# Patient Record
Sex: FEMALE | Race: WHITE
[De-identification: names, ages, dates, MRNs, and addresses within clinical notes are randomized per-mention and may not be internally consistent; named-entity substitution may affect disease eponyms.]

---

## 2025-02-10 ENCOUNTER — HOSPITAL ENCOUNTER (EMERGENCY)
Age: 55
Discharge: HOME | End: 2025-02-10
Payer: COMMERCIAL

## 2025-02-10 VITALS
SYSTOLIC BLOOD PRESSURE: 115 MMHG | TEMPERATURE: 98.06 F | HEART RATE: 86 BPM | OXYGEN SATURATION: 98 % | DIASTOLIC BLOOD PRESSURE: 78 MMHG

## 2025-02-10 VITALS — SYSTOLIC BLOOD PRESSURE: 104 MMHG | OXYGEN SATURATION: 95 % | DIASTOLIC BLOOD PRESSURE: 64 MMHG | HEART RATE: 84 BPM

## 2025-02-10 VITALS — SYSTOLIC BLOOD PRESSURE: 98 MMHG | DIASTOLIC BLOOD PRESSURE: 48 MMHG | OXYGEN SATURATION: 97 % | HEART RATE: 83 BPM

## 2025-02-10 VITALS — HEART RATE: 86 BPM | SYSTOLIC BLOOD PRESSURE: 104 MMHG | DIASTOLIC BLOOD PRESSURE: 64 MMHG

## 2025-02-10 VITALS — DIASTOLIC BLOOD PRESSURE: 76 MMHG | HEART RATE: 83 BPM | SYSTOLIC BLOOD PRESSURE: 135 MMHG

## 2025-02-10 VITALS — HEART RATE: 85 BPM | OXYGEN SATURATION: 93 %

## 2025-02-10 VITALS — OXYGEN SATURATION: 95 % | HEART RATE: 84 BPM | DIASTOLIC BLOOD PRESSURE: 54 MMHG | SYSTOLIC BLOOD PRESSURE: 109 MMHG

## 2025-02-10 VITALS — HEART RATE: 86 BPM | OXYGEN SATURATION: 94 %

## 2025-02-10 VITALS — HEART RATE: 84 BPM

## 2025-02-10 VITALS — OXYGEN SATURATION: 93 % | DIASTOLIC BLOOD PRESSURE: 64 MMHG | SYSTOLIC BLOOD PRESSURE: 113 MMHG | HEART RATE: 84 BPM

## 2025-02-10 VITALS — HEART RATE: 91 BPM

## 2025-02-10 VITALS — TEMPERATURE: 98.24 F | HEART RATE: 83 BPM | OXYGEN SATURATION: 97 %

## 2025-02-10 VITALS — HEART RATE: 86 BPM | TEMPERATURE: 98.2 F | SYSTOLIC BLOOD PRESSURE: 97 MMHG | DIASTOLIC BLOOD PRESSURE: 73 MMHG

## 2025-02-10 VITALS
DIASTOLIC BLOOD PRESSURE: 65 MMHG | TEMPERATURE: 98.4 F | OXYGEN SATURATION: 78 % | SYSTOLIC BLOOD PRESSURE: 112 MMHG | HEART RATE: 95 BPM

## 2025-02-10 VITALS — HEART RATE: 83 BPM | SYSTOLIC BLOOD PRESSURE: 109 MMHG | DIASTOLIC BLOOD PRESSURE: 56 MMHG | OXYGEN SATURATION: 87 %

## 2025-02-10 VITALS — HEART RATE: 88 BPM | SYSTOLIC BLOOD PRESSURE: 109 MMHG | OXYGEN SATURATION: 93 % | DIASTOLIC BLOOD PRESSURE: 57 MMHG

## 2025-02-10 VITALS — HEART RATE: 87 BPM

## 2025-02-10 VITALS — OXYGEN SATURATION: 78 %

## 2025-02-10 VITALS — HEART RATE: 85 BPM | OXYGEN SATURATION: 94 %

## 2025-02-10 VITALS — HEART RATE: 81 BPM | SYSTOLIC BLOOD PRESSURE: 116 MMHG | DIASTOLIC BLOOD PRESSURE: 65 MMHG

## 2025-02-10 VITALS — DIASTOLIC BLOOD PRESSURE: 67 MMHG | SYSTOLIC BLOOD PRESSURE: 111 MMHG | HEART RATE: 84 BPM | OXYGEN SATURATION: 94 %

## 2025-02-10 VITALS — SYSTOLIC BLOOD PRESSURE: 98 MMHG | OXYGEN SATURATION: 97 % | DIASTOLIC BLOOD PRESSURE: 48 MMHG | HEART RATE: 85 BPM

## 2025-02-10 VITALS — HEART RATE: 82 BPM

## 2025-02-10 VITALS — HEART RATE: 82 BPM | OXYGEN SATURATION: 97 %

## 2025-02-10 VITALS — OXYGEN SATURATION: 93 % | SYSTOLIC BLOOD PRESSURE: 97 MMHG | DIASTOLIC BLOOD PRESSURE: 73 MMHG | HEART RATE: 89 BPM

## 2025-02-10 VITALS — SYSTOLIC BLOOD PRESSURE: 113 MMHG | DIASTOLIC BLOOD PRESSURE: 65 MMHG | HEART RATE: 85 BPM

## 2025-02-10 VITALS — HEART RATE: 88 BPM | OXYGEN SATURATION: 94 %

## 2025-02-10 VITALS — HEART RATE: 81 BPM

## 2025-02-10 VITALS — HEART RATE: 89 BPM

## 2025-02-10 VITALS — HEART RATE: 84 BPM | DIASTOLIC BLOOD PRESSURE: 46 MMHG | SYSTOLIC BLOOD PRESSURE: 107 MMHG

## 2025-02-10 VITALS — OXYGEN SATURATION: 96 % | HEART RATE: 83 BPM

## 2025-02-10 VITALS — HEART RATE: 83 BPM | OXYGEN SATURATION: 94 %

## 2025-02-10 VITALS — OXYGEN SATURATION: 91 % | HEART RATE: 84 BPM

## 2025-02-10 VITALS — HEART RATE: 83 BPM

## 2025-02-10 VITALS — OXYGEN SATURATION: 95 % | HEART RATE: 84 BPM

## 2025-02-10 VITALS — HEART RATE: 86 BPM

## 2025-02-10 VITALS — TEMPERATURE: 98.24 F

## 2025-02-10 VITALS — OXYGEN SATURATION: 94 %

## 2025-02-10 VITALS — BODY MASS INDEX: 42.7 KG/M2

## 2025-02-10 VITALS — HEART RATE: 85 BPM

## 2025-02-10 VITALS — HEART RATE: 114 BPM

## 2025-02-10 DIAGNOSIS — R79.89: ICD-10-CM

## 2025-02-10 DIAGNOSIS — D64.9: Primary | ICD-10-CM

## 2025-02-10 DIAGNOSIS — N18.9: ICD-10-CM

## 2025-02-10 DIAGNOSIS — R44.1: ICD-10-CM

## 2025-02-10 LAB
ADD MANUAL DIFF: NO
ALANINE AMINOTRANSFERASE: 12 U/L (ref 14–59)
ALBUMIN GLOBULIN RATIO: 0.5
ALBUMIN LEVEL: 2.2 G/DL (ref 3.4–5)
ALKALINE PHOSPHATASE: 51 U/L (ref 46–116)
ANION GAP: 10
ASPARTATE AMINO TRANSFERASE: 18 U/L (ref 15–37)
BLOOD UREA NITROGEN: 37 MG/DL (ref 7–18)
CALCIUM: 9.3 MG/DL (ref 8.5–10.1)
CARBON DIOXIDE: 35.4 MMOL/L (ref 21–32)
CHLORIDE: 99 MMOL/L (ref 98–107)
CO2 BLD-SCNC: 35.4 MMOL/L (ref 21–32)
ESTIMATED GFR (AFRICAN AMERICA: 16
ESTIMATED GFR (NON-AFRICAN AME: 13
GLOBULIN: 4.8 G/DL
GLUCOSE BLD-MCNC: 121 MG/DL (ref 74–106)
HCT VFR BLD CALC: 22.2 % (ref 36–48)
HEMATOCRIT: 22.2 % (ref 36–48)
HEMOGLOBIN: 6.6 G/DL (ref 12–16)
IMMATURE GRANULOCYTES ABS AUTO: 0.18 10^3/UL (ref 0–0.03)
IMMATURE GRANULOCYTES PCT AUTO: 1.6 % (ref 0–0.5)
LYMPHOCYTES  ABSOLUTE AUTO: 2.3 10^3/UL (ref 1.2–3.8)
MCV RBC: 90.6 FL (ref 81–99)
MEAN CORPUSCULAR HEMOGLOBIN: 26.9 PG (ref 26.7–34)
MEAN CORPUSCULAR HGB CONC: 29.7 G/DL (ref 29.9–35.2)
MEAN CORPUSCULAR VOLUME: 90.6 FL (ref 81–99)
PLATELET # BLD: 367 10^3/UL (ref 150–450)
PLATELET COUNT: 367 10^3/UL (ref 150–450)
POTASSIUM SERPLBLD-SCNC: 4.4 MMOL/L (ref 3.5–5.1)
POTASSIUM: 4.4 MMOL/L (ref 3.5–5.1)
RED BLOOD COUNT: 2.45 10^6/UL (ref 4.2–5.4)
SODIUM BLD-SCNC: 140 MMOL/L (ref 136–145)
SODIUM: 140 MMOL/L (ref 136–145)
TOTAL PROTEIN: 7 G/DL (ref 6.4–8.2)
WBC # BLD: 11.5 10^3/UL (ref 4–11)
WHITE BLOOD COUNT: 11.5 10^3/UL (ref 4–11)

## 2025-02-10 PROCEDURE — 93005 ELECTROCARDIOGRAM TRACING: CPT

## 2025-02-10 PROCEDURE — 84484 ASSAY OF TROPONIN QUANT: CPT

## 2025-02-10 PROCEDURE — 99284 EMERGENCY DEPT VISIT MOD MDM: CPT

## 2025-02-10 PROCEDURE — 36592 COLLECT BLOOD FROM PICC: CPT

## 2025-02-10 PROCEDURE — 86923 COMPATIBILITY TEST ELECTRIC: CPT

## 2025-02-10 PROCEDURE — 80053 COMPREHEN METABOLIC PANEL: CPT

## 2025-02-10 PROCEDURE — 86900 BLOOD TYPING SEROLOGIC ABO: CPT

## 2025-02-10 PROCEDURE — 36430 TRANSFUSION BLD/BLD COMPNT: CPT

## 2025-02-10 PROCEDURE — 86850 RBC ANTIBODY SCREEN: CPT

## 2025-02-10 PROCEDURE — 86901 BLOOD TYPING SEROLOGIC RH(D): CPT

## 2025-02-10 PROCEDURE — 36415 COLL VENOUS BLD VENIPUNCTURE: CPT

## 2025-02-10 PROCEDURE — 70450 CT HEAD/BRAIN W/O DYE: CPT

## 2025-02-10 PROCEDURE — 85025 COMPLETE CBC W/AUTO DIFF WBC: CPT

## 2025-02-10 PROCEDURE — P9016 RBC LEUKOCYTES REDUCED: HCPCS

## 2025-02-10 NOTE — ED_ITS
HPI - Altered Mental Status    
General    
Chief Complaint: Altered Mental Status    
Stated Complaint: WEAKNESS    
Time Seen by Provider: 02/10/25 07:11    
Source: patient    
Mode of arrival: ambulance    
Limitations: no limitations    
History of Present Illness    
HPI narrative:     
The patient is a 54-year-old female who is coming to us from a skilled nursing   
facility for concern of altered mental status, the patient herself denies any   
concerns she mentioned that she did admit that she think that she saw someone in  
her room today and she trying getting out of the bed when the nurse came and   
that may be what was considered that she is hallucinating    
    
    
The patient admitted that this happened only this time and apparently she thinks  
that maybe the antibiotic that she was provided with before because her this    
    
At this moment the patient denies any concerns or any pain    
Related Data    
                                Home Medications    
    
    
    
?Medication ?Instructions ?Recorded ?Confirmed    
     
acetaminophen 325 mg tablet 650 mg PO Q4H PRN fever 02/10/25 02/10/25    
     
albuterol sulfate 2.5 mg/3 mL mg 02/10/25     
    
(0.083 %) solution for nebulization       
     
amiodarone 200 mg tablet 400 mg PO Q24H 02/10/25 02/10/25    
     
amitriptyline 50 mg tablet 50 mg PO .every night 02/10/25 02/10/25    
     
apixaban 5 mg tablet (Eliquis) 5 mg PO BID 02/10/25 02/10/25    
     
duloxetine 60 mg capsule,delayed 60 mg PO DAILY 02/10/25 02/10/25    
    
release       
     
losartan 25 mg tablet 25 mg PO DAILY 02/10/25 02/10/25    
     
omeprazole 20 mg capsule,delayed 20 mg PO DAILY 02/10/25 02/10/25    
    
release       
     
potassium chloride 10 mEq 10 meq PO DAILY 02/10/25 02/10/25    
    
tablet,extended release       
     
pravastatin 40 mg tablet 40 mg PO DAILY 02/10/25 02/10/25    
    
    
    
                                    Allergies    
    
    
    
Allergy/AdvReac Type Severity Reaction Status Date / Time    
     
amoxicillin (From Augmentin) AdvReac Intermediate Unknown Verified 02/10/25   
07:09    
     
aspirin AdvReac Intermediate Unknown Verified 02/10/25 07:09    
     
clavulanic acid (From AdvReac Intermediate Unknown Verified 02/10/25 07:09    
    
Augmentin)         
     
diphenhydramine (From AdvReac Intermediate Unknown Verified 02/10/25 07:09    
    
Benadryl)         
     
naproxen AdvReac Intermediate Unknown Verified 02/10/25 07:09    
     
NSAIDS (Non-Steroidal AdvReac Intermediate Unknown Verified 02/10/25 07:09    
    
Anti-Inflamma         
    
    
    
    
Review of Systems    
    
    
ROS      
    
 Status of ROS                          10 or more systems reviewed and unremark    
able except as noted in     
history and below       
    
    
PFSH    
PFSH    
Social History    
Little interest or pleasure in doing things:  not at all     
Feeling down, depressed, or hopeless:  not at all     
    
    
    
Exam    
Narrative    
Exam Narrative:     
Nurses notes and vital signs reviewed and patient is not hypoxic.    
    
General:  Well-appearing and in no apparent distress.      
Skin:  Warm, pale    
No rash.    
Head:  Normocephalic, atraumatic.    
Neck:  Supple, non-tender.    
Eye:  Pupils are equal, round and EOMI. No scleral icterus.    
Ears, Nose, Mouth, and Throat:  TM are clear, no nasal mucosal hypertrophy.    
Oral mucosa is moist, no posterior oropharynx erythema, uvula is mid-line    
Cardiovascular:  Regular Rate and Rhythm without murmur, gallop or rub.    
Respiratory:  No accessory muscle use or respiratory distress.    
Lungs are clear to auscultation, no wheezing, rales or rhonchi    
Chest Wall:  no tenderness    
Back: No midline thoracic or lumbar vertebral tenderness. No CVA tenderness    
Musculoskeletal:  normal ROM, no calf or popliteal tenderness, no lower   
extremity edema/swelling    
GI:  Abdomen is soft, non-distended.  Normal bowel sounds.    
No masses appreciated.    
No tenderness to palpation. No rebound, guarding, or rigidity noted.    
Constitutional    
Vital Signs, click to edit/add:     
    
                                Last Vital Signs    
    
    
    
Temp  98.2 F   02/10/25 11:18    
     
Pulse  82   02/10/25 11:20    
     
Resp  11 L  02/10/25 11:20    
     
BP  109/54   02/10/25 11:17    
     
Pulse Ox  97   02/10/25 11:20    
     
O2 Del Method  Room Air  02/10/25 07:24    
     
O2 Flow Rate  3   02/10/25 07:09    
    
    
    
    
    
Course    
Vital Signs    
Vital signs:     
    
                                   Vital Signs    
    
    
    
Temperature  98.4 F   02/10/25 07:09    
     
Pulse Rate  95 H  02/10/25 07:09    
     
Respiratory Rate  18   02/10/25 07:09    
     
Blood Pressure  112/65   02/10/25 07:09    
     
Pulse Oximetry  78 L  02/10/25 07:09    
     
Oxygen Delivery Method  Room Air  02/10/25 07:09    
     
Oxygen Delivery Flow Rate  3   02/10/25 07:09    
    
    
                                            
    
    
    
Temperature  98.2 F   02/10/25 11:18    
     
Pulse Rate  82   02/10/25 11:20    
     
Respiratory Rate  11 L  02/10/25 11:20    
     
Blood Pressure  109/54   02/10/25 11:17    
     
Pulse Oximetry  97   02/10/25 11:20    
     
Oxygen Delivery Method  Room Air  02/10/25 07:24    
     
Oxygen Delivery Flow Rate  3   02/10/25 07:09    
    
    
    
    
    
MDM - Altered Mental Status    
MDM Narrative    
Medical decision making narrative:     
The patient EKG in the ER showing sinus rhythm with a heart rate of 84 no ST   
elevation or depression the patient does not have any chest pain    
    
The patient apparently was admitted almost within the last month to [facility] where she was diagnosed with chronic kidney disease in addition to   
apparently UTI, the patient was discharged to the skilled nursing facility   
because of muscular deconditioning    
    
The patient did admit that she was hallucinating about someone being with her in  
the room earlier this morning and that made them sent her over here for   
evaluation    
    
But the patient at the moment have no complaint and her blood workup did show   
that her hemoglobin 6.6 she admits that sometimes she is tired and weak most of   
the time she is denying any chest pain or difficulty breathing    
    
The patient consented to getting 1 unit of blood transfusion    
    
There is no active bleeding at the moment and her kidney function shows GFR of   
13 which can explain her having anemia of chronic disease    
    
The patient right now was transfused with 1 unit of packed RBCs after which she   
was monitored and she had no acute complaints    
    
The patient then will be sent back to the skilled nursing facility    
    
It was noted that the patient troponin is elevated and this is mostly elevated   
secondary to the chronic kidney disease and I did repeat the test and there was   
no trending up that could be significant for any acute coronary syndrome    
Lab Data    
Labs:     
    
                                   Lab Results    
    
    
    
  02/10/25 02/10/25 02/10/25 Range/Units    
    
  07:15 08:06 08:51     
     
WBC  11.5 H    (4.0-11.0)  10^3/uL    
     
RBC  2.45 L    (4.20-5.40)  10^6/uL    
     
Hgb  6.6 L*    (12.0-16.0)  g/dL    
     
Hct  22.2 L*    (36.0-48.0)  %    
     
MCV  90.6    (81.0-99.0)  fL    
     
MCH  26.9    (26.7-34.0)  pg    
     
MCHC  29.7 L    (29.9-35.2)  g/dL    
     
RDW  16.5 H    (11.0-15.0)  %    
     
Plt Count  367    (150-450)  10^3/uL    
     
MPV  9.8    (9.5-13.5)  fL    
     
Neut % (Auto)  68.8    (43.0-75.0)  %    
     
Lymph % (Auto)  19.7 L    (20.5-60.0)  %    
     
Mono % (Auto)  7.8    (1.7-12.0)  %    
     
Eos % (Auto)  1.8    (0.9-7.0)  %    
     
Baso % (Auto)  0.3    (0.2-2.0)  %    
     
Neut # (Auto)  7.9 H    (1.4-6.5)  10^3/uL    
     
Lymph # (Auto)  2.3    (1.2-3.8)  10^3/uL    
     
Mono # (Auto)  0.9 H    (0.3-0.8)  10^3/uL    
     
Eos # (Auto)  0.2    (0.0-0.7)  10^3/uL    
     
Baso # (Auto)  0.0    (0.0-0.1)  10^3/uL    
     
Abs Immat Gran (auto)  0.18 H    (0.00-0.03)  10^3/uL    
     
Imm/Tot Granulo (auto)  1.6 H    (0.0-0.5)  %    
     
Sodium  140    (136-145)  mmol/L    
     
Potassium  4.4    (3.5-5.1)  mmol/L    
     
Chloride  99    ()  mmol/L    
     
Carbon Dioxide  35.4 H    (21.0-32.0)  mmol/L    
     
Anion Gap  10.0        
     
BUN  37.0 H    (7.0-18.0)  mg/dL    
     
Creatinine  3.62 H    (0.55-1.02)  mg/dL    
     
Est GFR ( Amer)  16 L    (>=60 mL/min/1.73m^2)      
     
Est GFR (Non-Af Amer)  13 L    (>=60 mL/min/1.73m^2)      
     
BUN/Creatinine Ratio  10.2        
     
Glucose  121 H    ()  mg/dL    
     
Calcium  9.3    (8.5-10.1)  mg/dL    
     
Total Bilirubin  0.3    (0.2-1.0)  mg/dL    
     
AST  18    (15-37)  U/L    
     
ALT  12 L    (14-59)  U/L    
     
Alkaline Phosphatase  51    ()  U/L    
     
Troponin I High Sens    111.0 H*  (4.0-51.3)  pg/mL    
     
Total Protein  7.0    (6.4-8.2)  g/dL    
     
Albumin  2.2 L    (3.4-5.0)  g/dL    
     
Globulin  4.8    g/dL    
     
Albumin/Globulin Ratio  0.5        
     
Blood Type   A Negative       
     
Antibody Screen   Negative       
     
Crossmatch   See Detail       
    
    
    
    
  02/10/25 Range/Units    
    
  10:28     
     
WBC   (4.0-11.0)  10^3/uL    
     
RBC   (4.20-5.40)  10^6/uL    
     
Hgb   (12.0-16.0)  g/dL    
     
Hct   (36.0-48.0)  %    
     
MCV   (81.0-99.0)  fL    
     
MCH   (26.7-34.0)  pg    
     
MCHC   (29.9-35.2)  g/dL    
     
RDW   (11.0-15.0)  %    
     
Plt Count   (150-450)  10^3/uL    
     
MPV   (9.5-13.5)  fL    
     
Neut % (Auto)   (43.0-75.0)  %    
     
Lymph % (Auto)   (20.5-60.0)  %    
     
Mono % (Auto)   (1.7-12.0)  %    
     
Eos % (Auto)   (0.9-7.0)  %    
     
Baso % (Auto)   (0.2-2.0)  %    
     
Neut # (Auto)   (1.4-6.5)  10^3/uL    
     
Lymph # (Auto)   (1.2-3.8)  10^3/uL    
     
Mono # (Auto)   (0.3-0.8)  10^3/uL    
     
Eos # (Auto)   (0.0-0.7)  10^3/uL    
     
Baso # (Auto)   (0.0-0.1)  10^3/uL    
     
Abs Immat Gran (auto)   (0.00-0.03)  10^3/uL    
     
Imm/Tot Granulo (auto)   (0.0-0.5)  %    
     
Sodium   (136-145)  mmol/L    
     
Potassium   (3.5-5.1)  mmol/L    
     
Chloride   ()  mmol/L    
     
Carbon Dioxide   (21.0-32.0)  mmol/L    
     
Anion Gap       
     
BUN   (7.0-18.0)  mg/dL    
     
Creatinine   (0.55-1.02)  mg/dL    
     
Est GFR (African Amer)   (>=60 mL/min/1.73m^2)      
     
Est GFR (Non-Af Amer)   (>=60 mL/min/1.73m^2)      
     
BUN/Creatinine Ratio       
     
Glucose   ()  mg/dL    
     
Calcium   (8.5-10.1)  mg/dL    
     
Total Bilirubin   (0.2-1.0)  mg/dL    
     
AST   (15-37)  U/L    
     
ALT   (14-59)  U/L    
     
Alkaline Phosphatase   ()  U/L    
     
Troponin I High Sens  116.8 H*  (4.0-51.3)  pg/mL    
     
Total Protein   (6.4-8.2)  g/dL    
     
Albumin   (3.4-5.0)  g/dL    
     
Globulin   g/dL    
     
Albumin/Globulin Ratio       
     
Blood Type       
     
Antibody Screen       
     
Crossmatch       
    
    
    
    
    
Discharge Plan    
Discharge    
Chief Complaint: Altered Mental Status    
    
Clinical Impression:    
 Anemia, Transfusion of blood during current hospitalisation, Chronic kidney   
disease    
    
    
Patient Disposition: Home, Self-Care    
    
Time of Disposition Decision: 11:22    
    
Condition: Good    
    
Prescriptions / Home Meds:    
No Action    
  acetaminophen 325 mg tablet     
   650 mg PO Q4H PRN (Reason: fever)     
  albuterol sulfate 2.5 mg /3 mL (0.083 %) solution for nebulization     
           
  amiodarone 200 mg tablet     
   400 mg PO Q24H     
  amitriptyline 50 mg tablet     
   50 mg PO .every night     
  duloxetine 60 mg capsule,delayed release(DR/EC)     
   60 mg PO DAILY     
  Eliquis 5 mg tablet     
   5 mg PO BID     
  losartan 25 mg tablet     
   25 mg PO DAILY     
  omeprazole 20 mg capsule,delayed release(DR/EC)     
   20 mg PO DAILY     
  potassium chloride 10 mEq tablet extended release     
   10 meq PO DAILY     
  pravastatin 40 mg tablet     
   40 mg PO DAILY     
    
Print Language: English    
    
Instructions:  Chronic Kidney Disease Diet (DC), Anemia (ED)    
    
Referrals:    
MONY THAYER [Primary Care Provider] - 1 week

## 2025-02-10 NOTE — ECG_ITS
[facility] 
                                        
                                       Test Date:    2025-02-10 
Pat Name:     STEPHANIE HEARN            Department:    
Patient ID:   [id number]               Room:         - 
Gender:       Female                   Technician:    
:          [id number]               Requested By: MONY THAYER 
Order Number: [id number]              Reading MD:   DEVON GILLIAM 
                                 Measurements 
Intervals                              Axis           
Rate:         84                       P:            13 
ID:           [id number]                      QRS:          44 
QRSD:         100                      T:            91 
QT:           382                                     
QTc:          423                                     
                           Interpretive Statements 
1100 Sinus rhythm 
4011 Minimal ST depression 
9130 **  borderline ECG  ** 
No previous ECG available for comparison 
Electronically Signed On 2- 20:21:30 EST by DEVON GILLIAM

## 2025-02-10 NOTE — CT_ITS
The [address] [address] 
     [phone] 
  
  
Patient Name: 
STEPHANIE HEARN 
  
MRN: [id number]    YOB: 1970    Sex: F 
Assigned Patient Location: ED.MAIN 
Current Patient Location:  
Accession/Order Number: [id number] 
Exam Date: 2/10/2025  07:50    Report Date: 2/10/2025  08:07 
  
At the request of: 
BRADLEY ERWIN   
  
Procedure:  CT head/brain wo con 
  
CT head/brain wo con, 2/10/2025 7:50 AM EST 
  
INDICATION: hallucination on anticoagulant  
  
COMPARISON: There is no appropriate prior study for comparison.  
  
TECHNIQUE: Axial CT images of the brain from skull base to vertex, including  
portions of the face and sinuses, were obtained without contrast . Multiplanar  
  
reformatted images were generated and reviewed as needed. 
  
Dose reduction techniques were achieved by using automated exposure control  
and/or adjustment of mA and/or kV according to patient size and/or use of  
iterative reconstruction technique. 
  
FINDINGS: 
  
The cerebral sulci as well as ventricular system are appropriate for age. 
  
There is no intracranial mass, mass effect, midline shift, intra or  
extra-axial  
fluid collection or hemorrhage. Periventricular and centrum semiovale  
hypodensities are most likely consistent with microvascular ischemic changes. 
  
The visualized portions of orbits, mastoid air cells as well as paranasal  
sinuses are unremarkable.  
  
There is no suspicious osteolytic or osteoblastic lesion.  
  
ORDER #: [id number] CT/CT head/brain wo con  
IMPRESSION:  
   
No acute intracranial process is noted.  
   
   
Electronically authenticated by: ANI GRANGER   Date: 2/10/2025  08:07

## 2025-02-10 NOTE — ED.AMS1
HPI - Altered Mental Status
General
Chief Complaint: Altered Mental Status
Stated Complaint: WEAKNESS
Time Seen by Provider: 02/10/25 07:11
Source: patient
Mode of arrival: ambulance
Limitations: no limitations
History of Present Illness
HPI narrative: 
The patient is a 54-year-old female who is coming to us from a skilled nursing facility for concern of altered mental status, the patient herself denies any concerns she mentioned that she did admit that she think that she saw someone in her room 
today and she trying getting out of the bed when the nurse came and that may be what was considered that she is hallucinating


The patient admitted that this happened only this time and apparently she thinks that maybe the antibiotic that she was provided with before because her this

At this moment the patient denies any concerns or any pain
Related Data
Home Medications

?Medication ?Instructions ?Recorded ?Confirmed
acetaminophen 325 mg tablet 650 mg PO Q4H PRN fever 02/10/25 02/10/25
albuterol sulfate 2.5 mg/3 mL mg 02/10/25 
(0.083 %) solution for nebulization   
amiodarone 200 mg tablet 400 mg PO Q24H 02/10/25 02/10/25
amitriptyline 50 mg tablet 50 mg PO .every night 02/10/25 02/10/25
apixaban 5 mg tablet (Eliquis) 5 mg PO BID 02/10/25 02/10/25
duloxetine 60 mg capsule,delayed 60 mg PO DAILY 02/10/25 02/10/25
release   
losartan 25 mg tablet 25 mg PO DAILY 02/10/25 02/10/25
omeprazole 20 mg capsule,delayed 20 mg PO DAILY 02/10/25 02/10/25
release   
potassium chloride 10 mEq 10 meq PO DAILY 02/10/25 02/10/25
tablet,extended release   
pravastatin 40 mg tablet 40 mg PO DAILY 02/10/25 02/10/25


Allergies

Allergy/AdvReac Type Severity Reaction Status Date / Time
amoxicillin (From Augmentin) AdvReac Intermediate Unknown Verified 02/10/25 07:09
aspirin AdvReac Intermediate Unknown Verified 02/10/25 07:09
clavulanic acid (From AdvReac Intermediate Unknown Verified 02/10/25 07:09
Augmentin)     
diphenhydramine (From AdvReac Intermediate Unknown Verified 02/10/25 07:09
Benadryl)     
naproxen AdvReac Intermediate Unknown Verified 02/10/25 07:09
NSAIDS (Non-Steroidal AdvReac Intermediate Unknown Verified 02/10/25 07:09
Anti-Inflamma     



Review of Systems
ROS  
 Status of ROS 10 or more systems reviewed and unremarkable except as noted in history and below   

PFSH
PFSH
Social History
Little interest or pleasure in doing things:  not at all 
Feeling down, depressed, or hopeless:  not at all 



Exam
Narrative
Exam Narrative: 
Nurses notes and vital signs reviewed and patient is not hypoxic.

General:  Well-appearing and in no apparent distress.  
Skin:  Warm, pale
No rash.
Head:  Normocephalic, atraumatic.
Neck:  Supple, non-tender.
Eye:  Pupils are equal, round and EOMI. No scleral icterus.
Ears, Nose, Mouth, and Throat:  TM are clear, no nasal mucosal hypertrophy.  Oral mucosa is moist, no posterior oropharynx erythema, uvula is mid-line
Cardiovascular:  Regular Rate and Rhythm without murmur, gallop or rub.
Respiratory:  No accessory muscle use or respiratory distress.
Lungs are clear to auscultation, no wheezing, rales or rhonchi
Chest Wall:  no tenderness
Back: No midline thoracic or lumbar vertebral tenderness. No CVA tenderness
Musculoskeletal:  normal ROM, no calf or popliteal tenderness, no lower extremity edema/swelling
GI:  Abdomen is soft, non-distended.  Normal bowel sounds.
No masses appreciated.
No tenderness to palpation. No rebound, guarding, or rigidity noted.
Constitutional
Vital Signs, click to edit/add: 

Last Vital Signs

Temp  98.2 F   02/10/25 11:18
Pulse  82   02/10/25 11:20
Resp  11 L  02/10/25 11:20
BP  109/54   02/10/25 11:17
Pulse Ox  97   02/10/25 11:20
O2 Del Method  Room Air  02/10/25 07:24
O2 Flow Rate  3   02/10/25 07:09




Course
Vital Signs
Vital signs: 

Vital Signs

Temperature  98.4 F   02/10/25 07:09
Pulse Rate  95 H  02/10/25 07:09
Respiratory Rate  18   02/10/25 07:09
Blood Pressure  112/65   02/10/25 07:09
Pulse Oximetry  78 L  02/10/25 07:09
Oxygen Delivery Method  Room Air  02/10/25 07:09
Oxygen Delivery Flow Rate  3   02/10/25 07:09



Temperature  98.2 F   02/10/25 11:18
Pulse Rate  82   02/10/25 11:20
Respiratory Rate  11 L  02/10/25 11:20
Blood Pressure  109/54   02/10/25 11:17
Pulse Oximetry  97   02/10/25 11:20
Oxygen Delivery Method  Room Air  02/10/25 07:24
Oxygen Delivery Flow Rate  3   02/10/25 07:09




MDM - Altered Mental Status
MDM Narrative
Medical decision making narrative: 
The patient EKG in the ER showing sinus rhythm with a heart rate of 84 no ST elevation or depression the patient does not have any chest pain

The patient apparently was admitted almost within the last month to [facility] where she was diagnosed with chronic kidney disease in addition to apparently UTI, the patient was discharged to the skilled nursing facility because of muscular 
deconditioning

The patient did admit that she was hallucinating about someone being with her in the room earlier this morning and that made them sent her over here for evaluation

But the patient at the moment have no complaint and her blood workup did show that her hemoglobin 6.6 she admits that sometimes she is tired and weak most of the time she is denying any chest pain or difficulty breathing

The patient consented to getting 1 unit of blood transfusion

There is no active bleeding at the moment and her kidney function shows GFR of 13 which can explain her having anemia of chronic disease

The patient right now was transfused with 1 unit of packed RBCs after which she was monitored and she had no acute complaints

The patient then will be sent back to the skilled nursing facility

It was noted that the patient troponin is elevated and this is mostly elevated secondary to the chronic kidney disease and I did repeat the test and there was no trending up that could be significant for any acute coronary syndrome
Lab Data
Labs: 

Lab Results

  02/10/25 02/10/25 02/10/25 Range/Units
  07:15 08:06 08:51 
WBC  11.5 H    (4.0-11.0)  10^3/uL
RBC  2.45 L    (4.20-5.40)  10^6/uL
Hgb  6.6 L*    (12.0-16.0)  g/dL
Hct  22.2 L*    (36.0-48.0)  %
MCV  90.6    (81.0-99.0)  fL
MCH  26.9    (26.7-34.0)  pg
MCHC  29.7 L    (29.9-35.2)  g/dL
RDW  16.5 H    (11.0-15.0)  %
Plt Count  367    (150-450)  10^3/uL
MPV  9.8    (9.5-13.5)  fL
Neut % (Auto)  68.8    (43.0-75.0)  %
Lymph % (Auto)  19.7 L    (20.5-60.0)  %
Mono % (Auto)  7.8    (1.7-12.0)  %
Eos % (Auto)  1.8    (0.9-7.0)  %
Baso % (Auto)  0.3    (0.2-2.0)  %
Neut # (Auto)  7.9 H    (1.4-6.5)  10^3/uL
Lymph # (Auto)  2.3    (1.2-3.8)  10^3/uL
Mono # (Auto)  0.9 H    (0.3-0.8)  10^3/uL
Eos # (Auto)  0.2    (0.0-0.7)  10^3/uL
Baso # (Auto)  0.0    (0.0-0.1)  10^3/uL
Abs Immat Gran (auto)  0.18 H    (0.00-0.03)  10^3/uL
Imm/Tot Granulo (auto)  1.6 H    (0.0-0.5)  %
Sodium  140    (136-145)  mmol/L
Potassium  4.4    (3.5-5.1)  mmol/L
Chloride  99    ()  mmol/L
Carbon Dioxide  35.4 H    (21.0-32.0)  mmol/L
Anion Gap  10.0    
BUN  37.0 H    (7.0-18.0)  mg/dL
Creatinine  3.62 H    (0.55-1.02)  mg/dL
Est GFR ( Amer)  16 L    (>=60 mL/min/1.73m^2)  
Est GFR (Non-Af Amer)  13 L    (>=60 mL/min/1.73m^2)  
BUN/Creatinine Ratio  10.2    
Glucose  121 H    ()  mg/dL
Calcium  9.3    (8.5-10.1)  mg/dL
Total Bilirubin  0.3    (0.2-1.0)  mg/dL
AST  18    (15-37)  U/L
ALT  12 L    (14-59)  U/L
Alkaline Phosphatase  51    ()  U/L
Troponin I High Sens    111.0 H*  (4.0-51.3)  pg/mL
Total Protein  7.0    (6.4-8.2)  g/dL
Albumin  2.2 L    (3.4-5.0)  g/dL
Globulin  4.8    g/dL
Albumin/Globulin Ratio  0.5    
Blood Type   A Negative   
Antibody Screen   Negative   
Crossmatch   See Detail   

  02/10/25 Range/Units
  10:28 
WBC   (4.0-11.0)  10^3/uL
RBC   (4.20-5.40)  10^6/uL
Hgb   (12.0-16.0)  g/dL
Hct   (36.0-48.0)  %
MCV   (81.0-99.0)  fL
MCH   (26.7-34.0)  pg
MCHC   (29.9-35.2)  g/dL
RDW   (11.0-15.0)  %
Plt Count   (150-450)  10^3/uL
MPV   (9.5-13.5)  fL
Neut % (Auto)   (43.0-75.0)  %
Lymph % (Auto)   (20.5-60.0)  %
Mono % (Auto)   (1.7-12.0)  %
Eos % (Auto)   (0.9-7.0)  %
Baso % (Auto)   (0.2-2.0)  %
Neut # (Auto)   (1.4-6.5)  10^3/uL
Lymph # (Auto)   (1.2-3.8)  10^3/uL
Mono # (Auto)   (0.3-0.8)  10^3/uL
Eos # (Auto)   (0.0-0.7)  10^3/uL
Baso # (Auto)   (0.0-0.1)  10^3/uL
Abs Immat Gran (auto)   (0.00-0.03)  10^3/uL
Imm/Tot Granulo (auto)   (0.0-0.5)  %
Sodium   (136-145)  mmol/L
Potassium   (3.5-5.1)  mmol/L
Chloride   ()  mmol/L
Carbon Dioxide   (21.0-32.0)  mmol/L
Anion Gap   
BUN   (7.0-18.0)  mg/dL
Creatinine   (0.55-1.02)  mg/dL
Est GFR (African Amer)   (>=60 mL/min/1.73m^2)  
Est GFR (Non-Af Amer)   (>=60 mL/min/1.73m^2)  
BUN/Creatinine Ratio   
Glucose   ()  mg/dL
Calcium   (8.5-10.1)  mg/dL
Total Bilirubin   (0.2-1.0)  mg/dL
AST   (15-37)  U/L
ALT   (14-59)  U/L
Alkaline Phosphatase   ()  U/L
Troponin I High Sens  116.8 H*  (4.0-51.3)  pg/mL
Total Protein   (6.4-8.2)  g/dL
Albumin   (3.4-5.0)  g/dL
Globulin   g/dL
Albumin/Globulin Ratio   
Blood Type   
Antibody Screen   
Crossmatch   




Discharge Plan
Discharge
Chief Complaint: Altered Mental Status

Clinical Impression:
 Anemia, Transfusion of blood during current hospitalisation, Chronic kidney disease


Patient Disposition: Home, Self-Care

Time of Disposition Decision: 11:22

Condition: Good

Prescriptions / Home Meds:
No Action
  acetaminophen 325 mg tablet 
   650 mg PO Q4H PRN (Reason: fever) 
  albuterol sulfate 2.5 mg /3 mL (0.083 %) solution for nebulization 
       
  amiodarone 200 mg tablet 
   400 mg PO Q24H 
  amitriptyline 50 mg tablet 
   50 mg PO .every night 
  duloxetine 60 mg capsule,delayed release(DR/EC) 
   60 mg PO DAILY 
  Eliquis 5 mg tablet 
   5 mg PO BID 
  losartan 25 mg tablet 
   25 mg PO DAILY 
  omeprazole 20 mg capsule,delayed release(DR/EC) 
   20 mg PO DAILY 
  potassium chloride 10 mEq tablet extended release 
   10 meq PO DAILY 
  pravastatin 40 mg tablet 
   40 mg PO DAILY 

Print Language: English

Instructions:  Chronic Kidney Disease Diet (DC), Anemia (ED)

Referrals:
MONY THAYER [Primary Care Provider] - 1 week

## 2025-02-10 NOTE — SWNOTE1
SURAJ received email from Valeria at [facility] and she was letting SURAJ know that pt is from there facility. SURAJ confirmed with Valeria that pt is here in our ED at the present time.

## 2025-02-11 ENCOUNTER — HOSPITAL ENCOUNTER (OUTPATIENT)
Dept: HOSPITAL 101 - LAB | Age: 55
Discharge: HOME | End: 2025-02-11
Payer: COMMERCIAL

## 2025-02-11 DIAGNOSIS — R41.82: Primary | ICD-10-CM

## 2025-02-11 LAB
ALANINE AMINOTRANSFERASE: 16 U/L (ref 14–59)
ALBUMIN GLOBULIN RATIO: 0.5
ALBUMIN LEVEL: 2.3 G/DL (ref 3.4–5)
ALKALINE PHOSPHATASE: 62 U/L (ref 46–116)
ANION GAP: 10.8
ASPARTATE AMINO TRANSFERASE: 21 U/L (ref 15–37)
BLOOD UREA NITROGEN: 42 MG/DL (ref 7–18)
CALCIUM: 8.8 MG/DL (ref 8.5–10.1)
CARBON DIOXIDE: 32.8 MMOL/L (ref 21–32)
CHLORIDE: 100 MMOL/L (ref 98–107)
CO2 BLD-SCNC: 32.8 MMOL/L (ref 21–32)
ESTIMATED GFR (AFRICAN AMERICA: 14
ESTIMATED GFR (NON-AFRICAN AME: 12
GLOBULIN: 4.7 G/DL
GLUCOSE BLD-MCNC: 215 MG/DL (ref 74–106)
GLUCOSE URINE UA: 100 MG/DL
HCT VFR BLD CALC: 26.3 % (ref 36–48)
HEMATOCRIT: 26.3 % (ref 36–48)
HEMOGLOBIN: 7.7 G/DL (ref 12–16)
MAGNESIUM: 1.8 MG/DL (ref 1.8–2.4)
MCV RBC: 92.9 FL (ref 81–99)
MEAN CORPUSCULAR HEMOGLOBIN: 27.2 PG (ref 26.7–34)
MEAN CORPUSCULAR HGB CONC: 29.3 G/DL (ref 29.9–35.2)
MEAN CORPUSCULAR VOLUME: 92.9 FL (ref 81–99)
PLATELET # BLD: 366 10^3/UL (ref 150–450)
PLATELET COUNT: 366 10^3/UL (ref 150–450)
POTASSIUM SERPLBLD-SCNC: 4.6 MMOL/L (ref 3.5–5.1)
POTASSIUM: 4.6 MMOL/L (ref 3.5–5.1)
RED BLOOD COUNT: 2.83 10^6/UL (ref 4.2–5.4)
SODIUM BLD-SCNC: 139 MMOL/L (ref 136–145)
SODIUM: 139 MMOL/L (ref 136–145)
TOTAL PROTEIN: 7 G/DL (ref 6.4–8.2)
WBC # BLD: 14.8 10^3/UL (ref 4–11)
WHITE BLOOD COUNT: 14.8 10^3/UL (ref 4–11)

## 2025-02-11 PROCEDURE — 85027 COMPLETE CBC AUTOMATED: CPT

## 2025-02-11 PROCEDURE — 80053 COMPREHEN METABOLIC PANEL: CPT

## 2025-02-11 PROCEDURE — 36415 COLL VENOUS BLD VENIPUNCTURE: CPT

## 2025-02-11 PROCEDURE — 81003 URINALYSIS AUTO W/O SCOPE: CPT

## 2025-02-11 PROCEDURE — 83735 ASSAY OF MAGNESIUM: CPT

## 2025-02-11 NOTE — XMS_ITS
Comprehensive CCD (C-CDA v2.1)  
  
                          Created on: 2025  
  
  
CRIS HEARN  
External Reference #: CDR,PersonID:[id number]  
: 1970  
Sex: Female  
  
Demographics  
  
  
                                        Address             [address], [place]  [id number]  
   
                                        Mobile Phone        [phone]  
   
                                        Preferred Language  en  
   
                                        Marital Status      Single  
   
                                        Buddhism Affiliation Unknown  
   
                                        Race                White  
   
                                        Ethnic Group        Not  or Lati  
no  
  
  
Author  
  
  
                                        Organization        [facility]  
  
  
Care Team Providers  
  
  
                                Care Team Member Name Role            Phone  
   
                                RUPERTO FELIX W Unavailable     Unavailable  
   
                                ISIDRO, RUPERTO W Unavailable     Unavailable  
   
                                LORENE LOPES   Unavailable     Unavailable  
   
                                ISIDRO, RUPERTO W Unavailable     Unavailable  
   
                                ISIDRO, RUPERTO W Unavailable     Unavailable  
   
                                ISIDRO, RUPERTO W Unavailable     Unavailable  
   
                                ISIDRO, RUPERTO W Unavailable     Unavailable  
   
                                ISIDRO, RUPERTO W Unavailable     Unavailable  
   
                                ISIDRO, RUPERTO W Unavailable     Unavailable  
   
                                ISIDRO, RUPERTO W Unavailable     Unavailable  
   
                                ISIDRO, RUPERTO W Unavailable     Unavailable  
   
                                MD Jill Rodriguez Attending Provider [phone]  
  
   
                                Yann Sparks Unavailable     [phone]  
   
                                Ruddy Elder DO Primary Care Provider [phone]   
   
                                DO Apollo Lopez Attending Provider [phone]  
[phone]  
   
                                DO Apollo Lopez Primary Care Provider [phone]  
[phone]  
   
                                MD Jordan De Leon Jr Emergency Provider [phone]  
[phone]  
   
                                DO Sulaiman Shine Admit Provider  [phone]  
   
                                MD Brisa Acevedo Attending Provider [phone]  
[phone]  
   
                                MARYCRUZ Iniguez MD Unavailable     [phone]  
   
                                SEBASTIEN Henriquez Emergency Provider [phone]  
   
                                DO Alexei Wise Emergency Provider MD Amanda Melchor Admit Provider  [phone]  
   
                                MD Amanda Murillo Attending Provider [phone]   
   
                                MD Azael Aj Other Provider  [phone]  
   
                                [place] Primary Care Provider [phone]  
[phone]  
   
                                MD Roselia Chowdhury Other Provider  [phone]  
   
                                DO Apollo Lopez Primary Care Provider [phone]  
[phone]  
   
                                SEBASTIEN Henriquez Emergency Provider [phone]  
   
                                DO Alexei Wise Emergency Provider MD Amanda Melchor Admit Provider  [phone]  
   
                                MD Amanda Murillo Attending Provider [phone]   
   
                                MD Azael Aj Other Provider  [phone]  
   
                                [facility] Primary Care Provider [phone]  
[phone]  
   
                                MD Roselia Chowdhury Other Provider  [phone]  
   
                                MD Azael Aj Attending Provider [phone]  
   
                                [facility] Primary Care Provider [phone]  
[phone]  
   
                                [facility] Primary Care Provider [phone]  
[phone]  
   
                                [facility] [phone]  
   
                                MARYCRUZ Iniguez MD Unavailable     [phone]  
   
                                Unavailable     Primary Care Provider Unavailabl  
e  
   
                                Kenji Christiansen MD Unavailable     [phone]  
   
                                Unavailable     Primary Care Provider Unavailabl  
e  
   
                                [facility] Primary Care Provider [phone]  
[phone]  
   
                                Miquel Asencio DO Emergency Provider [phone]  
   
                                Renny Rowley MD Admit Provider  [phone]  
   
                                Renny Rowley MD Attending Provider [phone]  
   
                                Unavailable     Primary Care Provider Unavailabl  
e  
   
                                Nithya Jaimes DO Emergency Provider [phone]  
   
                                Shyam Guadarrama MD Admit Provider  [phone]  
   
                                Shyam Guadarrama MD Attending Provider [phone]  
[phone]  
   
                                Yamilet Terry MD Attending Provider [phone]  
   
                                Azael Aj MD Other Provider  [phone]  
   
                                Lisa Camejo MD Other Provider  [phone]  
   
                                Charlie Ortega     Attending       Unavailable  
   
                                Santos Gandara Attending       Unavailable  
   
                                Santos Gandara Admitting       Unavailable  
   
                                [place]      Unavailable  
   
                                [place] [employer] [facility]      Unavailable  
   
                                [place] [employer]      Unavailable  
   
                                [facility] [place] [employer]      Unavailable  
   
                                [place] [employer]      Unavailable  
   
                                [place] [employer]      Unavailable  
   
                                [facility] [place] [employer]      Unavailable  
   
                                [place] [employer]      Unavailable  
   
                                [place] [employer]      Unavailable  
   
                                MD Nahum P[facility]rez [employer]      Unavailable  
   
                                RUDDY ELDER Primary Care    Unavailable  
   
                                PROVIDER, UNKNOWN Admitting       Unavailable  
   
                                ARELI QUIÑONES Attending       Unavailable  
   
                                PROVIDER, UNKNOWN Admitting       Unavailable  
   
                                RUDDY ELDER Primary Care    Unavailable  
   
                                PROVIDER, UNKNOWN Attending       Unavailable  
   
                                MARYCRUZ INIGUEZ Attending       Unavailable  
   
                                PROVIDER, UNKNOWN Admitting       Unavailable  
   
                                RUDDY ELDER Primary [facility]    Unavailable  
   
                                PROVIDER, UNKNOWN Admitting       Unavailable  
   
                                RUDDY ELDER Primary Care    Unavailable  
   
                                PROVIDER, UNKNOWN Attending       Unavailable  
   
                                RUDDY ELDER Primary Care    Unavailable  
   
                                KOUROUNI, ISMINI Admitting       Unavailable  
   
                                PROVIDER, UNKNOWN Attending       Unavailable  
   
                                JOSEPHINESKIENE, YAMILET Referring       Unavailable  
   
                                [facility]UDDY ELDER Primary Care    Unavailable  
   
                                KOUROUNI, ISMINI Admitting       Unavailable  
   
                                PROVIDER, UNKNOWN Attending       Unavailable  
   
                                SEMASKIENE, YAMILET Referring       Unavailable  
   
                                RUD[facility] ELDER Primary [facility]    Unavailable  
   
                                KOUROUNI, ISMINI Admitting       Unavailable  
   
                                PROVIDER, UNKNOWN Attending       Unavailable  
   
                                HSANDAE, YAMILET Referring       Unavailable  
   
                                PROVIDER, UNKNOWN Admitting       Unavailable  
   
                                [facility] ELD[facility] Primary [facility]    Unavailable  
   
                                PROVIDER, UNKNOWN Attending       Unavailable  
   
                                MARYCRUZ INIGUEZ Admitting       Unavailable  
   
                                RUDDY ELDER Primary Care    Unavailable  
   
                                PROVIDER, UNKNOWN Attending       Unavailable  
   
                                MARYCRUZ INIGUEZ Admitting       Unavailable  
   
                                RUDDY ELDER Primary Care    Unavailable  
   
                                PROVIDER, UNKNOWN Attending       Unavailable  
   
                                MARYCRUZ INIGUEZ Referring       Unavailable  
   
                                PROVIDER, UNKNOWN Attending       Unavailable  
   
                                PROVIDER, UNKNOWN Admitting       Unavailable  
   
                                [facility]UD[facility] ELDER Primary [facility]    Unavailable  
   
                                MARYCRUZ INIGUEZ Admitting       Unavailable  
   
                                MARYCRUZ INIGUEZ Attending       Unavailable  
   
                                MARYCRUZ INIGUEZ Referring       Unavailable  
   
                                [facility]    Unavailable  
   
                                [facility]LD[facility]    Unavailable  
   
                                CHRIS MENDIOLA Attending       Unavailable  
   
                                KOUROUNI, ISMINI Admitting       Unavailable  
   
                                REQUEST, IP PHYSICAL THERAPY SERVICE Consulting   
     Unavailable  
   
                                SEMASKIENE, YAMILET Referring       Unavailable  
   
                                REQUEST, IP OCCUPATIONAL THERAPY SERVICE Consult  
ing      Unavailable  
   
                                CONSULT, IP INFECTIOUS DISEASE Consulting      U  
navailable  
   
                                CONSULT, IP SURGERY UROLOGY Consulting      Unav  
ailable  
   
                                SENAKILAH TRIMBLE Admitting       Unavailabl  
e  
   
                                MIQUEL ASENCIO    Referring       Unavailable  
   
                                TANISHA STAHL Attending       Unavailable  
   
                                [facility] ELD[facility] Primary [facility]    Unavailable  
   
                                CONSULT, IP SURGERY UROLOGY Consulting      Unav  
ailable  
   
                                REQUEST, IP SLP SERVICE Consulting      Unavaila  
ble  
   
                                REQUEST, IP PHYSICAL THERAPY SERVICE Consulting   
     Unavailable  
   
                                REQUEST, IP OCCUPATIONAL THERAPY SERVICE Consult  
ing      Unavailable  
   
                                CONSULT, IP INFECTIOUS DISEASE Consulting      U  
navailable  
   
                                CONSULT, IP CARDIOLOGY Consulting      Unavailab  
le  
   
                                CONSULT, IP CARDIOLOGY ELECTROPHYSIOLOGY (EP) Co  
nsulting      Unavailable  
   
                                ALESIA BARRETT Consulting      Unavailable  
   
                                MARYCRUZ INIGUEZ Attending       Unavailable  
   
                                MARYCRUZ INIGUEZ Admitting       Unavailable  
   
                                [facility] ELD[facility] Primary [facility]    Unavailable  
   
                                PROVIDER, UNKNOWN Admitting       Unavailable  
   
                                RUDDY ELDER Primary Care    Unavailable  
   
                                PROVIDER, UNKNOWN Attending       Unavailable  
   
                                PROVIDER, UNKNOWN Admitting       Unavailable  
   
                                KENJI CHRISTIANSEN Attending       Unavailable  
   
                                RUD[facility] ELDER Primary [facility]    Unavailable  
   
                                PROVIDER, UNKNOWN Admitting       Unavailable  
   
                                AMANDA RICHARD Attending       Unavailable  
   
                                RUDDY ELDER Primary Care    Unavailable  
   
                                [facility]UD[facility] ELDER Primary [facility]    Unavailable  
   
                                SENEVIRATNE, CHANDULA Admitting       Unavailabl  
e  
   
                                LUIS M, MIQUEL    Referring       Unavailable  
   
                                PROVIDER, UNKNOWN Attending       Unavailable  
   
                                [facility] ELD[facility]    Unavailable  
   
                                SENEVIRATNE, CHANDULA Admitting       Unavailabl  
e  
   
                                LUIS M, MIQUEL    Referring       Unavailable  
   
                                PROVIDER, UNKNOWN Attending       Unavailable  
   
                                SENEVIRATNE, CHANDULA Admitting       Unavailabl  
e  
   
                                LUIS M, MIQUEL    Referring       Unavailable  
   
                                [facility] ELD[facility]    Unavailable  
   
                                PROVIDER, UNKNOWN Attending       Unavailable  
   
                                LORENE BARRERA   Primary Care Physician [phone]  
   
                                MD [facility]um P[facility]      Unavailable  
   
                                Santos Gandara Attending       Unavailable  
   
                                Santos Gandara Admitting       Unavailable  
   
                                [place] Primary [facility]    Unavaila  
ble  
   
                                Apollo Lopez  Consulting      Unavailable  
   
                                [facility], Sharif Attending       Unavailable  
   
                                [facility], Sharif Admitting       Unavailable  
   
                                [place]    Unavaila  
ble  
   
                                Renny Rowley   Attending       Unavailable  
   
                                Renny Rowley   Admitting       Unavailable  
   
                                Azael Aj  Consulting      Unavailable  
   
                                [place]    Unavaila  
Yamilet Betancourt Attending       Unavailable  
   
                                Shyam Guadarrama    Admitting       Unavailable  
   
                                Lisa Camejo   Consulting      Unavailable  
  
  
  
Allergies  
  
  
                                                    Allergy   
Classification                          Reported   
Allergen(s)               Allergy Type              Date of   
Onset                     Reaction(s)               Facility  
   
                                                    Aluminum aspirin  
(2 sources)         Aluminum aspirin    Drug Allergy        20                                                  [facility]  
   
                                                    diphenhydrAMINE  
(2 sources)         diphenhydrAMINE     Drug Allergy        20                                                  [facility]  
   
                                                    NSAIDs  
(4 sources)         Ibuprofen           Drug Allergy        20                                                  [facility]  
Work Phone:   
[phone]  
   
                                                      
(20 sources)                            Aspirin;   
Translations:   
[aspirin]                 Drug Allergy              20                        hives                     [facility]  
   
                                        Comment on above:   This patient uses Al  
buterol at home.   
   
                                                      
(14 sources)                            Azithromycin;   
Translations:   
[azithromycin]            Drug Allergy              20                        [place]                  [facility]  
   
                                                      
(20 sources)                            diphenhydrAMINE;   
Translations:   
[DIPHENHYDRAMINE]         Drug Allergy              20                                      Swelling,   
Unknown   
(qualifier   
value)                                  [facility]  
   
                                                      
(20 sources)                            Ibuprofen;   
Translations:   
[ibuprofen]               Drug Allergy              20                        hives                     [facility]  
   
                                                      
(20 sources)                            Naproxen;   
Translations:   
[naproxen]                Drug Allergy              20                                      hives,   
Urticaria   
(disorder)                              [facility]  
   
                                                      
(14 sources)                            Amoxicillin-Pot   
Clavulanate;   
Translations:   
[AMOXICILLIN-POT   
CLAVULANATE]                            Propensity to   
adverse   
reactions to   
drug                                    20                                                  [facility]  
   
                                                      
(7 sources)                             Amoxicillin;   
Translations:   
[amoxicillin]             Drug Allergy              20                        [facility]                    [facility]  
   
                                                      
(10 sources)                            Amoxicillin /   
Clavulanate;   
Translations:   
[Augmentin]     Drug Allergy                    [facility]   
Repository  
   
                                                      
(9 sources)                             diphenhydrAMINE;   
Translations:   
[Benadryl]      Drug Allergy                                    [facility]   
Repository  
   
                                                      
(10 sources)                            NSAIDs;   
Translations:   
[NSAIDs]                                Propensity to   
adverse   
reactions   
(disorder)                              [facility]   
Repository  
  
  
  
Medications  
Current Medications  
  
  
  
                      Medication Drug Class(es) Dates      Sig (Normalized) Sig   
(Original)  
   
                                                    acetaminophen 500 mg   
oral tablet  
(20 sources)                                        Start:   
2025                              take 1 tablet by   
mouth every four   
hours as needed for   
pain                                    acetaminophen 500   
mg Tab 500 mg = 1   
tab(s), Oral, q4hr,   
PRN for pain, # 60   
tab(s), Refills(s)   
0 Start Date:   
2/3/25 Status:   
Ordered  
  
  
  
                                                    Start: 2024  
End: 2024                         take 2 tablets by mouth every   
six hours as needed for pain            acetaminophen (Tylenol) 325 mg   
tablet Take 2 Tablets by mouth every   
6 hours as needed for Pain or Fever   
for up to 5 days. 20 Tablet 0   
2024 Active  
   
                                                    Start: 2024  
End: 2024                         take 2 tablets by mouth every   
six hours as needed for pain            acetaminophen (Tylenol) 325 mg   
tablet Take 2 Tablets by mouth every   
6 hours as needed for Pain or Fever   
for up to 5 days. 20 Tablet 0   
2024 Active  
   
                                                    Start: 2024  
End: 2024                                     acetaminophen (TYLENOL) tabl  
et  
   
                                                    Start: 10-  
End: 2022                         take 1 tablet by mouth every   
four hours as needed for pain           Acetaminophen 500 mg Tablet   
Discontinued 500 MG PO Q4H as needed   
for Pain 100 2021   
11:00pm 2022 10:07pm  
   
                                                    Start: 2021  
End: 10-                         take 2 tablets by mouth every   
six hours as needed for pain            Acetaminophen 325 mg Tablet   
Discontinued 650 MG PO Q6H as needed   
for fever or pain 0 2021 11:00pm 2021   
10:36am  
   
                                                    Start: 2021  
End: 10-                         take 650 mg by mouth every six   
hours                                   Acetaminophen Discontinued 650 MG PO   
Q6H 0 2021 12:00am   
2021 11:36am  
  
  
  
                                                    acetaminophen 325 mg   
/ oxyCODONE   
hydrochloride 5 mg   
oral tablet  
(20 sources)    Opioid Agonist  Start: 2024                 oxyCODONE-acet  
aminophen   
(PERCOCET) 5-325 mg per tablet  
  
  
  
                                                    Start: 2022  
End: 2022                         take 1 tablet by mouth   
every six hours as needed   
for pain                                Oxycodone-Acetaminophen (Percocet) 5-325  
   
mg tablet Discontinued 1 TAB PO Q6H as   
needed for pain 12 3 May 4th, 2022 May   
6th, 2022 2:03pm This is a formal   
prescription to complete the process for   
the emergency telephone order previously   
given from Dr. Castelan.  
   
                                                    Start: 2019  
End: 2019                         take 1 tablet by mouth   
every eight hours as   
needed for pain                         Oxycodone-Acetaminophen 5-325 mg Tablet   
Discontinued 1 TAB PO Q8H as needed for   
Pain Scale 8 - 10 15 5 2019   
12:00am 2019 2:52pm  
  
  
  
                                                    [id number] 200   
actuat   
albuterol 0.09   
mg/actuat   
metered dose   
inhaler  
(20 sources)                            beta2-Adrenergic   
Agonist                                 Start:   
2025                              take 2 puff(s)   
by inhalation   
every four hours   
for wheezing                            Proventil HFA 90   
mcg/inh Aerosol   
2 puff(s),   
Inhalation, q4hr   
for wheezing,   
6.7 gram,   
Refill(s) 0   
Start Date:   
2/3/25 Status:   
Ordered  
  
  
  
                                        Start: 2024   take 2.5 mg by inhal  
ation every   
four hours as needed                    Albuterol Sulfate 2.5 mg /3 mL   
(0.083 %) solution for   
nebulization Active 2.5 MG   
INHALATION Every 4 hours as needed   
for Shortness Of Breath 270 30 May   
16th, 2024 10:35am  
   
                                        Start: 2023   take 2 puff(s) by mo  
uth every   
four hours as needed for wheezing       albuterol (PROVENTIL HFA)   
INHALATION HFA inhaler   
(VENTOLIN,PROAIR,PROVENTIL) 90mcg   
Inhale 2 Puffs by mouth every 4   
hours as needed for Shortness of   
Breath or Wheezing. 8.5 g   
2023 Active  
   
                                Start: 2023                 albuterol (PRO  
VENTIL HFA) 108 (90   
Base) MCG/ACT HFA inhaler  
   
                                                    Start: 06-  
End: 2025                         take 1 puff(s) by inhalation   
every six hours as needed               Albuterol Sulfate (Ventolin Hfa)   
90 mcg/actuation Hfa Aerosol   
Inhaler Discontinued 2 PUFF   
INHALATION Q6H as needed for   
Shortness Of Breath  11:00pm 2025   
12:16am  
   
                                                    Start: 2019  
End: 2024                         take 2.5 mg by inhalation every   
three hours as needed                   Albuterol Sulfate 2.5 mg /3 mL   
(0.083 %) Solution For   
Nebulization Discontinued 2.5 MG   
INHALATION Q3H as needed for   
Shortness Of Breath 15 October   
6th, 2021 11:00pm May 16th, 2024   
10:37am  
   
                                                    Start: 2019  
End: 2020                         take 2.5 mg by inhalation four   
times daily                             Albuterol Sulfate 2.5 mg /3 mL   
(0.083 %) Solution For   
Nebulization Discontinued 2.5 MG   
INHALATION Four times daily -   
Respiratory 0 2019   
11:00pm 2020   
11:02pm  
   
                                                    Start: 2017  
End: 2019                         take 2.5 mg by inhalation every   
four hours as needed for wheezing       Albuterol Sulfate 1.25 mg/3 mL   
Solution For Nebulization   
Discontinued 2.5 MG INHALATION Q4H   
as needed for Shortness Of Breath   
Or Wheezing 100 2017 6:09pm 2019 4:43pm  
   
                                                    Start: 2017  
End: 2017                         take 1.25 mg by inhalation every   
four hours as needed for wheezing       Albuterol Sulfate 1.25 mg/3 mL   
Solution For Nebulization   
Discontinued 1.25 MG INHALATION   
Q4H as needed for Shortness Of   
Breath Or Wheezing 2017 12:00am 2017   
6:09pm  
   
                                                            take 1 puff(s) by in  
halation   
every four hours as needed              Albuterol Sulfate  (90   
Base) MCG/ACT 1 puff as needed   
Inhalation every 4 hrs Active  
   
                                                            take 1 puff(s) by in  
halation   
every four hours as needed              Albuterol Sulfate  (90   
Base) MCG/ACT 1 puff as needed   
Inhalation every 4 hrs Active  
  
  
  
                                                    albuterol 0.833   
mg/ml /   
ipratropium   
bromide 0.167   
mg/ml   
inhalation   
solution  
(20 sources)                            Anticholinergic,   
beta2-Adrenergic   
Agonist                                 Start:   
2025                              take 3 mL by   
inhalation   
every six   
hours                                   albuterol-ipratropium   
Inh Sol 3 mL UD 3 mL,   
NEB, q6hr, Refill(s) 0   
Start Date: 2/3/25   
Status: Ordered  
  
  
  
                                                    Start: 2024  
End: 2024                         take 1 mL by inhalation   
every six hours                         Ipratropium-Albuterol 0.5 mg-3 mg(2.5   
mg base)/3 mL solution for nebulization   
Discontinued 3 ML INHALATION Every 6   
hours 2024 11:00pm May   
16th, 2024 10:18am  
   
                                                    Start: 2021  
End: 2023                         take 1 mL by inhalation four   
times daily                             Ipratropium-Albuterol 0.5 mg-3 mg(2.5   
mg base)/3 mL Solution For Nebulization   
Discontinued 3 ML INHALATION Four times   
daily - Respiratory 15 2021 11:00pm 2023 7:21pm  
   
                                                            take 3 mL by inhalat  
ion   
every six hours as needed               Ipratropium-Albuterol 0.5-2.5 (3)   
MG/3ML 3 mL as needed Inhalation every   
6 hrs Active  
  
  
  
                                                    amiodarone   
hydrochloride 200   
mg oral tablet  
(1 source)                Antiarrhythmic            Start:   
2025                              take 2   
tablets by   
mouth once   
daily                                   amiodarone 200 mg   
Tab 400 mg = 2   
tab(s), Oral,   
Daily, Refills(s)   
0 Start Date:   
2/3/25 Status:   
Ordered  
   
                                                    amitriptyline   
hydrochloride 50 mg   
oral tablet  
(20 sources)                            Tricyclic   
Antidepressant                          Start:   
2025                              take 1   
tablet by   
mouth once   
daily at   
bedtime                                 amitriptyline 50   
mg Tab 50 mg = 1   
tab(s), Oral, Once   
a day (at   
bedtime), # 30   
tab(s), Refills(s)   
0 Start Date:   
2/3/25 Status:   
Ordered  
  
  
  
                                                    Start: 2017  
End: 2024                         take 1 tablet by mouth once   
daily in the evening                    Amitriptyline 50 mg Tablet   
Discontinued 50 MG PO Every evening   
0 2021 1:17pm   
2021 10:36am  
  
  
  
                                                    ammonium lactate   
Top 12% Lotion  
(1 source)                      Start: 2025                 ammonium lacta  
te   
Top 12% Lotion 1   
govind, Topical,   
BID, 120 mL,   
Refill(s) 0   
Start Date:   
2/3/25 Status:   
Ordered  
   
                                                    amoxicillin 875 mg   
/ clavulanate 125   
mg oral tablet  
(20 sources)                            Penicillin-class   
Antibacterial                           Start: 2024  
End: 2024                         take 1 tablet   
by mouth   
twice daily                             amoxicillin-clav  
ulanate   
(AUGMENTIN)   
875-125 MG per   
tablet Take 1   
Tablet by mouth   
2 times daily   
for 14 days. 28   
Tablet 0   
2024   
Active  
  
  
  
                                                    Start: 2024  
End: 2024                         take 1 tablet by mouth   
twice daily                             amoxicillin-clavulanate (AUGMENTIN)   
875-125 MG per tablet Take 1 Tablet by   
mouth 2 times daily for 10 days. 20 Tablet   
0 2024 Active  
   
                                                    Start: 2020  
End: 06-                         take 1 tablet by mouth   
every eight hours                       Amoxicillin-Pot Clavulanate (Augmentin)   
500-125 mg tablet Discontinued 1 TAB PO   
Q8H 30 10 December 16th, 2020 12:00am 2021 2:35am  
  
  
  
                                                    apixaban 5 mg   
oral tablet  
(20 sources)                            Factor Xa   
Inhibitor                 Start: 2025         take 1 tablet   
by mouth twice   
daily                                   Eliquis 5 mg oral   
tablet 5 mg = 1   
tab(s), Oral, BID,   
# 60 tab(s),   
Refills(s) 0 Start   
Date: 2/3/25   
Status: Ordered  
  
  
  
                                                    Start: 2019  
End: 2023                         take 1 tablet by mouth twice   
daily                                   Apixaban (Eliquis) 5 mg Tablet   
Discontinued 5 MG PO Twice daily  11:00pm 2023 4:10pm On Hold:   
resumption determined by [facility]   
hospital  
   
                                                            take 2 tablets by mo  
uth twice   
daily                                   Apixaban (ELIQUIS) 2.5 MG tablet   
Take 5 mg by mouth 2 times daily.   
Active  
  
  
  
                                                    calcium chloride 0.0014 meq/  
ml /   
potassium chloride 0.004 meq/ml /   
sodium chloride 0.103 meq/ml /   
sodium lactate 0.028 meq/ml   
injectable solution  
(3 sources)                     Start: 2024                 lactated ringe  
rs iv infusion  
  
  
  
                                Start: 2024                 lactated ringe  
rs iv infusion  
   
                                Start: 2023                 lactated ringe  
rs iv infusion  
  
  
  
                                                    cefepime 1000 mg   
injection  
(1 source)                              Cephalosporin   
Antibacterial       Start: 2023                       cefepime   
(MAXIPIME) iv   
piggyback 1000 mg  
   
                                                    Cpap (Continuous   
Positive Airway   
Pressure)  
(1 source)                      Start: 2024                 Cpap (Continuo  
us   
Positive Airway   
Pressure) Active   
0 .ROUTE May   
16th, 2024   
12:00am DME   
[facility]  
   
                                                    Cpap (Continuous   
Positive Airway   
Pressure) unit  
(5 sources)                     Start: 2024                 Cpap (Continuo  
us   
Positive Airway   
Pressure) unit   
Active 0 .ROUTE   
May 15th, 2024   
11:00pm DME   
[facility]  
   
                                                    Cymbalta 60 mg   
Cap-DR  
(1 source)                              Start: 2025   take 1   
capsule by   
mouth once   
daily                                   Cymbalta 60 mg   
Cap-DR = 1   
cap(s), Oral,   
Daily, (do not   
crush or chew), #   
30 cap(s),   
Refills(s) 0   
Start Date:   
2/3/25 Status:   
Ordered  
   
                                                    dextrose 10 % iv   
infusion  
(1 source)                      Start: 2023                 dextrose 10 %   
iv   
infusion  
   
                                                    0.5 ml dulaglutide   
1.5 mg/ml   
auto-injector  
(20 sources)                            GLP-1 Receptor   
Agonist             Start: 2023                       Dulaglutide   
(Trulicity) 0.75   
mg/0.5 mL pen   
injector Active   
0.7 MG SUBCUT   
every week 2023   
11:00pm On Hold:   
Hold ON thursday   
PER PT  
  
  
  
                                                            inject 0.75 mg by banks  
bcutaneous injection   
every week                              dulaglutide (Trulicity) 0.75 MG/0.5ML   
SOPN injection pen Inject 0.75 mg under   
the skin once weekly.  Active  
  
  
  
                                                    esomeprazole 40 mg   
delayed release   
oral capsule  
(1 source)                              Proton Pump   
Inhibitor                               Start:   
2023                                          esomeprazole (NEXIUM)   
capsule  
   
                                                    fenofibrate 145 mg   
oral tablet  
(20 sources)                            Peroxisome   
Proliferator   
Receptor alpha   
Agonist                                 Start:   
2024                              take 1   
tablet by   
mouth once   
daily                                   Fenofibrate   
Nanocrystallized 145   
mg tablet Active 145   
MG PO Daily May 7th,   
2024 11:00pm  
  
  
  
                                                    Start: 2017  
End: 2019                                     Fenofibrate 120 mg Tablet   
Discontinued 134 MG PO Daily   
2017 12:00am 2019 4:43pm  
   
                                                    Start: 2017  
End: 2019           take 134 mg by mouth once daily Fenofibrate Discontinu  
ed 134 MG   
PO   
Daily 2017 1:00am   
2019 5:43pm  
   
                                                            take 1 tablet by laith  
th every   
twenty-four hours                       Fenofibrate 145 MG 1 tablet with   
food Orally Once a day Active  
  
  
  
                                                    1 ml fentaNYL 0.05 mg/ml   
injection  
(4 sources)               Opioid Agonist            Start: 2024  
End: 2024                                     fentaNYL (SUBLIMAZE) 50   
MCG/ML injection  
  
  
  
                                                    Start: 2024  
End: 2024                                     fentaNYL (SUBLIMAZE) 100 MCG  
/2ML injection  
   
                                                    Start: 2023  
End: 2023                                     fentaNYL (SUBLIMAZE) 100 MCG  
/2ML injection  
  
  
  
                                                    fluticasone   
propionate 0.05   
mg/actuat metered   
dose nasal spray  
(20 sources)        Corticosteroid      Start: 2025   take 1 spray(s)   
nasal route   
twice daily                             fluticasone Nasal   
0.05 mg/inh Spry 1   
spray(s), Nasal,   
BID, 16 gram,   
Refill(s) 0, each   
nostril Start   
Date: 2/3/25   
Status: Ordered  
  
  
  
                                                    Start: 2023  
End: 2025                                     Fluticasone Propionate 50 mc  
g/actuation spray,suspension   
Discontinued 2 SPRAY INTRANASAL Daily as needed for Allergy   
Symptoms 2023 11:00pm 2025 11:14pm  
   
                                                                Fluticasone Furo  
ate 50 MCG/ACT AEPB Inhale by mouth. Active  
  
  
  
                                                    120 actuat   
fluticasone   
propionate 0.23   
mg/actuat /   
salmeterol 0.021   
mg/actuat metered   
dose inhaler  
(20 sources)                            Corticosteroid,   
beta2-Adrenergic   
Agonist                                 Start:   
2023                              take 2   
puff(s) by   
mouth twice   
daily                                   fluticasone   
-salmeterol   
(Advair HFA)   
230-21 MCG/ACT   
inhaler Inhale 2   
Puffs by mouth 2   
times daily. 12   
Each 2023   
Active  
  
  
  
                                                    Start: 10-  
End: 2022                                     Fluticasone Propion-Salmeter  
ol (Advair Diskus) 100-50 mcg/dose   
blister with device Discontinued 1 INH INHALATION Twice daily 60   
2021 11:00pm 2022 4:14am  
   
                                                    Start: 10-  
End: 2022                                     Fluticasone Propion-Salmeter  
ol (Advair Diskus) 100-50 mcg/dose   
blister with device Discontinued 1 INH INHALATION Twice daily 60   
2021 12:00am 2022 5:14am  
   
                                                    Start: 2017  
End: 2018                                     Fluticasone Propion-Salmeter  
ol (Advair Diskus) 250-50 mcg/dose   
blister with device Discontinued 1 INH INHALATION Twice daily  12:00am 2018 11:00pm 2018 11:04pm  
   
                                                    Start: 2017  
End: 2018                                     Fluticasone Propion-Salmeter  
ol (Advair Diskus) 250-50 mcg/dose   
blister with device Discontinued 1 INH INHALATION Twice daily  12:00am 2018 11:04pm  
   
                                                    Start: 2017  
End: 2018                                     Fluticasone Propion-Salmeter  
ol (Advair Diskus) 250-50 mcg/dose   
blister with device Discontinued 1 INH INHALATION Twice daily  1:00am 2018 12:04am  
  
  
  
                                                    furosemide 80 mg   
oral tablet  
(20 sources)        Loop Diuretic       Start: 2024   take 1 tablet   
by mouth once   
daily                                   Furosemide 80 mg   
tablet Active 80 MG   
PO Daily May 7th,   
2024 11:00pm On   
Hold: Hold  
  
  
  
                                                    Start: 2023  
End: 2024                         take 1 tablet by mouth once   
daily                                   Furosemide 20 mg Tablet Discontinued   
20 MG PO Daily 2023   
11:00pm 2024 11:56pm  
   
                                                    Start: 2021  
End: 10-                         take 2 tablets by mouth once   
daily in the morning                    Furosemide (Lasix) 40 mg tablet   
Discontinued 80 MG PO Every morning   
2021 4:01pm 2021 10:36am  
   
                                                    Start: 2021  
End: 2021                         take 1 tablet by mouth once   
daily                                   Furosemide (Lasix) 40 mg tablet   
Discontinued 40 MG PO Daily  11:00pm 2021 11:56am  
   
                                                    Start: 06-  
End: 06-                         take 1 tablet by mouth once   
daily                                   Furosemide 80 mg tablet Discontinued   
80 MG PO Daily 2021   
11:00pm 2021 3:37am  
   
                                                    Start: 2019  
End: 2020                         take 1 tablet by mouth once   
daily                                   Furosemide 40 mg tablet Discontinued   
40 MG PO Daily    
4:34pm 2020 12:03pm  
   
                                                    Start: 2019  
End: 2019                         take 2 tablets by mouth once   
daily                                   Furosemide 40 mg tablet Discontinued   
80 MG PO Daily 2019 2:53pm   
2019 4:34pm  
   
                                                    Start: 2019  
End: 2019           take 80 mg by mouth once daily Furosemide Discontinued  
 80 MG PO   
Daily 2019 3:53pm 2019 5:34pm  
   
                                                    Start: 2019  
End: 2019                         take 1 tablet by mouth once   
daily                                   Furosemide 40 mg Tablet Discontinued   
40 MG PO Daily 60 2019   
12:03pm 2019 2:53pm  
   
                                                    Start: 2017  
End: 2019                         take 1 tablet by mouth twice   
daily                                   Furosemide 40 mg Tablet Discontinued   
40 MG PO BID@0800,1600 60 2017 12:00am 2019   
12:03pm  
   
                                                    Start: 2017  
End: 2017                         take 1 tablet by mouth once   
daily                                   Furosemide 80 mg Tablet Discontinued   
80 MG PO Daily 2017   
12:00am 2017 6:01pm  
  
  
  
                                                    1 ml HYDROmorphone   
hydrochloride 0.2 mg/ml   
prefilled syringe  
(14 sources)              Opioid Agonist            Start: 2024  
End: 05-                                     HYDROmorphone (DILAUDID)   
1 mg/mL injection  
  
  
  
                                                    Start: 2024  
End: 2024                                     HYDROmorphone (DILAUDID) 0.2  
   
MG/ML injection 0.2 mg  
   
                                                    Start: 2023  
End: 2023                         take 0.5 mg intravenously every   
four hours as needed                    Hydromorphone (Dilaudid)   
Discontinued 0.5 mg IV-PUSH Q4H   
as needed 0 2023   
11:00pm 2023   
4:10pm  
   
                                                    Start: 2023  
End: 2023                         take 0.5 mg intravenously every   
four hours                              Hydromorphone (Dilaudid)   
Discontinued 0.5 mg IV-PUSH Q4H 0   
2023 11:00pm 2023 4:10pm  
   
                                                    Start: 2023  
End: 2023                         take 0.5 mg intravenously every   
four hours                              Hydromorphone (Dilaudid)   
Discontinued 0.5 mg IV-PUSH Q4H 0   
2023 12:00am   
2023 5:10pm  
   
                                        Start: 2023   take 0.5 mg intraven  
ously every   
four hours                              Hydromorphone (Dilaudid) Active   
0.5 mg IV-PUSH Q4H 0 2023 12:00am  
  
  
  
                                                    insulin lispro 100   
unt/ml injectable   
solution  
(1 source)      Insulin Analog  Start: 2023                 insulin lispro  
   
(HumaLOG) 100   
UNIT/ML   
injection  
   
                                                    Lidoderm  
(1 source)                              Antiarrhythmic, Amide   
Local Anesthetic    Start: 2025                       Lidoderm 1   
patch(es),   
Topical, Daily,   
Refill(s) 0   
Start Date:   
2/3/25 Status:   
Ordered  
   
                                                    linezolid 600 mg   
oral tablet  
(20 sources)                            Oxazolidinone   
Antibacterial             Start: 2024         take 1 tablet   
by mouth   
twice daily                             Linezolid 600 mg   
tablet Active   
600 MG PO Twice   
daily May 7th,   
2024 11:00pm  
  
  
  
                                                    Start: 2024  
End: 2024                         take 1 tablet by mouth twice   
daily                                   linezolid (ZYVOX) 600 MG tablet Take   
1 Tablet by mouth 2 times daily. 6   
Tablet 0 2024   
Discontinued  
  
  
  
                                                    losartan potassium   
25 mg oral tablet  
(1 source)                              Angiotensin 2   
Receptor Blocker          Start: 2025         take 1 tablet   
by mouth once   
daily                                   losartan 25 mg Tab   
25 mg = 1 tab(s),   
Oral, Daily, # 30   
tab(s), Refills(s)   
0 Start Date:   
2/3/25 Status:   
Ordered  
   
                                                    Multi For Her -  
(3 sources)                                                     Multi For Her -   
Orally Active  
   
                                                    Multivitamin   
preparation  
(15 sources)                            Start: 2023   take 1 tablet   
by mouth once   
daily                                   Multivitamin   
Active 1 TAB PO   
Daily 2023 11:00pm  
  
  
  
                                        Start: 2023   take 1 tablet by laith  
th once   
daily                                   Multivitamin Active 1 TAB PO Daily   
2023 12:00am  
   
                                                    Start: 2017  
End: 10-                         take 1 tablet by mouth once   
daily in the morning                    Multivitamin Discontinued 1 TAB PO   
Every morning 2017   
12:00am 2021 10:36am  
   
                                                    Start: 2017  
End: 10-                         take 1 tablet by mouth once   
daily in the morning                    Multivitamin Discontinued 1 TAB PO   
Every morning 2017   
1:00am 2021 11:36am  
  
  
  
                                                    Multivitamin tablet  
(5 sources)                             Start: 2023   take 1 tablet   
by mouth once   
daily                                   Multivitamin   
tablet Active 1   
TAB PO Daily 2023 11:00pm  
   
                                                    1 ml naloxone   
hydrochloride 0.4   
mg/ml injection  
(2 sources)     Opioid Antagonist Start: 2024                 naloxone (NA  
RCAN)   
0.4 MG/ML   
injection  
  
  
  
                                Start: 2024                 naloxone (NARC  
AN) 0.4 MG/ML injection  
  
  
  
                                                    nystatin 100 unt/mg   
topical powder  
(20 sources)    Polyene Antifungal Start: 2023                 nystatin (M  
YCOSTATIN)   
100,000 unit/g powder  
  
  
  
                                                    Start: 2022  
End: 2023                                     Nystatin 100,000 unit/gram p  
owder Discontinued TOPICAL May 2nd,  
   
2022 11:00pm 2023 7:21pm  
   
                                                    Start: 2021  
End: 10-                                     Nystatin (Nystop) 100,000 un  
it/gram Powder Discontinued 1   
APPLIC   
TOPICAL Twice daily 0 2021 11:00pm 2021 10:36am  
   
                                                    Start: 2019  
End: 06-                                     Nystatin (Nystop) 100,000 un  
it/gram Powder Discontinued 1   
APPLIC   
TOPICAL Three times daily 60  11:00pm 2021 2:35am  
  
  
  
                                                    omeprazole 20 mg   
delayed release   
oral capsule  
(20 sources)                            Proton Pump   
Inhibitor                 Start: 2024         take 1 capsule   
by mouth twice   
daily                                   Omeprazole 20 mg   
capsule,delayed   
release(DR/EC) Active   
20 MG PO Twice daily   
May 7th, 2024 11:00pm  
  
  
  
                                                    Start: 10-  
End: 2024                         take 1 capsule by mouth twice   
daily                                   Omeprazole 20 mg Capsule,Delayed   
Release(Dr/Ec) Discontinued 20 MG PO   
Twice daily 60 2021   
11:00pm 2024 11:56pm  
   
                                                    Start: 2019  
End: 2020                         take 1 capsule by mouth once   
daily                                   Omeprazole 20 mg Capsule,Delayed   
Release(Dr/Ec) Discontinued 20 MG PO   
Daily 30  11:00pm   
2020 11:05pm  
  
  
  
                                                    oxyCODONE hydrochloride 5   
mg oral tablet  
(20 sources)    Opioid Agonist  Start: 2023                 oxyCODONE imme  
diate   
release tablet  
  
  
  
                                                    Start: 2021  
End: 2022                         take 1 tablet by mouth every   
six hours as needed for pain            Oxycodone 5 mg Tablet Discontinued 5   
MG PO Q6H as needed for Pain    
10:07pm  
  
  
  
                                                    Oxygen  
(2 sources)                     Start: 2024                 Oxygen Active   
0 .Route May 16th, 2024 11:17am   
DME   
[facility]  
  
  
  
                                                    Start: 2024  
End: 2024                                     Oxygen Discontinued 0 .Route  
 May 8th, 2024 12:00am May 16th,   
2024   
11:19am As directed  
  
  
  
                                                    Oxygen 3 liters  
(3 sources)                                                     Oxygen 3 liters   
continuous Active  
   
                                                    Oxygen unit  
(10 sources)                    Start: 2024                 Oxygen unit Ac  
tive 0 .Route May 16th, 2024   
10:17am DME [facility]  
  
  
  
                                                    Start: 2024  
End: 2024                                     Oxygen unit Discontinued 0 .  
Route May 7th, 2024 11:00pm May   
16th,   
2024 10:19am As directed  
  
  
  
                                                    pantoprazole 40 mg   
delayed release oral   
tablet  
(20 sources)                            Proton Pump   
Inhibitor                 Start: 2025         take 1 tablet   
by mouth once   
daily                                   Pantoprazole 40 mg   
DR Tab 40 mg = 1   
tab(s), Oral,   
Daily, Refills(s) 0   
Start Date: 2/3/25   
Status: Ordered  
  
  
  
                                        Start: 2023   take 1 tablet by laith  
th once   
daily                                   pantoprazole (PROTONIX) 40 MG tablet   
Take 1 Tablet by mouth daily. 30   
Tablet 3 2023 Active  
  
  
  
                                                    phenazopyridine   
hydrochloride 200 mg   
delayed release oral   
tablet  
(3 sources)                                         Start: 2024  
End: 2024                         take 1 tablet by   
mouth three   
times daily as   
needed                                  phenazopyridine   
(Pyridium) 200 MG tablet   
Take 1 Tablet by mouth 3   
times daily as needed   
for Other (bladder   
irritation) for up to 3   
days. 9 Tablet 0   
2024   
Active  
  
  
  
                                                    Start: 2024  
End: 2024                         take 1 tablet by mouth three   
times daily as needed                   phenazopyridine (Pyridium) 200 MG   
tablet Take 1 Tablet by mouth 3 times   
daily as needed for Other (bladder   
irritation) for up to 3 days. 9 Tablet   
0 2024 Active  
  
  
  
                                                    polyethylene glycol 3350   
07332 mg powder for oral   
solution  
(12 sources)              Osmotic Laxative          Start: 2024  
End: 2024                                     polyethylene glycol   
(MiraLax) packet   
Dissolve 1 Packet (17 g   
total) in 8 ounces of   
liquid and drink daily.   
14 Packet 2024 Active  
  
  
  
                                                    Start: 2024  
End: 2024                                     polyethylene glycol (MiraLax  
) packet Dissolve 1 Packet (17 g   
total) in 8 ounces of liquid and drink daily. 14 Packet 0   
2024 Active  
  
  
  
                                                    pravastatin sodium   
40 mg oral tablet  
(20 sources)                            HMG-CoA Reductase   
Inhibitor                 Start: 2025         take 1 tablet   
by mouth once   
daily at   
bedtime                                 pravastatin 40 mg   
Tab 40 mg = 1   
tab(s), Oral, Once   
a day (at   
bedtime), # 30   
tab(s), Refills(s)   
0 Start Date:   
2/3/25 Status:   
Ordered  
  
  
  
                                                    Start: 06-  
End: 10-                         take 1 tablet by mouth once   
daily in the evening                    Pravastatin 40 mg tablet   
Discontinued 40 MG PO Every evening   
0 2021 1:17pm   
2021 10:36am  
   
                                                    Start: 2017  
End: 2020                         take 1 tablet by mouth at   
bedtime                                 Pravastatin 40 mg Tablet   
Discontinued 40 MG PO Bedtime 0 2019 11:00pm 2020 1:50pm  
  
  
  
                                                    sennosides, usp 8.6 mg   
oral tablet  
(1 source)                              Start: 2025   take 1 tablet by laith  
th   
once daily                              senna 8.6 mg Tab 8.6   
mg = 1 tab(s), Oral,   
Daily, Refills(s) 0   
Start Date: 2/3/25   
Status: Ordered  
   
                                                    Sodium Chloride  
(20 sources)                            Start: 2025   take 0.09 g   
intravenously at   
bedtime                                 Sodium Chloride 0.9%   
IV Sol 250 mL 0.09   
gm, 10 mL, IV Push,   
Bedtime Start Date:   
2/3/25 Status:   
Ordered  
  
  
  
                                                    Start: 2025  
End: 2025                         take 0.09 g intravenously at   
bedtime                                 Sodium Chloride 0.9% IV Sol 250   
mL 0.09 gm, 10 mL, IV Push,   
Bedtime Start Date: 2/3/25 Stop   
Date: 25 Status: Ordered  
   
                                Start: 2024                 sodium chlorid  
e 0.9 % 0.9 %   
injection  
   
                                Start: 2024                 sodium chlorid  
e 0.9 % 0.9 %   
injection  
   
                                Start: 2023                 Sodium Chlorid  
e Flush (sodium   
chloride 0.9%) 0.9 % SOLN flush   
syringe Inject 10 mL   
intravenously every 12 hours. 10   
mL 2023 Active  
   
                                                    Start: 2023  
End: 2023                                     sodium chloride 0.9 % iv carlos  
  
  
  
  
                                                    sulfamethoxazole 800   
mg / trimethoprim   
160 mg oral tablet  
(1 source)                              Dihydrofolate   
Reductase   
Inhibitor   
Antibacterial,   
Sulfonamide   
Antimicrobial                                               Sulfamethoxazole-Tri  
methoprim   
800-160 MG TAKE 1 TABLET BY   
MOUTH TWICE A DAY FOR 5 DAYS   
Oral for 5 Days Active  
   
                                                    tamsulosin   
hydrochloride 0.4 mg   
oral capsule  
(20 sources)                            alpha-Adrenergic   
Blocker                                 Star  
t:   
                                      take 1   
capsule by   
mouth once   
daily in   
the evening   
for pain                                tamsulosin (FLOMAX) 0.4 MG   
capsule Take 1 Capsule by mouth   
daily. For stent related pain 30   
Capsule 2024 1:24 PM EDT   
2024 Active  
   
                                                    traMADol   
hydrochloride 50 mg   
oral tablet  
(6 sources)               Opioid Agonist            Star  
t:   
  
End:   
                                                  traMADOL 50 mg Tab 25 mg = 0  
.5   
tab(s), Oral, q12hr, PRN Pain, #   
10 tab(s), Refills(s) 0 Start   
Date: 25 Stop Date: 25   
Status: Ordered  
  
  
  
                                        Start: 2025   take 1 tablet by laith  
th every   
twelve hours                            traMADol 25 mg oral tablet 25 mg =   
1 tab(s), Oral, q12hr, Refills(s) 0   
Start Date: 2/3/25 Status: Ordered  
   
                                                    Start: 2024  
End: 2024                                     tramadol (ULTRAM) 50 MG tabl  
et   
Indications: Acute post-operative   
pain Take 1 Tablet by mouth every 8   
hours as needed for Pain for up to   
5 doses. 5 Tablet 0 2024 Active  
   
                                                    Start: 2024  
End: 2024                                     tramadol (ULTRAM) 50 MG tabl  
et Take   
1 Tablet by mouth every 6 hours as   
needed (severe pain not controlled   
by tylenol) for up to 6 doses. 6   
Tablet 0 2024   
Active  
  
  
  
                                                    vitamin b12 1 mg   
oral tablet  
(20 sources)        Vitamin B12         Start: 10-   take 1 tablet by   
mouth once daily   
in the morning                          Cyanocobalamin   
(Vitamin B-12) 1,000   
mcg Tablet Active 1000   
MCG PO Every morning   
   
11:00pm  
  
  
  
                                                    Start: 10-  
End: 2022                         inject 1000 ug by intramuscular   
injection every 30 days                 Cyanocobalamin (Vitamin B-12)   
1,000 mcg/mL Solution Discontinued   
1000 MCG IM Q30D@0900 0 2021 11:00pm 2022   
10:07pm  
   
                                                    Start: 10-  
End: 2022                         inject 1000 ug by intramuscular   
injection every 30 days                 Cyanocobalamin (Vitamin B-12)   
Discontinued 1000 MCG IM Q30D@0900   
0 2021 12:00am 2022 11:07pm  
  
  
  
Completed/Discontinued Medications  
  
  
  
                      Medication Drug Class(es) Dates      Sig (Normalized) Sig   
(Original)  
   
                                                    acetaminophen 325 mg   
/ HYDROcodone   
bitartrate 5 mg oral   
tablet  
(13 sources)              Opioid Agonist            Start:   
2022  
End:   
2023                              take 1 tablet by   
mouth every six   
hours as needed for   
pain                                    Hydrocodone-Acetami  
nophen 5-325 mg   
tablet Discontinued   
1 TAB PO Q6H as   
needed for pain 20   
5 May 6th, 2022   
July 31st, 2023   
7:21pm  
   
                                                    2 ml amisulpride 2.5   
mg/ml injection  
(1 source)                                          Start:   
2024  
End:   
2024                                          amisulpride   
(BARHEMSYS)   
injection 10 mg  
  
  
  
                                                    Start: 2024  
End: 2024                                     amisulpride (BARHEMSYS) inje  
ction 10 mg  
  
  
  
                                                    atorvastatin 40 mg   
oral tablet  
(13 sources)                            HMG-CoA   
Reductase   
Inhibitor                               Start: 2020  
End: 06-                         take 1 tablet   
by mouth once   
daily in the   
evening                                 Atorvastatin 40 mg   
Tablet Discontinued   
40 MG PO Every   
evening    
12:00am 2021 2:35am  
   
                                                    120 actuat   
budesonide 0.16   
mg/actuat /   
formoterol fumarate   
0.0045 mg/actuat   
metered dose   
inhaler  
(20 sources)                            Corticosteroid,   
beta2-Adrenergic   
Agonist                                 Start: 2024  
End: 2025                                     Budesonide-Formoter  
ol (Symbicort)   
160-4.5   
mcg/actuation HFA   
aerosol inhaler   
Discontinued 2 INH   
INHALATION Twice   
daily May 7th, 2024   
11:00pm 2025 12:16am  
  
  
  
                                Start: 2023                 budesonide-for  
moterol (SYMBICORT)   
80-4.5 MCG/ACT inhaler  
   
                                        Start: 01-   take 2 puff(s) by in  
halation   
twice daily                             Symbicort 160-4.5 MCG/ACT 2 puffs   
Inhalation Twice a day for 30 days 10   
[place], 2023 Active  
   
                                                    Start: 2022  
End: 2024                         take 1 puff(s) by inhalation   
twice daily                             Budesonide-Formoterol (Symbicort)   
160-4.5 mcg/actuation HFA aerosol   
inhaler Discontinued 2 PUFF INHALATION   
Twice daily 2022 11:00pm   
2024 11:55pm  
   
                                                            take 2 puff(s) by mo  
uth   
twice daily                             budesonide-formoterol (Symbicort)   
160-4.5 MCG/ACT inhaler Inhale 2 Puffs   
by mouth 2 times daily. Active  
  
  
  
                                                    canagliflozin 100   
mg oral tablet  
(20 sources)                            Sodium-Glucose   
Cotransporter 2   
Inhibitor                               Start:   
2021  
End: 10-                         take 1   
tablet by   
mouth once   
daily in the   
morning                                 Canagliflozin   
(Invokana) 100 mg   
tablet   
Discontinued 100   
MG PO Every   
morning 2021 4:01pm   
2021   
10:36am  
  
  
  
                                                    Start: 2021  
End: 2021                         take 1 tablet by mouth once   
daily                                   Canagliflozin 100 mg tablet   
Discontinued 100 MG PO Daily  11:00pm 2021 6:32pm  
  
  
  
                                                    cefdinir 300 mg   
oral capsule  
(20 sources)                            Cephalosporin   
Antibacterial                           Start: 2019  
End: 2019                         take 1 capsule   
by mouth twice   
daily                                   Cefdinir 300 mg   
capsule   
Discontinued 300 MG   
PO Twice daily    
12:00am 2019 2:50pm  
  
  
  
                                                    Start: 2017  
End: 2017                         take 1 capsule by mouth twice   
daily                                   Cefdinir 300 mg capsule Discontinued   
300 MG PO Twice daily 6 3 November   
20th, 2017 12:00am 2017 12:00am 2017   
12:01am  
  
  
  
                                                    cefTRIAXone 1000 mg   
injection  
(13 sources)                            Cephalosporin   
Antibacterial                           Start: 2024  
End: 2024                                     cefTRIAXone   
(ROCEPHIN) 1,000 mg   
in dextrose 50 mL   
ivpb  
  
  
  
                                                    Start: 2023  
End: 2023                         take 1 g intravenously every   
twenty-four hours                       Ceftriaxone 1 gram Recon Soln   
Discontinued 1 GM IV Q24H 0 2023 11:00pm 2023 4:53pm  
  
  
  
                                                    cephalexin 500 mg   
oral capsule  
(20 sources)                            Cephalosporin   
Antibacterial                           Start: 2023  
End: 10-                         take 1   
capsule by   
mouth every   
eight hours                             Cephalexin 500 mg   
capsule   
Discontinued 500   
MG PO Q8H 30 10   
September 21st,   
2023 11:00pm   
2023   
3:04am  
  
  
  
                                                    Start: 2021  
End: 10-                         take 1 capsule by mouth four   
times daily                             Cephalexin 500 mg capsule   
Discontinued 500 MG PO Four times   
daily    
11:00pm 2021 10:36am  
  
  
  
                                                    citalopram 40 mg   
oral tablet  
(13 sources)                            Serotonin   
Reuptake   
Inhibitor                               Start: 2017  
End: 2019                         take 1 tablet   
by mouth once   
daily                                   Citalopram 40   
tablet   
Discontinued 40 MG   
PO Daily 2017 12:00am   
2019   
2:53pm  
   
                                                    cloNIDine   
hydrochloride 0.1 mg   
oral tablet  
(13 sources)                            Central alpha-2   
Adrenergic   
Agonist                                 Start: 2017  
End: 2019                         take 1 tablet   
by mouth   
twice daily                             Clonidine Hcl 0.1   
mg Tablet   
Discontinued 0.1   
MG PO Twice daily   
2017 12:00am 2019 4:43pm  
   
                                                    24 hr dilTIAZem   
hydrochloride 180 mg   
extended release   
oral capsule  
(13 sources)                            Calcium Channel   
Blocker                                 Start: 2017  
End: 2019                         take 1   
capsule by   
mouth once   
daily                                   Diltiazem Hcl 180   
mg Capsule,Ext.Rel   
24h Degradable   
Discontinued 180   
MG PO Daily   
2017 12:00am 2019 4:43pm  
   
                                                    docusate sodium 100   
mg oral capsule  
(20 sources)                                        Start: 10-  
End: 2022                         take 1   
capsule by   
mouth three   
times daily                             Docusate Sodium   
100 mg Capsule   
Discontinued 100   
MG PO Three times   
daily  11:00pm   
2022   
10:07pm  
  
  
  
                                                    Start: 06-  
End: 06-                         take 1 capsule by mouth once   
daily                                   Docusate Sodium 100 mg capsule   
Discontinued 100 MG PO Daily 2021 11:00pm 2021   
4:35am  
  
  
  
                                                    doxycycline   
hyclate 100 mg   
oral tablet  
(20 sources)              Tetracycline-class Drug   Start:   
2023  
End: 10-                         take 1   
tablet by   
mouth once   
daily                                   Doxycycline   
Hyclate 100 mg   
Tablet   
Discontinued 100   
MG PO Daily   
2023 11:00pm   
2023 3:04am  
  
  
  
                                                    Start: 10-  
End: 2022                         take 1 tablet by mouth twice   
daily                                   Doxycycline Hyclate 100 mg tablet   
Discontinued 100 MG PO Twice daily   
10 5 October 28th, 2021 11:00pm   
2022 10:07pm  
  
  
  
                                                    DULoxetine 60 mg   
delayed release oral   
capsule  
(20 sources)                            Serotonin and   
Norepinephrine Reuptake   
Inhibitor                               Start: 10-  
End: 2022                                     Duloxetine   
Discontinued MG PO   
2021   
12:00am 2022 11:05pm  
  
  
  
                                                    Start: 2017  
End: 2022                                     Duloxetine 60 mg capsule,del  
ayed release(DR/EC) Discontinued MG  
   
PO 2021 11:00pm 2022 10:05pm  
  
  
  
                                                    ergocalciferol 1.25 mg   
oral capsule  
(20 sources)                            Provitamin D2   
Compound                                Start: 2023  
End: 2025                                     Ergocalciferol (Vitamin   
D2) (Vitamin D2) 1,250   
mcg (50,000 unit)   
capsule Discontinued   
37986 UNIT PO every   
week 2023   
11:00pm 2025 12:17am thursday  
  
  
  
                                                    Start: 10-  
End: 2022                         take 1 capsule by mouth every   
week                                    Ergocalciferol (Vitamin D2) 1,250 mcg   
(50,000 unit) Capsule Discontinued   
1250 MCG PO Q7D@0900 4 2021 11:00pm 2022 10:08pm  
   
                                                                vitamin D2 (ERGO  
CALCIFEROL) 1.25 MG   
(50358 UT) capsule Take 50,000 Units   
by mouth. Active  
  
  
  
                                                    ertapenem 1000   
mg injection  
(9 sources)               Penem Antibacterial       Start:   
10-  
End: 2024                         take 1 g   
intravenously   
every twenty-four   
hours                                   Ertapenem 1 gram   
Recon Soln   
Discontinued 1 GM   
IV Q24H 14 14   
2023 11:00pm   
2024 11:57pm  
   
                                                    gabapentin 100   
mg oral capsule  
(20 sources)                            Anti-epileptic   
Agent                                   Start:   
2019  
End: 2020                                     Gabapentin 100 mg   
Capsule   
Discontinued 100   
MG PO Twice daily   
15 10 Leeanna 17th,   
2019 11:00pm   
2020 11:03pm   
Really dispense   
only 15 tablets:   
100 mg po BID for   
5 days, then 100   
mg po daily for 5   
days, then stop.  
  
  
  
                                                    Start: 2017  
End: 2019                         take 1 capsule by mouth three   
times daily                             Gabapentin 300 mg Capsule   
Discontinued 300 MG PO Three times   
daily 2017 12:00am   
2019 4:43pm  
  
  
  
                                                    Insulin Aspart U-100 (Novolo  
g   
Flexpen U-100 Insulin) 100   
unit/mL (3 mL) Insulin Pen  
(12 sources)                                        Start: 2023  
End: 2023                                     Insulin Aspart U-100 (Novolo  
g   
Flexpen U-100 Insulin) 100   
unit/mL (3 mL) Insulin Pen   
Discontinued 0 UNITS SUBCUT Every   
6 hours 0 2023 11:00pm   
2023 4:10pm  
  
  
  
                                                    Start: 2023  
End: 2023                                     Insulin Aspart U-100 (Novolo  
g Flexpen U-100 Insulin) 100   
unit/mL   
(3 mL) Insulin Pen Discontinued 0 UNITS SUBCUT Every 6 hours 0   
2023 12:00am 2023 5:10pm  
   
                                Start: 2023                 Insulin Aspart  
 U-100 (Novolog Flexpen U-100 Insulin) 100   
unit/mL   
(3 mL) Insulin Pen Active 0 UNITS SUBCUT Every 6 hours 0 2023 12:00am  
  
  
  
                                                    3 ml insulin aspart,   
human 100 unt/ml pen   
injector  
(20 sources)              Insulin Analog            Start: 2019  
End: 2020                                     Insulin Aspart U-100   
(Novolog Flexpen U-100   
Insulin) 100 unit/mL (3 mL)   
Insulin Pen Discontinued 0   
UNITS SUBCUT Before meals   
and at bedtime 0 2019 11:00pm 2020 11:03pm Please contact   
the information source for   
Protocol details.  
  
  
  
                                                    Start: 2017  
End: 2019                         inject 10 [IU] by subcutaneous   
injection at mealtime, then inject   
5 [IU] by subcutaneous injection   
at mealtime                             Insulin Aspart U-100 (Novolog   
U-100 Insulin Aspart) 100 unit/mL   
Solution Discontinued 20 UNITS   
SUBCUT before meals 2017 12:00am 2019 4:43pm 10 UNITS BEFORE A   
LARGE MEAL, 5 UNITS BEFORE A   
SMALL MEAL.  
  
  
  
                                                    3 ml insulin detemir   
100 unt/ml pen injector  
(20 sources)              Insulin Analog            Start: 2019  
End: 2020                                     Insulin Detemir U-100   
(Levemir Flextouch U-100   
Insuln) 100 unit/mL (3 mL)   
Insulin Pen Discontinued 60   
UNITS SUBCUT Q24H 0 2019 11:00pm 2020 11:03pm  
  
  
  
                                                    Start: 2017  
End: 2019                         inject 60 [IU] by subcutaneous   
injection at bedtime                    Insulin Detemir U-100 (Levemir   
U-100 Insulin) 100 unit/mL   
Solution Discontinued 60 UNIT   
SUBCUT Bedtime 2017 12:00am 2019   
4:43pm  
  
  
  
                                                    iohexol (OMNIPAQUE) 350 MG/M  
L   
injection  
(2 sources)                                         Start: 2024  
End: 2024                                     iohexol (OMNIPAQUE) 350 MG/M  
L   
injection  
  
  
  
                                                    Start: 2023  
End: 2023                                     iohexol (OMNIPAQUE) 350 MG/M  
L injection  
  
  
  
                                                    ipratropium   
bromide 0.2 mg/ml   
inhalation   
solution  
(20 sources)              Anticholinergic           Start: 2019  
End: 2020                         take 0.5 mg by   
inhalation four   
times daily                             Ipratropium   
Bromide 0.02 %   
Solution   
Discontinued 0.5   
MG INHALATION Four   
times daily -   
Respiratory 0 2019 11:00pm   
2020 11:04pm  
  
  
  
                                                    Start: 2019  
End: 2020                         take 0.5 mg by inhalation four   
times daily                             Ipratropium Bromide Discontinued   
0.5 MG INHALATION Four times daily   
- Respiratory 0 2019   
12:00am 2020   
12:04am  
   
                                                                ipratropium (ATR  
OVENT) 0.02 %   
nebulizer solution Use 2.5 mL via   
nebulizer 4 times daily. Active  
  
  
  
                                                    irbesartan 150 mg   
oral tablet  
(13 sources)                            Angiotensin 2   
Receptor Blocker                        Start: 2021  
End: 10-                         take 1 tablet   
by mouth once   
daily                                   Irbesartan 150 mg   
Tablet Discontinued   
75 MG PO Daily 15   
30 Leeanna 17th, 2021   
11:00pm 2021 10:36am   
On Hold: Resume on   
10/01/21.  
  
  
  
                                                    Start: 2021  
End: 10-           take 75 mg by mouth once daily Irbesartan Discontinued  
 75 MG PO   
Daily 15 30 Leeanna 18th, 2021 12:00am   
2021 11:36am  
  
  
  
                                                    ammonium lactate 120 mg/ml   
topical lotion  
(20 sources)                                        Start: 10-  
End: 2024                                     Ammonium Lactate 12 % lotion  
   
Discontinued 1 APPLIC TOPICAL   
Daily as needed for Dry Skin   
2023 11:00pm   
2024 11:54pm  
  
  
  
                                                    Start: 2023  
End: 2023                                     Ammonium Lactate 12 % lotion  
 Discontinued 1 APPLIC TOPICAL   
Daily   
2023 11:00pm 2023 4:53pm apply to feet  
   
                                                                ammonium lactate  
 (LAC-HYDRIN) 12 % lotion Apply 12 g topically 2   
times daily. Apply thin layer to affected area. Active  
  
  
  
                                                    linagliptin 5 mg   
oral tablet  
(13 sources)                            Dipeptidyl   
Peptidase 4   
Inhibitor                               Start: 2020  
End: 06-                         take 1   
tablet by   
mouth once   
daily                                   Linagliptin 5 mg   
tablet   
Discontinued 5 MG   
PO Daily  12:00am 2021 3:37am  
   
                                                    lisinopril 10 mg   
oral tablet  
(13 sources)                            Angiotensin   
Converting Enzyme   
Inhibitor                               Start: 2019  
End: 2020                         take 1   
tablet by   
mouth once   
daily                                   Lisinopril 10 mg   
Tablet   
Discontinued 10 MG   
PO Daily 2019 12:00am   
2020 12:03pm  
   
                                                    100 ml magnesium   
sulfate 40 mg/ml   
injection  
(1 source)                                          Start: 2023  
End: 2023                                     magnesium sulfate   
4 GM/100ML in 100   
mL ivpb  
   
                                                    meperidine   
hydrochloride 50   
mg/ml injectable   
solution  
(1 source)                Opioid Agonist            Start: 2023  
End: 2023                                     meperidine   
(Demerol) 50 MG/ML   
injection   
Indications:   
Rigors Inject 1 mL   
into the muscle   
once for 1 dose. 1   
mL 0 2023   
   
                                                    24 hr metFORMIN   
hydrochloride 500 mg   
extended release   
oral tablet  
(20 sources)              Biguanide                 Start: 2022  
End: 2025                         take 1   
tablet by   
mouth once   
daily                                   Metformin 500 mg   
tablet extended   
release 24 hr   
Discontinued 500   
MG PO Daily 2022 11:00pm   
2025   
12:18am  
  
  
  
                                                    Start: 2021  
End: 10-                         take 1 tablet by mouth once   
daily in the evening                    Metformin 500 mg tablet   
Discontinued 500 MG PO Every   
evening  11:00pm   
2021 10:36am On Hold:   
Resume on 10/01/21.  
   
                                                    Start: 2019  
End: 2020                         take 1 tablet by mouth twice   
daily                                   Metformin 1,000 mg Tablet   
Discontinued 1000 MG PO Twice daily   
2019 11:00pm 2020 12:03pm  
   
                                                    Start: 2017  
End: 2019                         take 2 tablets by mouth twice   
daily                                   Metformin 500 mg Tablet   
Discontinued 1000 MG PO Twice daily   
2017 12:00am 2019 2:52pm  
   
                                                    Start: 2017  
End: 2019                         take 1000 mg by mouth twice   
daily                                   Metformin Discontinued 1000 MG PO   
Twice daily 2017   
1:00am 2019 3:52pm  
   
                                                            take 1 tablet by laith  
th twice   
daily at mealtime                       metformin (GLUCOPHAGE) 500 MG   
tablet Take 500 mg by mouth 2 times   
daily (with meals). Active  
  
  
  
                                                    methylPREDNISolone 4 mg   
oral tablet  
(13 sources)              Corticosteroid            Start:   
10-  
End: 2022                         take 1   
tablet by   
mouth once                              Methylprednisolone   
(Medrol (Yves)) 4 mg   
tablets,dose pack   
Discontinued 0 PO   
.COMPLEX  11:00pm   
2022   
10:07pm orally per   
package directions  
   
                                                    midazolam 1 mg/ml   
injectable solution  
(2 sources)               Benzodiazepine            Start:   
2024  
End: 2024                                     midazolam (VERSED) 2   
MG/2ML injection  
  
  
  
                                                    Start: 2023  
End: 2023                                     midazolam (VERSED) 2 MG/2ML   
injection  
  
  
  
                                                    Multivitamin Tablet  
(5 sources)                                         Start: 2017  
End: 10-                         take 1 tablet by   
mouth once daily   
in the morning                          Multivitamin Tablet   
Discontinued 1 TAB PO   
Every morning 2017 12:00am   
2021   
10:36am  
   
                                                    Multivitamin With Folic   
Acid (Thera) 400 mcg   
Tablet  
(13 sources)                                        Start: 10-  
End: 2023                         take 1 tablet by   
mouth once daily   
in the morning                          Multivitamin With Folic   
Acid (Thera) 400 mcg   
Tablet Discontinued 1   
TAB PO Every morning    
11:00pm 2023   
7:21pm  
  
  
  
                                                    Start: 10-  
End: 2023                         take 1 tablet by mouth once   
daily in the morning                    Multivitamin With Folic Acid (Thera)   
400 mcg Tablet Discontinued 1 TAB PO   
Every morning    
12:00am 2023 8:21pm  
   
                                        Start: 10-   take 1 tablet by laith  
th once   
daily in the morning                    Multivitamin With Folic Acid (Thera)   
400 mcg Tablet Active 1 TAB PO Every   
morning  12:00am  
  
  
  
                                                    nitrofurantoin,   
macrocrystals 25 mg   
/ nitrofurantoin,   
monohydrate 75 mg   
oral capsule  
(9 sources)                             Nitrofuran   
Antibacterial                           Start:   
2024  
End: 2025                         take 1   
capsule by   
mouth twice   
daily at   
mealtime                                Nitrofurantoin   
Monohyd/M-Cryst   
(Macrobid) 100 mg   
capsule   
Discontinued 100 MG   
PO Twice daily    
12:00am 2025 12:18am   
must administer   
with a meal/food  
   
                                                    2 ml ondansetron 2   
mg/ml injection  
(1 source)                              Serotonin-3   
Receptor   
Antagonist                              Start:   
2024  
End: 2024                                     ondansetron   
(ZOFRAN) 4 MG/2ML   
injection  
   
                                                    Potassium Chloride  
(20 sources)                                        Start:   
2025                              take 1 tablet   
by mouth   
twice daily   
at mealtime                             Potassium Chloride   
(Eqv-Klor-Con 10)   
10 mEq oral tablet,   
extended release 10   
mEq = 1 tab(s),   
Oral, Daily, TAKE 1   
TABLET BY MOUTH   
TWICE DAILY WITH   
FOOD Start Date:   
2/3/25 Status:   
Ordered  
  
  
  
                                        Start: 2022   take 1 capsule by mo  
uth once   
daily                                   Potassium Chloride 10 mEq capsule,   
extended release Active 10 MEQ PO   
Daily 30 May 5th, 2022 11:00pm On   
Hold: Hold  
   
                                                    Start: 2019  
End: 06-                         take 1 capsule by mouth once   
daily                                   Potassium Chloride 10 mEq capsule,   
extended release Discontinued 10 MEQ   
PO Daily  11:00pm   
2021 3:37am  
   
                                                    Start: 2017  
End: 2019                         take 1 tablet by mouth once   
daily                                   Potassium Chloride 20 mEq Tablet   
Extended Release Discontinued 20 MEQ   
PO Daily 2017 12:00am   
2019 4:43pm  
   
                                                            take 1 tablet by laith  
th once   
daily                                   potassium chloride SA (K-DUR) 10 MEQ   
controlled release tablet Take 10   
mEq by mouth daily. Active  
  
  
  
                                                    predniSONE 10 mg oral tablet  
(20 sources)                                        Start: 2019  
End: 2020                                     Prednisone 10 mg tablet   
Discontinued 0 .ROUTE .COMPLEX  11:00pm 2020 11:06pm Disp 10 mg. QS   
for taper course. 40 mg for 3   
days, then 20 mg for 3 days, then   
10 mg for 7 days. In AM with food.  
  
  
  
                                                    Start: 2019  
End: 2020                                     Prednisone Discontinued 0 .R  
OUTE   
.COMPLEX  12:00am   
2020 12:06am Disp 10   
mg. QS for taper course. 40 mg for 3   
days, then 20 mg for 3 days, then 10   
mg for 7 days. In AM with food.  
   
                                                    Start: 2017  
End: 2017                                     Prednisone 20 mg Tablet Disc  
ontinued   
20 MG PO As Directed 13  12:00am 2017   
12:00am 2017 12:02am 40   
mg daily x 4 days, 30 mg daily x 4   
days, 20 mg daily x 3 days, then 10   
mg daily x 2 days, then stop Please   
contact the information source for   
Taper Schedule details.  
   
                                                    Start: 2017  
End: 2017                         take 40 mg by mouth once   
daily, then take 30 mg by   
mouth once daily, then take 20   
mg by mouth once daily                  Prednisone Discontinued 20 MG PO As   
Directed 13    
1:00am 2017 1:02am 40   
mg daily x 4 days, 30 mg daily x 4   
days, 20 mg daily x 3 days, then 10   
mg daily x 2 days, then stop  
  
  
  
                                                    prochlorperazine 5   
mg/ml injectable   
solution  
(12 sources)              Phenothiazine             Start:   
2023  
End:   
2023                              take 10 mg   
intravenously   
every four hours   
as needed for   
nausea and   
vomiting                                Prochlorperazine   
Edisylate 10 mg/2 mL   
(5 mg/mL) Solution   
Discontinued 10 MG   
IV-PUSH Q4H as   
needed for Nausea   
And Vomiting 0 2023 11:00pm   
2023   
4:10pm  
   
                                                    Sennosides (Senna   
Lax) 8.6 mg Tablet  
(20 sources)                                        Start:   
10-  
End:   
2022                              take 1 tablet by   
mouth twice daily   
as needed for   
constipation                            Sennosides (Senna   
Lax) 8.6 mg Tablet   
Discontinued 1 TAB   
PO Twice daily as   
needed for   
Constipation 60   
2021   
11:00pm 2022 10:06pm  
  
  
  
                                                    Start: 10-  
End: 2022                         take 1 tablet by mouth twice   
daily                                   Sennosides (Senna Lax) 8.6 mg Tablet   
Discontinued 1 TAB PO Twice daily 60   
2021 11:00pm 2022 10:06pm  
   
                                                    Start: 10-  
End: 2022                         take 2 tablets by mouth at   
bedtime                                 Sennosides (Senna Lax) 8.6 mg Tablet   
Discontinued 2 TAB PO Bedtime 60   
2021 11:00pm 2022 10:06pm  
   
                                                    Start: 10-  
End: 2022                         take 1 tablet by mouth twice   
daily                                   Sennosides (Senna Lax) 8.6 mg Tablet   
Discontinued 1 TAB PO Twice daily 60   
2021 12:00am 2022 11:06pm  
   
                                                    Start: 10-  
End: 2022                         take 2 tablets by mouth at   
bedtime                                 Sennosides (Senna Lax) 8.6 mg Tablet   
Discontinued 2 TAB PO Bedtime 60   
2021 12:00am 2022 11:06pm  
  
  
  
                                                    sotalol hydrochloride   
80 mg oral tablet  
(20 sources)              Antiarrhythmic            Start: 10-  
End: 2022                         take 1 tablet   
by mouth   
twice daily                             Sotalol 80 mg   
tablet Discontinued   
80 MG PO Twice   
daily 2021 11:00pm 2022 10:06pm  
  
  
  
                                                    Start: 2021  
End: 10-                         take 1 tablet by mouth once   
daily in the morning                    Sotalol (Sotalol Af) 120 mg tablet   
Discontinued 120 MG PO Every morning   
2021 4:01pm 2021 10:36am  
   
                                                    Start: 2019  
End: 2021                         take 1 tablet by mouth twice   
daily                                   Sotalol 80 mg Tablet Discontinued 80   
MG PO Twice daily 60 30 2019 11:00pm 2021 2:03pm  
   
                                                                Sotalol HCl AF 1  
20 MG TABS Take by   
mouth daily. Active  
  
  
  
                                                    60 actuat   
tiotropium 0.0025   
mg/actuat   
inhalation spray  
(20 sources)              Anticholinergic           Start: 2024  
End: 2025                         take 2.5 ug by   
inhalation once   
daily                                   Tiotropium Bromide   
(Spiriva Respimat)   
2.5 mcg/actuation   
mist Discontinued 2   
INH INHALATION   
Daily May 7th, 2024   
11:00pm 2025 12:19am  
  
  
  
                                                    Start: 2023  
End: 2024                         take 1 puff(s) by inhalation   
once daily                              Tiotropium Bromide (Spiriva   
Respimat) 2.5 mcg/actuation mist   
Discontinued 2 PUFF INHALATION   
Daily 2023 11:00pm   
2024 11:56pm  
   
                                        Start: 01-   take 2 puff(s) by in  
halation   
once daily                              Spiriva Respimat 2.5 MCG/ACT 2   
puffs Inhalation Once a day for 30   
days 10 [place], 2023 Active  
  
  
  
                                                    vancomycin 1500   
mg injection  
(20 sources)                            Glycopeptide   
Antibacterial                           Start:   
10-  
End: 2024                         take 1.5 g   
intravenously   
every twenty-four   
hours                                   Vancomycin 1.5   
gram recon soln   
Discontinued 1.5   
GM IV Q24H 14  11:00pm   
2024 11:57pm  
  
  
  
                                                    Start: 2023  
End: 2023                                     vancomycin (VANCOCIN) 500 mg  
 in D5W   
100 mL (COMMERCIAL PREMIX)  
   
                                                    Start: 2023  
End: 2023                                     vancomycin 1500 mg in D5W 25  
0 mL   
ivpb  
   
                                                    Start: 2023  
End: 2023                                     vancomycin 1500 mg in D5W 25  
0 mL   
ivpb  
   
                                                    Start: 2022  
End: 2023                         take 1 capsule by mouth four   
times daily                             Vancomycin 125 mg Capsule   
Discontinued 125 MG PO Four times   
daily 40 10 May 18th, 2022 11:00pm   
2023 7:21pm  
  
  
  
                                                    vancomycin (VANCOCIN) 500 MG  
 500   
mg in dextrose 5 % 100 mL   
OFFSITE ADS  
(1 source)                                          Start: 2023  
End: 2023                                     vancomycin (VANCOCIN) 500 MG  
   
500 mg in dextrose 5 % 100 mL   
OFFSITE ADS  
   
                                                    vancomycin lab draw  
(3 sources)                                         Start: 2023  
End: 2023                                     vancomycin lab draw  
  
  
  
                                                    Start: 2023  
End: 2023                                     vancomycin lab draw  
   
                                                    Start: 2023  
End: 2023                                     vancomycin lab draw  
  
  
  
                                                    Vitamin D 50,000   
intl units (1.25   
mg) oral capsule  
(1 source)                              Start: 2025   take 1 capsule by   
mouth every week                        Vitamin D 50,000 intl units   
(1.25 mg) oral capsule 50,000   
International_Unit = 1   
cap(s), Oral, qWeek, # 4   
cap(s), Refills(s) 0 Start   
Date: 2/3/25 Status: Ordered  
  
  
  
Problems  
Active Problems  
  
  
                      Problem Classification Problem    Date       Documented Da  
te Episodic/Chronic  
   
                                                    Abdominal pain  
(10 sources)                            Flank pain;   
Translations:   
[Unspecified abdominal   
pain]                                   2025          Episodic  
   
                                                    Acute and unspecified   
renal failure  
(20 sources)                            Injury of kidney;   
Translations: [Acute   
kidney failure,   
unspecified]                            Onset:   
2019                Episodic  
   
                                                    Administrative/social   
admission  
(13 sources)                            Other reduced   
mobility;   
Translations:   
[Impaired mobility and   
activities of daily   
living]                                 2021          Episodic  
   
                                                    Bacterial infection;   
unspecified site  
(11 sources)                            Bacteremia;   
Translations:   
[Bacteremia]                            10-          Episodic  
   
                                                    Cardiac dysrhythmias  
(20 sources)                            Atrial fibrillation;   
Translations:   
[Unspecified atrial   
fibrillation]                           2021          Chronic  
   
                                                    Chronic kidney disease  
(20 sources)                            Chronic kidney disease   
stage 3; Translations:   
[Stage 3 chronic   
kidney disease]                         Onset:   
  
3                         2022                Chronic  
   
                                                    Chronic obstructive   
pulmonary disease and   
bronchiectasis  
(20 sources)                            Acute exacerbation of   
chronic obstructive   
airways disease;   
Translations: [Chronic   
obstructive lung   
disease]                                Onset:   
  
3                         2019                Chronic  
   
                                        Comment on above:   3L NC CONTINUOUS   
   
                                                    Conduction disorders  
(2 sources)                             First degree   
atrioventricular   
block; Translations:   
[Atrioventricular   
block, first degree]                     2023          Chronic  
   
                                                    Congestive heart   
failure;   
nonhypertensive  
(20 sources)                            Heart failure with   
normal ejection   
fraction;   
Translations:   
[Unspecified diastolic   
(congestive) heart   
failure]                                2021          Chronic  
   
                                                    Crushing injury or   
internal injury  
(11 sources)                            Perinephric hematoma;   
Translations: [Minor   
contusion of   
unspecified kidney,   
initial encounter]                      Onset:   
  
5                         2025                Episodic  
   
                                                    Deficiency and other   
anemia  
(13 sources)                            Chronic anemia;   
Translations: [Anemia,   
unspecified]                            2022          Episodic  
   
                                                    Deficiency and other   
anemia  
(11 sources)                            Anemia; Translations:   
[Anemia, unspecified]                     2023          Episodic  
   
                                                    Deficiency and other   
anemia  
(2 sources)                             Anemia, unspecified;   
Translations: [Anemia,   
unspecified]                            10-          Episodic  
   
                                                    Diabetes mellitus with   
complications  
(20 sources)                            Disorder due to type 2   
diabetes mellitus;   
Translations: [Type 2   
diabetes mellitus with   
unspecified   
complications]                          Onset:   
  
3                         2023                Chronic  
   
                                                    Diabetes mellitus   
without complication  
(20 sources)                            Type 2 diabetes   
mellitus without   
complications;   
Translations:   
[Diabetes mellitus]                     Onset:   
  
7                         06-                Chronic  
   
                                                    Disorders of lipid   
metabolism  
(20 sources)                            Dyslipidemia;   
Translations:   
[Hyperlipidemia,   
unspecified]                            Onset:   
  
3                         2019                Chronic  
   
                                                    E Codes: Fall  
(20 sources)                            Fall on same level;   
Translations: [Fall on   
same level,   
unspecified, initial   
encounter]                              2022          Episodic  
   
                                                    Esophageal disorders  
(18 sources)                            Diffuse spasm of   
esophagus;   
Translations:   
[Dyskinesia of   
esophagus]                              2023          Chronic  
   
                                                    Essential hypertension  
(20 sources)                            Essential   
hypertension;   
Translations:   
[Essential (primary)   
hypertension]                           Onset:   
  
3                         2019                Chronic  
   
                                                    Fluid and electrolyte   
disorders  
(20 sources)                            Acute hypokalemia;   
Translations:   
[Hypokalemia]                           2020          Episodic  
   
                                                    Fracture of lower limb  
(20 sources)                            Metatarsal bone   
fracture;   
Translations:   
[Fracture of   
unspecified metatarsal   
bone(s), right foot,   
initial encounter for   
closed fracture]                        2021          Episodic  
   
                                                    Fracture of lower limb  
(13 sources)                            Metatarsal bone   
fracture;   
Translations:   
[Fracture of   
unspecified metatarsal   
bone(s), left foot,   
initial encounter for   
closed fracture]                        2021          Episodic  
   
                                                    Intestinal infection  
(13 sources)                            Clostridium difficile   
diarrhea;   
Translations:   
[Enterocolitis due to   
Clostridium difficile,   
not specified as   
recurrent]                              2022          Episodic  
   
                                                    Malaise and fatigue  
(20 sources)                            Asthenia;   
Translations:   
[Weakness]                              Onset:   
  
5                         2021                Episodic  
   
                                                    Mood disorders  
(20 sources)                            Depressive disorder;   
Translations:   
[Depression]                            Onset:   
  
3                         2021                Chronic  
   
                                                    Multiple sclerosis  
(20 sources)                            Multiple sclerosis;   
Translations:   
[Multiple sclerosis]                    Onset:   
  
3                         2023                Chronic  
   
                                                    Nutritional   
deficiencies  
(12 sources)                            Vitamin D deficiency;   
Translations: [Vitamin   
D deficiency,   
unspecified]                            2023          Chronic  
   
                                                    Open wounds of head;   
neck; and trunk  
(16 sources)                            Disorder of breast;   
Translations:   
[Unspecified open   
wound of left breast,   
initial encounter]                      2023          Episodic  
   
                                                    Other aftercare  
(13 sources)                            Long-term current use   
of anticoagulant;   
Translations: [Long   
term (current) use of   
anticoagulants]                         2020          Episodic  
   
                                                    Other aftercare  
(1 source)                              Long term (current)   
use of insulin;   
Translations: [Long   
term (current) use of   
insulin]                                Onset:   
  
5                                                   Episodic  
   
                                                    Other and ill-defined   
heart disease  
(13 sources)                            Left ventricular   
systolic dysfunction;   
Translations: [Other   
ill-defined heart   
diseases]                               2021          Chronic  
   
                                                    Other circulatory   
disease  
(1 source)                              History of transient   
ischemic attack;   
Translations:   
[Personal history of   
transient ischemic   
attack (TIA), and   
cerebral infarction   
without residual   
deficits]                               Onset:   
  
5                                                   Episodic  
   
                                                    Other connective   
tissue disease  
(20 sources)                            Foot pain;   
Translations: [Pain in   
right foot]                             2021          Episodic  
   
                                                    Other diseases of   
kidney and ureters  
(12 sources)                            Bilateral   
hydronephrosis ;   
Translations:   
[Unspecified   
hydronephrosis]                         2023          Episodic  
   
                                                    Other diseases of   
kidney and ureters  
(4 sources)                             Unspecified   
hydronephrosis;   
Translations:   
[Hydronephrosis]                        2023          Episodic  
   
                                                    Other gastrointestinal   
disorders  
(15 sources)                            Dysphagia;   
Translations:   
[Dysphagia,   
unspecified]                            2021          Episodic  
   
                                                    Other gastrointestinal   
disorders  
(20 sources)                            Diarrhea;   
Translations:   
[Diarrhea,   
unspecified]                            2019          Episodic  
   
                                                    Other gastrointestinal   
disorders  
(13 sources)                            Esophageal dysphagia;   
Translations: [Other   
dysphagia]                              2021          Episodic  
   
                                                    Other gastrointestinal   
disorders  
(13 sources)                            Constipation;   
Translations:   
[Constipation,   
unspecified]                            2021          Episodic  
   
                                                    Other gastrointestinal   
disorders  
(4 sources)                             Diarrhea, unspecified;   
Translations:   
[Diarrhea]                              2023          Episodic  
   
                                                    Other hematologic   
conditions  
(13 sources)                            Raised cardiac enzyme   
or marker;   
Translations: [Other   
specified   
abnormalities of   
plasma proteins]                        2022          Episodic  
   
                                                    Other injuries and   
conditions due to   
external causes  
(13 sources)                            History of fall;   
Translations: [History   
of falling]                             2021          Episodic  
   
                                                    Other lower   
respiratory disease  
(13 sources)                            Hypoxemia;   
Translations:   
[Hypoxemia]                             2022          Episodic  
   
                                                    Other lower   
respiratory disease  
(13 sources)                            Hypoxia; Translations:   
[Hypoxemia]                             2021          Episodic  
   
                                                    Other nervous system   
disorders  
(1 source)                              Polyneuropathy,   
unspecified;   
Translations:   
[POLYNEUROPATHY   
UNSPECIFIED]                            Onset:   
  
7                                                   Chronic  
   
                                                    Other nervous system   
disorders  
(13 sources)                            Disorder of brain;   
Translations:   
[Encephalopathy,   
unspecified]                            2022          Chronic  
   
                                                    Other nervous system   
disorders  
(6 sources)                             Encephalopathy,   
unspecified;   
Translations:   
[Encephalopathy,   
unspecified]                            Onset:   
  
5                         2025                Chronic  
   
                                                    Other nervous system   
disorders  
(1 source)                              Metabolic   
encephalopathy;   
Translations:   
[Metabolic   
encephalopathy]                         Onset:   
  
5                                                   Chronic  
   
                                                    Other nervous system   
disorders  
(1 source)                              Acute postoperative   
pain; Translations:   
[Other acute   
postprocedural pain]                     2024          Episodic  
   
                                                    Other nutritional;   
endocrine; and   
metabolic disorders  
(20 sources)                            Morbid obesity;   
Translations: [Morbid   
(severe) obesity due   
to excess calories]                     Onset:   
  
3                         2021                Chronic  
   
                                                    Other nutritional;   
endocrine; and   
metabolic disorders  
(13 sources)                            Metabolic syndrome X;   
Translations:   
[Metabolic syndrome]                     2021          Chronic  
   
                                                    Other nutritional;   
endocrine; and   
metabolic disorders  
(13 sources)                            Body mass index 40+ -   
severely obese;   
Translations: [Morbid   
(severe) obesity due   
to excess calories]                     2021          Chronic  
   
                                                    Other nutritional;   
endocrine; and   
metabolic disorders  
(13 sources)                            Obesity; Translations:   
[Obesity, unspecified]                     06-          Chronic  
   
                                                    Other nutritional;   
endocrine; and   
metabolic disorders  
(13 sources)                            Alveolar   
hypoventilation;   
Translations: [Morbid   
(severe) obesity with   
alveolar   
hypoventilation]                        2022          Chronic  
   
                                                    Other nutritional;   
endocrine; and   
metabolic disorders  
(16 sources)                            Hypomagnesemia;   
Translations:   
[Hypomagnesemia]                        2020          Chronic  
   
                                                    Other nutritional;   
endocrine; and   
metabolic disorders  
(3 sources)                             Extreme obesity with   
alveolar   
hypoventilation;   
Translations: [Morbid   
(severe) obesity with   
alveolar   
hypoventilation]                                            Chronic  
   
                                                    Other nutritional;   
endocrine; and   
metabolic disorders  
(4 sources)                             Morbid (severe)   
obesity with alveolar   
hypoventilation;   
Translations: [Obesity   
hypoventilation   
syndrome]                                                   Chronic  
   
                                                    Other nutritional;   
endocrine; and   
metabolic disorders  
(13 sources)              Morbid obesity            Onset:   
  
3                         2023                Chronic  
   
                                                    Other nutritional;   
endocrine; and   
metabolic disorders  
(4 sources)                             Hypomagnesemia;   
Translations:   
[Disorders of   
magnesium metabolism]                   Onset:   
  
5                         2025                Chronic  
   
                                                    Other screening for   
suspected conditions   
(not mental disorders   
or infectious disease)  
(2 sources)                             Electrocardiogram   
abnormal;   
Translations:   
[Abnormal   
electrocardiogram   
[ECG] [EKG]]                            2023          Episodic  
   
                                                    Other skin disorders  
(13 sources)                            Eruption;   
Translations: [Rash   
and other nonspecific   
skin eruption]                          2021          Episodic  
   
                                                    Pneumonia (except that   
caused by tuberculosis   
or sexually   
transmitted disease)  
(13 sources)                            Community acquired   
pneumonia;   
Translations:   
[Pneumonia,   
unspecified organism]                     2019          Episodic  
   
                                                    Residual codes;   
unclassified  
(20 sources)                            Obstructive sleep   
apnea syndrome;   
Translations:   
[Obstructive sleep   
apnea (adult)   
(pediatric)]                            Onset:   
  
3                         2021                Chronic  
   
                                                    Residual codes;   
unclassified  
(2 sources)                             Sleep apnea;   
Translations: [Sleep   
apnea, unspecified]                                         Chronic  
   
                                                    Residual codes;   
unclassified  
(13 sources)                            Obstructive sleep   
apnea   
(adult)(pediatric)                      Onset:   
  
3                         2023                Chronic  
   
                                                    Residual codes;   
unclassified  
(13 sources)                            Bilateral lower limb   
edema; Translations:   
[Localized edema]                       2021          Episodic  
   
                                                    Residual codes;   
unclassified  
(20 sources)                            Tobacco user;   
Translations: [Tobacco   
use]                                    2019          Episodic  
   
                                                    Residual codes;   
unclassified  
(13 sources)                            Patient encounter   
status; Translations:   
[Encounter for   
prophylactic measures,   
unspecified]                            2021          Episodic  
   
                                                    Residual codes;   
unclassified  
(3 sources)                             Tobacco dependence   
syndrome;   
Translations: [Tobacco   
use]                                                        Episodic  
   
                                                    Residual codes;   
unclassified  
(1 source)                              Rigor; Translations:   
[Other general   
symptoms and signs]                     2023          Episodic  
   
                                                    Respiratory failure;   
insufficiency; arrest   
(adult)  
(20 sources)                            Chronic hypoxemic   
respiratory failure;   
Translations: [Chronic   
respiratory failure   
with hypoxia]                           Onset:   
  
5                         2021                Chronic  
   
                                                    Respiratory failure;   
insufficiency; arrest   
(adult)  
(20 sources)                            Acute hypercapnic   
respiratory failure;   
Translations: [Acute   
respiratory failure   
with hypercapnia]                       10-          Episodic  
   
                                                    Septicemia (except in   
labor)  
(20 sources)                            Sepsis; Translations:   
[Sepsis, unspecified   
organism]                               Onset:   
  
5                         2020                Episodic  
   
                                                    Shock  
(1 source)                              Severe sepsis with   
septic shock;   
Translations: [Severe   
sepsis with septic   
shock]                                  Onset:   
  
5                                                   Episodic  
   
                                                    Skin and subcutaneous   
tissue infections  
(20 sources)                            Abscess of abdominal   
wall; Translations:   
[Cutaneous abscess of   
abdominal wall]                         2020          Episodic  
   
                                                    Spondylosis;   
intervertebral disc   
disorders; other back   
problems  
(20 sources)                            Backache;   
Translations:   
[Dorsalgia,   
unspecified]                            2020          Episodic  
   
                                                    Sprains and strains  
(13 sources)                            Sprain of medial   
collateral ligament of   
left knee, initial   
encounter;   
Translations: [Sprain   
of medial collateral   
ligament of left knee]                     2022          Episodic  
   
                                                    Substance-related   
disorders  
(14 sources)                            Smoker; Translations:   
[Nicotine dependence,   
unspecified,   
uncomplicated]                          2017          Chronic  
   
                                        Comment on above:   Added secondary to d  
ocumentation in Social History.   
   
                                                    Superficial injury;   
contusion  
(13 sources)                            Hematoma of right   
foot; Translations:   
[Contusion of right   
foot, initial   
encounter]                              2022          Episodic  
   
                                                    Systemic lupus   
erythematosus and   
connective tissue   
disorders  
(13 sources)                            Overlap syndrome;   
Translations: [Other   
overlap syndromes]                      2019          Chronic  
   
                                                    Unclassified  
(1 source)                              Body mass index (BMI)   
50-59.9 , adult;   
Translations: [BODY   
MASS INDEX BMI 50-59.9   
ADULT]                                  Onset:   
                                                   Chronic  
   
                                                    Urinary tract   
infections  
(20 sources)                            Bacterial urinary   
infection;   
Translations: [Urinary   
tract infection, site   
not specified]                          Onset:   
2021                Episodic  
   
                                                    Viral infection  
(13 sources)                            Disease caused by   
2019-nCoV;   
Translations:   
[COVID-19]                              2022          Episodic  
  
  
Past or Other Problems  
  
  
                      Problem Classification Problem    Date       Documented Da  
te Episodic/Chronic  
   
                                                    Acquired foot   
deformities  
(1 source)                              Foot drop, left   
foot; Translations:   
[FOOT DROP LEFT   
FOOT]                                   Onset:   
2017                                          Episodic  
   
                                                    Calculus of urinary   
tract  
(20 sources)                            Staghorn calculus;   
Translations:   
[Calculus of   
kidney]                                 Onset:   
2023                Episodic  
   
                                                    Deficiency and other   
anemia  
(20 sources)                            Iron deficiency   
anemia;   
Translations: [Iron   
deficiency anemia,   
unspecified]                            Onset:   
2023                Episodic  
   
                                                    Deficiency and other   
anemia  
(13 sources)                            Iron deficiency   
anemia, unspecified                     Onset:   
2023                Episodic  
   
                                                    Diabetes mellitus   
without complication  
(1 source)                              Other abnormal   
glucose;   
Translations:   
[OTHER ABNORMAL   
GLUCOSE]                                Onset:   
2017                                          Episodic  
   
                                                    Other circulatory   
disease  
(1 source)                              Hemorrhage,   
unspecified                             2024          Episodic  
   
                                                    Other circulatory   
disease  
(1 source)                              Hemorrhage, not   
elsewhere   
classified;   
Translations:   
[Hemorrhage, not   
elsewhere   
classified]                             Onset:   
2024                                          Episodic  
   
                                                    Other diseases of   
kidney and ureters  
(20 sources)                            Obstructive   
nephropathy;   
Translations:   
[Other obstructive   
and reflux   
uropathy]                               Onset:   
2023                Episodic  
   
                                                    Other diseases of   
kidney and ureters  
(13 sources)                            Other specified   
disorders of kidney   
and ureter                              Onset:   
2023                Episodic  
   
                                                    Other gastrointestinal   
disorders  
(1 source)                              Dysphagia,   
unspecified;   
Translations:   
[DYSPHAGIA   
UNSPECIFIED]                            Onset:   
2017                                          Episodic  
   
                                                    Other nervous system   
disorders  
(1 source)                              Other acute   
postoperative pain                      2024          Episodic  
   
                                                    Other skin disorders  
(20 sources)                            Hidradenitis   
suppurativa;   
Translations:   
[Hidradenitis   
suppurativa]                            Onset:   
2023                Episodic  
   
                                                    Other skin disorders  
(13 sources)              Hidradenitis              Onset:   
2023                Episodic  
  
  
  
Results  
  
  
                      Test Name  Value      Interpretation Reference Range Juan Choi 2025   
   
                      Anion gap [Moles/Vol] 7 mmol/L   Normal     6-16       [facility]  
   
                                        Comment on above:   Performed By: #### 2  
[id number] ####  
[facility]  
[address]  
[place] [address]   
   
                      Calcium [Mass/Vol] 8.8 mg/dL  Low        8.9-11.1   [facility]  
   
                                        Comment on above:   Performed By: #### 2  
[id number] ####  
[facility]  
[address]  
[place] [address]   
   
                      Chloride [Moles/Vol] 97 mmol/L  Low        101-111    [facility]  
   
                                        Comment on above:   Performed By: #### 2  
[id number] ####  
[facility]  
[address] [place] [place] [address]   
   
                      CO2 [Moles/Vol] 37 mmol/L  High       21-31      [facility]  
   
                                        Comment on above:   Performed By: #### 2  
[id number] ####  
[facility]  
[address]  
[place] [address]   
   
                      Creatinine [Mass/Vol] 3.3 mg/dL  High       0.5-1.3    [facility]  
   
                                        Comment on above:   Performed By: #### 2  
[id number] ####  
[facility]  
[address] [place] [address]   
   
                      Glucose [Mass/Vol] 103 mg/dL  Normal          [facility]  
   
                                        Comment on above:   Performed By: #### 2  
[id number] ####  
[facility]  
[address]  
[place] [address]   
   
                      Potassium [Moles/Vol] 4.0 mmol/L Normal     3.5-5.3    [facility]  
   
                                        Comment on above:   Performed By: #### 2  
[id number] ####  
[facility]  
[address]  
[place] [address]   
   
                      Sodium [Moles/Vol] 137 mmol/L Normal     135-145    [facility]  
   
                                        Comment on above:   Performed By: #### 2  
[id number] ####  
[facility]  
[address] [place] [address]   
   
                                                    Urea nitrogen   
[Mass/Vol]      32 mg/dL        High            5-21            [facility]  
   
                                        Comment on above:   Performed By: #### 2  
[id number] ####  
[facility]  
[address] [place] [address]   
   
                                                    Urea   
nitrogen/Creatinine   
[Mass ratio]    10 No Units     Normal          10-20           [facility]  
   
                                        Comment on above:   Performed By: #### 2  
[id number] ####  
[facility]  
[address] [place] [address]   
   
                                                    CHEMISTRYOrdered By: Lab ROP  
User on 2025   
   
                      Glucose [Mass/Vol] 142 mg/dL  High       55 - 99 mg/dL FT  
C POC   
Subsection  
   
                                        Comment on above:   Result Comment: Rayray GAGE   
   
                                POC Username    MAYURI TAPIA Invalid   
Interpretation   
Code                                                FT POC   
Subsection  
   
                                Sodium [Moles/Vol] 734562654962 mmol/L Invalid   
Interpretation   
Code                                                [facility] POC   
Subsection  
   
                                Sodium [Moles/Vol] [id number] mmol/L Invalid   
Interpretation   
Code                                                FT POC   
Subsection  
   
                      Glucose [Mass/Vol] 137 mg/dL  High       55 - 99 mg/dL FTM  
C POC   
Subsection  
   
                                        Comment on above:   Result Comment: Rayray GAGE   
   
                                POC Username    EVELYN GARRETT     Invalid   
Interpretation   
Code                                                FT POC   
Subsection  
   
                                Sodium [Moles/Vol] [id number] mmol/L Invalid   
Interpretation   
Code                                                [facility] POC   
Subsection  
   
                                Sodium [Moles/Vol] 855807744 mmol/L Invalid   
Interpretation   
Code                                                [facility] POC   
Subsection  
   
                      Glucose [Mass/Vol] 96 mg/dL   Normal     55 - 99 mg/dL FTM  
C POC   
Subsection  
   
                                        Comment on above:   Result Comment: Rayray GAGE   
   
                                POC Username    EVELYN GARRETT     Invalid   
Interpretation   
Code                                                FT POC   
Subsection  
   
                                Sodium [Moles/Vol] [id number] mmol/L Invalid   
Interpretation   
Code                                                [facility] POC   
Subsection  
   
                                Sodium [Moles/Vol] 084208821 mmol/L Invalid   
Interpretation   
Code                                                FT POC   
Subsection  
   
                                                    CHEMISTRYOrdered By: SYSTEM   
SYSTEM on 2025   
   
                      Anion gap [Moles/Vol] 7 mmol/L   Normal     6 - 16 mEq/L R  
emisol Chem  
   
                          Calcium [Mass/Vol] 8.8 mg/dL    Low          8.9 - 11.  
1   
mg/dL                                   Remisol Chem  
   
                          Chloride [Moles/Vol] 97 mmol/L    Low          101 - 1  
11   
mmol/L                                  Remisol Chem  
   
                      CO2 [Moles/Vol] 37 mmol/L  High       21 - 31 mmol/L Remis  
ol Chem  
   
                      Creatinine [Mass/Vol] 3.3 mg/dL  High       0.5 - 1.3 mg/d  
L Remisol Chem  
   
                          eGFR         16 mL/min/1.73 m2 Low          >=59mL/min  
/1.73   
m2                                      Remisol Chem  
   
                      Glucose [Mass/Vol] 103 mg/dL  Normal     55 - 199 mg/dL Re  
misol Chem  
   
                          Potassium [Moles/Vol] 4.0 mmol/L   Normal       3.5 -   
5.3   
mmol/L                                  Remisol Chem  
   
                          Sodium [Moles/Vol] 137 mmol/L   Normal       135 - 145  
   
mmol/L                                  Remisol Chem  
   
                                                    Urea nitrogen   
[Mass/Vol]      32 mg/dL        High            5 - 21 mg/dL    Remisol Chem  
   
                                                    Urea   
nitrogen/Creatinine   
[Mass ratio]    10 mg/mg        Normal          10 - 20         Remisol Chem  
   
                                                    Capillary Glucose POCon    
   
                      Glucose [Mass/Vol] 142 mg/dL  High       55-99      [facility]  
   
                                        Comment on above:   Result Comment: Rayray GAGE   
   
                                                            Performed By: #### 2  
[id number] ####  
[facility]  
[address] [place] [address]   
   
                      Glucose [Mass/Vol] 137 mg/dL  High       55-99      [facility]  
   
                                        Comment on above:   Result Comment: Rayray GAGE   
   
                                                            Performed By: #### 2  
[id number] ####  
[facility]  
[address] [place] [address]   
   
                      Glucose [Mass/Vol] 96 mg/dL   Normal     55-99      [facility]  
   
                                        Comment on above:   Result Comment: Rayray GAGE   
   
                                                            Performed By: #### 2  
[id number] ####  
[facility]  
[address] [place] [address]   
   
                                                    Discharge Note-Nursingon    
   
                                                    Discharge   
Note-Nursing                            Discharge Note-Nursing  
[Image Removed]  
CRIS HEARN  
:1970  
MRN:[id number]  
Visit Date:2025  
Inpatient Discharge   
Instructions  
Your Care Team  
Admitting Physician -  
Santos Gandara DO  
Consulting Physician -  
Nahum Pérez MD  
Reason for Your Visit  
renal insufficiency  
Your Diagnosis  
Metabolic   
encephalopathy  
History of stroke  
Acute kidney injury   
superimposed on   
chronic kidney disease  
UTI (urinary tract   
infection)  
Chronic kidney   
disease, unspecified  
Potential stroke  
Weakness or fatigue  
Tests Performed  
BMP -- Results Pending   
--  
CT Head or Brain w/o   
Contrast  
MRI Brain w/o Contrast  
XR Chest Single View  
Please visit your   
patient portal for   
your results or   
contact your primary   
care physician.  
This Is Your   
Medications List  
Sodium Chloride 0.9%   
intravenous solution   
(Sodium Chloride 0.9%   
IV Sol 250 mL)  
Sodium Chloride 0.9%   
intravenous solution   
(Sodium Chloride 0.9%   
IV Sol 250 mL)  
acetaminophen   
(acetaminophen 500 mg   
Tab)  
albuterol (Proventil   
HFA 90 mcg/inh   
Aerosol)  
albuterol-ipratropium   
(albuterol-ipratropium   
Inh Sol 3 mL UD)  
amiodarone (amiodarone   
200 mg Tab)  
amitriptyline   
(amitriptyline 50 mg   
Tab)  
ammonium lactate   
topical (ammonium   
lactate Top 12%   
Lotion)  
apixaban (Eliquis 5 mg   
oral tablet)  
duloxetine (Cymbalta   
60 mg Cap-DR)  
ergocalciferol   
(Vitamin D 50,000 intl   
units (1.25 mg) oral   
capsule)  
fluticasone nasal   
(fluticasone Nasal   
0.05 mg/inh Spry)  
lidocaine topical   
(Lidoderm)  
losartan (losartan 25   
mg Tab)  
pantoprazole   
(Pantoprazole 40 mg DR   
Tab)  
potassium chloride   
(Potassium Chloride   
(Eqv-Klor-Con 10) 10   
mEq oral tablet,   
extended release)  
pravastatin   
(pravastatin 40 mg   
Tab)  
senna (senna 8.6 mg   
Tab)  
tramadol (traMADOL 50   
mg Tab)  
tramadol (traMADol 25   
mg oral tablet)  
[Image Removed:   
STOP]Stop taking these   
medications  
ertapenem  
heparin flush (Heparin   
Lock Flush)  
Discharge Vitals  
Temperature (Axillary)  
36.6 ???C  
Heart Rate (Monitored)  
77  
Respiratory Rate  
18  
Blood Pressure  
127/78  
Weight  
122.7 kg  
What to do next  
Instructions From Your   
Doctor  
Event Name  
Event Result  
Discharge Activity  
Ambulate as tolerated  
Discharge Restrictions  
No restrictions  
Discharge Diet(s)  
Fat Modified- Low   
cholesterol  
Discharge Instructions  
Will need follow-up   
with PCP, urology to   
have placed her   
urostomy tube on the   
left at [facility] and   
recommend nephrology   
consult in the   
outpatient setting  
  
**New Follow Up   
Appointments after   
Discharge**  
Follow Up with   
Recommend referral to   
nephrology in the   
outpatient setting for   
your acute kidney   
injury When:  
Follow Up with   
Follow-up with [facility]   
urology who placed   
your nephrostomy tube   
When:  
Follow Up with LORENE BARRERA When:  
Comments:  
Call for followup   
appointment  
Where:  
[facility] [place] [address]-  
[phone]   
Business (1)  
Follow Up with   
Beto MATTHEWS, HUMAIRA Sidhu When: Within 2 to   
4 weeks  
Where:  
[facility] [address]   
[place] [address]-  
[phone]  
Medications  
What How Much When   
Instructions Next Dose  
Changed tramadol   
(traMADOL 50 mg Tab)   
0.5 Tablets By Mouth   
Every 12 hours as   
needed for Pain   
Printed Prescription   
As needed  
Unchanged   
acetaminophen   
(acetaminophen 500 mg   
Tab) 1 Tablets By   
Mouth Every 4 hours as   
needed for for pain As   
needed  
Unchanged albuterol   
(Proventil HFA 90 mcg/   
inh Aerosol) 2 Puffs   
Inhalation Every 4   
hours as needed for   
for wheezing As needed  
Unchanged   
albuterol-ipratropium   
(albuterol-ipratropium   
Inh Sol 3 mL UD) 3   
Milliliter Nebulized   
inhalation (aerosol)   
Every 6 hours Resume   
tonight at 8:00 PM  
Unchanged amiodarone   
(amiodarone 200 mg   
Tab) 2 Tablets By   
Mouth Every day   
2025  
Unchanged   
amitriptyline   
(amitriptyline 50 mg   
Tab) 1 Tablets By   
Mouth Once a day (at   
bedtime) Tonight at   
bedtime  
Unchanged ammonium   
lactate topical   
(ammonium lactate Top   
12% Lotion) 1   
Application Topical 2   
times a day Resume   
tonight at 9:00 PM  
Unchanged apixaban   
(Eliquis 5 mg oral   
tablet) 1 Tablets By   
Mouth 2 times a day   
Tonight at 9:00 PM  
Unchanged duloxetine   
(Cymbalta 60 mg   
Cap-DR) 1 Capsules By   
Mouth Every day (do   
not crush or chew)   
2025  
Unchanged   
ergocalciferol   
(Vitamin D 50,000 intl   
units (1.25 mg) oral   
capsule) 1 Capsules By   
Mouth Every week   
Resume weekly  
Unchanged fluticasone   
nasal (fluticasone   
Nasal 0.05 mg/ inh   
Spry) 1 Sprays Nasal   
Inhalation 2 times a   
day each nostril   
Resume tonight at 9:00   
PM  
Unchanged lidocaine   
topical (Lidoderm) 1   
Patches Topical Every   
day Resume 2025  
Unchanged losartan   
(losartan 25 mg Tab) 1   
Tablets By Mouth Every   
day Resume 2025  
Unchanged pantoprazole   
(Pantoprazole 40 mg DR   
Tab) 1 Tablets By   
Mouth Every day Resume   
2025  
Unchanged potassium   
chloride (Potassium   
Chloride (Eqv-Klor-Con   
10) 10 mEq oral   
tablet, extended   
release) 1 Tablets By   
Mouth Every day TAKE 1   
TABLET BY MOUTH TWICE   
DAILY WITH FOOD Resume   
2025  
Unchanged pravastatin   
(pravastatin 40 mg   
Tab) 1 Tablets By   
Mouth Once a day (at   
bedtime) Tonight at   
bedtime  
Unchanged senna (   
(more content not   
included)...        Normal                                  [facility]  
   
                                                    Inpatient Clinical Summaryon  
 2025   
   
                                                    Inpatient Clinical   
Summary                                 Inpatient Clinical   
Summary  
(Inserted Image.   
Unable to display)   
[address] [address] [phone]  
Clinical Summary  
  
Person Information:  
Name: CRIS HEARN   
Age: 54 Years :   
1970  
Sex: Female PCP: LORENE BARRERA DO  
Marital Status:  
Single Phone:  
Race:  
White Ethnicity:  
Non- or    
Language:  
English  
MRN: [id number] Visit   
Id: [id number]  
Visit Reason:  
Potential stroke;   
Weakness or fatigue;   
stroke Speciality:   
Acuity:  
Enc Type: Inpatient   
Med Service: Medical  
Arrival:  
2025 15:48:48   
Discharge: Dispo Type:   
Admitted as IP to this   
Hosp  
  
Address:  
[address] [place] [id number]  
  
Provider Notes:  
  
  
Diagnosis:  
1:Metabolic   
encephalopathy;   
2:History of stroke;   
3:Acute kidney injury   
superimposed on   
chronic kidney   
disease; 4:UTI   
(urinary tract   
infection); Chronic   
kidney disease,   
unspecified  
  
Problems  
Active  
Smoker  
  
Smoking Status:  
Current Every Day   
Smoker  
  
Functional Status:  
Sensory Deficits:  
History of Falls:  
Mobility Assistance   
Prior to Admission:   
Partial assistance  
ADLs: Complete assist  
Current Level of   
Assistance for   
Self-Care/Mobility:  
  
Cognitive Status:  
Oriented x 3  
  
Allergies  
aspirin (Hives)  
NSAIDs (Hives)  
naproxen (Hives)  
Augmentin (Thrush)  
Benadryl (Unknown)  
  
Measurements:  
Height: 167.64 cm  
Weight: 122.7 kg  
Blood Pressure: 127   
mmHg / 78 mmHg  
BMI: 43.8 kg/m2  
  
  
Procedures  
No Procedures   
Documented  
  
Immunizations  
No Immunizations   
Documented This Visit  
  
Final Med List:  
acetaminophen   
(acetaminophen 500 mg   
Tab) 1 Tablets By   
Mouth every 4 hours as   
needed for pain.  
albuterol (Proventil   
HFA 90 mcg/inh   
Aerosol) 2 Puffs   
Inhalation every 4   
hours as needed for   
wheezing.  
albuterol-ipratropium   
(albuterol-ipratropium   
Inh Sol 3 mL UD) 3   
Milliliter Nebulized   
inhalation (aerosol)   
every 6 hours.  
amiodarone (amiodarone   
200 mg Tab) 2 Tablets   
By Mouth every day.  
amitriptyline   
(amitriptyline 50 mg   
Tab) 1 Tablets By   
Mouth once a day (at   
bedtime).  
ammonium lactate   
topical (ammonium   
lactate Top 12%   
Lotion) 1 Application   
Topical 2 times a day.  
apixaban (Eliquis 5 mg   
oral tablet) 1 Tablets   
By Mouth 2 times a   
day.  
duloxetine (Cymbalta   
60 mg Cap-DR) 1   
Capsules By Mouth   
every day. (do not   
crush or chew).  
ergocalciferol   
(Vitamin D 50,000 intl   
units (1.25 mg) oral   
capsule) 1 Capsules By   
Mouth every week.  
fluticasone nasal   
(fluticasone Nasal   
0.05 mg/inh Spry) 1   
Sprays Nasal   
Inhalation 2 times a   
day. each nostril.  
lidocaine topical   
(Lidoderm) 1 Patches   
Topical every day.  
losartan (losartan 25   
mg Tab) 1 Tablets By   
Mouth every day.  
pantoprazole   
(Pantoprazole 40 mg DR   
Tab) 1 Tablets By   
Mouth every day.  
potassium chloride   
(Potassium Chloride   
(Eqv-Klor-Con 10) 10   
mEq oral tablet,   
extended release) 1   
Tablets By Mouth every   
day. TAKE 1 TABLET BY   
MOUTH TWICE DAILY WITH   
FOOD.  
pravastatin   
(pravastatin 40 mg   
Tab) 1 Tablets By   
Mouth once a day (at   
bedtime).  
senna (senna 8.6 mg   
Tab) 1 Tablets By   
Mouth every day.  
Sodium Chloride 0.9%   
intravenous solution   
(Sodium Chloride 0.9%   
IV Sol 250 mL) 10   
Milliliter IV Push at   
bedtime.  
Sodium Chloride 0.9%   
intravenous solution   
(Sodium Chloride 0.9%   
IV Sol 250 mL) 10   
Milliliter IV Push at   
bedtime for 24 Hour.  
tramadol (traMADol 25   
mg oral tablet) 1   
Tablets By Mouth every   
12 hours.  
tramadol (traMADOL 50   
mg Tab) 0.5 Tablets By   
Mouth every 12 hours   
as needed Pain.   
Refills: 0.  
  
Care Team Members:  
Attending Physician:   
Santos Gandara DO  
Consulting Physician:   
Nahum Pérez MD  
Referring Physician:  
  
  
Follow up:  
With: Address: When:  
Recommend referral to   
nephrology in the   
outpatient setting for   
your acute kidney   
injury  
With: Address: When:  
Follow-up with [facility]   
urology who placed   
your nephrostomy tube  
With: Address: When:  
[place]   
[address] [phone]   
Business (1)  
Comments:  
Call for followup   
appointment  
With: Address: When:  
Nahum Pérez MD   
[facility] [address]   
[place] [address] [phone] Within   
2 to 4 weeks  
  
Patient Education   
Information:  
  
Core Measures: Stroke   
(Cerebrovascular   
Accident) [facility] [place]   
[place]            [facility]  
   
                                                    Inpatient Patient Summaryon   
2025   
   
                                                    Inpatient Patient   
Summary                                 Inpatient Patient   
Summary  
(Inserted Image.   
Unable to display)   
[address] [address] [phone]  
  
Patient Discharge   
Instructions  
  
PERSON INFORMATION  
Name: CRIS HEARN   
YOB: 1970  
Current Date: 2025   
16:25:37  
  
PHYSICIANS  
Admitting Physician:   
Santos Gandara DO  
Primary Care   
Physician: LORENE BARRERA DO  
PCP Phone Number:   
[phone]  
Comment:  
  
Discharge Diagnosis:   
1:Metabolic   
encephalopathy;   
2:History of stroke;   
3:Acute kidney injury   
superimposed on   
chronic kidney   
disease; 4:UTI   
(urinary tract   
infection); Chronic   
kidney disease,   
unspecified  
Condition at   
Discharge: Stable  
  
CRIS HEARN has   
been given the   
following list of   
follow-up   
instructions,   
prescriptions, and   
patient education   
materials:  
  
PATIENT FOLLOW-UP   
INFORMATION  
Diet: Fat Modified-   
Low cholesterol  
Discharge Activity:   
Ambulate as tolerated  
Discharge   
Restrictions: No   
restrictions  
Wound Care   
Instructions:  
Remove Your Dressing   
In Days  
Call Your Doctor For:  
  
IF UNABLE TO CONTACT   
YOUR PHYSICIAN AND YOU   
FEEL IT IS AN   
EMERGENCY, GO TO THE   
NEAREST EMERGENCY ROOM   
OR CALL 911  
  
Home Treatment:  
Devices/Equipment:   
None  
Special Services:  
Additional   
Instructions: Will   
need follow-up with   
PCP, urology to have   
placed her urostomy   
tube on the left at   
[facility] and recommend   
nephrology consult in   
the outpatient setting  
Primary Care Physician   
to provide the   
following pending test   
results:  
Follow up:  
With: Address: When:  
Recommend referral to   
nephrology in the   
outpatient setting for   
your acute kidney   
injury  
With: Address: When:  
Follow-up with [facility]   
urology who placed   
your nephrostomy tube  
With: Address: When:  
[place]   
[address] [phone]   
Business (1)  
Comments:  
Call for followup   
appointment  
With: Address: When:  
Beto MATTHEWS, [address] [address] [address] [place] [address] [phone] Within   
2 to 4 weeks  
In the event that this   
physician does not   
participate in your   
insurance network,   
please consult with   
your insurance company   
to find a nearby   
participating   
provider.  
  
Comment:  
SHRUTHI MEDINA RENEE E,   
have received the   
attached patient   
education   
materials/instructions   
and have verbalized   
understanding:  
Patient   
Signature_____________  
______________________  
__________   
Date___________  
Clinican/Nurse   
Signature_____________  
______________________  
___ Date___________  
HERE ARE THE   
MEDICATION CHANGES   
THAT OCCURRED DURING   
YOUR HOSPITAL STAY  
  
Medications to   
Continue Taking That   
Have Changed  
Printed Prescriptions  
START: tramadol   
(traMADOL 50 mg Tab)   
0.5 Tablets By Mouth   
every 12 hours as   
needed Pain. Refills:   
0.  
Last   
Dose:_________________  
___Next   
Dose:_________________  
___  
Other Medications  
START: tramadol   
(traMADol 25 mg oral   
tablet) 1 Tablets By   
Mouth every 12 hours.  
Last   
Dose:_________________  
___Next   
Dose:_________________  
___  
Medications to   
Continue with No   
Changes  
Other Medications  
acetaminophen   
(acetaminophen 500 mg   
Tab) 1 Tablets By   
Mouth every 4 hours as   
needed for pain.  
Last   
Dose:_________________  
___Next   
Dose:_________________  
___  
albuterol (Proventil   
HFA 90 mcg/inh   
Aerosol) 2 Puffs   
Inhalation every 4   
hours as needed for   
wheezing.  
Last   
Dose:_________________  
___Next   
Dose:_________________  
___  
albuterol-ipratropium   
(albuterol-ipratropium   
Inh Sol 3 mL UD) 3   
Milliliter Nebulized   
inhalation (aerosol)   
every 6 hours.  
Last   
Dose:_________________  
___Next   
Dose:_________________  
___  
amiodarone (amiodarone   
200 mg Tab) 2 Tablets   
By Mouth every day.  
Last   
Dose:_________________  
___Next   
Dose:_________________  
___  
amitriptyline   
(amitriptyline 50 mg   
Tab) 1 Tablets By   
Mouth once a day (at   
bedtime).  
Last   
Dose:_________________  
___Next   
Dose:_________________  
___  
ammonium lactate   
topical (ammonium   
lactate Top 12%   
Lotion) 1 Application   
Topical 2 times a day.  
Last   
Dose:_________________  
___Next   
Dose:_________________  
___  
apixaban (Eliquis 5 mg   
oral tablet) 1 Tablets   
By Mouth 2 times a   
day.  
Last   
Dose:_________________  
___Next   
Dose:_________________  
___  
duloxetine (Cymbalta   
60 mg Cap-DR) 1   
Capsules By Mouth   
every day. (do not   
crush or chew).  
Last   
Dose:_________________  
___Next   
Dose:_________________  
___  
ergocalciferol   
(Vitamin D 50,000 intl   
units (1.25 mg) oral   
capsule) 1 Capsules By   
Mouth every week.  
Last   
Dose:_________________  
___Next   
Dose:_________________  
___  
fluticasone nasal   
(fluticasone Nasal   
0.05 mg/inh Spry) 1   
Sprays Nasal   
Inhalation 2 times a   
day. each nostril.  
Last   
Dose:_________________  
___Next   
Dose:_________________  
___  
lidocaine topical   
(Lidoderm) 1 Patches   
Topical every day.  
Last   
Dose:_________________  
___Next   
Dose:_________________  
___  
losartan (losartan 25   
mg Tab) 1 Tablets By   
Mouth every day.  
Last   
Dose:_________________  
___Next   
Dose:_________________  
___  
pantoprazole   
(Pantoprazole 40 mg DR   
Tab) 1 Tablets By   
Mouth every day.  
Last   
Dose:_________________  
___Next   
Dose:_________________  
___  
potassium chloride   
(Potassium Chlori   
(more content not   
included)...        Normal                                  [facility]  
   
                                                    Interdisciplinary Note - Luis  
e Manageron 2025   
   
                                                    Interdisciplinary   
Note -                      Interdisciplinary Note   
-   
CRM to room 317  
Patient is awake,   
alert and oriented.   
Patient is from [facility]   
SNF. She does not want   
to return and wants   
new SNF. Patient PCP   
listed is not correct,   
she verified DME and   
insurance. Patient   
came in with stroke   
like symptoms. She was   
found to have   
encephalopathy, CULLEN   
and UTi. Patient is   
assigned to Dr Gandara,   
see notes. Patient has   
consults for neuro,   
diet, SS for ETOH and   
advance directives.   
Patient declines ETOH   
use and does not want   
to do advance   
directives. Patient   
was accepted to [facility]   
and they started   
precert on . CRM   
provided contact info,   
white board updated.   
CRM following  
DC once precert   
obtained            Normal                                  [facility] [facility]  
   
                                        Comment on above:   Result Comment: Elec  
tronically Signed By: Rebecca Ortiz\.br\Date and Time Signed: 25 11:52 EST   
   
                                                    eGFRon 2025   
   
                      eGFR       16 mL/min/1.73 m2 Low        >=59       [facility]  
   
                                        Comment on above:   Performed By: #### 1  
[id number] ####  
[facility]  
[address]  
[place] [address]   
   
                                                    BMPon 2025   
   
                      Anion gap [Moles/Vol] 10 mmol/L  Normal     6-16       [facility]  
   
                                        Comment on above:   Performed By: #### 2  
[id number] ####  
[facility]  
[address] [place] [address]   
   
                      Calcium [Mass/Vol] 8.9 mg/dL  Normal     8.9-11.1   [facility]  
   
                                        Comment on above:   Performed By: #### 2  
[id number] ####  
[facility]  
[address]  
[place] [address]   
   
                      Chloride [Moles/Vol] 98 mmol/L  Low        101-111    [facility]  
   
                                        Comment on above:   Performed By: #### 2  
[id number] ####  
[facility]  
[address] [place] [place] [address]   
   
                      CO2 [Moles/Vol] 35 mmol/L  High       21-31      [facility]  
   
                                        Comment on above:   Performed By: #### 2  
[id number] ####  
[facility]  
[address]  
[place] [address]   
   
                      Creatinine [Mass/Vol] 3.3 mg/dL  High       0.5-1.3    [facility]  
   
                                        Comment on above:   Performed By: #### 2  
[id number] ####  
[facility]  
[address]  
[place] [address]   
   
                      Glucose [Mass/Vol] 128 mg/dL  Normal          [facility]  
   
                                        Comment on above:   Performed By: #### 2  
[id number] ####  
[facility]  
[address] [place] [address]   
   
                      Potassium [Moles/Vol] 4.1 mmol/L Normal     3.5-5.3    [facility]  
   
                                        Comment on above:   Performed By: #### 2  
[id number] ####  
[facility]  
[address] [place] [address]   
   
                      Sodium [Moles/Vol] 139 mmol/L Normal     135-145    [facility]  
   
                                        Comment on above:   Performed By: #### 2  
[id number] ####  
[facility]  
[address] [place] [address]   
   
                                                    Urea nitrogen   
[Mass/Vol]      34 mg/dL        High            5-21            [facility]  
   
                                        Comment on above:   Performed By: #### 2  
[id number] ####  
[facility]  
[address] [place] [address]   
   
                                                    Urea   
nitrogen/Creatinine   
[Mass ratio]    10 No Units     Normal          10-20           [facility]  
   
                                        Comment on above:   Performed By: #### 2  
[id number] ####  
[facility]  
[address] [place] [address]   
   
                                                    CHEMISTRYOrdered By: SYSTEM   
SYSTEM on 2025   
   
                      Anion gap [Moles/Vol] 10 mmol/L  Normal     6 - 16 mEq/L R  
emisol Chem  
   
                          Calcium [Mass/Vol] 8.9 mg/dL    Normal       8.9 - 11.  
1   
mg/dL                                   Remisol [facility]  
   
                          Chloride [Moles/Vol] 98 mmol/L    Low          101 - 1  
11   
mmol/L                                  Remisol [facility]  
   
                      CO2 [Moles/Vol] 35 mmol/L  High       21 - 31 mmol/L Remis  
ol Chem  
   
                      Creatinine [Mass/Vol] 3.3 mg/dL  High       0.5 - 1.3 mg/d  
L Remisol Chem  
   
                          eGFR         16 mL/min/1.73 m2 Low          >=59mL/min  
/1.73   
m2                                      [facility]  
   
                      Glucose [Mass/Vol] 128 mg/dL  Normal     55 - 199 mg/dL Re  
misol Chem  
   
                          Potassium [Moles/Vol] 4.1 mmol/L   Normal       3.5 -   
5.3   
mmol/L                                  Remisol [facility]  
   
                          Sodium [Moles/Vol] 139 mmol/L   Normal       135 - 145  
   
mmol/L                                  Remisol [facility]  
   
                                                    Urea nitrogen   
[Mass/Vol]      34 mg/dL        High            5 - 21 mg/dL    [facility]  
   
                                                    Urea   
nitrogen/Creatinine   
[Mass ratio]    10 mg/mg        Normal          10 - 20         [facility]  
   
                                                    Capillary Glucose POCon 0  
   
   
                      Glucose [Mass/Vol] 207 mg/dL  High       55-99      [facility]  
   
                                        Comment on above:   Result Comment: Rayray GAGE   
   
                                                            Performed By: #### 2  
[id number] ####  
[facility]  
[address] [place] [address]   
   
                      Glucose [Mass/Vol] 121 mg/dL  High       55-99      [facility]  
   
                                        Comment on above:   Result Comment: Rayray GAGE   
   
                                                            Performed By: #### 2  
[id number] ####  
[facility]  
[address] [place] [address]   
   
                      Glucose [Mass/Vol] 149 mg/dL  High       55-99      [facility]  
   
                                        Comment on above:   Result Comment: No C  
overage Given   
   
                                                            Performed By: #### 2  
[id number] ####  
[facility]  
[address] [place] [address]   
   
                      Glucose [Mass/Vol] 105 mg/dL  High       55-99      [facility]  
   
                                        Comment on above:   Result Comment: Rayray GAGE   
   
                                                            Performed By: #### 2  
[id number] ####  
[facility]  
[address] [place] [address]   
   
                                                    Interdisciplinary Note - Luis  
e Manageron 2025   
   
                                                    Interdisciplinary   
Note -                      Interdisciplinary Note   
-   
CRM to room 317  
Patient is awake,   
alert and oriented.   
Patient is from [facility]   
SNF. She does not want   
to return and wants HH   
and DME needs now.   
Patient PCP listed is   
not correct, she   
verified DME and   
insurance. Patient   
came in with stroke   
like symptoms. She was   
found to have   
encephalopathy, CULLEN   
and UTi. Patient is   
assigned to Dr Gandara,   
see notes. Patient has   
consults for neuro,   
diet, SS for ETOH and   
advance directives.   
Patient declines ETOH   
use and does not want   
to do advance   
directives. . CRM   
provided contact info,   
white board updated.   
CRM following  
CRM went back to   
patient room. She   
wants a SNF stay just   
not back to [facility]  
Referrals sent to 1.   
[facility] and 2 [facility]  
[facility] accepted and they   
are starting precert Normal                                  [facility]  
   
                                        Comment on above:   Result Comment: Elec  
tronically Signed By: Rebecca Ortiz\.br\Date and Time Signed: 25 14:25 EST   
   
                                                    Interdisciplinary   
Note -                      Interdisciplinary Note   
-   
CRM to room 317  
Patient is awake,   
alert and oriented.   
Patient is from [facility]   
SNF. She does not want   
to return and wants HH   
and DME needs now.   
Patient PCP listed is   
not correct, she   
verified DME and   
insurance. Patient   
came in with stroke   
like symptoms. She was   
found to have   
encephalopathy, CULLEN   
and UTi. Patient is   
assigned to Dr Gandara,   
see notes. Patient has   
consults for neuro,   
diet, SS for ETOH and   
advance directives.   
Patient declines ETOH   
use and does not want   
to do advance   
directives. . CRM   
provided contact info,   
white board updated.   
CRM following  
CRM went back to   
patient room. She   
wants a SNF stay just   
not back to [facility]  
Referrals sent to 1.   
[facility] and 2 [facility]  
   
                                        Comment on above:   Result Comment: Elec  
tronically Signed By: Rebecca Ortiz\.br\Date and Time Signed: 25 11:10 EST   
   
                                                    Interdisciplinary   
Note -                      Interdisciplinary Note   
-   
CRM to room 317  
Patient is awake,   
alert and oriented.   
Patient is from [facility].   
Patient PCP listed is   
not correct, she   
verified DME and   
insurance. Patient   
came in with stroke   
like symptoms. Patient   
is assigned to Dr Gandara, see notes.   
Patient has consults   
for neuro, diet, SS   
for ETOH and advance   
directives. Patient   
declines ETOH use and   
does not want to do   
advance directives.   
Patient DC plan is   
transfer to [facility]. CRM   
provided contact info,   
white board updated.   
CRM following  
CRM back to room  
Patient wants SNF stay   
just not return to   
[facility]  
Next choices are [facility]   
and [facility]  
   
                                        Comment on above:   Result Comment: Elec  
tronically Signed By: Rebecca Ortiz\.br\Date and Time Signed: 25 11:02 EST   
   
                                                    eGFRon 2025   
   
                      eGFR       16 mL/min/1.73 m2 Low        >=59       [facility]  
   
                                        Comment on above:   Performed By: #### 1  
[id number] ####  
[facility]  
[address]  
[place] [address]   
   
                                                    BMPon 2025   
   
                      Anion gap [Moles/Vol] 8 mmol/L   Normal     6-16       [facility]  
   
                                        Comment on above:   Performed By: #### 2  
[id number] ####  
[facility]  
[address] [place] [address]   
   
                      Calcium [Mass/Vol] 9.0 mg/dL  Normal     8.9-11.1   [facility]  
   
                                        Comment on above:   Performed By: #### 2  
[id number] ####  
[facility]  
[address] [place] [address]   
   
                      Chloride [Moles/Vol] 99 mmol/L  Low        101-111    [facility]  
   
                                        Comment on above:   Performed By: #### 2  
[id number] ####  
[facility]  
[address] [place] [address]   
   
                      CO2 [Moles/Vol] 35 mmol/L  High       21-31      [facility]  
   
                                        Comment on above:   Performed By: #### 2  
[id number] ####  
[facility]  
[address] [place] [address]   
   
                      Creatinine [Mass/Vol] 3.4 mg/dL  High       0.5-1.3    [facility]  
   
                                        Comment on above:   Performed By: #### 2  
[id number] ####  
[facility]  
[address] [place] [address]   
   
                      Glucose [Mass/Vol] 93 mg/dL   Normal          [facility]  
   
                                        Comment on above:   Performed By: #### 2  
[id number] ####  
[facility]  
[address] [place] [address]   
   
                      Potassium [Moles/Vol] 4.2 mmol/L Normal     3.5-5.3    [facility]  
   
                                        Comment on above:   Performed By: #### 2  
[id number] ####  
[facility]  
[address] [place] [address]   
   
                      Sodium [Moles/Vol] 138 mmol/L Normal     135-145    [facility]  
   
                                        Comment on above:   Performed By: #### 2  
[id number] ####  
[facility]  
[address] [place] [address]   
   
                                                    Urea nitrogen   
[Mass/Vol]      29 mg/dL        High            5-21            [facility]  
   
                                        Comment on above:   Performed By: #### 2  
[id number] ####  
[facility]  
[address] [place] [address]   
   
                                                    Urea   
nitrogen/Creatinine   
[Mass ratio]    8 No Units      Low             10-20           [facility]  
   
                                        Comment on above:   Performed By: #### 2  
[id number] ####  
[facility]  
[address] [place] [address]   
   
                                                    CBC w/ Auto Diffon   
5   
   
                                                    Basophils/100 WBC   
(Bld)           0.4 %           Normal          0.0-2.0         [facility]  
   
                                        Comment on above:   Performed By: #### 2  
[id number] ####  
[facility]  
[address] [place] [address]   
   
                                                    Basophils/Leukocytes   
Auto (Bld) [Pure #   
fraction]       0.0 E9/L        Normal          0.0-0.2         [facility]  
   
                                        Comment on above:   Performed By: #### 2  
[id number] ####  
[facility]  
[address] [place] [address]   
   
                                                    Eosinophils (Bld)   
[#/Vol]         0.5 E9/L        Normal          0.0-0.5         [facility]  
   
                                        Comment on above:   Performed By: #### 2  
[id number] ####  
[facility]  
[address] [place] [address]   
   
                                                    Eosinophils/100 WBC   
(Bld)           5.6 %           Normal          0.0-8.0         [facility]  
   
                                        Comment on above:   Performed By: #### 2  
[id number] ####  
[facility]  
[address] [place] [address]   
   
                                                    Erythrocyte   
distribution width   
(RBC) [Ratio]   17.2 %          High            10.9-14.2       [facility]  
   
                                        Comment on above:   Performed By: #### 2  
[id number] ####  
[facility]  
[address] [place] [address]   
   
                                                    Hematocrit (Bld)   
[Volume fraction] 22.8 %          Low             34.0-46.0       [facility]  
   
                                        Comment on above:   Performed By: #### 2  
[id number] ####  
[facility]  
[address] [place] [address]   
   
                                                    Hemoglobin (Bld)   
[Mass/Vol]      7.3 g/dL        Low             12.0-16.0       [facility]  
   
                                        Comment on above:   Performed By: #### 2  
[id number] ####  
[facility]  
[address] [place] [address]   
   
                                                    Lymphocytes (Bld)   
[#/Vol]         1.6 E9/L        Normal          1.0-4.0         [facility]  
   
                                        Comment on above:   Performed By: #### 2  
[id number] ####  
[facility]  
[address] [place] [address]   
   
                                                    Lymphocytes/100 WBC   
(Bld)           18.4 %          Normal          14.0-50.0       [facility]  
   
                                        Comment on above:   Performed By: #### 2  
[id number] ####  
[facility]  
[address] [place] [address]   
   
                                                    MCH (RBC) [Entitic   
mass]           27.4 pg         Normal          27.0-34.0       [facility]  
   
                                        Comment on above:   Performed By: #### 2  
[id number] ####  
[facility]  
[address] [place] [address]   
   
                      MCHC (RBC) [Mass/Vol] 32.3 g/dL  Normal     31.4-36.0  [facility]  
   
                                        Comment on above:   Performed By: #### 2  
[id number] ####  
[facility]  
[address] [place] [address]   
   
                                                    MCV (RBC) [Entitic   
vol]            85.0 fL         Normal          80.0-100.0      [facility]  
   
                                        Comment on above:   Performed By: #### 2  
[id number] ####  
[facility]  
[address] [place] [address]   
   
                                                    Monocytes (Bld)   
[#/Vol]         0.8 E9/L        Normal          0.2-1.0         [facility]  
   
                                        Comment on above:   Performed By: #### 2  
[id number] ####  
[facility]  
[address] [place] [address]   
   
                                                    Neutrophils (Bld)   
[#/Vol]         5.8 E9/L        Normal          2.0-7.5         [facility]  
   
                                        Comment on above:   Performed By: #### 2  
[id number] ####  
[facility]  
[address] [place] [address]   
   
                                                    Neutrophils/100 WBC   
(Bld)           66.4 %          Normal          36.0-75.0       [facility]  
   
                                        Comment on above:   Performed By: #### 2  
[id number] ####  
[facility]  
[address] [place] [address]   
   
                                                    Platelet mean volume   
(Bld) [Entitic vol] 7.4 fL          Normal          6.4-10.8        [facility]  
   
                                        Comment on above:   Performed By: #### 2  
[id number] ####  
[facility]  
[address] [place] [address]   
   
                                                    Platelets (Bld)   
[#/Vol]         349.0 E9/L      Normal          150.0-500.0     [facility]  
   
                                        Comment on above:   Performed By: #### 2  
[id number] ####  
[facility]  
[address] [place] [address]   
   
                      RBC (Bld) [#/Vol] 2.7 E12/L  Low        4.3-5.9    [facility]  
   
                                        Comment on above:   Performed By: #### 2  
[id number] ####  
[facility]  
[address] [place] [address]   
   
                                                    WBC corrected for   
nucl RBC Auto (Bld)   
[#/Vol]         8.7 E9/L        Normal          4.0-11.0        [facility]  
   
                                        Comment on above:   Performed By: #### 2  
[id number] ####  
[facility]  
[address] [place] [address]   
   
                                                    CHEMISTRYOrdered By: Maribel Wells on 2025   
   
                      Anion gap [Moles/Vol] 8 mmol/L   Normal     6 - 16 mEq/L R  
emisol Chem  
   
                          Calcium [Mass/Vol] 9.0 mg/dL    Normal       8.9 - 11.  
1   
mg/dL                                   [facility]  
   
                          Chloride [Moles/Vol] 99 mmol/L    Low          101 - 1  
11   
mmol/L                                  [facility]  
   
                      CO2 [Moles/Vol] 35 mmol/L  High       21 - 31 mmol/L Remis  
ol Chem  
   
                      Creatinine [Mass/Vol] 3.4 mg/dL  High       0.5 - 1.3 mg/d  
L [facility]  
   
                          eGFR         15 mL/min/1.73 m2 Low          >=59mL/min  
/1.73   
m2                                      [facility]  
   
                      Glucose [Mass/Vol] 93 mg/dL   Normal     55 - 199 mg/dL Re  
misol Chem  
   
                          Potassium [Moles/Vol] 4.2 mmol/L   Normal       3.5 -   
5.3   
mmol/L                                  [facility]  
   
                          Sodium [Moles/Vol] 138 mmol/L   Normal       135 - 145  
   
mmol/L                                  [facility]  
   
                                                    Urea nitrogen   
[Mass/Vol]      29 mg/dL        High            5 - 21 mg/dL    [facility]  
   
                                                    Urea   
nitrogen/Creatinine   
[Mass ratio]    8 mg/mg         Low             10 - 20         [facility]  
   
                                                    Capillary Glucose POCon    
   
                      Glucose [Mass/Vol] 173 mg/dL  High       55-99      [facility]  
   
                                        Comment on above:   Result Comment: Rayray GAGE   
   
                                                            Performed By: #### 2  
[id number] ####  
[facility]  
[address] [place] [address]   
   
                      Glucose [Mass/Vol] 126 mg/dL  High       55-99      [facility]  
   
                                        Comment on above:   Result Comment: Rayray GAGE   
   
                                                            Performed By: #### 2  
[id number] ####  
[facility] Laboratory  
[address] [place] [address]   
   
                      Glucose [Mass/Vol] 139 mg/dL  High       55-99      [facility]  
   
                                        Comment on above:   Result Comment: Rayray GAGE   
   
                                                            Performed By: #### 2  
[id number] ####  
[facility] Laboratory  
[address] [place] [address]   
   
                      Glucose [Mass/Vol] 95 mg/dL   Normal     55-99      [facility]  
   
                                        Comment on above:   Result Comment: Rayray GAGE   
   
                                                            Performed By: #### 2  
[id number] ####  
[facility] Laboratory  
[address] [place] [address]   
   
                                                    HEMATOLOGYOrdered By: SYSTEM  
 SYSTEM on 2025   
   
                                                    Basophils/100 WBC   
(Bld)           0.4 %           Normal          0.0 - 2.0 %     [facility]  
   
                                                    Basophils/Leukocytes   
Auto (Bld) [Pure #   
fraction]       0.0 E9/L        Normal          0.0 - 0.2 E9/L  [facility]  
   
                                                    Eosinophils (Bld)   
[#/Vol]         0.5 E9/L        Normal          0.0 - 0.5 E9/L  [facility]  
   
                                                    Eosinophils/100 WBC   
(Bld)           5.6 %           Normal          0.0 - 8.0 %     [facility]  
   
                                                    Erythrocyte   
distribution width   
(RBC) [Ratio]   17.2 %          High            10.9 - 14.2 %   [facility]  
   
                                                    Hematocrit (Bld)   
[Volume fraction] 22.8 %          Low             34.0 - 46.0 %   [facility]  
   
                                                    Hemoglobin (Bld)   
[Mass/Vol]          7.3 g/dL            Low                 12.0 - 16.0   
gm/dL                                   [facility]  
   
                                                    Lymphocytes (Bld)   
[#/Vol]         1.6 E9/L        Normal          1.0 - 4.0 E9/L  [facility]  
   
                                                    Lymphocytes/100 WBC   
(Bld)           18.4 %          Normal          14.0 - 50.0 %   [facility]  
   
                                                    MCH (RBC) [Entitic   
mass]           27.4 pg         Normal          27.0 - 34.0 pg  [facility]  
   
                          MCHC (RBC) [Mass/Vol] 32.3 g/dL    Normal       31.4 -  
 36.0   
gm/dL                                   [facility]  
   
                                                    MCV (RBC) [Entitic   
vol]            85.0 fL         Normal          80.0 - 100.0 fL [facility]  
   
                                                    Monocytes (Bld)   
[#/Vol]         0.8 E9/L        Normal          0.2 - 1.0 E9/L  [facility]  
   
                                                    Monocytes/100 WBC   
(Bld)           9.2 %           Normal          4.0 - 14.0 %    [facility]  
   
                                                    Neutrophils (Bld)   
[#/Vol]         5.8 E9/L        Normal          2.0 - 7.5 E9/L  [facility]  
   
                                                    Neutrophils/100 WBC   
(Bld)           66.4 %          Normal          36.0 - 75.0 %   [facility]  
   
                                                    Platelet mean volume   
(Bld) [Entitic vol] 7.4 fL          Normal          6.4 - 10.8 fL   [facility]  
   
                                                    Platelets (Bld)   
[#/Vol]             349.0 E9/L          Normal              150.0 - 500.0   
E9/L                                    [facility]  
   
                      RBC (Bld) [#/Vol] 2.7 E12/L  Low        4.3 - 5.9 E12/L Re  
[facility]  
   
                                                    WBC corrected for   
nucl RBC Auto (Bld)   
[#/Vol]         8.7 E9/L        Normal          4.0 - 11.0 E9/L [facility]  
   
                                                    Interdisciplinary Note - Luis  
e Manageron 2025   
   
                                                    Interdisciplinary   
Note -                      Interdisciplinary Note   
-   
CRM to room 317  
Patient is awake,   
alert and oriented.   
Patient is from [facility].   
Patient PCP listed is   
not correct, she   
verified DME and   
insurance. Patient   
came in with stroke   
like symptoms. Patient   
is assigned to Dr Gandara, see notes.   
Patient has consults   
for neuro, diet, SS   
for ETOH and advance   
directives. Patient   
declines ETOH use and   
does not want to do   
advance directives.   
Patient DC plan is   
transfer to [facility]. CRM   
provided contact info,   
white board updated.   
CRM following       Normal                                  [facility]  
   
                                        Comment on above:   Result Comment: Elec  
tronically Signed By: Rebecca Ortiz\.br\Date and Time Signed: 25 11:07 EST   
   
                                                    eGFRon 2025   
   
                      eGFR       15 mL/min/1.73 m2 Low        >=59       [facility]  
   
                                        Comment on above:   Performed By: #### 1  
[id number] ####  
[facility] Laboratory  
[address] [place] [address]   
   
                                                    BMPon 2025   
   
                      Anion gap [Moles/Vol] 8 mmol/L   Normal     6-16       [facility]  
   
                                        Comment on above:   Order Comment: Line   
packet given to CHARLENE [email or web address] 2025   
04:50:10 EST   
   
                                                            Performed By: #### 2  
[id number] ####  
[facility] Laboratory  
[address] [place] [address]   
   
                      Calcium [Mass/Vol] 8.8 mg/dL  Low        8.9-11.1   [facility]  
   
                                        Comment on above:   Order Comment: Line   
packet given to CHARLENE [email or web address] 2025   
04:50:10 EST   
   
                                                            Performed By: #### 2  
[id number] ####  
[facility] Laboratory  
[address] [place] [address]   
   
                      Chloride [Moles/Vol] 101 mmol/L Normal     101-111    [facility]  
   
                                        Comment on above:   Order Comment: Line   
packet given to CHARLENE [email or web address] 2025   
04:50:10 EST   
   
                                                            Performed By: #### 2  
[id number] ####  
[facility] Laboratory  
[address] [place] [address]   
   
                      CO2 [Moles/Vol] 34 mmol/L  High       21-31      [facility]  
   
                                        Comment on above:   Order Comment: Line   
packet given to CHARLENE martinez 2025   
04:50:10 EST   
   
                                                            Performed By: #### 2  
[id number] ####  
[facility] Laboratory  
[address] [place] [address]   
   
                      Creatinine [Mass/Vol] 3.7 mg/dL  High       0.5-1.3    [facility]  
   
                                        Comment on above:   Order Comment: Line   
packet given to CHARLENE martinez 2025   
04:50:10 EST   
   
                                                            Performed By: #### 2  
[id number] ####  
[facility] Laboratory  
[address] [place] [address]   
   
                      Glucose [Mass/Vol] 105 mg/dL  Normal          [facility]  
   
                                        Comment on above:   Order Comment: Line   
packet given to CHARLENE martinez 2025   
04:50:10 EST   
   
                                                            Performed By: #### 2  
[id number] ####  
[facility] Laboratory  
[address] [place] [address]   
   
                      Potassium [Moles/Vol] 3.9 mmol/L Normal     3.5-5.3    [facility]  
   
                                        Comment on above:   Order Comment: Line   
packet given to CHARLENE martinez 2025   
04:50:10 EST   
   
                                                            Performed By: #### 2  
[id number] ####  
[facility] Laboratory  
[address] [place] [address]   
   
                      Sodium [Moles/Vol] 139 mmol/L Normal     135-145    [facility]  
   
                                        Comment on above:   Order Comment: Line   
packet given to CHARLENE Rodriguez [email or web address] 2025   
04:50:10 EST   
   
                                                            Performed By: #### 2  
[id number] ####  
[facility] Laboratory  
[address] [place] [address]   
   
                                                    Urea nitrogen   
[Mass/Vol]      30 mg/dL        High            5-21            [facility]  
   
                                        Comment on above:   Order Comment: Line   
packet given to CHARLENE martinez 2025   
04:50:10 EST   
   
                                                            Performed By: #### 2  
[id number] ####  
[facility] Laboratory  
[address] [place] [address]   
   
                                                    Urea   
nitrogen/Creatinine   
[Mass ratio]    8 No Units      Low             10-20           [facility]  
   
                                        Comment on above:   Order Comment: Line   
packet given to CHARLENE martinez 2025   
04:50:10 EST   
   
                                                            Performed By: #### 2  
[id number] ####  
[facility] Laboratory  
[address] [place] [address]   
   
                                                    CBC w/ Auto Diffon   
5   
   
                                                    Basophils/100 WBC   
(Bld)           0.5 %           Normal          0.0-2.0         [facility]  
   
                                        Comment on above:   Order Comment: Line   
packet given to CHARLENE martinez 2025   
04:50:10 EST   
   
                                                            Performed By: #### 2  
[id number] ####  
[facility] Laboratory  
[address] [place] [address]   
   
                                                    Basophils/Leukocytes   
Auto (Bld) [Pure #   
fraction]       0.0 E9/L        Normal          0.0-0.2         [facility]  
   
                                        Comment on above:   Order Comment: Line   
packet given to CHARLENE martinez 2025   
04:50:10 EST   
   
                                                            Performed By: #### 2  
[id number] ####  
[facility] Laboratory  
[address] [place] [address]   
   
                                                    Eosinophils (Bld)   
[#/Vol]         0.4 E9/L        Normal          0.0-0.5         [facility]  
   
                                        Comment on above:   Order Comment: Line   
packet given to CHARLENE martinez 2025   
04:50:10 EST   
   
                                                            Performed By: #### 2  
[id number] ####  
[facility] Laboratory  
[address] [place] [address]   
   
                                                    Eosinophils/100 WBC   
(Bld)           4.3 %           Normal          0.0-8.0         [facility]  
   
                                        Comment on above:   Order Comment: Line   
packet given to CHARLENE martinez 2025   
04:50:10 EST   
   
                                                            Performed By: #### 2  
[id number] ####  
[facility] Laboratory  
[address] [place] [address]   
   
                                                    Erythrocyte   
distribution width   
(RBC) [Ratio]   17.1 %          High            10.9-14.2       [facility]  
   
                                        Comment on above:   Order Comment: Line   
packet given to CHARLENE martinez 2025   
04:50:10 EST   
   
                                                            Performed By: #### 2  
[id number] ####  
[facility]  
[address] [place] [address]   
   
                                                    Hematocrit (Bld)   
[Volume fraction] 22.3 %          Low             34.0-46.0       [facility]  
   
                                        Comment on above:   Order Comment: Line   
packet given to CHARLENE Rodriguez [facility] 2025   
04:50:10 EST   
   
                                                            Performed By: #### 2  
[id number] ####  
[facility] Laboratory  
[address] [place] [address]   
   
                                                    Hemoglobin (Bld)   
[Mass/Vol]      7.2 g/dL        Low             12.0-16.0       [facility]  
   
                                        Comment on above:   Order Comment: Line   
packet given to CHARLENE Rodriguez [facility] 2025   
04:50:10 EST   
   
                                                            Performed By: #### 2  
[id number] ####  
[facility] Laboratory  
[address] [place] [address]   
   
                                                    Lymphocytes (Bld)   
[#/Vol]         1.8 E9/L        Normal          1.0-4.0         [facility]  
   
                                        Comment on above:   Order Comment: Line   
packet given to CHARLENE Rodriguez [facility] 2025   
04:50:10 EST   
   
                                                            Performed By: #### 2  
[id number] ####  
[facility] Laboratory  
[address] [place] [address]   
   
                                                    Lymphocytes/100 WBC   
(Bld)           22.0 %          Normal          14.0-50.0       [facility]  
   
                                        Comment on above:   Order Comment: Line   
packet given to CHARLENE Rodriguez [facility] 2025   
04:50:10 EST   
   
                                                            Performed By: #### 2  
[id number] ####  
[facility] Laboratory  
[address] [place] [address]   
   
                                                    MCH (RBC) [Entitic   
mass]           27.6 pg         Normal          27.0-34.0       [facility]  
   
                                        Comment on above:   Order Comment: Line   
packet given to CHARLENE Rodriguez [facility] 2025   
04:50:10 EST   
   
                                                            Performed By: #### 2  
[id number] ####  
[facility] Laboratory  
[address] [place] [address]   
   
                      MCHC (RBC) [Mass/Vol] 32.4 g/dL  Normal     31.4-36.0  [facility]  
   
                                        Comment on above:   Order Comment: Line   
packet given to CHARLENE martinez 2025   
04:50:10 EST   
   
                                                            Performed By: #### 2  
[id number] ####  
[facility] Laboratory  
[address] [place] [address]   
   
                                                    MCV (RBC) [Entitic   
vol]            85.1 fL         Normal          80.0-100.0      [facility]  
   
                                        Comment on above:   Order Comment: Line   
packet given to CHARLENE martinez 2025   
04:50:10 EST   
   
                                                            Performed By: #### 2  
[id number] ####  
[facility] Laboratory  
[address] [place] [address]   
   
                                                    Monocytes (Bld)   
[#/Vol]         0.8 E9/L        Normal          0.2-1.0         [facility]  
   
                                        Comment on above:   Order Comment: Line   
packet given to CHARLENE martinez 2025   
04:50:10 EST   
   
                                                            Performed By: #### 2  
[id number] ####  
[facility] Laboratory  
[address] [place] [address]   
   
                                                    Neutrophils (Bld)   
[#/Vol]         5.2 E9/L        Normal          2.0-7.5         [facility]  
   
                                        Comment on above:   Order Comment: Line   
packet given to CHARLENE martinez 2025   
04:50:10 EST   
   
                                                            Performed By: #### 2  
[id number] ####  
[facility] Laboratory  
[address] [place] [address]   
   
                                                    Neutrophils/100 WBC   
(Bld)           63.1 %          Normal          36.0-75.0       [facility]  
   
                                        Comment on above:   Order Comment: Line   
packet given to CHARLENE martinez 2025   
04:50:10 EST   
   
                                                            Performed By: #### 2  
[id number] ####  
[facility] Laboratory  
[address] [place] [address]   
   
                                                    Platelet mean volume   
(Bld) [Entitic vol] 7.8 fL          Normal          6.4-10.8        [facility]  
   
                                        Comment on above:   Order Comment: Line   
packet given to CHARLENE martinez 2025   
04:50:10 EST   
   
                                                            Performed By: #### 2  
[id number] ####  
[facility] Laboratory  
[address] [place] [address]   
   
                                                    Platelets (Bld)   
[#/Vol]         333.0 E9/L      Normal          150.0-500.0     [facility]  
   
                                        Comment on above:   Order Comment: Line   
packet given to CHARLENE [email or web address] 2025   
04:50:10 EST   
   
                                                            Performed By: #### 2  
[id number] ####  
[facility] Laboratory  
[address] [place] [address]   
   
                      RBC (Bld) [#/Vol] 2.6 E12/L  Low        4.3-5.9    [facility]  
   
                                        Comment on above:   Order Comment: Line   
packet given to CHARLENE [email or web address] 2025   
04:50:10 EST   
   
                                                            Performed By: #### 2  
[id number] ####  
[facility]  
[address] [place] [address]   
   
                                                    WBC corrected for   
nucl RBC Auto (Bld)   
[#/Vol]         8.3 E9/L        Normal          4.0-11.0        [facility]  
   
                                        Comment on above:   Order Comment: Line   
packet given to CHARLENE [email or web address] 2025   
04:50:10 EST   
   
                                                            Performed By: #### 2  
[id number] ####  
[facility]  
[address] [place] [address]   
   
                                                    CHEMISTRYOrdered By: Kadie Mckeon on 2025   
   
                                                    Cholesterol   
[Mass/Vol]      100 mg/dL       Low             120 - 200 mg/dL [facility]  
   
                                                    Cholesterol in HDL   
[Mass/Vol]                37 mg/dL                  Invalid   
Interpretation   
Code                                                [facility]  
   
                                        Comment on above:   Result Comment: '>=   
60 LOW RISK'  
'<= 40 HIGH RISK'   
   
                                                    Cholesterol in LDL   
[Mass/Vol]      35 mg/dL        Normal          <=129mg/dL      [facility]  
   
                                                    Cholesterol in VLDL   
[Mass/Vol]      32 mg/dL        Normal          7 - 40 mg/dL    [facility]  
   
                                                    HbA1c (Bld) [Mass   
fraction]       6.4 %           High            <=5.9%          [facility]   
[facility]  
   
                                                    Triglyceride   
[Mass/Vol]      160 mg/dL       High            <=149mg/dL      [facility]  
   
                                                    Capillary Glucose POCon    
   
                      Glucose [Mass/Vol] 155 mg/dL  High       55-99      [facility]  
   
                                        Comment on above:   Performed By: #### 2  
[id number] ####  
[facility] Laboratory  
[address] [place] [address]   
   
                      Glucose [Mass/Vol] 84 mg/dL   Normal     55-99      [facility]  
   
                                        Comment on above:   Result Comment: Ryaray darby RN/MD   
   
                                                            Performed By: #### 2  
[id number] ####  
[facility]  
[address] [place] [address]   
   
                      Glucose [Mass/Vol] 129 mg/dL  High       55-99      [facility]  
   
                                        Comment on above:   Result Comment: Rayray darby RN/MD   
   
                                                            Performed By: #### 2  
[id number] ####  
[facility] Laboratory  
[address] [place] [address]   
   
                      Glucose [Mass/Vol] 111 mg/dL  High       55-99      [facility]  
   
                                        Comment on above:   Result Comment: Rayray darby RN/MD   
   
                                                            Performed By: #### 2  
[id number] ####  
[facility]  
[address] [place] [address]   
   
                                                    HEMATOLOGYOrdered By: SYSTEM  
 SYSTEM on 2025   
   
                                                    Basophils/100 WBC   
(Bld)           0.5 %           Normal          0.0 - 2.0 %     [facility]  
   
                                                    Basophils/Leukocytes   
Auto (Bld) [Pure #   
fraction]       0.0 E9/L        Normal          0.0 - 0.2 E9/L  [facility]  
   
                                                    Eosinophils (Bld)   
[#/Vol]         0.4 E9/L        Normal          0.0 - 0.5 E9/L  [facility]  
   
                                                    Eosinophils/100 WBC   
(Bld)           4.3 %           Normal          0.0 - 8.0 %     [facility]  
   
                                                    Erythrocyte   
distribution width   
(RBC) [Ratio]   17.1 %          High            10.9 - 14.2 %   [facility]  
   
                                                    Hematocrit (Bld)   
[Volume fraction] 22.3 %          Low             34.0 - 46.0 %   [facility]  
   
                                                    Hemoglobin (Bld)   
[Mass/Vol]          7.2 g/dL            Low                 12.0 - 16.0   
gm/dL                                   [facility]  
   
                                                    Lymphocytes (Bld)   
[#/Vol]         1.8 E9/L        Normal          1.0 - 4.0 E9/L  [facility]  
   
                                                    Lymphocytes/100 WBC   
(Bld)           22.0 %          Normal          14.0 - 50.0 %   [facility]  
   
                                                    MCH (RBC) [Entitic   
mass]           27.6 pg         Normal          27.0 - 34.0 pg  [facility]  
   
                          MCHC (RBC) [Mass/Vol] 32.4 g/dL    Normal       31.4 -  
 36.0   
gm/dL                                   [facility]  
   
                                                    MCV (RBC) [Entitic   
vol]            85.1 fL         Normal          80.0 - 100.0 fL [facility]  
   
                                                    Monocytes (Bld)   
[#/Vol]         0.8 E9/L        Normal          0.2 - 1.0 E9/L  [facility]  
   
                                                    Monocytes/100 WBC   
(Bld)           10.1 %          Normal          4.0 - 14.0 %    [facility]  
   
                                                    Neutrophils (Bld)   
[#/Vol]         5.2 E9/L        Normal          2.0 - 7.5 E9/L  [facility]  
   
                                                    Neutrophils/100 WBC   
(Bld)           63.1 %          Normal          36.0 - 75.0 %   [facility]  
   
                                                    Platelet mean volume   
(Bld) [Entitic vol] 7.8 fL          Normal          6.4 - 10.8 fL   [facility]  
   
                                                    Platelets (Bld)   
[#/Vol]             333.0 E9/L          Normal              150.0 - 500.0   
E9/L                                    [facility]  
   
                      RBC (Bld) [#/Vol] 2.6 E12/L  Low        4.3 - 5.9 E12/L Re  
[facility]  
   
                                                    WBC corrected for   
nucl RBC Auto (Bld)   
[#/Vol]         8.3 E9/L        Normal          4.0 - 11.0 E9/L [facility]  
   
                                                    [id number] 2025   
   
                                                    HbA1c (Bld) [Mass   
fraction]       6.4 %           High            <=5.9           [facility]  
   
                                        Comment on above:   Order Comment: Line   
packet given to CHARLENE Rodriguez [id number] 2025   
04:50:10 EST   
   
                                                            Performed By: #### 7  
[id number] ####  
[facility]  
[address] [place] [address]   
   
                                                    Interdisciplinary Note - Luis  
e Manageron 2025   
   
                                                    Interdisciplinary   
Note -                      Interdisciplinary Note   
-   
CRM spoke with patient   
in room. Patient is   
alert and oriented and   
participates in   
discharge planning. No   
family in room.   
Patient white board   
updated, and CRM   
contact information   
provided. Patient   
states DR Gandara saw   
patient earlier today   
and may try to do a   
MRI here while waiting   
a bed at [facility].   
Patient is form [facility]   
and plans to return at   
[facility].   
Patient is on 3L   
oxygen today. Patient   
denies any questions   
at this time. CRM will   
get updates from Dr gandara this am and   
neurology to see as   
well today.         Normal                                  [facility]  
   
                                        Comment on above:   Result Comment: Elec  
tronically Signed By: Randall CHANG,   
Chey\.br\Date and Time Signed: 25 09:16 EST   
   
                                                    Lipid Panelon 2025   
   
                                                    Cholesterol   
[Mass/Vol]      100 mg/dL       Low             120-200         [facility]  
   
                                        Comment on above:   Order Comment: Line   
packet given to CHARLENE Rodriguez [id number] 2025   
04:50:10 EST   
   
                                                            Performed By: #### 2  
[id number] ####  
[facility]  
[address] [place] [address]   
   
                                                    Cholesterol in HDL   
[Mass/Vol]                37 mg/dL                  Invalid   
Interpretation   
Code                                                [facility]  
   
                                        Comment on above:   Order Comment: Line   
packet given to CHARLENE Rodriguez [id number] 2025   
04:50:10 EST   
   
                                                            Result Comment: '>=   
60 LOW RISK'  
'<= 40 HIGH RISK'   
   
                                                            Performed By: #### 2  
[id number] ####  
[facility]  
[address] [place] [address]   
   
                                                    Cholesterol in LDL   
[Mass/Vol]      35 mg/dL        Normal          <=129           [facility]  
   
                                        Comment on above:   Order Comment: Line   
packet given to CHARLENE Rodriguez [id number] 2025   
04:50:10 EST   
   
                                                            Performed By: #### 2  
[id number] ####  
[facility]  
[address] [place] [address]   
   
                                                    Cholesterol in VLDL   
[Mass/Vol]      32 mg/dL        Normal          7-40            [facility]  
   
                                        Comment on above:   Order Comment: Line   
packet given to CHARLENE Rodriguez [id number] 2025   
04:50:10 EST   
   
                                                            Performed By: #### 2  
[id number] ####  
[facility]  
[address] [place] [address]   
   
                                                    Triglyceride   
[Mass/Vol]      160 mg/dL       High            <=149           [facility]  
   
                                        Comment on above:   Order Comment: Line   
packet given to RN Jennifer [id number] 2025   
04:50:10 EST   
   
                                                            Performed By: #### 2  
[id number] ####  
[facility]  
[address] [place]  
[place] [address]   
   
                                                    MRI BRAIN GAUTAM Jiang    
   
                                                              
  
  
  
  
  
  
  
  
  
  
  
  
Exam Date/Time:  
2025 12:34 EST  
  
Reason for Exam:  
Stroke  
  
Report  
IMPRESSION: NO ACUTE   
INFARCT OR   
INTRACRANIAL   
HEMORRHAGE IDENTIFIED.  
  
MILD TO MODERATE   
NONSPECIFIC WHITE   
MATTER CHANGES, AS   
NOTED.  
  
  
EXAM: MRI Brain w/o   
Contrast  
  
  
DATE: 2025 11:42   
AM  
  
CLINICAL HISTORY:   
Stroke.  
  
COMPARISON: Head CT   
2025.  
  
TECHNIQUE: Multiplanar   
MR imaging of the head   
was performed without   
contrast.  
  
FINDINGS: Motion   
artifact mildly limits   
the sequences.  
  
Acute Change: There is   
no evidence of   
restricted diffusion   
to suggest an acute  
infarct.  
  
Hemorrhage: No   
evidence of   
intracranial   
hemorrhage.  
  
Mass Lesion/ Mass   
Effect: No evidence of   
an intracranial mass   
or extra-axial fluid  
collection. No   
significant mass   
effect.  
  
Chronic Change: Mild   
moderate predominantly   
supratentorial white   
matter changes,  
which can be seen in   
chronic small vessel   
ischemic disease,   
chronic sequela of  
migraines, and   
numerous other   
etiologies; including   
vasculitis, and   
demyelination  
such as MS.  
  
Parenchyma: No   
significant volume   
loss for age. The   
brain parenchyma is   
otherwise  
within normal limits   
of signal intensity   
and morphology.  
  
Ventricles: Normal   
caliber and   
morphology.  
  
Skull Base:   
Hypothalamic and   
pituitary region are   
grossly normal.   
Craniocervical  
junction is normal. No   
significant marrow   
replacement process.  
  
Vasculature: Major   
intracranial arterial   
structures, and dural   
venous sinuses show  
typical flow void,   
suggesting patency.  
  
Other: Paranasal   
sinuses and mastoid   
air cells are   
essentially clear. The   
orbits are  
unremarkable. The   
extracranial soft   
tissues are   
unremarkable.  
  
  
  
  
  
  
  
Report  
Ordering Provider:   
Adams Dorman  
***** FINAL REPORT   
*****  
  
Dictated: 2025   
1:14 pm Arie Gardner MD  
  
Signed (Electronic   
Signature): 2025   
1:14 pm  
  
Signed by: Arie Gardner MD  
Transcribed by: KYAW   
Technologist: BOOKER                                           [facility]  
   
                                                            Radiology,   
Radiologist, MD -   
2025 Formatting   
of this note might be   
different from the   
original.  
  
  
  
  
  
  
  
  
  
  
  
  
  
Exam Date/Time:  
2025 12:34 EST  
  
Reason for Exam:  
Stroke  
  
Report  
IMPRESSION: NO ACUTE   
INFARCT OR   
INTRACRANIAL   
HEMORRHAGE IDENTIFIED.  
  
MILD TO MODERATE   
NONSPECIFIC WHITE   
MATTER CHANGES, AS   
NOTED.  
  
  
EXAM: MRI Brain w/o   
Contrast  
  
  
DATE: 2025 11:42   
AM  
  
CLINICAL HISTORY:   
Stroke.  
  
COMPARISON: Head CT   
2025.  
  
TECHNIQUE: Multiplanar   
MR imaging of the head   
was performed without   
contrast.  
  
FINDINGS: Motion   
artifact mildly limits   
the sequences.  
  
Acute Change: There is   
no evidence of   
restricted diffusion   
to suggest an acute  
infarct.  
  
Hemorrhage: No   
evidence of   
intracranial   
hemorrhage.  
  
Mass Lesion/ Mass   
Effect: No evidence of   
an intracranial mass   
or extra-axial fluid  
collection. No   
significant mass   
effect.  
  
Chronic Change: Mild   
moderate predominantly   
supratentorial white   
matter changes,  
which can be seen in   
chronic small vessel   
ischemic disease,   
chronic sequela of  
migraines, and   
numerous other   
etiologies; including   
vasculitis, and   
demyelination  
such as MS.  
  
Parenchyma: No   
significant volume   
loss for age. The   
brain parenchyma is   
otherwise  
within normal limits   
of signal intensity   
and morphology.  
  
Ventricles: Normal   
caliber and   
morphology.  
  
Skull Base:   
Hypothalamic and   
pituitary region are   
grossly normal.   
Craniocervical  
junction is normal. No   
significant marrow   
replacement process.  
  
Vasculature: Major   
intracranial arterial   
structures, and dural   
venous sinuses show  
typical flow void,   
suggesting patency.  
  
Other: Paranasal   
sinuses and mastoid   
air cells are   
essentially clear. The   
orbits are  
unremarkable. The   
extracranial soft   
tissues are   
unremarkable.  
  
  
  
  
  
  
  
Report  
Ordering Provider:   
Adams Dorman  
***** FINAL REPORT   
*****  
  
Dictated: 2025   
1:14 pm Arie Gardner MD  
  
Signed (Electronic   
Signature): 2025   
1:14 pm  
  
Signed by: Arie Gardner MD  
Transcribed by: KYAW   
Technologist: BOOKER  
                                                            [facility]  
   
                                                    Radiology Study   
observation   
(narrative)                                                     [facility]  
   
                                                    MRI BRAIN WO CONOrdered By:   
Radiologist Radiology on 2025   
   
                                                                  [facility]  
Work Phone:   
[phone]  
   
                                                    MRI Brain w/o Contraston    
   
                                                    MRI Brain w/o   
Contrast                                Exam Date/Time:  
2025 12:34 EST  
Reason for Exam:  
Stroke  
Report  
IMPRESSION: NO ACUTE   
INFARCT OR   
INTRACRANIAL   
HEMORRHAGE IDENTIFIED.  
MILD TO MODERATE   
NONSPECIFIC WHITE   
MATTER CHANGES, AS   
NOTED.  
EXAM: MRI Brain w/o   
Contrast  
DATE: 2025 11:42   
AM  
CLINICAL HISTORY:   
Stroke.  
COMPARISON: Head CT   
2025.  
TECHNIQUE: Multiplanar   
MR imaging of the head   
was performed without   
contrast.  
FINDINGS: Motion   
artifact mildly limits   
the sequences.  
Acute Change: There is   
no evidence of   
restricted diffusion   
to suggest an acute  
infarct.  
Hemorrhage: No   
evidence of   
intracranial   
hemorrhage.  
Mass Lesion/ Mass   
Effect: No evidence of   
an intracranial mass   
or extra-axial fluid  
collection. No   
significant mass   
effect.  
Chronic Change: Mild   
moderate predominantly   
supratentorial white   
matter changes,  
which can be seen in   
chronic small vessel   
ischemic disease,   
chronic sequela of  
migraines, and   
numerous other   
etiologies; including   
vasculitis, and   
demyelination  
such as MS.  
Parenchyma: No   
significant volume   
loss for age. The   
brain parenchyma is   
otherwise  
within normal limits   
of signal intensity   
and morphology.  
Ventricles: Normal   
caliber and   
morphology.  
Skull Base:   
Hypothalamic and   
pituitary region are   
grossly normal.   
Craniocervical  
junction is normal. No   
significant marrow   
replacement process.  
Vasculature: Major   
intracranial arterial   
structures, and dural   
venous sinuses show  
typical flow void,   
suggesting patency.  
Other: Paranasal   
sinuses and mastoid   
air cells are   
essentially clear. The   
orbits are  
unremarkable. The   
extracranial soft   
tissues are   
unremarkable.  
  
Report  
Ordering Provider:   
Adams Dorman  
***** FINAL REPORT   
*****  
  
Dictated: 2025   
1:14 pm Arie Gardner MD  
  
Signed (Electronic   
Signature): 2025   
1:14 pm  
Signed by: Arie Gardner MD  
Transcribed by: KYAW   
Technologist: JPD   Normal                                  [facility]  
   
                                                    Telephone Encounteron 2025   
   
                                                    Transcription   
Authentication   
Interface Message   
Text                                    Per staff at [facility], patient has   
been admitted to   
[facility].       Normal                                  [facility]   
[facility]   
[facility]  
   
                                                    eGFRon 2025   
   
                      eGFR       14 mL/min/1.73 m2 Low        >=59       [facility]  
   
                                        Comment on above:   Performed By: #### 1  
[id number] ####  
[facility]  
[address] [place] [address]   
   
                                                    BMPon 2025   
   
                      Anion gap [Moles/Vol] 9 mmol/L   Normal     6-16       [facility]  
   
                                        Comment on above:   Performed By: #### 2  
[id number] ####  
[facility]  
[address] [place] [address]   
   
                      Calcium [Mass/Vol] 9.3 mg/dL  Normal     8.9-11.1   [facility]  
   
                                        Comment on above:   Performed By: #### 2  
[id number] ####  
[facility]  
[address] [place] [address]   
   
                      Chloride [Moles/Vol] 101 mmol/L Normal     101-111    [facility]  
   
                                        Comment on above:   Performed By: #### 2  
[id number] ####  
[facility]  
[address] [place] [address]   
   
                      CO2 [Moles/Vol] 35 mmol/L  High       21-31      [facility]  
   
                                        Comment on above:   Performed By: #### 2  
[id number] ####  
[facility]  
[address] [place] [address]   
   
                      Creatinine [Mass/Vol] 4.2 mg/dL  High       0.5-1.3    [facility]  
   
                                        Comment on above:   Performed By: #### 2  
[id number] ####  
[facility]  
[address] [place] [address]   
   
                      Glucose [Mass/Vol] 154 mg/dL  Normal          [facility]  
   
                                        Comment on above:   Performed By: #### 2  
[id number] ####  
[facility]  
[address] [place] [address]   
   
                      Potassium [Moles/Vol] 4.4 mmol/L Normal     3.5-5.3    [facility]  
   
                                        Comment on above:   Performed By: #### 2  
[id number] ####  
[facility]  
[address] [place] [address]   
   
                      Sodium [Moles/Vol] 141 mmol/L Normal     135-145    [facility]  
   
                                        Comment on above:   Performed By: #### 2  
[id number] ####  
[facility]  
[address] [place] [address]   
   
                                                    Urea nitrogen   
[Mass/Vol]      30 mg/dL        High            5-21            [facility]  
   
                                        Comment on above:   Performed By: #### 2  
[id number] ####  
[facility]  
[address] [place] [address]   
   
                                                    Urea   
nitrogen/Creatinine   
[Mass ratio]    7 No Units      Low             10-20           [facility]  
   
                                        Comment on above:   Performed By: #### 2  
[id number] ####  
[facility]  
[address] [place] [address]   
   
                                                    C Urineon 2025   
   
                                                    Bacteria identified   
Cx Nom (U)                              Microbiology  
PROCEDURE: Urine   
Culture [R1]   
ACCESSION:   
[id number]  
SOURCE: U [employer]   
BODY SITE:  
COLLECTED DATE/TIME:   
2025 17:53 EST   
RECEIVED DATE/TIME:   
2025 21:12 EST  
START DATE/TIME:   
2025 21:12 EST   
FREE TEXT SOURCE:  
Jordan MATTHEWS, Charlie Ortega MD, Charlie  
***FINAL REPORTS***  
Final Report []   
Verified Date/Time:   
2/3/2025 10:30 EST  
1,000 cfu/ml Mixed   
skin contaminants  
Performing Locations  
R1: This test was   
performed at:  
[facility], [address] [place] [address], [address] ,   
, [phone]    Normal                                  [facility]  
   
                                        Comment on above:   Performed By: #### 2  
[id number] ####  
[facility]  
[address] [place] [address]   
   
                                                    CBC w/ Auto Diffon   
5   
   
                                                    Basophils/100 WBC   
(Bld)           0.8 %           Normal          0.0-2.0         [facility]  
   
                                        Comment on above:   Performed By: #### 2  
[id number] ####  
[facility]  
[address] [place] [address]   
   
                                                    Basophils/Leukocytes   
Auto (Bld) [Pure #   
fraction]       0.1 E9/L        Normal          0.0-0.2         [facility]  
   
                                        Comment on above:   Performed By: #### 2  
[id number] ####  
[facility]  
[address] [place] [address]   
   
                                                    Eosinophils (Bld)   
[#/Vol]         0.4 E9/L        Normal          0.0-0.5         [facility]  
   
                                        Comment on above:   Performed By: #### 2  
[id number] ####  
[facility]  
[address] [place] [address]   
   
                                                    Eosinophils/100 WBC   
(Bld)           4.0 %           Normal          0.0-8.0         [facility]  
   
                                        Comment on above:   Performed By: #### 2  
[id number] ####  
[facility]  
[address] [place] [address], [place] [address]   
   
                                                    Erythrocyte   
distribution width   
(RBC) [Ratio]   16.8 %          High            10.9-14.2       [facility]  
   
                                        Comment on above:   Performed By: #### 2  
[id number] ####  
[facility]  
[address] [place] [address]   
   
                                                    Hematocrit (Bld)   
[Volume fraction] 24.8 %          Low             34.0-46.0       [facility]  
   
                                        Comment on above:   Performed By: #### 2  
[id number] ####  
[facility]  
[address] [place] [address]   
   
                                                    Hemoglobin (Bld)   
[Mass/Vol]      8.0 g/dL        Low             12.0-16.0       [facility]  
   
                                        Comment on above:   Performed By: #### 2  
[id number] ####  
[facility]  
[address] [place] [address]   
   
                                                    Lymphocytes (Bld)   
[#/Vol]         1.2 E9/L        Normal          1.0-4.0         [facility]  
   
                                        Comment on above:   Performed By: #### 2  
[id number] ####  
[facility]  
[address] [place] [address]   
   
                                                    Lymphocytes/100 WBC   
(Bld)           13.9 %          Low             14.0-50.0       [facility]  
   
                                        Comment on above:   Performed By: #### 2  
[id number] ####  
[facility] [facility]  
[address] [place] [address]   
   
                                                    MCH (RBC) [Entitic   
mass]           27.6 pg         Normal          27.0-34.0       [facility]  
   
                                        Comment on above:   Performed By: #### 2  
[id number] ####  
[facility]  
[address] [place] [address]   
   
                      MCHC (RBC) [Mass/Vol] 32.5 g/dL  Normal     31.4-36.0  [facility]  
   
                                        Comment on above:   Performed By: #### 2  
[id number] ####  
[facility]  
[address] [place] [address]   
   
                                                    MCV (RBC) [Entitic   
vol]            84.9 fL         Normal          80.0-100.0      [facility]  
   
                                        Comment on above:   Performed By: #### 2  
[id number] ####  
[facility]  
[address] [place] [address]   
   
                                                    Monocytes (Bld)   
[#/Vol]         0.7 E9/L        Normal          0.2-1.0         [facility]  
   
                                        Comment on above:   Performed By: #### 2  
[id number] ####  
[facility]  
[address] [place] [address]   
   
                                                    Neutrophils (Bld)   
[#/Vol]         6.5 E9/L        Normal          2.0-7.5         [facility]  
   
                                        Comment on above:   Performed By: #### 2  
[id number] ####  
[facility]  
[address] [place] [address]   
   
                                                    Neutrophils/100 WBC   
(Bld)           73.3 %          Normal          36.0-75.0       [facility]  
   
                                        Comment on above:   Performed By: #### 2  
[id number] ####  
[facility]  
[address] [place] [address]   
   
                      Platelet   340.0 E9/L Normal     150.0-500.0 [facility]  
   
                                        Comment on above:   Performed By: #### 2  
[id number] ####  
[facility]  
[address] [place] [address]   
   
                                                    Platelet mean volume   
(Bld) [Entitic vol] 7.4 fL          Normal          6.4-10.8        [facility]  
   
                                        Comment on above:   Performed By: #### 2  
[id number] ####  
[facility]  
[address] [place] [address]   
   
                      RBC (Bld) [#/Vol] 2.9 E12/L  Low        4.3-5.9    [facility]  
   
                                        Comment on above:   Performed By: #### 2  
[id number] ####  
[facility]  
[address] [place] [address]   
   
                                                    WBC corrected for   
nucl RBC Auto (Bld)   
[#/Vol]         8.9 E9/L        Normal          4.0-11.0        [facility]  
   
                                        Comment on above:   Performed By: #### 2  
[id number] ####  
[facility]  
[address] [place] [address]   
   
                                                    Capillary Glucose POCon 02-0  
3-2025   
   
                      Glucose [Mass/Vol] 160 mg/dL  High       55-99      [facility]  
   
                                        Comment on above:   Result Comment: Rayray darby RN/MD   
   
                                                            Performed By: #### 2  
[id number] ####  
[facility]  
[address] [place] [address]   
   
                      Glucose [Mass/Vol] 182 mg/dL  High       55-99      [facility]  
   
                                        Comment on above:   Result Comment: Rayray darby RN/MD   
   
                                                            Performed By: #### 2  
[id number] ####  
[facility]  
[address] [place] [address]   
   
                                                    ED Clinical Summaryon 2025   
   
                                        ED Clinical Summary ED Clinical Summary  
(Inserted Image.   
Unable to display)   
[address] [address] [phone]  
ED Clinical Summary  
Person Information  
Name: CRIS HEARN   
[place] Age:   
54 Years :   
1970  
Sex: Female Language:   
English PCP: LORENE BARRERA DO  
Marital Status: Single   
Phone:  
MRN: [id number] Visit   
Id: FIN: [id number]  
Visit Reason:   
Potential stroke;   
Weakness or fatigue;   
stroke Speciality:   
Acuity: 2  
Enc Type: Inpatient   
Med Service: Medical  
Arrival: 2025   
15:48:48 Discharge:   
LOS: 000 44:25  
Checkin: 2025   
15:48:48 Checkout:   
2/3/2025 12:13:09   
Dispo Type: Admitted   
as IP to this [facility]  
  
EVENTS:  
Event Name Event   
Status Request   
Date/Time Start   
Date/Time Complete   
Date/Time  
Arrive Complete   
2025 15:48:48   
Document Home Meds   
Request 2025   
15:48:48  
Triage Complete   
2025 15:48:48   
2025 16:22:46   
2025 16:22:46  
EKG Complete 2025   
15:59:52 2025   
16:24:15  
NPO Request 2025   
15:59:52  
Pending Labs Complete   
2025 15:59:52   
2025 18:18:50  
Blood Collect Request   
2025 15:59:52  
Lab Complete 2025   
15:59:52 2025   
16:29:26  
Patient Care Request   
2025 15:59:52  
CT Complete 2025   
15:59:52 2025   
16:00:38 2025   
16:07:27  
X-Ray Complete   
2025 15:59:52   
2025 16:07:41   
2025 16:12:17  
RT Request 2025   
15:59:52  
Bed Assign Complete   
2025 15:59:56   
Dr Exam Complete   
2025 15:59:56   
2025 16:04:26   
2025 16:04:26  
RN Exam Complete   
2025 15:59:56   
2025 16:28:31   
2025 16:28:31  
RR Stroke Request   
2025 16:00:41  
Registration Complete   
2025 16:04:26   
2025 16:09:38   
2025 16:09:38  
Pending Labs Complete   
2025 16:05:36   
2025 16:05:36   
2025 16:29:26  
Lab Complete 2025   
16:05:36 2025   
16:05:36 2025   
16:29:26  
Pending Labs Complete   
2025 16:06:52   
Reg Complete Request   
2025 16:09:38  
Reg Bed Request   
Complete 2025   
16:09:38 2025   
16:09:38 2025   
16:09:38  
Wet Read Complete   
2025 16:12:17   
2/3/2025 07:16:13   
2/3/2025 07:16:13  
Isolation Screening   
Request 2025   
16:22:47  
Fall Risk Request   
2025 16:28:32  
RR Stroke Request   
2025 16:28:32  
Pending Labs Complete   
2025 16:29:31   
2025 22:38:19  
Pending Labs Complete   
2025 16:51:12   
2025 16:51:12   
2025 16:51:13  
Pending Labs Complete   
2025 17:49:19   
Patient Care Complete   
2025 18:08:25   
2025 21:17:00  
Pending Labs Complete   
2025 18:18:50   
2025 18:18:50   
2/3/2025 10:30:12  
Lab Complete 2025   
18:18:50 2025   
18:18:50 2/3/2025   
10:30:12  
Dr Exam Complete   
2025 19:07:03   
Registration Complete   
2025 19:07:03   
2025 23:14:37   
2025 23:14:37  
Pending Labs Complete   
2025 20:11:41   
2025 20:11:41   
2025 20:19:17  
Meds Admin Cancel   
2025 20:13:08   
2025 20:21:14  
Pending Labs Complete   
2025 20:19:11   
2025 20:19:11   
2025 20:53:28  
Lab Complete 2025   
20:19:11 2025   
20:19:11 2025   
20:53:28  
Meds Admin Complete   
2025 20:21:14   
2025 20:33:42  
Patient Care Request   
2025 20:39:32  
Transfer Request   
2025 20:39:32  
Meds Admin Complete   
2025 20:39:32   
2025 21:58:47  
Meds Admin Complete   
2025 20:39:56   
2025 21:58:47  
Meds Admin Complete   
2025 23:36:00   
2025 23:43:23  
Meds Admin Complete   
2025 00:31:06   
2025 00:43:21  
Pending Labs Complete   
2025 05:21:54   
2025 09:19:02  
Lab Complete 2025   
05:21:54 2025   
09:19:02  
Pending Labs Complete   
2025 08:13:58   
Pending Labs Complete   
2025 08:53:56   
2025 08:53:56   
2025 09:19:02  
Lab Complete 2025   
08:53:56 2025   
08:53:56 2025   
09:19:02  
Dr Exam Complete   
2025 16:28:51   
Registration Complete   
2025 16:28:51   
2025 20:27:42   
2025 20:27:42  
Dr Exam Complete   
2025 19:07:51   
Meds Admin Cancel   
2025 19:48:04   
2025 19:55:04  
Meds Admin Cancel   
2025 19:55:04   
2025 19:56:15  
Meds Admin Cancel   
2025 19:56:15   
2/3/2025 10:49:18  
Dr Exam Complete   
2/3/2025 07:13:15   
Registration Start   
2/3/2025 07:13:15   
2/3/2025 10:30:01  
Consult Request   
2/3/2025 10:22:01  
Hospitalist Consult   
Request 2/3/2025   
10:22:01  
Admit Request 2/3/2025   
10:30:00  
Patient Care Request   
2/3/2025 10:30:02  
Patient Care Request   
2/3/2025 10:30:02  
Patient Care Request   
2/3/2025 10:30:02  
Patient Care Request   
2/3/2025 10:30:03  
Patient Care Request   
2/3/2025 10:30:04  
Patient Care Request   
2/3/2025 10:30:04  
Patient Care Request   
2/3/2025 10:41:11  
Consult Request   
2/3/2025 10:41:11  
Meds Admin Request   
2/3/2025 10:41:11  
Pending Labs Request   
2/3/2025 10:41:11  
Lab Request 2/3/2025   
10:41:11  
Pending Labs Request   
2/3/2025 10:42:09  
Lab Request 2/3/2025   
10:42:09  
Pending Labs (more   
content not   
included)...        Normal                                  [facility]  
   
                                                    ED Note-Physicianon 20   
   
                                        ED Note-Physician   ED Note-Physician  
Basic Information  
Time Seen:  
Charlie Ortega MD   
2025 16:04  
Chief Complaint  
[place] [facility] with   
c/o right sided   
weakness. LKW 1400. hx   
of 2 previous strokes.   
wears 3L O2 24/ for   
hx of COPD.   
nephrostomy in place.   
pt has hx of right   
sided defecits  
History of Present   
Illness  
54-year-old female   
sent from a local   
nursing home because   
of right-sided   
weakness and leaning   
to her right side.   
Patient volunteers   
that she has a history   
of a previous stroke   
that left her with   
some right-sided   
deficit. Patient here   
has no particular   
complaint. She does   
complain of a dry   
mouth. She denies any   
pain. She denies   
shortness of breath   
she denies any   
abdominal pain.   
Patient states that   
she does have a   
drainage tube in her   
left kidney that   
drains to a bag.  
Review of Systems  
A 10 point review of   
systems is negative   
except as noted above.  
Medical and Surgical   
History: Reviewed and   
noted  
Social history: Lives   
at home  
Tobacco: Denies  
Physical Exam  
Vitals & Measurements  
T: 36.8 ???C(Oral) HR:   
86(Monitored) RR: 20   
BP: 125/72 SpO2: 96%  
HT: 172 cm WT: 127 kg   
BMI: 42.93  
Pleasant overweight   
54-year-old female she   
appears to be awake   
and attentive she   
follows my commands   
well. Extraocular   
muscles are conjugate   
and full. There is   
some brief lateral   
gaze nystagmus left   
and right. Visual   
fields are intact.   
There is no detectable   
facial weakness. The   
heart is regular   
distant but without   
murmur gallop or rub.   
Breath sounds are   
equal left and right.   
Patient has an IV   
catheter in the right   
pectoral region. The   
abdomen is obese but   
soft. No redness   
swelling or deformity   
in her extremities.   
Patient is gross motor   
strength in her upper   
extremities is intact   
strong and symmetric.   
Strength testing the   
left leg is somewhat   
problematic because of   
pain with attempted   
movement. When each   
joint is isolated and   
compared her strength   
in terms of flexion   
and extension at the   
knees and the ankles   
appear to be   
symmetric. Simple   
touch is symmetric.   
Heel toe maneuver is   
limited because of   
pain in the left leg.   
Finger-nose maneuver   
does show some   
relative ataxia on the   
right side that is not   
present on the left.   
On the aphasia screen   
the patient did have   
some difficulty   
describing actions   
taken place on the   
action picture. She   
was able to correctly   
identify the pictures   
of the various items.   
Although the speech   
was slurred she was   
able to read each of   
the sentences and   
identify each of the   
words.  
Medical Decision   
Making  
The radiologist from   
stat read states that   
there is no acute   
bleed. More detailed   
reading is difficult   
because of motion   
artifact. When I went   
back into finish our   
NIH scale. The   
patient's family was   
in the room. The   
family member state   
that a nurse at   
[facility]   
states that she was   
leaning to her right   
yesterday and seem to   
have right sided   
weakness yesterday.   
The family states that   
they do believe she   
had an old stroke on   
the right side. The   
major change that the   
family has noticed   
today has been slurred   
speech and increased   
drowsiness. Patient   
was apparently   
hospitalized at   
[facility] twice   
within the past month.   
She was placed on   
antibiotics for   
sepsis. Was discharged   
home but she was so   
weak she could not   
function at home. She   
went back to [place]   
and again transferred   
to [facility].   
She was there for   
several days and then   
discharged to [facility] nursing home.   
She has been at   
[facility] for   
about 5 days. Patient   
is on a blood thinner   
Eliquis. As such she   
will not qualify for   
tPA. We will check her   
kidney function. If   
her kidney function is   
adequate we can   
consider CTA head and   
neck.  
Patient has a   
creatinine above 4. We   
do not have previous   
lab work to compare   
with. We will contact   
[facility] and   
Chris for the most   
recent laboratory   
studies. This would   
likely explain the   
altered mental status.   
We will contact the   
stroke center up in   
[place]. We will   
discuss intervention   
with them.   
Complicating factors   
are the old stroke,   
the remaining ataxia   
on the right side and   
the pain with movement   
of the left leg.  
I discussed the case   
with  from   
[place] neurology   
intervention. She   
believes that we   
should respect the   
family's history of   
previous right-sided   
stroke. She believes   
that this slurred   
speech and the altered   
mental status is   
probably metabolic   
encephalopathy. This   
should be our   
treatment target. She   
agrees that a CTA head   
and neck should not be   
performed because of   
the elevated   
creatinine. And clear   
that the patient is   
not a thrombolytic   
candidate because of   
her Eliquis.  
After contacting the   
[facility]   
transfer line it   
appears that the   
patient's treatment   
occurred at [facility] not at [facility]. We will   
contact [facility].  
I spoke with Dr. Dillon the   
hospitalist who accept   
the patient in   
transfer recommends   
ertapenem for   
treatment of her urine   
as she has   
multidrug-resistant   
urine. Patient is   
given a dose of   
ertapenem.  
Assessment/Plan  
1. Metabolic   
encephalopathy   
(G93.41: Metabolic   
encephalopathy)  
2. Histo (more content   
not included)...    Normal                                  [facility]  
   
                                        Comment on above:   Result Comment: Elec  
tronically Signed By: Chris Bailey DO\.br\Date and Time Signed: 25 10:22   
EST\.br\Electronically Co-Signed By: Charlie Ortega MD   
   
                                                    ED Patient Education Noteon   
2025   
   
                                                    ED Patient Education   
Note                                    ED Patient Education   
Note                Normal                                  [facility]  
   
                                                    ED Patient Summaryon   
025   
   
                                        ED Patient Summary  ED Patient Summary  
(Inserted Image.   
Unable to display)   
[place] [id number] [phone]  
  
Patient Discharge   
Instructions  
  
Person Information  
Name: CRIS HEARN   
Age: 54 Years  
MRN: [id number] FIN:   
[id number]  
Arrival Date: 2025   
15:48:48  
Discharge Diagnosis:   
1:Metabolic   
encephalopathy;   
2:History of stroke;   
3:Acute on chronic   
renal insufficiency;   
Chronic kidney   
disease, unspecified  
Primary Care   
Physician: LORENE BARRERA DO  
  
Provider Information  
Primary Provider:  
Chris Bailey DO  
Advanced Practice   
Clinician:None  
The exam and treatment   
you received in the   
Emergency Department   
were for an urgent   
problem and are not   
intended as complete   
care. It is important   
that you follow up   
with a doctor, nurse   
practitioner, or   
physician???s   
assistant for ongoing   
care. If your symptoms   
become worse or you do   
not improve as   
expected and you are   
unable to reach your   
usual health care   
provider, you should   
return to the   
Emergency Department.   
We are available 24   
hours a day.  
  
CRIS HEARN has   
been given the   
following list of   
patient education   
materials,   
prescriptions and   
follow-up   
instructions:  
  
Follow-up   
Instructions:  
  
In the event that this   
physician does not   
participate in your   
insurance network,   
please consult with   
your insurance company   
to find a nearby   
participating   
provider.  
  
Patient Education   
Materials:  
  
A MESSAGE TO ALL   
PATIENTS REGARDING   
OPIOIDS  
  
PRESCRIPTION OPIOIDS:   
WHAT YOU NEED TO KNOW  
  
Prescription opioids   
can be used to help   
relieve   
moderate-to-severe   
pain and are often   
prescribed following a   
surgery or injury, or   
for certain health   
conditions. These   
medications can be an   
important part of the   
treatment but also   
come with serious   
risks. It is important   
to work with your   
healthcare provider to   
make sure you are   
getting the safest,   
most effective care.  
  
WHAT ARE THE RISKS AND   
SIDE EFFECTS OF OPIOID   
USE?  
Prescription opioids   
carry serious risks of   
addiction and   
overdose, especially   
with prolonged use. An   
opioid overdose, often   
marked by slowed   
breathing, can cause   
sudden death. The use   
of prescription   
opioids can have a   
number of side effects   
as well, even when   
taken as directed:  
???   
Tolerance???meaning   
you might need to take   
more of the medication   
for the same pain   
relief  
??? Physical   
dependence???meaning   
you have symptoms of   
withdrawal when a   
medication is stopped  
??? Increased   
sensitivity to pain  
??? Constipation  
??? Nausea, vomiting,   
and dry mouth  
??? Sleepiness and   
dizziness  
??? Confusion  
??? Depression  
??? Low levels of   
testosterone that can   
result in lower sex   
drive, energy, and   
strength  
??? Itching and   
sweating  
  
RISKS ARE GREATER   
WITH:  
??? History of drug   
misuse, substance use   
disorder, or overdose  
??? Mental health   
conditions (such as   
depression or anxiety)  
??? Sleep apnea  
??? Older age (65   
years and older)  
??? Pregnancy  
  
Avoid alcohol while   
taking prescription   
opioids. Also, unless   
specifically advised   
by your health care   
provider, medications   
to avoid include:  
??? Benzodiazepines   
(such as Xanax or   
Valium)  
??? Muscle relaxants   
(such as Soma or   
Flexeril)  
??? Hypnotics (such as   
Ambien or Lunesta)  
??? Other prescription   
opioids  
  
KNOW YOUR OPTIONS  
Talk to your health   
care provider about   
ways to manage your   
pain that don???t   
involve prescription   
opioids. Some of these   
options may actually   
work better and have   
fewer risks and side   
effects. Options may   
include:  
??? Pain relievers   
such as acetaminophen,   
ibuprofen, and   
naproxen  
??? Some medication   
that are also used for   
depression or seizures  
??? Physical therapy   
and exercise  
??? Cognitive   
behavioral therapy, a   
psychological,   
goal-directed   
approach, in which   
patients learn how to   
modify physical,   
behavioral, and   
emotional triggers of   
pain and stress.  
  
IF YOU ARE PRESCRIBED   
OPIOIDS FOR PAIN:  
??? Never take opioids   
in greater amounts or   
more often than   
prescribed.  
??? Follow up with   
your primary health   
care provider.  
o Work together to   
create a plan on how   
to manage your pain.  
o Talk about ways to   
help manage your pain   
that don???t involve   
prescription opioids.  
o Talk about any and   
all concerns and side   
effects.  
??? Help prevent   
misuse and abuse  
o Never sell or share   
prescription opioids.  
o Never use another   
person???s   
prescription opioids.  
??? Store prescription   
opioids in a secure   
place and out of reach   
of others (this may   
include visitors,   
children, friends, and   
family).  
??? Safely dispose of   
unused prescription   
opioids: Find your   
community drug   
take-back program or   
your pharmacy   
mail-back program, or   
flush them down the   
toilet, following   
guidance from the Food   
and Drug   
Administration   
(www.fda.gov/Drugs/Res  
ourcesForYou).  
??? Visit   
www.cdc.gov/drugoverdo  
se to learn about the   
risks of opioids abuse   
and overdose.  
??? If you believe you   
may be struggling with   
addiction, tell your   
health care   
professional and ask   
for guidance or call   
[facility]???S National   
Helpline at   
3-031-282-BD (more   
content not   
included)...        Normal                                  [facility]  
   
                                                    HEMATOLOGYOrdered By: SYSTEM  
 SYSTEM on 02-   
   
                                                    Basophils/100 WBC   
(Bld)           0.8 %           Normal          0.0 - 2.0 %     [facility]  
   
                                                    Basophils/Leukocytes   
Auto (Bld) [Pure #   
fraction]       0.1 E9/L        Normal          0.0 - 0.2 E9/L  [facility]  
   
                                                    Eosinophils (Bld)   
[#/Vol]         0.4 E9/L        Normal          0.0 - 0.5 E9/L  [facility]  
   
                                                    Eosinophils/100 WBC   
(Bld)           4.0 %           Normal          0.0 - 8.0 %     [facility]  
   
                                                    Erythrocyte   
distribution width   
(RBC) [Ratio]   16.8 %          High            10.9 - 14.2 %   [facility]  
   
                                                    Hematocrit (Bld)   
[Volume fraction] 24.8 %          Low             34.0 - 46.0 %   [facility]  
   
                                                    Hemoglobin (Bld)   
[Mass/Vol]          8.0 g/dL            Low                 12.0 - 16.0   
gm/dL                                   [facility]  
   
                                                    Lymphocytes (Bld)   
[#/Vol]         1.2 E9/L        Normal          1.0 - 4.0 E9/L  [facility]  
   
                                                    Lymphocytes/100 WBC   
(Bld)           13.9 %          Low             14.0 - 50.0 %   [facility]  
   
                                                    MCH (RBC) [Entitic   
mass]           27.6 pg         Normal          27.0 - 34.0 pg  [facility]  
   
                          MCHC (RBC) [Mass/Vol] 32.5 g/dL    Normal       31.4 -  
 36.0   
gm/dL                                   [facility]  
   
                                                    MCV (RBC) [Entitic   
vol]            84.9 fL         Normal          80.0 - 100.0 fL [facility]  
   
                                                    Monocytes (Bld)   
[#/Vol]         0.7 E9/L        Normal          0.2 - 1.0 E9/L  [facility]  
   
                                                    Monocytes/100 WBC   
(Bld)           8.0 %           Normal          4.0 - 14.0 %    [facility]  
   
                                                    Neutrophils (Bld)   
[#/Vol]         6.5 E9/L        Normal          2.0 - 7.5 E9/L  [facility]  
   
                                                    Neutrophils/100 WBC   
(Bld)           73.3 %          Normal          36.0 - 75.0 %   [facility]  
   
                          Platelet     340.0 E9/L   Normal       150.0 - 500.0   
E9/L                                    [facility]  
   
                                                    Platelet mean volume   
(Bld) [Entitic vol] 7.4 fL          Normal          6.4 - 10.8 fL   [facility]  
   
                      RBC (Bld) [#/Vol] 2.9 E12/L  Low        4.3 - 5.9 E12/L Re  
misol Heme  
   
                                                    WBC corrected for   
nucl RBC Auto (Bld)   
[#/Vol]         8.9 E9/L        Normal          4.0 - 11.0 E9/L [facility]  
   
                                                    Interdisciplinary Note - Luis  
e Manageron 2025   
   
                                                    Interdisciplinary   
Note -                      Interdisciplinary Note   
-   
Chart review completed  
Patient just brought   
to 3 [address] today  
Patient is from [facility],   
referral sent to   
verify  
DC plan is transfer   
pending to [facility]  
CRM will see patient   
later this date or /  
CRM to room 317  
Patient is awake,   
alert and oriented.   
Patient is from [facility].   
Patient PCP listed is   
not correct, she   
verified DME and   
insurance. Patient   
came in with stroke   
like symptoms. Patient   
DC plan is transfer to   
[facility]. CRM provided   
contact info, white   
board updated. CRM   
following           Normal                                  [facility]  
   
                                        Comment on above:   Result Comment: Elec  
tronically Signed By: Rebecca Ortiz\.br\Date and Time Signed: 25 14:09 EST   
   
                                                    Interdisciplinary Note - Spe  
ech Languageon 2025   
   
                                                    Interdisciplinary   
Note - Speech   
Language                                Interdisciplinary Note   
- Speech Language  
ST 2/3/25: Per pt, was   
having issues with   
speech and keeping up   
with conversation on   
previous date.   
However, this date   
reports  everyone I   
have talked to today   
agrees I sound like   
myself and I am able   
to keep up with   
conversations.  Pt   
reports no difficulty   
with swallow. Will   
cancel orders at this   
time, please reconsult   
as needed.          Normal                                  [facility]  
   
                                                    eGFRon 2025   
   
                      eGFR       12 mL/min/1.73 m2 Low        >=59       [facility]  
   
                                        Comment on above:   Performed By: #### 1  
[id number] ####  
[facility]  
[address] [place] [address]   
   
                                                    BMPon 2025   
   
                      Anion gap [Moles/Vol] 9 mmol/L   Normal     6-16       [facility]  
   
                                        Comment on above:   Order Comment: line   
draw send packet   
   
                                                            Performed By: #### 2  
[id number] ####  
[facility]  
[address] [place] [address]   
   
                      Calcium [Mass/Vol] 9.0 mg/dL  Normal     8.9-11.1   [facility]  
   
                                        Comment on above:   Order Comment: line   
draw send packet   
   
                                                            Performed By: #### 2  
[id number] ####  
[facility]  
[address] [place] [address]   
   
                      Chloride [Moles/Vol] 102 mmol/L Normal     101-111    [facility]  
   
                                        Comment on above:   Order Comment: line   
draw send packet   
   
                                                            Performed By: #### 2  
[id number] ####  
[facility]  
[address] [place] [address]   
   
                      CO2 [Moles/Vol] 32 mmol/L  High       21-31      [facility]  
   
                                        Comment on above:   Order Comment: line   
draw send packet   
   
                                                            Performed By: #### 2  
[id number] ####  
[facility]  
[address] [place] [address]   
   
                      Creatinine [Mass/Vol] 4.1 mg/dL  High       0.5-1.3    [facility]  
   
                                        Comment on above:   Order Comment: line   
draw send packet   
   
                                                            Performed By: #### 2  
[id number] ####  
[facility]  
[address] [place] [address]   
   
                      Glucose [Mass/Vol] 101 mg/dL  Normal          [facility]  
   
                                        Comment on above:   Order Comment: line   
draw send packet   
   
                                                            Performed By: #### 2  
[id number] ####  
[facility]  
[address] [place] [address]   
   
                      Potassium [Moles/Vol] 4.4 mmol/L Normal     3.5-5.3    [facility]  
   
                                        Comment on above:   Order Comment: line   
draw send packet   
   
                                                            Performed By: #### 2  
[id number] ####  
[facility]  
[address] [place] [address]   
   
                      Sodium [Moles/Vol] 139 mmol/L Normal     135-145    [facility]  
   
                                        Comment on above:   Order Comment: line   
draw send packet   
   
                                                            Performed By: #### 2  
[id number] ####  
[facility]  
[address] [place] [address]   
   
                                                    Urea nitrogen   
[Mass/Vol]      29 mg/dL        High            5-21            [facility]  
   
                                        Comment on above:   Order Comment: line   
draw send packet   
   
                                                            Performed By: #### 2  
[id number] ####  
[facility]  
[address] [place] [address]   
   
                                                    Urea   
nitrogen/Creatinine   
[Mass ratio]    7 No Units      Low             10-20           [facility]  
   
                                        Comment on above:   Order Comment: line   
draw send packet   
   
                                                            Performed By: #### 2  
[id number] ####  
[facility]  
[address] [place] [address]   
   
                                                    CBC w/ Auto Diffon   
5   
   
                                                    Basophils/100 WBC   
(Bld)           0.6 %           Normal          0.0-2.0         [facility]  
   
                                        Comment on above:   Order Comment: line   
draw send packet   
   
                                                            Performed By: #### 2  
[id number] ####  
[facility]  
[address] [place] [address]   
   
                                                    Basophils/Leukocytes   
Auto (Bld) [Pure #   
fraction]       0.0 E9/L        Normal          0.0-0.2         [facility]  
   
                                        Comment on above:   Order Comment: line   
draw send packet   
   
                                                            Performed By: #### 2  
[id number] ####  
[facility]  
[address] [place] [address]   
   
                                                    Eosinophils (Bld)   
[#/Vol]         0.3 E9/L        Normal          0.0-0.5         [facility]  
   
                                        Comment on above:   Order Comment: line   
draw send packet   
   
                                                            Performed By: #### 2  
[id number] ####  
[facility]  
[address] [place] [address]   
   
                                                    Eosinophils/100 WBC   
(Bld)           3.8 %           Normal          0.0-8.0         [facility]  
   
                                        Comment on above:   Order Comment: line   
draw send packet   
   
                                                            Performed By: #### 2  
[id number] ####  
[facility]  
[address] [place] [address]   
   
                                                    Erythrocyte   
distribution width   
(RBC) [Ratio]   16.9 %          High            10.9-14.2       [facility]  
   
                                        Comment on above:   Order Comment: line   
draw send packet   
   
                                                            Performed By: #### 2  
[id number] ####  
[facility]  
[address] [place] [address]   
   
                                                    Hematocrit (Bld)   
[Volume fraction] 22.3 %          Low             34.0-46.0       [facility]  
   
                                        Comment on above:   Order Comment: line   
draw send packet   
   
                                                            Performed By: #### 2  
[id number] ####  
[facility]  
[address] [place] [address]   
   
                                                    Hemoglobin (Bld)   
[Mass/Vol]      7.0 g/dL        Low             12.0-16.0       [facility]  
   
                                        Comment on above:   Order Comment: line   
draw send packet   
   
                                                            Performed By: #### 2  
[id number] ####  
[facility]  
[address] [place] [address]   
   
                                                    Lymphocytes (Bld)   
[#/Vol]         1.3 E9/L        Normal          1.0-4.0         [facility]  
   
                                        Comment on above:   Order Comment: line   
draw send packet   
   
                                                            Performed By: #### 2  
[id number] ####  
[facility]  
[address] [place] [address]   
   
                                                    Lymphocytes/100 WBC   
(Bld)           18.2 %          Normal          14.0-50.0       [facility]  
   
                                        Comment on above:   Order Comment: line   
draw send packet   
   
                                                            Performed By: #### 2  
[id number] ####  
[facility]  
[address] [place] [address]   
   
                                                    MCH (RBC) [Entitic   
mass]           26.9 pg         Low             27.0-34.0       [facility]  
   
                                        Comment on above:   Order Comment: line   
draw send packet   
   
                                                            Performed By: #### 2  
[id number] ####  
[facility]  
[address] [place] [address]   
   
                      MCHC (RBC) [Mass/Vol] 31.5 g/dL  Normal     31.4-36.0  [facility]  
   
                                        Comment on above:   Order Comment: line   
draw send packet   
   
                                                            Performed By: #### 2  
[id number] ####  
[facility]  
[address] [place] [address]   
   
                                                    MCV (RBC) [Entitic   
vol]            85.5 fL         Normal          80.0-100.0      [facility]  
   
                                        Comment on above:   Order Comment: line   
draw send packet   
   
                                                            Performed By: #### 2  
[id number] ####  
[facility]  
[address] [place] [address]   
   
                                                    Monocytes (Bld)   
[#/Vol]         0.6 E9/L        Normal          0.2-1.0         [facility]  
   
                                        Comment on above:   Order Comment: line   
draw send packet   
   
                                                            Performed By: #### 2  
[id number] ####  
[facility]  
[address] [place] [address]   
   
                                                    Neutrophils (Bld)   
[#/Vol]         5.0 E9/L        Normal          2.0-7.5         [facility]  
   
                                        Comment on above:   Order Comment: line   
draw send packet   
   
                                                            Performed By: #### 2  
[id number] ####  
[facility]  
[address] [place] [address]   
   
                                                    Neutrophils/100 WBC   
(Bld)           68.9 %          Normal          36.0-75.0       [facility]  
   
                                        Comment on above:   Order Comment: line   
draw send packet   
   
                                                            Performed By: #### 2  
[id number] ####  
[facility]  
[address] [place] [address]   
   
                      Platelet   304.0 E9/L Normal     150.0-500.0 [facility]  
   
                                        Comment on above:   Order Comment: line   
draw send packet   
   
                                                            Performed By: #### 2  
[id number] ####  
[facility]  
[address] [place] [address]   
   
                                                    Platelet mean volume   
(Bld) [Entitic vol] 7.6 fL          Normal          6.4-10.8        [facility]  
   
                                        Comment on above:   Order Comment: line   
draw send packet   
   
                                                            Performed By: #### 2  
[id number] ####  
[facility]  
[address] [place] [address]   
   
                      RBC (Bld) [#/Vol] 2.6 E12/L  Low        4.3-5.9    [facility]  
   
                                        Comment on above:   Order Comment: line   
draw send packet   
   
                                                            Performed By: #### 2  
[id number] ####  
[facility]  
[address] [place] [address]   
   
                                                    WBC corrected for   
nucl RBC Auto (Bld)   
[#/Vol]         7.3 E9/L        Normal          4.0-11.0        [facility]  
   
                                        Comment on above:   Order Comment: line   
draw send packet   
   
                                                            Performed By: #### 2  
[id number] ####  
[facility]  
[address] [place] [address]   
   
                                                    CT Head or Brain w/o Contras  
ton 2025   
   
                                                    CT Head or Brain w/o   
Contrast                                Exam Date/Time:  
2025 16:07 EST  
Reason for Exam:  
Neuro deficit, acute,   
stroke   
suspected;Stroke  
Report  
IMPRESSION:  
No acute intracranial   
process within limits   
of motion.  
COMMUNICATION:   
Communicated by   
statrad radiologist   
Dr. Stanley with: Dr. Ortega on  
2025 at 1621.  
HISTORY: Rapid   
response stroke.   
Right-sided deficits.   
History of prior   
stroke or  
TIA.  
TECHNIQUE: Serial   
axial images without   
IV contrast were   
obtained from the   
vertex to  
the foramen magnum,   
with sagittal and   
coronal   
reconstructions.  
All CT scans at this   
facility use dose   
modulation, iterative   
reconstruction, and/or  
weight based dosing   
when appropriate to   
reduce radiation dose   
to as low as   
reasonably  
achievable.  
COMPARISON: None   
available.  
RESULT:  
At least moderate   
limitations from   
motion. Within these   
limits:  
Acute change: No   
evidence of an acute   
infarct or other acute   
parenchymal process.  
Hemorrhage: No   
evidence of acute   
intracranial   
hemorrhage.  
Mass Lesion / Mass   
Effect: There is no   
evidence of an   
intracranial mass or  
extraaxial fluid   
collection. No   
significant mass   
effect.  
Chronic change:   
Scattered patchy foci   
of decreased   
attenuation within  
supratentorial white   
matter which is a   
nonspecific finding   
but likely represents   
mild  
microvascular   
ischemia. Vascular   
calcifications.  
Parenchyma: There is   
no significant volume   
loss.  
Ventricles: The   
ventricles are within   
normal limits of size   
and configuration for  
age.  
Paranasal sinuses and   
skull base: The   
visualized paranasal   
sinuses are grossly  
clear. Mastoid air   
cells clear. The skull   
base is unremarkable.   
Soft tissues  
unremarkable.  
  
Report  
Ordering Provider:   
Charlie Ortega  
***** FINAL REPORT   
*****  
  
Dictated: 2025   
9:07 am Apollo Mcclure MD  
  
Signed (Electronic   
Signature): 2025   
9:07 am  
Signed by: Apollo Mcclure MD  
Transcribed by: KYAW   
Technologist: JUANCARLOS   Normal                                  [facility]  
   
                                                    Capillary Glucose POCon    
   
                      Glucose [Mass/Vol] 110 mg/dL  High       55-99      [facility]  
   
                                        Comment on above:   Result Comment: Ivone anderson Meter   
   
                                                            Performed By: #### 2  
[id number] ####  
[facility]  
[address] [place] [address]   
   
                                                    XR Chest Single Viewon    
   
                                        XR Chest Single View Exam Date/Time:  
2025 16:12 EST  
Reason for Exam:  
Chest pain  
Report  
IMPRESSION:  
Possible opacity   
versus atelectasis   
involving the lung   
bases. Possible   
interstitial  
pulmonary edema. No   
priors for comparison.   
Limitations as   
discussed.  
EXAMINATION/TECHNIQUE:   
XR Chest Single View  
HISTORY: Stroke. Chest   
pain.  
COMPARISON: None   
available.  
  
RESULT:  
Limitations from   
portable technique,   
patient body habitus,   
and patient   
positioning.  
Right-sided central   
venous catheter with   
tip near the superior   
cavoatrial junction.  
Possible opacity   
versus atelectasis   
involving the lung   
bases. Diffusely   
coarsened  
interstitial lung   
markings, which could   
be chronic or   
associated with   
interstitial  
pulmonary edema.   
Enlarged   
cardiomediastinal   
silhouette,   
accentuated by   
technique and  
positioning. No   
distinct acute osseous   
findings.  
Ordering Provider:   
Charlie Ortega  
***** FINAL REPORT   
*****  
  
Dictated: 2025   
9:28 am Apollo Mcclure MD  
  
Signed (Electronic   
Signature): 2025   
9:28 am  
Signed by: Apollo Mcclure MD  
Transcribed by: KYAW   
Technologist: CARSON   Normal                                  [facility]  
   
                                                    eGFRon 2025   
   
                      eGFR       12 mL/min/1.73 m2 Low        >=59       [facility]  
   
                                        Comment on above:   Performed By: #### 1  
[id number] ####  
[facility]  
[address] [place] [address]   
   
                                                    BB Draw & Holdon 2025   
   
                                        BB D&H              Sample drawn for Blo  
od   
Ba                  Normal                                  [facility]  
   
                                        Comment on above:   Performed By: #### 1  
[id number] ####  
[facility]  
[address] [place] [address]   
   
                                                    BNPOrdered By: Abdi camargo 2025   
   
                                                    Natriuretic peptide B   
(Bld) [Mass/Vol] 154 pg/mL       High            5 - 80 pg/mL    [facility]   
[facility]  
   
                                        Comment on above:   Performed By: #### 1  
[id number] ####  
[facility]  
[address] [place] [address]   
   
                                                    CBC w/ Auto Diffon   
5   
   
                                                    Basophils/100 WBC   
(Bld)           0.8 %           Normal          0.0-2.0         [facility]  
   
                                        Comment on above:   Performed By: #### 2  
[id number] ####  
[facility]  
[address] [place] [address]   
   
                                                    Basophils/Leukocytes   
Auto (Bld) [Pure #   
fraction]       0.1 E9/L        Normal          0.0-0.2         [facility]  
   
                                        Comment on above:   Performed By: #### 2  
[id number] ####  
[facility]  
[address] [place] [address]   
   
                                                    Eosinophils (Bld)   
[#/Vol]         0.3 E9/L        Normal          0.0-0.5         [facility]  
   
                                        Comment on above:   Performed By: #### 2  
[id number] ####  
[facility]  
[address] [place] [address]   
   
                                                    Eosinophils/100 WBC   
(Bld)           3.2 %           Normal          0.0-8.0         [facility]  
   
                                        Comment on above:   Performed By: #### 2  
[id number] ####  
[facility]  
[address] [place] [address]   
   
                                                    Erythrocyte   
distribution width   
(RBC) [Ratio]   17.1 %          High            10.9-14.2       [facility]  
   
                                        Comment on above:   Performed By: #### 2  
[id number] ####  
[facility]  
[address] [place] [address]   
   
                                                    Hematocrit (Bld)   
[Volume fraction] 22.4 %          Low             34.0-46.0       [facility]  
   
                                        Comment on above:   Performed By: #### 2  
[id number] ####  
[facility]  
[address] [place] [address]   
   
                                                    Hemoglobin (Bld)   
[Mass/Vol]      7.3 g/dL        Low             12.0-16.0       [facility]  
   
                                        Comment on above:   Performed By: #### 2  
[id number] ####  
[facility]  
[address] [place] [address]   
   
                                                    Lymphocytes (Bld)   
[#/Vol]         1.3 E9/L        Normal          1.0-4.0         [facility]  
   
                                        Comment on above:   Performed By: #### 2  
[id number] ####  
[facility]  
[address] [place] [address]   
   
                                                    Lymphocytes/100 WBC   
(Bld)           14.9 %          Normal          14.0-50.0       [facility]  
   
                                        Comment on above:   Performed By: #### 2  
[id number] ####  
[facility]  
[address] [place] [address]   
   
                                                    MCH (RBC) [Entitic   
mass]           27.8 pg         Normal          27.0-34.0       [facility]  
   
                                        Comment on above:   Performed By: #### 2  
[id number] ####  
[facility]  
[address] [place] [address]   
   
                      MCHC (RBC) [Mass/Vol] 32.8 g/dL  Normal     31.4-36.0  [facility]  
   
                                        Comment on above:   Performed By: #### 2  
[id number] ####  
[facility]  
[address] [place] [address]   
   
                                                    MCV (RBC) [Entitic   
vol]            84.8 fL         Normal          80.0-100.0      [facility]  
   
                                        Comment on above:   Performed By: #### 2  
[id number] ####  
[facility]  
[address] [place] [address]   
   
                                                    Monocytes (Bld)   
[#/Vol]         0.7 E9/L        Normal          0.2-1.0         [facility]  
   
                                        Comment on above:   Performed By: #### 2  
[id number] ####  
[facility]  
[address] [place] [address]   
   
                                                    Neutrophils (Bld)   
[#/Vol]         6.5 E9/L        Normal          2.0-7.5         [facility]  
   
                                        Comment on above:   Performed By: #### 2  
[id number] ####  
[facility]  
[address] [place] [address]   
   
                                                    Neutrophils/100 WBC   
(Bld)           73.1 %          Normal          36.0-75.0       [facility]  
   
                                        Comment on above:   Performed By: #### 2  
[id number] ####  
[facility]  
[address] [place] [address]   
   
                      Platelet   307.0 E9/L Normal     150.0-500.0 [facility]  
   
                                        Comment on above:   Performed By: #### 2  
[id number] ####  
[facility]  
[address] [place] [address]   
   
                                                    Platelet mean volume   
(Bld) [Entitic vol] 7.5 fL          Normal          6.4-10.8        [facility]  
   
                                        Comment on above:   Performed By: #### 2  
[id number] ####  
[facility]  
[address] [place] [address]   
   
                      RBC (Bld) [#/Vol] 2.6 E12/L  Low        4.3-5.9    [facility]  
   
                                        Comment on above:   Performed By: #### 2  
[id number] ####  
[facility]  
[address] [place] [address]   
   
                                                    WBC corrected for   
nucl RBC Auto (Bld)   
[#/Vol]         8.9 E9/L        Normal          4.0-11.0        [facility]  
   
                                        Comment on above:   Performed By: #### 2  
[id number] ####  
[facility]  
[address] [place] [address]   
   
                                                    CHEMISTRYOrdered By: SYSTEM   
SYSTEM on 2025   
   
                          Troponin HS  19.80 pg/mL  Normal       10.10 - 27.10   
pg/mL                                   [facility]  
   
                                        Comment on above:   Interpretive Data: T  
he 95% CI (Confidence Interval) PPV   
(Positive Predictive Value) for myocardial infarction in females   
is 38 pg/mL, in males 51 pg/mL. The results should be used in   
conjunction with clinical conditions of myocardial infarction.  
(Access High Sensitivity Troponin I Instructions For Use,   
[employer], 2018)   
   
                          Troponin HS  20.20 pg/mL  Normal       10.10 - 27.10   
pg/mL                                   [facility]  
   
                                        Comment on above:   Interpretive Data: T  
he 95% CI (Confidence Interval) PPV   
(Positive Predictive Value) for myocardial infarction in females   
is 38 pg/mL, in males 51 pg/mL. The results should be used in   
conjunction with clinical conditions of myocardial infarction.  
(Access High Sensitivity Troponin I Instructions For Use,   
[employer], 2018)   
   
                          Troponin HS  19.70 pg/mL  Normal       10.10 - 27.10   
pg/mL                                   [facility]  
   
                                        Comment on above:   Interpretive Data: T  
he 95% CI (Confidence Interval) PPV   
(Positive Predictive Value) for myocardial infarction in females   
is 38 pg/mL, in males 51 pg/mL. The results should be used in   
conjunction with clinical conditions of myocardial infarction.  
(Access High Sensitivity Troponin I Instructions For Use,   
[employer], 2018)   
   
                                                    Albumin/Globulin   
[Mass ratio]    0.8 {ratio}     Low             1.1 - 2.2       [facility]  
   
                                                    ALP [Catalytic   
activity/Vol]       40 [iU]/d           Normal              21 - 98   
Int._Unit/L                             [facility]  
   
                                                    ALT No additional   
P-5'-P [Catalytic   
activity/Vol]       5 [iU]/d            Low                 6 - 46   
Int._Unit/L                             [facility]  
   
                                                    AST [Catalytic   
activity/Vol]       8 [iU]/d            Normal              5 - 43   
Int._Unit/L                             [facility]  
   
                                                    CMPOrdered By: SYSTEM SYSTEM  
 on 2025   
   
                      Albumin [Mass/Vol] 3.0 g/dL   Low        3.3 - 5.0 gm/dL R  
[facility]  
   
                                        Comment on above:   Performed By: #### 2  
[id number] ####  
[facility]  
[address] [place] [address]   
   
                      Bilirubin [Mass/Vol] 0.3 mg/dL  Normal     0.0 - 1.1 mg/dL  
 [facility]  
   
                                        Comment on above:   Performed By: #### 2  
[id number] ####  
[facility]  
[address] [place] [address]   
   
                                                    Globulin (S)   
[Mass/Vol]      3.9 g/dL        Normal          1.4 - 4.0 gm/dL [facility]  
   
                                        Comment on above:   Performed By: #### 2  
[id number] ####  
[facility]  
[address] [place] [address]   
   
                      Protein [Mass/Vol] 6.9 g/dL   Normal     6.0 - 7.8 gm/dL R  
[facility]  
   
                                        Comment on above:   Performed By: #### 2  
[id number] ####  
[facility]  
[address] [place] [address]   
   
                                                    CMPon 2025   
   
                                                    Albumin/Globulin (S)   
[Mass conc ratio] 0.8             Low             1.1-2.2         [facility]  
   
                                        Comment on above:   Performed By: #### 2  
[id number] ####  
[facility]  
[address] [place] [address]   
   
                                                    ALP [Catalytic   
activity/Vol]   40 Int._Unit/L  Normal          21-98           [facility]  
   
                                        Comment on above:   Performed By: #### 2  
[id number] ####  
[facility]  
[address] [place] [address]   
   
                                                    ALT No additional   
P-5'-P [Catalytic   
activity/Vol]   5 Int._Unit/L   Low             6-46            [facility]  
   
                                        Comment on above:   Performed By: #### 2  
[id number] ####  
[facility]  
[address] [place] [address]   
   
                      Anion gap [Moles/Vol] 11 mmol/L  Normal     6-16       [facility]  
   
                                        Comment on above:   Performed By: #### 2  
[id number] ####  
[facility]  
[address] [place] [address]   
   
                                                    AST [Catalytic   
activity/Vol]   8 Int._Unit/L   Normal          5-43            [facility]  
   
                                        Comment on above:   Performed By: #### 2  
[id number] ####  
[facility]  
[address] [place] [address]   
   
                      Calcium [Mass/Vol] 9.0 mg/dL  Normal     8.9-11.1   [facility]  
   
                                        Comment on above:   Performed By: #### 2  
[id number] ####  
[facility]  
[address] [place] [address]   
   
                      Chloride [Moles/Vol] 101 mmol/L Normal     101-111    [facility]  
   
                                        Comment on above:   Performed By: #### 2  
[id number] ####  
[facility]  
[address] [place] [address]   
   
                      CO2 [Moles/Vol] 31 mmol/L  Normal     21-31      [facility]  
   
                                        Comment on above:   Performed By: #### 2  
[id number] ####  
[facility]  
[address] [place] [address]   
   
                      Creatinine [Mass/Vol] 4.2 mg/dL  High       0.5-1.3    [facility]  
   
                                        Comment on above:   Performed By: #### 2  
[id number] ####  
[facility]  
[address] [place] [address]   
   
                      Glucose [Mass/Vol] 111 mg/dL  Normal          [facility]  
   
                                        Comment on above:   Performed By: #### 2  
[id number] ####  
[facility]  
[address] [place] [address]   
   
                      Potassium [Moles/Vol] 4.6 mmol/L Normal     3.5-5.3    [facility]  
   
                                        Comment on above:   Performed By: #### 2  
[id number] ####  
[facility]  
[address] [place] [address]   
   
                      Sodium [Moles/Vol] 138 mmol/L Normal     135-145    [facility]  
   
                                        Comment on above:   Performed By: #### 2  
[id number] ####  
[facility]  
[address] [place] [address]   
   
                                                    Urea nitrogen   
[Mass/Vol]      29 mg/dL        High            5-21            [facility]  
   
                                        Comment on above:   Performed By: #### 2  
[id number] ####  
[facility]  
[address] [place] [address]   
   
                                                    Urea   
nitrogen/Creatinine   
[Mass ratio]    7 No Units      Low             10-20           [facility]  
   
                                        Comment on above:   Performed By: #### 2  
[id number] ####  
[facility]  
[address] [place] [address]   
   
                                                    COAGULATIONOrdered By: Zaid Guzman on 2025   
   
                                                    aPTT Coag (PPP)   
[Time]              39.4 s              High                25.1 - 36.5   
second(s)                               [facility]   
Coag  
   
                                        Comment on above:   Interpretive Data: MARCI fraga  
15 days -  4 weeks  
1 - 5 months  
6 - 11 months  
1 - 5 years  
6 - 10 years  
11 - 17 years  
PTT  
Mean: 35.4 (27.6-45.6)  
Mean: 33.5 (24.8-40.7)  
Mean: 32.4 (25.1-40.7)  
Mean: 31.6 (24.0-39.2)  
Mean: 31.6 (26.9-38.7)  
Mean: 31.0 (24.6-38.4)  
  
Pediatric Reference ranges were obtained from a study by Román Caal et al. prepared from 1437 samples obtained at 7   
different centers using the same coagulation reagent and   
instrumentation as [facility]. Currently there are no coagulation   
studies available worldwide for children birth to 14 days, and   
no normal ranges.  
  
  
Heparin therapeutic range (represented by Anti-Factor Xa   
activity of 0.2 - 0.4  
U/mL) corresponds to PTT of 56.6 - 109.0 sec.   
   
                          PT Coag (PPP) [Time] 17.9 s       High         9.4 - 1  
2.5   
second(s)                               [facility]   
Coag  
   
                                        Comment on above:   Interpretive Data: 1  
5 days - 4 weeks  
1 - 5 months  
6 -11 months  
1 5 years  
6 10 years  
11 -17 years  
Mean: 11.2 (9.5 12.6)  
Mean: 11.0 (9.7 12.8)  
Mean: 11.0 (9.8 13.0)  
Mean: 11.3 (9.9 13.4)  
Mean: 11.7 (10.0 14.6)  
Mean: 11.8 (10.0 - 14.1)  
  
  
  
  
Pediatric Reference ranges were obtained from a study by Román Caal et al. prepared from 1437 samples obtained at 7   
different centers using the same coagulation reagent and   
instrumentation as [facility] Currently there are no coagulation   
studies available worldwide for children birth to 14 days, and   
no normal ranges.   
   
                                                    Capillary Glucose POCon 02-0  
   
   
                      Glucose [Mass/Vol] 115 mg/dL  High       55-99      [facility]  
   
                                        Comment on above:   Result Comment: Ivone  
justin Meter   
   
                                                            Performed By: #### 2  
[id number] ####  
[facility]  
[address] [place] [address]   
   
                                                    ED Note-Physicianon 20   
   
                                        ED Note-Physician   ED Note-Physician  
Basic Information  
Time Seen:  
Charlie Ortega MD   
2025 16:04  
Chief Complaint  
[place] [facility] with   
c/o right sided   
weakness. LKW 1400. hx   
of 2 previous strokes.   
wears 3L O2 24/ for   
hx of COPD.   
nephrostomy in place.   
pt has hx of right   
sided defecits  
History of Present   
Illness  
54-year-old female   
sent from a local   
nursing home because   
of right-sided   
weakness and leaning   
to her right side.   
Patient volunteers   
that she has a history   
of a previous stroke   
that left her with   
some right-sided   
deficit. Patient here   
has no particular   
complaint. She does   
complain of a dry   
mouth. She denies any   
pain. She denies   
shortness of breath   
she denies any   
abdominal pain.   
Patient states that   
she does have a   
drainage tube in her   
left kidney that   
drains to a bag.  
Review of Systems  
A 10 point review of   
systems is negative   
except as noted above.  
Medical and Surgical   
History: Reviewed and   
noted  
Social history: Lives   
at home  
Tobacco: Denies  
Physical Exam  
Vitals & Measurements  
T: 36.8 ???C(Oral) HR:   
86(Monitored) RR: 20   
BP: 125/72 SpO2: 96%  
HT: 172 cm WT: 127 kg   
BMI: 42.93  
Pleasant overweight   
54-year-old female she   
appears to be awake   
and attentive she   
follows my commands   
well. Extraocular   
muscles are conjugate   
and full. There is   
some brief lateral   
gaze nystagmus left   
and right. Visual   
fields are intact.   
There is no detectable   
facial weakness. The   
heart is regular   
distant but without   
murmur gallop or rub.   
Breath sounds are   
equal left and right.   
Patient has an IV   
catheter in the right   
pectoral region. The   
abdomen is obese but   
soft. No redness   
swelling or deformity   
in her extremities.   
Patient is gross motor   
strength in her upper   
extremities is intact   
strong and symmetric.   
Strength testing the   
left leg is somewhat   
problematic because of   
pain with attempted   
movement. When each   
joint is isolated and   
compared her strength   
in terms of flexion   
and extension at the   
knees and the ankles   
appear to be   
symmetric. Simple   
touch is symmetric.   
Heel toe maneuver is   
limited because of   
pain in the left leg.   
Finger-nose maneuver   
does show some   
relative ataxia on the   
right side that is not   
present on the left.   
On the aphasia screen   
the patient did have   
some difficulty   
describing actions   
taken place on the   
action picture. She   
was able to correctly   
identify the pictures   
of the various items.   
Although the speech   
was slurred she was   
able to read each of   
the sentences and   
identify each of the   
words.  
Medical Decision   
Making  
The radiologist from   
stat read states that   
there is no acute   
bleed. More detailed   
reading is difficult   
because of motion   
artifact. When I went   
back into finish our   
NIH scale. The   
patient's family was   
in the room. The   
family member state   
that a nurse at   
[facility]   
states that she was   
leaning to her right   
yesterday and seem to   
have right sided   
weakness yesterday.   
The family states that   
they do believe she   
had an old stroke on   
the right side. The   
major change that the   
family has noticed   
today has been slurred   
speech and increased   
drowsiness. Patient   
was apparently   
hospitalized at   
[facility] twice   
within the past month.   
She was placed on   
antibiotics for   
sepsis. Was discharged   
home but she was so   
weak she could not   
function at home. She   
went back to [place]   
and again transferred   
to [facility].   
She was there for   
several days and then   
discharged to [facility].   
She has been at   
[facility] for   
about 5 days. Patient   
is on a blood thinner   
Eliquis. As such she   
will not qualify for   
tPA. We will check her   
kidney function. If   
her kidney function is   
adequate we can   
consider CTA head and   
neck.  
Patient has a   
creatinine above 4. We   
do not have previous   
lab work to compare   
with. We will contact   
[facility] and   
Chris for the most   
recent laboratory   
studies. This would   
likely explain the   
altered mental status.   
We will contact the   
stroke center up in   
[place]. We will   
discuss intervention   
with them.   
Complicating factors   
are the old stroke,   
the remaining ataxia   
on the right side and   
the pain with movement   
of the left leg.  
I discussed the case   
with  from   
[place] neurology   
intervention. She   
believes that we   
should respect the   
family's history of   
previous right-sided   
stroke. She believes   
that this slurred   
speech and the altered   
mental status is   
probably metabolic   
encephalopathy. This   
should be our   
treatment target. She   
agrees that a CTA head   
and neck should not be   
performed because of   
the elevated   
creatinine. And clear   
that the patient is   
not a thrombolytic   
candidate because of   
her Eliquis.  
After contacting the   
[facility]   
transfer line it   
appears that the   
patient's treatment   
occurred at [facility] not at [facility]. We will   
contact [facility].  
I spoke with Dr. Dillon the   
hospitalist who accept   
the patient in   
transfer recommends   
ertapenem for   
treatment of her urine   
as she has   
multidrug-resistant   
urine. Patient is   
given a dose of   
ertapenem.  
Assessment/Plan  
1. Metabolic   
encephalopathy   
(G93.41: Metabolic   
encephalopathy)  
2. Histo (more content   
not included)...    Normal                                  [facility]  
   
                                        Comment on above:   Result Comment: Elec  
tronically Signed By: Charlie Ortega MD\.br\Electronically Co-Signed By: Maritza Yousif DO\.br\Date and Time Co-Signed: 25 22:27 EST   
   
                                                    Laboratory - Microbiology an  
d Antimicrobial susceptibilityOrdered By: Carol Seip  
 on   
2025   
   
                                                    Bacteria identified   
Cx Nom (U)                              1,000 cfu/ml Mixed   
skin contaminants                                           [facility]  
   
                                                    PT & PTTon 2025   
   
                                                    aPTT Coag (PPP)   
[Time]          39.4 second(s)  High            25.1-36.5       [facility]  
   
                                        Comment on above:   Result Comment: Para  
meter 15 days - 4 weeks 1 - 5 months 6 -   
11   
months 1 - 5 years 6 - 10 years 11 - 17 years  
PTT Mean: 35.4 (27.6-45.6) Mean: 33.5 (24.8-40.7) Mean: 32.4   
(25.1-40.7) Mean: 31.6 (24.0-39.2) Mean: 31.6 (26.9-38.7) Mean:   
31.0 (24.6-38.4)  
  
Pediatric Reference ranges were obtained from a study by wilmer Johnson prepared from 1437 samples obtained at 7   
different centers using the same coagulation reagent and   
instrumentation as [facility]. Currently there are no coagulation   
studies available worldwide for children birth to 14 days, and   
no normal ranges.  
Heparin therapeutic range (represented by Anti-Factor Xa   
activity of 0.2 - 0.4  
U/mL) corresponds to PTT of 56.6 - 109.0 sec.   
   
                                                            Performed By: #### 1  
[id number] ####  
[facility]  
[address] [place] [address]   
   
                      PT Coag (PPP) [Time] 17.9 second(s) High       9.4-12.5     
[facility]  
   
                                        Comment on above:   Result Comment: 15 d  
ays - 4 weeks 1 - 5 months 6 -11 months 1   
??? 5 years 6 ??? 10 years 11 -17 years  
Mean: 11.2 (9.5 ??? 12.6) Mean: 11.0 (9.7 ??? 12.8) Mean: 11.0   
(9.8 ??? 13.0) Mean: 11.3 (9.9 ??? 13.4) Mean: 11.7 (10.0 ???   
14.6) Mean: 11.8 (10.0 - 14.1)  
Pediatric Reference ranges were obtained from a study by wilmer Johnson prepared from 1437 samples obtained at 7   
different centers using the same coagulation reagent and   
instrumentation as [facility] Currently there are no coagulation   
studies available worldwide for children birth to 14 days, and   
no normal ranges.   
   
                                                            Performed By: #### 1  
[id number] ####  
[facility]  
[address] [place] [address]   
   
                                                    PT & PTTOrdered By: Molly Guzman on 2025   
   
                                                    INR Coag (PPP)   
[Relative time]           1.59 {INR}                Invalid   
Interpretation   
Code                                                [facility]   
Coag  
   
                                        Comment on above:   Result Comment: INR   
results are specifically intended to   
assess   
patients stabilized on long-term  
Anticoagulation therapy suggested INR???s  
???Less Intensive Anticoagulation??? 2.0 ??? 3.0  
Conventional Range 3.0 ??? 4.5   
   
                                                            Performed By: #### 1  
[id number] ####  
[facility]  
[address] [place] [address]   
   
                                                            Interpretive Data: I  
NR results are specifically intended to   
assess patients stabilized on long-term  
Anticoagulation therapy suggested INR s  
Less Intensive Anticoagulation 2.0 3.0  
Conventional Range 3.0 4.5   
   
                                                    Telephone Encounteron 2025   
   
                                                    Transcription   
Authentication   
Interface Message   
Text                            Normal                          The   
[facility]   
System  
   
                                                    Troponin 0 Hr.on 2025   
   
                      Troponin HS 21.20 pg/mL Normal     10.10-27.10 [facility]  
   
                                        Comment on above:   Result Comment: The   
95% CI (Confidence Interval) PPV (Positive  
  
Predictive Value) for myocardial infarction in females is 38   
pg/mL, in males 51 pg/mL. The results should be used in   
conjunction with clinical conditions of myocardial infarction.  
(Access High Sensitivity Troponin I Instructions For Use,   
[employer], 2018)   
   
                                                            Performed By: #### 1  
[id number] ####  
[facility]  
[address] [place] [address]   
   
                                                    Troponin 1 Hr.on 2025   
   
                      Troponin HS 19.70 pg/mL Normal     10.10-27.10 [facility]  
   
                                        Comment on above:   Order Comment: 1700  
Pt is a line. CHARLENE Hudson has a line packet, [id number] 2025   
16:53:48 EST   
   
                                                            Result Comment: The   
95% CI (Confidence Interval) PPV (Positive   
Predictive Value) for myocardial infarction in females is 38   
pg/mL, in males 51 pg/mL. The results should be used in   
conjunction with clinical conditions of myocardial infarction.  
(Access High Sensitivity Troponin I Instructions For Use,   
[employer], 2018)   
   
                                                            Performed By: #### 1  
[id number] ####  
[facility]  
[address] [place] [address]   
   
                                                    Troponin 3 Hr.on 2025   
   
                      Troponin HS 20.20 pg/mL Normal     10.10-27.10 [facility]  
   
                                        Comment on above:   Order Comment: line   
draw will drop packet heb 2025   
18:16:36   
EST   
   
                                                            Result Comment: The   
95% CI (Confidence Interval) PPV (Positive   
Predictive Value) for myocardial infarction in females is 38   
pg/mL, in males 51 pg/mL. The results should be used in   
conjunction with clinical conditions of myocardial infarction.  
(Access High Sensitivity Troponin I Instructions For Use,   
[employer], 2018)   
   
                                                            Performed By: #### 1  
[id number] ####  
[facility]  
[address] [place] [address]   
   
                                                    Troponin 6 Hr.on 2025   
   
                      Troponin HS 19.80 pg/mL Normal     10.10-27.10 [facility]  
   
                                        Comment on above:   Result Comment: The   
95% CI (Confidence Interval) PPV (Positive  
  
Predictive Value) for myocardial infarction in females is 38   
pg/mL, in males 51 pg/mL. The results should be used in   
conjunction with clinical conditions of myocardial infarction.  
(Access High Sensitivity Troponin I Instructions For Use,   
[employer], 2018)   
   
                                                            Performed By: #### 1  
[id number] ####  
[facility]  
[address] [place] [address]   
   
                                                    UA with Cult Rflxon 20  
25   
   
                      Bacteria Auto Ql (U) 1+ /HPF    Abnormal   Trace      Fish  
[facility]  
   
                                        Comment on above:   Performed By: #### 4  
[id number] ####  
[facility]  
[address] [place] [address]   
   
                      Bilirubin Ql (U) Negative   Normal     Negative   [facility]  
   
                                        Comment on above:   Performed By: #### 4  
[id number] ####  
[facility]  
[address] [place] [address]   
   
                      Clarity (U) Turbid     Abnormal   Clear      [facility]  
   
                                        Comment on above:   Performed By: #### 4  
[id number] ####  
[facility]  
[address] [place] [address]   
   
                      Color (U)  Brown      Abnormal   Yellow     [facility]  
   
                                        Comment on above:   Result Comment: Micr  
oscopic readings are only performed on   
those   
samples that meet specific criteria set forth by [facility].   
   
                                                            Performed By: #### 4  
[id number] ####  
[facility]  
[address] [place] [address]   
   
                                                    Epithelial   
cells.squamous Auto   
(Urine sed) [#/Area]      0-2                       Invalid   
Interpretation   
Code                                                [facility]  
   
                                        Comment on above:   Performed By: #### 4  
[id number] ####  
[facility]  
[address] [place] [address]   
   
                      Glucose Ql (U) Trace      Abnormal   Negative   [facility]  
   
                                        Comment on above:   Performed By: #### 4  
[id number] ####  
[facility]  
[address] [place] [address]   
   
                                                    Hemoglobin Auto test   
strip (U) [Mass/Vol] 3+ mg/dL        Abnormal        Negative        [facility]  
   
                                        Comment on above:   Performed By: #### 4  
[id number] ####  
[facility]  
[address] [place] [address]   
   
                                                    Ketones Auto test   
strip Ql (U)    Negative        Normal          Negative        [facility]  
   
                                        Comment on above:   Performed By: #### 4  
[id number] ####  
[facility]  
[address] [place] [address]   
   
                                                    Leukocyte esterase   
Auto test strip Ql   
(U)             500 Roxy/uL      Abnormal        Negative        [facility]  
   
                                        Comment on above:   Performed By: #### 4  
[id number] ####  
[facility]  
[address] [place] [address]   
   
                      Mucus Auto Ql (U) Trace      Normal     Negative   [facility]  
   
                                        Comment on above:   Performed By: #### 4  
[id number] ####  
[facility]  
[address] [place] [address]   
   
                                                    Nitrite Auto test   
strip Ql (U)    Negative        Normal          Negative        [facility]  
   
                                        Comment on above:   Performed By: #### 4  
[id number] ####  
[facility]  
[address] [place] [address]   
   
                                pH (U)          7.0 [pH]        Invalid   
Interpretation   
Code                      5.0-9.0                   [facility]  
   
                                        Comment on above:   Performed By: #### 4  
[id number] ####  
[facility]  
[address] [place] [address]   
   
                      Protein Ql (U) 2+ mg/dL   Abnormal   Negative   [facility]  
   
                                        Comment on above:   Performed By: #### 4  
[id number] ####  
[facility]  
[address] [place] [address]   
   
                      RBC Ql (U) >75        Abnormal   0-3        [facility]  
   
                                        Comment on above:   Performed By: #### 4  
[id number] ####  
[facility]  
[address] [place] [address]   
   
                                                    Specific gravity (U)   
[Rel density]             1.013                     Invalid   
Interpretation   
Code                      1.005-1.030               [facility]  
   
                                        Comment on above:   Performed By: #### 4  
[id number] ####  
[facility]  
[address] [place] [address]   
   
                                                    Urobilinogen (U)   
[Mass/Vol]      Negative        Normal          Negative        [facility]  
   
                                        Comment on above:   Performed By: #### 4  
[id number] ####  
[facility]  
[address] [place] [address]   
   
                                                    WBC Auto (Urine sed)   
[#/Area]        31-75           Abnormal        0-5             [facility]  
   
                                        Comment on above:   Performed By: #### 4  
[id number] ####  
[facility]  
[address] [place] [address]   
   
                                                    Type of Urine   
collection method Clean Catch     Normal                          [facility]  
   
                                        Comment on above:   Performed By: #### 4  
[id number] ####  
[facility]  
[address] [place], [place] [address]   
   
                                                    URINALYSISOrdered By: SYSTEM  
 SYSTEM on 2025   
   
                                Bacteria Auto Ql (U) 1+ /HPF         Invalid   
Interpretation   
Code                      Trace/HPF                 [facility] UA Auto   
SS  
   
                      Bilirubin Ql (U) Negative   Normal     Negativemg/dL [facility]   
UA Auto   
SS  
   
                                        Clarity (U)         Turbid  
*ABN*  
(25 5:53 PM)                        Invalid   
Interpretation   
Code                      Clear                     [facility] UA Auto   
SS  
   
                                        Color (U)           Brown 1  
*ABN*  
(25 5:53 PM)                        Invalid   
Interpretation   
Code                      Yellow                    [facility] UA Auto   
SS  
   
                                        Comment on above:   Interpretive Data: M  
icroscopic readings are only performed on   
those samples that meet specific criteria set forth by [facility].   
   
                                                    Epithelial   
cells.squamous Auto   
(Urine sed) [#/Area]      0-2 graded/HPF            Invalid   
Interpretation   
Code                                                [facility] UA Auto   
SS  
   
                                Glucose Ql (U)  Trace mg/dL     Invalid   
Interpretation   
Code                      Negativemg/dL             [facility] UA Auto   
SS  
   
                                                    Hemoglobin Auto test   
strip (U) [Mass/Vol]      3+ mg/dL                  Invalid   
Interpretation   
Code                      Negativemg/dL             [facility] UA Auto   
SS  
   
                                                    Ketones Auto test   
strip Ql (U)    Negative        Normal          Negativemg/dL   [facility] UA Auto   
SS  
   
                                                    Leukocyte esterase   
Auto test strip Ql   
(U)                       500 Roxy/uL Roxy/uL         Invalid   
Interpretation   
Code                      NegativeLeu/uL            [facility] UA Auto   
SS  
   
                          Mucus Auto Ql (U) Trace graded/LPF Normal       Negati  
vegraded/  
LPF                                     [facility] UA Auto   
SS  
   
                                                    Nitrite Auto test   
strip Ql (U)    Negative        Normal          Negativemg/dL   [facility] UA Auto   
SS  
   
                                        pH (U)              7.0  
*NA*  
(25 5:53 PM)                        Invalid   
Interpretation   
Code                      5.0 - 9.0                 [facility] UA Auto   
SS  
   
                                Protein Ql (U)  2+ mg/dL        Invalid   
Interpretation   
Code                      Negativemg/dL             [facility] UA Auto   
SS  
   
                                RBC Ql (U)      >75 graded/HPF  Invalid   
Interpretation   
Code                      0-3graded/HPF             [facility] UA Auto   
SS  
   
                                                    Specific gravity (U)   
[Rel density]                           1.013  
*NA*  
(25 5:53 PM)                        Invalid   
Interpretation   
Code                      1.005 - 1.030             [facility] UA Auto   
SS  
   
                                                    Urobilinogen (U)   
[Mass/Vol]      Negative        Normal          Negativemg/dL   [facility] UA Auto   
SS  
   
                                                    WBC Auto (Urine sed)   
[#/Area]                  31-75 graded/HPF          Invalid   
Interpretation   
Code                      0-5graded/HPF             [facility] UA Auto   
SS  
   
                                                    URINALYSISOrdered By: Taryn Mcfarland on 2025   
   
                                        UA Spec Desc        Clean Catch  
(25 5:53 PM)    Normal                                  [facility]A Auto   
SS  
   
                                                    eGFRon 2025   
   
                      eGFR       12 mL/min/1.73 m2 Low        >=59       [facility]  
   
                                        Comment on above:   Performed By: #### 1  
[id number] ####  
[facility]  
[address] [place] [address]   
   
                                                    Telephone Encounteron 2025   
   
                                                    Transcription   
Authentication   
Interface Message   
Text                                    Copy of order for CT   
faxed to [facility] at   
[phone].  
  
Patient was identified   
by name and date of   
birth. Rupali Rosenberg,   
RN, RN              Normal                                  The   
[facility]   
System  
   
                                                    Transcription   
Authentication   
Interface Message   
Text                            Normal                          The   
[facility]   
System  
   
                                                    ALL MAGNESIUMon 2025   
   
                      Magnesium [Mass/Vol] 2 mg/dL               1.9 - 2.7 mg/dL  
 [facility]  
   
                                                                  CLINISYNC  
   
                                                                  [facility]  
   
                                                    CBC WITH DIFFERENTIALon    
   
                                                    Basophils (Bld)   
[#/Vol]         0.05 10*3/uL    Normal          0.00-0.20       The   
[facility]   
System  
   
                                        Comment on above:   Performed By: #### C  
BCDSAT ####[facility] PATHOLOGY [id number] [place] [address],    
   
                                                    Basophils/100 WBC   
(Bld)           0.4 %           Normal          <=1.9           The   
[facility]   
System  
   
                                        Comment on above:   Performed By: #### C  
BCDSAT ####[facility] PATHOLOGY [id number] [place] [address],    
   
                                                    Eosinophils (Bld)   
[#/Vol]         0.15 10*3/uL    Normal          0.00-0.70       The   
[facility]   
System  
   
                                        Comment on above:   Performed By: #### C  
BCDSAT ####[facility] PATHOLOGY [id number] [place] [address],    
   
                                                    Eosinophils/100 WBC   
(Bld)           1.2 %           Normal          0.1-4.0         The   
[facility]   
System  
   
                                        Comment on above:   Performed By: #### C  
BCDSAT ####[facility] PATHOLOGY [id number]   
[place], [place],    
   
                                                    Erythrocyte   
distribution width   
(RBC) [Ratio]   17.3 %          High            11.5-14.5       The   
[facility]   
System  
   
                                        Comment on above:   Performed By: #### C  
BCDSAT ####[facility] PATHOLOGY [id number]   
[place], [place],    
   
                                                    Hematocrit (Bld)   
[Volume fraction] 23.2 %          Low             36.0-46.0       The   
[facility]   
System  
   
                                        Comment on above:   Performed By: #### C  
BCDSAT ####[facility] PATHOLOGY [id number]   
[place], [place],    
   
                                                    Hemoglobin (Bld)   
[Mass/Vol]      7.1 g/dL        Low             12.0-15.0       The   
[facility]   
System  
   
                                        Comment on above:   Performed By: #### C  
BCDSAT ####[facility] PATHOLOGY [id number]   
[place], [place],    
   
                                                    Lymphocytes (Bld)   
[#/Vol]         1.88 10*3/uL    Normal          1.00-4.80       The   
[facility]   
System  
   
                                        Comment on above:   Performed By: #### C  
BCDSAT ####[facility] PATHOLOGY [id number]   
[place], [place],    
   
                                                    Lymphocytes/100 WBC   
(Bld)           14.8 %          Low             24.0-44.0       The   
[facility]   
System  
   
                                        Comment on above:   Performed By: #### C  
BCDSAT ####[facility] PATHOLOGY [id number]   
[place], [place],    
   
                                                    MCH (RBC) [Entitic   
mass]           26.0 pg         Normal          26.0-34.0       The   
[facility]   
System  
   
                                        Comment on above:   Performed By: #### C  
BCDSAT ####[facility] PATHOLOGY [id number] [place] [address],    
   
                      MCHC (RBC) [Mass/Vol] 30.5 g/dL  Low        32.0-35.9  The  
   
[facility]   
System  
   
                                        Comment on above:   Performed By: #### C  
BCDSAT ####[facility] PATHOLOGY [id number] [place] [address],    
   
                                                    MCV (RBC) [Entitic   
vol]            85 fL           Normal                    The   
[facility]   
System  
   
                                        Comment on above:   Performed By: #### C  
BCDSAT ####[facility] PATHOLOGY [id number]   
[place], [place],    
   
                                                    Monocytes (Bld)   
[#/Vol]         0.99 10*3/uL    Normal          0.20-1.00       The   
[facility]   
System  
   
                                        Comment on above:   Performed By: #### C  
BCDSAT ####[facility] PATHOLOGY [id number]   
[place], [place],    
   
                                                    Monocytes/100 WBC   
(Bld)           7.8 %           Normal          2.0-11.0        The   
[facility]   
System  
   
                                        Comment on above:   Performed By: #### C  
BCDSAT ####[facility] PATHOLOGY [id number]   
[place], [place],    
   
                                                    Neutrophils (Bld)   
[#/Vol]         9.61 10*3/uL    High            1.50-8.00       The   
[facility]   
[facility]  
   
                                        Comment on above:   Performed By: #### RADHA GOODEAT ####S PATHOLOGY [id number]   
[place], [place],    
   
                                                    Neutrophils/100 WBC   
(Bld)           75.8 %          Normal          31.0-76.0       The   
[facility]   
[facility]  
   
                                        Comment on above:   Performed By: #### RADHA GOODEAT ####S PATHOLOGY [id number]   
[place], [place],    
   
                                                    Platelet mean volume   
(Bld) [Entitic vol] 8.2 fL          Normal          7.5-11.2        The   
[facility]   
[facility]  
   
                                        Comment on above:   Performed By: #### RADHA GOODEAT ####S PATHOLOGY [id number]   
[place],    
   
                                                    Platelets (Bld)   
[#/Vol]         303 10*3/uL     Normal          150-400         The   
[facility]   
[facility]  
   
                                        Comment on above:   Performed By: #### RADHA GOODEAT ####S PATHOLOGY [id number]   
[place],    
   
                      RBC (Bld) [#/Vol] 2.72 10*6/uL Low        4.00-5.20  The   
[facility]   
[facility]  
   
                                        Comment on above:   Performed By: #### RADHA GOODEAT ####S PATHOLOGY [id number]   
[place],    
   
                      WBC (Bld) [#/Vol] 12.7 10*3/uL High       4.5-11.5   The   
[facility]   
[facility]  
   
                                        Comment on above:   Performed By: #### RADHA GOODEAT ####S PATHOLOGY [id number]   
[place],    
   
                                                    Discharge Planning Noteon    
   
                                                    Transcription   
Authentication   
Interface Message   
Text                            Normal                          The   
[facility]   
System  
   
                                                    GLUCOSE, FINGERSTICK-IN OFFI  
CEon 2025   
   
                      Glucose [Mass/Vol] 191 mg/dL  High            The   
[facility]   
[facility]  
   
                                        Comment on above:   Performed By: #### 8  
2948 ####NURSING GLUCOSE [id number]   
[place],    
   
                      Glucose [Mass/Vol] 129 mg/dL  High            The   
[facility]   
System  
   
                                        Comment on above:   Performed By: #### 8  
2948 ####NURSING GLUCOSE [id number]   
[place], [address]   
   
                                                    MAGNESIUMon 2025   
   
                      Magnesium [Mass/Vol] 2.0 mg/dL  Normal     1.9-2.7    The   
[facility]   
System  
   
                                        Comment on above:   Performed By: #### M  
G ####[facility] PATHOLOGY [id number]   
[place], [place],    
   
                                                    Progress Noteson 2025   
   
                                                    Transcription   
Authentication   
Interface Message   
Text                                    Patient discharged to   
[facility] via   
[facility]. Two Iv's  
removed. PICC line   
left in place for   
antibiotic use.   
Patient took all   
belongings  
with her.           Normal                                  The   
[facility]   
System  
   
                                                    Transcription   
Authentication   
Interface Message   
Text                                    Pt admitted to   
hospital            Normal                                  The   
[facility]   
System  
   
                                                    Assessment AND Plan Noteon 0  
2025   
   
                                                    Transcription   
Authentication   
Interface Message   
Text                            Normal                          The   
[facility]   
System  
   
                                                    Transcription   
Authentication   
Interface Message   
Text                                    -Home medications   
include duloxetine 60   
mg and amitriptyline   
50 mg at bedtime  
PLAN:  
- Continue home meds Normal                                  The   
[facility]   
System  
   
                                                    Transcription   
Authentication   
Interface Message   
Text                                    Unclear reason for her   
inability to ambulate,   
possibly   
deconditioning vs  
critical   
illness/sepsis   
myopathy vs pain   
limitation over a poor   
baseline  
mobility  
Plan:  
Venous duplex US given   
calf   
tenderness/concern for   
DVT  
Check CK  
PT/OT               Normal                                  The   
[facility]   
System  
   
                                                    Transcription   
Authentication   
Interface Message   
Text                            Normal                          The   
[facility]   
System  
   
                                                    Transcription   
Authentication   
Interface Message   
Text                                    - recently switched   
from sotalol 120 mg   
BID to amiodarone 400   
mg daily per  
cardiology due to   
worsening renal   
function  
PLAN:  
- Amiodarone 4000 mg   
daily  
- Hold home eliquis   
due to possible   
perirenal hematoma  Normal                                  The   
[facility]   
System  
   
                                                    Transcription   
Authentication   
Interface Message   
Text                                    -Home medications   
include canagliflozin   
100 mg daily,   
Trulicity weekly,  
metformin 500 mg BID  
PLAN:  
- Sliding scale   
insulin  
- qAC / at bedtime   
POCT with hypoglycemia   
protocol  
- Discontinue   
canagliflozin due to   
risk UTI with PMH T2DM Normal                                  The   
[facility]   
System  
   
                                                    BASIC METABOLIC PANELon    
   
                      Anion gap [Moles/Vol] 13 mmol/L  Normal     10-20      The  
   
[facility]   
System  
   
                                        Comment on above:   Performed By: #### C  
H8, MG ####[facility] PATHOLOGY [id number]   
[place], [place],    
   
                      Calcium [Mass/Vol] 8.5 mg/dL  Low        8.6-10.3   The   
[facility]   
[facility]  
   
                                        Comment on above:   Performed By: #### C  
H8, MG ####S PATHOLOGY [id number]   
[place],    
   
                      Chloride [Moles/Vol] 106 mmol/L Normal          The   
[facility]   
[facility]  
   
                                        Comment on above:   Performed By: #### C  
H8, MG ####S PATHOLOGY [id number]   
[place],    
   
                      CO2 [Moles/Vol] 26 mmol/L  Normal     21-31      The   
[facility]   
[facility]  
   
                                        Comment on above:   Performed By: #### C  
H8, MG ####S PATHOLOGY [id number]   
[place],    
   
                      Creatinine [Mass/Vol] 3.17 mg/dL High       0.60-1.20  The  
   
[facility]   
[facility]  
   
                                        Comment on above:   Performed By: #### C  
H8, MG ####S PATHOLOGY [id number]   
[place],    
   
                                                    ESTIMATED GFR   
(CKD-EPI)       17 mL/min/1.73sqm Low             >=60            The   
[facility]   
[facility]  
   
                                        Comment on above:   Result Comment:   
 CKD EPI Equation using Creatinine without  
   
RaceComment: Estimated glomerular filtration rate (eGFR) is   
calculated without a race coefficient. Values should be   
interpreted in the context of the patient's full clinical   
presentation.Reference:1. Alfredo C, Delmi M, Valdez FLYNN, et al..   
A Unifying Approach for GFR Estimation: Recommendations of the   
NKF-ASN Task Force on Reassessing the Inclusion of Race in   
Diagnosing Kidney Disease. American Journal of Kidney Diseases   
;79(2):268-88.e1.2. N Engl J Med 1 Vol. 385 Issue 19   
Pages 3254-6357   
   
                                                            Performed By: #### C  
H8, MG ####[facility] PATHOLOGY [id number]   
[place],    
   
                      Glucose [Mass/Vol] 133 mg/dL  High            The   
[facility]   
[facility]  
   
                                        Comment on above:   Performed By: #### C  
H8, MG ####S PATHOLOGY [id number]   
[place],    
   
                      Potassium [Moles/Vol] 4.2 mmol/L Normal     3.5-5.0    The  
   
[facility]   
[facility]  
   
                                        Comment on above:   Performed By: #### RADHA ROBLES8, MG ####[facility] PATHOLOGY [id number] [place] [address],    
   
                      Sodium [Moles/Vol] 141 mmol/L Normal     136-145    The   
[facility]   
[facility]  
   
                                        Comment on above:   Performed By: #### RADHA ROBLES8, MG ####[facility] PATHOLOGY [id number] [place] [address],    
   
                                                    Urea nitrogen   
[Mass/Vol]      28 mg/dL        High            7-25            The   
[facility]  
   
                                        Comment on above:   Performed By: #### RADHA PAGE, MG ####[facility] PATHOLOGY [id number] [place] [address],    
   
                                                    CBC WITH DIFFERENTIALon    
   
                                                    Basophils (Bld)   
[#/Vol]         0.11 10*3/uL    Normal          0.00-0.20       The   
[facility]   
System  
   
                                        Comment on above:   Performed By: #### RADHA  
BCDSAT ####[facility] PATHOLOGY [id number] [place] [address],    
   
                                                    Basophils/100 WBC   
(Bld)           0.8 %           Normal          <=1.9           The   
[facility]   
[facility]  
   
                                        Comment on above:   Performed By: #### RADHA  
BCDSAT ####[facility] PATHOLOGY [id number] [place] [address],    
   
                                                    Eosinophils (Bld)   
[#/Vol]         0.19 10*3/uL    Normal          0.00-0.70       The   
[facility]   
[facility]  
   
                                        Comment on above:   Performed By: #### RADHA  
BCDSAT ####[facility] PATHOLOGY [id number] [place] [address],    
   
                                                    Eosinophils/100 WBC   
(Bld)           1.4 %           Normal          0.1-4.0         The   
[facility]   
[facility]  
   
                                        Comment on above:   Performed By: #### RADHA  
BCDSAT ####[facility] PATHOLOGY [id number] [place] [address],    
   
                                                    Erythrocyte   
distribution width   
(RBC) [Ratio]   17.2 %          High            11.5-14.5       The   
[facility]   
[facility]  
   
                                        Comment on above:   Performed By: #### C  
BCDSAT ####[facility] PATHOLOGY [id number]   
[place],    
   
                                                    Hematocrit (Bld)   
[Volume fraction] 23.2 %          Low             36.0-46.0       The   
[facility]   
System  
   
                                        Comment on above:   Performed By: #### C  
BCDSAT ####[facility] PATHOLOGY [id number]   
[place],    
   
                                                    Hemoglobin (Bld)   
[Mass/Vol]      7.1 g/dL        Low             12.0-15.0       The   
[facility]   
System  
   
                                        Comment on above:   Performed By: #### C  
BCDSAT ####[facility] PATHOLOGY [id number]   
[place],    
   
                                                    Lymphocytes (Bld)   
[#/Vol]         1.49 10*3/uL    Normal          1.00-4.80       The   
[facility]   
System  
   
                                        Comment on above:   Performed By: #### C  
BCDSAT ####[facility] PATHOLOGY [id number] [place] [address],    
   
                                                    Lymphocytes/100 WBC   
(Bld)           11.3 %          Low             24.0-44.0       The   
[facility]   
System  
   
                                        Comment on above:   Performed By: #### C  
BCDSAT ####[facility] PATHOLOGY [id number]   
[place],    
   
                                                    MCH (RBC) [Entitic   
mass]           26.2 pg         Normal          26.0-34.0       The   
[facility]   
System  
   
                                        Comment on above:   Performed By: #### C  
BCDSAT ####[facility] PATHOLOGY [id number]   
[place],    
   
                      MCHC (RBC) [Mass/Vol] 30.6 g/dL  Low        32.0-35.9  The  
   
[facility]   
System  
   
                                        Comment on above:   Performed By: #### C  
BCDSAT ####[facility] PATHOLOGY [id number]   
[place],    
   
                                                    MCV (RBC) [Entitic   
vol]            86 fL           Normal                    The   
[facility]   
System  
   
                                        Comment on above:   Performed By: #### C  
BCDSAT ####[facility] PATHOLOGY [id number] [place] [address],    
   
                                                    Monocytes (Bld)   
[#/Vol]         0.89 10*3/uL    Normal          0.20-1.00       The   
[facility]   
[facility]  
   
                                        Comment on above:   Performed By: #### C  
BCDSAT ####[facility] PATHOLOGY [id number]   
[place],    
   
                                                    Monocytes/100 WBC   
(Bld)           6.7 %           Normal          2.0-11.0        The   
[facility]   
[facility]  
   
                                        Comment on above:   Performed By: #### C  
BCDSAT ####[facility] PATHOLOGY [id number]   
[place],    
   
                                                    Neutrophils (Bld)   
[#/Vol]         10.53 10*3/uL   High            1.50-8.00       The   
[facility]   
[facility]  
   
                                        Comment on above:   Performed By: #### C  
BCDSAT ####[facility] PATHOLOGY [id number] [place] [address],    
   
                                                    Neutrophils/100 WBC   
(Bld)           79.8 %          High            31.0-76.0       The   
[facility]   
[facility]  
   
                                        Comment on above:   Performed By: #### C  
BCDSAT ####[facility] PATHOLOGY [id number] [place] [address],    
   
                                                    Platelet mean volume   
(Bld) [Entitic vol] 7.9 fL          Normal          7.5-11.2        The   
[facility]   
[facility]  
   
                                        Comment on above:   Performed By: #### C  
BCDSAT ####[facility] PATHOLOGY [id number] [place] [address],    
   
                                                    Platelets (Bld)   
[#/Vol]         282 10*3/uL     Normal          150-400         The   
[facility]   
[facility]  
   
                                        Comment on above:   Performed By: #### C  
BCDSAT ####[facility] PATHOLOGY [id number] [place] [address],    
   
                      RBC (Bld) [#/Vol] 2.71 10*6/uL Low        4.00-5.20  The   
[facility]   
[facility]  
   
                                        Comment on above:   Performed By: #### C  
BCDSAT ####[facility] PATHOLOGY [id number] [place] [address],    
   
                      WBC (Bld) [#/Vol] 13.2 10*3/uL High       4.5-11.5   The   
[facility]  
   
                                        Comment on above:   Performed By: #### C  
BCDSAT ####[facility] PATHOLOGY [id number]   
[place],    
   
                                                    GLUCOSE, FINGERSTICK-IN OFFI  
CEon 2025   
   
                      Glucose [Mass/Vol] 148 mg/dL  High            The   
[facility]  
   
                                        Comment on above:   Performed By: #### 8  
2948 ####NURSING GLUCOSE [id number]   
[place], [address]   
   
                      Glucose [Mass/Vol] 122 mg/dL  High            The   
[facility]  
   
                                        Comment on above:   Performed By: #### 8  
2948 ####NURSING GLUCOSE [id number]   
[place], [address]   
   
                      Glucose [Mass/Vol] 147 mg/dL  High            The   
[facility]  
   
                                        Comment on above:   Performed By: #### 8  
2948 ####NURSING GLUCOSE [id number]   
[place], [address]   
   
                      Glucose [Mass/Vol] 163 mg/dL  High            The   
[facility]  
   
                                        Comment on above:   Performed By: #### 8  
2948 ####NURSING GLUCOSE [id number] [place] [address], [address]   
   
                                                    MAGNESIUMon 2025   
   
                      Magnesium [Mass/Vol] 2.1 mg/dL  Normal     1.9-2.7    The   
[facility]  
   
                                        Comment on above:   Performed By: #### C  
H8, MG ####[facility] PATHOLOGY [id number]   
[place], [place],    
   
                                                    METRO CBC WITH DIFFERENTIALo  
n 2025   
   
                                                    Basophils (Bld)   
[#/Vol]             0.11 10*3/uL                            0.00 - 0.20   
K/uL                                    [facility]  
   
                                                    Basophils/100 WBC   
(Bld)           0.8 %                           NINF - 1.9 %    [facility]  
   
                                                    Eosinophils (Bld)   
[#/Vol]             0.19 10*3/uL                            0.00 - 0.70   
K/uL                                    [facility]  
   
                                                    Eosinophils/100 WBC   
(Bld)           1.4 %                           0.1 - 4.0 %     [facility]  
   
                                                    Erythrocyte   
distribution width   
(RBC) [Ratio]   17.2 %          High            11.5 - 14.5 %   [facility]  
   
                                                    Hematocrit (Bld)   
[Volume fraction] 23.2 %          Low             36.0 - 46.0 %   [facility]  
   
                                                    Hemoglobin (Bld)   
[Mass/Vol]          7.1 g/dL            Low                 12.0 - 15.0   
g/dL                                    [facility]  
   
                                                    Interpretation and   
review of laboratory   
results         Abnormal                                        [facility] MEAN PLT VOL 7.9 fL                7.5 - 11.2 fL [facility] PLATELET 282 K/uL              150 - 400 K/uL [facility] RBC COUNT 2.71       Low                   [facility]  
   
                                                    Lymphocytes (Bld)   
[#/Vol]             1.49 10*3/uL                            1.00 - 4.80   
K/uL                                    [facility]  
   
                                                    Lymphocytes/100 WBC   
(Bld)           11.3 %          Low             24.0 - 44.0 %   [facility]  
   
                                                    MCH (RBC) [Entitic   
mass]           26.2 pg                         26.0 - 34.0 pg  [facility]  
   
                          MCHC (RBC) [Mass/Vol] 30.6 g/dL    Low          32.0 -  
 35.9   
g/dL                                    [facility]  
   
                                                    MCV (RBC) [Entitic   
vol]            86 fL                           80 - 100 fL     [facility] TOTAL VOLUME -   
REF                 0.89 K/uL                               0.20 - 1.00   
K/uL                                    [facility]  
   
                                                    Monocytes/100 WBC   
(Bld)           6.7 %                           2.0 - 11.0 %    [facility]  
   
                                                    Neutrophils (Bld)   
[#/Vol]             10.53 10*3/uL       High                1.50 - 8.00   
K/uL                                    [facility]  
   
                                                    Neutrophils/100 WBC   
(Bld)           79.8 %          High            31.0 - 76.0 %   [facility]  
   
                      WBC (Bld) [#/Vol] 13.2 10*3/uL High       4.5 - 11.5 K/uL   
[facility]  
   
                                                                  CLINISYNC  
   
                                                                  [facility]  
   
                                                    Progress Noteson 2025   
   
                                                    Transcription   
Authentication   
Interface Message   
Text                            Normal                          The   
[facility]   
System  
   
                                                    Assessment AND Plan Noteon 0  
2025   
   
                                                    Transcription   
Authentication   
Interface Message   
Text                                    - recently switched   
from sotalol 120 mg   
BID to amiodarone 400   
mg daily per  
cardiology due to   
worsening renal   
function  
PLAN:  
- Amiodarone 4000 mg   
daily  
- Hold home eliquis   
due to possible   
perirenal hematoma  Normal                                  The   
[facility]   
[facility]  
   
                                                    Transcription   
Authentication   
Interface Message   
Text                            Normal                          The   
[facility]   
System  
   
                                                    Transcription   
Authentication   
Interface Message   
Text                                    -Home medications   
include duloxetine 60   
mg and amitriptyline   
50 mg at bedtime  
PLAN:  
- Continue home meds Normal                                  The   
[facility]   
System  
   
                                                    Transcription   
Authentication   
Interface Message   
Text                                    -Home medications   
include canagliflozin   
100 mg daily,   
Trulicity weekly,  
metformin 500 mg BID  
PLAN:  
- Sliding scale   
insulin  
- qAC / at bedtime   
POCT with hypoglycemia   
protocol  
- Discontinue   
canagliflozin due to   
risk UTI with PMH T2DM Normal                                  The   
[facility]   
System  
   
                                                    Transcription   
Authentication   
Interface Message   
Text                                    Unclear reason for her   
inability to ambulate,   
possibly   
deconditioning vs  
critical   
illness/sepsis   
myopathy vs pain   
limitation over a poor   
baseline  
mobility  
Plan:  
Venous duplex US given   
calf   
tenderness/concern for   
DVT  
Check CK  
PT/OT               Normal                                  The   
[facility]   
System  
   
                                                    COMPLETE BLOOD COUNTon    
   
                                                    Erythrocyte   
distribution width   
(RBC) [Ratio]   16.9 %          High            11.5-14.5       The   
[facility]   
[facility]  
   
                                        Comment on above:   Performed By: #### C  
BC ####[facility] PATHOLOGY [id number]   
[place], [place],    
   
                                                    Hematocrit (Bld)   
[Volume fraction] 22.7 %          Low             36.0-46.0       The   
[facility]   
System  
   
                                        Comment on above:   Performed By: #### C  
BC ####[facility] PATHOLOGY [id number] [place] [address],    
   
                                                    Hemoglobin (Bld)   
[Mass/Vol]      7.1 g/dL        Low             12.0-15.0       The   
[facility]   
System  
   
                                        Comment on above:   Performed By: #### C  
BC ####[facility] PATHOLOGY [id number]   
[place], [place],    
   
                                                    MCH (RBC) [Entitic   
mass]           26.8 pg         Normal          26.0-34.0       The   
[facility]   
System  
   
                                        Comment on above:   Performed By: #### C  
BC ####[facility] PATHOLOGY [id number]   
[place], [place],    
   
                      MCHC (RBC) [Mass/Vol] 31.4 g/dL  Low        32.0-35.9  The  
   
[facility]   
System  
   
                                        Comment on above:   Performed By: #### C  
BC ####[facility] PATHOLOGY [id number]   
[place],    
   
                                                    MCV (RBC) [Entitic   
vol]            86 fL           Normal                    The   
[facility]   
System  
   
                                        Comment on above:   Performed By: #### C  
BC ####S PATHOLOGY [id number]   
[place],    
   
                                                    Platelet mean volume   
(Bld) [Entitic vol] 8.3 fL          Normal          7.5-11.2        The   
[facility]   
[facility]  
   
                                        Comment on above:   Performed By: #### C  
BC ####[facility] PATHOLOGY [id number]   
[place],    
   
                                                    Platelets (Bld)   
[#/Vol]         275 10*3/uL     Normal          150-400         The   
[facility]   
System  
   
                                        Comment on above:   Performed By: #### C  
BC ####[facility] PATHOLOGY [id number]   
[place],    
   
                      RBC (Bld) [#/Vol] 2.66 10*6/uL Low        4.00-5.20  The   
[facility]   
[facility]  
   
                                        Comment on above:   Performed By: #### C  
BC ####[facility] PATHOLOGY [id number]   
[place],    
   
                      WBC (Bld) [#/Vol] 13.1 10*3/uL High       4.5-11.5   The   
[facility]   
[facility]  
   
                                        Comment on above:   Performed By: #### C  
BC ####[facility] PATHOLOGY [id number]   
[place],    
   
                                                    CT CHEST W/O CONTRASTon    
   
                      CT CHEST W/O CONTRAST            Normal                The  
   
[facility]   
System  
   
                                                    GLUCOSE, FINGERSTICK-IN OFFI  
CEon 2025   
   
                      Glucose [Mass/Vol] 173 mg/dL  High            The   
[facility]   
[facility]  
   
                                        Comment on above:   Performed By: #### 8  
2948 ####NURSING GLUCOSE [id number]   
[place], [address]   
   
                      Glucose [Mass/Vol] 168 mg/dL  High            The   
[facility]   
[facility]  
   
                                        Comment on above:   Performed By: #### 8  
2948 ####NURSING GLUCOSE [id number]   
[place], [address]   
   
                      Glucose [Mass/Vol] 170 mg/dL  High            The   
[facility]   
System  
   
                                        Comment on above:   Performed By: #### 8  
2948 ####NURSING GLUCOSE [id number]   
[place], [address]   
   
                      Glucose [Mass/Vol] 164 mg/dL  High            The   
[facility]   
[facility]  
   
                                        Comment on above:   Performed By: #### 8  
2948 ####NURSING GLUCOSE [id number]   
[place], [address]   
   
                                                    MAGNESIUMon 2025   
   
                      Magnesium [Mass/Vol] 2.1 mg/dL  Normal     1.9-2.7    The   
[facility]   
System  
   
                                        Comment on above:   Performed By: #### M  
G ####[facility] PATHOLOGY [id number]   
[place], [place],    
   
                                                    Progress Noteson 2025   
   
                                                    Transcription   
Authentication   
Interface Message   
Text                            Normal                          The   
[facility]   
[facility]  
   
                                                    XA TUNL CENTRAL LINE PLMT AD  
ULT (TANJA)on 2025   
   
                                                    XA TUNL CENTRAL LINE   
PLMT ADULT (TANJA)                 Normal                          The   
[facility]   
System  
   
                                                    ALL MAGNESIUMon 2025   
   
                                                    Interpretation and   
review of laboratory   
results         Abnormal                                        [facility]  
   
                      Magnesium [Mass/Vol] 1.6 mg/dL  Low        1.9 - 2.7 mg/dL  
 [facility]  
   
                                                                  CLINISYNC  
   
                                                                  [facility]   
Healthcare  
   
                                                    Assessment AND Plan Noteon 0  
2025   
   
                                                    Transcription   
Authentication   
Interface Message   
Text                                    - recently switched   
from sotalol 120 mg   
BID to amiodarone 400   
mg daily per  
cardiology due to   
worsening renal   
function  
PLAN:  
- Amiodarone 4000 mg   
daily  
- Hold home eliquis   
due to possible   
perirenal hematoma  Normal                                  The   
[facility]   
System  
   
                                                    Transcription   
Authentication   
Interface Message   
Text                            Normal                          The   
[facility]   
System  
   
                                                    Transcription   
Authentication   
Interface Message   
Text                                    -Home medications   
include duloxetine 60   
mg and amitriptyline   
50 mg at bedtime  
PLAN:  
- Continue home meds Normal                                  The   
[facility]   
System  
   
                                                    Transcription   
Authentication   
Interface Message   
Text                                    Unclear reason for her   
inability to ambulate,   
possibly   
deconditioning vs  
critical   
illness/sepsis   
myopathy vs pain   
limitation over a poor   
baseline  
mobility  
Plan:  
Venous duplex US given   
calf   
tenderness/concern for   
DVT  
Check CK  
PT/OT               Normal                                  The   
[facility]   
System  
   
                                                    Transcription   
Authentication   
Interface Message   
Text                                    -Home medications   
include canagliflozin   
100 mg daily,   
Trulicity weekly,  
metformin 500 mg BID  
PLAN:  
- Sliding scale   
insulin  
- qAC / at bedtime   
POCT with hypoglycemia   
protocol  
- Discontinue   
canagliflozin due to   
risk UTI with PMH T2DM Normal                                  The   
[facility]   
System  
   
                                                    CBC WITH DIFFERENTIALon    
   
                                                    Basophils (Bld)   
[#/Vol]         0.10 10*3/uL    Normal          0.00-0.20       The   
[facility]   
System  
   
                                        Comment on above:   Performed By: #### C  
BCDSAT ####[facility] PATHOLOGY [id number]   
[place],    
   
                                                    Basophils/100 WBC   
(Bld)           0.6 %           Normal          <=1.9           The   
[facility]   
[facility]  
   
                                        Comment on above:   Performed By: #### C  
BCDSAT ####[facility] PATHOLOGY [id number]   
[place],    
   
                                                    Eosinophils (Bld)   
[#/Vol]         0.17 10*3/uL    Normal          0.00-0.70       The   
[facility]   
[facility]  
   
                                        Comment on above:   Performed By: #### C  
BCDSAT ####[facility] PATHOLOGY [id number] [place] [address],    
   
                                                    Eosinophils/100 WBC   
(Bld)           1.1 %           Normal          0.1-4.0         The   
[facility]   
[facility]  
   
                                        Comment on above:   Performed By: #### C  
BCDSAT ####[facility] PATHOLOGY [id number] [place] [address],    
   
                                                    Erythrocyte   
distribution width   
(RBC) [Ratio]   16.9 %          High            11.5-14.5       The   
[facility]   
[facility]  
   
                                        Comment on above:   Performed By: #### C  
BCDSAT ####[facility] PATHOLOGY [id number] [place] [address],    
   
                                                    Hematocrit (Bld)   
[Volume fraction] 25.8 %          Low             36.0-46.0       The   
[facility]   
[facility]  
   
                                        Comment on above:   Performed By: #### C  
BCDSAT ####[facility] PATHOLOGY [id number] [place] [address],    
   
                                                    Hemoglobin (Bld)   
[Mass/Vol]      8.0 g/dL        Low             12.0-15.0       The   
[facility]   
[facility]  
   
                                        Comment on above:   Performed By: #### C  
BCDSAT ####[facility] PATHOLOGY [id number] [place] [address],    
   
                                                    Lymphocytes (Bld)   
[#/Vol]         1.62 10*3/uL    Normal          1.00-4.80       The   
[facility]   
[facility]  
   
                                        Comment on above:   Performed By: #### C  
BCDSAT ####[facility] PATHOLOGY [id number] [place] [address],    
   
                                                    Lymphocytes/100 WBC   
(Bld)           10.1 %          Low             24.0-44.0       The   
[facility]   
[facility]  
   
                                        Comment on above:   Performed By: #### C  
BCDSAT ####[facility] PATHOLOGY [id number]   
[place],    
   
                                                    MCH (RBC) [Entitic   
mass]           25.9 pg         Low             26.0-34.0       The   
[facility]  
   
                                        Comment on above:   Performed By: #### C  
BCDSAT ####[facility] PATHOLOGY [id number]   
[place],    
   
                      MCHC (RBC) [Mass/Vol] 31.1 g/dL  Low        32.0-35.9  The  
   
[facility]   
[facility]  
   
                                        Comment on above:   Performed By: #### C  
BCDSAT ####[facility] PATHOLOGY [id number]   
[place],    
   
                                                    MCV (RBC) [Entitic   
vol]            83 fL           Normal                    The   
[facility]   
[facility]  
   
                                        Comment on above:   Performed By: #### C  
BCDSAT ####[facility] PATHOLOGY [id number]   
[place],    
   
                                                    Monocytes (Bld)   
[#/Vol]         1.29 10*3/uL    High            0.20-1.00       The   
[facility]   
[facility]  
   
                                        Comment on above:   Performed By: #### C  
BCDSAT ####[facility] PATHOLOGY [id number]   
[place],    
   
                                                    Monocytes/100 WBC   
(Bld)           8.1 %           Normal          2.0-11.0        The   
[facility]   
[facility]  
   
                                        Comment on above:   Performed By: #### C  
BCDSAT ####[facility] PATHOLOGY [id number]   
[place],    
   
                                                    Neutrophils (Bld)   
[#/Vol]         12.78 10*3/uL   High            1.50-8.00       The   
[facility]   
[facility]  
   
                                        Comment on above:   Performed By: #### C  
BCDSAT ####[facility] PATHOLOGY [id number]   
[place],    
   
                                                    Neutrophils/100 WBC   
(Bld)           80.1 %          High            31.0-76.0       The   
[facility]   
[facility]  
   
                                        Comment on above:   Performed By: #### C  
BCDSAT ####[facility] PATHOLOGY [id number]   
[place],    
   
                                                    Platelet mean volume   
(Bld) [Entitic vol] 7.9 fL          Normal          7.5-11.2        The   
[facility]   
System  
   
                                        Comment on above:   Performed By: #### C  
BCPOLOAT ####S PATHOLOGY [id number]   
[place],    
   
                                                    Platelets (Bld)   
[#/Vol]         285 10*3/uL     Normal          150-400         The   
[facility]   
System  
   
                                        Comment on above:   Performed By: #### C  
RUSSELAT ####S PATHOLOGY [id number]   
[place],    
   
                      RBC (Bld) [#/Vol] 3.09 10*6/uL Low        4.00-5.20  The   
[facility]   
System  
   
                                        Comment on above:   Performed By: #### C  
BCPOLOAT ####S PATHOLOGY [id number]   
[place],    
   
                      WBC (Bld) [#/Vol] 16.0 10*3/uL High       4.5-11.5   The   
[facility]  
   
                                        Comment on above:   Performed By: #### RADHA GOODEAT ####[facility] PATHOLOGY [id number]   
[place],    
   
                                                    Consultson 2025   
   
                                                    Transcription   
Authentication   
Interface Message   
Text                            Normal                          The   
[facility]   
System  
   
                                                    Transcription   
Authentication   
Interface Message   
Text                            Normal                          The   
[facility]   
System  
   
                                                    GLUCOSE, FINGERSTICK-IN OFFI  
CEon 2025   
   
                      Glucose [Mass/Vol] 161 mg/dL  High            The   
[facility]  
   
                                        Comment on above:   Performed By: #### 8  
2948 ####NURSING GLUCOSE [id number]   
[place],    
   
                      Glucose [Mass/Vol] 172 mg/dL  High            The   
[facility]   
System  
   
                                        Comment on above:   Performed By: #### 8  
2948 ####NURSING GLUCOSE [id number]   
[place], [address]   
   
                      Glucose [Mass/Vol] 203 mg/dL  High            The   
[facility]   
System  
   
                                        Comment on above:   Performed By: #### 8  
2948 ####NURSING GLUCOSE [id number]   
[place], [address]   
   
                      Glucose [Mass/Vol] 146 mg/dL  High            The   
[facility]   
System  
   
                                        Comment on above:   Performed By: #### 8  
2948 ####NURSING GLUCOSE [id number]   
[place], [address]   
   
                                                    H AND [employer] 2025   
   
                                                    Transcription   
Authentication   
Interface Message   
Text                            Normal                          The   
[facility]   
[facility]  
   
                                                    ID Antibiotic Orderson    
   
                                                    Transcription   
Authentication   
Interface Message   
Text                            Normal                          The   
[facility]   
System  
   
                                                    MAGNESIUMon 2025   
   
                      Magnesium [Mass/Vol] 1.6 mg/dL  Low        1.9-2.7    The   
[facility]   
System  
   
                                        Comment on above:   Performed By: #### M  
G ####[facility] PATHOLOGY [id number]   
[place],    
   
                                                    Post-Procedure Noteon 2025   
   
                                                    Transcription   
Authentication   
Interface Message   
Text                            Normal                          The   
[facility]   
System  
   
                                                    Progress Noteson 2025   
   
                                                    Transcription   
Authentication   
Interface Message   
Text                            Normal                          The   
[facility]   
System  
   
                                                    Transcription   
Authentication   
Interface Message   
Text                            Normal                          The   
[facility]   
[facility]  
   
                                                    Transcription   
Authentication   
Interface Message   
Text                            Normal                          The   
[facility]   
System  
   
                                                    Assessment AND Plan Noteon 0  
2025   
   
                                                    Transcription   
Authentication   
Interface Message   
Text                                    -Home medications   
include duloxetine 60   
mg and amitriptyline   
50 mg at bedtime  
PLAN:  
- Continue home meds Normal                                  The   
[facility]   
[facility]  
   
                                                    Transcription   
Authentication   
Interface Message   
Text                                    - recently switched   
from sotalol 120 mg   
BID to amiodarone 400   
mg daily per  
cardiology due to   
worsening renal   
function  
PLAN:  
- Amiodarone 4000 mg   
daily  
- Hold home eliquis   
due to possible   
perirenal hematoma  Normal                                  The   
[facility]   
System  
   
                                                    Transcription   
Authentication   
Interface Message   
Text                                    -Home medications   
include canagliflozin   
100 mg daily,   
Trulicity weekly,  
metformin 500 mg BID  
PLAN:  
- Sliding scale   
insulin  
- qAC / at bedtime   
POCT with hypoglycemia   
protocol  
- Discontinue   
canagliflozin due to   
risk UTI with PMH T2DM Normal                                  The   
[facility]   
[facility]  
   
                                                    Transcription   
Authentication   
Interface Message   
Text                            Normal                          The   
[facility]   
System  
   
                                                    Transcription   
Authentication   
Interface Message   
Text                                    Unclear reason for her   
inability to ambulate,   
possibly   
deconditioning vs  
critical   
illness/sepsis   
myopathy vs pain   
limitation over a poor   
baseline  
mobility  
Plan:  
Venous duplex US given   
calf   
tenderness/concern for   
DVT  
Check CK  
PT/OT               Normal                                  The   
[facility]   
System  
   
                                                    BASIC METABOLIC PANELon 01-2  
3-2025   
   
                      Anion gap [Moles/Vol] 11 mmol/L  Normal     10-20      The  
   
[facility]   
System  
   
                                        Comment on above:   Performed By: #### C  
H8, MG ####[facility] [id number]   
[place], [place],    
   
                      Calcium [Mass/Vol] 8.1 mg/dL  Low        8.6-10.3   The   
[facility]   
[facility]  
   
                                        Comment on above:   Performed By: #### C  
H8, MG ####[facility] [id number]   
[place], [place],    
   
                      Chloride [Moles/Vol] 106 mmol/L Normal          The   
[facility]   
[facility]  
   
                                        Comment on above:   Performed By: #### C  
H8, MG ####[facility] [id number]   
[place], [place],    
   
                      CO2 [Moles/Vol] 28 mmol/L  Normal     21-31      The   
[facility]   
System  
   
                                        Comment on above:   Performed By: #### C  
H8, MG ####[facility] [id number]   
[place],    
   
                      Creatinine [Mass/Vol] 2.93 mg/dL High       0.60-1.20  The  
   
[facility]   
System  
   
                                        Comment on above:   Performed By: #### C  
H8, MG ####S PATHOLOGY [id number]   
[place],    
   
                                                    ESTIMATED GFR   
(CKD-EPI)       18 mL/min/1.73sqm Low             >=60            The   
[facility]   
System  
   
                                        Comment on above:   Result Comment:   
 CKD EPI Equation using Creatinine without  
   
RaceComment: Estimated glomerular filtration rate (eGFR) is   
calculated without a race coefficient. Values should be   
interpreted in the context of the patient's full clinical   
presentation.Reference:1. Alfredo C, eDlmi M, Valdez FLYNN, et al..   
A Unifying Approach for GFR Estimation: Recommendations of the   
NKF-ASN Task Force on Reassessing the Inclusion of Race in   
Diagnosing Kidney Disease. American Journal of Kidney Diseases   
;79(2):268-88.e1.2. N Engl J Med  Vol. 385 Issue 19   
Pages 8592-4799   
   
                                                            Performed By: #### C  
H8, MG ####[facility] [id number]   
[place],    
   
                      Glucose [Mass/Vol] 148 mg/dL  High            The   
[facility]   
System  
   
                                        Comment on above:   Performed By: #### C  
H8, MG ####[facility] [id number]   
[place],    
   
                      Potassium [Moles/Vol] 4.2 mmol/L Normal     3.5-5.0    The  
   
[facility]   
[facility]  
   
                                        Comment on above:   Performed By: #### RAHDA ROBLES8, MG ####[facility] PATHOLOGY [id number]   
[place],    
   
                      Sodium [Moles/Vol] 141 mmol/L Normal     136-145    The   
[facility]   
[facility]  
   
                                        Comment on above:   Performed By: #### RADHA ROBLES8, MG ####[facility] PATHOLOGY [id number] [place] [address],    
   
                                                    Urea nitrogen   
[Mass/Vol]      31 mg/dL        High            7-25            The   
[facility]   
[facility]  
   
                                        Comment on above:   Performed By: #### RADHA PAGE, MG ####[facility] PATHOLOGY [id number] [place] [address],    
   
                                                    CBC WITH DIFFERENTIALon 01-2  
3-2025   
   
                                                    Basophils (Bld)   
[#/Vol]         0.07 10*3/uL    Normal          0.00-0.20       The   
[facility]   
System  
   
                                        Comment on above:   Performed By: #### RADHA GOODEAT ####[facility] PATHOLOGY [id number] [place] [address],    
   
                                                    Basophils/100 WBC   
(Bld)           0.5 %           Normal          <=1.9           The   
[facility]   
System  
   
                                        Comment on above:   Performed By: #### RADHA GOODEAT ####[facility] PATHOLOGY [id number]   
[place],    
   
                                                    Eosinophils (Bld)   
[#/Vol]         0.27 10*3/uL    Normal          0.00-0.70       The   
[facility]   
System  
   
                                        Comment on above:   Performed By: #### RADHA GOODEAT ####S PATHOLOGY [id number] [place] [address],    
   
                                                    Eosinophils/100 WBC   
(Bld)           1.7 %           Normal          0.1-4.0         The   
[facility]   
System  
   
                                        Comment on above:   Performed By: #### RADHA GOODEAT ####S PATHOLOGY [id number]   
[place],    
   
                                                    Erythrocyte   
distribution width   
(RBC) [Ratio]   17.2 %          High            11.5-14.5       The   
[facility]   
System  
   
                                        Comment on above:   Performed By: #### RADHA GOODEAT ####[facility] [id number]   
[place],    
   
                                                    Hematocrit (Bld)   
[Volume fraction] 25.3 %          Low             36.0-46.0       The   
[facility]   
[facility]  
   
                                        Comment on above:   Performed By: #### C  
BCDSAT ####[facility] PATHOLOGY [id number]   
[place],    
   
                                                    Hemoglobin (Bld)   
[Mass/Vol]      7.9 g/dL        Low             12.0-15.0       The   
[facility]   
[facility]  
   
                                        Comment on above:   Performed By: #### C  
BCDSAT ####[facility] PATHOLOGY [id number]   
[place],    
   
                                                    Lymphocytes (Bld)   
[#/Vol]         1.86 10*3/uL    Normal          1.00-4.80       The   
[facility]   
[facility]  
   
                                        Comment on above:   Performed By: #### C  
BCDSAT ####[facility] PATHOLOGY [id number]   
[place],    
   
                                                    Lymphocytes/100 WBC   
(Bld)           12.1 %          Low             24.0-44.0       The   
[facility]   
[facility]  
   
                                        Comment on above:   Performed By: #### C  
BCDSAT ####[facility] PATHOLOGY [id number]   
[place],    
   
                                                    MCH (RBC) [Entitic   
mass]           26.3 pg         Normal          26.0-34.0       The   
[facility]   
[facility]  
   
                                        Comment on above:   Performed By: #### C  
BCDSAT ####[facility] PATHOLOGY [id number]   
[place],    
   
                      MCHC (RBC) [Mass/Vol] 31.0 g/dL  Low        32.0-35.9  The  
   
[facility]   
[facility]  
   
                                        Comment on above:   Performed By: #### C  
BCDSAT ####[facility] PATHOLOGY [id number]   
[place],    
   
                                                    MCV (RBC) [Entitic   
vol]            85 fL           Normal                    The   
[facility]   
[facility]  
   
                                        Comment on above:   Performed By: #### C  
BCDSAT ####[facility] PATHOLOGY [id number] [place] [address],    
   
                                                    Monocytes (Bld)   
[#/Vol]         1.52 10*3/uL    High            0.20-1.00       The   
[facility]   
[facility]  
   
                                        Comment on above:   Performed By: #### C  
BCDSAT ####S PATHOLOGY [id number]   
[place],    
   
                                                    Monocytes/100 WBC   
(Bld)           9.9 %           Normal          2.0-11.0        The   
[facility]   
[facility]  
   
                                        Comment on above:   Performed By: #### C  
BCDSAT ####[facility] PATHOLOGY [id number]   
[place],    
   
                                                    Neutrophils (Bld)   
[#/Vol]         11.73 10*3/uL   High            1.50-8.00       The   
[facility]   
[facility]  
   
                                        Comment on above:   Performed By: #### C  
BCDSAT ####[facility] PATHOLOGY [id number]   
[place],    
   
                                                    Neutrophils/100 WBC   
(Bld)           75.9 %          Normal          31.0-76.0       The   
[facility]   
[facility]  
   
                                        Comment on above:   Performed By: #### C  
BCDSAT ####[facility] PATHOLOGY [id number]   
[place],    
   
                                                    Platelet mean volume   
(Bld) [Entitic vol] 8.1 fL          Normal          7.5-11.2        The   
[facility]   
[facility]  
   
                                        Comment on above:   Performed By: #### C  
BCDSAT ####[facility] PATHOLOGY [id number]   
[place],    
   
                                                    Platelets (Bld)   
[#/Vol]         305 10*3/uL     Normal          150-400         The   
[facility]   
[facility]  
   
                                        Comment on above:   Performed By: #### C  
BCDSAT ####S PATHOLOGY [id number]   
[place],    
   
                      RBC (Bld) [#/Vol] 2.99 10*6/uL Low        4.00-5.20  The   
[facility]   
[facility]  
   
                                        Comment on above:   Performed By: #### C  
BCDSAT ####S PATHOLOGY [id number]   
[place],    
   
                      WBC (Bld) [#/Vol] 15.5 10*3/uL High       4.5-11.5   The   
[facility]   
[facility]  
   
                                        Comment on above:   Performed By: #### C  
BCDSAT ####S PATHOLOGY [id number]   
[place],    
   
                                                    Consultson 01-   
   
                                                    Transcription   
Authentication   
Interface Message   
Text                            Normal                          The   
[facility]   
System  
   
                                                    Transcription   
Authentication   
Interface Message   
Text                            Normal                          The   
[facility]   
System  
   
                                                    GLUCOSE, FINGERSTICK-IN OFFI  
CEon 2025   
   
                      Glucose [Mass/Vol] 146 mg/dL  High            The   
[facility]   
System  
   
                                        Comment on above:   Performed By: #### 8  
2948 ####NURSING GLUCOSE [id number]   
[place], [address]   
   
                      Glucose [Mass/Vol] 142 mg/dL  High            The   
[facility]   
System  
   
                                        Comment on above:   Performed By: #### 8  
2948 ####NURSING GLUCOSE [id number]   
[place], [address]   
   
                      Glucose [Mass/Vol] 143 mg/dL  High            The   
[facility]   
System  
   
                                        Comment on above:   Performed By: #### 8  
2948 ####NURSING GLUCOSE [id number]   
[place], [address]   
   
                      Glucose [Mass/Vol] 170 mg/dL  High            The   
[facility]  
   
                                        Comment on above:   Performed By: #### 8  
2948 ####NURSING GLUCOSE [id number]   
[place], [address]   
   
                                                    MAGNESIUMon 2025   
   
                      Magnesium [Mass/Vol] 1.8 mg/dL  Low        1.9-2.7    The   
[facility]   
[facility]  
   
                                        Comment on above:   Performed By: #### C  
H8, MG ####[facility] [id number]   
[place], [place],    
   
                                                    METRO CBC WITH DIFFERENTIALo  
n 2025   
   
                                                    Basophils (Bld)   
[#/Vol]             0.07 10*3/uL                            0.00 - 0.20   
K/uL                                    [facility]   
Healthcare  
   
                                                    Basophils/100 WBC   
(Bld)           0.5 %                           NINF - 1.9 %    [facility]  
   
                                                    Eosinophils (Bld)   
[#/Vol]             0.27 10*3/uL                            0.00 - 0.70   
K/uL                                    [facility]  
   
                                                    Eosinophils/100 WBC   
(Bld)           1.7 %                           0.1 - 4.0 %     [facility]  
   
                                                    Erythrocyte   
distribution width   
(RBC) [Ratio]   17.2 %          High            11.5 - 14.5 %   [facility]  
   
                                                    Hematocrit (Bld)   
[Volume fraction] 25.3 %          Low             36.0 - 46.0 %   [facility]  
   
                                                    Hemoglobin (Bld)   
[Mass/Vol]          7.9 g/dL            Low                 12.0 - 15.0   
g/dL                                    [facility]  
   
                                                    Interpretation and   
review of laboratory   
results         Abnormal                                        [facility] MEAN PLT VOL 8.1 fL                7.5 - 11.2 fL [facility] PLATELET 305 K/uL              150 - 400 K/uL [facility] RBC COUNT 2.99       Low                   [facility]  
   
                                                    Lymphocytes (Bld)   
[#/Vol]             1.86 10*3/uL                            1.00 - 4.80   
K/uL                                    [facility]  
   
                                                    Lymphocytes/100 WBC   
(Bld)           12.1 %          Low             24.0 - 44.0 %   [facility]  
   
                                                    MCH (RBC) [Entitic   
mass]           26.3 pg                         26.0 - 34.0 pg  [facility]  
   
                          MCHC (RBC) [Mass/Vol] 31 g/dL      Low          32.0 -  
 35.9   
g/dL                                    [facility]  
   
                                                    MCV (RBC) [Entitic   
vol]            85 fL                           80 - 100 fL     [facility] TOTAL VOLUME -   
REF                 1.52 K/uL           High                0.20 - 1.00   
K/uL                                    [facility]  
   
                                                    Monocytes/100 WBC   
(Bld)           9.9 %                           2.0 - 11.0 %    [facility]  
   
                                                    Neutrophils (Bld)   
[#/Vol]             11.73 10*3/uL       High                1.50 - 8.00   
K/uL                                    [facility]  
   
                                                    Neutrophils/100 WBC   
(Bld)           75.9 %                          31.0 - 76.0 %   [facility]  
   
                      WBC (Bld) [#/Vol] 15.5 10*3/uL High       4.5 - 11.5 K/uL   
[facility]  
   
                                                                  CLINISYNC  
   
                                                                  [facility]  
   
                                                    Progress Noteson 2025   
   
                                                    Transcription   
Authentication   
Interface Message   
Text                            Normal                          The   
[facility]   
System  
   
                                                    Transcription   
Authentication   
Interface Message   
Text                            Normal                          The   
[facility]   
System  
   
                                                    Transcription   
Authentication   
Interface Message   
Text                            Normal                          The   
[facility]   
System  
   
                                                    Assessment AND Plan Noteon 0  
2025   
   
                                                    Transcription   
Authentication   
Interface Message   
Text                            Normal                          The   
[facility]   
System  
   
                                                    Transcription   
Authentication   
Interface Message   
Text                                    -Home medications   
include duloxetine 60   
mg and amitriptyline   
50 mg at bedtime  
PLAN:  
- Continue home meds Normal                                  The   
[facility]  
   
                                                    Transcription   
Authentication   
Interface Message   
Text                                    -Home medications   
include canagliflozin   
100 mg daily,   
Trulicity weekly,  
metformin 500 mg BID  
PLAN:  
- Sliding scale   
insulin  
- qAC / at bedtime   
POCT with hypoglycemia   
protocol  
- Discontinue   
canagliflozin due to   
risk UTI with PMH T2DM Normal                                  The   
[facility]   
System  
   
                                                    Transcription   
Authentication   
Interface Message   
Text                                    Unclear reason for her   
inability to ambulate,   
possibly   
deconditioning vs  
critical   
illness/sepsis   
myopathy vs pain   
limitation over a poor   
baseline  
mobility  
Plan:  
Venous duplex US given   
calf   
tenderness/concern for   
DVT  
Check CK  
PT/OT               Normal                                  The   
[facility]   
System  
   
                                                    Transcription   
Authentication   
Interface Message   
Text                                    - recently switched   
from sotalol 120 mg   
BID to amiodarone 400   
mg daily per  
cardiology due to   
worsening renal   
function  
PLAN:  
- Amiodarone 4000 mg   
daily  
- Hold home eliquis   
due to possible   
perirenal hematoma  Normal                                  The   
[facility]   
System  
   
                                                    BASIC METABOLIC PANELon    
   
                      Anion gap [Moles/Vol] 10 mmol/L  Normal     10-20      The  
   
[facility]   
System  
   
                                        Comment on above:   Performed By: #### RADHA  
H8, MG, PROCAL ####[facility]   
[id number] [place],    
   
                      Calcium [Mass/Vol] 8.1 mg/dL  Low        8.6-10.3   The   
[facility]   
System  
   
                                        Comment on above:   Performed By: #### C  
H8, MG, PROCAL ####[facility]   
[id number] [place],    
   
                      Chloride [Moles/Vol] 107 mmol/L Normal          The   
[facility]   
System  
   
                                        Comment on above:   Performed By: #### C  
H8, MG, PROCAL ####[facility]   
[id number] [place],    
   
                      CO2 [Moles/Vol] 28 mmol/L  Normal     21-31      The   
[facility]   
System  
   
                                        Comment on above:   Performed By: #### C  
H8, MG, PROCAL ####[facility]   
[id number] [place],    
   
                      Creatinine [Mass/Vol] 3.12 mg/dL High       0.60-1.20  The  
   
[facility]   
System  
   
                                        Comment on above:   Performed By: #### C  
H8, MG, PROCAL ####[facility]   
[id number] [place],    
   
                                                    ESTIMATED GFR   
(CKD-EPI)       17 mL/min/1.73sqm Low             >=60            The   
[facility]   
System  
   
                                        Comment on above:   Result Comment:   
 CKD EPI Equation using Creatinine without  
   
RaceComment: Estimated glomerular filtration rate (eGFR) is   
calculated without a race coefficient. Values should be   
interpreted in the context of the patient's full clinical   
presentation.Reference:1. Alfredo GARCIA, Delmi M, Valdez FLYNN, et al..   
A Unifying Approach for GFR Estimation: Recommendations of the   
NKF-ASN Task Force on Reassessing the Inclusion of Race in   
Diagnosing Kidney Disease. American Journal of Kidney Diseases   
;79(2):268-88.e1.2. N Engl J Med  Vol. 385 Issue 19   
Pages 6528-5947   
   
                                                            Performed By: #### RADHA PAGE MG, PROCAL ####[facility]   
[id number] [place],    
   
                      Glucose [Mass/Vol] 137 mg/dL  High            The   
[facility]   
[facility]  
   
                                        Comment on above:   Performed By: #### RADHA PAGE MG, PROCAL ####[facility]   
[id number] [place],    
   
                      Potassium [Moles/Vol] 4.1 mmol/L Normal     3.5-5.0    The  
   
[facility]   
System  
   
                                        Comment on above:   Performed By: #### RADHA PAGE MG, PROCAL ####[facility]   
[id number] [place],    
   
                      Sodium [Moles/Vol] 141 mmol/L Normal     136-145    The   
[facility]   
[facility]  
   
                                        Comment on above:   Performed By: #### RADHA PAGE MG, PROCAL ####[facility]   
[id number] [place], [place],    
   
                                                    Urea nitrogen   
[Mass/Vol]      39 mg/dL        High            7-25            The   
[facility]   
System  
   
                                        Comment on above:   Performed By: #### RADHA  
H8 MG, PROCAL ####[facility]   
[id number] [place], [place],    
   
                                                    CBC WITH DIFFERENTIALon    
   
                                                    Basophils (Bld)   
[#/Vol]         0.04 10*3/uL    Normal          0.00-0.20       The   
[facility]   
System  
   
                                        Comment on above:   Performed By: #### RADHA  
BCDSAT ####[facility] [id number]   
[place], [place],    
   
                                                    Basophils/100 WBC   
(Bld)           0.3 %           Normal          <=1.9           The   
[facility]   
[facility]  
   
                                        Comment on above:   Performed By: #### C  
BCDSAT ####[facility] PATHOLOGY [id number]   
[place], [place],    
   
                                                    Eosinophils (Bld)   
[#/Vol]         0.33 10*3/uL    Normal          0.00-0.70       The   
[facility]   
[facility]  
   
                                        Comment on above:   Performed By: #### C  
BCDSAT ####[facility] PATHOLOGY [id number] [place] [address],    
   
                                                    Eosinophils/100 WBC   
(Bld)           2.0 %           Normal          0.1-4.0         The   
[facility]   
[facility]  
   
                                        Comment on above:   Performed By: #### C  
BCDSAT ####[facility] PATHOLOGY [id number] [place] [address],    
   
                                                    Erythrocyte   
distribution width   
(RBC) [Ratio]   16.7 %          High            11.5-14.5       The   
[facility]   
[facility]  
   
                                        Comment on above:   Performed By: #### C  
BCDSAT ####[facility] PATHOLOGY [id number] [place] [address],    
   
                                                    Hematocrit (Bld)   
[Volume fraction] 25.6 %          Low             36.0-46.0       The   
[facility]   
[facility]  
   
                                        Comment on above:   Performed By: #### C  
BCDSAT ####[facility] PATHOLOGY [id number]   
[place],    
   
                                                    Hemoglobin (Bld)   
[Mass/Vol]      8.0 g/dL        Low             12.0-15.0       The   
[facility]   
[facility]  
   
                                        Comment on above:   Performed By: #### C  
BCDSAT ####[facility] PATHOLOGY [id number]   
[place],    
   
                                                    Lymphocytes (Bld)   
[#/Vol]         1.94 10*3/uL    Normal          1.00-4.80       The   
[facility]   
[facility]  
   
                                        Comment on above:   Performed By: #### C  
BCDSAT ####[facility] PATHOLOGY [id number]   
[place],    
   
                                                    Lymphocytes/100 WBC   
(Bld)           11.8 %          Low             24.0-44.0       The   
[facility]   
[facility]  
   
                                        Comment on above:   Performed By: #### C  
BCDSAT ####[facility] PATHOLOGY [id number]   
[place],    
   
                                                    MCH (RBC) [Entitic   
mass]           26.4 pg         Normal          26.0-34.0       The   
[facility]   
[facility]  
   
                                        Comment on above:   Performed By: #### C  
BCDSAT ####[facility] PATHOLOGY [id number]   
[place],    
   
                      MCHC (RBC) [Mass/Vol] 31.3 g/dL  Low        32.0-35.9  The  
   
[facility]   
System  
   
                                        Comment on above:   Performed By: #### C  
BCDSAT ####[facility] PATHOLOGY [id number]   
[place],    
   
                                                    MCV (RBC) [Entitic   
vol]            84 fL           Normal                    The   
[facility]   
System  
   
                                        Comment on above:   Performed By: #### C  
BCDSAT ####[facility] PATHOLOGY [id number]   
[place],    
   
                                                    Monocytes (Bld)   
[#/Vol]         1.56 10*3/uL    High            0.20-1.00       The   
[facility]   
[facility]  
   
                                        Comment on above:   Performed By: #### C  
BCDSAT ####[facility] PATHOLOGY [id number]   
[place],    
   
                                                    Monocytes/100 WBC   
(Bld)           9.4 %           Normal          2.0-11.0        The   
[facility]   
System  
   
                                        Comment on above:   Performed By: #### C  
BCDSAT ####[facility] PATHOLOGY [id number]   
[place],    
   
                                                    Neutrophils (Bld)   
[#/Vol]         12.59 10*3/uL   High            1.50-8.00       The   
[facility]   
System  
   
                                        Comment on above:   Performed By: #### C  
BCDSAT ####[facility] PATHOLOGY [id number]   
[place],    
   
                                                    Neutrophils/100 WBC   
(Bld)           76.5 %          High            31.0-76.0       The   
[facility]   
System  
   
                                        Comment on above:   Performed By: #### C  
BCDSAT ####[facility] PATHOLOGY [id number]   
[place],    
   
                                                    Platelet mean volume   
(Bld) [Entitic vol] 8.6 fL          Normal          7.5-11.2        The   
[facility]   
[facility]  
   
                                        Comment on above:   Performed By: #### C  
BCDSAT ####S PATHOLOGY [id number]   
[place],    
   
                                                    Platelets (Bld)   
[#/Vol]         295 10*3/uL     Normal          150-400         The   
[facility]   
[facility]  
   
                                        Comment on above:   Performed By: #### C  
BCDSAT ####[facility] PATHOLOGY [id number]   
[place],    
   
                      RBC (Bld) [#/Vol] 3.03 10*6/uL Low        4.00-5.20  The   
[facility]   
[facility]  
   
                                        Comment on above:   Performed By: #### C  
BCDSAT ####[facility] PATHOLOGY [id number]   
[place],    
   
                      WBC (Bld) [#/Vol] 16.5 10*3/uL High       4.5-11.5   The   
[facility]   
[facility]  
   
                                        Comment on above:   Performed By: #### RADHA GOODEAT ####[facility] PATHOLOGY [id number]   
[place],    
   
                                                    GLUCOSE, FINGERSTICK-IN OFFI  
CEon 2025   
   
                      Glucose [Mass/Vol] 186 mg/dL  High            The   
[facility]   
[facility]  
   
                                        Comment on above:   Performed By: #### 8  
2948 ####NURSING GLUCOSE [id number]   
[place], [address]   
   
                      Glucose [Mass/Vol] 146 mg/dL  High            The   
[facility]   
[facility]  
   
                                        Comment on above:   Performed By: #### 8  
2948 ####NURSING GLUCOSE [id number]   
[place], [address]   
   
                      Glucose [Mass/Vol] 176 mg/dL  High            The   
[facility]   
System  
   
                                        Comment on above:   Performed By: #### 8  
2948 ####NURSING GLUCOSE [id number]   
[place], [address]   
   
                      Glucose [Mass/Vol] 189 mg/dL  High            The   
[facility]   
System  
   
                                        Comment on above:   Performed By: #### 8  
2948 ####NURSING GLUCOSE [id number]   
[place], [address]   
   
                      Glucose [Mass/Vol] 155 mg/dL  High            The   
[facility]   
System  
   
                                        Comment on above:   Performed By: #### 8  
2948 ####NURSING GLUCOSE [id number]   
[place], [address]   
   
                                                    LEGIONELLA ANTIGEN, URINEon   
2025   
   
                                                    LEGIONELLA ANTIGEN,   
URINE           Negative        Normal          Negative        The   
[facility]   
System  
   
                                        Comment on above:   Order Comment: Infec  
tion due to Legionella cannot be ruled out  
   
since other serogroups and species may cause Legionnaires'   
disease. Antigen may not be present in urine in early infection.   
   
                                                            Performed By: #### U  
R LEG AG ####[facility] [id number]   
[place], [place]   
   
                                                    MAGNESIUMon 2025   
   
                      Magnesium [Mass/Vol] 1.8 mg/dL  Low        1.9-2.7    The   
[facility]   
System  
   
                                        Comment on above:   Performed By: #### C  
H8, MG, PROCAL ####[facility]   
[id number] [place], [place],    
   
                                                    METRO CBC WITH DIFFERENTIALo  
n 2025   
   
                                                    Basophils (Bld)   
[#/Vol]             0.04 10*3/uL                            0.00 - 0.20   
K/uL                                    [facility]  
   
                                                    Basophils/100 WBC   
(Bld)           0.3 %                           NINF - 1.9 %    [facility]  
   
                                                    Eosinophils (Bld)   
[#/Vol]             0.33 10*3/uL                            0.00 - 0.70   
K/uL                                    [facility]  
   
                                                    Eosinophils/100 WBC   
(Bld)           2 %                             0.1 - 4.0 %     [facility]  
   
                                                    Erythrocyte   
distribution width   
(RBC) [Ratio]   16.7 %          High            11.5 - 14.5 %   [facility]  
   
                                                    Hematocrit (Bld)   
[Volume fraction] 25.6 %          Low             36.0 - 46.0 %   [facility]  
   
                                                    Hemoglobin (Bld)   
[Mass/Vol]          8 g/dL              Low                 12.0 - 15.0   
g/dL                                    [facility]  
   
                                                    Interpretation and   
review of laboratory   
results         Abnormal                                        [facility] MEAN PLT VOL 8.6 fL                7.5 - 11.2 fL [facility] PLATELET 295 K/uL              150 - 400 K/uL [facility] RBC COUNT 3.03       Low                   [facility]  
   
                                                    Lymphocytes (Bld)   
[#/Vol]             1.94 10*3/uL                            1.00 - 4.80   
K/uL                                    [facility]  
   
                                                    Lymphocytes/100 WBC   
(Bld)           11.8 %          Low             24.0 - 44.0 %   [facility]  
   
                                                    MCH (RBC) [Entitic   
mass]           26.4 pg                         26.0 - 34.0 pg  [facility]  
   
                          MCHC (RBC) [Mass/Vol] 31.3 g/dL    Low          32.0 -  
 35.9   
g/dL                                    [facility]  
   
                                                    MCV (RBC) [Entitic   
vol]            84 fL                           80 - 100 fL     [facility]   
REF                 1.56 K/uL           High                0.20 - 1.00   
K/uL                                    [facility]  
   
                                                    Monocytes/100 WBC   
(Bld)           9.4 %                           2.0 - 11.0 %    [facility]  
   
                                                    Neutrophils (Bld)   
[#/Vol]             12.59 10*3/uL       High                1.50 - 8.00   
K/uL                                    [facility]  
   
                                                    Neutrophils/100 WBC   
(Bld)           76.5 %          High            31.0 - 76.0 %   [facility]  
   
                      WBC (Bld) [#/Vol] 16.5 10*3/uL High       4.5 - 11.5 K/uL   
[facility]  
   
                                                                  CLINISYNC  
   
                                                                  [facility]  
   
                                                    PROCALCITONINon 2025   
   
                      PROCALCITONIN 0.40 ng/mL Normal     <0.50      The   
[facility]   
System  
   
                                        Comment on above:   Result Comment: Proc  
alcitonin Concentrations < 0.50 ng/mL =   
Low   
Risk of severe sepsis and/or septic shockProcalcitonin   
Concentrations > 2.00 ng/mL = High Risk of severe sepsis and/or   
septic shockConcentrations under 0.50 ng/mL do not exclude local   
infections or systemic infections in their initial stages (e.g.   
under six hours from onset of illness). Procalcitonin   
concentrations between 0.50 and 2.00 ng/mL should be interpreted   
with consideration of the patient's history. In this range, it   
is recommended to retest Procalcitonin within 6 to 24 hours.   
   
                                                            Performed By: #### C  
MG CAMILO, PROCAL ####[facility]   
[id number] [place], [address]   
   
                                                    Progress Noteson 2025   
   
                                                    Transcription   
Authentication   
Interface Message   
Text                            Normal                          The   
[facility]   
System  
   
                                                    Transcription   
Authentication   
Interface Message   
Text                            Normal                          The   
[facility]  
   
                                                    Progress Notes - NoteWritero  
n 2025   
   
                                                    Transcription   
Authentication   
Interface Message   
Text                            Normal                          The   
[facility]   
System  
   
                                                    STREPTOCOCCUS PNEUMONIAE AGo  
n 2025   
   
                                                    STREPTOCOCCUS   
PNEUMONIAE AG   Negative        Normal          Negative        The   
[facility]   
System  
   
                                        Comment on above:   Order Comment: Strep  
 pneumoniae antigen absent in urine,   
suggesting no current or recent pneumococcal pneumonia   
infection.   
   
                                                            Performed By: #### S  
[email or web address] ####[facility] [id number]   
[place], [place]   
   
                                                    XR CHEST PA+LAT 2 VIEWSon    
   
                                                    XR CHEST PA+LAT 2   
VIEWS                           Normal                          The   
[facility]   
System  
   
                                                    ALL MAGNESIUMon 2025   
   
                      Magnesium [Mass/Vol] 2.1 mg/dL             1.9 - 2.7 mg/dL  
 [facility]  
   
                                                    Assessment AND Plan Noteon 0  
2025   
   
                                                    Transcription   
Authentication   
Interface Message   
Text                                    - recently switched   
from sotalol 120 mg   
BID to amiodarone 400   
mg daily per  
cardiology due to   
worsening renal   
function  
PLAN:  
- Amiodarone 4000 mg   
daily  
- Hold home eliquis   
due to possible   
perirenal hematoma  Normal                                  The   
[facility]   
System  
   
                                                    Transcription   
Authentication   
Interface Message   
Text                                    Unclear reason for her   
inability to ambulate,   
possibly   
deconditioning vs  
critical   
illness/sepsis   
myopathy vs pain   
limitation over a poor   
baseline  
mobility  
Plan:  
Venous duplex US given   
calf   
tenderness/concern for   
DVT  
Check CK  
PT/OT               Normal                                  The   
[facility]   
System  
   
                                                    Transcription   
Authentication   
Interface Message   
Text                                    -Home medications   
include canagliflozin   
100 mg daily,   
Trulicity weekly,  
metformin 500 mg BID  
PLAN:  
- Sliding scale   
insulin  
- qAC / at bedtime   
POCT with hypoglycemia   
protocol  
- Discontinue   
canagliflozin due to   
risk UTI with PMH T2DM Normal                                  The   
[facility]   
System  
   
                                                    Transcription   
Authentication   
Interface Message   
Text                            Normal                          The   
[facility]   
System  
   
                                                    Transcription   
Authentication   
Interface Message   
Text                                    -Home medications   
include duloxetine 60   
mg and amitriptyline   
50 mg at bedtime  
PLAN:  
- Continue home meds Normal                                  The   
[facility]   
System  
   
                                                    BASIC METABOLIC PANELon    
   
                      Anion gap [Moles/Vol] 11 mmol/L  Normal     10-20      The  
   
[facility]   
System  
   
                                        Comment on above:   Performed By: #### M  
G, CK, HEPATIC, CH8 ####[facility]   
[id number] [place], [address]   
   
                      Calcium [Mass/Vol] 7.9 mg/dL  Low        8.6-10.3   The   
[facility]   
System  
   
                                        Comment on above:   Performed By: #### M  
G, CK, HEPATIC, CH8 ####[facility]   
[id number] [place],    
   
                      Chloride [Moles/Vol] 106 mmol/L Normal          The   
[facility]   
[facility]  
   
                                        Comment on above:   Performed By: #### M  
G, CK, HEPATIC, CH8 ####[facility]   
[id number] [place],    
   
                      CO2 [Moles/Vol] 29 mmol/L  Normal     21-31      The   
[facility]   
[facility]  
   
                                        Comment on above:   Performed By: #### M  
G, CK, HEPATIC, CH8 ####S PATHOLOGY   
[id number] [place],    
   
                      Creatinine [Mass/Vol] 3.33 mg/dL High       0.60-1.20  The  
   
[facility]   
[facility]  
   
                                        Comment on above:   Performed By: #### M  
G, CK, HEPATIC, CH8 ####S PATHOLOGY   
[id number] [place],    
   
                                                    ESTIMATED GFR   
(CKD-EPI)       16 mL/min/1.73sqm Low             >=60            The   
[facility]   
[facility]  
   
                                        Comment on above:   Result Comment:   
 CKD EPI Equation using Creatinine without  
   
RaceComment: Estimated glomerular filtration rate (eGFR) is   
calculated without a race coefficient. Values should be   
interpreted in the context of the patient's full clinical   
presentation.Reference:1. Alfredo C, Delmi M, Valdez FLYNN, et al..   
A Unifying Approach for GFR Estimation: Recommendations of the   
NKF-ASN Task Force on Reassessing the Inclusion of Race in   
Diagnosing Kidney Disease. American Journal of Kidney Diseases   
;79(2):268-88.e1.2. N Engl J Med  Vol. 385 Issue 19   
Pages 3879-9107   
   
                                                            Performed By: #### M  
G, CK, HEPATIC, CH8 ####S PATHOLOGY   
[id number] [place],    
   
                      Glucose [Mass/Vol] 135 mg/dL  High            The   
[facility]   
System  
   
                                        Comment on above:   Performed By: #### M  
G, CK, HEPATIC, CH8 ####S PATHOLOGY   
[id number] [place],    
   
                      Potassium [Moles/Vol] 4.0 mmol/L Normal     3.5-5.0    The  
   
[facility]   
System  
   
                                        Comment on above:   Performed By: #### M  
G, CK, HEPATIC, CH8 ####[facility] PATHOLOGY   
[id number] [place],    
   
                      Sodium [Moles/Vol] 142 mmol/L Normal     136-145    The   
[facility]   
System  
   
                                        Comment on above:   Performed By: #### M  
G, CK, HEPATIC, CH8 ####[facility] PATHOLOGY   
[id number] [place] [address],    
   
                                                    Urea nitrogen   
[Mass/Vol]      45 mg/dL        High            7-25            The   
[facility]   
System  
   
                                        Comment on above:   Performed By: #### M  
G, CK, HEPATIC, CH8 ####[facility] PATHOLOGY   
[id number] [place] [address],    
   
                                                    CBC WITH DIFFERENTIALon    
   
                                                    Basophils (Bld)   
[#/Vol]         0.06 10*3/uL    Normal          0.00-0.20       The   
[facility]   
System  
   
                                        Comment on above:   Performed By: #### C  
BCDSAT ####[facility] PATHOLOGY [id number] [place] [address],    
   
                                                    Basophils/100 WBC   
(Bld)           0.4 %           Normal          <=1.9           The   
[facility]   
System  
   
                                        Comment on above:   Performed By: #### C  
BCDSAT ####[facility] PATHOLOGY [id number] [place] [address],    
   
                                                    Eosinophils (Bld)   
[#/Vol]         0.35 10*3/uL    Normal          0.00-0.70       The   
[facility]   
System  
   
                                        Comment on above:   Performed By: #### C  
BCDSAT ####[facility] PATHOLOGY [id number] [place] [address],    
   
                                                    Eosinophils/100 WBC   
(Bld)           2.1 %           Normal          0.1-4.0         The   
[facility]   
System  
   
                                        Comment on above:   Performed By: #### C  
BCDSAT ####[facility] PATHOLOGY [id number]   
[place], [place],    
   
                                                    Erythrocyte   
distribution width   
(RBC) [Ratio]   16.9 %          High            11.5-14.5       The   
[facility]   
System  
   
                                        Comment on above:   Performed By: #### C  
BCDSAT ####[facility] PATHOLOGY [id number]   
[place], [place],    
   
                                                    Hematocrit (Bld)   
[Volume fraction] 26.9 %          Low             36.0-46.0       The   
[facility]   
System  
   
                                        Comment on above:   Performed By: #### C  
BCDSAT ####[facility] PATHOLOGY [id number]   
[place],    
   
                                                    Hemoglobin (Bld)   
[Mass/Vol]      8.5 g/dL        Low             12.0-15.0       The   
[facility]   
System  
   
                                        Comment on above:   Performed By: #### C  
BCDSAT ####[facility] PATHOLOGY [id number]   
[place],    
   
                                                    Lymphocytes (Bld)   
[#/Vol]         1.83 10*3/uL    Normal          1.00-4.80       The   
[facility]   
System  
   
                                        Comment on above:   Performed By: #### C  
BCDSAT ####[facility] PATHOLOGY [id number]   
[place],    
   
                                                    Lymphocytes/100 WBC   
(Bld)           11.0 %          Low             24.0-44.0       The   
[facility]   
System  
   
                                        Comment on above:   Performed By: #### C  
BCDSAT ####[facility] PATHOLOGY [id number]   
[place],    
   
                                                    MCH (RBC) [Entitic   
mass]           26.7 pg         Normal          26.0-34.0       The   
[facility]   
System  
   
                                        Comment on above:   Performed By: #### C  
BCDSAT ####[facility] PATHOLOGY [id number]   
[place],    
   
                      MCHC (RBC) [Mass/Vol] 31.6 g/dL  Low        32.0-35.9  The  
   
[facility]   
System  
   
                                        Comment on above:   Performed By: #### C  
BCDSAT ####[facility] PATHOLOGY [id number]   
[place],    
   
                                                    MCV (RBC) [Entitic   
vol]            85 fL           Normal                    The   
[facility]   
System  
   
                                        Comment on above:   Performed By: #### C  
BCDSAT ####[facility] PATHOLOGY [id number]   
[place],    
   
                                                    Monocytes (Bld)   
[#/Vol]         1.31 10*3/uL    High            0.20-1.00       The   
[facility]   
[facility]  
   
                                        Comment on above:   Performed By: #### C  
BCDSAT ####[facility] PATHOLOGY [id number]   
[place],    
   
                                                    Monocytes/100 WBC   
(Bld)           7.9 %           Normal          2.0-11.0        The   
[facility]   
[facility]  
   
                                        Comment on above:   Performed By: #### C  
BCDSAT ####[facility] PATHOLOGY [id number]   
[place],    
   
                                                    Neutrophils (Bld)   
[#/Vol]         13.03 10*3/uL   High            1.50-8.00       The   
[facility]   
[facility]  
   
                                        Comment on above:   Performed By: #### C  
BCDSAT ####[facility] PATHOLOGY [id number]   
[place],    
   
                                                    Neutrophils/100 WBC   
(Bld)           78.6 %          High            31.0-76.0       The   
[facility]   
[facility]  
   
                                        Comment on above:   Performed By: #### C  
BCDSAT ####[facility] PATHOLOGY [id number]   
[place],    
   
                                                    Platelet mean volume   
(Bld) [Entitic vol] 8.9 fL          Normal          7.5-11.2        The   
[facility]   
[facility]  
   
                                        Comment on above:   Performed By: #### C  
BCDSAT ####[facility] PATHOLOGY [id number]   
[place],    
   
                                                    Platelets (Bld)   
[#/Vol]         317 10*3/uL     Normal          150-400         The   
[facility]   
[facility]  
   
                                        Comment on above:   Performed By: #### C  
BCDSAT ####[facility] PATHOLOGY [id number]   
[place],    
   
                      RBC (Bld) [#/Vol] 3.18 10*6/uL Low        4.00-5.20  The   
[facility]   
[facility]  
   
                                        Comment on above:   Performed By: #### C  
BCDSAT ####[facility] PATHOLOGY [id number]   
[place],    
   
                      WBC (Bld) [#/Vol] 16.6 10*3/uL High       4.5-11.5   The   
[facility]   
[facility]  
   
                                        Comment on above:   Performed By: #### C  
BCDSAT ####S PATHOLOGY [id number]   
[place],    
   
                                                    Basophils (Bld)   
[#/Vol]         0.09 10*3/uL    Normal          0.00-0.20       The   
[facility]   
[facility]  
   
                                        Comment on above:   Performed By: #### C  
BCDSAT ####[facility] PATHOLOGY [id number] [place] [address],    
   
                                                    Basophils/100 WBC   
(Bld)           0.6 %           Normal          <=1.9           The   
[facility]   
[facility]  
   
                                        Comment on above:   Performed By: #### C  
BCDSAT ####[facility] PATHOLOGY [id number] [place] [address],    
   
                                                    Eosinophils (Bld)   
[#/Vol]         0.41 10*3/uL    Normal          0.00-0.70       The   
[facility]   
[facility]  
   
                                        Comment on above:   Performed By: #### RADHA GOODEAT ####[facility] PATHOLOGY [id number] [place] [address],    
   
                                                    Eosinophils/100 WBC   
(Bld)           2.5 %           Normal          0.1-4.0         The   
[facility]   
[facility]  
   
                                        Comment on above:   Performed By: #### RADHA GOODEAT ####[facility] PATHOLOGY [id number] [place] [address],    
   
                                                    Erythrocyte   
distribution width   
(RBC) [Ratio]   17.0 %          High            11.5-14.5       The   
[facility]   
[facility]  
   
                                        Comment on above:   Performed By: #### RADHA GOODEAT ####[facility] PATHOLOGY [id number] [place] [address],    
   
                                                    Hematocrit (Bld)   
[Volume fraction] 27.1 %          Low             36.0-46.0       The   
[facility]   
[facility]  
   
                                        Comment on above:   Performed By: #### C  
RUSSELAT ####[facility] PATHOLOGY [id number] [place] [address],    
   
                                                    Hemoglobin (Bld)   
[Mass/Vol]      8.3 g/dL        Low             12.0-15.0       The   
[facility]   
[facility]  
   
                                        Comment on above:   Performed By: #### RADHA  
BCPOLOAT ####[facility] PATHOLOGY [id number]   
[place],    
   
                                                    Lymphocytes (Bld)   
[#/Vol]         1.74 10*3/uL    Normal          1.00-4.80       The   
[facility]   
[facility]  
   
                                        Comment on above:   Performed By: #### C  
BCPOLOAT ####[facility] PATHOLOGY [id number]   
[place],    
   
                                                    Lymphocytes/100 WBC   
(Bld)           10.9 %          Low             24.0-44.0       The   
[facility]   
[facility]  
   
                                        Comment on above:   Performed By: #### C  
BCDSAT ####[facility] PATHOLOGY [id number]   
[place],    
   
                                                    MCH (RBC) [Entitic   
mass]           25.9 pg         Low             26.0-34.0       The   
[facility]   
[facility]  
   
                                        Comment on above:   Performed By: #### C  
BCDSAT ####[facility] PATHOLOGY [id number]   
[place],    
   
                      MCHC (RBC) [Mass/Vol] 30.8 g/dL  Low        32.0-35.9  The  
   
[facility]   
[facility]  
   
                                        Comment on above:   Performed By: #### C  
BCDSAT ####[facility] PATHOLOGY [id number]   
[place],    
   
                                                    MCV (RBC) [Entitic   
vol]            84 fL           Normal                    The   
[facility]   
[facility]  
   
                                        Comment on above:   Performed By: #### C  
BCDSAT ####[facility] PATHOLOGY [id number]   
[place],    
   
                                                    Monocytes (Bld)   
[#/Vol]         1.23 10*3/uL    High            0.20-1.00       The   
[facility]   
[facility]  
   
                                        Comment on above:   Performed By: #### C  
BCDSAT ####[facility] PATHOLOGY [id number]   
[place],    
   
                                                    Monocytes/100 WBC   
(Bld)           7.7 %           Normal          2.0-11.0        The   
[facility]   
[facility]  
   
                                        Comment on above:   Performed By: #### C  
BCDSAT ####[facility] PATHOLOGY [id number]   
[place],    
   
                                                    Neutrophils (Bld)   
[#/Vol]         12.47 10*3/uL   High            1.50-8.00       The   
[facility]   
[facility]  
   
                                        Comment on above:   Performed By: #### C  
BCDSAT ####[facility] PATHOLOGY [id number]   
[place],    
   
                                                    Neutrophils/100 WBC   
(Bld)           78.3 %          High            31.0-76.0       The   
[facility]   
System  
   
                                        Comment on above:   Performed By: #### C  
BCDSAT ####S PATHOLOGY [id number]   
[place],    
   
                                                    Platelet mean volume   
(Bld) [Entitic vol] 8.1 fL          Normal          7.5-11.2        The   
[facility]   
System  
   
                                        Comment on above:   Performed By: #### C  
BCDSAT ####S PATHOLOGY [id number]   
[place],    
   
                                                    Platelets (Bld)   
[#/Vol]         308 10*3/uL     Normal          150-400         The   
[facility]   
System  
   
                                        Comment on above:   Performed By: #### C  
BCDSAT ####S PATHOLOGY [id number]   
[place],    
   
                      RBC (Bld) [#/Vol] 3.21 10*6/uL Low        4.00-5.20  The   
[facility]   
System  
   
                                        Comment on above:   Performed By: #### C  
BCDSAT ####S PATHOLOGY [id number]   
[place],    
   
                      WBC (Bld) [#/Vol] 15.9 10*3/uL High       4.5-11.5   The   
[facility]   
System  
   
                                        Comment on above:   Performed By: #### RADHA  
BCDSAT ####S PATHOLOGY [id number]   
[place],    
   
                                                    CREATINE KINASEon 2025  
   
   
                                                    CK [Catalytic   
activity/Vol]   15 U/L          Low                       The   
[facility]   
System  
   
                                        Comment on above:   Performed By: #### M  
G, CK, HEPATIC, CH8 ####S PATHOLOGY   
[id number] [place],    
   
                                                    Consultson 2025   
   
                                                    Transcription   
Authentication   
Interface Message   
Text                            Normal                          The   
[facility]   
System  
   
                                                    GLUCOSE, FINGERSTICK-IN OFFI  
CEon 2025   
   
                      Glucose [Mass/Vol] 195 mg/dL  High            The   
[facility]   
System  
   
                                        Comment on above:   Result Comment: Rayray SANCHEZ MD   
   
                                                            Performed By: #### 8  
[id number] ####NURSING GLUCOSE [id number]   
[place],    
   
                      Glucose [Mass/Vol] 141 mg/dL  High            The   
[facility]   
System  
   
                                        Comment on above:   Performed By: #### 8  
2948 ####NURSING GLUCOSE [id number]   
[place], [address]   
   
                      Glucose [Mass/Vol] 212 mg/dL  High            The   
[facility]   
System  
   
                                        Comment on above:   Performed By: #### 8  
2948 ####NURSING GLUCOSE [id number]   
[place], [address]   
   
                      Glucose [Mass/Vol] 170 mg/dL  High            The   
[facility]   
System  
   
                                        Comment on above:   Performed By: #### 8  
2948 ####NURSING GLUCOSE [id number]   
[place], [address]   
   
                                                    HEPATIC FUNCTION PANELon    
   
                      Albumin [Mass/Vol] 2.7 g/dL   Low        3.5-5.7    The   
[facility]   
System  
   
                                        Comment on above:   Performed By: #### BELINDA  
G, CK, HEPATIC, CH8 ####[facility]   
[id number] [place],    
   
                      ALK        38 IU/L    Normal          The   
[facility]   
System  
   
                                        Comment on above:   Performed By: #### BELINDA  
G, CK, HEPATIC, CH8 ####[facility] PATHOLOGY   
[id number] [place],    
   
                                                    ALT [Catalytic   
activity/Vol]   5 U/L           Low             7-52            The   
[facility]   
System  
   
                                        Comment on above:   Performed By: #### BELINDA  
G, CK, HEPATIC, CH8 ####[facility] PATHOLOG[facility]   
[id number] [place],    
   
                                                    AST [Catalytic   
activity/Vol]   8 U/L           Low             13-39           The   
[facility]   
System  
   
                                        Comment on above:   Performed By: #### BELINDA  
G, CK, HEPATIC, CH8 ####[facility] PATH[facility]   
[id number] [place],    
   
                      Bilirubin [Mass/Vol] 0.3 mg/dL  Normal     0.3-1.0    The   
[facility]   
System  
   
                                        Comment on above:   Performed By: #### BELINDA  
G, CK, HEPATIC, CH8 ####[facility] PATH[facility]   
[id number] [place],    
   
                      DBIL       < 0.05     Normal     0.03-0.18  The   
[facility]   
System  
   
                                        Comment on above:   Performed By: #### BELINDA  
G, CK, HEPATIC, CH8 ####[facility] PATHOLOGY   
[id number] [place],    
   
                      Protein [Mass/Vol] 5.5 g/dL   Low        6.0-8.3    The   
[facility]   
System  
   
                                        Comment on above:   Performed By: #### M  
G, CK, HEPATIC, CH8 ####[facility] PATHOLOGY   
[id number] [place],    
   
                                                    MAGNESIUMon 2025   
   
                      Magnesium [Mass/Vol] 2.1 mg/dL  Normal     1.9-2.7    The   
[facility]   
System  
   
                                        Comment on above:   Performed By: #### M  
G, CK, HEPATIC, CH8 ####[facility] PATHOLOGY   
[id number] [place],    
   
                                                    [facility] BASIC METABOLIC PANELo  
n 2025   
   
                          Calcium [Mass/Vol] 7.9 mg/dL    Low          8.6 - 10.  
3   
mg/dL                                   [facility]  
   
                      Chloride [Moles/Vol] 106 mmol/L            98 - 107 mmol/L  
 [facility]  
   
                      CO2 [Moles/Vol] 29 mmol/L             21 - 31 mmol/L [facility]  
   
                          Creatinine [Mass/Vol] 3.33 mg/dL   High         0.60 -  
 1.20   
mg/dL                                   [facility]  
   
                      Glucose [Mass/Vol] 135 mg/dL  High       74 - 109 mg/dL [facility]  
   
                                                    Interpretation and   
review of laboratory   
results         Abnormal                                        [facility]  
   
                      LHS ESTIMATED GFR 16         Low        - PINF     [facility]  
   
                                        Comment on above:    CKD EPI Equatio  
n using Creatinine without Race  
Comment: Estimated glomerular filtration rate (eGFR) is   
calculated without a race coefficient. Values should be   
interpreted in the context of the patient's full clinical   
presentation.  
Reference:  
1. Alfredo C, Delmi M, Valdez FLYNN, et al.. A Unifying Approach   
for GFR Estimation: Recommendations of the NKF-ASN Task Force on   
Reassessing the Inclusion of Race in Diagnosing Kidney Disease.   
American Journal of Kidney Diseases 202;79(2):268-88.e1.  
2. N Engl J Med  Vol. 385 Issue 19 Pages 3215-0210  
  
   
   
                      METRO ANION GAP 11                    10 - 20    [facility]  
   
                          Potassium [Moles/Vol] 4 mmol/L                  3.5 -   
5.0   
mmol/L                                  [facility]  
   
                          Sodium [Moles/Vol] 142 mmol/L                136 - 145  
   
mmol/L                                  [facility]  
   
                                                    Urea nitrogen   
[Mass/Vol]      45 mg/dL        High            7 - 25 mg/dL    [facility] CBC WITH DIFFERENTIALo  
n 2025   
   
                                                    Basophils (Bld)   
[#/Vol]             0.09 10*3/uL                            0.00 - 0.20   
K/uL                                    [facility]  
   
                                                    Basophils/100 WBC   
(Bld)           0.6 %                           NINF - 1.9 %    [facility]  
   
                                                    Eosinophils (Bld)   
[#/Vol]             0.41 10*3/uL                            0.00 - 0.70   
K/uL                                    [facility]  
   
                                                    Eosinophils/100 WBC   
(Bld)           2.5 %                           0.1 - 4.0 %     [facility]  
   
                                                    Erythrocyte   
distribution width   
(RBC) [Ratio]   17 %            High            11.5 - 14.5 %   [facility]  
   
                                                    Hematocrit (Bld)   
[Volume fraction] 27.1 %          Low             36.0 - 46.0 %   [facility]  
   
                                                    Hemoglobin (Bld)   
[Mass/Vol]          8.3 g/dL            Low                 12.0 - 15.0   
g/dL                                    [facility]  
   
                                                    Interpretation and   
review of laboratory   
results         Abnormal                                        [facility] MEAN PLT VOL 8.1 fL                7.5 - 11.2 fL [facility] PLATELET 308 K/uL              150 - 400 K/uL [facility] RBC COUNT 3.21       Low                   [facility]  
   
                                                    Lymphocytes (Bld)   
[#/Vol]             1.74 10*3/uL                            1.00 - 4.80   
K/uL                                    [facility]  
   
                                                    Lymphocytes/100 WBC   
(Bld)           10.9 %          Low             24.0 - 44.0 %   [facility]  
   
                                                    MCH (RBC) [Entitic   
mass]           25.9 pg         Low             26.0 - 34.0 pg  [facility]  
   
                          MCHC (RBC) [Mass/Vol] 30.8 g/dL    Low          32.0 -  
 35.9   
g/dL                                    [facility]  
   
                                                    MCV (RBC) [Entitic   
vol]            84 fL                           80 - 100 fL     [facility] TOTAL VOLUME -   
REF                 1.23 K/uL           High                0.20 - 1.00   
K/uL                                    [facility]  
   
                                                    Monocytes/100 WBC   
(Bld)           7.7 %                           2.0 - 11.0 %    [facility]  
   
                                                    Neutrophils (Bld)   
[#/Vol]             12.47 10*3/uL       High                1.50 - 8.00   
K/uL                                    [facility]  
   
                                                    Neutrophils/100 WBC   
(Bld)           78.3 %          High            31.0 - 76.0 %   [facility]  
   
                      WBC (Bld) [#/Vol] 15.9 10*3/uL High       4.5 - 11.5 K/uL   
[facility]  
   
                                                    No Panel Informationon    
   
                                                                  [facility]  
   
                                                    Procedureson 2025   
   
                                                    Transcription   
Authentication   
Interface Message   
Text                                                Invalid   
Interpretation   
Code                                                The   
[facility]   
System  
   
                                                    Progress Noteson 2025   
   
                                                    Transcription   
Authentication   
Interface Message   
Text                            Normal                          The   
[facility]   
System  
   
                                                    Transcription   
Authentication   
Interface Message   
Text                            Normal                          The   
[facility]   
System  
   
                                                    Transcription   
Authentication   
Interface Message   
Text                                    Preliminary Vascular   
Lab Report Duplex   
Bilateral lower   
Extremity Vein Scan  
Negative for bilateral   
lower extremity for   
DVT. Official report   
to follow. CADEN Alcala RVS     Normal                                  The   
[facility]   
System  
   
                                                    Transcription   
Authentication   
Interface Message   
Text                            Normal                          The   
[facility]   
System  
   
                                                    Transcription   
Authentication   
Interface Message   
Text                                    25 0258  
Admit Note (Completed   
by Receiving Unit)  
Arrival Time: 0258  
Arrived to:  8 [address]  
Arrived from:  7   
[place] [facility]  
Mode of Transport Cart  
Transported by:   
Patient Transporter  
Transported with:   
Oxygen  
SBAR Report Received   
From: Yudy         Normal                                  The   
[facility]   
System  
   
                                                    Transcription   
Authentication   
Interface Message   
Text                            Normal                          The   
[facility]   
[facility]  
   
                                                    Progress Notes - NoteWritero  
n 2025   
   
                                                    Transcription   
Authentication   
Interface Message   
Text                            Normal                          The   
[facility]   
System  
   
                                                    VANCOMYCIN RANDOMon 20  
25   
   
                      VANC R     23.6 ug/mL Normal     5.0-40.0   The   
[facility]   
[facility]  
   
                                        Comment on above:   Performed By: #### V  
ANC R ####[facility] [id number]   
[place], [address]   
   
                                                    ALL LACTOSE TOLERANCEon    
   
                          METRO CR LACT 0.8 mmol/L                0.5 - 1.6   
mmol/L                                  [facility]  
   
                                                            This test was   
developed, and its   
performance   
characteristics   
determined by the   
Department of   
Pathology of The   
[facility]. It   
has not been cleared   
or approved by the   
FDA. This test is used   
for clinical purposes   
only.  
                                                            [facility]  
   
                                                    BASIC METABOLIC PANELon    
   
                      Anion gap [Moles/Vol] 9 mmol/L   Low        10-20      The  
   
[facility]   
System  
   
                                        Comment on above:   Performed By: #### M  
G, FETIBC, MANASA, CH8, CRP, VANC R ####[facility]   
PATHOLOGY [id number] [place],   
   
   
                      Calcium [Mass/Vol] 8.1 mg/dL  Low        8.6-10.3   The   
[facility]   
[facility]  
   
                                        Comment on above:   Performed By: #### M  
G, FETIBC, MANASA, CH8, CRP, VANC R ####[facility]   
PATHOLOGY [id number] [place],   
   
   
                      Chloride [Moles/Vol] 105 mmol/L Normal          The   
[facility]   
[facility]  
   
                                        Comment on above:   Performed By: #### M  
G, FETIBC, MANASA, CH8, CRP, VANC R ####[facility]   
PATHOLOGY [id number] [place],   
   
   
                      CO2 [Moles/Vol] 28 mmol/L  Normal     21-31      The   
[facility]   
[facility]  
   
                                        Comment on above:   Performed By: #### M  
G, FETIBC, MANASA, CH8, CRP, VANC R ####[facility]   
PATHOLOGY [id number] [place],   
   
   
                      Creatinine [Mass/Vol] 3.34 mg/dL High       0.60-1.20  The  
   
[facility]   
[facility]  
   
                                        Comment on above:   Performed By: #### M  
G, FETIBC, MANASA, CH8, CRP, VANC R ####[facility]   
PATHOLOGY [id number] [place],   
   
   
                                                    ESTIMATED GFR   
(CKD-EPI)       16 mL/min/1.73sqm Low             >=60            The   
[facility]   
[facility]  
   
                                        Comment on above:   Result Comment:   
 CKD EPI Equation using Creatinine without  
   
RaceComment: Estimated glomerular filtration rate (eGFR) is   
calculated without a race coefficient. Values should be   
interpreted in the context of the patient's full clinical   
presentation.Reference:1. Alfredo C, Delmi M, Valdez DC, et al..   
A Unifying Approach for GFR Estimation: Recommendations of the   
NKF-ASN Task Force on Reassessing the Inclusion of Race in   
Diagnosing Kidney Disease. American Journal of Kidney Diseases   
;79(2):268-88.e1.2. N Engl J Med 1 Vol. 385 Issue 19   
Pages 0763-4367   
   
                                                            Performed By: #### M  
G, FETIBC, MANASA, CH8, CRP, VANC R ####[facility]   
PATHOLOGY [id number] [place],   
   
   
                      Glucose [Mass/Vol] 143 mg/dL  High            The   
[facility]   
System  
   
                                        Comment on above:   Performed By: #### M  
G, FETIBC, MANASA, CH8, CRP, VANC R ####[facility]   
PATHOLOGY [id number] [place],   
   
   
                      Potassium [Moles/Vol] 4.1 mmol/L Normal     3.5-5.0    The  
   
[facility]   
System  
   
                                        Comment on above:   Performed By: #### M  
G, FETIBC, MANASA, CH8, CRP, VANC R ####[facility]   
PATHOLOGY [id number] [place],   
   
   
                      Sodium [Moles/Vol] 138 mmol/L Normal     136-145    The   
[facility]   
System  
   
                                        Comment on above:   Performed By: #### M  
G, FETIBC, MANASA, CH8, CRP, VANC R ####[facility]   
PATHOLOGY [id number] [place] [address],   
   
   
                                                    Urea nitrogen   
[Mass/Vol]      49 mg/dL        High            7-25            The   
[facility]   
System  
   
                                        Comment on above:   Performed By: #### M  
G, FETIBC, MANASA, CH8, CRP, VANC R ####[facility]   
PATHOLOGY [id number] [place] [address],   
   
   
                                                    Blood Attestationon 20  
25   
   
                                                    Transcription   
Authentication   
Interface Message   
Text                            Normal                          The   
[facility]   
System  
   
                                                    C-REACTIVE PROTEINon   
025   
   
                      CRP        9.6 mg/dL  High       <0.5       The   
[facility]   
System  
   
                                        Comment on above:   Performed By: #### M  
G, FETIBC, MANASA, CH8, CRP, VANC R ####[facility]   
PATHOLOGY [id number] [place],   
   
   
                                                    CBC WITH DIFFERENTIALon    
   
                                                    Basophils (Bld)   
[#/Vol]         0.17 10*3/uL    Normal          0.00-0.20       The   
[facility]   
System  
   
                                        Comment on above:   Performed By: #### C  
BCDSAT ####[facility] PATHOLOGY [id number]   
[place],    
   
                                                    Basophils/100 WBC   
(Bld)           1.0 %           Normal          <=1.9           The   
[facility]   
[facility]  
   
                                        Comment on above:   Performed By: #### C  
BCDSAT ####[facility] PATHOLOGY [id number]   
[place],    
   
                                                    Eosinophils (Bld)   
[#/Vol]         0.42 10*3/uL    Normal          0.00-0.70       The   
[facility]   
[facility]  
   
                                        Comment on above:   Performed By: #### C  
BCDSAT ####[facility] PATHOLOGY [id number] [place] [address],    
   
                                                    Eosinophils/100 WBC   
(Bld)           2.5 %           Normal          0.1-4.0         The   
[facility]   
[facility]  
   
                                        Comment on above:   Performed By: #### C  
BCDSAT ####[facility] PATHOLOGY [id number] [place] [address],    
   
                                                    Erythrocyte   
distribution width   
(RBC) [Ratio]   16.7 %          High            11.5-14.5       The   
[facility]   
[facility]  
   
                                        Comment on above:   Performed By: #### C  
BCDSAT ####[facility] PATHOLOGY [id number] [place] [address],    
   
                                                    Hematocrit (Bld)   
[Volume fraction] 26.0 %          Low             36.0-46.0       The   
[facility]   
[facility]  
   
                                        Comment on above:   Performed By: #### C  
BCDSAT ####[facility] PATHOLOGY [id number] [place] [address],    
   
                                                    Hemoglobin (Bld)   
[Mass/Vol]      8.2 g/dL        Low             12.0-15.0       The   
[facility]   
[facility]  
   
                                        Comment on above:   Performed By: #### C  
BCDSAT ####[facility] PATHOLOGY [id number] [place] [address],    
   
                                                    Lymphocytes (Bld)   
[#/Vol]         1.79 10*3/uL    Normal          1.00-4.80       The   
[facility]   
[facility]  
   
                                        Comment on above:   Performed By: #### C  
BCDSAT ####[facility] PATHOLOGY [id number] [place] [address],    
   
                                                    Lymphocytes/100 WBC   
(Bld)           10.4 %          Low             24.0-44.0       The   
[facility]  
   
                                        Comment on above:   Performed By: #### C  
BCDSAT ####[facility] PATHOLOGY [id number]   
[place],    
   
                                                    MCH (RBC) [Entitic   
mass]           26.8 pg         Normal          26.0-34.0       The   
[facility]   
[facility]  
   
                                        Comment on above:   Performed By: #### C  
BCDSAT ####[facility] PATHOLOGY [id number]   
[place],    
   
                      MCHC (RBC) [Mass/Vol] 31.7 g/dL  Low        32.0-35.9  The  
   
[facility]   
[facility]  
   
                                        Comment on above:   Performed By: #### C  
BCDSAT ####[facility] PATHOLOGY [id number]   
[place],    
   
                                                    MCV (RBC) [Entitic   
vol]            85 fL           Normal                    The   
[facility]   
[facility]  
   
                                        Comment on above:   Performed By: #### C  
BCDSAT ####[facility] PATHOLOGY [id number] [place] [address],    
   
                                                    Monocytes (Bld)   
[#/Vol]         1.37 10*3/uL    High            0.20-1.00       The   
[facility]   
[facility]  
   
                                        Comment on above:   Performed By: #### C  
BCDSAT ####[facility] PATHOLOGY [id number]   
[place],    
   
                                                    Monocytes/100 WBC   
(Bld)           7.9 %           Normal          2.0-11.0        The   
[facility]   
[facility]  
   
                                        Comment on above:   Performed By: #### C  
BCDSAT ####[facility] PATHOLOGY [id number]   
[place],    
   
                                                    Neutrophils (Bld)   
[#/Vol]         13.51 10*3/uL   High            1.50-8.00       The   
[facility]   
[facility]  
   
                                        Comment on above:   Performed By: #### C  
BCDSAT ####[facility] PATHOLOGY [id number]   
[place],    
   
                                                    Neutrophils/100 WBC   
(Bld)           78.3 %          High            31.0-76.0       The   
[facility]   
[facility]  
   
                                        Comment on above:   Performed By: #### C  
BCDSAT ####[facility] PATHOLOGY [id number]   
[place],    
   
                                                    Platelet mean volume   
(Bld) [Entitic vol] 8.4 fL          Normal          7.5-11.2        The   
[facility]   
[facility]  
   
                                        Comment on above:   Performed By: #### C  
BCDSAT ####[facility] PATHOLOGY [id number]   
[place],    
   
                                                    Platelets (Bld)   
[#/Vol]         284 10*3/uL     Normal          150-400         The   
[facility]   
[facility]  
   
                                        Comment on above:   Performed By: #### C  
BCDSAT ####[facility] PATHOLOGY [id number]   
[place],    
   
                      RBC (Bld) [#/Vol] 3.07 10*6/uL Low        4.00-5.20  The   
[facility]  
   
                                        Comment on above:   Performed By: #### C  
BCDSAT ####[facility] PATHOLOGY [id number]   
[place],    
   
                      WBC (Bld) [#/Vol] 17.3 10*3/uL High       4.5-11.5   The   
[facility]   
System  
   
                                        Comment on above:   Performed By: #### C  
BCDSAT ####[facility] PATHOLOGY [id number] [place] [address],    
   
                                                    Basophils (Bld)   
[#/Vol]         0.12 10*3/uL    Normal          0.00-0.20       The   
[facility]   
System  
   
                                        Comment on above:   Performed By: #### C  
BCDSAT ####[facility] PATHOLOGY [id number] [place] [address],    
   
                                                    Basophils/100 WBC   
(Bld)           0.8 %           Normal          <=1.9           The   
[facility]   
System  
   
                                        Comment on above:   Performed By: #### C  
BCDSAT ####[facility] PATHOLOGY [id number]   
[place],    
   
                                                    Eosinophils (Bld)   
[#/Vol]         0.42 10*3/uL    Normal          0.00-0.70       The   
[facility]   
System  
   
                                        Comment on above:   Performed By: #### C  
BCDSAT ####[facility] PATHOLOGY [id number] [place] [address],    
   
                                                    Eosinophils/100 WBC   
(Bld)           2.7 %           Normal          0.1-4.0         The   
[facility]   
System  
   
                                        Comment on above:   Performed By: #### C  
BCDSAT ####[facility] PATHOLOGY [id number] [place] [address],    
   
                                                    Erythrocyte   
distribution width   
(RBC) [Ratio]   17.3 %          High            11.5-14.5       The   
[facility]  
   
                                        Comment on above:   Performed By: #### C  
RUSSELAT ####[facility] PATHOLOGY [id number] [place] [address],    
   
                                                    Hematocrit (Bld)   
[Volume fraction] 24.3 %          Low             36.0-46.0       The   
[facility]  
   
                                        Comment on above:   Performed By: #### C  
RUSSELAT ####[facility] PATHOLOGY [id number] [place] [address],    
   
                                                    Hemoglobin (Bld)   
[Mass/Vol]      7.5 g/dL        Low             12.0-15.0       The   
[facility]  
   
                                        Comment on above:   Performed By: #### C  
RUSSELAT ####[facility] PATHOLOGY [id number] [place] [address],    
   
                                                    Lymphocytes (Bld)   
[#/Vol]         1.43 10*3/uL    Normal          1.00-4.80       The   
[facility]   
[facility]  
   
                                        Comment on above:   Performed By: #### C  
RUSSELAT ####[facility] PATHOLOGY [id number] [place] [address],    
   
                                                    Lymphocytes/100 WBC   
(Bld)           9.2 %           Low             24.0-44.0       The   
[facility]   
[facility]  
   
                                        Comment on above:   Performed By: #### C  
RUSSELAT ####[facility] PATHOLOGY [id number] [place] [address],    
   
                                                    MCH (RBC) [Entitic   
mass]           25.9 pg         Low             26.0-34.0       The   
[facility]   
System  
   
                                        Comment on above:   Performed By: #### C  
RUSSELAT ####[facility] PATHOLOGY [id number] [place] [address],    
   
                      MCHC (RBC) [Mass/Vol] 30.8 g/dL  Low        32.0-35.9  The  
   
[facility]   
[facility]  
   
                                        Comment on above:   Performed By: #### C  
RUSSELAT ####S PATHOLOGY [id number]   
[place],    
   
                                                    MCV (RBC) [Entitic   
vol]            84 fL           Normal                    The   
[facility]   
[facility]  
   
                                        Comment on above:   Performed By: #### C  
RUSSELAT ####[facility] PATHOLOGY [id number]   
[place],    
   
                                                    Monocytes (Bld)   
[#/Vol]         1.16 10*3/uL    High            0.20-1.00       The   
[facility]  
   
                                        Comment on above:   Performed By: #### C  
BCDSAT ####[facility] PATHOLOGY [id number]   
[place],    
   
                                                    Monocytes/100 WBC   
(Bld)           7.5 %           Normal          2.0-11.0        The   
[facility]   
[facility]  
   
                                        Comment on above:   Performed By: #### C  
BCDSAT ####[facility] PATHOLOGY [id number]   
[place],    
   
                                                    Neutrophils (Bld)   
[#/Vol]         12.39 10*3/uL   High            1.50-8.00       The   
[facility]   
[facility]  
   
                                        Comment on above:   Performed By: #### C  
BCDSAT ####[facility] PATHOLOGY [id number]   
[place],    
   
                                                    Neutrophils/100 WBC   
(Bld)           79.9 %          High            31.0-76.0       The   
[facility]   
[facility]  
   
                                        Comment on above:   Performed By: #### C  
BCDSAT ####[facility] PATHOLOGY [id number]   
[place],    
   
                                                    Platelet mean volume   
(Bld) [Entitic vol] 8.3 fL          Normal          7.5-11.2        The   
[facility]   
[facility]  
   
                                        Comment on above:   Performed By: #### C  
BCDSAT ####[facility] PATHOLOGY [id number]   
[place], [place],    
   
                                                    Platelets (Bld)   
[#/Vol]         322 10*3/uL     Normal          150-400         The   
[facility]   
[facility]  
   
                                        Comment on above:   Performed By: #### C  
BCDSAT ####[facility] PATHOLOGY [id number]   
[place], [place],    
   
                      RBC (Bld) [#/Vol] 2.89 10*6/uL Low        4.00-5.20  The   
[facility]   
[facility]  
   
                                        Comment on above:   Performed By: #### C  
BCDSAT ####[facility] PATHOLOGY [id number]   
[place], [place],    
   
                      WBC (Bld) [#/Vol] 15.5 10*3/uL High       4.5-11.5   The   
[facility]   
System  
   
                                        Comment on above:   Performed By: #### C  
BCDSAT ####[facility] PATHOLOGY [id number] [place] [address],    
   
                                                    Basophils (Bld)   
[#/Vol]         0.06 10*3/uL    Normal          0.00-0.20       The   
[facility]   
System  
   
                                        Comment on above:   Performed By: #### C  
BCDSAT ####[facility] PATHOLOGY [id number] [place] [address], #### HB A1C ####[facility] PATHOLOGY LABORATORY [address] [place], [address]   
   
                                                    Basophils/100 WBC   
(Bld)           0.4 %           Normal          <=1.9           The   
[facility]   
System  
   
                                        Comment on above:   Performed By: #### C  
BCDSAT ####[facility] PATHOLOGY [id number] [place] [address], #### HB A1C ####[facility] PATHOLOGY LABORATORY [address] [place], [address]   
   
                                                    Eosinophils (Bld)   
[#/Vol]         0.40 10*3/uL    Normal          0.00-0.70       The   
[facility]   
System  
   
                                        Comment on above:   Performed By: #### C  
BCDSAT ####[facility] PATHOLOGY [id number] [place] [address], #### HB A1C ####[facility] PATHOLOGY LABORATORY [address] [place], [address]   
   
                                                    Eosinophils/100 WBC   
(Bld)           2.6 %           Normal          0.1-4.0         The   
[facility]  
   
                                        Comment on above:   Performed By: #### C  
BCDSAT ####[facility] PATHOLOGY [id number] [place] [address], #### HB A1C ####[facility] PATHOLOGY LABORATORY [address] [place], [address]   
   
                                                    Erythrocyte   
distribution width   
(RBC) [Ratio]   17.0 %          High            11.5-14.5       The   
[facility]  
   
                                        Comment on above:   Performed By: #### C  
BCDSAT ####[facility] PATHOLOGY [id number] [place] [address], #### HB A1C ####[facility] PATHOLOGY LABORATORY [address] [place],    
   
                                                    Hematocrit (Bld)   
[Volume fraction] 24.7 %          Low             36.0-46.0       The   
[facility]   
System  
   
                                        Comment on above:   Performed By: #### C  
BCDSAT ####[facility] PATHOLOGY [id number] [place] [address], #### HB A1C ####[facility] PATHOLOGY LABORATORY [address] [place],    
   
                                                    Hemoglobin (Bld)   
[Mass/Vol]      7.4 g/dL        Low             12.0-15.0       The   
[facility]   
System  
   
                                        Comment on above:   Performed By: #### C  
BCDSAT ####[facility] PATHOLOGY [id number] [place] [address], #### HB A1C ####[facility] PATHOLOGY LABORATORY [address] [place],    
   
                                                    Lymphocytes (Bld)   
[#/Vol]         1.91 10*3/uL    Normal          1.00-4.80       The   
[facility]   
System  
   
                                        Comment on above:   Performed By: #### C  
BCDSAT ####[facility] PATHOLOGY [id number] [place] [address], #### HB A1C ####[facility] PATHOLOGY LABORATORY [address] [place],    
   
                                                    Lymphocytes/100 WBC   
(Bld)           12.6 %          Low             24.0-44.0       The   
[facility]   
System  
   
                                        Comment on above:   Performed By: #### C  
BCDSAT ####[facility] PATHOLOGY [id number] [place] [address], #### HB A1C ####[facility] PATHOLOGY LABORATORY [address] [place],    
   
                                                    MCH (RBC) [Entitic   
mass]           25.3 pg         Low             26.0-34.0       The   
[facility]   
System  
   
                                        Comment on above:   Performed By: #### C  
BCDSAT ####[facility] PATHOLOGY [id number] [place] [address], #### HB A1C ####[facility] PATHOLOGY LABORATORY [address] [place],    
   
                      MCHC (RBC) [Mass/Vol] 30.2 g/dL  Low        32.0-35.9  The  
   
[facility]   
[facility]  
   
                                        Comment on above:   Performed By: #### C  
BCDSAT ####[facility] PATHOLOGY [id number] [place] [address], #### HB A1C ####[facility] PATHOLOGY LABORATORY [address] [place], [address]   
   
                                                    MCV (RBC) [Entitic   
vol]            84 fL           Normal                    The   
[facility]   
System  
   
                                        Comment on above:   Performed By: #### C  
BCDSAT ####[facility] PATHOLOGY [id number] [place] [address], #### HB A1C ####[facility] PATHOLOGY LABORATORY [address] [place], [address]   
   
                                                    Monocytes (Bld)   
[#/Vol]         1.07 10*3/uL    High            0.20-1.00       The   
[facility]   
System  
   
                                        Comment on above:   Performed By: #### C  
BCDSAT ####[facility] PATHOLOGY [id number] [place] [address], #### HB A1C ####[facility] PATHOLOGY LABORATORY [address] [place], [address]   
   
                                                    Monocytes/100 WBC   
(Bld)           7.1 %           Normal          2.0-11.0        The   
[facility]   
System  
   
                                        Comment on above:   Performed By: #### C  
BCDSAT ####[facility] PATHOLOGY [id number] [place] [address], #### HB A1C ####[facility] PATHOLOGY LABORATORY [address] [place], [address]   
   
                                                    Neutrophils (Bld)   
[#/Vol]         11.77 10*3/uL   High            1.50-8.00       The   
[facility]   
System  
   
                                        Comment on above:   Performed By: #### C  
BCDSAT ####[facility] PATHOLOGY [id number] [place] [address], #### HB A1C ####[facility] PATHOLOGY LABORATORY [address] [place], [address]   
   
                                                    Neutrophils/100 WBC   
(Bld)           77.4 %          High            31.0-76.0       The   
[facility]   
System  
   
                                        Comment on above:   Performed By: #### C  
BCDSAT ####[facility] PATHOLOGY [id number] [place] [address], #### HB A1C ####[facility] PATHOLOGY LABORATORY [address] [place], [address]   
   
                                                    Platelet mean volume   
(Bld) [Entitic vol] 7.8 fL          Normal          7.5-11.2        The   
[facility]   
System  
   
                                        Comment on above:   Performed By: #### C  
BCDSAT ####[facility] PATHOLOGY [id number] [place] [address], #### HB A1C ####[facility] PATHOLOGY LABORATORY [address] [place],    
   
                                                    Platelets (Bld)   
[#/Vol]         312 10*3/uL     Normal          150-400         The   
[facility]   
System  
   
                                        Comment on above:   Performed By: #### C  
BCDSAT ####[facility] PATHOLOGY [id number] [place] [address], #### HB A1C ####[facility] PATHOLOGY LABORATORY [address] [place],    
   
                      RBC (Bld) [#/Vol] 2.94 10*6/uL Low        4.00-5.20  The   
[facility]   
System  
   
                                        Comment on above:   Performed By: #### C  
BCDSAT ####[facility] PATHOLOGY [id number] [place] [address], #### HB A1C ####[facility] PATHOLOGY LABORATORY [address] [place],    
   
                      WBC (Bld) [#/Vol] 15.2 10*3/uL High       4.5-11.5   The   
[facility]   
System  
   
                                        Comment on above:   Performed By: #### C  
BCDSAT ####[facility] PATHOLOGY [id number] [place] [address], #### HB A1C ####[facility] PATHOLOGY LABORATORY [address] [place], [address]   
   
                                                    Basophils (Bld)   
[#/Vol]         0.06 10*3/uL    Normal          0.00-0.20       The   
[facility]   
System  
   
                                        Comment on above:   Performed By: #### C  
BCDSAT ####[facility] PATHOLOGY [id number] [place] [address],    
   
                                                    Basophils/100 WBC   
(Bld)           0.4 %           Normal          <=1.9           The   
[facility]   
System  
   
                                        Comment on above:   Performed By: #### C  
BCDSAT ####[facility] PATHOLOGY [id number]   
[place],    
   
                                                    Eosinophils (Bld)   
[#/Vol]         0.37 10*3/uL    Normal          0.00-0.70       The   
[facility]   
[facility]  
   
                                        Comment on above:   Performed By: #### C  
BCDSAT ####[facility] PATHOLOGY [id number]   
[place],    
   
                                                    Eosinophils/100 WBC   
(Bld)           2.2 %           Normal          0.1-4.0         The   
[facility]   
[facility]  
   
                                        Comment on above:   Performed By: #### C  
BCDSAT ####[facility] PATHOLOGY [id number]   
[place],    
   
                                                    Erythrocyte   
distribution width   
(RBC) [Ratio]   17.3 %          High            11.5-14.5       The   
[facility]   
[facility]  
   
                                        Comment on above:   Performed By: #### C  
BCDSAT ####[facility] PATHOLOGY [id number]   
[place],    
   
                                                    Hematocrit (Bld)   
[Volume fraction] 22.9 %          Low             36.0-46.0       The   
[facility]   
[facility]  
   
                                        Comment on above:   Performed By: #### C  
BCDSAT ####[facility] PATHOLOGY [id number]   
[place],    
   
                                                    Hemoglobin (Bld)   
[Mass/Vol]      6.8 g/dL        Critically low  12.0-15.0       The   
[facility]   
[facility]  
   
                                        Comment on above:   Performed By: #### C  
BCDSAT ####[facility] PATHOLOGY [id number]   
[place],    
   
                                                    Lymphocytes (Bld)   
[#/Vol]         1.79 10*3/uL    Normal          1.00-4.80       The   
[facility]   
[facility]  
   
                                        Comment on above:   Performed By: #### C  
BCDSAT ####[facility] PATHOLOGY [id number]   
[place],    
   
                                                    Lymphocytes/100 WBC   
(Bld)           11.0 %          Low             24.0-44.0       The   
[facility]   
[facility]  
   
                                        Comment on above:   Performed By: #### C  
BCDSAT ####[facility] PATHOLOGY [id number]   
[place],    
   
                                                    MCH (RBC) [Entitic   
mass]           25.0 pg         Low             26.0-34.0       The   
[facility]   
[facility]  
   
                                        Comment on above:   Performed By: #### C  
BCDSAT ####[facility] PATHOLOGY [id number]   
[place],    
   
                      MCHC (RBC) [Mass/Vol] 29.7 g/dL  Low        32.0-35.9  The  
   
[facility]   
[facility]  
   
                                        Comment on above:   Performed By: #### C  
BCDSAT ####[facility] PATHOLOGY [id number]   
[place],    
   
                                                    MCV (RBC) [Entitic   
vol]            84 fL           Normal                    The   
[facility]   
[facility]  
   
                                        Comment on above:   Performed By: #### C  
BCDSAT ####[facility] PATHOLOGY [id number]   
[place],    
   
                                                    Monocytes (Bld)   
[#/Vol]         1.11 10*3/uL    High            0.20-1.00       The   
[facility]   
[facility]  
   
                                        Comment on above:   Performed By: #### C  
BCDSAT ####[facility] PATHOLOGY [id number]   
[place],    
   
                                                    Monocytes/100 WBC   
(Bld)           6.8 %           Normal          2.0-11.0        The   
[facility]   
[facility]  
   
                                        Comment on above:   Performed By: #### C  
BCDSAT ####[facility] PATHOLOGY [id number]   
[place],    
   
                                                    Neutrophils (Bld)   
[#/Vol]         12.99 10*3/uL   High            1.50-8.00       The   
[facility]   
[facility]  
   
                                        Comment on above:   Performed By: #### C  
BCDSAT ####[facility] PATHOLOGY [id number]   
[place],    
   
                                                    Neutrophils/100 WBC   
(Bld)           79.6 %          High            31.0-76.0       The   
[facility]   
[facility]  
   
                                        Comment on above:   Performed By: #### C  
BCDSAT ####[facility] PATHOLOGY [id number]   
[place],    
   
                                                    Platelet mean volume   
(Bld) [Entitic vol] 7.8 fL          Normal          7.5-11.2        The   
[facility]   
[facility]  
   
                                        Comment on above:   Performed By: #### C  
BCDSAT ####S PATHOLOGY [id number]   
[place],    
   
                                                    Platelets (Bld)   
[#/Vol]         329 10*3/uL     Normal          150-400         The   
[facility]   
[facility]  
   
                                        Comment on above:   Performed By: #### C  
BCDSAT ####[facility] PATHOLOGY [id number]   
[place],    
   
                      RBC (Bld) [#/Vol] 2.72 10*6/uL Low        4.00-5.20  The   
[facility]   
[facility]  
   
                                        Comment on above:   Performed By: #### C  
BCDSAT ####[facility] PATHOLOGY [id number]   
[place],    
   
                      WBC (Bld) [#/Vol] 16.3 10*3/uL High       4.5-11.5   The   
[facility]   
[facility]  
   
                                        Comment on above:   Performed By: #### RADHA  
BCDSAT ####[facility] PATHOLOGY [id number]   
[place],    
   
                                                    Consultson 2025   
   
                                                    Transcription   
Authentication   
Interface Message   
Text                            Normal                          The   
[facility]   
System  
   
                                                    Transcription   
Authentication   
Interface Message   
Text                            Normal                          The   
[facility]   
System  
   
                                                    Transcription   
Authentication   
Interface Message   
Text                            Normal                          The   
[facility]   
[facility]  
   
                                                    Transcription   
Authentication   
Interface Message   
Text                            Normal                          The   
[facility]   
[facility]  
   
                                                    FERRITINon 2025   
   
                      MANASA        127.3 ng/mL Normal     11.0-306.8 The   
[facility]   
[facility]  
   
                                        Comment on above:   Performed By: #### M  
G, FETIBC, MANASA, CH8, CRP, VANC R ####[facility]   
PATHOLOGY [id number] [place],   
   
   
                                                    GLUCOSE, FINGERSTICK-IN OFFI  
CEon 2025   
   
                      Glucose [Mass/Vol] 178 mg/dL  High            The   
[facility]   
[facility]  
   
                                        Comment on above:   Performed By: #### 8  
[id number] ####NURSING GLUCOSE [id number]   
[place],    
   
                      Glucose [Mass/Vol] 197 mg/dL  High            The   
[facility]   
[facility]  
   
                                        Comment on above:   Result Comment: Rayray darby RN APN MDFolladenike Protocol   
   
                                                            Performed By: #### 8  
[id number] ####NURSING GLUCOSE [id number]   
[place], [address]   
   
                      Glucose [Mass/Vol] 152 mg/dL  High            The   
[facility]   
[facility]  
   
                                        Comment on above:   Result Comment: Rayray Fenton Protocol   
   
                                                            Performed By: #### 8  
[id number] ####NURSING GLUCOSE [id number]   
[place], [address]   
   
                      Glucose [Mass/Vol] 157 mg/dL  High            The   
[facility]   
[facility]  
   
                                        Comment on above:   Performed By: #### 8  
[id number] ####NURSING GLUCOSE [id number]   
[place], [address]   
   
                                                    H AND Patricio 2025   
   
                                                    Transcription   
Authentication   
Interface Message   
Text                            Normal                          The   
[facility]   
[facility]  
   
                                                    Transcription   
Authentication   
Interface Message   
Text                            Normal                          The   
[facility]   
[facility]  
   
                                                    HEMOGLOBIN A1Con 2025   
   
                      Glucose [Mass/Vol] 134 mg/dL  Normal                The   
[facility]   
[facility]  
   
                                        Comment on above:   Performed By: #### C  
BCDSAT ####[facility] PATHOLOGY [id number] [place] [address], #### HB A1C ####[facility] PATHOLOGY LABORATORY [address] [place], [address]   
   
                                                    HbA1c (Bld) [Mass   
fraction]       6.3 %           High            4.0-5.6         The   
[facility]   
System  
   
                                        Comment on above:   Performed By: #### C  
BCDSAT ####[facility] PATHOLOGY [id number] [place] [address], #### HB A1C ####[facility] PATHOLOGY LABORATORY [address] [place], [address]   
   
                                                    IRON AND TIBCon 2025   
   
                      % SAT CORRECT PRD 11 %       Low        20-55      The   
[facility]   
System  
   
                                        Comment on above:   Performed By: #### M  
G, FETIBC, MANASA, CH8, CRP, VANC R ####[facility]   
PATHOLOGY [id number] [place] [address],   
   
   
                      FE CORRECT PRD 30 ug/dL   Low             The   
[facility]   
System  
   
                                        Comment on above:   Performed By: #### M  
G, FETIBC, MANASA, CH8, CRP, VANC R ####[facility]   
PATHOLOGY [id number] [place] [address],   
   
   
                      TIBC CORRECT  ug/mL  Normal     250-410    The   
[facility]   
System  
   
                                        Comment on above:   Performed By: #### M  
G, FETIBC, MANASA, CH8, CRP, VANC R ####[facility]   
PATHOLOGY [id number] [place], [place],   
   
   
                      TRANSFER CORRECT  mg/dL  Normal     [id number]    The   
[facility]   
System  
   
                                        Comment on above:   Performed By: #### M  
G, FETIBC, MANASA, CH8, CRP, VANC R ####[facility]   
PATHOLOGY [id number] [place], [place],   
   
   
                                                    LACTIC ACIDon 2025   
   
                      CR LACT    0.8 mmol/L Normal     0.5-1.6    [facility]   
[facility]   
[facility]  
   
                                        Comment on above:   Order Comment: This   
test was developed, and its performance   
characteristics determined by the Department of Pathology of The   
[facility]. It has not been cleared or approved by the   
FDA. This test is used for clinical purposes only.   
   
                                                            Performed By: #### L  
ACT ####[facility] PATHOLOGY [id number]   
[place], [place],    
   
                                                    MAGNESIUMon 2025   
   
                      Magnesium [Mass/Vol] 1.8 mg/dL  Low        1.9-2.7    [facility]   
[facility]   
[facility]  
   
                                        Comment on above:   Performed By: #### M  
G, FETIBC, MANASA, CH8, CRP, VANC R ####[facility]   
PATHOLOGY [id number] [place], [place],   
   
   
                                                    METRO CBC WITH DIFFERENTIALo  
n 2025   
   
                                                    Basophils (Bld)   
[#/Vol]             0.06 10*3/uL                            0.00 - 0.20   
K/uL                                    [facility]   
Healthcare  
   
                                                    Basophils/100 WBC   
(Bld)           0.4 %                           NINF - 1.9 %    [facility]  
   
                                                    Eosinophils (Bld)   
[#/Vol]             0.37 10*3/uL                            0.00 - 0.70   
K/uL                                    [facility]  
   
                                                    Eosinophils/100 WBC   
(Bld)           2.2 %                           0.1 - 4.0 %     [facility]  
   
                                                    Erythrocyte   
distribution width   
(RBC) [Ratio]   17.3 %          High            11.5 - 14.5 %   [facility]  
   
                                                    Hematocrit (Bld)   
[Volume fraction] 22.9 %          Low             36.0 - 46.0 %   [facility]  
   
                                                    Hemoglobin (Bld)   
[Mass/Vol]          6.8 g/dL            Critically low      12.0 - 15.0   
g/dL                                    [facility]  
   
                                                    Interpretation and   
review of laboratory   
results         Abnormal                                        [facility] MEAN PLT VOL 7.8 fL                7.5 - 11.2 fL [facility] PLATELET 329 K/uL              150 - 400 K/uL [facility] RBC COUNT 2.72       Low                   [facility]  
   
                                                    Lymphocytes (Bld)   
[#/Vol]             1.79 10*3/uL                            1.00 - 4.80   
K/uL                                    [facility]  
   
                                                    Lymphocytes/100 WBC   
(Bld)           11 %            Low             24.0 - 44.0 %   [facility]  
   
                                                    MCH (RBC) [Entitic   
mass]           25 pg           Low             26.0 - 34.0 pg  [facility]  
   
                          MCHC (RBC) [Mass/Vol] 29.7 g/dL    Low          32.0 -  
 35.9   
g/dL                                    [facility]  
   
                                                    MCV (RBC) [Entitic   
vol]            84 fL                           80 - 100 fL     [facility] TOTAL VOLUME -   
REF                 1.11 K/uL           High                0.20 - 1.00   
K/uL                                    [facility]  
   
                                                    Monocytes/100 WBC   
(Bld)           6.8 %                           2.0 - 11.0 %    [facility]  
   
                                                    Neutrophils (Bld)   
[#/Vol]             12.99 10*3/uL       High                1.50 - 8.00   
K/uL                                    [facility]  
   
                                                    Neutrophils/100 WBC   
(Bld)           79.6 %          High            31.0 - 76.0 %   [facility]  
   
                      WBC (Bld) [#/Vol] 16.3 10*3/uL High       4.5 - 11.5 K/uL   
[facility]  
   
                                                                  CLINISYNC  
   
                                                                  [facility]  
   
                                                    MRSA SCREENon 2025   
   
                                                    MRSA DNA MADIE+probe Ql   
(Unsp spec)                             CMR:  
  
No methicillin   
resistant   
Staphylococcus aureus   
isolated.                 Normal                    No methicillin   
resistant   
Staphylococcus   
aureus   
isolated.                               The   
[facility]   
System  
   
                                        Comment on above:   Performed By: #### C  
MR ####[facility] [id number]   
[place], [place]   
   
                                                    PARTIAL THROMBOPLASTIN TIMEo  
n 2025   
   
                                                    aPTT Coag (Bld)   
[Time]          34 s            Normal          25-37           The   
[facility]   
System  
   
                                        Comment on above:   Performed By: #### A  
PTT, PT ####[facility] PATHOLOGY [id number]   
[place], [place],    
   
                                                    PROTHROMBIN TIME AND INRon 0  
2025   
   
                                                    INR Coag (PPP)   
[Relative time] 1.21 {INR}      High            0.90-1.10       [facility]   
[facility]   
System  
   
                                        Comment on above:   Performed By: #### A  
PTT, PT ####[facility] PATHOLOGY [id number]   
[place], [place],    
   
                      PT Coag (PPP) [Time] 13.6 s     High       9.7-12.9   The   
[facility]   
System  
   
                                        Comment on above:   Performed By: #### A  
PTT, PT ####[facility] PATHOLOGY [id number] [place] [address],    
   
                                                    Progress Noteson 2025   
   
                                                    Transcription   
Authentication   
Interface Message   
Text                            Normal                          [facility]   
[facility]   
System  
   
                                                    Transcription   
Authentication   
Interface Message   
Text                                    Notified Dr. Coreas of   
RN inability to obtain   
peripheral blood   
cultures x2.  
Charge RN also   
attempted x2 with no   
success. No new orders   
at this time.       Normal                                  [facility]   
[facility]   
[facility]  
   
                                                    Transcription   
Authentication   
Interface Message   
Text                            Normal                          [facility]   
[facility]   
[facility]  
   
                                                    Transcription   
Authentication   
Interface Message   
Text                            Normal                          [facility]   
[facility]   
[facility]  
   
                                                    Transcription   
Authentication   
Interface Message   
Text                            Normal                          The   
[facility]   
System  
   
                                                    RED BLOOD CELL COMPONENTon 0  
2025   
   
                                        BB ORDER ITEM       Product status info   
to   
follow              Normal                                  [facility]   
[facility]   
[facility]  
   
                                        Comment on above:   Performed By: #### R  
HARRIS ####[facility] PATHOLOGY [id number] [place] [address],    
   
                                        BB ORDER ITEM       Product status info   
to   
follow              Normal                                  The   
[facility]   
System  
   
                                        Comment on above:   Performed By: #### R  
HARRIS ####S PATHOLOGY [id number] [place] [address],    
   
                                                    RED BLOOD CELL UNIT STATUSon  
 2025   
   
                      BLOOD PRODUCT CODE [id number]   Normal                The   
[facility]   
System  
   
                                        Comment on above:   Performed By: #### R  
BU ####S PATHOLOGY [id number] [place] [address],    
   
                                                    BLOOD PRODUCT   
DESCRIPTION     Red Blood Cells Normal                          The   
[facility]   
System  
   
                                        Comment on above:   Performed By: #### R  
BU ####S PATHOLOGY [id number] [place] [address],    
   
                      BLOOD PRODUCT STATUS Transfused Normal                The   
[facility]   
System  
   
                                        Comment on above:   Performed By: #### R  
BU ####S PATHOLOGY [id number]   
[place],    
   
                                                    BLOOD PRODUCT UNIT   
INFO            [id number]   Normal                          The   
[facility]   
System  
   
                                        Comment on above:   Performed By: #### R  
BU ####[facility] PATHOLOGY [id number]   
[place],    
   
                                                    BLOOD PRODUCT UNIT   
TYPE            0600            Normal                          The   
[facility]   
System  
   
                                        Comment on above:   Result Comment: A Ne  
g   
   
                                                            Performed By: #### R  
BU ####[facility] PATHOLOGY [id number]   
[place],    
   
                                                    CROSSMATCH   
INTERPRETATION  Compatible (E)  Normal                          The   
[facility]   
System  
   
                                        Comment on above:   Performed By: #### R  
BU ####S PATHOLOGY [id number]   
[place],    
   
                      BLOOD PRODUCT CODE [id number]   Normal                The   
[facility]   
System  
   
                                        Comment on above:   Performed By: #### R  
BU ####S PATHOLOGY [id number]   
[place],    
   
                                                    BLOOD PRODUCT   
DESCRIPTION     Red Blood Cells Normal                          The   
[facility]   
System  
   
                                        Comment on above:   Performed By: #### R  
BU ####S PATHOLOGY [id number]   
[place],    
   
                      BLOOD PRODUCT STATUS Transfused Normal                The   
[facility]   
System  
   
                                        Comment on above:   Performed By: #### R  
BU ####[facility] PATHOLOGY [id number]   
[place],    
   
                                                    BLOOD PRODUCT UNIT   
INFO            [id number]   Normal                          The   
[facility]   
System  
   
                                        Comment on above:   Performed By: #### R  
BU ####S PATHOLOGY [id number]   
[place],    
   
                                                    BLOOD PRODUCT UNIT   
TYPE            9500            Normal                          The   
[facility]   
System  
   
                                        Comment on above:   Result Comment: O Ne  
g   
   
                                                            Performed By: #### R  
BU ####[facility] PATH[facility]Y [id number]   
[place],    
   
                                                    CROSSMATCH   
INTERPRETATION  Compatible (E)  Normal                          The   
[facility]   
System  
   
                                        Comment on above:   Performed By: #### R  
BU ####[facility] PATHOLOGY [id number]   
[place],    
   
                                                    Student Noteon 2025   
   
                                                    Transcription   
Authentication   
Interface Message   
Text                            Normal                          The   
[facility]   
System  
   
                                                    TYPE AND SCREENon 2025  
   
   
                                                    ABO and Rh group Nom   
(Bld)                                   Blood group A Rh(D)   
negative            Normal                                  The   
[facility]   
System  
   
                                        Comment on above:   Performed By: #### T  
S ####S PATHOLOGY [id number]   
[place],    
   
                      ABSC INT   Negative   Normal                The   
[facility]   
System  
   
                                        Comment on above:   Performed By: #### T  
S ####S PATHOLOGY [id number] [place] [address],    
   
                                                    Transfer Noteon 2025   
   
                                                    Transcription   
Authentication   
Interface Message   
Text                            Normal                          The   
[facility]   
System  
   
                                                    URINALYSIS WITH REFLEX CULTU  
RE PERFORMABLEon 2025   
   
                      Glucose Ql (U) 200 mg/dL  Abnormal   Negative   The   
[facility]   
System  
   
                                        Comment on above:   Order Comment: A neg  
ative leukocyte esterase AND negative   
nitrite test or absence of pyuria (urine WBC count <= 5-10) make   
a UTI (urinary tract infection) very unlikely in a   
non-neutropenic adult (<=5% likelihood in many studies). A   
positive leukocyte esterase, nitrite and/or pyuria is a   
nonspecific result. This can be seen in conditions other than a   
UTI e.g. asymptomatic bacteriuria, gynecologic infections,   
sexually transmitted infections, and noninfectious conditions   
(positive predictive value for UTI around 50%)   
   
                                                            Performed By: #### u  
rinalysiswcul ####[facility] PATHOLOGY   
[id number] [place] [address], #### C   
URINE ####[facility] [address] [place], [place] [address]   
   
                                                    Protein (U)   
[Mass/Vol]      200 mg/dL       Abnormal        Negative        The   
[facility]   
System  
   
                                        Comment on above:   Order Comment: A neg  
ative leukocyte esterase AND negative   
nitrite test or absence of pyuria (urine WBC count <= 5-10) make   
a UTI (urinary tract infection) very unlikely in a   
non-neutropenic adult (<=5% likelihood in many studies). A   
positive leukocyte esterase, nitrite and/or pyuria is a   
nonspecific result. This can be seen in conditions other than a   
UTI e.g. asymptomatic bacteriuria, gynecologic infections,   
sexually transmitted infections, and noninfectious conditions   
(positive predictive value for UTI around 50%)   
   
                                                            Performed By: #### u  
rinalysiswcul ####S PATHOLOGY   
[id number] [place], [place], #### C   
URINE ####[facility] [id number] [place], [place]   
   
                      SQUAMOUS EPITHELIAL 0-2        Normal     0-10       The   
[facility]   
System  
   
                                        Comment on above:   Order Comment: A neg  
ative leukocyte esterase AND negative   
nitrite test or absence of pyuria (urine WBC count <= 5-10) make   
a UTI (urinary tract infection) very unlikely in a   
non-neutropenic adult (<=5% likelihood in many studies). A   
positive leukocyte esterase, nitrite and/or pyuria is a   
nonspecific result. This can be seen in conditions other than a   
UTI e.g. asymptomatic bacteriuria, gynecologic infections,   
sexually transmitted infections, and noninfectious conditions   
(positive predictive value for UTI around 50%)   
   
                                                            Performed By: #### u  
rinalysiswcul ####[facility] PATHOLOGY   
[id number] [place], [place], #### C   
URINE ####[facility] Mxdpjcluy2544 [place], [place]   
   
                                                    TRIPLE PHOSPHATE   
CRYSTALS        Few             Normal                          The   
[facility]   
System  
   
                                        Comment on above:   Order Comment: A neg  
ative leukocyte esterase AND negative   
nitrite test or absence of pyuria (urine WBC count <= 5-10) make   
a UTI (urinary tract infection) very unlikely in a   
non-neutropenic adult (<=5% likelihood in many studies). A   
positive leukocyte esterase, nitrite and/or pyuria is a   
nonspecific result. This can be seen in conditions other than a   
UTI e.g. asymptomatic bacteriuria, gynecologic infections,   
sexually transmitted infections, and noninfectious conditions   
(positive predictive value for UTI around 50%)   
   
                                                            Performed By: #### u  
rinalysiswcul ####[facility] PATHOLOGY   
[id number] [place], #### C   
URINE ####[facility] [id number] [place], [place]   
   
                      U APPEAR   Turbid     Normal     Clear      The   
[facility]   
System  
   
                                        Comment on above:   Order Comment: A neg  
ative leukocyte esterase AND negative   
nitrite test or absence of pyuria (urine WBC count <= 5-10) make   
a UTI (urinary tract infection) very unlikely in a   
non-neutropenic adult (<=5% likelihood in many studies). A   
positive leukocyte esterase, nitrite and/or pyuria is a   
nonspecific result. This can be seen in conditions other than a   
UTI e.g. asymptomatic bacteriuria, gynecologic infections,   
sexually transmitted infections, and noninfectious conditions   
(positive predictive value for UTI around 50%)   
   
                                                            Performed By: #### u  
rinalysiswcul ####[facility] PATHOLOGY   
[id number] [place], [place], #### C   
URINE ####[facility] [id number] [place], [place]   
   
                      U BILI     Negative   Normal     Negative   The   
[facility]   
System  
   
                                        Comment on above:   Order Comment: A neg  
ative leukocyte esterase AND negative   
nitrite test or absence of pyuria (urine WBC count <= 5-10) make   
a UTI (urinary tract infection) very unlikely in a   
non-neutropenic adult (<=5% likelihood in many studies). A   
positive leukocyte esterase, nitrite and/or pyuria is a   
nonspecific result. This can be seen in conditions other than a   
UTI e.g. asymptomatic bacteriuria, gynecologic infections,   
sexually transmitted infections, and noninfectious conditions   
(positive predictive value for UTI around 50%)   
   
                                                            Performed By: #### u  
rinalysiswcul ####[facility] PATHOLOGY   
[id number] [place], [place], #### C   
URINE ####[facility] Czdqcgsia8925 [place], [place]   
   
                      U BLOOD    Large      Abnormal   Negative   The   
[facility]   
System  
   
                                        Comment on above:   Order Comment: A neg  
ative leukocyte esterase AND negative   
nitrite test or absence of pyuria (urine WBC count <= 5-10) make   
a UTI (urinary tract infection) very unlikely in a   
non-neutropenic adult (<=5% likelihood in many studies). A   
positive leukocyte esterase, nitrite and/or pyuria is a   
nonspecific result. This can be seen in conditions other than a   
UTI e.g. asymptomatic bacteriuria, gynecologic infections,   
sexually transmitted infections, and noninfectious conditions   
(positive predictive value for UTI around 50%)   
   
                                                            Performed By: #### u  
rinalysiswcul ####[facility] PATHOLOGY   
[id number] [place], #### C   
URINE ####[facility] Etashfhza4761 [place], [place]   
   
                      U COLOR    Light Orange Normal     Colorless  The   
[facility]   
System  
   
                                        Comment on above:   Order Comment: A neg  
ative leukocyte esterase AND negative   
nitrite test or absence of pyuria (urine WBC count <= 5-10) make   
a UTI (urinary tract infection) very unlikely in a   
non-neutropenic adult (<=5% likelihood in many studies). A   
positive leukocyte esterase, nitrite and/or pyuria is a   
nonspecific result. This can be seen in conditions other than a   
UTI e.g. asymptomatic bacteriuria, gynecologic infections,   
sexually transmitted infections, and noninfectious conditions   
(positive predictive value for UTI around 50%)   
   
                                                            Performed By: #### u  
rinalysiswcul ####[facility] PATHOLOGY   
[id number] [place], [place], #### C   
URINE ####[facility] [id number] [place], [place] [address]   
   
                      U HY CAST  0-2        Normal                The   
[facility]  
   
                                        Comment on above:   Order Comment: A neg  
ative leukocyte esterase AND negative   
nitrite test or absence of pyuria (urine WBC count <= 5-10) make   
a UTI (urinary tract infection) very unlikely in a   
non-neutropenic adult (<=5% likelihood in many studies). A   
positive leukocyte esterase, nitrite and/or pyuria is a   
nonspecific result. This can be seen in conditions other than a   
UTI e.g. asymptomatic bacteriuria, gynecologic infections,   
sexually transmitted infections, and noninfectious conditions   
(positive predictive value for UTI around 50%)   
   
                                                            Performed By: #### u  
rinalysiswcul ####[facility] PATHOLOGY   
[id number] [place], [place], #### C   
URINE ####[facility] [address] [place], [place] [address]   
   
                      U KETONE   Negative   Normal     Negative   [facility]   
[facility]   
[facility]  
   
                                        Comment on above:   Order Comment: A neg  
ative leukocyte esterase AND negative   
nitrite test or absence of pyuria (urine WBC count <= 5-10) make   
a UTI (urinary tract infection) very unlikely in a   
non-neutropenic adult (<=5% likelihood in many studies). A   
positive leukocyte esterase, nitrite and/or pyuria is a   
nonspecific result. This can be seen in conditions other than a   
UTI e.g. asymptomatic bacteriuria, gynecologic infections,   
sexually transmitted infections, and noninfectious conditions   
(positive predictive value for UTI around 50%)   
   
                                                            Performed By: #### u  
rinalysiswcul ####[facility] PATHOLOGY   
[id number] [place], [place], #### C   
URINE ####[facility] [address] [place], [place]   
   
                      U LEUK     Positive   Abnormal   Negative   The   
[facility]   
System  
   
                                        Comment on above:   Order Comment: A neg  
ative leukocyte esterase AND negative   
nitrite test or absence of pyuria (urine WBC count <= 5-10) make   
a UTI (urinary tract infection) very unlikely in a   
non-neutropenic adult (<=5% likelihood in many studies). A   
positive leukocyte esterase, nitrite and/or pyuria is a   
nonspecific result. This can be seen in conditions other than a   
UTI e.g. asymptomatic bacteriuria, gynecologic infections,   
sexually transmitted infections, and noninfectious conditions   
(positive predictive value for UTI around 50%)   
   
                                                            Result Comment: Norm  
al urine specimens will not produce a   
positive reaction. Small amounts of leukocyte esterase, causing   
a positive reaction should be repeated, using a fresh urine   
specimen, from the same patient. Positive results require   
further testing for pyuria.   
   
                                                            Performed By: #### u  
rinalysiswcul ####[facility] PATHOLOGY   
[id number] [place], [place], #### C   
URINE ####[facility] [address]so[address]xam[address] [place], [place]   
   
                      U MUCOUS   Present    Normal                The   
[facility]   
System  
   
                                        Comment on above:   Order Comment: A neg  
ative leukocyte esterase AND negative   
nitrite test or absence of pyuria (urine WBC count <= 5-10) make   
a UTI (urinary tract infection) very unlikely in a   
non-neutropenic adult (<=5% likelihood in many studies). A   
positive leukocyte esterase, nitrite and/or pyuria is a   
nonspecific result. This can be seen in conditions other than a   
UTI e.g. asymptomatic bacteriuria, gynecologic infections,   
sexually transmitted infections, and noninfectious conditions   
(positive predictive value for UTI around 50%)   
   
                                                            Performed By: #### u  
rinalysiswcul ####[facility] PATHOLOGY   
[id number] [place], [place], #### C   
URINE ####[facility] [address] [place] [address]   
   
                      U NITRITE  Negative   Normal     Negative   The   
[facility]   
[facility]  
   
                                        Comment on above:   Order Comment: A neg  
ative leukocyte esterase AND negative   
nitrite test or absence of pyuria (urine WBC count <= 5-10) make   
a UTI (urinary tract infection) very unlikely in a   
non-neutropenic adult (<=5% likelihood in many studies). A   
positive leukocyte esterase, nitrite and/or pyuria is a   
nonspecific result. This can be seen in conditions other than a   
UTI e.g. asymptomatic bacteriuria, gynecologic infections,   
sexually transmitted infections, and noninfectious conditions   
(positive predictive value for UTI around 50%)   
   
                                                            Performed By: #### u  
rinalysiswcul ####[facility] PATHOLOGY   
[id number] [place], [place], #### C   
URINE ####[facility] [address] [place], [place] [address]   
   
                      U PH       8.0        Normal     5.0-8.0    The   
[facility]   
[facility]  
   
                                        Comment on above:   Order Comment: A neg  
ative leukocyte esterase AND negative   
nitrite test or absence of pyuria (urine WBC count <= 5-10) make   
a UTI (urinary tract infection) very unlikely in a   
non-neutropenic adult (<=5% likelihood in many studies). A   
positive leukocyte esterase, nitrite and/or pyuria is a   
nonspecific result. This can be seen in conditions other than a   
UTI e.g. asymptomatic bacteriuria, gynecologic infections,   
sexually transmitted infections, and noninfectious conditions   
(positive predictive value for UTI around 50%)   
   
                                                            Performed By: #### u  
rinalysiswcul ####[facility] PATHOLOGY   
[id number] [place], [place], #### C   
URINE ####[facility] [address] [place], [place]   
   
                      U RBC      >100       Abnormal   0-2        The   
[facility]   
[facility]  
   
                                        Comment on above:   Order Comment: A neg  
ative leukocyte esterase AND negative   
nitrite test or absence of pyuria (urine WBC count <= 5-10) make   
a UTI (urinary tract infection) very unlikely in a   
non-neutropenic adult (<=5% likelihood in many studies). A   
positive leukocyte esterase, nitrite and/or pyuria is a   
nonspecific result. This can be seen in conditions other than a   
UTI e.g. asymptomatic bacteriuria, gynecologic infections,   
sexually transmitted infections, and noninfectious conditions   
(positive predictive value for UTI around 50%)   
   
                                                            Performed By: #### u  
rinalysiswcul ####[facility] PATHOLOGY   
[id number] [place], [place], #### C   
URINE ####[facility] [address]auctx7[address] [place], [place]   
   
                      U SG       1.015      Normal     <=1.030    The   
[facility]   
System  
   
                                        Comment on above:   Order Comment: A neg  
ative leukocyte esterase AND negative   
nitrite test or absence of pyuria (urine WBC count <= 5-10) make   
a UTI (urinary tract infection) very unlikely in a   
non-neutropenic adult (<=5% likelihood in many studies). A   
positive leukocyte esterase, nitrite and/or pyuria is a   
nonspecific result. This can be seen in conditions other than a   
UTI e.g. asymptomatic bacteriuria, gynecologic infections,   
sexually transmitted infections, and noninfectious conditions   
(positive predictive value for UTI around 50%)   
   
                                                            Performed By: #### u  
rinalysiswcul ####[facility] PATHOLOGY   
[id number] [place], [place], #### C   
URINE ####[facility] [address]reo7305 [place], [place]   
   
                      U UROBILI  3.0 mg/dL  Abnormal   Negative   The   
[facility]   
[facility]  
   
                                        Comment on above:   Order Comment: A neg  
ative leukocyte esterase AND negative   
nitrite test or absence of pyuria (urine WBC count <= 5-10) make   
a UTI (urinary tract infection) very unlikely in a   
non-neutropenic adult (<=5% likelihood in many studies). A   
positive leukocyte esterase, nitrite and/or pyuria is a   
nonspecific result. This can be seen in conditions other than a   
UTI e.g. asymptomatic bacteriuria, gynecologic infections,   
sexually transmitted infections, and noninfectious conditions   
(positive predictive value for UTI around 50%)   
   
                                                            Performed By: #### u  
rinalysiswcul ####[facility] PATHOLOGY   
[id number] [place], [place], #### C   
URINE ####[facility] [address]mw29[address] [place], [place]   
   
                      U WBC           Abnormal   0-2        [facility]   
[facility]   
[facility]  
   
                                        Comment on above:   Order Comment: A neg  
ative leukocyte esterase AND negative   
nitrite test or absence of pyuria (urine WBC count <= 5-10) make   
a UTI (urinary tract infection) very unlikely in a   
non-neutropenic adult (<=5% likelihood in many studies). A   
positive leukocyte esterase, nitrite and/or pyuria is a   
nonspecific result. This can be seen in conditions other than a   
UTI e.g. asymptomatic bacteriuria, gynecologic infections,   
sexually transmitted infections, and noninfectious conditions   
(positive predictive value for UTI around 50%)   
   
                                                            Performed By: #### u  
rinalysiswcul ####[facility] PATHOLOGY   
[id number] [place] [address], #### C   
URINE ####[facility] [address] [place], [place] [address]   
   
                                                    URINE CULTUREon 2025   
   
                                                    Bacteria identified   
Cx Nom (U)                              C URINE:  
  
No growth of greater   
than 100 CFU/ml     Normal                                  The   
[facility]   
System  
   
                                        Comment on above:   Performed By: #### u  
rinalysiswcul ####[facility] PATHOLOGY   
[id number] [place] [address], #### C   
URINE ####[facility] [address] [place], [place] [address]   
   
                                                    VANCOMYCIN RANDOMon 20  
25   
   
                      VANC R     16.1 ug/mL Normal     5.0-40.0   The   
[facility]   
System  
   
                                        Comment on above:   Performed By: #### V  
ANC R ####[facility] PATHOLOGY [id number] [place] [address],    
   
                      VANC R     20.4 ug/mL Normal     5.0-40.0   The   
[facility]   
System  
   
                                        Comment on above:   Performed By: #### M  
G, FETIBC, MANASA, CH8, CRP, VANC R ####[facility]   
PATHOLOGY [id number] [place] [address],   
   
   
                                                    Alanine aminotransferase [En  
zymatic activity/volume] in Serum or PlasmaOrdered   
By:   
Shyam Guadarrama on 2025   
   
                                                    ALT [Catalytic   
activity/Vol]                           Alanine   
aminotransferase   
[Enzymatic   
activity/volume] in   
Serum or Plasma     Low                 7-52                [facility]  
   
                                                    Albumin [Mass/volume] in Ser  
um or Plasma by Bromocresol green (BCG) dye binding   
methoOrdered By: Shyam Guadarrama on 2025   
   
                                                    Albumin BCG dye   
[Mass/Vol]                              Albumin [Mass/volume]   
in Serum or Plasma by   
Bromocresol green   
(BCG) dye binding   
metho               Low                 3.5-5.7             [facility]  
   
                                                    Alkaline phosphatase [Enzyma  
tic activity/volume] in Serum or PlasmaOrdered By:   
Shyam Guadarrama on 2025   
   
                                                    ALP [Catalytic   
activity/Vol]                           Alkaline phosphatase   
[Enzymatic   
activity/volume] in   
Serum or Plasma                                       [facility]  
   
                                                    Appearance of UrineOrdered B  
y: Evan Coffey on 2025   
   
                      Appearance (U) Urine appearance Abnormal   Clear      [facility]  
   
                                                    Aspartate aminotransferase [  
Enzymatic activity/volume] in Serum or PlasmaOrdered  
By:   
Shyam Guadarrama on 2025   
   
                                                    AST [Catalytic   
activity/Vol]                           Aspartate   
aminotransferase   
[Enzymatic   
activity/volume] in   
Serum or Plasma     Low                 13-39               [facility]  
   
                                                    Bacteria [Presence] in Urine  
 by AutomatedOrdered By: Evan Coffey on 2025   
   
                                        Bacteria Auto Ql (U) Bacteria [Presence]  
 in   
Urine by Automated                      None Seen           [facility]  
   
                                                    Bilirubin Test strip Ql (U)O  
rdered By: Evan Erlinda on 2025   
   
                                        Bilirubin Ql (U)    Bilirubin.total   
[Presence] in Urine by   
Test strip                              Negative            [facility]  
   
                                                    Bilirubin.total [Mass/volume  
] in Serum or PlasmaOrdered By: Shyam Guadarrama on   
2025   
   
                                        Bilirubin [Mass/Vol] Bilirubin.total   
[Mass/volume] in Serum   
or Plasma                               0.3-1.0             [facility]  
   
                                                    Blood Cultureon 2025   
   
                                                    Bacteria identified   
Cx Nom (Bld)                            NO GROWTH 5 DAYS  
PERFORMED BY:  
[place] [address]  
[phone]  
PATHOLOGIST MEDICAL   
DIRECTOR  
CODEY SÁNCHEZ M.D.                Normal                                  The   
[facility]   
[facility]   
[facility]  
   
                                        Comment on above:   Performed By: #### C  
MP, LIPID ####  
[address]   
   
                                                    CT abdomen pelvis wo conon 0  
2025   
   
                                                    CT abdomen pelvis wo   
con                                     [facility]  
[address] [place] [address]  
  
CT Scan Report  
Signed  
  
Patient: Cris Hearn MR#: [id number]  
971  
: 1970   
Acct:[id number]  
  
Age/Sex: 54 / F ADM   
Date: 25  
  
Loc:  Room: 9V5353-2   
Type: ADM IN  
Attending Dr: Yamilet Terry MD  
Copies to: Yamilet Terry MD  
  
  
  
Ordering Provider:   
Yamilet Terry MD  
Date of Service:   
25  
Accession #:   
([id number]) CT/CT   
abdomen pelvis wo con:   
Nephrostomy,   
leukocytosis  
  
  
  
  
CT ABDOMEN AND PELVIS   
WITHOUT INTRAVENOUS   
CONTRAST:  
  
CLINICAL HISTORY:   
Nephrostomy,   
leukocytosis,   
weakness, C. difficile  
  
COMPARISON: 2025  
  
TECHNIQUE: Spiral   
images were obtained   
through the abdomen   
and pelvis without   
intravenous contrast.  
This CT exam was   
performed using one or   
more following dose   
reduction techniques:   
Automated  
exposure control,   
adjustment of the mA   
and/or kV according to   
patient size, or use   
of iterative  
reconstruction   
technique.  
  
FINDINGS:  
  
Lung Bases:   
[Consolidation both   
lower lobes likely   
areas of atelectasis.   
Left-sided effusion.  
Small right-sided   
effusion.]  
  
Organs:Gallbladder is   
absent. Otherwise the   
liver, left adrenal   
gland and pancreas are  
unremarkable.   
Bilateral   
nephrolithiasis.   
Left-sided nephrostomy   
tube. Residual   
calculus left aarti  
l collecting system 9   
mm in size. The left   
renal collecting   
system appears   
satisfactory  
decompressed when   
compared to the   
previous examination.   
There is however   
progression of the   
left-  
sided   
perinephric/retroperit  
quinones hematoma   
measuring 14.5 x 7.4   
cm.[Splenic cyst   
unchanged.. Right  
adrenal gland adenoma.   
Thickened left adrenal   
gland.  
  
GI: Moderate retained   
stool within the   
colon. No bowel   
obstruction. Scattered   
colonic  
diverticulosis.  
  
Pelvis:[Mild bladder   
distention. Uterus   
unremarkable as   
visualized. No   
definite adnexal mass.  
Mild bladder   
distention]  
  
Peritoneum/Retroperito  
neum:Progression of   
the retroperitoneal   
hematoma[/perinephric   
hematoma.  
Aortic and iliac   
vascular disease.   
Stable retroperitoneal   
lymph nodes.  
  
Abd   
wall/Bones:Degenerativ  
e changes of the lower   
lumbar spine.   
Fat-containing right   
lower quadrant  
hernia identified   
containing omentum.[  
  
  
  
ORDER #: [id number]   
CT/CT abdomen pelvis   
wo con  
IMPRESSION:  
  
Progression of the   
loculated   
retroperitoneal/perine  
phric collection   
suggestive of a   
combination of  
urinoma and   
retroperitoneal   
hematoma this measures   
up to 15 cm in   
greatest dimension.  
  
  
Bilateral   
nephrolithiasis with   
left-sided nephrostomy  
  
Impression dictated   
by: Taye López M.D.2025 3:04 PM  
  
  
Dictation Location:   
[place] [address]  
  
  
  
Transcribed By: [facility]   
25 1504  
Dictated By: Taey López MD 25 1449  
  
Signed By:  
25 1504       Normal                                  The   
[facility]   
[facility]   
[facility]  
   
                                                    Calcium [Mass/volume] in Ser  
um or PlasmaOrdered By: Shyam Guadarrama on 2025   
   
                                        Calcium [Mass/Vol]  Calcium [Mass/volume  
]   
in Serum or Plasma  Low                 8.6-10.3            [facility]  
   
                                                    Carbon dioxide, total [Moles  
/volume] in Serum or PlasmaOrdered By: Shyam Guadarrama   
on   
2025   
   
                                        CO2 [Moles/Vol]     Carbon dioxide, tota  
l   
[Moles/volume] in   
Serum or Plasma                         21.0-31.0           [facility]  
   
                                                    Chloride [Moles/volume] in S  
chandler or PlasmaOrdered By: Shyam Guadarrama on 2025   
   
                                        Chloride [Moles/Vol] Chloride   
[Moles/volume] in   
Serum or Plasma                                       [facility]  
   
                                                    Color Auto (U)Ordered By: Javier Coffey on 2025   
   
                      Color (U)  Color of Urine by Auto            Yellow     Fi  
[facility]  
   
                                                    Comprehensive Metabolic Pane  
betty 2025   
   
                      Albumin [Mass/Vol] 2.9 g/dL   Low        3.5-5.7    The   
[facility]   
Physician   
Group  
   
                                        Comment on above:   Performed By: #### C  
MP, LIPID ####  
[facility]  
[address]   
   
                                                    Albumin/Globulin   
[Mass ratio]    0.9 {ratio}     Normal                          The   
[facility]   
Physician   
Group  
   
                                        Comment on above:   Performed By: #### C  
MP, LIPID ####  
[facility]  
[address] [place] [id number] [place]   
   
                                                    ALP [Catalytic   
activity/Vol]   37 U/L          Normal                    The   
[facility]   
Physician   
Group  
   
                                        Comment on above:   Performed By: #### C  
MP, LIPID ####  
[facility]  
[address] [place] [id number] [place]   
   
                                                    ALT [Catalytic   
activity/Vol]   3 U/L           Low             7-52            The   
[facility]   
Physician   
Group  
   
                                        Comment on above:   Performed By: #### C  
MP, LIPID ####  
[facility]  
[address]   
   
                      Anion gap [Moles/Vol] 13.7 mmol/L Normal     6.0-15.0   Th  
e   
[facility]   
Physician   
Group  
   
                                        Comment on above:   Performed By: #### C  
MP, LIPID ####  
[facility]  
[address] [place] [address]   
   
                                                    AST [Catalytic   
activity/Vol]   10 U/L          Low             13-39           The   
[facility]   
Physician   
Group  
   
                                        Comment on above:   Performed By: #### C  
MP, LIPID ####  
[facility]  
[address] [place] [address]   
   
                      Bilirubin [Mass/Vol] 0.3 mg/dL  Normal     0.3-1.0    The   
[facility]   
Physician   
Group  
   
                                        Comment on above:   Performed By: #### C  
MP, LIPID ####  
[address]   
   
                      Calcium [Mass/Vol] 8.3 mg/dL  Low        8.6-10.3   The   
[facility]   
Physician   
Group  
   
                                        Comment on above:   Performed By: #### C  
MP, LIPID ####  
[place] [address] [place]   
   
                      Chloride [Moles/Vol] 104 mmol/L Normal          The   
[facility]   
Physician   
Group  
   
                                        Comment on above:   Performed By: #### C  
MP, LIPID ####  
[address]   
   
                      CO2 [Moles/Vol] 24.3 mmol/L Normal     21.0-31.0  The   
[facility]   
Physician   
Group  
   
                                        Comment on above:   Performed By: #### C  
MP, LIPID ####  
[place] [address] [place]   
   
                      Creatinine [Mass/Vol] 2.89 mg/dL High       0.60-1.20  The  
   
[facility]   
Phys[facility]   
[facility]  
   
                                        Comment on above:   Performed By: #### C  
MP, LIPID ####  
[address]   
   
                                                    Creatinine Clr Calc   
Pharmacy        30.20           Normal                          The   
[facility]   
Physician   
Group  
   
                                        Comment on above:   Performed By: #### C  
MP, LIPID ####  
[place] [id number] [place]   
   
                      Estimated GFR 18.619 mL/Min Normal                The   
[facility]   
[facility]   
[facility]  
   
                                        Comment on above:   Performed By: #### C  
MP, LIPID ####  
[address]   
   
                                                    Globulin (S)   
[Mass/Vol]      3.2 g/dL        Normal                          The   
[facility]   
[facility]   
[facility]  
   
                                        Comment on above:   Performed By: #### C  
MP, LIPID ####  
[address]   
   
                      Glucose [Mass/Vol] 139 mg/dL  High            The   
[facility]   
[facility]   
[facility]  
   
                                        Comment on above:   Result Comment: Rand  
om Glucose Reference Range is dependent on  
   
time and  
content of last meal. Glucose of more than 200 mg/dL in a  
nonstressed, ambulatory subject supports the diagnosis of  
Diabetes Mellitus.  
ADA recommended reference range   
   
                                                            Performed By: #### C  
MP, LIPID ####  
[address]   
   
                      Potassium [Moles/Vol] 4.0 mmol/L Normal     3.5-5.1    The  
   
[facility]   
[facility]   
[facility]  
   
                                        Comment on above:   Performed By: #### C  
MP, LIPID ####  
[address]   
   
                      Protein [Mass/Vol] 6.1 g/dL   Low        6.4-8.9    The   
[facility]   
[facility]   
Group  
   
                                        Comment on above:   Performed By: #### C  
MP, LIPID ####  
[address]   
   
                      Sodium [Moles/Vol] 138 mmol/L Normal     136-145    The   
[facility]   
[facility]   
[facility]  
   
                                        Comment on above:   Performed By: #### C  
MP, LIPID ####  
[place] [address] [place]   
   
                                                    Urea nitrogen   
[Mass/Vol]      50 mg/dL        High            7-25            The   
[facility]   
[facility]   
[facility]  
   
                                        Comment on above:   Performed By: #### C  
MP, LIPID ####  
[place] [address] [place]   
   
                                                    Creatinine [Mass/volume] in   
Serum or PlasmaOrdered By: Shyam Guadarrama on 2025  
   
   
                                        Creatinine [Mass/Vol] Creatinine   
[Mass/volume] in Serum   
or Plasma           High                0.60-1.20           [facility]  
   
                                                    Dipstick and Microscopicon 0  
2025   
   
                                Appearance (U)  Cloudy          Critically   
abnormal                  Clear                     The   
[facility]   
[facility]   
Group  
   
                                        Comment on above:   Order Comment: Reaso  
n for Exam Type 2 diabetes mellitus with   
complication,  
without long-ter   
   
                                                            Performed By: #### C  
MP, LIPID ####  
[facility]  
[address] [place] [address] [place]   
   
                      Bacteria,Urine Rare       Normal     None Seen  The   
[facility]   
[facility]   
Group  
   
                                        Comment on above:   Order Comment: Reaso  
n for Exam Type 2 diabetes mellitus with   
complication,  
without long-ter   
   
                                                            Performed By: #### C  
MP, LIPID ####  
[facility]  
[address] [place] [address] [place]   
   
                      Bilirubin,Urine Negative   Normal     Negative   The   
[facility]   
[facility]   
Group  
   
                                        Comment on above:   Order Comment: Reaso  
n for Exam Type 2 diabetes mellitus with   
complication,  
without long-ter   
   
                                                            Performed By: #### C  
MP, LIPID ####  
[place] [address] [place]   
   
                      Budding Yeast,Urine 2+         High       None Seen  The   
[facility]   
[facility]   
Group  
   
                                        Comment on above:   Order Comment: Reaso  
n for Exam Type 2 diabetes mellitus with   
complication,  
without long-ter   
   
                                                            Result Comment: PERF  
ORMED BY:  
[place] [address]  
[phone]  
PATHOLOGIST MEDICAL DIRECTOR  
CODEY SÁNCHEZ M.D.   
   
                                                            Performed By: #### C  
MP, LIPID ####  
[facility]  
[address] [place] [address]   
   
                      Color (U)  Light-Yellow Normal     Yellow     The   
[facility]   
Phys[facility]   
Group  
   
                                        Comment on above:   Order Comment: Reaso  
n for Exam Type 2 diabetes mellitus with   
complication,  
without long-ter   
   
                                                            Performed By: #### C  
MP, LIPID ####  
[facility]  
[address] [place] [address] [place]   
   
                      Glucose Ql (U) 50 mg/dL   High       Normal     The   
[facility]   
Phys[facility]   
Group  
   
                                        Comment on above:   Order Comment: Reaso  
n for Exam Type 2 diabetes mellitus with   
complication,  
without long-ter   
   
                                                            Performed By: #### C  
MP, LIPID ####  
[facility]  
[address] [place] [address] [place]   
   
                      Hyaline Casts,Urine None       Normal     0-8        The   
[facility]   
Phys[facility]   
Group  
   
                                        Comment on above:   Order Comment: Reaso  
n for Exam Type 2 diabetes mellitus with   
complication,  
without long-ter   
   
                                                            Performed By: #### C  
MP, LIPID ####  
[address]   
   
                      Ketones Ql (U) Trace      High       Negative   The   
[facility]   
Phys[facility]   
Group  
   
                                        Comment on above:   Order Comment: Reaso  
n for Exam Type 2 diabetes mellitus with   
complication,  
without long-ter   
   
                                                            Performed By: #### C  
MP, LIPID ####  
[address]   
   
                                                    Leukocyte esterase   
Test strip Ql (U) 4+              High            Negative        The   
[facility]   
Physician   
Group  
   
                                        Comment on above:   Order Comment: Reaso  
n for Exam Type 2 diabetes mellitus with   
complication,  
without long-ter   
   
                                                            Performed By: #### C  
MP, LIPID ####  
[address]   
   
                      Mucus,Urine Rare       Normal                The   
[facility]   
[facility]   
Group  
   
                                        Comment on above:   Order Comment: Reaso  
n for Exam Type 2 diabetes mellitus with   
complication,  
without long-ter   
   
                                                            Performed By: #### C  
MP, LIPID ####  
[address]   
   
                      Nitrite,Urine Negative   Normal     Negative   The   
[facility]   
Phys[facility]   
Group  
   
                                        Comment on above:   Order Comment: Reaso  
n for Exam Type 2 diabetes mellitus with   
complication,  
without long-ter   
   
                                                            Performed By: #### C  
MP, LIPID ####  
[address]   
   
                      Occult Blood,Urine 2+         High       Negative   The   
[facility]   
Phys[facility]   
Group  
   
                                        Comment on above:   Order Comment: Reaso  
n for Exam Type 2 diabetes mellitus with   
complication,  
without long-ter   
   
                                                            Result Comment: PERF  
ORMED BY:  
[place] [address]  
[phone]  
PATHOLOGIST MEDICAL DIRECTOR  
CODEY SÁNCHEZ M.D.   
   
                                                            Performed By: #### C  
MP, LIPID ####  
[address]   
   
                      pH (U)     7.5 [pH]   Normal     5.0-9.0    The   
[facility]   
Phys[facility]   
Group  
   
                                        Comment on above:   Order Comment: Reaso  
n for Exam Type 2 diabetes mellitus with   
complication,  
without long-ter   
   
                                                            Performed By: #### C  
MP, LIPID ####  
[address]   
   
                                                    Protein (U)   
[Mass/Vol]      100 mg/dL       High            Negative        The   
[facility]   
Physician   
Group  
   
                                        Comment on above:   Order Comment: Reaso  
n for Exam Type 2 diabetes mellitus with   
complication,  
without long-ter   
   
                                                            Performed By: #### C  
MP, LIPID ####  
[address]   
   
                      RBC,Urine  Innumerable High       0-4        The   
[facility]   
[facility]   
Group  
   
                                        Comment on above:   Order Comment: Reaso  
n for Exam Type 2 diabetes mellitus with   
complication,  
without long-ter   
   
                                                            Performed By: #### C  
MP, LIPID ####  
[address]   
   
                                                    Specificy   
Gravity,Urine   1.014           Normal          1.001-1.030     The   
[facility]   
Phys[facility]   
Group  
   
                                        Comment on above:   Order Comment: Reaso  
n for Exam Type 2 diabetes mellitus with   
complication,  
without long-ter   
   
                                                            Performed By: #### C  
MP, LIPID ####  
[address]   
   
                      Urobilinogen,Urine Normal     Normal     Normal     The   
[facility]   
[facility]   
[facility]  
   
                                        Comment on above:   Order Comment: Reaso  
n for Exam Type 2 diabetes mellitus with   
complication,  
without long-ter   
   
                                                            Performed By: #### C  
MP, LIPID ####  
[address]   
   
                      WBC,Urine  20 [HPF]   High       0-4        The   
[facility]   
Phys[facility]   
Group  
   
                                        Comment on above:   Order Comment: Reaso  
n for Exam Type 2 diabetes mellitus with   
complication,  
without long-ter   
   
                                                            Performed By: #### C  
MP, LIPID ####  
[address]   
   
                                                    ECG 12 lead ECGon 2025  
   
   
                                        ECG 12 lead ECG     [facility]  
[address] [place] [address]  
  
Electrocardiograph   
Report  
Signed  
  
Patient: Cris Hearn MR#: [id number]  
[id number]  
: 1970   
Acct:[id number]  
  
Age/Sex: 54 / F ADM   
Date: 25  
  
Loc:  Room: [address] [place] [address]   
Type: DIS IN  
Attending Dr: Yamilet Terry MD  
  
Ordering Provider:   
Yamilet Terry MD  
Date of Service:   
25  
Accession #:   
([id number]) ECG/ECG   
12 lead ECG: on   
satolol  
  
  
Copies to:  
  
  
Test Reason :  
Blood Pressure : */*   
mmHG  
Vent. Rate : 59 BPM   
Atrial Rate : 59 BPM  
P-R Int : 210 ms QRS   
Dur : 94 ms  
QT Int : 474 ms P-R-T   
Axes : 58 79 47   
degrees  
QTcB Int : 469 ms  
  
Sinus bradycardia with   
1st degree AV block  
Otherwise normal ECG  
No previous ECGs   
available  
Confirmed by JOAQUIN MATTHEWS [facility], TIEN ([id number])   
on 2025 8:40:44   
AM  
  
Referred By:   
Electronically Signed   
By: TIEN NUÑEZ MD   
[facility]  
  
  
  
Transcribed By: MUS  
Signed By Tien Nuñez MD, [facility]  
25 0840       Normal                                  The   
[facility]   
Phys[facility]   
Group  
   
                                                    Epithelial cells.squamous [#  
/area] in Urine sediment by Automated countOrdered   
By:   
Evan Coffey on 2025   
   
                                                    Epithelial   
cells.squamous Auto   
(Urine sed) [#/Area]                    Epithelial   
cells.squamous   
[#/area] in Urine   
sediment by Automated   
count                                                       [facility]  
   
                                                    Erythrocytes [#/area] in Uri  
ne sediment by Automated countOrdered By: Evan Coffey on   
2025   
   
                                                    RBC Auto (Urine sed)   
[#/Area]                                Erythrocytes [#/area]   
in Urine sediment by   
Automated count     High                0-4                 [facility]  
   
                                                    GLUCOSE, FINGERSTICK-IN OFFI  
CEon 2025   
   
                      Glucose [Mass/Vol] 140 mg/dL  High            [facility]   
[facility]   
[facility]  
   
                                        Comment on above:   Performed By: #### 8  
1858 ####[facility] GLUCOSE [id number] [place] [address], [address]   
   
                                                    Globulin Calc (S) [Mass/Vol]  
Ordered By: Shyam Guadarrama on 2025   
   
                                                    Globulin (S)   
[Mass/Vol]                              Serum globulin   
measurement by   
calculation   
(mass/volume)                                               [facility]  
   
                                                    Glucose Glucometer (BldC) [M  
ass/Vol]Ordered By: Yamilet Terry on 2025   
   
                                        Glucose [Mass/Vol]  Capillary blood   
glucose measurement by   
glucometer   
(mass/volume)                                               [facility]  
   
                                        Comment on above:   Random Glucose Refer  
ence Range is dependent on time and   
content   
of last meal. Glucose of more than 200 mg/dL in a nonstressed,   
ambulatory subject supports the diagnosis of Diabetes Mellitus.   
   
                                                    Glucose Poct Glucometerson 0  
2025   
   
                      Glucose [Mass/Vol] 150 mg/dL  Normal                The   
[facility]   
Physician   
Group  
   
                                        Comment on above:   Result Comment: Rand  
om Glucose Reference Range is dependent on  
   
time and  
content of last meal. Glucose of more than 200 mg/dL in a  
nonstressed, ambulatory subject supports the diagnosis of  
Diabetes Mellitus.  
PERFORMED BY:  
[facility]  
[address] [facility] [address] [address]  
[phone]  
PATHOLOGIST MEDICAL DIRECTOR  
CODEY SÁNCHEZ M.D.   
   
                                                            Performed By: #### G  
LULS ####  
Point of Care testing  
,   
   
                      Glucose [Mass/Vol] 159 mg/dL  Normal                The   
[facility]   
Phys[facility]   
Group  
   
                                        Comment on above:   Result Comment: Rand  
om Glucose Reference Range is dependent on  
   
time and  
content of last meal. Glucose of more than 200 mg/dL in a  
nonstressed, ambulatory subject supports the diagnosis of  
Diabetes Mellitus.  
PERFORMED BY:  
[facility]  
[address] [address]  
[phone]  
PATHOLOGIST MEDICAL DIRECTOR  
CODEY SÁNCHEZ M.D.   
   
                                                            Performed By: #### G  
LULS ####  
Point of Care testing  
,   
   
                      Glucose [Mass/Vol] 151 mg/dL  Normal                The   
[facility]   
Physician   
Group  
   
                                        Comment on above:   Result Comment: Rand  
om Glucose Reference Range is dependent on  
   
time and  
content of last meal. Glucose of more than 200 mg/dL in a  
nonstressed, ambulatory subject supports the diagnosis of  
Diabetes Mellitus.  
PERFORMED BY:  
[facility]  
[address] [address]  
[phone]  
PATHOLOGIST MEDICAL DIRECTOR  
CODEY SÁNCHEZ M.D.   
   
                                                            Performed By: #### G  
LULS ####Point of Care testing,   
   
                                                    Glucose [Mass/volume] in Ser  
um or PlasmaOrdered By: Shyam Guadarrama on 2025   
   
                                        Glucose [Mass/Vol]  Glucose [Mass/volume  
]   
in Serum or Plasma  High                              [facility]  
   
                                        Comment on above:   ADA recommended refe  
rence rangeRandom Glucose Reference Range   
is   
dependent on time and content of last meal. Glucose of more than   
200 mg/dL in a nonstressed, ambulatory subject supports the   
diagnosis of Diabetes Mellitus.   
   
                                                    Glucose [Mass/volume] in Uri  
ne by Test stripOrdered By: Evan Coffey on   
2025   
   
                                                    Glucose Test strip   
(U) [Mass/Vol]                          Glucose [Mass/volume]   
in Urine by Test strip High                Normal              [facility]  
   
                                                    Hematocrit Auto (Bld) [Volum  
e fraction]Ordered By: Yamilet Terry on 2025  
  
   
                                                    Hematocrit (Bld)   
[Volume fraction]                       Hematocrit [Volume   
Fraction] of Blood by   
Automated count     Low                 34.0-46.4           [facility]  
   
                                                    Hemoglobin Test strip Ql (U)  
Ordered By: Evan Coffey on 2025   
   
                                        Hemoglobin Ql (U)   Hemoglobin [Presence  
]   
in Urine by Test strip High                Negative            [facility]  
   
                                                    Hemoglobin [Mass/volume] in   
BloodOrdered By: Yamilet Terry on 2025   
   
                                                    Hemoglobin (Bld)   
[Mass/Vol]                              Hemoglobin   
[Mass/volume] in Blood Low                 11.8-15.4           [facility]  
   
                                                    Hemoglobin and Hematocriton   
2025   
   
                                                    Hematocrit (Bld)   
[Volume fraction] 23.5 %          Low             34.0-46.4       The   
[facility]   
Phys[facility]   
Group  
   
                                        Comment on above:   Result Comment: PERF  
ORMED BY:  
[place] [address]  
[phone]  
PATHOLOGIST MEDICAL DIRECTOR  
CODEY SÁNCHEZ M.D.   
   
                                                            Performed By: #### H  
H ####  
[facility]  
[address] [place] [address]   
   
                                                    Hemoglobin (Bld)   
[Mass/Vol]      7.3 g/dL        Low             11.8-15.4       The   
[facility]   
Physician   
Group  
   
                                        Comment on above:   Performed By: #### H  
H ####  
[facility]  
[address] [place] [address]   
   
                                                    Hematocrit (Bld)   
[Volume fraction] 23.4 %          Low             34.0-46.4       The   
[facility]   
Physician   
Group  
   
                                        Comment on above:   Result Comment: PERF  
ORMED BY:  
[place] [address]  
[phone]  
PATHOLOGIST MEDICAL DIRECTOR  
CODEY SÁNCHEZ M.D.   
   
                                                            Performed By: #### C  
MP, LIPID ####  
[facility]  
[address] [place] [address] [place]   
   
                                                    Hemoglobin (Bld)   
[Mass/Vol]      7.1 g/dL        Low             11.8-15.4       The   
[facility]   
Physician   
Group  
   
                                        Comment on above:   Performed By: #### C  
MP, LIPID ####  
[facility]  
[address]   
   
                                                    Hyaline casts [#/area] in Ur  
ine sediment by Automated countOrdered By: Evan Coffey   
on 2025   
   
                                                    Hyaline casts Auto   
(Urine sed) [#/Area]                    Hyaline casts [#/area]   
in Urine sediment by   
Automated count                         0-8                 [facility]  
   
                                                    Ketones Test strip Ql (U)Ord  
ered By: Evan Coffey on 2025   
   
                                        Ketones Ql (U)      Ketones [Presence] i  
n   
Urine by Test strip High                Negative            [facility]  
   
                                                    Laboratory - Microbiology an  
d Antimicrobial susceptibilityOrdered By: Nithya Jaimes on   
2025   
   
                                                    Bacteria identified   
Cx Nom (Bld)    NO GROWTH 5 DAYS                                 [facility]  
   
                                                    Lactate [Moles/volume] in Se  
rum or PlasmaOrdered By: Nithya Jaimes on 2025   
   
                                        Lactate [Moles/Vol] Lactate [Moles/volum  
e]   
in Serum or Plasma                      0.5-1.9             [facility]  
   
                                        Comment on above:   Lactic Acid referenc  
e range has been updated to 0.5 1.9 mmol/L  
   
and the critical range of 2.0 or greater.   
   
                                                    Lactic Acidon 2025   
   
                      Lactate [Moles/Vol] 0.7 mmol/L Normal     0.5-1.9    The   
[facility]   
Physician   
Group  
   
                                        Comment on above:   Result Comment: Lact  
ic Acid reference range has been updated   
to   
0.5 ? 1.9  
mmol/L and the critical range of 2.0 or greater.  
PERFORMED BY:  
[place] [address]  
[phone]  
PATHOLOGIST MEDICAL DIRECTOR  
CODEY SÁNCHEZ M.D.   
   
                                                            Performed By: #### C  
SEBASTIEN, LIPID ####  
[facility]  
[address] [place] [address]   
   
                                                    LeukoReduced RBCon    
   
                      LeukoReduced RBC READY      Normal                The   
[facility]   
Phys[facility]   
Group  
   
                                                    Leukocyte esterase [Presence  
] in Urine by Test stripOrdered By: Evan Coffey on   
2025   
   
                                                    Leukocyte esterase   
Test strip Ql (U)                       Leukocyte esterase   
[Presence] in Urine by   
Test strip          High                Negative            [facility]  
   
                                                    Leukocytes [#/area] in Urine  
 sediment by Automated countOrdered By: Evan Coffey  
 on   
2025   
   
                                                    WBC Auto (Urine sed)   
[#/Area]                                Leukocytes [#/area] in   
Urine sediment by   
Automated count     High                0-4                 [facility]  
   
                                                    METRO GLUCOSE, FINGERSTICK-I  
N OFFICEon 2025   
   
                      Glucose [Mass/Vol] 140 mg/dL  High       74 - 109 mg/dL NO  
[facility]  
   
                                                    Interpretation and   
review of laboratory   
results         Abnormal                                        [facility]  
   
                                                                  [facility]  
   
                                                                  [facility]  
   
                                                    Magnesiumon 2025   
   
                      Magnesium [Mass/Vol] 2.0 mg/dL  Normal     1.9-2.7    The   
[facility]   
[facility]   
[facility]  
   
                                        Comment on above:   Result Comment: PERF  
ORMED BY:  
[place] [address]  
[phone]  
PATHOLOGIST MEDICAL DIRECTOR  
CODEY SÁNCHEZ M.D.   
   
                                                            Performed By: #### C  
MP, LIPID ####  
[address]   
   
                                                    Magnesium [Mass/volume] in S  
chandler or PlasmaOrdered By: Shyam Guadarrama on 2025   
   
                                        Magnesium [Mass/Vol] Magnesium   
[Mass/volume] in Serum   
or Plasma                               1.9-2.7             [facility]  
   
                                                    Mucus [Presence] in Urine by  
 AutomatedOrdered By: Evan Coffey on 2025   
   
                                        Mucus Auto Ql (U)   Mucus [Presence] in   
Urine by Automated                                          [facility]  
   
                                                    Nitrite Test strip Ql (U)Ord  
ered By: Evan Coffey on 2025   
   
                                        Nitrite Ql (U)      Nitrite [Presence] i  
n   
Urine by Test strip                     Negative            [facility]  
   
                                                    No Panel InformationOrdered   
By: Shyam Guadarrama on 2025   
   
                                                    Estimated GFR   
(CKD-EPI)       18.619 mL/Min                                   [facility]  
   
                                                    Pharmacy Creatinine   
Clearance (Chem 30.20                                           [facility]  
   
                                                    Phosphate [Mass/volume] in S  
chandler or PlasmaOrdered By: Shyam Guadarrama on 2025   
   
                                        Phosphate [Mass/Vol] Phosphate   
[Mass/volume] in Serum   
or Plasma                               2.5-4.5             [facility]  
   
                                                    Phosphoruson 2025   
   
                      Phosphate [Mass/Vol] 3.8 mg/dL  Normal     2.5-4.5    The   
[facility]   
[facility]   
[facility]  
   
                                        Comment on above:   Performed By: #### C  
MP, LIPID ####  
[facility]  
[address]   
   
                                                    Potassium [Moles/volume] in   
Serum or PlasmaOrdered By: Shyam Guadarrama on 2025  
   
   
                                        Potassium [Moles/Vol] Potassium   
[Moles/volume] in   
Serum or Plasma                         3.5-5.1             [facility]  
   
                                                    Protein Test strip (U) [Mass  
/Vol]Ordered By: Evan Coffey on 2025   
   
                                                    Protein (U)   
[Mass/Vol]                              Protein [Mass/volume]   
in Urine by Test strip High                Negative            [facility]  
   
                                                    Protein [Mass/volume] in Ser  
um or PlasmaOrdered By: Shyam Guadarrama on 2025   
   
                                        Protein [Mass/Vol]  Protein [Mass/volume  
]   
in Serum or Plasma  Low                 6.4-8.9             [facility]  
   
                                                    Serum or plasma albumin/glob  
ulin mass ratioOrdered By: Shyam Guadarrama on 2025  
   
   
                                                    Albumin/Globulin   
[Mass ratio]                            Serum or plasma   
albumin/globulin mass   
ratio                                                       [facility]  
   
                                                    Serum or plasma anion gap de  
terminationOrdered By: Shyam Guadarrama on 2025   
   
                                        Anion gap [Moles/Vol] Serum or plasma an  
ion   
gap determination                       6.0-15.0            [facility]  
   
                                                    Sodium [Moles/volume] in Ser  
um or PlasmaOrdered By: Shyam Guadarrama on 2025   
   
                                        Sodium [Moles/Vol]  Sodium [Moles/volume  
]   
in Serum or Plasma                      136-145             [facility]  
   
                                                    Specific gravity Test strip   
(U) [Rel density]Ordered By: Evan Coffey on   
2025   
   
                                                    Specific gravity (U)   
[Rel density]                           Specific gravity of   
Urine by Test strip                     1.001-1.030         [facility]  
   
                                                    Type and Screenon 2025  
   
   
                                                    ABO and Rh group Nom   
(Bld)                                   Blood group A Rh(D)   
negative            Normal                                  The   
[facility]   
[facility]   
[facility]  
   
                                        Comment on above:   Order Comment: Trans  
fuse now? Y Number of units to transfuse   
now? 1   
   
                                                            Result Comment: PERF  
ORMED BY:  
[place] [address]  
[phone]  
PATHOLOGIST MEDICAL DIRECTOR  
CODEY SÁNCHEZ M.D.   
   
                                                    Urea nitrogen [Mass/volume]   
in Serum or PlasmaOrdered By: Shyam Guadarrama on   
2025   
   
                                                    Urea nitrogen   
[Mass/Vol]                              Urea nitrogen   
[Mass/volume] in Serum   
or Plasma           High                7-25                [facility]  
   
                                                    Urine Cultureon 2025   
   
                                                    Bacteria identified   
Cx Nom (U)                              Urine Culture Results  
<10,000 colonies/ml   
Mixed Bacterial Skin   
Contaminants 2 Days  
PERFORMED BY:  
[place] [address]  
[phone]  
PATHOLOGIST MEDICAL   
DIRECTOR  
CODEY SÁNCHEZ M.D.                Normal                                  The   
[facility]   
[facility]   
[facility]  
   
                                        Comment on above:   Performed By: #### C  
MP, LIPID ####  
[address]   
   
                                                    Urine cultureOrdered By: Etienne Coffey on 2025   
   
                                                    Bacteria identified   
Cx Nom (U)      Urine culture                                   [facility]  
   
                                                    Urobilinogen Test strip (U)   
[Mass/Vol]Ordered By: Evan Coffey on 2025   
   
                                                    Urobilinogen (U)   
[Mass/Vol]                              Urobilinogen   
[Mass/volume] in Urine   
by Test strip                           Normal              [facility]  
   
                                                    Yeast.budding [Presence] in   
Urine by Computer assisted methodOrdered By: Evan Coffey   
on 2025   
   
                                                    Yeast.budding   
Computer assisted Ql   
(U)                                     Yeast.budding   
[Presence] in Urine by   
Computer assisted   
method              High                None Seen           [facility]  
   
                                                    pH Test strip (U)Ordered By:  
 Evan Coffey on 2025   
   
                                        pH (U)              pH of Urine by Test   
strip                                   5.0-9.0             [facility]  
   
                                                    Alanine aminotransferase [En  
zymatic activity/volume] in Serum or PlasmaOrdered   
By:   
Evan Coffey on 2025   
   
                                                    ALT [Catalytic   
activity/Vol]                           Alanine   
aminotransferase   
[Enzymatic   
activity/volume] in   
Serum or Plasma     Low                 7-52                [facility]  
   
                                                    Albumin [Mass/volume] in Ser  
um or Plasma by Bromocresol green (BCG) dye binding   
methoOrdered By: Evan Coffey on 2025   
   
                                                    Albumin BCG dye   
[Mass/Vol]                              Albumin [Mass/volume]   
in Serum or Plasma by   
Bromocresol green   
(BCG) dye binding   
metho               Low                 3.5-5.7             [facility]  
   
                                                    Alkaline phosphatase [Enzyma  
tic activity/volume] in Serum or PlasmaOrdered By:   
Evan Coffey on 2025   
   
                                                    ALP [Catalytic   
activity/Vol]                           Alkaline phosphatase   
[Enzymatic   
activity/volume] in   
Serum or Plasma                                       [facility]  
   
                                                    Aspartate aminotransferase [  
Enzymatic activity/volume] in Serum or PlasmaOrdered  
By:   
Evan Coffey on 2025   
   
                                                    AST [Catalytic   
activity/Vol]                           Aspartate   
aminotransferase   
[Enzymatic   
activity/volume] in   
Serum or Plasma     Low                 13-39               [facility]  
   
                                                    BASIC METABOLIC PANELon    
   
                      Anion gap [Moles/Vol] 12 mmol/L  Normal     10-20      The  
   
[facility]   
[facility]  
   
                                        Comment on above:   Performed By: #### C  
H8, MG ####[facility] [id number]   
[place], [place],    
   
                      Calcium [Mass/Vol] 7.7 mg/dL  Low        8.6-10.3   The   
[facility]   
[facility]  
   
                                        Comment on above:   Performed By: #### C  
H8, MG ####[facility] [id number]   
[place],    
   
                      Chloride [Moles/Vol] 105 mmol/L Normal          The   
[facility]   
[facility]  
   
                                        Comment on above:   Performed By: #### C  
H8, MG ####[facility] [id number]   
[place],    
   
                      CO2 [Moles/Vol] 27 mmol/L  Normal     21-31      The   
[facility]   
[facility]  
   
                                        Comment on above:   Performed By: #### C  
H8, MG ####[facility] [id number]   
[place],    
   
                      Creatinine [Mass/Vol] 3.07 mg/dL High       0.60-1.20  The  
   
[facility]   
[facility]  
   
                                        Comment on above:   Performed By: #### C  
H8, MG ####[facility] [id number]   
[place],    
   
                                                    ESTIMATED GFR   
(CKD-EPI)       17 mL/min/1.73sqm Low             >=60            The   
[facility]   
System  
   
                                        Comment on above:   Result Comment:   
 CKD EPI Equation using Creatinine without  
   
RaceComment: Estimated glomerular filtration rate (eGFR) is   
calculated without a race coefficient. Values should be   
interpreted in the context of the patient's full clinical   
presentation.Reference:1. Alfredo GARCIA, Delmi M, Valdez FLYNN, et al..   
A Unifying Approach for GFR Estimation: Recommendations of the   
NKF-ASN Task Force on Reassessing the Inclusion of Race in   
Diagnosing Kidney Disease. American Journal of Kidney Diseases   
;79(2):268-88.e1.2. N Engl J Med 1 Vol. 385 Issue 19   
Pages 3407-4213   
   
                                                            Performed By: #### C  
H8, MG ####[facility] [id number]   
[place],    
   
                      Glucose [Mass/Vol] 168 mg/dL  High            The   
[facility]   
[facility]  
   
                                        Comment on above:   Performed By: #### C  
H8, MG ####[facility] [id number]   
[place],    
   
                      Potassium [Moles/Vol] 4.0 mmol/L Normal     3.5-5.0    The  
   
[facility]   
[facility]  
   
                                        Comment on above:   Performed By: #### C  
H8, MG ####[facility] [id number]   
[place],    
   
                      Sodium [Moles/Vol] 140 mmol/L Normal     136-145    The   
[facility]   
[facility]  
   
                                        Comment on above:   Performed By: #### C  
H8, MG ####[facility] [id number]   
[place],    
   
                                                    Urea nitrogen   
[Mass/Vol]      57 mg/dL        High            7-25            The   
[facility]   
[facility]  
   
                                        Comment on above:   Performed By: #### C  
H8, MG ####[facility] [id number]   
[place],    
   
                                                    Basophils Auto (Bld) [#/Vol]  
Ordered By: Evan Coffey on 2025   
   
                                                    Basophils (Bld)   
[#/Vol]                                 Automated basophil   
count                                   0.0-0.2             [facility]  
   
                                                    Basophils/100 WBC Auto (Bld)  
Ordered By: Evan Coffey on 2025   
   
                                                    Basophils/100 WBC   
(Bld)           Automated basophil %                 .               [facility]  
   
                                                    Bilirubin.total [Mass/volume  
] in Serum or PlasmaOrdered By: Evan Coffey on   
2025   
   
                                        Bilirubin [Mass/Vol] Bilirubin.total   
[Mass/volume] in Serum   
or Plasma                               0.3-1.0             [facility]  
   
                                                    CBC WITH DIFFERENTIALon    
   
                                                    Basophils (Bld)   
[#/Vol]         0.03 10*3/uL    Normal          0.00-0.20       The   
[facility]   
[facility]  
   
                                        Comment on above:   Performed By: #### C  
BCDSAT ####[facility] PATHOLOGY [id number]   
[place],    
   
                                                    Basophils/100 WBC   
(Bld)           0.2 %           Normal          <=1.9           The   
[facility]   
System  
   
                                        Comment on above:   Performed By: #### C  
BCDSAT ####[facility] PATHOLOGY [id number] [place] [address],    
   
                                                    Eosinophils (Bld)   
[#/Vol]         0.21 10*3/uL    Normal          0.00-0.70       The   
[facility]   
[facility]  
   
                                        Comment on above:   Performed By: #### C  
BCDSAT ####[facility] PATHOLOGY [id number]   
[place],    
   
                                                    Eosinophils/100 WBC   
(Bld)           1.1 %           Normal          0.1-4.0         The   
[facility]   
[facility]  
   
                                        Comment on above:   Performed By: #### C  
BCDSAT ####[facility] PATHOLOGY [id number]   
[place],    
   
                                                    Erythrocyte   
distribution width   
(RBC) [Ratio]   16.7 %          High            11.5-14.5       The   
[facility]   
[facility]  
   
                                        Comment on above:   Performed By: #### C  
BCDSAT ####[facility] PATHOLOGY [id number]   
[place],    
   
                                                    Hematocrit (Bld)   
[Volume fraction] 24.3 %          Low             36.0-46.0       The   
[facility]   
[facility]  
   
                                        Comment on above:   Performed By: #### C  
BCDSAT ####[facility] PATHOLOGY [id number]   
[place],    
   
                                                    Hemoglobin (Bld)   
[Mass/Vol]      7.8 g/dL        Low             12.0-15.0       The   
[facility]   
System  
   
                                        Comment on above:   Performed By: #### C  
BCDSAT ####[facility] PATHOLOGY [id number]   
[place],    
   
                                                    Lymphocytes (Bld)   
[#/Vol]         2.04 10*3/uL    Normal          1.00-4.80       The   
[facility]   
System  
   
                                        Comment on above:   Performed By: #### C  
BCDSAT ####[facility] PATHOLOGY [id number]   
[place],    
   
                                                    Lymphocytes/100 WBC   
(Bld)           10.7 %          Low             24.0-44.0       The   
[facility]   
System  
   
                                        Comment on above:   Performed By: #### C  
BCDSAT ####[facility] PATHOLOGY [id number]   
[place],    
   
                                                    MCH (RBC) [Entitic   
mass]           26.4 pg         Normal          26.0-34.0       The   
[facility]   
System  
   
                                        Comment on above:   Performed By: #### C  
BCDSAT ####[facility] PATHOLOGY [id number]   
[place],    
   
                      MCHC (RBC) [Mass/Vol] 32.0 g/dL  Normal     32.0-35.9  The  
   
[facility]   
System  
   
                                        Comment on above:   Performed By: #### C  
BCDSAT ####[facility] PATHOLOGY [id number]   
[place],    
   
                                                    MCV (RBC) [Entitic   
vol]            82 fL           Normal                    The   
[facility]   
System  
   
                                        Comment on above:   Performed By: #### C  
BCDSAT ####[facility] PATHOLOGY [id number]   
[place],    
   
                                                    Monocytes (Bld)   
[#/Vol]         1.01 10*3/uL    High            0.20-1.00       The   
[facility]   
System  
   
                                        Comment on above:   Performed By: #### C  
BCDSAT ####[facility] PATHOLOGY [id number]   
[place],    
   
                                                    Monocytes/100 WBC   
(Bld)           5.3 %           Normal          2.0-11.0        The   
[facility]   
System  
   
                                        Comment on above:   Performed By: #### C  
BCDSAT ####[facility] PATHOLOGY [id number]   
[place],    
   
                                                    Neutrophils (Bld)   
[#/Vol]         15.81 10*3/uL   High            1.50-8.00       The   
[facility]   
[facility]  
   
                                        Comment on above:   Performed By: #### C  
BCDSAT ####[facility] PATHOLOGY [id number]   
[place],    
   
                                                    Neutrophils/100 WBC   
(Bld)           82.8 %          High            31.0-76.0       The   
[facility]   
System  
   
                                        Comment on above:   Performed By: #### C  
BCDSAT ####[facility] PATHOLOGY [id number]   
[place],    
   
                                                    Platelet mean volume   
(Bld) [Entitic vol] 7.6 fL          Normal          7.5-11.2        The   
[facility]   
System  
   
                                        Comment on above:   Performed By: #### C  
BCDSAT ####[facility] PATHOLOGY [id number]   
[place],    
   
                                                    Platelets (Bld)   
[#/Vol]         398 10*3/uL     Normal          150-400         The   
[facility]   
System  
   
                                        Comment on above:   Performed By: #### C  
BCDSAT ####[facility] PATHOLOGY [id number]   
[place],    
   
                      RBC (Bld) [#/Vol] 2.95 10*6/uL Low        4.00-5.20  The   
[facility]   
System  
   
                                        Comment on above:   Performed By: #### C  
BCDSAT ####S PATHOLOGY [id number]   
[place],    
   
                      WBC (Bld) [#/Vol] 19.1 10*3/uL High       4.5-11.5   The   
[facility]   
[facility]  
   
                                        Comment on above:   Performed By: #### RADHA  
BCDSAT ####[facility] PATHOLOGY [id number]   
[place],    
   
                                                    Calcium [Mass/volume] in Ser  
um or PlasmaOrdered By: Evan Coffey on 2025   
   
                                        Calcium [Mass/Vol]  Calcium [Mass/volume  
]   
in Serum or Plasma  Low                 8.6-10.3            [facility]  
   
                                                    Carbon dioxide, total [Moles  
/volume] in Serum or PlasmaOrdered By: Evan Coffey   
on   
2025   
   
                                        CO2 [Moles/Vol]     Carbon dioxide, tota  
l   
[Moles/volume] in   
Serum or Plasma                         21.0-31.0           [facility]  
   
                                                    Chloride [Moles/volume] in S  
chandler or PlasmaOrdered By: Evan Coffey on 2025  
   
   
                                        Chloride [Moles/Vol] Chloride   
[Moles/volume] in   
Serum or Plasma                                       [facility]  
   
                                                    Complete Blood Count Auto Di  
ffon 2025   
   
                                                    Basophils (Bld)   
[#/Vol]         0.1 10*3/uL     Normal          0.0-0.2         The   
[facility]   
[facility]   
[facility]  
   
                                        Comment on above:   Result Comment: PERF  
ORMED BY:  
[facility]  
[place] [address]  
[phone]  
PATHOLOGIST MEDICAL DIRECTOR  
CODEY SÁNCHEZ M.D.   
   
                                                            Performed By: #### G  
LULS ####  
Point of Care testing  
,   
   
                                                    Basophils/100 WBC   
(Bld)           0.2 %           Normal          .               The   
[facility]   
[facility]ician   
Group  
   
                                        Comment on above:   Performed By: #### G  
LULS ####  
Point of Care testing  
,   
   
                                                    Eosinophils (Bld)   
[#/Vol]         0.4 10*3/uL     Normal          0.0-0.45        The   
[facility]   
[facility]   
Group  
   
                                        Comment on above:   Performed By: #### G  
LULS ####  
Point of Care testing  
,   
   
                                                    Eosinophils/100 WBC   
(Bld)           1.7 %           Normal          .               The   
[facility]   
[facility]   
Group  
   
                                        Comment on above:   Performed By: #### G  
LULS ####  
Point of Care testing  
,   
   
                                                    Erythrocyte   
distribution width   
(RBC) [Ratio]   17.0 %          High            11.9-15.3       The   
[facility]   
Phys[facility]   
Group  
   
                                        Comment on above:   Performed By: #### G  
LULS ####  
Point of Care testing  
,   
   
                                                    Hematocrit (Bld)   
[Volume fraction] 24.7 %          Low             34.0-46.4       The   
[facility]   
[facility]   
Group  
   
                                        Comment on above:   Performed By: #### G  
LULS ####  
Point of Care testing  
,   
   
                                                    Hemoglobin (Bld)   
[Mass/Vol]      7.8 g/dL        Low             11.8-15.4       The   
[facility]   
[facility]   
[facility]  
   
                                        Comment on above:   Performed By: #### G  
LULS ####  
Point of Care testing  
,   
   
                                                    Lymphocytes (Bld)   
[#/Vol]         2.2 10*3/uL     Normal          1.00-4.8        The   
[facility]   
Phys[facility]   
[facility]  
   
                                        Comment on above:   Performed By: #### G  
LULS ####  
Point of Care testing  
,   
   
                                                    Lymphocytes/100 WBC   
(Bld)           10.5 %          Normal          .               The   
[facility]   
[facility]   
[facility]  
   
                                        Comment on above:   Performed By: #### G  
LULS ####  
Point of Care testing  
,   
   
                                                    MCH (RBC) [Entitic   
mass]           25.8 pg         Normal          24.7-34.3       The   
[facility]   
[facility]   
[facility]  
   
                                        Comment on above:   Performed By: #### G  
LULS ####  
Point of Care testing  
,   
   
                                                    MCV (RBC) [Entitic   
vol]            81.8 fL         Normal                    The   
[facility]   
Physician   
Group  
   
                                        Comment on above:   Performed By: #### G  
LULS ####  
Point of Care testing  
,   
   
                                                    Mean Corpuscular HGB   
Conc            31.6 g/dL       Low             32.0-35.0       The   
[facility]   
[facility]   
[facility]  
   
                                        Comment on above:   Performed By: #### G  
LULS ####  
Point of Care testing  
,   
   
                                                    Monocytes (Bld)   
[#/Vol]         1.1 10*3/uL     High            0.0-0.8         The   
[facility]   
[facility]   
[facility]  
   
                                        Comment on above:   Performed By: #### G  
LULS ####  
Point of Care testing  
,   
   
                                                    Monocytes/100 WBC   
(Bld)           23.15 %         High            0.00-20.00      The   
[facility]   
[facility]   
[facility]  
   
                                        Comment on above:   Result Comment: For   
adults in ED, MDW > 20.0 may be associated  
   
with a higher  
risk of sepsis during the first 12 hrs of hospital admission   
   
                                                            Performed By: #### G  
LULS ####  
Point of Care testing  
,   
   
                                                    Monocytes/100 WBC   
(Bld)           5.0 %           Normal          .               The   
[facility]   
[facility]   
[facility]  
   
                                        Comment on above:   Performed By: #### G  
LULS ####  
Point of Care testing  
,   
   
                                                    Neutrophils (Bld)   
[#/Vol]         17.3 10*3/uL    High            1.8-7.7         The   
[facility]   
[facility]   
[facility]  
   
                                        Comment on above:   Performed By: #### G  
LULS ####  
Point of Care testing  
,   
   
                                                    Neutrophils/100 WBC   
(Bld)           82.6 %          Normal          .               The   
[facility]   
Phys[facility]   
[facility]  
   
                                        Comment on above:   Performed By: #### G  
LULS ####  
Point of Care testing  
,   
   
                      NRBC%      0.1 /100{WBC} Normal     0-0.5      The   
[facility]   
[facility]   
[facility]  
   
                                        Comment on above:   Performed By: #### G  
EILEENLS ####  
Point of Care testing  
,   
   
                                                    Platelet mean volume   
(Bld) [Entitic vol] 7.7 fL          Normal          6.3-10.7        The   
[facility]   
Phys[facility]   
[facility]  
   
                                        Comment on above:   Performed By: #### G  
EILEENLS ####  
Point of Care testing  
,   
   
                                                    Platelets (Bld)   
[#/Vol]         359 10*3/uL     Normal          150-450         The   
[facility]   
Phys[facility]   
[facility]  
   
                                        Comment on above:   Performed By: #### G  
EILEENLS ####  
Point of Care testing  
,   
   
                      RBC (Bld) [#/Vol] 3.02 10*6/uL Low        3.60-5.00  The   
[facility]   
Phys[facility]   
[facility]  
   
                                        Comment on above:   Performed By: #### G  
EILEENLS ####  
Point of Care testing  
,   
   
                      WBC (Bld) [#/Vol] 21.0 10*3/uL High       3.8-11.6   The   
[facility]   
Phys[facility]   
Group  
   
                                        Comment on above:   Performed By: #### G  
EILEENLS ####  
Point of Care testing  
,   
   
                                                    Comprehensive Metabolic Pane  
betty 2025   
   
                      Albumin [Mass/Vol] 3.0 g/dL   Low        3.5-5.7    The   
[facility]   
Physician   
Group  
   
                                        Comment on above:   Performed By: #### G  
EDILMA ####  
Point of Care testing  
,   
   
                                                    Albumin/Globulin   
[Mass ratio]    0.9 {ratio}     Normal                          The   
[facility]   
Phys[facility]   
Group  
   
                                        Comment on above:   Performed By: #### G  
EILEENLS ####  
Point of Care testing  
,   
   
                                                    ALP [Catalytic   
activity/Vol]   41 U/L          Normal                    The   
[facility]   
Phys[facility]   
Group  
   
                                        Comment on above:   Performed By: #### G  
EILEENLS ####  
Point of Care testing  
,   
   
                                                    ALT [Catalytic   
activity/Vol]   5 U/L           Low             7-52            The   
[facility]   
[facility]   
Group  
   
                                        Comment on above:   Performed By: #### G  
EILEENLS ####  
Point of Care testing  
,   
   
                      Anion gap [Moles/Vol] 14.8 mmol/L Normal     6.0-15.0   Th  
e   
[facility]   
[facility]   
Group  
   
                                        Comment on above:   Performed By: #### G  
EILEENLS ####  
Point of Care testing  
,   
   
                                                    AST [Catalytic   
activity/Vol]   9 U/L           Low             13-39           The   
[facility]   
Physician   
Group  
   
                                        Comment on above:   Performed By: #### G  
EILEENLS ####  
Point of Care testing  
,   
   
                      Bilirubin [Mass/Vol] 0.4 mg/dL  Normal     0.3-1.0    The   
[facility]   
Phys[facility]   
Group  
   
                                        Comment on above:   Performed By: #### G  
LULS ####  
Point of Care testing  
,   
   
                      Calcium [Mass/Vol] 8.3 mg/dL  Low        8.6-10.3   The   
[facility]   
Phys[facility]   
[facility]  
   
                                        Comment on above:   Performed By: #### G  
LULS ####  
Point of Care testing  
,   
   
                      Chloride [Moles/Vol] 103 mmol/L Normal          The   
[facility]   
[facility]   
[facility]  
   
                                        Comment on above:   Performed By: #### G  
LULS ####  
Point of Care testing  
,   
   
                      CO2 [Moles/Vol] 25.3 mmol/L Normal     21.0-31.0  The   
[facility]   
Phys[facility]   
[facility]  
   
                                        Comment on above:   Performed By: #### G  
LULS ####  
Point of Care testing  
,   
   
                      Creatinine [Mass/Vol] 2.96 mg/dL High       0.60-1.20  The  
   
[facility]   
Physician   
[facility]  
   
                                        Comment on above:   Performed By: #### G  
LULS ####  
Point of Care testing  
,   
   
                                                    Creatinine Clr Calc   
Pharmacy        28.92           Normal                          The   
[facility]   
[facility]   
[facility]  
   
                                        Comment on above:   Performed By: #### G  
LULS ####  
Point of Care testing  
,   
   
                      Estimated GFR 18.091 mL/Min Normal                The   
[facility]   
[facility]   
[facility]  
   
                                        Comment on above:   Performed By: #### G  
LULS ####  
Point of Care testing  
,   
   
                                                    Globulin (S)   
[Mass/Vol]      3.4 g/dL        Normal                          The   
[facility]   
[facility]   
[facility]  
   
                                        Comment on above:   Performed By: #### G  
LULS ####  
Point of Care testing  
,   
   
                      Glucose [Mass/Vol] 155 mg/dL  High            The   
[facility]   
[facility]   
[facility]  
   
                                        Comment on above:   Result Comment: Rand  
 Glucose Reference Range is dependent on  
   
time and  
content of last meal. Glucose of more than 200 mg/dL in a  
nonstressed, ambulatory subject supports the diagnosis of  
Diabetes Mellitus.  
ADA recommended reference range   
   
                                                            Performed By: #### G  
LULS ####  
Point of Care testing  
,   
   
                      Potassium [Moles/Vol] 4.1 mmol/L Normal     3.5-5.1    The  
   
[facility]   
Phys[facility]   
Group  
   
                                        Comment on above:   Performed By: #### G  
LULS ####  
Point of Care testing  
,   
   
                      Protein [Mass/Vol] 6.4 g/dL   Normal     6.4-8.9    The   
[facility]   
[facility]   
[facility]  
   
                                        Comment on above:   Performed By: #### G  
EDILMA ####  
Point of Care testing  
,   
   
                      Sodium [Moles/Vol] 139 mmol/L Normal     136-145    The   
[facility]   
[facility]   
[facility]  
   
                                        Comment on above:   Performed By: #### G  
LULS ####  
Point of Care testing  
,   
   
                                                    Urea nitrogen   
[Mass/Vol]      52 mg/dL        High            7-25            The   
[facility]   
Physician   
Group  
   
                                        Comment on above:   Performed By: #### G  
EILEENLS ####  
Point of Care testing  
,   
   
                                                    Creatinine [Mass/volume] in   
Serum or PlasmaOrdered By: Evan Coffey on   
2025   
   
                                        Creatinine [Mass/Vol] Creatinine   
[Mass/volume] in Serum   
or Plasma           High                0.60-1.20           [facility]  
   
                                                    Discharge Planning Noteon    
   
                                                    Transcription   
Authentication   
Interface Message   
Text                                    Pt will dc home via   
[facility] [place] at   
630pm. Address   
verified.  
  
Med following for [facility]   
services. SW notified   
dc today  
  
Weekend SW  
SONY Esparza, LISW-S  
Epic Chat           Normal                                  [facility]   
[facility]   
[facility]  
   
                                                    Eosinophils Auto (Bld) [#/Vo  
l]Ordered By: Evan Coffey on 2025   
   
                                                    Eosinophils (Bld)   
[#/Vol]                                 Automated eosinophil   
count                                   0.0-0.45            [facility]  
   
                                                    Eosinophils/100 WBC Auto (Bl  
d)Ordered By: Evan Coffey on 2025   
   
                                                    Eosinophils/100 WBC   
(Bld)           Automated eosinophil %                 .               [facility]  
   
                                                    Erythrocyte distribution wid  
th Auto (RBC) [Ratio]Ordered By: Evan Coffey on   
2025   
   
                                                    Erythrocyte   
distribution width   
(RBC) [Ratio]                           Erythrocyte   
distribution width   
[Ratio] by Automated   
count               High                11.9-15.3           [facility]  
   
                                                    GLUCOSE, FINGERSTICK-IN OFFI  
CEon 2025   
   
                      Glucose [Mass/Vol] 139 mg/dL  High            The   
[facility]   
System  
   
                                        Comment on above:   Performed By: #### 8  
2948 ####NURSING GLUCOSE [id number]   
[place], [address]   
   
                      Glucose [Mass/Vol] 165 mg/dL  High            The   
[facility]   
System  
   
                                        Comment on above:   Result Comment: Rayray SANCHEZ MD   
   
                                                            Performed By: #### 8  
2948 ####NURSING GLUCOSE [id number]   
[place], [address]   
   
                      Glucose [Mass/Vol] 189 mg/dL  High            The   
[facility]   
System  
   
                                        Comment on above:   Result Comment: Rayray SANCHEZ MD   
   
                                                            Performed By: #### 8  
2948 ####NURSING GLUCOSE [id number]   
[place], [address]   
   
                                                    Globulin Calc (S) [Mass/Vol]  
Ordered By: Evan Coffey on 2025   
   
                                                    Globulin (S)   
[Mass/Vol]                              Serum globulin   
measurement by   
calculation   
(mass/volume)                                               [facility]  
   
                                                    Glucose Glucometer (BldC) [M  
ass/Vol]Ordered By: Nithya Jaimes on 2025   
   
                                        Glucose [Mass/Vol]  Capillary blood   
glucose measurement by   
glucometer   
(mass/volume)                                               [facility]  
   
                                        Comment on above:   Random Glucose Refer  
ence Range is dependent on time and   
content   
of last meal. Glucose of more than 200 mg/dL in a nonstressed,   
ambulatory subject supports the diagnosis of Diabetes Mellitus.   
   
                                                    Glucose Poct Glucometerson 0  
2025   
   
                      Glucose [Mass/Vol] 169 mg/dL  Normal                The   
[facility]   
Physician   
Group  
   
                                        Comment on above:   Result Comment: Rand  
 Glucose Reference Range is dependent on  
   
time and  
content of last meal. Glucose of more than 200 mg/dL in a  
nonstressed, ambulatory subject supports the diagnosis of  
Diabetes Mellitus.  
PERFORMED BY:  
[place] [address]  
[phone]  
PATHOLOGIST MEDICAL DIRECTOR  
CODEY SÁNCHEZ M.D.   
   
                                                            Performed By: #### C  
MP, LIPID ####  
[facility]  
[address] [place] [address]   
   
                                                    Glucose [Mass/volume] in Ser  
um or PlasmaOrdered By: Evan Coffey on 2025   
   
                                        Glucose [Mass/Vol]  Glucose [Mass/volume  
]   
in Serum or Plasma  High                              [facility]  
   
                                        Comment on above:   ADA recommended refe  
rence rangeRandom Glucose Reference Range   
is   
dependent on time and content of last meal. Glucose of more than   
200 mg/dL in a nonstressed, ambulatory subject supports the   
diagnosis of Diabetes Mellitus.   
   
                                                    Hematocrit Auto (Bld) [Volum  
e fraction]Ordered By: Evan Coffey on 2025   
   
                                                    Hematocrit (Bld)   
[Volume fraction]                       Hematocrit [Volume   
Fraction] of Blood by   
Automated count     Low                 34.0-46.4           [facility]  
   
                                                    Hemoglobin [Mass/volume] in   
BloodOrdered By: Evan Coffey on 2025   
   
                                                    Hemoglobin (Bld)   
[Mass/Vol]                              Hemoglobin   
[Mass/volume] in Blood Low                 11.8-15.4           [facility]  
   
                                                    Leukocytes [#/volume] correc  
sapna for nucleated erythrocytes in Blood by Automated  
   
counOrdered By: Evan Coffey on 2025   
   
                                                    WBC corrected for   
nucl RBC Auto (Bld)   
[#/Vol]                                 Leukocytes [#/volume]   
corrected for   
nucleated erythrocytes   
in Blood by Automated   
coun                High                3.8-11.6            [facility]  
   
                                                    Lymphocytes Auto (Bld) [#/Vo  
l]Ordered By: Evan Coffey on 2025   
   
                                                    Lymphocytes (Bld)   
[#/Vol]                                 Lymphocytes [#/volume]   
in Blood by Automated   
count                                   1.00-4.8            [facility]  
   
                                                    Lymphocytes/100 WBC Auto (Bl  
d)Ordered By: Evan Coffey on 2025   
   
                                                    Lymphocytes/100 WBC   
(Bld)                                   Lymphocytes/100   
leukocytes in Blood by   
Automated count                         .                   [facility]  
   
                                                    MAGNESIUMon 2025   
   
                      Magnesium [Mass/Vol] 1.5 mg/dL  Low        1.9-2.7    The   
[facility]   
[facility]  
   
                                        Comment on above:   Performed By: #### C  
H8, MG ####[facility] [id number] [place] [address], [id number]   
   
                                                    MCH Auto (RBC) [Entitic mass  
]Ordered By: Evan Coffey on 2025   
   
                                                    MCH (RBC) [Entitic   
mass]                                   MCH [Entitic mass] by   
Automated count                         24.7-34.3           [facility]  
   
                                                    MCHC Auto (RBC) [Mass/Vol]Or  
dered By: Evan Coffey on 2025   
   
                                        MCHC (RBC) [Mass/Vol] MCHC [Mass/volume]  
 by   
Automated count     Low                 32.0-35.0           [facility]  
   
                                                    MCV Auto (RBC) [Entitic vol]  
Ordered By: Evan Coffey on 2025   
   
                                                    MCV (RBC) [Entitic   
vol]                                    MCV [Entitic volume]   
by Automated count                                    [facility]  
   
                                                    METRO BASIC METABOLIC PANELo  
n 2025   
   
                          Calcium [Mass/Vol] 7.7 mg/dL    Low          8.6 - 10.  
3   
mg/dL                                   [facility]  
   
                      Chloride [Moles/Vol] 105 mmol/L            98 - 107 mmol/L  
 [facility]  
   
                      CO2 [Moles/Vol] 27 mmol/L             21 - 31 mmol/L [facility]  
   
                          Creatinine [Mass/Vol] 3.07 mg/dL   High         0.60 -  
 1.20   
mg/dL                                   [facility]  
   
                      Glucose [Mass/Vol] 168 mg/dL  High       74 - 109 mg/dL NO  
[facility]  
   
                                                    Interpretation and   
review of laboratory   
results         Abnormal                                        [facility]  
   
                      LHS ESTIMATED GFR 17         Low        - PINF     [facility]  
   
                                        Comment on above:    CKD EPI Equatio  
n using Creatinine without Race  
Comment: Estimated glomerular filtration rate (eGFR) is   
calculated without a race coefficient. Values should be   
interpreted in the context of the patient's full clinical   
presentation.  
Reference:  
1. Alfredo C, Delmi M, Valdez FLYNN, et al.. A Unifying Approach   
for GFR Estimation: Recommendations of the NKF-ASN Task Force on   
Reassessing the Inclusion of Race in Diagnosing Kidney Disease.   
American Journal of Kidney Diseases 202;79(2):268-88.e1.  
2. N Engl J Med 1 Vol. 385 Issue 19 Pages 6679-2174  
  
   
   
                      METRO ANION GAP 12                    10 - 20    [facility]  
   
                          Potassium [Moles/Vol] 4 mmol/L                  3.5 -   
5.0   
mmol/L                                  [facility]  
   
                          Sodium [Moles/Vol] 140 mmol/L                136 - 145  
   
mmol/L                                  [facility]  
   
                                                    Urea nitrogen   
[Mass/Vol]      57 mg/dL        High            7 - 25 mg/dL    [facility]  
   
                                                                  CLINISYNC  
   
                                                                  [facility]  
   
                                                    METRO CBC WITH DIFFERENTIALo  
n 2025   
   
                                                    Basophils (Bld)   
[#/Vol]             0.03 10*3/uL                            0.00 - 0.20   
K/uL                                    [facility]  
   
                                                    Basophils/100 WBC   
(Bld)           0.2 %                           NINF - 1.9 %    [facility]  
   
                                                    Eosinophils (Bld)   
[#/Vol]             0.21 10*3/uL                            0.00 - 0.70   
K/uL                                    [facility]  
   
                                                    Eosinophils/100 WBC   
(Bld)           1.1 %                           0.1 - 4.0 %     [facility]  
   
                                                    Erythrocyte   
distribution width   
(RBC) [Ratio]   16.7 %          High            11.5 - 14.5 %   [facility]  
   
                                                    Hematocrit (Bld)   
[Volume fraction] 24.3 %          Low             36.0 - 46.0 %   [facility]  
   
                                                    Hemoglobin (Bld)   
[Mass/Vol]          7.8 g/dL            Low                 12.0 - 15.0   
g/dL                                    [facility]  
   
                                                    Interpretation and   
review of laboratory   
results         Abnormal                                        [facility] MEAN PLT VOL 7.6 fL                7.5 - 11.2 [facility]L [facility]L              150 - 400 K/uL [facility] RBC COUNT 2.95       Low                   [facility]  
   
                                                    Lymphocytes (Bld)   
[#/Vol]             2.04 10*3/uL                            1.00 - 4.80   
K/uL                                    [facility]  
   
                                                    Lymphocytes/100 WBC   
(Bld)           10.7 %          Low             24.0 - 44.0 %   [facility]  
   
                                                    MCH (RBC) [Entitic   
mass]           26.4 pg                         26.0 - 34.0 pg  [facility]  
   
                          MCHC (RBC) [Mass/Vol] 32 g/dL                   32.0 -  
 35.9   
g/dL                                    [facility]  
   
                                                    MCV (RBC) [Entitic   
vol]            82 fL                           80 - 100 fL     [facility]  
   
                                                    METRO TOTAL VOLUME -   
REF                 1.01 K/uL           High                0.20 - 1.00   
K/uL                                    [facility]  
   
                                                    Monocytes/100 WBC   
(Bld)           5.3 %                           2.0 - 11.0 %    [facility]  
   
                                                    Neutrophils (Bld)   
[#/Vol]             15.81 10*3/uL       High                1.50 - 8.00   
K/uL                                    [facility]  
   
                                                    Neutrophils/100 WBC   
(Bld)           82.8 %          High            31.0 - 76.0 %   [facility]  
   
                      WBC (Bld) [#/Vol] 19.1 10*3/uL High       4.5 - 11.5 K/uL   
[facility]  
   
                                                                  CLINISYNC  
   
                                                                  [facility]  
   
                                                    Magnesiumon 2025   
   
                      Magnesium [Mass/Vol] 1.4 mg/dL  Low        1.9-2.7    The   
[facility]   
[facility]   
[facility]  
   
                                        Comment on above:   Result Comment: PERF  
ORMED BY:  
[facility]  
[place] [address]  
[phone]  
PATHOLOGIST MEDICAL DIRECTOR  
CODEY SÁCNHEZ M.D.   
   
                                                            Performed By: #### H  
H ####  
[address]   
   
                                                    Magnesium [Mass/volume] in S  
chandler or PlasmaOrdered By: Evan Coffey on 2025  
   
   
                                        Magnesium [Mass/Vol] Magnesium   
[Mass/volume] in Serum   
or Plasma           Low                 1.9-2.7             [facility]  
   
                                                    Monocyte distribution width   
[Entitic volume] in Blood by AutomatedOrdered By:   
Evan Coffey on 2025   
   
                                                    Monocyte distribution   
width Auto (Bld)   
[Entitic vol]                           Monocyte distribution   
width [Entitic volume]   
in Blood by Automated High                0.00-20.00          [facility]  
   
                                        Comment on above:   For adults in ED, MD  
W > 20.0 may be associated with a higher   
risk of sepsis during the first 12 hrs of hospital admission   
   
                                                    Monocytes Auto (Bld) [#/Vol]  
Ordered By: Evan Coffey on 2025   
   
                                                    Monocytes (Bld)   
[#/Vol]                                 Automated blood   
monocyte count      High                0.0-0.8             [facility]  
   
                                                    Monocytes/100 WBC Auto (Bld)  
Ordered By: Evan Coffey on 2025   
   
                                                    Monocytes/100 WBC   
(Bld)           Automated monocyte %                 .               [facility]  
   
                                                    Neutrophils Auto (Bld) [#/Vo  
l]Ordered By: Evan Coffey on 2025   
   
                                                    Neutrophils (Bld)   
[#/Vol]                                 Neutrophils [#/volume]   
in Blood by Automated   
count               High                1.8-7.7             [facility]  
   
                                                    Neutrophils/100 WBC Auto (Bl  
d)Ordered By: Evan Coffey on 2025   
   
                                                    Neutrophils/100 WBC   
(Bld)           Automated neutrophil %                 .               [facility]  
   
                                                    No Panel InformationOrdered   
By: Evan Coffey on 2025   
   
                                                    Estimated GFR   
(CKD-EPI)       18.091 mL/Min                                   [facility]  
   
                                                    Pharmacy Creatinine   
Clearance (Chem 28.92                                           [facility]  
   
                                                    Nucleated erythrocytes [Pres  
ence] in Blood by Automated countOrdered By: Evan Coffey   
on 2025   
   
                                                    Nucleated RBC Auto Ql   
(Bld)                                   Nucleated erythrocytes   
[Presence] in Blood by   
Automated count                         0-0.5               [facility]  
   
                                                    Platelet mean volume Auto (B  
ld) [Entitic vol]Ordered By: Evan Coffey on   
2025   
   
                                                    Platelet mean volume   
(Bld) [Entitic vol]                     Platelet mean volume   
[Entitic volume] in   
Blood by Automated   
count                                   6.3-10.7            [facility]  
   
                                                    Platelets Auto (Bld) [#/Vol]  
Ordered By: Evan Coffey on 2025   
   
                                                    Platelets (Bld)   
[#/Vol]                                 Platelets [#/volume]   
in Blood by Automated   
count                                   150-450             [facility]  
   
                                                    Potassium [Moles/volume] in   
Serum or PlasmaOrdered By: Evan Coffey on   
2025   
   
                                        Potassium [Moles/Vol] Potassium   
[Moles/volume] in   
Serum or Plasma                         3.5-5.1             [facility]  
   
                                                    Protein [Mass/volume] in Ser  
um or PlasmaOrdered By: Evan Coffey on 2025   
   
                                        Protein [Mass/Vol]  Protein [Mass/volume  
]   
in Serum or Plasma                      6.4-8.9             [facility]  
   
                                                    RBC Auto (Bld) [#/Vol]Ordere  
d By: Evan Coffey on 2025   
   
                                        RBC (Bld) [#/Vol]   Erythrocytes   
[#/volume] in Blood by   
Automated count     Low                 3.60-5.00           [facility]  
   
                                                    Serum or plasma albumin/glob  
ulin mass ratioOrdered By: Evan Coffey on   
2025   
   
                                                    Albumin/Globulin   
[Mass ratio]                            Serum or plasma   
albumin/globulin mass   
ratio                                                       [facility]  
   
                                                    Serum or plasma anion gap de  
terminationOrdered By: Evan Coffey on 2025   
   
                                        Anion gap [Moles/Vol] Serum or plasma an  
ion   
gap determination                       6.0-15.0            [facility]  
   
                                                    Sodium [Moles/volume] in Ser  
um or PlasmaOrdered By: Evan Coffey on 2025   
   
                                        Sodium [Moles/Vol]  Sodium [Moles/volume  
]   
in Serum or Plasma                      136-145             [facility]  
   
                                                    Student Noteon 2025   
   
                                                    Transcription   
Authentication   
Interface Message   
Text                            Normal                          The   
[facility]   
System  
   
                                                    Urea nitrogen [Mass/volume]   
in Serum or PlasmaOrdered By: Evan Coffey on   
2025   
   
                                                    Urea nitrogen   
[Mass/Vol]                              Urea nitrogen   
[Mass/volume] in Serum   
or Plasma           High                7-25                [facility]  
   
                                                    WBC Auto (Bld) [#/Vol]Ordere  
d By: Evan Coffey on 2025   
   
                                        WBC (Bld) [#/Vol]   Leukocytes [#/volume  
]   
in Blood by Automated   
count               High                3.8-11.6            [facility]  
   
                                                    ALL MAGNESIUMon 2025   
   
                      Magnesium [Mass/Vol] 1.7 mg/dL  Low        1.9 - 2.7 mg/dL  
 [facility]  
   
                                                    BASIC METABOLIC PANELon    
   
                      Anion gap [Moles/Vol] 15 mmol/L  Normal     10-20      The  
   
[facility]   
[facility]  
   
                                        Comment on above:   Performed By: #### BELINDA SMITH, CH8 ####S PATHOLOGY [id number]   
[place], [place],    
   
                      Calcium [Mass/Vol] 7.9 mg/dL  Low        8.6-10.3   The   
[facility]   
[facility]  
   
                                        Comment on above:   Performed By: #### BELINDA SMITH, CH8 ####S PATHOLOGY [id number]   
[place], [place],    
   
                      Chloride [Moles/Vol] 105 mmol/L Normal          The   
[facility]   
System  
   
                                        Comment on above:   Performed By: #### BELINDA SMITH, CH8 ####S PATHOLOGY [id number]   
[place],    
   
                      CO2 [Moles/Vol] 25 mmol/L  Normal     21-31      The   
[facility]   
System  
   
                                        Comment on above:   Performed By: #### BELINDA SMITH, CH8 ####S PATHOLOGY [id number]   
[place],    
   
                      Creatinine [Mass/Vol] 3.32 mg/dL High       0.60-1.20  The  
   
[facility]   
System  
   
                                        Comment on above:   Performed By: #### BELINDA SMITH, CH8 ####S PATHOLOGY [id number]   
[place],    
   
                                                    ESTIMATED GFR   
(CKD-EPI)       16 mL/min/1.73sqm Low             >=60            The   
[facility]   
System  
   
                                        Comment on above:   Result Comment:   
 CKD EPI Equation using Creatinine without  
   
RaceComment: Estimated glomerular filtration rate (eGFR) is   
calculated without a race coefficient. Values should be   
interpreted in the context of the patient's full clinical   
presentation.Reference:1. Alfredo GARCIA, Delmi M, Valdez FLYNN, et al..   
A Unifying Approach for GFR Estimation: Recommendations of the   
NKF-ASN Task Force on Reassessing the Inclusion of Race in   
Diagnosing Kidney Disease. American Journal of Kidney Diseases   
;79(2):268-88.e1.2. N Engl J Med 1 Vol. 385 Issue 19   
Pages 2683-3824   
   
                                                            Performed By: #### BELINDA SMITH, IRON8 ####[facility] PATH[facility] [id number]   
[place],    
   
                      Glucose [Mass/Vol] 161 mg/dL  High            The   
[facility]   
[facility]  
   
                                        Comment on above:   Performed By: #### BELINDA SMITH, IRON8 ####[facility] PATHOLOGY [id number]   
[place],    
   
                      Potassium [Moles/Vol] 4.3 mmol/L Normal     3.5-5.0    The  
   
[facility]   
[facility]  
   
                                        Comment on above:   Performed By: #### BELINDA SMITH, IRON8 ####S PATHOLOGY [id number]   
[place],    
   
                      Sodium [Moles/Vol] 141 mmol/L Normal     136-145    The   
[facility]   
[facility]  
   
                                        Comment on above:   Performed By: #### BELINDA SMITH, ERIKA ####S PATHOLOGY [id number]   
[place],    
   
                                                    Urea nitrogen   
[Mass/Vol]      66 mg/dL        High            7-25            The   
[facility]   
[facility]  
   
                                        Comment on above:   Performed By: #### BELINDA SMITH, IRON8 ####S PATHOLOGY [id number]   
[place],    
   
                                                    CBC WITH DIFFERENTIALon    
   
                                                    Erythrocyte   
distribution width   
(RBC) [Ratio]   16.9 %          High            11.5-14.5       The   
[facility]   
[facility]  
   
                                        Comment on above:   Performed By: #### ONEIL BECERRA ####[facility] PATH[facility]   
[id number] [place],    
   
                                                    Hematocrit (Bld)   
[Volume fraction] 26.8 %          Low             36.0-46.0       The   
[facility]   
System  
   
                                        Comment on above:   Performed By: #### ONEIL BECERRA ####S PATHOLOGY   
[id number] [place],    
   
                                                    Hemoglobin (Bld)   
[Mass/Vol]      8.1 g/dL        Low             12.0-15.0       The   
[facility]   
[facility]  
   
                                        Comment on above:   Performed By: ###ONEIL FLOR ####[facility] PATHOLOGY   
[id number] [place],    
   
                                                    MCH (RBC) [Entitic   
mass]           25.0 pg         Low             26.0-34.0       The   
[facility]   
[facility]  
   
                                        Comment on above:   Performed By: ###ONEIL FLOR ####[facility] PATHOLOGY   
[id number] [place],    
   
                      MCHC (RBC) [Mass/Vol] 30.4 g/dL  Low        32.0-35.9  The  
   
[facility]   
System  
   
                                        Comment on above:   Performed By: ###ONEIL FLOR ####S PATHOLOGY   
[id number] [place],    
   
                                                    MCV (RBC) [Entitic   
vol]            82 fL           Normal                    The   
[facility]   
System  
   
                                        Comment on above:   Performed By: ###ONEIL FLOR ####[facility] PATHOLOGY   
[id number] [place] [address],    
   
                                                    Platelet mean volume   
(Bld) [Entitic vol] 7.5 fL          Normal          7.5-11.2        The   
[facility]   
System  
   
                                        Comment on above:   Performed By: ###ONEIL FLOR ####[facility] PATHOLOGY   
[id number] [place],    
   
                                                    Platelets (Bld)   
[#/Vol]         444 10*3/uL     High            150-400         The   
[facility]   
System  
   
                                        Comment on above:   Performed By: ###ONEIL FLOR ####[facility] PATHOLOGY   
[id number] [place] [address],    
   
                      RBC (Bld) [#/Vol] 3.25 10*6/uL Low        4.00-5.20  The   
[facility]   
System  
   
                                        Comment on above:   Performed By: ###ONEIL FLOR ####S PATHOLOGY   
[id number] [place],    
   
                      WBC (Bld) [#/Vol] 17.1 10*3/uL High       4.5-11.5   The   
[facility]   
System  
   
                                        Comment on above:   Performed By: #### ONEIL BECERRA ####[facility]   
[id number] [place],    
   
                                                    GLUCOSE, FINGERSTICK-IN OFFI  
CEon 2025   
   
                      Glucose [Mass/Vol] 145 mg/dL  High            The   
[facility]   
[facility]  
   
                                        Comment on above:   Performed By: #### 8  
2948 ####NURSING GLUCOSE [id number]   
[place], [address]   
   
                      Glucose [Mass/Vol] 167 mg/dL  High            The   
[facility]   
[facility]  
   
                                        Comment on above:   Performed By: #### 8  
2948 ####NURSING GLUCOSE [id number]   
[place], [address]   
   
                      Glucose [Mass/Vol] 163 mg/dL  High            The   
[facility]   
System  
   
                                        Comment on above:   Result Comment: Rayray SANCHEZ MD   
   
                                                            Performed By: #### 8  
2948 ####NURSING GLUCOSE [id number]   
[place], [address]   
   
                      Glucose [Mass/Vol] 240 mg/dL  High            The   
[facility]   
[facility]  
   
                                        Comment on above:   Performed By: #### 8  
2948 ####NURSING GLUCOSE [id number]   
[place], [address]   
   
                      Glucose [Mass/Vol] 166 mg/dL  High            The   
[facility]   
[facility]  
   
                                        Comment on above:   Result Comment: Rayray SANCHEZ MD   
   
                                                            Performed By: #### 8  
2948 ####NURSING GLUCOSE [id number]   
[place], [address]   
   
                                                    MAGNESIUMon 2025   
   
                      Magnesium [Mass/Vol] 1.7 mg/dL  Low        1.9-2.7    The   
[facility]   
[facility]  
   
                                        Comment on above:   Performed By: #### BELINDA SMITH CH8 ####[facility] PATHOLOGY [id number]   
[place],    
   
                                                    MANUAL DIFF AND MORPHon    
   
                      ANISOCYTOSIS Slight     Normal                The   
[facility]   
System  
   
                                        Comment on above:   Performed By: #### ONEIL BECERRA ####[facility] PATHOLOG[facility]   
[id number] [place],    
   
                                                    CELLS COUNTED TOTAL #   
IN BLOOD        100             Normal                          The   
[facility]   
[facility]  
   
                                        Comment on above:   Performed By: #### ONEIL BECERRA ####S PATHOLOGY   
[id number] [place], [place],    
   
                                                    EOSINOPHILS % BY   
MANUAL COUNT    6.0 %           High            0.1-4.0         The   
[facility]   
System  
   
                                        Comment on above:   Performed By: #### ONELI BECERRA ####S PATHOLOGY   
[id number] [place], [place],    
   
                                                    EOSINOPHILS ABS BY   
MANUAL COUNT    1.03 K/uL       High            0.00-0.70       The   
[facility]   
System  
   
                                        Comment on above:   Performed By: #### ONEIL BECERRA ####S PATHOLOGY   
[id number] [place],    
   
                      FRAGMENTED RBC Few        Normal                The   
[facility]   
System  
   
                                        Comment on above:   Performed By: #### ONEIL BECERRA ####S PATHOLOGY   
[id number] [place] [address],    
   
                      HYPOCHROMASIA Slight     Normal                The   
[facility]   
System  
   
                                        Comment on above:   Performed By: ###ONEIL FLOR ####[facility] PATHOLOGY   
[id number] [place] [address],    
   
                                                    LYMPHOCYTES % BY   
MANUAL COUNT    10.0 %          Low             24.0-44.0       The   
[facility]   
System  
   
                                        Comment on above:   Performed By: #### ONEIL BECERRA ####[facility] PATHOLOGY   
[id number] [place] [address],    
   
                                                    LYMPHOCYTES ABS BY   
MANUAL COUNT    1.71 K/uL       Normal          1.00-4.80       The   
[facility]   
System  
   
                                        Comment on above:   Performed By: #### ONEIL BECERRA ####S PATHOLOGY   
[id number] [place],    
   
                                                    MONOCYTES % BY MANUAL   
COUNT           2.0 %           Normal          2.0-11.0        The   
[facility]   
System  
   
                                        Comment on above:   Performed By: ###ONEIL FLOR ####S PATHOLOGY   
[id number] [place],    
   
                                                    MONOCYTES ABS BY   
MANUAL COUNT    0.34 K/uL       Normal          0.20-1.00       The   
[facility]   
System  
   
                                        Comment on above:   Performed By: #### ONEIL BECERRA ####S PATHOLOGY   
[id number] [place] [address], [place],    
   
                                                    MYELOCYTES % BY   
MANUAL COUNT    1 %             High            <0              The   
[facility]   
System  
   
                                        Comment on above:   Performed By: ###ONEIL FLOR ####S PATHOLOGY   
[id number] [place],    
   
                                                    MYELOCYTES ABS BY   
MANUAL COUNT    0.17 K/uL       High            <0.01           The   
[facility]   
System  
   
                                        Comment on above:   Performed By: ###ONEIL FLOR ####S PATHOLOGY   
[id number] [place],    
   
                                                    NEUTROPHILS % BY   
MANUAL COUNT    81.0 %          High            31.0-76.0       The   
[facility]   
System  
   
                                        Comment on above:   Performed By: ###ONEIL FLOR ####S PATHOLOGY   
[id number] [place],    
   
                                                    NEUTROPHILS ABS BY   
MANUAL COUNT    13.85 K/uL      High            1.50-8.00       The   
[facility]   
System  
   
                                        Comment on above:   Performed By: ###ONEIL FLOR ####S PATHOLOGY   
[id number] [place] [address],    
   
                      OVALOCYTES Few        Normal                The   
[facility]   
System  
   
                                        Comment on above:   Performed By: ###ONEIL FLOR ####S PATHOLOGY   
[id number] [place],    
   
                      POLYCHROMASIA Slight     Normal                The   
[facility]   
System  
   
                                        Comment on above:   Performed By: ###ONEIL FLOR ####S PATHOLOGY   
[id number] [place],    
   
                      SPHEROCYTES Few        Normal                The   
[facility]   
System  
   
                                        Comment on above:   Performed By: ###ONEIL FLOR ####S PATHOLOGY   
[id number] [place],    
   
                      STOMATOCYTES Moderate   Normal                The   
[facility]   
System  
   
                                        Comment on above:   Performed By: ###ONEIL FLOR ####S PATHOLOGY   
[id number] [place],    
   
                                                    [facility] BASIC METABOLIC PANELo  
n 2025   
   
                          Calcium [Mass/Vol] 7.9 mg/dL    Low          8.6 - 10.  
3   
mg/dL                                   [facility]   
[facility]  
   
                      Chloride [Moles/Vol] 105 mmol/L            98 - 107 mmol/L  
 [facility]  
   
                      CO2 [Moles/Vol] 25 mmol/L             21 - 31 mmol/L [facility]  
   
                          Creatinine [Mass/Vol] 3.32 mg/dL   High         0.60 -  
 1.20   
mg/dL                                   [facility]  
   
                      Glucose [Mass/Vol] 161 mg/dL  High       74 - 109 mg/dL NO  
[facility]   
[facility]  
   
                      LHS ESTIMATED GFR 16         Low        - PINF     [facility]  
   
                                        Comment on above:    CKD EPI Equatio  
n using Creatinine without Race  
Comment: Estimated glomerular filtration rate (eGFR) is   
calculated without a race coefficient. Values should be   
interpreted in the context of the patient's full clinical   
presentation.  
Reference:  
1. Alfredo C, Delmi M, Valdez DC, et al.. A Unifying Approach   
for GFR Estimation: Recommendations of the NKF-ASN Task Force on   
Reassessing the Inclusion of Race in Diagnosing Kidney Disease.   
American Journal of Kidney Diseases ;79(2):268-88.e1.  
2. N Engl J Med 1 Vol. 385 Issue 19 Pages 4224-5767  
  
   
   
                      METRO ANION GAP 15                    10 - 20    [facility]  
   
                          Potassium [Moles/Vol] 4.3 mmol/L                3.5 -   
5.0   
mmol/L                                  [facility]  
   
                          Sodium [Moles/Vol] 141 mmol/L                136 - 145  
   
mmol/L                                  [facility]  
   
                                                    Urea nitrogen   
[Mass/Vol]      66 mg/dL        High            7 - 25 mg/dL    [facility]  
   
                                                    No Panel Informationon    
   
                                                    Interpretation and   
review of laboratory   
results         Abnormal                                        [facility]  
   
                                                                  CLINISYNC  
   
                                                                  [facility]  
   
                                                    Progress Noteson 2025   
   
                                                    Transcription   
Authentication   
Interface Message   
Text                            Normal                          The   
[facility]   
System  
   
                                                    Transcription   
Authentication   
Interface Message   
Text                            Normal                          The   
[facility]   
System  
   
                                                    Student Noteon 2025   
   
                                                    Transcription   
Authentication   
Interface Message   
Text                            Normal                          The   
[facility]   
System  
   
                                                    ALL MAGNESIUMon 2025   
   
                      Magnesium [Mass/Vol] 1.9 mg/dL             1.9 - 2.7 mg/dL  
 [facility]  
   
                                                    BASIC METABOLIC PANELon    
   
                      Anion gap [Moles/Vol] 14 mmol/L  Normal     10-20      The  
   
[facility]   
System  
   
                                        Comment on above:   Performed By: #### ERIKA COVARRUBIAS ####[facility] [id number] [place] [address],    
   
                      Calcium [Mass/Vol] 8.0 mg/dL  Low        8.6-10.3   [facility]   
[facility]   
[facility]  
   
                                        Comment on above:   Performed By: #### BELINDA SMITH, IRON8 ####[facility] PATHOLOGY [id number]   
[place],    
   
                      Chloride [Moles/Vol] 104 mmol/L Normal          [facility]   
[facility]   
[facility]  
   
                                        Comment on above:   Performed By: #### BELINDA SMITH, IRON8 ####[facility] PATHOLOGY [id number]   
[place],    
   
                      CO2 [Moles/Vol] 27 mmol/L  Normal     21-31      The   
[facility]   
[facility]  
   
                                        Comment on above:   Performed By: #### BELINDA SMITH, IRON8 ####[facility] PATHOLOGY [id number]   
[place],    
   
                      Creatinine [Mass/Vol] 3.61 mg/dL High       0.60-1.20  The  
   
[facility]   
[facility]  
   
                                        Comment on above:   Performed By: #### BELINDA SMITH, IRON8 ####[facility] PATHOLOGY [id number]   
[place],    
   
                                                    ESTIMATED GFR   
(CKD-EPI)       14 mL/min/1.73sqm Low             >=60            The   
[facility]   
[facility]  
   
                                        Comment on above:   Result Comment:   
 CKD EPI Equation using Creatinine without  
   
RaceComment: Estimated glomerular filtration rate (eGFR) is   
calculated without a race coefficient. Values should be   
interpreted in the context of the patient's full clinical   
presentation.Reference:1. Alfredo C, Delmi M, Valdez FLYNN, et al..   
A Unifying Approach for GFR Estimation: Recommendations of the   
NKF-ASN Task Force on Reassessing the Inclusion of Race in   
Diagnosing Kidney Disease. American Journal of Kidney Diseases   
;79(2):268-88.e1.2. N Engl J Med 1 Vol. 385 Issue 19   
Pages 8982-2144   
   
                                                            Performed By: #### BELINDA SMITH CH8 ####S PATHOLOGY [id number]   
[place],    
   
                      Glucose [Mass/Vol] 140 mg/dL  High            The   
[facility]   
[facility]  
   
                                        Comment on above:   Performed By: #### BELINDA SMITH, IRON8 ####[facility] PATHOLOGY [id number]   
[place],    
   
                      Potassium [Moles/Vol] 4.4 mmol/L Normal     3.5-5.0    The  
   
[facility]   
[facility]  
   
                                        Comment on above:   Performed By: #### BELINDA SMITH CH8 ####[facility] PATHOLOGY [id number] [place] [address],    
   
                      Sodium [Moles/Vol] 141 mmol/L Normal     136-145    The   
[facility]  
   
                                        Comment on above:   Performed By: #### BELINDA SMITH, ERIKA ####[facility] PATHOLOGY [id number] [place] [address],    
   
                                                    Urea nitrogen   
[Mass/Vol]      71 mg/dL        High            7-25            The   
[facility]  
   
                                        Comment on above:   Performed By: #### ERIKA COVARRUBIAS ####[facility] PATHOLOGY [id number] [place] [address],    
   
                                                    CBC WITH DIFFERENTIALon    
   
                                                    Erythrocyte   
distribution width   
(RBC) [Ratio]   16.9 %          High            11.5-14.5       [facility]   
[facility]   
[facility]  
   
                                        Comment on above:   Performed By: ###ONEIL FLOR ####[facility] PATHOLOGY   
[id number] [place] [address],    
   
                                                    Hematocrit (Bld)   
[Volume fraction] 25.3 %          Low             36.0-46.0       The   
[facility]   
[facility]  
   
                                        Comment on above:   Performed By: ###ONEIL FLOR ####[facility] PATHOLOGY   
[id number] [place] [address],    
   
                                                    Hemoglobin (Bld)   
[Mass/Vol]      7.9 g/dL        Low             12.0-15.0       The   
[facility]  
   
                                        Comment on above:   Performed By: ###ONEIL FLOR ####[facility] PATHOLOGY   
[id number] [place],    
   
                                                    MCH (RBC) [Entitic   
mass]           25.3 pg         Low             26.0-34.0       The   
[facility]   
[facility]  
   
                                        Comment on above:   Performed By: ###ONEIL FLOR ####[facility] PATHOLOGY   
[id number] [place] [address],    
   
                      MCHC (RBC) [Mass/Vol] 31.1 g/dL  Low        32.0-35.9  The  
   
[facility]   
System  
   
                                        Comment on above:   Performed By: ###ONEIL FLOR ####S PATHOLOGY   
[id number] [place],    
   
                                                    MCV (RBC) [Entitic   
vol]            81 fL           Normal                    The   
[facility]   
[facility]  
   
                                        Comment on above:   Performed By: #### ONEIL BECERRA ####S PATHOLOGY   
[id number] [place],    
   
                                                    Platelet mean volume   
(Bld) [Entitic vol] 7.3 fL          Low             7.5-11.2        The   
[facility]   
[facility]  
   
                                        Comment on above:   Performed By: #### ONEIL BECERRA ####S PATHOLOGY   
[id number] [place],    
   
                                                    Platelets (Bld)   
[#/Vol]         435 10*3/uL     High            150-400         The   
[facility]   
[facility]  
   
                                        Comment on above:   Performed By: ###ONEIL FLOR ####S PATHOLOGY   
[id number] [place],    
   
                      RBC (Bld) [#/Vol] 3.11 10*6/uL Low        4.00-5.20  The   
[facility]   
[facility]  
   
                                        Comment on above:   Performed By: ###ONEIL FLOR ####[facility]   
[id number] [place],    
   
                      WBC (Bld) [#/Vol] 14.7 10*3/uL High       4.5-11.5   The   
[facility]   
System  
   
                                        Comment on above:   Performed By: ###ONEIL FLOR ####S PATHOLOGY   
[id number] [place],    
   
                                                    Consultson 2025   
   
                                                    Transcription   
Authentication   
Interface Message   
Text                            Normal                          The   
[facility]   
[facility]  
   
                                                    Transcription   
Authentication   
Interface Message   
Text                            Normal                          The   
[facility]   
[facility]  
   
                                                    GLUCOSE, FINGERSTICK-IN OFFI  
CEon 2025   
   
                      Glucose [Mass/Vol] 182 mg/dL  High            The   
[facility]   
[facility]  
   
                                        Comment on above:   Performed By: #### 8  
2948 ####NURSING GLUCOSE [id number]   
[place],    
   
                      Glucose [Mass/Vol] 174 mg/dL  High            The   
[facility]   
[facility]  
   
                                        Comment on above:   Performed By: #### 8  
2948 ####NURSING GLUCOSE [id number]   
[place], [address]   
   
                      Glucose [Mass/Vol] 160 mg/dL  High            The   
[facility]   
System  
   
                                        Comment on above:   Performed By: #### 8  
2948 ####NURSING GLUCOSE [id number]   
[place], [address]   
   
                      Glucose [Mass/Vol] 186 mg/dL  High            The   
[facility]   
System  
   
                                        Comment on above:   Performed By: #### 8  
2948 ####NURSING GLUCOSE [id number]   
[place], [address]   
   
                                                    MAGNESIUMon 2025   
   
                      Magnesium [Mass/Vol] 1.9 mg/dL  Normal     1.9-2.7    The   
[facility]   
System  
   
                                        Comment on above:   Performed By: #### BELINDA SMITH CH8 ####[facility] [id number] [place] [address],    
   
                                                    MANUAL DIFF AND MORPHon    
   
                      ANC        12.65 K/uL Normal                The   
[facility]   
System  
   
                                        Comment on above:   Performed By: #### ONEIL BECERRA ####[facility]   
[id number] [place],    
   
                      ANISOCYTOSIS Slight     Normal                The   
[facility]   
System  
   
                                        Comment on above:   Performed By: ###ONEIL FLOR ####[facility]   
[id number] [place],    
   
                                                    BANDS % BY MANUAL   
COUNT           5 %             Normal          <=10            The   
[facility]   
System  
   
                                        Comment on above:   Performed By: ###ONEIL FLOR ####[facility]   
[id number] [place],    
   
                                                    BANDS ABS BY MANUAL   
COUNT           0.74 K/uL       High            <0.01           The   
[facility]   
System  
   
                                        Comment on above:   Performed By: ###ONEIL FLOR ####[facility]   
[id number] [place],    
   
                                                    CELLS COUNTED TOTAL #   
IN BLOOD        100             Normal                          [facility]   
[facility]  
   
                                        Comment on above:   Performed By: ###ONEIL FLOR ####[facility]   
[id number] [place],    
   
                                                    EOSINOPHILS % BY   
MANUAL COUNT    1.0 %           Normal          0.1-4.0         The   
[facility]   
System  
   
                                        Comment on above:   Performed By: #### ONEIL BECERRA ####S PATHOLOGY   
[id number] [place],    
   
                                                    EOSINOPHILS ABS BY   
MANUAL COUNT    0.15 K/uL       Normal          0.00-0.70       The   
[facility]   
System  
   
                                        Comment on above:   Performed By: #### ONEIL BECERRA ####S PATHOLOGY   
[id number] [place],    
   
                      HYPOCHROMASIA Slight     Normal                The   
[facility]   
System  
   
                                        Comment on above:   Performed By: #### ONEIL BECERRA ####S PATHOLOGY   
[id number] [place],    
   
                      LARGE PLATELETS Present    Normal                The   
[facility]   
System  
   
                                        Comment on above:   Performed By: #### ONEIL BECERRA ####S PATHOLOGY   
[id number] [place],    
   
                                                    LYMPHOCYTES % BY   
MANUAL COUNT    8.0 %           Low             24.0-44.0       The   
[facility]   
System  
   
                                        Comment on above:   Performed By: #### ONEIL BECERRA ####S PATHOLOGY   
[id number] [place] [address],    
   
                                                    LYMPHOCYTES ABS BY   
MANUAL COUNT    1.18 K/uL       Normal          1.00-4.80       The   
[facility]   
System  
   
                                        Comment on above:   Performed By: #### ONEIL BECERRA ####S PATHOLOGY   
[id number] [place],    
   
                                                    METAMYELOCYTES % BY   
MANUAL COUNT    2 %             High            <0              The   
[facility]   
System  
   
                                        Comment on above:   Performed By: #### ONEIL BECERRA ####S PATHOLOGY   
[id number] [place],    
   
                                                    METAMYELOCYTES ABS BY   
MANUAL COUNT    0.29 K/uL       High            <0.01           The   
[facility]   
System  
   
                                        Comment on above:   Performed By: #### ONEIL BECERRA ####S PATHOLOGY   
[id number] [place],    
   
                                                    MONOCYTES % BY MANUAL   
COUNT           2.0 %           Normal          2.0-11.0        The   
[facility]   
System  
   
                                        Comment on above:   Performed By: #### ONEIL BECERRA ####S PATHOLOGY   
[id number] [place],    
   
                                                    MONOCYTES ABS BY   
MANUAL COUNT    0.29 K/uL       Normal          0.20-1.00       The   
[facility]   
System  
   
                                        Comment on above:   Performed By: #### ONEIL BECERRA ####S PATHOLOGY   
[id number] [place],    
   
                                                    MYELOCYTES % BY   
MANUAL COUNT    1 %             High            <0              The   
[facility] [facility]  
   
                                        Comment on above:   Performed By: #### ONEIL BECERRA ####S PATHOLOGY   
[id number] [place], [place],    
   
                                                    MYELOCYTES ABS BY   
MANUAL COUNT    0.15 K/uL       High            <0.01           The   
[facility]   
System  
   
                                        Comment on above:   Performed By: #### ONEIL BECERRA ####[facility] PATHOLOGY   
[id number] [place], [place],    
   
                                                    NEUTROPHILS % BY   
MANUAL COUNT    81.0 %          High            31.0-76.0       The   
[facility]   
System  
   
                                        Comment on above:   Performed By: ###ONEIL FLOR ####[facility] PATHOLOGY   
[id number] [place],    
   
                                                    NEUTROPHILS ABS BY   
MANUAL COUNT    11.91 K/uL      High            1.50-8.00       The   
[facility]   
System  
   
                                        Comment on above:   Performed By: ###ONEIL FLOR ####S PATHOLOGY   
[id number] [place],    
   
                      OVALOCYTES Few        Normal                The   
[facility]   
System  
   
                                        Comment on above:   Performed By: #### ONEIL BECERRA ####S PATHOLOGY   
[id number] [place],    
   
                      STOMATOCYTES Few        Normal                The   
[facility]   
System  
   
                                        Comment on above:   Performed By: #### ONEIL BECERRA ####S PATHOLOGY   
[id number] [place],    
   
                                                    [facility] BASIC METABOLIC PANELo  
n 2025   
   
                          Calcium [Mass/Vol] 8 mg/dL      Low          8.6 - 10.  
3   
mg/dL                                   [facility]  
   
                      Chloride [Moles/Vol] 104 mmol/L            98 - 107 mmol/L  
 [facility]  
   
                      CO2 [Moles/Vol] 27 mmol/L             21 - 31 mmol/L [facility]  
   
                          Creatinine [Mass/Vol] 3.61 mg/dL   High         0.60 -  
 1.20   
mg/dL                                   [facility]  
   
                      Glucose [Mass/Vol] 140 mg/dL  High       74 - 109 mg/dL [facility]  
   
                                                    Interpretation and   
review of laboratory   
results         Abnormal                                        [facility] ESTIMATED GFR 14         Low        - PINF     [facility]  
   
                                        Comment on above:    CKD EPI Equatio  
n using Creatinine without Race  
Comment: Estimated glomerular filtration rate (eGFR) is   
calculated without a race coefficient. Values should be   
interpreted in the context of the patient's full clinical   
presentation.  
Reference:  
1. Alfredo C, Delmi M, Valdez FLYNN, et al.. A Unifying Approach   
for GFR Estimation: Recommendations of the NKF-ASN Task Force on   
Reassessing the Inclusion of Race in Diagnosing Kidney Disease.   
American Journal of Kidney Diseases ;79(2):268-88.e1.  
2. N Engl J Med  Vol. 385 Issue 19 Pages 3264-9690  
  
   
   
                      METRO ANION GAP 14                    10 - 20    [facility]  
   
                          Potassium [Moles/Vol] 4.4 mmol/L                3.5 -   
5.0   
mmol/L                                  [facility]  
   
                          Sodium [Moles/Vol] 141 mmol/L                136 - 145  
   
mmol/L                                  [facility]  
   
                                                    Urea nitrogen   
[Mass/Vol]      71 mg/dL        High            7 - 25 mg/dL    [facility] CBC WITH DIFFERENTIALo  
n 2025   
   
                                                    Erythrocyte   
distribution width   
(RBC) [Ratio]   16.9 %          High            11.5 - 14.5 %   [facility]  
   
                                                    Hematocrit (Bld)   
[Volume fraction] 25.3 %          Low             36.0 - 46.0 %   [facility]  
   
                                                    Hemoglobin (Bld)   
[Mass/Vol]          7.9 g/dL            Low                 12.0 - 15.0   
g/dL                                    [facility]  
   
                                                    Interpretation and   
review of laboratory   
results         Abnormal                                        [facility] MEAN PLT VOL 7.3 fL     Low        7.5 - 11.2 fL [facility] PLATELET 435 K/uL   High       150 - 400 K/uL [facility] RBC COUNT 3.11       Low                   [facility]  
   
                                                    MCH (RBC) [Entitic   
mass]           25.3 pg         Low             26.0 - 34.0 pg  [facility]  
   
                          MCHC (RBC) [Mass/Vol] 31.1 g/dL    Low          32.0 -  
 35.9   
g/dL                                    [facility]  
   
                                                    MCV (RBC) [Entitic   
vol]            81 fL                           80 - 100 fL     [facility]  
   
                      WBC (Bld) [#/Vol] 14.7 10*3/uL High       4.5 - 11.5 K/uL   
[facility]  
   
                                                                  [facility]  
   
                                                    No Panel Informationon    
   
                                                                  [facility]  
   
                                                    Progress Noteson 2025   
   
                                                    Transcription   
Authentication   
Interface Message   
Text                            Normal                          The   
[facility]   
System  
   
                                                    Transcription   
Authentication   
Interface Message   
Text                            Normal                          The   
[facility]   
System  
   
                                                    Student Noteon 2025   
   
                                                    Transcription   
Authentication   
Interface Message   
Text                            Normal                          The   
[facility]   
System  
   
                                                    ALL MAGNESIUMon 01-   
   
                      Magnesium [Mass/Vol] 2 mg/dL               1.9 - 2.7 mg/dL  
 [facility]  
   
                                                    BASIC METABOLIC PANELon    
   
                      Anion gap [Moles/Vol] 15 mmol/L  Normal     10-20      The  
   
[facility]   
System  
   
                                        Comment on above:   Performed By: #### RADHA  
H8, MG ####[facility] PATHOLOGY [id number]   
[place],    
   
                      Calcium [Mass/Vol] 7.9 mg/dL  Low        8.6-10.3   The   
[facility]   
System  
   
                                        Comment on above:   Performed By: #### RADHA ROBLES8, MG ####S PATHOLOGY [id number]   
[place],    
   
                      Chloride [Moles/Vol] 102 mmol/L Normal          The   
[facility]   
System  
   
                                        Comment on above:   Performed By: #### RADHA  
H8, MG ####S PATHOLOGY [id number]   
[place],    
   
                      CO2 [Moles/Vol] 26 mmol/L  Normal     21-31      The   
[facility]   
System  
   
                                        Comment on above:   Performed By: #### RADHA  
H8, MG ####S PATHOLOGY [id number]   
[place],    
   
                      Creatinine [Mass/Vol] 3.60 mg/dL High       0.60-1.20  The  
   
[facility]   
System  
   
                                        Comment on above:   Performed By: #### RADHA PAGE, MG ####S PATHOLOGY [id number]   
[place],    
   
                                                    ESTIMATED GFR   
(CKD-EPI)       14 mL/min/1.73sqm Low             >=60            The   
[facility]   
System  
   
                                        Comment on above:   Result Comment:   
 CKD EPI Equation using Creatinine without  
   
RaceComment: Estimated glomerular filtration rate (eGFR) is   
calculated without a race coefficient. Values should be   
interpreted in the context of the patient's full clinical   
presentation.Reference:1. Alfredo C, Delmi M, Valdez FLYNN, et al..   
A Unifying Approach for GFR Estimation: Recommendations of the   
NKF-ASN Task Force on Reassessing the Inclusion of Race in   
Diagnosing Kidney Disease. American Journal of Kidney Diseases   
;79(2):268-88.e1.2. N Engl J Med 1 Vol. 385 Issue 19   
Pages 1455-2454   
   
                                                            Performed By: #### RADHA  
H8, MG ####[facility] [id number]   
[place],    
   
                      Glucose [Mass/Vol] 173 mg/dL  High            The   
[facility]   
[facility]  
   
                                        Comment on above:   Performed By: #### C  
H8, MG ####[facility] PATH[facility] [id number]   
[place],    
   
                      Potassium [Moles/Vol] 4.3 mmol/L Normal     3.5-5.0    The  
   
[facility]   
System  
   
                                        Comment on above:   Performed By: #### C  
H8, MG ####[facility] [id number]   
[place],    
   
                      Sodium [Moles/Vol] 139 mmol/L Normal     136-145    The   
[facility]   
[facility]  
   
                                        Comment on above:   Performed By: #### C  
H8, MG ####[facility] PATH[facility] [id number]   
[place],    
   
                                                    Urea nitrogen   
[Mass/Vol]      73 mg/dL        High            7-25            The   
[facility]   
System  
   
                                        Comment on above:   Performed By: #### C  
H8, MG ####[facility] PATH[facility] [id number]   
[place],    
   
                                                    CBC WITH DIFFERENTIALon    
   
                                                    Erythrocyte   
distribution width   
(RBC) [Ratio]   16.6 %          High            11.5-14.5       The   
[facility]   
System  
   
                                        Comment on above:   Performed By: #### C  
BCDSAT, MDIFF ####S PATHOLOGY   
[id number] [place],    
   
                                                    Hematocrit (Bld)   
[Volume fraction] 24.4 %          Low             36.0-46.0       The   
[facility]   
[facility]  
   
                                        Comment on above:   Performed By: ###ONEIL FLOR ####S PATHOLOGY   
[id number] [place],    
   
                                                    Hemoglobin (Bld)   
[Mass/Vol]      7.6 g/dL        Low             12.0-15.0       The   
[facility]   
System  
   
                                        Comment on above:   Performed By: #### ONEIL BECERRA ####S PATHOLOGY   
[id number] [place],    
   
                                                    MCH (RBC) [Entitic   
mass]           25.4 pg         Low             26.0-34.0       The   
[facility]   
System  
   
                                        Comment on above:   Performed By: #### ONEIL BECERRA ####[facility] PATHOLOGY   
[id number] [place],    
   
                      MCHC (RBC) [Mass/Vol] 31.1 g/dL  Low        32.0-35.9  The  
   
[facility]   
System  
   
                                        Comment on above:   Performed By: ###ONEIL FLOR ####[facility] PATHOLOGY   
[id number] [place],    
   
                                                    MCV (RBC) [Entitic   
vol]            82 fL           Normal                    The   
[facility]   
System  
   
                                        Comment on above:   Performed By: #### ONEIL BECERRA ####[facility] PATHOLOGY   
[id number] [place],    
   
                                                    Platelet mean volume   
(Bld) [Entitic vol] 7.4 fL          Low             7.5-11.2        The   
[facility]   
System  
   
                                        Comment on above:   Performed By: ###ONEIL FLOR ####[facility] PATHOLOGY   
[id number] [place],    
   
                                                    Platelets (Bld)   
[#/Vol]         368 10*3/uL     Normal          150-400         The   
[facility]   
System  
   
                                        Comment on above:   Performed By: #### ONEIL BECERRA ####S PATHOLOGY   
[id number] [place],    
   
                      RBC (Bld) [#/Vol] 2.99 10*6/uL Low        4.00-5.20  The   
[facility]   
System  
   
                                        Comment on above:   Performed By: #### ONEIL BECERRA ####[facility]   
[id number] [place],    
   
                      WBC (Bld) [#/Vol] 10.6 10*3/uL Normal     4.5-11.5   The   
[facility]   
[facility]  
   
                                        Comment on above:   Performed By: #### RADHA GO MDIFF ####[facility]   
[id number] [place],    
   
                                                    GLUCOSE, FINGERSTICK-IN OFFI  
CEon 01-   
   
                      Glucose [Mass/Vol] 245 mg/dL  High            The   
[facility]   
[facility]  
   
                                        Comment on above:   Performed By: #### 8  
2948 ####NURSING GLUCOSE [id number]   
[place], [address]   
   
                      Glucose [Mass/Vol] 298 mg/dL  High            The   
[facility]   
[facility]  
   
                                        Comment on above:   Result Comment: Rayray SANCHEZ MD   
   
                                                            Performed By: #### 8  
2948 ####NURSING GLUCOSE [id number]   
[place], [address]   
   
                      Glucose [Mass/Vol] 208 mg/dL  High            The   
[facility]   
[facility]  
   
                                        Comment on above:   Performed By: #### 8  
2948 ####NURSING GLUCOSE [id number]   
[place], [address]   
   
                      Glucose [Mass/Vol] 176 mg/dL  High            The   
[facility]   
[facility]  
   
                                        Comment on above:   Performed By: #### 8  
2948 ####NURSING GLUCOSE [id number]   
[place], [address]   
   
                                                    MAGNESIUMon 01-   
   
                      Magnesium [Mass/Vol] 2.0 mg/dL  Normal     1.9-2.7    The   
[facility]   
System  
   
                                        Comment on above:   Performed By: #### C  
H8, MG ####[facility] PATH[facility] [id number]   
[place],    
   
                                                    MANUAL DIFF AND MORPHon    
   
                      ANC        9.11 K/uL  Normal                The   
[facility]   
System  
   
                                        Comment on above:   Performed By: #### ONEIL BECERRA ####S PATHOLOGY   
[id number] [place],    
   
                      ANISOCYTOSIS Slight     Normal                The   
[facility]   
System  
   
                                        Comment on above:   Performed By: #### ONEIL BECERRA ####S PATHOLOGY   
[id number] [place],    
   
                                                    BANDS % BY MANUAL   
COUNT           2 %             Normal          <=10            The   
[facility]   
System  
   
                                        Comment on above:   Performed By: #### ONEIL BECERRA ####S PATHOLOGY   
[id number] [place],    
   
                                                    BANDS ABS BY MANUAL   
COUNT           0.21 K/uL       High            <0.01           The   
[facility]   
System  
   
                                        Comment on above:   Performed By: #### ONEIL BECERRA ####S PATHOLOGY   
[id number] [place] [address],    
   
                                                    CELLS COUNTED TOTAL #   
IN BLOOD        100             Normal                          The   
[facility]   
System  
   
                                        Comment on above:   Performed By: #### ONEIL BECERRA ####S PATHOLOGY   
[id number] [place] [address],    
   
                      FRAGMENTED RBC Few        Normal                The   
[facility]   
System  
   
                                        Comment on above:   Performed By: #### ONEIL BECERRA ####[facility] PATHOLOGY   
[id number] [place],    
   
                      HYPOCHROMASIA Slight     Normal                The   
[facility]   
System  
   
                                        Comment on above:   Performed By: #### ONEIL BECERRA ####S PATHOLOGY   
[id number] [place],    
   
                                                    LYMPHOCYTES % BY   
MANUAL COUNT    11.0 %          Low             24.0-44.0       The   
[facility]   
System  
   
                                        Comment on above:   Performed By: #### ONEIL BECERRA ####S PATHOLOGY   
[id number] [place],    
   
                                                    LYMPHOCYTES ABS BY   
MANUAL COUNT    1.17 K/uL       Normal          1.00-4.80       The   
[facility]   
System  
   
                                        Comment on above:   Performed By: #### ONEIL BECERRA ####S PATHOLOGY   
[id number] [place],    
   
                                                    MONOCYTES % BY MANUAL   
COUNT           3.0 %           Normal          2.0-11.0        The   
[facility]   
System  
   
                                        Comment on above:   Performed By: #### ONEIL BECERRA ####S PATHOLOGY   
[id number] [place],    
   
                                                    MONOCYTES ABS BY   
MANUAL COUNT    0.32 K/uL       Normal          0.20-1.00       The   
[facility]   
System  
   
                                        Comment on above:   Performed By: #### ONEIL BECERRA ####S PATHOLOGY   
[id number] [place],    
   
                                                    NEUTROPHILS % BY   
MANUAL COUNT    84.0 %          High            31.0-76.0       The   
[facility]   
System  
   
                                        Comment on above:   Performed By: #### ONEIL BECERRA ####S PATHOLOGY   
[id number] [place],    
   
                                                    NEUTROPHILS ABS BY   
MANUAL COUNT    8.90 K/uL       High            1.50-8.00       The   
[facility]   
System  
   
                                        Comment on above:   Performed By: ###ONEIL FLOR ####S PATHOLOGY   
[id number] [place],    
   
                      OVALOCYTES Few        Normal                The   
[facility]   
System  
   
                                        Comment on above:   Performed By: #### ONEIL BECERRA ####S PATHOLOGY   
[id number] [place],    
   
                      STOMATOCYTES Few        Normal                The   
[facility]   
System  
   
                                        Comment on above:   Performed By: ###ONEIL FLOR ####S PATHOLOGY   
[id number] [place],    
   
                                                    [facility] BASIC METABOLIC PANELo  
n 01-   
   
                          Calcium [Mass/Vol] 7.9 mg/dL    Low          8.6 - 10.  
3   
mg/dL                                   [facility]  
   
                      Chloride [Moles/Vol] 102 mmol/L            98 - 107 mmol/L  
 [facility]  
   
                      CO2 [Moles/Vol] 26 mmol/L             21 - 31 mmol/L [facility]  
   
                          Creatinine [Mass/Vol] 3.6 mg/dL    High         0.60 -  
 1.20   
mg/dL                                   [facility]  
   
                      Glucose [Mass/Vol] 173 mg/dL  High       74 - 109 mg/dL [facility]  
   
                                                    Interpretation and   
review of laboratory   
results         Abnormal                                        [facility]  
   
                      LHS ESTIMATED GFR 14         Low        - PINF     [facility]  
   
                                        Comment on above:    CKD EPI Equatio  
n using Creatinine without Race  
Comment: Estimated glomerular filtration rate (eGFR) is   
calculated without a race coefficient. Values should be   
interpreted in the context of the patient's full clinical   
presentation.  
Reference:  
1. Alfredo C, Delmi M, Valdez FLYNN, et al.. A Unifying Approach   
for GFR Estimation: Recommendations of the NKF-ASN Task Force on   
Reassessing the Inclusion of Race in Diagnosing Kidney Disease.   
American Journal of Kidney Diseases ;79(2):268-88.e1.  
2. N Engl J Med 1 Vol. 385 Issue 19 Pages 7052-0368  
  
   
   
                      METRO ANION GAP 15                    10 - 20    [facility]  
   
                          Potassium [Moles/Vol] 4.3 mmol/L                3.5 -   
5.0   
mmol/L                                  [facility]   
Healthcare  
   
                          Sodium [Moles/Vol] 139 mmol/L                136 - 145  
   
mmol/L                                  [facility]  
   
                                                    Urea nitrogen   
[Mass/Vol]      73 mg/dL        High            7 - 25 mg/dL    [facility]  
   
                                                    No Panel Informationon 01-15  
-2025   
   
                                                                  [facility]  
   
                                                                  [facility]   
Healthcare  
   
                                                    Progress Noteson 01-   
   
                                                    Transcription   
Authentication   
Interface Message   
Text                            Normal                          The   
[facility]   
[facility]  
   
                                                    Transcription   
Authentication   
Interface Message   
Text                            Normal                          The   
[facility]   
[facility]  
   
                                                    Transcription   
Authentication   
Interface Message   
Text                            Normal                          The   
[facility]   
[facility]  
   
                                                    Progress Notes - NoteWritero  
n 01-   
   
                                                    Transcription   
Authentication   
Interface Message   
Text                            Normal                          The   
[facility]   
[facility]  
   
                                                    Student Noteon 01-   
   
                                                    Transcription   
Authentication   
Interface Message   
Text                            Normal                          The   
[facility]   
[facility]  
   
                                                    Transfer Noteon 01-   
   
                                                    Transcription   
Authentication   
Interface Message   
Text                            Normal                          The   
[facility]   
[facility]  
   
                                                    BASIC METABOLIC PANELon    
   
                      Anion gap [Moles/Vol] 15 mmol/L  Normal     10-20      The  
   
[facility]   
[facility]  
   
                                        Comment on above:   Performed By: #### ERIKA COVARRUBIAS FETIBC, MANASA ####[facility]   
[id number] [place], [place],    
   
                      Calcium [Mass/Vol] 8.0 mg/dL  Low        8.6-10.3   The   
[facility]   
[facility]  
   
                                        Comment on above:   Performed By: #### ERIKA COVARRUBIAS, FETIBC, MANASA ####[facility]   
[id number] [place],    
   
                      Chloride [Moles/Vol] 101 mmol/L Normal          The   
[facility]   
[facility]  
   
                                        Comment on above:   Performed By: #### BELINDA SMITH CH8, FETIBC, MANASA ####S PATHOLOGY   
[id number] [place],    
   
                      CO2 [Moles/Vol] 28 mmol/L  Normal     21-31      The   
[facility]   
[facility]  
   
                                        Comment on above:   Performed By: #### BELINDA SMITH CH8, FETIBC, MANASA ####S PATHOLOGY   
[id number] [place],    
   
                      Creatinine [Mass/Vol] 3.90 mg/dL High       0.60-1.20  The  
   
[facility]   
[facility]  
   
                                        Comment on above:   Performed By: #### BELINDA SMITH CHColumba, FETIBC, MANASA ####S PATHOLOGY   
[id number] [place],    
   
                                                    ESTIMATED GFR   
(CKD-EPI)       13 mL/min/1.73sqm Low             >=60            The   
[facility]   
[facility]  
   
                                        Comment on above:   Result Comment:   
 CKD EPI Equation using Creatinine without  
   
RaceComment: Estimated glomerular filtration rate (eGFR) is   
calculated without a race coefficient. Values should be   
interpreted in the context of the patient's full clinical   
presentation.Reference:1. Alfredo C, Delmi M, Valdez DC, et al..   
A Unifying Approach for GFR Estimation: Recommendations of the   
NKF-ASN Task Force on Reassessing the Inclusion of Race in   
Diagnosing Kidney Disease. American Journal of Kidney Diseases   
;79(2):268-88.e1.2. N Engl J Med  Vol. 385 Issue 19   
Pages 9811-5225   
   
                                                            Performed By: #### BELINDA SMITH CH8, FETIBC, MANASA ####S PATHOLOGY   
[id number] [place],    
   
                      Glucose [Mass/Vol] 177 mg/dL  High            The   
[facility]   
System  
   
                                        Comment on above:   Performed By: #### BELINDA SMITH CH8, FETIBC, MANASA ####[facility]   
[id number] [place],    
   
                      Potassium [Moles/Vol] 4.1 mmol/L Normal     3.5-5.0    The  
   
[facility]   
[facility]  
   
                                        Comment on above:   Performed By: #### BELINDA SMITH CH8, FETIBC, MANASA ####S PATHOLOGY   
[id number] [place],    
   
                      Sodium [Moles/Vol] 140 mmol/L Normal     136-145    The   
[facility]   
System  
   
                                        Comment on above:   Performed By: #### ERIKA COVARRUBIAS, OSMANI, MANASA ####[facility] PATHOLOGY   
[id number] [place],    
   
                                                    Urea nitrogen   
[Mass/Vol]      70 mg/dL        High            7-25            The   
[facility]   
System  
   
                                        Comment on above:   Performed By: #### ERIKA COVARRUBIAS, OSMANI, MANASA ####[facility] PATHOLOGY   
[id number] [place],    
   
                                                    CBC WITH DIFFERENTIALon    
   
                                                    Basophils (Bld)   
[#/Vol]         0.01 10*3/uL    Normal          0.00-0.20       The   
[facility]   
System  
   
                                        Comment on above:   Performed By: #### C  
BCDSAT ####[facility] PATHOLOGY [id number]   
[place],    
   
                                                    Basophils/100 WBC   
(Bld)           0.1 %           Normal          <=1.9           The   
[facility]   
System  
   
                                        Comment on above:   Performed By: #### C  
BCDSAT ####[facility] PATHOLOGY [id number]   
[place],    
   
                                                    Eosinophils (Bld)   
[#/Vol]         0.00 10*3/uL    Normal          0.00-0.70       The   
[facility]   
System  
   
                                        Comment on above:   Performed By: #### C  
BCDSAT ####[facility] PATHOLOGY [id number]   
[place],    
   
                                                    Eosinophils/100 WBC   
(Bld)           0.0 %           Low             0.1-4.0         The   
[facility]   
System  
   
                                        Comment on above:   Performed By: #### C  
BCDSAT ####S PATHOLOGY [id number]   
[place],    
   
                                                    Erythrocyte   
distribution width   
(RBC) [Ratio]   17.5 %          High            11.5-14.5       The   
[facility]   
System  
   
                                        Comment on above:   Performed By: #### C  
BCDSAT ####[facility] PATHOLOGY [id number]   
[place],    
   
                                                    Hematocrit (Bld)   
[Volume fraction] 22.6 %          Low             36.0-46.0       The   
[facility]   
[facility]  
   
                                        Comment on above:   Performed By: #### C  
RUSSELAT ####[facility] PATHOLOGY [id number]   
[place],    
   
                                                    Hemoglobin (Bld)   
[Mass/Vol]      7.6 g/dL        Low             12.0-15.0       The   
[facility]   
[facility]  
   
                                        Comment on above:   Performed By: #### RADHA GOODEAT ####[facility] PATHOLOGY [id number] [place] [address],    
   
                                                    Lymphocytes (Bld)   
[#/Vol]         0.85 10*3/uL    Low             1.00-4.80       The   
[facility]   
[facility]  
   
                                        Comment on above:   Performed By: #### C  
RUSSELAT ####[facility] PATHOLOGY [id number] [place] [address],    
   
                                                    Lymphocytes/100 WBC   
(Bld)           8.8 %           Low             24.0-44.0       The   
[facility]   
System  
   
                                        Comment on above:   Performed By: #### RADHA GOODEAT ####[facility] PATHOLOGY [id number] [place] [address],    
   
                                                    MCH (RBC) [Entitic   
mass]           26.9 pg         Normal          26.0-34.0       The   
[facility]   
System  
   
                                        Comment on above:   Performed By: #### RADHA GOODEAT ####[facility] PATHOLOGY [id number] [place] [address],    
   
                      MCHC (RBC) [Mass/Vol] 33.8 g/dL  Normal     32.0-35.9  The  
   
[facility]   
System  
   
                                        Comment on above:   Performed By: #### RADHA GOODEAT ####[facility] PATHOLOGY [id number] [place] [address],    
   
                                                    MCV (RBC) [Entitic   
vol]            80 fL           Normal                    The   
[facility]   
System  
   
                                        Comment on above:   Performed By: #### RADHA GOODEAT ####[facility] PATHOLOGY [id number] [place] [address],    
   
                                                    Monocytes (Bld)   
[#/Vol]         0.60 10*3/uL    Normal          0.20-1.00       The   
[facility]   
System  
   
                                        Comment on above:   Performed By: #### RADHA GOODEAT ####[facility] PATHOLOGY [id number] [place] [address],    
   
                                                    Monocytes/100 WBC   
(Bld)           6.1 %           Normal          2.0-11.0        The   
[facility]   
[facility]  
   
                                        Comment on above:   Performed By: #### RADHA GOODEAT ####[facility] PATHOLOGY [id number]   
[place],    
   
                                                    Neutrophils (Bld)   
[#/Vol]         8.30 10*3/uL    High            1.50-8.00       The   
[facility]   
[facility]  
   
                                        Comment on above:   Performed By: #### RADHA GOODEAT ####[facility] PATHOLOGY [id number]   
[place],    
   
                                                    Neutrophils/100 WBC   
(Bld)           85.0 %          High            31.0-76.0       The   
[facility]   
System  
   
                                        Comment on above:   Performed By: #### RADHA GOODEAT ####[facility] PATHOLOGY [id number]   
[place],    
   
                                                    Platelet mean volume   
(Bld) [Entitic vol] 8.2 fL          Normal          7.5-11.2        The   
[facility]   
System  
   
                                        Comment on above:   Performed By: #### RADHA GOODEAT ####[facility] PATHOLOGY [id number]   
[place],    
   
                                                    Platelets (Bld)   
[#/Vol]         332 10*3/uL     Normal          150-400         The   
[facility]   
System  
   
                                        Comment on above:   Performed By: #### RADHA GOODEAT ####[facility] PATHOLOGY [id number]   
[place],    
   
                      RBC (Bld) [#/Vol] 2.84 10*6/uL Low        4.00-5.20  The   
[facility]   
[facility]  
   
                                        Comment on above:   Performed By: #### RADHA GOODEAT ####[facility] PATHOLOGY [id number]   
[place],    
   
                      WBC (Bld) [#/Vol] 9.8 10*3/uL Normal     4.5-11.5   The   
[facility]   
[facility]  
   
                                        Comment on above:   Performed By: #### RADHA GOODEAT ####[facility] PATHOLOGY [id number]   
[place], [place],    
   
                                                    [facility]   
   
                                                    Transcription   
Authentication   
Interface Message   
Text                            Normal                          The   
[facility]   
[facility]  
   
                                                    Transcription   
Authentication   
Interface Message   
Text                            Normal                          The   
[facility]   
[facility]  
   
                                                    FERRITINon 2025   
   
                      MANASA        90.7 ng/mL Normal     11.0-306.8 The   
[facility]   
[facility]  
   
                                        Comment on above:   Performed By: #### ERIKA COVARRUBIAS FETANN, MANASA ####[facility]   
[id number] [place],    
   
                                                    GLUCOSE, FINGERSTICK-IN OFFI  
CEon 2025   
   
                      Glucose [Mass/Vol] 251 mg/dL  High            The   
[facility]   
[facility]  
   
                                        Comment on above:   Performed By: #### 8  
2948 ####NURSING GLUCOSE [id number]   
[place], [address]   
   
                      Glucose [Mass/Vol] 264 mg/dL  High            The   
[facility]   
System  
   
                                        Comment on above:   Performed By: #### 8  
2948 ####NURSING GLUCOSE [id number]   
[place], [address]   
   
                      Glucose [Mass/Vol] 225 mg/dL  High            The   
[facility]   
System  
   
                                        Comment on above:   Performed By: #### 8  
2948 ####NURSING GLUCOSE [id number]   
[place], [address]   
   
                      Glucose [Mass/Vol] 224 mg/dL  High            The   
[facility]   
System  
   
                                        Comment on above:   Performed By: #### 8  
2948 ####NURSING GLUCOSE [id number]   
[place], [address]   
   
                                                    IRON AND TIBCon 2025   
   
                      % SAT CORRECT PRD 27 %       Normal     20-55      The   
[facility]   
System  
   
                                        Comment on above:   Performed By: #### ERIKA OCVARRUBIAS, FETIBC, MANASA ####S PATHOLOGY   
[id number] [place],    
   
                      FE CORRECT PRD 68 ug/dL   Normal          The   
[facility]   
System  
   
                                        Comment on above:   Performed By: #### ERIKA COVARRUBIAS, FETIBRADHA, MANASA ####S PATHOLOGY   
[id number] [place],    
   
                      TIBC CORRECT  ug/mL  Normal     250-410    The   
[facility]   
System  
   
                                        Comment on above:   Performed By: #### ERIKA COVARRUBIAS, FETIBC, MANASA ####S PATHOLOGY   
[id number] [place],    
   
                      TRANSFER CORRECT  mg/dL  Low        203-362    The   
[facility]   
System  
   
                                        Comment on above:   Performed By: #### M  
SARAH, IRON8, FETIBC, MANASA ####S PATHOLOGY   
[id number] [place],    
   
                                                    MAGNESIUMon 2025   
   
                      Magnesium [Mass/Vol] 2.3 mg/dL  Normal     1.9-2.7    The   
[facility]   
System  
   
                                        Comment on above:   Performed By: #### M  
SARAH, IRON8, FETIBC, MANASA ####S PATHOLOGY   
[id number] [place],    
   
                                                    [facility] BASIC METABOLIC PANELo  
n 2025   
   
                          Calcium [Mass/Vol] 8 mg/dL      Low          8.6 - 10.  
3   
mg/dL                                   [facility]  
   
                      Chloride [Moles/Vol] 101 mmol/L            98 - 107 mmol/L  
 [facility]  
   
                      CO2 [Moles/Vol] 28 mmol/L             21 - 31 mmol/L [facility]  
   
                          Creatinine [Mass/Vol] 3.9 mg/dL    High         0.60 -  
 1.20   
mg/dL                                   [facility]  
   
                      Glucose [Mass/Vol] 177 mg/dL  High       74 - 109 mg/dL [facility]  
   
                                                    Interpretation and   
review of laboratory   
results         Abnormal                                        [facility]  
   
                      LHS ESTIMATED GFR 13         Low        - PINF     [facility]  
   
                                        Comment on above:    CKD EPI Equatio  
n using Creatinine without Race  
Comment: Estimated glomerular filtration rate (eGFR) is   
calculated without a race coefficient. Values should be   
interpreted in the context of the patient's full clinical   
presentation.  
Reference:  
1. Alfredo C, Delmi M, Valdez DC, et al.. A Unifying Approach   
for GFR Estimation: Recommendations of the NKF-ASN Task Force on   
Reassessing the Inclusion of Race in Diagnosing Kidney Disease.   
American Journal of Kidney Diseases 202;79(2):268-88.e1.  
2. N Engl J Med 1 Vol. 385 Issue 19 Pages 1020-7836  
  
   
   
                      METRO ANION GAP 15                    10 - 20    [facility]  
   
                          Potassium [Moles/Vol] 4.1 mmol/L                3.5 -   
5.0   
mmol/L                                  [facility]  
   
                          Sodium [Moles/Vol] 140 mmol/L                136 - 145  
   
mmol/L                                  [facility]  
   
                                                    Urea nitrogen   
[Mass/Vol]      70 mg/dL        High            7 - 25 mg/dL    [facility]  
   
                                                                  CLINISYNC  
   
                                                                  [facility] CBC WITH DIFFERENTIALo  
n 2025   
   
                                                    Basophils (Bld)   
[#/Vol]             0.01 10*3/uL                            0.00 - 0.20   
K/uL                                    [facility]  
   
                                                    Basophils/100 WBC   
(Bld)           0.1 %                           NINF - 1.9 %    [facility]  
   
                                                    Eosinophils (Bld)   
[#/Vol]             0 10*3/uL                               0.00 - 0.70   
K/uL                                    [facility]  
   
                                                    Eosinophils/100 WBC   
(Bld)           0 %             Low             0.1 - 4.0 %     [facility]  
   
                                                    Erythrocyte   
distribution width   
(RBC) [Ratio]   17.5 %          High            11.5 - 14.5 %   [facility]  
   
                                                    Hematocrit (Bld)   
[Volume fraction] 22.6 %          Low             36.0 - 46.0 %   [facility]  
   
                                                    Hemoglobin (Bld)   
[Mass/Vol]          7.6 g/dL            Low                 12.0 - 15.0   
g/dL                                    [facility]  
   
                                                    Interpretation and   
review of laboratory   
results         Abnormal                                        [facility] MEAN PLT VOL 8.2 fL                7.5 - 11.2 fL [facility] PLATELET 332 K/uL              150 - 400 K/uL [facility] RBC COUNT 2.84       Low                   [facility]  
   
                                                    Lymphocytes (Bld)   
[#/Vol]             0.85 10*3/uL        Low                 1.00 - 4.80   
K/uL                                    [facility]  
   
                                                    Lymphocytes/100 WBC   
(Bld)           8.8 %           Low             24.0 - 44.0 %   [facility]  
   
                                                    MCH (RBC) [Entitic   
mass]           26.9 pg                         26.0 - 34.0 pg  [facility]  
   
                          MCHC (RBC) [Mass/Vol] 33.8 g/dL                 32.0 -  
 35.9   
g/dL                                    [facility]  
   
                                                    MCV (RBC) [Entitic   
vol]            80 fL                           80 - 100 fL     [facility] TOTAL VOLUME -   
REF                 0.6 K/uL                                0.20 - 1.00   
K/uL                                    [facility]  
   
                                                    Monocytes/100 WBC   
(Bld)           6.1 %                           2.0 - 11.0 %    [facility]  
   
                                                    Neutrophils (Bld)   
[#/Vol]             8.3 10*3/uL         High                1.50 - 8.00   
K/uL                                    [facility]  
   
                                                    Neutrophils/100 WBC   
(Bld)           85 %            High            31.0 - 76.0 %   [facility]  
   
                      WBC (Bld) [#/Vol] 9.8 10*3/uL            4.5 - 11.5 K/uL N  
[facility]  
   
                                                                  CLINISYNC  
   
                                                                  [facility]   
Healthcare  
   
                                                    Progress Noteson 2025   
   
                                                    Transcription   
Authentication   
Interface Message   
Text                            Normal                          The   
[facility]   
System  
   
                                                    Transcription   
Authentication   
Interface Message   
Text                            Normal                          The   
[facility]   
System  
   
                                                    Transcription   
Authentication   
Interface Message   
Text                            Normal                          The   
[facility]   
System  
   
                                                    Progress Notes - NoteWritero  
n 2025   
   
                                                    Transcription   
Authentication   
Interface Message   
Text                            Normal                          The   
[facility]   
System  
   
                                                    BASIC METABOLIC PANELon 01-1  
3-2025   
   
                      Anion gap [Moles/Vol] 15 mmol/L  Normal     10-20      The  
   
[facility]   
System  
   
                                        Comment on above:   Performed By: #### C  
H8 ####[facility] PATHOLOGY [id number]   
[place], [place],    
   
                      Calcium [Mass/Vol] 7.7 mg/dL  Low        8.6-10.3   The   
[facility]  
   
                                        Comment on above:   Performed By: #### C  
H8 ####[facility] PATHOLOGY [id number]   
[place], [place],    
   
                      Chloride [Moles/Vol] 102 mmol/L Normal          The   
[facility]   
[facility]  
   
                                        Comment on above:   Performed By: #### C  
H8 ####S PATHOLOGY [id number]   
[place],    
   
                      CO2 [Moles/Vol] 28 mmol/L  Normal     21-31      The   
[facility]   
[facility]  
   
                                        Comment on above:   Performed By: #### C  
H8 ####S PATHOLOGY [id number]   
[place],    
   
                      Creatinine [Mass/Vol] 3.61 mg/dL High       0.60-1.20  The  
   
[facility]   
[facility]  
   
                                        Comment on above:   Performed By: #### C  
H8 ####S PATHOLOGY [id number]   
[place],    
   
                                                    ESTIMATED GFR   
(CKD-EPI)       14 mL/min/1.73sqm Low             >=60            The   
[facility]   
[facility]  
   
                                        Comment on above:   Result Comment:   
 CKD EPI Equation using Creatinine without  
   
RaceComment: Estimated glomerular filtration rate (eGFR) is   
calculated without a race coefficient. Values should be   
interpreted in the context of the patient's full clinical   
presentation.Reference:1. Alfredo GARCIA, Delmi M, Valdez FLYNN, et al..   
A Unifying Approach for GFR Estimation: Recommendations of the   
NKF-ASN Task Force on Reassessing the Inclusion of Race in   
Diagnosing Kidney Disease. American Journal of Kidney Diseases   
;79(2):268-88.e1.2. N Engl J Med  Vol. 385 Issue 19   
Pages 9126-5626   
   
                                                            Performed By: #### C  
H8 ####[facility] PATHOLOGY [id number]   
[place], [place],    
   
                      Glucose [Mass/Vol] 242 mg/dL  High            The   
[facility]  
   
                                        Comment on above:   Performed By: #### C  
H8 ####[facility] PATHOLOGY [id number]   
[place],    
   
                      Potassium [Moles/Vol] 4.2 mmol/L Normal     3.5-5.0    The  
   
[facility]  
   
                                        Comment on above:   Performed By: #### C  
H8 ####[facility] PATHOLOGY [id number]   
[place],    
   
                      Sodium [Moles/Vol] 141 mmol/L Normal     136-145    The   
[facility]  
   
                                        Comment on above:   Performed By: #### RADHA  
H8 ####[facility] PATHOLOGY [id number]   
[place],    
   
                                                    Urea nitrogen   
[Mass/Vol]      69 mg/dL        High            7-25            The   
[facility]  
   
                                        Comment on above:   Performed By: #### C  
H8 ####[facility] PATHOLOGY [id number]   
[place],    
   
                      Anion gap [Moles/Vol] 16 mmol/L  Normal     10-20      The  
   
[facility]  
   
                                        Comment on above:   Performed By: #### BELINDA SMITH CH8 ####[facility] PATHOLOGY [id number]   
[place],    
   
                      Calcium [Mass/Vol] 7.9 mg/dL  Low        8.6-10.3   The   
[facility]   
System  
   
                                        Comment on above:   Performed By: #### BELINDA SMITH, CH8 ####[facility] PATHOLOGY [id number]   
[place],    
   
                      Chloride [Moles/Vol] 105 mmol/L Normal          The   
[facility]   
System  
   
                                        Comment on above:   Performed By: #### BELINDA SMITH CH8 ####[facility] PATHOLOGY [id number]   
[place],    
   
                      CO2 [Moles/Vol] 28 mmol/L  Normal     21-31      The   
[facility]   
System  
   
                                        Comment on above:   Performed By: ###Fletcher SMITH CH8 ####S PATHOLOGY [id number]   
[place],    
   
                      Creatinine [Mass/Vol] 3.83 mg/dL High       0.60-1.20  The  
   
[facility]   
System  
   
                                        Comment on above:   Performed By: #### BELINDA SMITH CH8 ####[facility] PATHOLOGY [id number]   
[place],    
   
                                                    ESTIMATED GFR   
(CKD-EPI)       13 mL/min/1.73sqm Low             >=60            The   
[facility]   
System  
   
                                        Comment on above:   Result Comment:   
 CKD EPI Equation using Creatinine without  
   
RaceComment: Estimated glomerular filtration rate (eGFR) is   
calculated without a race coefficient. Values should be   
interpreted in the context of the patient's full clinical   
presentation.Reference:1. Alfredo C, Delmi M, Valdez FLYNN, et al..   
A Unifying Approach for GFR Estimation: Recommendations of the   
NKF-ASN Task Force on Reassessing the Inclusion of Race in   
Diagnosing Kidney Disease. American Journal of Kidney Diseases   
;79(2):268-88.e1.2. N Engl J Med  Vol. 385 Issue 19   
Pages 7883-3706   
   
                                                            Performed By: #### BELINDA SMITH CH8 ####[facility] PATHOLOGY [id number]   
[place],    
   
                      Glucose [Mass/Vol] 126 mg/dL  High            The   
[facility]   
System  
   
                                        Comment on above:   Performed By: #### BELINDA SMITH CH8 ####S PATHOLOGY [id number]   
[place],    
   
                      Potassium [Moles/Vol] 4.0 mmol/L Normal     3.5-5.0    The  
   
[facility]   
System  
   
                                        Comment on above:   Performed By: ###Fletcher SMITH CH8 ####S PATHOLOGY [id number]   
[place],    
   
                      Sodium [Moles/Vol] 145 mmol/L Normal     136-145    The   
[facility]   
System  
   
                                        Comment on above:   Performed By: ###Fletcher SMITH, CH8 ####[facility] PATHOLOGY [id number]   
[place],    
   
                                                    Urea nitrogen   
[Mass/Vol]      72 mg/dL        High            7-25            The   
[facility]  
   
                                        Comment on above:   Performed By: #### BELINDA SMITH CH8 ####[facility] PATHOLOGY [id number]   
[place],    
   
                                                    BLOOD GAS, ARTERIALon 2025   
   
                      CR JUAN C     -0.2 mmol/L Normal     -2.0-3.0   The   
[facility]  
   
                                        Comment on above:   Performed By: #### C  
R BGA ####[facility] PATHOLOGY [id number]   
[place],    
   
                      CR PCO2    55.4 mm Hg High       35.0-45.0  The   
[facility]  
   
                                        Comment on above:   Performed By: #### C  
R BGA ####[facility] PATHOLOGY [id number] [place] [address],    
   
                      CR PHA     7.294      Low        7.350-7.450 The   
[facility]  
   
                                        Comment on above:   Performed By: #### C  
R BGA ####[facility] PATHOLOGY [id number]   
[place],    
   
                      CR PO2     74 mm Hg   Low             The   
[facility]  
   
                                        Comment on above:   Performed By: #### C  
R BGA ####[facility] PATHOLOGY [id number]   
[place],    
   
                      FIO2 (CATEGORY) 2 LPM      Normal                The   
[facility]  
   
                                        Comment on above:   Performed By: #### C  
R BGA ####[facility] PATHOLOGY [id number]   
[place],    
   
                                                    HCO3 (Bld)   
[Moles/Vol]     26 mmol/L       Normal          21-28           The   
[facility]  
   
                                        Comment on above:   Performed By: #### C  
R BGA ####[facility] PATHOLOGY [id number]   
[place],    
   
                      MODE       Nasal Canula Normal                The   
[facility]  
   
                                        Comment on above:   Performed By: #### C  
R BGA ####[facility] PATHOLOGY [id number]   
[place],    
   
                                                    Oxygen saturation in   
Blood           92.9 %          Low             95.0-99.0       The   
[facility]  
   
                                        Comment on above:   Performed By: #### C  
R BGA ####[facility] PATHOLOGY [id number] [place] [address],    
   
                                                    CBC WITH DIFFERENTIALon 01-1  
3-2025   
   
                                                    Basophils (Bld)   
[#/Vol]         0.01 10*3/uL    Normal          0.00-0.20       The   
[facility]  
   
                                        Comment on above:   Performed By: #### C  
BCDSAT ####[facility] PATHOLOGY [id number] [place] [address],    
   
                                                    Basophils/100 WBC   
(Bld)           0.1 %           Normal          <=1.9           The   
[facility]  
   
                                        Comment on above:   Performed By: #### C  
BCDSAT ####[facility] PATHOLOGY [id number] [place] [address],    
   
                                                    Eosinophils (Bld)   
[#/Vol]         0.00 10*3/uL    Normal          0.00-0.70       The   
[facility]  
   
                                        Comment on above:   Performed By: #### RADHA GOODEAT ####[facility] PATHOLOGY [id number] [place] [address],    
   
                                                    Eosinophils/100 WBC   
(Bld)           0.0 %           Low             0.1-4.0         The   
[facility]  
   
                                        Comment on above:   Performed By: #### RADHA GOODEAT ####[facility] PATHOLOGY [id number] [place] [address],    
   
                                                    Erythrocyte   
distribution width   
(RBC) [Ratio]   17.3 %          High            11.5-14.5       The   
[facility]  
   
                                        Comment on above:   Performed By: #### C  
BCPOLOAT ####[facility] PATHOLOGY [id number] [place] [address],    
   
                                                    Hematocrit (Bld)   
[Volume fraction] 24.6 %          Low             36.0-46.0       The   
[facility]  
   
                                        Comment on above:   Performed By: #### C  
BCPOLOAT ####[facility] PATHOLOGY [id number] [place] [address],    
   
                                                    Hemoglobin (Bld)   
[Mass/Vol]      8.0 g/dL        Low             12.0-15.0       The   
[facility]  
   
                                        Comment on above:   Performed By: #### C  
BCPOLOAT ####[facility] PATHOLOGY [id number]   
[place],    
   
                                                    Lymphocytes (Bld)   
[#/Vol]         0.54 10*3/uL    Low             1.00-4.80       The   
[facility]  
   
                                        Comment on above:   Performed By: #### C  
BCDSAT ####[facility] PATHOLOGY [id number]   
[place],    
   
                                                    Lymphocytes/100 WBC   
(Bld)           5.7 %           Low             24.0-44.0       The   
[facility]   
[facility]  
   
                                        Comment on above:   Performed By: #### C  
BCDSAT ####[facility] PATHOLOGY [id number] [place] [address],    
   
                                                    MCH (RBC) [Entitic   
mass]           26.7 pg         Normal          26.0-34.0       The   
[facility]  
   
                                        Comment on above:   Performed By: #### C  
BCDSAT ####[facility] PATHOLOGY [id number] [place] [address],    
   
                      MCHC (RBC) [Mass/Vol] 32.7 g/dL  Normal     32.0-35.9  The  
   
[facility]   
[facility]  
   
                                        Comment on above:   Performed By: #### C  
BCDSAT ####[facility] PATHOLOGY [id number]   
[place],    
   
                                                    MCV (RBC) [Entitic   
vol]            82 fL           Normal                    The   
[facility]   
[facility]  
   
                                        Comment on above:   Performed By: #### C  
BCDSAT ####[facility] PATHOLOGY [id number]   
[place],    
   
                                                    Monocytes (Bld)   
[#/Vol]         0.25 10*3/uL    Normal          0.20-1.00       The   
[facility]  
   
                                        Comment on above:   Performed By: #### C  
BCDSAT ####[facility] PATHOLOGY [id number]   
[place]    
   
                                                    Monocytes/100 WBC   
(Bld)           2.7 %           Normal          2.0-11.0        The   
[facility]  
   
                                        Comment on above:   Performed By: #### C  
BCDSAT ####[facility] PATHOLOGY [id number]   
[place],    
   
                                                    Neutrophils (Bld)   
[#/Vol]         8.56 10*3/uL    High            1.50-8.00       The   
[facility]  
   
                                        Comment on above:   Performed By: #### C  
BCDSAT ####[facility] PATHOLOGY [id number]   
[place],    
   
                                                    Neutrophils/100 WBC   
(Bld)           91.5 %          High            31.0-76.0       The   
[facility]   
[facility]  
   
                                        Comment on above:   Performed By: #### C  
BCDSAT ####[facility] PATHOLOGY [id number]   
[place]    
   
                                                    Platelet mean volume   
(Bld) [Entitic vol] 8.4 fL          Normal          7.5-11.2        The   
[facility]  
   
                                        Comment on above:   Performed By: #### C  
BCDSAT ####[facility] PATHOLOGY [id number]   
[place]    
   
                                                    Platelets (Bld)   
[#/Vol]         327 10*3/uL     Normal          150-400         The   
[facility]  
   
                                        Comment on above:   Performed By: #### C  
BCDSAT ####[facility] PATHOLOGY [id number]   
[place],    
   
                      RBC (Bld) [#/Vol] 3.01 10*6/uL Low        4.00-5.20  The   
[facility]  
   
                                        Comment on above:   Performed By: #### C  
BCDSAT ####[facility] PATHOLOGY [id number]   
[place],    
   
                      WBC (Bld) [#/Vol] 9.4 10*3/uL Normal     4.5-11.5   The   
[facility]   
System  
   
                                        Comment on above:   Performed By: #### C  
BCDSAT ####[facility] PATHOLOGY [id number]   
[place],    
   
                                                    Basophils (Bld)   
[#/Vol]         0.02 10*3/uL    Normal          0.00-0.20       The   
[facility]   
System  
   
                                        Comment on above:   Performed By: #### C  
BCDSAT ####[facility] PATHOLOGY [id number]   
[place],    
   
                                                    Basophils/100 WBC   
(Bld)           0.3 %           Normal          <=1.9           The   
[facility]   
System  
   
                                        Comment on above:   Performed By: #### C  
BCDSAT ####[facility] PATHOLOGY [id number]   
[place],    
   
                                                    Eosinophils (Bld)   
[#/Vol]         0.00 10*3/uL    Normal          0.00-0.70       The   
[facility]  
   
                                        Comment on above:   Performed By: #### C  
RUSSELAT ####[facility] PATHOLOGY [id number]   
[place]    
   
                                                    Eosinophils/100 WBC   
(Bld)           0.1 %           Normal          0.1-4.0         The   
[facility]  
   
                                        Comment on above:   Performed By: #### C  
RUSSELAT ####[facility] PATHOLOGY [id number] [place] [address],    
   
                                                    Erythrocyte   
distribution width   
(RBC) [Ratio]   17.4 %          High            11.5-14.5       The   
[facility]  
   
                                        Comment on above:   Performed By: #### C  
RUSSELAT ####[facility] PATHOLOGY [id number] [place] [address],    
   
                                                    Hematocrit (Bld)   
[Volume fraction] 22.6 %          Low             36.0-46.0       The   
[facility]  
   
                                        Comment on above:   Performed By: #### C  
RUSSELAT ####[facility] PATHOLOGY [id number] [place] [address],    
   
                                                    Hemoglobin (Bld)   
[Mass/Vol]      7.3 g/dL        Low             12.0-15.0       The   
[facility]  
   
                                        Comment on above:   Performed By: #### C  
RUSSELAT ####[facility] PATHOLOGY [id number] [place] [address],    
   
                                                    Lymphocytes (Bld)   
[#/Vol]         0.75 10*3/uL    Low             1.00-4.80       The   
[facility]  
   
                                        Comment on above:   Performed By: #### C  
RUSSELAT ####[facility] PATHOLOGY [id number] [place] [address],    
   
                                                    Lymphocytes/100 WBC   
(Bld)           9.1 %           Low             24.0-44.0       The   
[facility]  
   
                                        Comment on above:   Performed By: #### C  
RUSSELAT ####[facility] PATHOLOGY [id number]   
[place],    
   
                                                    MCH (RBC) [Entitic   
mass]           26.2 pg         Normal          26.0-34.0       The   
[facility]  
   
                                        Comment on above:   Performed By: #### C  
RUSSELAT ####[facility] PATHOLOGY [id number]   
[place],    
   
                      MCHC (RBC) [Mass/Vol] 32.4 g/dL  Normal     32.0-35.9  The  
   
[facility]  
   
                                        Comment on above:   Performed By: #### C  
BCDSAT ####[facility] PATHOLOGY [id number]   
[place],    
   
                                                    MCV (RBC) [Entitic   
vol]            81 fL           Normal                    The   
[facility]  
   
                                        Comment on above:   Performed By: #### C  
BCDSAT ####[facility] PATHOLOGY [id number]   
[place]    
   
                                                    Monocytes (Bld)   
[#/Vol]         0.52 10*3/uL    Normal          0.20-1.00       The   
[facility]  
   
                                        Comment on above:   Performed By: #### C  
BCDSAT ####[facility] PATHOLOGY [id number]   
[place]    
   
                                                    Monocytes/100 WBC   
(Bld)           6.3 %           Normal          2.0-11.0        The   
[facility]  
   
                                        Comment on above:   Performed By: #### C  
BCDSAT ####[facility] PATHOLOGY [id number]   
[place]    
   
                                                    Neutrophils (Bld)   
[#/Vol]         6.95 10*3/uL    Normal          1.50-8.00       The   
[facility]  
   
                                        Comment on above:   Performed By: #### C  
BCDSAT ####[facility] PATHOLOGY [id number]   
[place]    
   
                                                    Neutrophils/100 WBC   
(Bld)           84.3 %          High            31.0-76.0       The   
[facility]   
[facility]  
   
                                        Comment on above:   Performed By: #### C  
BCDSAT ####[facility] PATHOLOGY [id number]   
[place], [place],    
   
                                                    Platelet mean volume   
(Bld) [Entitic vol] 8.4 fL          Normal          7.5-11.2        The   
[facility]   
[facility]  
   
                                        Comment on above:   Performed By: #### C  
BCDSAT ####[facility] PATHOLOGY [id number]   
[place], [place],    
   
                                                    Platelets (Bld)   
[#/Vol]         277 10*3/uL     Normal          150-400         The   
[facility]   
[facility]  
   
                                        Comment on above:   Performed By: #### C  
BCDSAT ####[facility] [id number]   
[place], [place],    
   
                      RBC (Bld) [#/Vol] 2.80 10*6/uL Low        4.00-5.20  The   
[facility]   
[facility]  
   
                                        Comment on above:   Performed By: #### C  
BCDSAT ####[facility] PATH[facility] [id number]   
[place], [place],    
   
                      WBC (Bld) [#/Vol] 8.2 10*3/uL Normal     4.5-11.5   The   
[facility]   
[facility]  
   
                                        Comment on above:   Performed By: #### C  
BCDSAT ####[facility] PATH[facility]Y [id number]   
[place], [place],    
   
                                                    Consultson 2025   
   
                                                    Transcription   
Authentication   
Interface Message   
Text                            Normal                          The   
[facility]   
[facility]  
   
                                                    GLUCOSE, FINGERSTICK-IN OFFI  
CEon 2025   
   
                      Glucose [Mass/Vol] 235 mg/dL  High            The   
[facility]  
   
                                        Comment on above:   Performed By: #### 8  
2948 ####NURSING GLUCOSE [id number]   
[place], [address]   
   
                      Glucose [Mass/Vol] 287 mg/dL  High            The   
[facility]   
[facility]  
   
                                        Comment on above:   Result Comment: Rayray Fenton Protocol   
   
                                                            Performed By: #### 8  
2948 ####NURSING GLUCOSE [id number]   
[place], [address]   
   
                      Glucose [Mass/Vol] 220 mg/dL  High            The   
[facility]   
[facility]  
   
                                        Comment on above:   Result Comment: Foll  
ow Protocol   
   
                                                            Performed By: #### 8  
2948 ####NURSING GLUCOSE [id number]   
[place], [address]   
   
                      Glucose [Mass/Vol] 243 mg/dL  High            The   
[facility]  
   
                                        Comment on above:   Result Comment: Rayray Fenton Protocol   
   
                                                            Performed By: #### 8  
2948 ####NURSING GLUCOSE [id number]   
[place], [address]   
   
                      Glucose [Mass/Vol] 127 mg/dL  High            The   
[facility]   
[facility]  
   
                                        Comment on above:   Result Comment: Noti  
fied RN APN MDFollow Protocol   
   
                                                            Performed By: #### 8  
2948 ####NURSING GLUCOSE [id number]   
[place], [address]   
   
                      Glucose [Mass/Vol] 132 mg/dL  High            The   
[facility]   
System  
   
                                        Comment on above:   Result Comment: Foll  
ow Protocol   
   
                                                            Performed By: #### 8  
2948 ####NURSING GLUCOSE [id number]   
[place], [address]   
   
                      Glucose [Mass/Vol] 127 mg/dL  High            The   
[facility]   
System  
   
                                        Comment on above:   Result Comment: Rayray darby RN APN MDFollow Protocol   
   
                                                            Performed By: #### 8  
2948 ####NURSING GLUCOSE [id number]   
[place], [address]   
   
                                                    MAGNESIUMon 2025   
   
                      Magnesium [Mass/Vol] 2.4 mg/dL  Normal     1.9-2.7    The   
[facility]   
System  
   
                                        Comment on above:   Performed By: #### M  
SARAH, CH8 ####[facility] PATH[facility]Y [id number]   
[place], [address]   
   
                                                    METRO CBC WITH DIFFERENTIALo  
n 2025   
   
                                                    Basophils (Bld)   
[#/Vol]             0.02 10*3/uL                            0.00 - 0.20   
K/uL                                    [facility]  
   
                                                    Basophils/100 WBC   
(Bld)           0.3 %                           NINF - 1.9 %    [facility]  
   
                                                    Eosinophils (Bld)   
[#/Vol]             0 10*3/uL                               0.00 - 0.70   
K/uL                                    [facility]  
   
                                                    Eosinophils/100 WBC   
(Bld)           0.1 %                           0.1 - 4.0 %     [facility]  
   
                                                    Erythrocyte   
distribution width   
(RBC) [Ratio]   17.4 %          High            11.5 - 14.5 %   [facility]  
   
                                                    Hematocrit (Bld)   
[Volume fraction] 22.6 %          Low             36.0 - 46.0 %   [facility]  
   
                                                    Hemoglobin (Bld)   
[Mass/Vol]          7.3 g/dL            Low                 12.0 - 15.0   
g/dL                                    [facility]  
   
                                                    Interpretation and   
review of laboratory   
results         Abnormal                                        [facility] MEAN PLT VOL 8.4 fL                7.5 - 11.2 fL [facility] PLATELET 277 K/uL              150 - 400 K/uL [facility] RBC COUNT 2.8        Low                   [facility]  
   
                                                    Lymphocytes (Bld)   
[#/Vol]             0.75 10*3/uL        Low                 1.00 - 4.80   
K/uL                                    [facility]  
   
                                                    Lymphocytes/100 WBC   
(Bld)           9.1 %           Low             24.0 - 44.0 %   [facility]  
   
                                                    MCH (RBC) [Entitic   
mass]           26.2 pg                         26.0 - 34.0 pg  [facility]  
   
                          MCHC (RBC) [Mass/Vol] 32.4 g/dL                 32.0 -  
 35.9   
g/dL                                    [facility]  
   
                                                    MCV (RBC) [Entitic   
vol]            81 fL                           80 - 100 fL     [facility]  
   
                                                    METRO TOTAL VOLUME -   
REF                 0.52 K/uL                               0.20 - 1.00   
K/uL                                    [facility]  
   
                                                    Monocytes/100 WBC   
(Bld)           6.3 %                           2.0 - 11.0 %    [facility]  
   
                                                    Neutrophils (Bld)   
[#/Vol]             6.95 10*3/uL                            1.50 - 8.00   
K/uL                                    [facility]  
   
                                                    Neutrophils/100 WBC   
(Bld)           84.3 %          High            31.0 - 76.0 %   [facility]  
   
                      WBC (Bld) [#/Vol] 8.2 10*3/uL            4.5 - 11.5 K/uL N  
[facility]  
   
                                                                  CLINISYNC  
   
                                                                  [facility] GLUCOSE, FINGERSTICK-I  
N OFFICEon 2025   
   
                      Glucose [Mass/Vol] 127 mg/dL  High       74 - 109 mg/dL NO  
[facility]  
   
                                        Comment on above:   Notified RN APN MD  
Follow Protocol  
  
   
   
                                                    Interpretation and   
review of laboratory   
results         Abnormal                                        [facility]  
   
                                                    Progress Noteson 2025   
   
                                                    Transcription   
Authentication   
Interface Message   
Text                            Normal                          The   
[facility]   
[facility]  
   
                                                    Transcription   
Authentication   
Interface Message   
Text                            Normal                          The   
[facility]   
[facility]  
   
                                                    Transcription   
Authentication   
Interface Message   
Text                            Normal                          The   
[facility]   
System  
   
                                                    Progress Notes - NoteWritero  
n 2025   
   
                                                    Transcription   
Authentication   
Interface Message   
Text                            Normal                          The   
[facility]   
System  
   
                                                    TYPE AND SCREENon 2025  
   
   
                                                    ABO and Rh group Nom   
(Bld)                                   Blood group A Rh(D)   
negative            Normal                                  The   
[facility]   
[facility]  
   
                                        Comment on above:   Performed By: #### T  
S ####S PATHOLOGY [id number] [place] [address],    
   
                      ABSC INT   Negative   Normal                The   
[facility]   
System  
   
                                        Comment on above:   Performed By: #### T  
S ####[facility] PATHOLOGY [id number]   
[place],    
   
                                                    B-HYDROXYBUTYRATEon 20  
25   
   
                      B-HYDROXYBUTYRATE 1.16 mmol/L High       0.02-0.27  The   
[facility]   
[facility]  
   
                                        Comment on above:   Order Comment: Refer  
ence Range: Normal: 0.02 - 0.27 mmol/L   
Ketosis: > 0.30 mmol/L Possible Ketoacidosis: > 0.50 mmol/L   
   
                                                            Performed By: #### B  
HY ####[facility] PATHOLOGY [id number]   
[place],    
   
                                                    BASIC METABOLIC PANELon    
   
                      Anion gap [Moles/Vol] 14 mmol/L  Normal     10-20      The  
   
[facility]   
[facility]  
   
                                        Comment on above:   Performed By: #### C  
H8 ####[facility] PATHOLOGY [id number]   
[place],    
   
                      Calcium [Mass/Vol] 7.7 mg/dL  Low        8.6-10.3   The   
[facility]   
[facility]  
   
                                        Comment on above:   Performed By: #### C  
H8 ####[facility] PATHOLOGY [id number]   
[place],    
   
                      Chloride [Moles/Vol] 104 mmol/L Normal          The   
[facility]   
[facility]  
   
                                        Comment on above:   Performed By: #### C  
H8 ####S PATHOLOGY [id number]   
[place],    
   
                      CO2 [Moles/Vol] 27 mmol/L  Normal     21-31      The   
[facility]   
[facility]  
   
                                        Comment on above:   Performed By: #### C  
H8 ####S PATHOLOGY [id number]   
[place],    
   
                      Creatinine [Mass/Vol] 3.76 mg/dL High       0.60-1.20  The  
   
[facility]   
[facility]  
   
                                        Comment on above:   Performed By: #### C  
H8 ####S PATHOLOGY [id number]   
[place],    
   
                                                    ESTIMATED GFR   
(CKD-EPI)       14 mL/min/1.73sqm Low             >=60            The   
[facility]   
[facility]  
   
                                        Comment on above:   Result Comment:   
 CKD EPI Equation using Creatinine without  
   
RaceComment: Estimated glomerular filtration rate (eGFR) is   
calculated without a race coefficient. Values should be   
interpreted in the context of the patient's full clinical   
presentation.Reference:1. Alfredo C, Delmi M, Valdez FLYNN, et al..   
A Unifying Approach for GFR Estimation: Recommendations of the   
NKF-ASN Task Force on Reassessing the Inclusion of Race in   
Diagnosing Kidney Disease. American Journal of Kidney Diseases   
;79(2):268-88.e1.2. N Engl J Med  Vol. 385 Issue 19   
Pages 2553-2704   
   
                                                            Performed By: #### C  
H8 ####[facility] PATH[facility]Y [id number]   
[place],    
   
                      Glucose [Mass/Vol] 143 mg/dL  High            The   
[facility]   
[facility]  
   
                                        Comment on above:   Performed By: #### C  
H8 ####[facility] PATHOLOGY [id number]   
[place],    
   
                      Potassium [Moles/Vol] 4.3 mmol/L Normal     3.5-5.0    The  
   
[facility]   
[facility]  
   
                                        Comment on above:   Performed By: #### C  
H8 ####[facility] PATHOLOGY [id number]   
[place],    
   
                      Sodium [Moles/Vol] 141 mmol/L Normal     136-145    The   
[facility]   
[facility]  
   
                                        Comment on above:   Performed By: #### C  
H8 ####[facility] PATHOLOGY [id number]   
[place],    
   
                                                    Urea nitrogen   
[Mass/Vol]      65 mg/dL        High            7-25            The   
[facility]   
[facility]  
   
                                        Comment on above:   Performed By: #### C  
H8 ####[facility] PATH[facility]Y [id number]   
[place],    
   
                      Anion gap [Moles/Vol] 14 mmol/L  Normal     10-20      The  
   
[facility]   
[facility]  
   
                                        Comment on above:   Performed By: #### C  
H8 ####[facility] PATH[facility]Y [id number]   
[place],    
   
                      Calcium [Mass/Vol] 8.0 mg/dL  Low        8.6-10.3   The   
[facility]   
System  
   
                                        Comment on above:   Performed By: #### C  
H8 ####[facility] PATHOLOGY [id number]   
[place],    
   
                      Chloride [Moles/Vol] 105 mmol/L Normal          The   
[facility]   
System  
   
                                        Comment on above:   Performed By: #### C  
H8 ####S PATHOLOGY [id number]   
[place],    
   
                      CO2 [Moles/Vol] 27 mmol/L  Normal     21-31      The   
[facility]  
   
                                        Comment on above:   Performed By: #### C  
H8 ####S PATHOLOGY [id number]   
[place],    
   
                      Creatinine [Mass/Vol] 3.73 mg/dL High       0.60-1.20  The  
   
[facility]   
[facility]  
   
                                        Comment on above:   Performed By: #### C  
H8 ####S PATHOLOGY [id number]   
[place],    
   
                                                    ESTIMATED GFR   
(CKD-EPI)       14 mL/min/1.73sqm Low             >=60            The   
[facility]   
[facility]  
   
                                        Comment on above:   Result Comment:   
 CKD EPI Equation using Creatinine without  
   
RaceComment: Estimated glomerular filtration rate (eGFR) is   
calculated without a race coefficient. Values should be   
interpreted in the context of the patient's full clinical   
presentation.Reference:1. Alfredo C, Delmi M, Valdez FLYNN, et al..   
A Unifying Approach for GFR Estimation: Recommendations of the   
NKF-ASN Task Force on Reassessing the Inclusion of Race in   
Diagnosing Kidney Disease. American Journal of Kidney Diseases   
;79(2):268-88.e1.2. N Engl J Med  Vol. 385 Issue 19   
Pages 6810-5822   
   
                                                            Performed By: #### C  
H8 ####S PATHOLOGY [id number]   
[place],    
   
                      Glucose [Mass/Vol] 137 mg/dL  High            The   
[facility]   
[facility]  
   
                                        Comment on above:   Performed By: #### C  
H8 ####S PATHOLOGY [id number]   
[place],    
   
                      Potassium [Moles/Vol] 4.2 mmol/L Normal     3.5-5.0    The  
   
[facility]   
[facility]  
   
                                        Comment on above:   Performed By: #### C  
H8 ####[facility] [id number]   
[place],    
   
                      Sodium [Moles/Vol] 142 mmol/L Normal     136-145    The   
[facility]   
[facility]  
   
                                        Comment on above:   Performed By: #### C  
H8 ####[facility] PATHOLOGY [id number]   
[place],    
   
                                                    Urea nitrogen   
[Mass/Vol]      68 mg/dL        High            7-25            [facility]   
[facility]  
   
                                        Comment on above:   Performed By: #### C  
H8 ####[facility] PATHOLOGY [id number]   
[place],    
   
                                                    BLOOD GAS, ARTERIALon 2025   
   
                      CR JUAN C     -11.5 mmol/L Low        -2.0-3.0   The   
[facility]  
   
                                        Comment on above:   Performed By: #### L  
ACT, CR BGA ####[facility] PATHOLOGY   
[id number]   
[place],    
   
                      CR PCO2    48.8 mm Hg High       35.0-45.0  The   
[facility]  
   
                                        Comment on above:   Performed By: #### L  
ACT, CR BGA ####[facility] PATHOLOGY   
[id number] [place] [address],    
   
                      CR PHA     7.153      Critically low 7.350-7.450 The   
[facility]  
   
                                        Comment on above:   Performed By: #### L  
ACT, CR BGA ####[facility] PATHOLOGY   
[id number] [place] [address],    
   
                      CR PO2     64 mm Hg   Low             The   
[facility]  
   
                                        Comment on above:   Performed By: #### L  
ACT, CR BGA ####[facility] PATHOLOGY   
[id number]   
[place],    
   
                                                    HCO3 (Bld)   
[Moles/Vol]     16 mmol/L       Low             21-28           [facility]   
[facility]  
   
                                        Comment on above:   Performed By: #### L  
ACT, CR BGA ####[facility] PATHOLOGY   
[id number]   
[place],    
   
                                                    Oxygen saturation in   
Blood           90.6 %          Low             95.0-99.0       The   
[facility]  
   
                                        Comment on above:   Performed By: #### L  
ACT, CR BGA ####[facility] PATHOLOGY   
[id number]   
[place],    
   
                      FIO2 (CATEGORY) 30%        Normal                The   
[facility]  
   
                                        Comment on above:   Performed By: #### L  
ACT, CR BGA ####[facility] PATHOLOGY   
[id number] [place] [address],    
   
                                                            Performed By: #### C  
R BGV ####[facility] PATHOLOGY [id number] [place] [address],    
   
                      MODE       BIPAP      Normal                The   
[facility]  
   
                                        Comment on above:   Performed By: #### L  
ACT, CR BGA ####[facility] PATHOLOGY   
[id number] [place] [address],    
   
                                                            Performed By: #### C  
R BGV ####[facility] PATHOLOGY [id number] [place] [address],    
   
                                                            Result Comment: Per   
O.C. RNThis is a corrected result. Previous   
result on 2025 at 0418 EST was Bagging   
   
                                                    BLOOD GAS, VENOUSon 20  
25   
   
                      CR ABEV    -8.7 mmol/L Low        -2.0-3.0   The   
[facility]  
   
                                        Comment on above:   Performed By: #### C  
R BGV ####[facility] PATHOLOGY [id number] [place] [address],    
   
                      CR HCO3V   19 mmol/L  Low        21-28      The   
[facility]  
   
                                        Comment on above:   Performed By: #### C  
R BGV ####[facility] PATHOLOGY [id number] [place] [address],    
   
                      CR PHV     7.181      Low        7.320-7.430 The   
[facility]  
   
                                        Comment on above:   Performed By: #### C  
R BGV ####[facility] PATHOLOGY [id number] [place] [address],    
   
                      CR PVCO2   52.3 mm Hg High       41.0-51.0  The   
[facility]   
[facility]  
   
                                        Comment on above:   Performed By: #### C  
R BGV ####[facility] PATHOLOGY [id number] [place] [address],    
   
                      CR PVO2    47 mm Hg   High       38-44      The   
[facility]   
[facility]  
   
                                        Comment on above:   Performed By: #### C  
R BGV ####S PATHOLOGY [id number] [place] [address],    
   
                      FIO2 (CATEGORY) Room Air   Normal                The   
[facility]   
[facility]  
   
                                        Comment on above:   Performed By: #### C  
R BGV ####[facility] PATHOLOGY [id number] [place] [address],    
   
                      MODE       Not Indicated Normal                The   
[facility]   
System  
   
                                        Comment on above:   Performed By: #### C  
R BGV ####[facility] PATHOLOGY [id number]   
[place], [place],    
   
                                                    Oxygen saturation in   
Blood           75.0 %          Normal          70.0-80.0       The   
[facility]   
System  
   
                                        Comment on above:   Performed By: #### C  
R BGV ####[facility] PATHOLOGY [id number]   
[place], [place],    
   
                      CR ABEV    -9.3 mmol/L Low        -2.0-3.0   The   
[facility]   
[facility]  
   
                                        Comment on above:   Performed By: #### C  
R BGV ####[facility] PATHOLOGY [id number] [place] [address],    
   
                      CR HCO3V   18 mmol/L  Low        21-28      The   
[facility]   
System  
   
                                        Comment on above:   Performed By: #### C  
R BGV ####[facility] PATHOLOGY [id number] [place] [address],    
   
                      CR PHV     7.197      Low        7.320-7.430 The   
[facility]   
[facility]  
   
                                        Comment on above:   Performed By: #### C  
R BGV ####[facility] PATHOLOGY [id number] [place] [address],    
   
                      CR PVCO2   47.9 mm Hg Normal     41.0-51.0  The   
[facility]   
[facility]  
   
                                        Comment on above:   Performed By: #### C  
R BGV ####[facility] PATHOLOGY [id number] [place] [address],    
   
                      CR PVO2    43 mm Hg   Normal     38-44      The   
[facility]   
System  
   
                                        Comment on above:   Performed By: #### C  
R BGV ####[facility] PATHOLOGY [id number] [place] [address],    
   
                                                    Oxygen saturation in   
Blood           70.6 %          Normal          70.0-80.0       The   
[facility]   
[facility]  
   
                                        Comment on above:   Performed By: #### C  
R BGV ####[facility] PATHOLOGY [id number] [place] [address],    
   
                      CR ABEV    -10.5 mmol/L Low        -2.0-3.0   The   
[facility]   
[facility]  
   
                                        Comment on above:   Performed By: #### C  
R BGV ####[facility] PATHOLOGY [id number]   
[place], [place],    
   
                      CR HCO3V   17 mmol/L  Low        21-28      The   
[facility]   
[facility]  
   
                                        Comment on above:   Performed By: #### C  
R BGV ####[facility] PATHOLOGY [id number] [place] [address],    
   
                      CR PHV     7.169      Low        7.320-7.430 The   
[facility]   
[facility]  
   
                                        Comment on above:   Performed By: #### C  
R BGV ####[facility] PATHOLOGY [id number] [place] [address],    
   
                      CR PVCO2   49.1 mm Hg Normal     41.0-51.0  The   
[facility]   
[facility]  
   
                                        Comment on above:   Performed By: #### C  
R BGV ####[facility] PATHOLOGY [id number] [place] [address],    
   
                      CR PVO2    40 mm Hg   Normal     38-44      The   
[facility]   
[facility]  
   
                                        Comment on above:   Performed By: #### C  
R BGV ####[facility] PATHOLOGY [id number] [place] [address],    
   
                                                    Oxygen saturation in   
Blood           69.9 %          Low             70.0-80.0       The   
[facility]   
[facility]  
   
                                        Comment on above:   Performed By: #### C  
R BGV ####[facility] PATHOLOGY [id number] [place] [address],    
   
                                                    Oxygen saturation in   
Blood           69.6 %          Low             70.0-80.0       [facility]  
   
                                        Comment on above:   Performed By: #### C  
R BGV ####[facility] PATHOLOGY [id number] [place] [address],    
   
                      CR ABEV    -10.7 mmol/L Low        -2.0-3.0   The   
[facility]   
[facility]  
   
                                        Comment on above:   Performed By: #### C  
R BGV ####[facility] PATHOLOGY [id number] [place] [address],    
   
                      CR PHV     7.143      Low        7.320-7.430 The   
[facility]   
[facility]  
   
                                        Comment on above:   Performed By: #### C  
R BGV ####[facility] PATHOLOGY [id number] [place] [address],    
   
                      CR PVCO2   52.8 mm Hg High       41.0-51.0  The   
[facility]   
[facility]  
   
                                        Comment on above:   Performed By: #### C  
R BGV ####[facility] [id number]   
[place], [place],    
   
                      CR PVO2    42 mm Hg   Normal     38-44      The   
[facility]   
[facility]  
   
                                        Comment on above:   Performed By: #### C  
R BGV ####[facility] PATHOLOGY [id number]   
[place], [place],    
   
                                                    Consultson 2025   
   
                                                    Transcription   
Authentication   
Interface Message   
Text                            Normal                          The   
[facility]   
System  
   
                                                    GLUCOSE, FINGERSTICK-IN OFFI  
CEon 2025   
   
                      Glucose [Mass/Vol] 157 mg/dL  High            The   
[facility]   
[facility]  
   
                                        Comment on above:   Result Comment: Rayray SANCHEZ MDFokelechi Protocol   
   
                                                            Performed By: #### 8  
2948 ####NURSING GLUCOSE [id number]   
[place], [address]   
   
                      Glucose [Mass/Vol] 136 mg/dL  High            The   
[facility]   
[facility]  
   
                                        Comment on above:   Performed By: #### 8  
2948 ####NURSING GLUCOSE [id number]   
[place], [address]   
   
                      Glucose [Mass/Vol] 182 mg/dL  High            The   
[facility]   
[facility]  
   
                                        Comment on above:   Performed By: #### 8  
2948 ####NURSING GLUCOSE [id number]   
[place], [address]   
   
                      Glucose [Mass/Vol] 134 mg/dL  High            The   
[facility]   
[facility]  
   
                                        Comment on above:   Result Comment: Rayray Fenton Protocol   
   
                                                            Performed By: #### 8  
2948 ####NURSING GLUCOSE [id number]   
[place], [address]   
   
                                                    LACTIC ACIDon 2025   
   
                      CR LACT    0.8 mmol/L Normal     0.5-1.6    The   
[facility]   
System  
   
                                        Comment on above:   Order Comment: This   
test was developed, and its performance   
characteristics determined by the Department of Pathology of The   
[facility]. It has not been cleared or approved by the   
FDA. This test is used for clinical purposes only.   
   
                                                            Performed By: #### L  
ACT, CR BGA ####S PATHOLOGY [id number]   
[place], [place],    
   
                                                    METRO BLOOD GAS, VENOUSon    
   
                                                    Interpretation and   
review of laboratory   
results         Abnormal                                        [facility]2  30%                              [facility]   Bagging                          [facility]  
   
                          METRO CR ABEV -10.7 mmol/L Low          -2.0 - 3.0   
mmol/L                                  [facility]  
   
                      METRO CR HCO3V 17 mmol/L  Low        21 - 28 mmol/L [facility]  
   
                      METRO CR PHV 7.143      Low        7.320 - 7.430 [facility]  
   
                      METRO CR PVCO2 52.8       High                  [facility]  
   
                      METRO CR PVO2 42                               [facility]  
   
                                                                  [facility]  
   
                                                    Interpretation and   
review of laboratory   
results         Abnormal                                        [facility] %FIO2  40%                              [facility]   BIPAP                            [facility]  
   
                          METRO CR ABEV -11.4 mmol/L Low          -2.0 - 3.0   
mmol/L                                  [facility]  
   
                      METRO CR HCO3V 18 mmol/L  Low        21 - 28 mmol/L [facility]  
   
                      METRO CR PHV 7.082      Low        7.320 - 7.430 [facility]  
   
                      METRO CR PVCO2 63.9       High                  [facility]  
   
                      METRO CR PVO2 56         High                  [facility]  
   
                                                    Oxygen saturation in   
Blood           82.6 %          High            70.0 - 80.0 %   [facility]  
   
                                                                  [facility]  
   
                                                                  [facility]  
   
                                                    METRO CALCIUM, IONIZEDon    
   
                          METRO CR ICA 1.24 mmol/L               1.15 - 1.33   
mmol/L                                  [facility]  
   
                                        Comment on above:   This test was develo  
ped, and its performance characteristics   
determined by the Department of Pathology of The [facility]   
System. It has not been cleared or approved by the FDA. This   
test is used for clinical purposes only.   
   
                                                                  [facility]  
   
                                                    Progress Noteson 2025   
   
                                                    Transcription   
Authentication   
Interface Message   
Text                            Normal                          The   
[facility]   
[facility]  
   
                                                    Transcription   
Authentication   
Interface Message   
Text                            Normal                          The   
[facility]   
[facility]  
   
                                                    Transcription   
Authentication   
Interface Message   
Text                            Normal                          The   
[facility]   
System  
   
                                                    Transcription   
Authentication   
Interface Message   
Text                            Normal                          The   
[facility]   
System  
   
                                                    Transcription   
Authentication   
Interface Message   
Text                            Normal                          The   
[facility]   
[facility]  
   
                                                    XR CHEST AP OR PA 1 VIEWon 0  
2025   
   
                                                    XR CHEST AP OR PA 1   
VIEW                            Normal                          The   
[facility]   
[facility]  
   
                                                    BASIC METABOLIC PANELon    
   
                      Anion gap [Moles/Vol] 17 mmol/L  Normal     10-20      The  
   
[facility]   
[facility]  
   
                                        Comment on above:   Performed By: #### M  
IRON SMITH8 ####[facility] PATHOLOGY [id number] [place] [address], [place],    
   
                      Calcium [Mass/Vol] 8.2 mg/dL  Low        8.6-10.3   The   
[facility]   
[facility]  
   
                                        Comment on above:   Performed By: #### BLEINDA SMITH, CH8 ####[facility] PATHOLOGY [id number]   
[place],    
   
                      Chloride [Moles/Vol] 107 mmol/L Normal          The   
[facility]   
[facility]  
   
                                        Comment on above:   Performed By: #### BELINDA SMITH, CH8 ####S PATHOLOGY [id number]   
[place],    
   
                      CO2 [Moles/Vol] 18 mmol/L  Low        21-31      The   
[facility]   
System  
   
                                        Comment on above:   Performed By: #### BELINDA SMITH, IRON8 ####[facility] PATHOLOGY [id number]   
[place],    
   
                      Creatinine [Mass/Vol] 3.81 mg/dL High       0.60-1.20  The  
   
[facility]   
[facility]  
   
                                        Comment on above:   Performed By: #### BELINDA SMITH, CH8 ####[facility] PATHOLOGY [id number]   
[place],    
   
                                                    ESTIMATED GFR   
(CKD-EPI)       13 mL/min/1.73sqm Low             >=60            The   
[facility]   
[facility]  
   
                                        Comment on above:   Result Comment:   
 CKD EPI Equation using Creatinine without  
   
RaceComment: Estimated glomerular filtration rate (eGFR) is   
calculated without a race coefficient. Values should be   
interpreted in the context of the patient's full clinical   
presentation.Reference:1. Alfredo C, Delmi M, Valdez FLYNN, et al..   
A Unifying Approach for GFR Estimation: Recommendations of the   
NKF-ASN Task Force on Reassessing the Inclusion of Race in   
Diagnosing Kidney Disease. American Journal of Kidney Diseases   
;79(2):268-88.e1.2. N Engl J Med 1 Vol. 385 Issue 19   
Pages 9469-9472   
   
                                                            Performed By: #### BELINDA SMITH, CH8 ####S PATHOLOGY [id number]   
[place],    
   
                      Glucose [Mass/Vol] 114 mg/dL  High            The   
[facility]   
System  
   
                                        Comment on above:   Performed By: #### BELINDA SMITH, CH8 ####[facility] PATH[facility]Y [id number]   
[place],    
   
                      Potassium [Moles/Vol] 4.5 mmol/L Normal     3.5-5.0    The  
   
[facility]   
System  
   
                                        Comment on above:   Performed By: #### BELINDA SMITH, CH8 ####[facility] PATH[facility]Y [id number]   
[place],    
   
                      Sodium [Moles/Vol] 137 mmol/L Normal     136-145    The   
[facility]   
System  
   
                                        Comment on above:   Performed By: #### BELINDA SMITH, IRON8 ####[facility] PATH[facility]Y [id number]   
[place],    
   
                                                    Urea nitrogen   
[Mass/Vol]      63 mg/dL        High            7-25            The   
[facility]   
System  
   
                                        Comment on above:   Performed By: #### BELINDA SMITH, IRON8 ####[facility] PATH[facility]Y [id number]   
[place],    
   
                      Anion gap [Moles/Vol] 14 mmol/L  Normal     10-20      The  
   
[facility]   
System  
   
                                        Comment on above:   Performed By: #### C  
H8, PHOS, MG ####[facility]   
[id number] [place],    
   
                      Calcium [Mass/Vol] 8.0 mg/dL  Low        8.6-10.3   The   
[facility]   
System  
   
                                        Comment on above:   Performed By: #### C  
H8, PHOS, MG ####[facility]   
[id number] [place],    
   
                      Chloride [Moles/Vol] 109 mmol/L High            The   
[facility]   
System  
   
                                        Comment on above:   Performed By: #### C  
H8, PHOS, MG ####[facility] PATH[facility]   
[id number] [place],    
   
                      CO2 [Moles/Vol] 17 mmol/L  Low        21-31      The   
[facility]   
System  
   
                                        Comment on above:   Performed By: #### C  
H8, PHOS, MG ####[facility]   
[id number] [place],    
   
                      Creatinine [Mass/Vol] 3.75 mg/dL High       0.60-1.20  The  
   
[facility]   
[facility]  
   
                                        Comment on above:   Performed By: #### C  
H8, PHOS, MG ####[facility]   
[id number] [place],    
   
                                                    ESTIMATED GFR   
(CKD-EPI)       14 mL/min/1.73sqm Low             >=60            The   
[facility]   
System  
   
                                        Comment on above:   Result Comment:   
 CKD EPI Equation using Creatinine without  
   
RaceComment: Estimated glomerular filtration rate (eGFR) is   
calculated without a race coefficient. Values should be   
interpreted in the context of the patient's full clinical   
presentation.Reference:1. Alfredo C, Delmi M, Valdez FLYNN, et al..   
A Unifying Approach for GFR Estimation: Recommendations of the   
NKF-ASN Task Force on Reassessing the Inclusion of Race in   
Diagnosing Kidney Disease. American Journal of Kidney Diseases   
;79(2):268-88.e1.2. N Engl J Med  Vol. 385 Issue 19   
Pages 1042-5062   
   
                                                            Performed By: #### C  
H8, PHOS, MG ####[facility]   
[id number] [place],    
   
                      Glucose [Mass/Vol] 126 mg/dL  High            The   
[facility]   
[facility]  
   
                                        Comment on above:   Performed By: #### RADHA  
HColumba PHOS, MG ####[facility]   
[id number] [place],    
   
                      Potassium [Moles/Vol] 4.6 mmol/L Normal     3.5-5.0    The  
   
[facility]   
[facility]  
   
                                        Comment on above:   Performed By: #### C  
HColumba PHOS, MG ####[facility]   
[id number] [place],    
   
                      Sodium [Moles/Vol] 135 mmol/L Low        136-145    The   
[facility]   
[facility]  
   
                                        Comment on above:   Performed By: #### C  
H8 PHOS, MG ####[facility]   
[id number] [place],    
   
                                                    Urea nitrogen   
[Mass/Vol]      57 mg/dL        High            7-25            The   
[facility]  
   
                                        Comment on above:   Performed By: #### C  
H8 PHOS, MG ####[facility]   
[id number] [place],    
   
                                                    BLOOD GAS, ARTERIALon 2025   
   
                      CR JUAN C     -10.7 mmol/L Low        -2.0-3.0   The   
[facility]   
[facility]  
   
                                        Comment on above:   Performed By: #### C  
R BGA ####[facility] PATHOLOGY [id number]   
[place], [place],    
   
                      CR PCO2    56.4 mm Hg High       35.0-45.0  The   
[facility]   
[facility]  
   
                                        Comment on above:   Performed By: #### C  
R BGA ####[facility] PATHOLOGY [id number] [place] [address],    
   
                      CR PHA     7.128      Critically low 7.350-7.450 The   
[facility]   
[facility]  
   
                                        Comment on above:   Performed By: #### C  
R BGA ####[facility] PATHOLOGY [id number] [place] [address],    
   
                      CR PO2     80 mm Hg   Normal          The   
[facility]   
[facility]  
   
                                        Comment on above:   Performed By: #### C  
R BGA ####[facility] PATHOLOGY [id number] [place] [address],    
   
                      FIO2 (CATEGORY) 40%        Normal                The   
[facility]   
[facility]  
   
                                        Comment on above:   Performed By: #### C  
R BGA ####[facility] [facility] PATHOLOGY [id number] [place] [address],    
   
                                                    HCO3 (Bld)   
[Moles/Vol]     18 mmol/L       Low             21-28           The   
[facility]   
[facility]  
   
                                        Comment on above:   Performed By: #### C  
R BGA ####[facility] PATHOLOGY [id number] [place] [address],    
   
                      MODE       BIPAP      Normal                The   
[facility]   
[facility]  
   
                                        Comment on above:   Performed By: #### C  
R BGA ####[facility] PATHOLOGY [id number] [place] [address],    
   
                                                    Oxygen saturation in   
Blood           94.7 %          Low             95.0-99.0       The   
[facility]   
[facility]  
   
                                        Comment on above:   Performed By: #### C  
R BGA ####[facility] PATHOLOGY [id number] [place] [address],    
   
                      CR JUAN C     -10.8 mmol/L Low        -2.0-3.0   The   
[facility]   
[facility]  
   
                                        Comment on above:   Performed By: #### C  
R BGA ####[facility] PATHOLOGY [id number] [place] [address],    
   
                      CR PCO2    59.7 mm Hg High       35.0-45.0  The   
[facility]   
System  
   
                                        Comment on above:   Performed By: #### C  
R BGA ####[facility] PATHOLOGY [id number]   
[place], [place],    
   
                      CR PHA     7.110      Critically low 7.350-7.450 The   
[facility]   
[facility]  
   
                                        Comment on above:   Performed By: #### C  
R BGA ####[facility] PATHOLOGY [id number]   
[place], [place],    
   
                      CR PO2     86 mm Hg   Normal          The   
[facility]   
[facility]  
   
                                        Comment on above:   Performed By: #### C  
R BGA ####[facility] PATHOLOGY [id number]   
[place],    
   
                      FIO2 (CATEGORY) 40%        Normal                The   
[facility]   
[facility]  
   
                                        Comment on above:   Performed By: #### C  
R BGA ####[facility] PATHOLOGY [id number] [place] [address],    
   
                                                    HCO3 (Bld)   
[Moles/Vol]     18 mmol/L       Low             21-28           The   
[facility]  
   
                                        Comment on above:   Performed By: #### C  
R BGA ####[facility] PATHOLOGY [id number] [place] [address],    
   
                      MODE       BIPAP      Normal                The   
[facility]  
   
                                        Comment on above:   Result Comment: 16/4  
x20   
   
                                                            Performed By: #### C  
R BGA ####[facility] PATHOLOGY [id number] [place] [address],    
   
                                                    Oxygen saturation in   
Blood           95.6 %          Normal          95.0-99.0       The   
[facility]   
[facility]  
   
                                        Comment on above:   Performed By: #### C  
R BGA ####[facility] PATHOLOGY [id number]   
[place], [place],    
   
                      CR JUAN C     -10.9 mmol/L Low        -2.0-3.0   The   
[facility]   
[facility]  
   
                                        Comment on above:   Performed By: #### C  
R BGA ####[facility] PATHOLOGY [id number]   
[place],    
   
                      CR PCO2    58.5 mm Hg High       35.0-45.0  The   
[facility]   
[facility]  
   
                                        Comment on above:   Performed By: #### C  
R BGA ####[facility] PATHOLOGY [id number]   
[place],    
   
                      CR PHA     7.115      Critically low 7.350-7.450 The   
[facility]   
[facility]  
   
                                        Comment on above:   Performed By: #### C  
R BGA ####[facility] PATHOLOGY [id number]   
[place],    
   
                      CR PO2     59 mm Hg   Low             The   
[facility]   
[facility]  
   
                                        Comment on above:   Performed By: #### C  
R BGA ####[facility] PATHOLOGY [id number] [place] [address],    
   
                      FIO2 (CATEGORY) 1 LPM      Normal                The   
[facility]  
   
                                        Comment on above:   Performed By: #### C  
R BGA ####[facility] PATHOLOGY [id number] [place] [address],    
   
                                                    HCO3 (Bld)   
[Moles/Vol]     18 mmol/L       Low             21-28           The   
[facility]  
   
                                        Comment on above:   Performed By: #### C  
R BGA ####[facility] PATHOLOGY [id number] [place] [address],    
   
                      MODE       Nasal Canula Normal                The   
[facility]  
   
                                        Comment on above:   Performed By: #### C  
R BGA ####[facility] PATHOLOGY [id number] [place] [address],    
   
                                                    Oxygen saturation in   
Blood           85.7 %          Low             95.0-99.0       The   
[facility]   
[facility]  
   
                                        Comment on above:   Performed By: #### C  
R BGA ####[facility] PATHOLOGY [id number] [place] [address],    
   
                                                    BLOOD GAS, VENOUSon 20  
25   
   
                      CR ABEV    -11.4 mmol/L Low        -2.0-3.0   The   
[facility]  
   
                                        Comment on above:   Performed By: #### C  
R BGV, CR ICA ####[facility] PATHOLOGY   
[id number] [place] [address],    
   
                      CR HCO3V   18 mmol/L  Low        21-28      The   
[facility]   
[facility]  
   
                                        Comment on above:   Performed By: #### C  
R BGV, CR ICA ####[facility] PATHOLOGY   
[id number] [place] [address],    
   
                      CR PHV     7.082      Low        7.320-7.430 The   
[facility]  
   
                                        Comment on above:   Performed By: #### C  
R BGV, CR ICA ####[facility] PATHOLOGY   
[id number] [place],    
   
                      CR PVCO2   63.9 mm Hg High       41.0-51.0  The   
[facility]   
[facility]  
   
                                        Comment on above:   Performed By: #### C  
R BGV, CR ICA ####[facility] PATHOLOGY   
[id number] [place],    
   
                      CR PVO2    56 mm Hg   High       38-44      The   
[facility]   
[facility]  
   
                                        Comment on above:   Performed By: #### C  
R BGV, CR ICA ####[facility] PATHOLOGY   
[id number] [place],    
   
                      FIO2 (CATEGORY) 40%        Normal                The   
[facility]  
   
                                        Comment on above:   Performed By: #### C  
R BGV, CR ICA ####[facility] PATHOLOGY   
[id number] [place],    
   
                      MODE       BIPAP      Normal                The   
[facility]  
   
                                        Comment on above:   Performed By: #### C  
R BGV, CR ICA ####[facility] PATHOLOGY   
[id number] [place] [address],    
   
                                                    Oxygen saturation in   
Blood           82.6 %          High            70.0-80.0       The   
[facility]   
[facility]  
   
                                        Comment on above:   Performed By: #### C  
R BGV, CR ICA ####[facility] PATHOLOGY   
[id number] [place],    
   
                                                    CALCIUM, IONIZEDon   
5   
   
                      CR ICA     1.24 mmol/L Normal     1.15-1.33  The   
[facility]   
[facility]  
   
                                        Comment on above:   Result Comment: This  
 test was developed, and its performance   
characteristics determined by the Department of Pathology of The   
[facility]. It has not been cleared or approved by the   
FDA. This test is used for clinical purposes only.   
   
                                                            Performed By: #### C  
R BGV, CR ICA ####[facility] PATHOLOGY   
[id number] [place] [address],    
   
                      CR ICA     1.32 mmol/L Normal     1.15-1.33  The   
[facility]   
[facility]  
   
                                        Comment on above:   Result Comment: This  
 test was developed, and its performance   
characteristics determined by the Department of Pathology of The   
[facility]. It has not been cleared or approved by the   
FDA. This test is used for clinical purposes only.   
   
                                                            Performed By: #### RADHA HORTA ICA ####[facility] PATHOLOGY [id number]   
[place],    
   
                      CR ICA     1.28 mmol/L Normal     1.15-1.33  The   
[facility]   
System  
   
                                        Comment on above:   Result Comment: This  
 test was developed, and its performance   
characteristics determined by the Department of Pathology of The   
[facility]. It has not been cleared or approved by the   
FDA. This test is used for clinical purposes only.   
   
                                                            Performed By: #### RADHA GUZMÁN ####S PATHOLOGY [id number]   
[place],    
   
                                                    CBC WITH DIFFERENTIALon    
   
                                                    Erythrocyte   
distribution width   
(RBC) [Ratio]   18.1 %          High            11.5-14.5       The   
[facility]   
[facility]  
   
                                        Comment on above:   Performed By: #### ONEIL BECERRA ####[facility] PATHOLOGY   
[id number] [place],    
   
                                                    Hematocrit (Bld)   
[Volume fraction] 26.7 %          Low             36.0-46.0       The   
[facility]   
[facility]  
   
                                        Comment on above:   Performed By: ###ONEIL FLOR ####[facility] PATHOLOGY   
[id number] [place],    
   
                                                    Hemoglobin (Bld)   
[Mass/Vol]      8.8 g/dL        Low             12.0-15.0       The   
[facility]   
[facility]  
   
                                        Comment on above:   Performed By: ###ONEIL FLOR ####[facility] PATHOLOGY   
[id number] [place],    
   
                                                    MCH (RBC) [Entitic   
mass]           27.1 pg         Normal          26.0-34.0       The   
[facility]   
System  
   
                                        Comment on above:   Performed By: ###ONEIL FLOR ####[facility] PATHOLOGY   
[id number] [place],    
   
                      MCHC (RBC) [Mass/Vol] 32.8 g/dL  Normal     32.0-35.9  The  
   
[facility]   
[facility]  
   
                                        Comment on above:   Performed By: ###ONEIL FLOR ####[facility] PATHOLOGY   
[id number] [place],    
   
                                                    MCV (RBC) [Entitic   
vol]            83 fL           Normal                    The   
[facility]   
[facility]  
   
                                        Comment on above:   Performed By: ###ONEIL FLOR ####[facility] PATHOLOGY   
[id number] [place],    
   
                                                    Platelet mean volume   
(Bld) [Entitic vol] 8.8 fL          Normal          7.5-11.2        The   
[facility]   
[facility]  
   
                                        Comment on above:   Performed By: ###ONEIL FLOR ####[facility] PATHOLOGY   
[id number] [place] [address],    
   
                                                    Platelets (Bld)   
[#/Vol]         224 10*3/uL     Normal          150-400         The   
[facility]   
[facility]  
   
                                        Comment on above:   Performed By: ###ONEIL FLOR ####[facility] PATHOLOGY   
[id number] [place] [address],    
   
                      RBC (Bld) [#/Vol] 3.23 10*6/uL Low        4.00-5.20  The   
[facility]   
[facility]  
   
                                        Comment on above:   Performed By: ###ONEIL FLOR ####[facility] PATHOLOGY   
[id number] [place] [address],    
   
                      WBC (Bld) [#/Vol] 11.2 10*3/uL Normal     4.5-11.5   The   
[facility]  
   
                                        Comment on above:   Performed By: ###ONEIL FLOR ####[facility] PATHOLOGY   
[id number] [place],    
   
                                                    Basophils (Bld)   
[#/Vol]         0.03 10*3/uL    Normal          0.00-0.20       The   
[facility]   
[facility]  
   
                                        Comment on above:   Performed By: ###Fletcher GO ####S PATHOLOGY [id number]   
[place],    
   
                                                    Basophils/100 WBC   
(Bld)           0.2 %           Normal          <=1.9           The   
[facility]   
[facility]  
   
                                        Comment on above:   Performed By: ###Fletcher GO ####S PATHOLOGY [id number]   
[place],    
   
                                                    Eosinophils (Bld)   
[#/Vol]         0.23 10*3/uL    Normal          0.00-0.70       The   
[facility]   
[facility]  
   
                                        Comment on above:   Performed By: #### C  
BCDSAT ####[facility] PATHOLOGY [id number]   
[place],    
   
                                                    Eosinophils/100 WBC   
(Bld)           2.0 %           Normal          0.1-4.0         The   
[facility]   
[facility]  
   
                                        Comment on above:   Performed By: #### C  
BCDSAT ####[facility] PATHOLOGY [id number]   
[place],    
   
                                                    Erythrocyte   
distribution width   
(RBC) [Ratio]   17.8 %          High            11.5-14.5       The   
[facility]   
[facility]  
   
                                        Comment on above:   Performed By: #### C  
BCDSAT ####[facility] PATHOLOGY [id number]   
[place],    
   
                                                    Hematocrit (Bld)   
[Volume fraction] 28.8 %          Low             36.0-46.0       The   
[facility]   
[facility]  
   
                                        Comment on above:   Performed By: #### C  
BCDSAT ####[facility] PATHOLOGY [id number] [place] [address],    
   
                                                    Hemoglobin (Bld)   
[Mass/Vol]      8.9 g/dL        Low             12.0-15.0       The   
[facility]  
   
                                        Comment on above:   Performed By: #### C  
BCDSAT ####[facility] PATHOLOGY [id number]   
[place],    
   
                                                    Lymphocytes (Bld)   
[#/Vol]         1.25 10*3/uL    Normal          1.00-4.80       The   
[facility]   
System  
   
                                        Comment on above:   Performed By: #### C  
BCDSAT ####[facility] PATHOLOGY [id number]   
[place],    
   
                                                    Lymphocytes/100 WBC   
(Bld)           11.1 %          Low             24.0-44.0       The   
[facility]   
System  
   
                                        Comment on above:   Performed By: #### C  
BCDSAT ####[facility] PATHOLOGY [id number]   
[place],    
   
                                                    MCH (RBC) [Entitic   
mass]           26.0 pg         Normal          26.0-34.0       The   
[facility]   
System  
   
                                        Comment on above:   Performed By: #### C  
BCDSAT ####[facility] PATHOLOGY [id number]   
[place],    
   
                      MCHC (RBC) [Mass/Vol] 30.8 g/dL  Low        32.0-35.9  The  
   
[facility]  
   
                                        Comment on above:   Performed By: #### C  
BCDSAT ####[facility] PATHOLOGY [id number]   
[place],    
   
                                                    MCV (RBC) [Entitic   
vol]            85 fL           Normal                    The   
[facility]  
   
                                        Comment on above:   Performed By: #### C  
BCDSAT ####[facility] PATHOLOGY [id number]   
[place],    
   
                                                    Monocytes (Bld)   
[#/Vol]         0.70 10*3/uL    Normal          0.20-1.00       The   
[facility]  
   
                                        Comment on above:   Performed By: #### C  
RUSSELAT ####[facility] PATHOLOGY [id number]   
[place],    
   
                                                    Monocytes/100 WBC   
(Bld)           6.2 %           Normal          2.0-11.0        The   
[facility]  
   
                                        Comment on above:   Performed By: #### C  
RUSSELAT ####[facility] PATHOLOGY [id number] [place] [address],    
   
                                                    Neutrophils (Bld)   
[#/Vol]         9.02 10*3/uL    High            1.50-8.00       The   
[facility]  
   
                                        Comment on above:   Performed By: #### RADHA GOODEAT ####[facility] PATHOLOGY [id number] [place] [address],    
   
                                                    Neutrophils/100 WBC   
(Bld)           80.4 %          High            31.0-76.0       The   
[facility]   
System  
   
                                        Comment on above:   Performed By: #### RADHA GOODEAT ####[facility] PATHOLOGY [id number]   
[place],    
   
                                                    Platelet mean volume   
(Bld) [Entitic vol] 9.1 fL          Normal          7.5-11.2        The   
[facility]   
System  
   
                                        Comment on above:   Performed By: #### RADHA GOODEAT ####[facility] PATHOLOGY [id number]   
[place],    
   
                                                    Platelets (Bld)   
[#/Vol]         194 10*3/uL     Normal          150-400         The   
[facility]  
   
                                        Comment on above:   Performed By: #### RADHA GOODEAT ####S PATHOLOGY [id number]   
[place],    
   
                      RBC (Bld) [#/Vol] 3.41 10*6/uL Low        4.00-5.20  The   
[facility]   
System  
   
                                        Comment on above:   Performed By: #### C  
RUSSELAT ####[facility] PATHOLOGY [id number]   
[place],    
   
                      WBC (Bld) [#/Vol] 11.2 10*3/uL Normal     4.5-11.5   The   
[facility]  
   
                                        Comment on above:   Performed By: #### C  
RUSSELAT ####[facility] PATHOLOGY [id number]   
[place],    
   
                                                    Consultson 2025   
   
                                                    Transcription   
Authentication   
Interface Message   
Text                            Normal                          The   
[facility]   
[facility]  
   
                                                    GLUCOSE, FINGERSTICK-IN OFFI  
CEon 2025   
   
                      Glucose [Mass/Vol] 101 mg/dL  Normal          The   
[facility]  
   
                                        Comment on above:   Result Comment: Noti  
fied CHARLENE SANCHEZ MDFollow Protocol   
   
                                                            Performed By: #### 8  
2948 ####NURSING GLUCOSE [id number]   
[place], [address]   
   
                      Glucose [Mass/Vol] 96 mg/dL   Normal          The   
[facility]  
   
                                        Comment on above:   Result Comment: Foll  
ow ProtocolNotified CHARLENE SANCHEZ MD   
   
                                                            Performed By: #### 8  
2948 ####NURSING GLUCOSE [id number]   
[place], [address]   
   
                      Glucose [Mass/Vol] 118 mg/dL  High            The   
[facility]  
   
                                        Comment on above:   Performed By: #### 8  
2948 ####NURSING GLUCOSE [id number]   
[place], [address]   
   
                      Glucose [Mass/Vol] 129 mg/dL  High            The   
[facility]  
   
                                        Comment on above:   Performed By: #### 8  
[id number] ####NURSING GLUCOSE [id number]   
[place], [address]   
   
                                                    MAGNESIUMon 2025   
   
                      Magnesium [Mass/Vol] 2.4 mg/dL  Normal     1.9-2.7    The   
[facility]   
[facility]  
   
                                        Comment on above:   Performed By: #### M  
SARAH CH8 ####S PATHOLOGY [id number]   
[place], [place],    
   
                      Magnesium [Mass/Vol] 2.2 mg/dL  Normal     1.9-2.7    The   
[facility]   
System  
   
                                        Comment on above:   Performed By: #### RADHA PAGE, PHOS, MG ####[facility]   
[id number] [place],    
   
                                                    MANUAL DIFF AND MORPHon    
   
                      ANC        9.63 K/uL  Normal                The   
[facility]   
System  
   
                                        Comment on above:   Performed By: #### ONEIL BECERRA ####S PATHOLOGY   
[id number] [place],    
   
                      ANISOCYTOSIS Slight     Normal                The   
[facility]   
System  
   
                                        Comment on above:   Performed By: #### ONEIL BECERRA ####S PATHOLOGY   
[id number] [place],    
   
                                                    BANDS % BY MANUAL   
COUNT           1 %             Normal          <=10            The   
[facility]   
System  
   
                                        Comment on above:   Performed By: ###ONEIL FLOR ####S PATHOLOGY   
[id number] [place],    
   
                                                    BANDS ABS BY MANUAL   
COUNT           0.11 K/uL       High            <0.01           The   
[facility]   
System  
   
                                        Comment on above:   Performed By: ###ONEIL FLOR ####S PATHOLOGY   
[id number] [place],    
   
                      LUIS CELLS Few        Normal                The   
[facility]   
System  
   
                                        Comment on above:   Performed By: ###ONEIL FLOR ####S PATHOLOGY   
[id number] [place],    
   
                                                    CELLS COUNTED TOTAL #   
IN BLOOD        100             Normal                          The   
[facility]   
System  
   
                                        Comment on above:   Performed By: #### ONEIL BECERRA ####[facility]   
[id number] [place],    
   
                                                    EOSINOPHILS % BY   
MANUAL COUNT    2.0 %           Normal          0.1-4.0         The   
[facility]   
System  
   
                                        Comment on above:   Performed By: #### ONEIL BECERRA ####[facility]   
[id number] [place],    
   
                                                    EOSINOPHILS ABS BY   
MANUAL COUNT    0.22 K/uL       Normal          0.00-0.70       The   
[facility]   
System  
   
                                        Comment on above:   Performed By: #### RADHA GO MDIFF ####S PATHOLOGY   
[id number] [place],    
   
                      FRAGMENTED RBC Few        Normal                The   
[facility]   
System  
   
                                        Comment on above:   Performed By: #### ONEIL BECERRA ####S PATHOLOGY   
[id number] [place],    
   
                                                    LYMPHOCYTES % BY   
MANUAL COUNT    10.0 %          Low             24.0-44.0       The   
[facility]   
System  
   
                                        Comment on above:   Performed By: #### ONEIL BECERRA ####S PATHOLOGY   
[id number] [place],    
   
                                                    LYMPHOCYTES ABS BY   
MANUAL COUNT    1.12 K/uL       Normal          1.00-4.80       The   
[facility]   
System  
   
                                        Comment on above:   Performed By: #### ONEIL BECERRA ####[facility] PATHOLOGY   
[id number] [place],    
   
                                                    MONOCYTES % BY MANUAL   
COUNT           2.0 %           Normal          2.0-11.0        The   
[facility]   
System  
   
                                        Comment on above:   Performed By: ###ONEIL FLOR ####[facility] PATHOLOGY   
[id number] [place],    
   
                                                    MONOCYTES ABS BY   
MANUAL COUNT    0.22 K/uL       Normal          0.20-1.00       The   
[facility]   
System  
   
                                        Comment on above:   Performed By: ###ONEIL FLOR ####[facility] PATHOLOGY   
[id number] [place],    
   
                                                    NEUTROPHILS % BY   
MANUAL COUNT    85.0 %          High            31.0-76.0       The   
[facility]   
System  
   
                                        Comment on above:   Performed By: ###ONEIL FLOR ####S PATHOLOGY   
[id number] [place],    
   
                                                    NEUTROPHILS ABS BY   
MANUAL COUNT    9.52 K/uL       High            1.50-8.00       The   
[facility]   
System  
   
                                        Comment on above:   Performed By: ###ONEIL FLOR ####S PATHOLOGY   
[id number] [place],    
   
                      OVALOCYTES Few        Normal                The   
[facility]   
System  
   
                                        Comment on above:   Performed By: ###ONEIL FLOR ####S PATHOLOGY   
[id number] [place],    
   
                                                    PHOSPHORUSon 2025   
   
                      Phosphate [Mass/Vol] 6.3 mg/dL  High       2.5-5.0    The   
[facility]   
[facility]  
   
                                        Comment on above:   Performed By: #### RADHA  
H8, PHOS, MG ####[facility]   
[id number] [place],    
   
                                                    Progress Noteson 2025   
   
                                                    Transcription   
Authentication   
Interface Message   
Text                                    ../JON Rangel   
notified of critical   
pH value of 7.110.   
/JON Rangel  
read back critical   
results. New orders   
received.           Normal                                  The   
[facility]   
System  
   
                                                    Transcription   
Authentication   
Interface Message   
Text                                    ../JON De Leon   
notified of critical   
pH, Arterial value of   
7.115. /JON Gardner read back   
critical results. New   
orders received.    Normal                                  The   
[facility]   
System  
   
                                                    Transcription   
Authentication   
Interface Message   
Text                            Normal                          The   
[facility]   
System  
   
                                                    Transcription   
Authentication   
Interface Message   
Text                            Normal                          The   
[facility]   
System  
   
                                                    Transcription   
Authentication   
Interface Message   
Text                            Normal                          The   
[facility]   
[facility]  
   
                                                    Transcription   
Authentication   
Interface Message   
Text                            Normal                          The   
[facility]   
System  
   
                                                    XR CHEST AP OR PA 1 VIEWon 0  
2025   
   
                                                    XR CHEST AP OR PA 1   
VIEW                            Normal                          The   
[facility]   
System  
   
                                                    BASIC METABOLIC PANELon    
   
                      Anion gap [Moles/Vol] 13 mmol/L  Normal     10-20      The  
   
[facility]   
[facility]  
   
                                        Comment on above:   Performed By: #### C  
H8, MG, PHOS, TSH HS ####[facility]   
[id number] [place],    
   
                      Calcium [Mass/Vol] 7.9 mg/dL  Low        8.6-10.3   The   
[facility]   
[facility]  
   
                                        Comment on above:   Performed By: #### RADHA  
H8, MG, PHOS, TSH HS ####[facility]   
[id number] [place],    
   
                      Chloride [Moles/Vol] 106 mmol/L Normal          The   
[facility]   
[facility]  
   
                                        Comment on above:   Performed By: #### RADHA  
H8, MG, PHOS, TSH HS ####[facility]   
[id number] [place],    
   
                      CO2 [Moles/Vol] 20 mmol/L  Low        21-31      The   
[facility]   
[facility]  
   
                                        Comment on above:   Performed By: #### C  
H8, MG, PHOS, TSH HS ####[facility]   
[id number] [place],    
   
                      Creatinine [Mass/Vol] 3.95 mg/dL High       0.60-1.20  The  
   
[facility]   
[facility]  
   
                                        Comment on above:   Performed By: #### C  
H8, MG, PHOS, TSH HS ####[facility]   
[id number] [place],    
   
                                                    ESTIMATED GFR   
(CKD-EPI)       13 mL/min/1.73sqm Low             >=60            The   
[facility]   
[facility]  
   
                                        Comment on above:   Result Comment:   
 CKD EPI Equation using Creatinine without  
   
RaceComment: Estimated glomerular filtration rate (eGFR) is   
calculated without a race coefficient. Values should be   
interpreted in the context of the patient's full clinical   
presentation.Reference:1. Alfredo C, Delmi M, Valdez FLYNN, et al..   
A Unifying Approach for GFR Estimation: Recommendations of the   
NKF-ASN Task Force on Reassessing the Inclusion of Race in   
Diagnosing Kidney Disease. American Journal of Kidney Diseases   
202;79(2):268-88.e1.2. N Engl J Med 1 Vol. 385 Issue 19   
Pages 0412-4857   
   
                                                            Performed By: #### C  
H8, MG, PHOS, TSH HS ####[facility]   
[id number] [place],    
   
                      Glucose [Mass/Vol] 145 mg/dL  High            The   
[facility]   
[facility]  
   
                                        Comment on above:   Performed By: #### C  
H8, MG, PHOS, TSH HS ####[facility]   
[id number] [place],    
   
                      Potassium [Moles/Vol] 4.3 mmol/L Normal     3.5-5.0    The  
   
[facility]   
[facility]  
   
                                        Comment on above:   Performed By: #### C  
H8, MG, PHOS, TSH HS ####[facility]   
[id number] [place],    
   
                      Sodium [Moles/Vol] 135 mmol/L Low        136-145    The   
[facility]   
[facility]  
   
                                        Comment on above:   Performed By: #### C  
H8, MG, PHOS, TSH HS ####[facility]   
[id number] [place],    
   
                                                    Urea nitrogen   
[Mass/Vol]      53 mg/dL        High            7-25            The   
[facility]   
System  
   
                                        Comment on above:   Performed By: #### C  
H8, MG, PHOS, TSH HS ####[facility]   
[id number] [place],    
   
                      Anion gap [Moles/Vol] 16 mmol/L  Normal     10-20      The  
   
[facility]   
[facility]  
   
                                        Comment on above:   Performed By: #### H  
EPATIC, MG, CH8, PHOS ####[facility]   
[id number] [place],    
   
                      Calcium [Mass/Vol] 7.7 mg/dL  Low        8.6-10.3   The   
[facility]   
[facility]  
   
                                        Comment on above:   Performed By: #### H  
EPATIC, MG, CH8, PHOS ####[facility]   
[id number] [place],    
   
                      Chloride [Moles/Vol] 106 mmol/L Normal          The   
[facility]   
System  
   
                                        Comment on above:   Performed By: #### H  
EPATIC, MG, CH8, PHOS ####[facility]   
[id number] [place],    
   
                      CO2 [Moles/Vol] 17 mmol/L  Low        21-31      The   
[facility]   
System  
   
                                        Comment on above:   Performed By: #### H  
EPATIC, MG, CH8, PHOS ####[facility]   
[id number] [place],    
   
                      Creatinine [Mass/Vol] 3.88 mg/dL High       0.60-1.20  The  
   
[facility]   
System  
   
                                        Comment on above:   Performed By: #### H  
EPATIC, MG, CH8, PHOS ####[facility]   
[id number] [place],    
   
                                                    ESTIMATED GFR   
(CKD-EPI)       13 mL/min/1.73sqm Low             >=60            The   
[facility]   
System  
   
                                        Comment on above:   Result Comment:   
 CKD EPI Equation using Creatinine without  
   
RaceComment: Estimated glomerular filtration rate (eGFR) is   
calculated without a race coefficient. Values should be   
interpreted in the context of the patient's full clinical   
presentation.Reference:1. Alfredo C, Delmi M, Valdez [place], et al..   
A Unifying Approach for GFR Estimation: Recommendations of the   
NKF-ASN Task Force on Reassessing the Inclusion of Race in   
Diagnosing Kidney Disease. American Journal of Kidney Diseases   
202;79(2):268-88.e1.2. N Engl J Med 1 Vol. 385 Issue 19   
Pages 8591-0246   
   
                                                            Performed By: #### H  
MG FABIOLA, CH8, PHOS ####[facility]   
[id number] [place],    
   
                      Glucose [Mass/Vol] 145 mg/dL  High            The   
[facility]   
[facility]  
   
                                        Comment on above:   Performed By: #### H  
FABIOLA MG, CH8, PHOS ####[facility]   
[id number] [place],    
   
                      Potassium [Moles/Vol] 4.4 mmol/L Normal     3.5-5.0    The  
   
[facility]   
[facility]  
   
                                        Comment on above:   Performed By: #### H  
FABIOLA MG, CH8, PHOS ####[facility]   
[id number] [place],    
   
                      Sodium [Moles/Vol] 135 mmol/L Low        136-145    The   
[facility]   
[facility]  
   
                                        Comment on above:   Performed By: #### H  
FABIOLA MG, CH8, PHOS ####[facility]   
[id number] [place],    
   
                                                    Urea nitrogen   
[Mass/Vol]      55 mg/dL        High            7-25            The   
[facility]   
[facility]  
   
                                        Comment on above:   Performed By: #### H  
EPABRADY MG, CH8, PHOS ####[facility]   
[id number] [place],    
   
                                                    BLOOD CULTUREon 01-   
   
                                                    Bacteria identified   
Cx Nom (Bld)                            C BLOOD:  
  
No Growth           Normal                                  The   
[facility]   
[facility]  
   
                                        Comment on above:   Performed By: #### C  
 BLOOD ####[facility] [id number]   
[place], [place]   
   
                                                    Blood Attestationon 01-10-20  
25   
   
                                                    Transcription   
Authentication   
Interface Message   
Text                            Normal                          The   
[facility]   
[facility]  
   
                                                    CBC WITH DIFFERENTIALon    
   
                                                    Basophils (Bld)   
[#/Vol]         0.03 10*3/uL    Normal          0.00-0.20       The   
[facility]   
[facility]  
   
                                        Comment on above:   Performed By: #### C  
RUSSELAT ####[facility] PATHOLOGY [id number] [place] [address],    
   
                                                    Basophils/100 WBC   
(Bld)           0.2 %           Normal          <=1.9           The   
[facility]   
[facility]  
   
                                        Comment on above:   Performed By: #### C  
RUSSELAT ####[facility] PATHOLOGY [id number] [place] [address],    
   
                                                    Eosinophils (Bld)   
[#/Vol]         0.14 10*3/uL    Normal          0.00-0.70       The   
[facility]   
[facility]  
   
                                        Comment on above:   Performed By: #### RADHA GOODEAT ####[facility] PATHOLOGY [id number] [place] [address],    
   
                                                    Eosinophils/100 WBC   
(Bld)           1.4 %           Normal          0.1-4.0         The   
[facility]   
[facility]  
   
                                        Comment on above:   Performed By: #### RADHA GOODEAT ####[facility] PATHOLOGY [id number] [place] [address],    
   
                                                    Erythrocyte   
distribution width   
(RBC) [Ratio]   17.4 %          High            11.5-14.5       The   
[facility]   
[facility]  
   
                                        Comment on above:   Performed By: #### RADHA GOODEAT ####[facility] PATHOLOGY [id number] [place] [address],    
   
                                                    Hematocrit (Bld)   
[Volume fraction] 28.1 %          Low             36.0-46.0       The   
[facility]   
[facility]  
   
                                        Comment on above:   Performed By: #### C  
RUSSELAT ####[facility] PATHOLOGY [id number] [place] [address],    
   
                                                    Hemoglobin (Bld)   
[Mass/Vol]      8.9 g/dL        Low             12.0-15.0       The   
[facility]   
[facility]  
   
                                        Comment on above:   Performed By: #### RADHA GOODEAT ####[facility] PATHOLOGY [id number]   
[place],    
   
                                                    Lymphocytes (Bld)   
[#/Vol]         1.11 10*3/uL    Normal          1.00-4.80       The   
[facility]   
[facility]  
   
                                        Comment on above:   Performed By: #### C  
RUSSELAT ####[facility] PATHOLOGY [id number]   
[place],    
   
                                                    Lymphocytes/100 WBC   
(Bld)           10.5 %          Low             24.0-44.0       The   
[facility]   
[facility]  
   
                                        Comment on above:   Performed By: #### C  
BCDSAT ####[facility] PATHOLOGY [id number]   
[place],    
   
                                                    MCH (RBC) [Entitic   
mass]           26.5 pg         Normal          26.0-34.0       The   
[facility]   
[facility]  
   
                                        Comment on above:   Performed By: #### C  
BCDSAT ####[facility] PATHOLOGY [id number]   
[place],    
   
                      MCHC (RBC) [Mass/Vol] 31.6 g/dL  Low        32.0-35.9  The  
   
[facility]   
System  
   
                                        Comment on above:   Performed By: #### C  
BCDSAT ####[facility] PATHOLOGY [id number]   
[place],    
   
                                                    MCV (RBC) [Entitic   
vol]            84 fL           Normal                    The   
[facility]   
[facility]  
   
                                        Comment on above:   Performed By: #### C  
BCDSAT ####[facility] PATHOLOGY [id number]   
[place],    
   
                                                    Monocytes (Bld)   
[#/Vol]         0.52 10*3/uL    Normal          0.20-1.00       The   
[facility]   
[facility]  
   
                                        Comment on above:   Performed By: #### C  
BCDSAT ####[facility] PATHOLOGY [id number]   
[place],    
   
                                                    Monocytes/100 WBC   
(Bld)           4.9 %           Normal          2.0-11.0        The   
[facility]   
System  
   
                                        Comment on above:   Performed By: #### C  
BCDSAT ####[facility] PATHOLOGY [id number]   
[place],    
   
                                                    Neutrophils (Bld)   
[#/Vol]         8.77 10*3/uL    High            1.50-8.00       The   
[facility]   
[facility]  
   
                                        Comment on above:   Performed By: #### C  
BCDSAT ####[facility] PATHOLOGY [id number]   
[place],    
   
                                                    Neutrophils/100 WBC   
(Bld)           83.0 %          High            31.0-76.0       The   
[facility]   
System  
   
                                        Comment on above:   Performed By: #### C  
BCDSAT ####[facility] PATHOLOGY [id number]   
[place],    
   
                                                    Platelet mean volume   
(Bld) [Entitic vol] 8.9 fL          Normal          7.5-11.2        The   
[facility]   
System  
   
                                        Comment on above:   Performed By: #### C  
BCDSAT ####[facility] PATHOLOGY [id number]   
[place],    
   
                                                    Platelets (Bld)   
[#/Vol]         191 10*3/uL     Normal          150-400         The   
[facility]   
System  
   
                                        Comment on above:   Performed By: #### C  
BCDSAT ####[facility] PATHOLOGY [id number] [place] [address],    
   
                      RBC (Bld) [#/Vol] 3.35 10*6/uL Low        4.00-5.20  The   
[facility]   
System  
   
                                        Comment on above:   Performed By: #### C  
BCDSAT ####[facility] PATHOLOGY [id number] [place] [address],    
   
                      WBC (Bld) [#/Vol] 10.6 10*3/uL Normal     4.5-11.5   The   
[facility]   
System  
   
                                        Comment on above:   Performed By: #### C  
BCDSAT ####[facility] PATHOLOGY [id number] [place] [address],    
   
                                                    Basophils (Bld)   
[#/Vol]         0.03 10*3/uL    Normal          0.00-0.20       The   
[facility]   
System  
   
                                        Comment on above:   Performed By: #### C  
BCDSAT ####[facility] PATHOLOGY [id number] [place] [address],    
   
                                                    Basophils/100 WBC   
(Bld)           0.2 %           Normal          <=1.9           The   
[facility]   
System  
   
                                        Comment on above:   Performed By: #### C  
BCDSAT ####[facility] PATHOLOGY [id number] [place] [address],    
   
                                                    Eosinophils (Bld)   
[#/Vol]         0.09 10*3/uL    Normal          0.00-0.70       The   
[facility]   
System  
   
                                        Comment on above:   Performed By: #### RADHA  
BCDSAT ####[facility] PATHOLOGY [id number] [place] [address],    
   
                                                    Eosinophils/100 WBC   
(Bld)           0.7 %           Normal          0.1-4.0         The   
[facility]   
[facility]  
   
                                        Comment on above:   Performed By: #### C  
RUSSELAT ####[facility] PATHOLOGY [id number] [place] [address],    
   
                                                    Erythrocyte   
distribution width   
(RBC) [Ratio]   17.3 %          High            11.5-14.5       The   
[facility]   
[facility]  
   
                                        Comment on above:   Performed By: #### C  
RUSSELAT ####[facility] PATHOLOGY [id number] [place] [address],    
   
                                                    Hematocrit (Bld)   
[Volume fraction] 27.5 %          Low             36.0-46.0       The   
[facility]   
[facility]  
   
                                        Comment on above:   Performed By: #### C  
RUSSELAT ####[facility] PATHOLOGY [id number] [place] [address],    
   
                                                    Hemoglobin (Bld)   
[Mass/Vol]      8.3 g/dL        Low             12.0-15.0       The   
[facility]  
   
                                        Comment on above:   Performed By: #### C  
RUSSELAT ####[facility] PATHOLOGY [id number] [place] [address],    
   
                                                    Lymphocytes (Bld)   
[#/Vol]         1.25 10*3/uL    Normal          1.00-4.80       The   
[facility]   
[facility]  
   
                                        Comment on above:   Performed By: #### C  
BCPOLOAT ####[facility] PATHOLOGY [id number]   
[place], [place],    
   
                                                    Lymphocytes/100 WBC   
(Bld)           9.8 %           Low             24.0-44.0       The   
[facility]   
[facility]  
   
                                        Comment on above:   Performed By: #### C  
BCDSAT ####[facility] PATHOLOGY [id number]   
[place], [place],    
   
                                                    MCH (RBC) [Entitic   
mass]           25.3 pg         Low             26.0-34.0       The   
[facility]   
[facility]  
   
                                        Comment on above:   Performed By: #### C  
BCDSAT ####[facility] PATHOLOGY [id number] [place] [address],    
   
                      MCHC (RBC) [Mass/Vol] 30.2 g/dL  Low        32.0-35.9  The  
   
[facility]   
[facility]  
   
                                        Comment on above:   Performed By: #### C  
BCDSAT ####[facility] PATHOLOGY [id number]   
[place],    
   
                                                    MCV (RBC) [Entitic   
vol]            84 fL           Normal                    The   
[facility]   
System  
   
                                        Comment on above:   Performed By: #### C  
BCDSAT ####[facility] PATHOLOGY [id number]   
[place],    
   
                                                    Monocytes (Bld)   
[#/Vol]         0.52 10*3/uL    Normal          0.20-1.00       The   
[facility]   
System  
   
                                        Comment on above:   Performed By: #### C  
BCDSAT ####[facility] PATHOLOGY [id number]   
[place],    
   
                                                    Monocytes/100 WBC   
(Bld)           4.1 %           Normal          2.0-11.0        The   
[facility]   
System  
   
                                        Comment on above:   Performed By: #### C  
BCDSAT ####[facility] PATHOLOGY [id number]   
[place],    
   
                                                    Neutrophils (Bld)   
[#/Vol]         10.89 10*3/uL   High            1.50-8.00       The   
[facility]   
System  
   
                                        Comment on above:   Performed By: #### C  
BCDSAT ####[facility] PATHOLOGY [id number]   
[place],    
   
                                                    Neutrophils/100 WBC   
(Bld)           85.2 %          High            31.0-76.0       The   
[facility]   
System  
   
                                        Comment on above:   Performed By: #### C  
BCDSAT ####[facility] PATHOLOGY [id number]   
[place],    
   
                                                    Platelet mean volume   
(Bld) [Entitic vol] 8.8 fL          Normal          7.5-11.2        The   
[facility]   
System  
   
                                        Comment on above:   Performed By: #### C  
BCDSAT ####[facility] PATHOLOGY [id number]   
[place],    
   
                                                    Platelets (Bld)   
[#/Vol]         215 10*3/uL     Normal          150-400         The   
[facility]   
System  
   
                                        Comment on above:   Performed By: #### C  
BCDSAT ####[facility] PATHOLOGY [id number]   
[place],    
   
                      RBC (Bld) [#/Vol] 3.28 10*6/uL Low        4.00-5.20  The   
[facility]   
[facility]  
   
                                        Comment on above:   Performed By: #### C  
BCDSAT ####[facility] PATHOLOGY [id number]   
[place],    
   
                      WBC (Bld) [#/Vol] 12.8 10*3/uL High       4.5-11.5   [facility]   
[facility]  
   
                                        Comment on above:   Performed By: #### C  
BCDSAT ####[facility] PATHOLOGY [id number] [place] [address],    
   
                                                    Erythrocyte   
distribution width   
(RBC) [Ratio]   17.7 %          High            11.5-14.5       The   
[facility]  
   
                                        Comment on above:   Performed By: #### M  
DIFF CBCDSAT ####[facility] PATHOLOGY   
[id number] [place] [address],    
   
                                                    Hematocrit (Bld)   
[Volume fraction] 26.2 %          Low             36.0-46.0       The   
[facility]   
System  
   
                                        Comment on above:   Performed By: #### M  
DIFF CBCDSAT ####[facility] PATHOLOGY   
[id number] [place] [address],    
   
                                                    Hemoglobin (Bld)   
[Mass/Vol]      8.2 g/dL        Low             12.0-15.0       The   
[facility]  
   
                                        Comment on above:   Performed By: #### M  
DIFF CBCDSAT ####[facility] PATHOLOGY   
[id number] [place] [address],    
   
                                                    MCH (RBC) [Entitic   
mass]           26.5 pg         Normal          26.0-34.0       The   
[facility]   
System  
   
                                        Comment on above:   Performed By: #### M  
DIFF, CBCDSAT ####[facility] PATHOLOGY   
[id number] [place] [address],    
   
                      MCHC (RBC) [Mass/Vol] 31.4 g/dL  Low        32.0-35.9  The  
   
[facility]  
   
                                        Comment on above:   Performed By: #### M  
DIFF, CBCDSAT ####[facility] PATHOLOGY   
[id number] [place] [address],    
   
                                                    MCV (RBC) [Entitic   
vol]            85 fL           Normal                    The   
[facility]  
   
                                        Comment on above:   Performed By: #### M  
DIFF, CBCDSAT ####[facility] PATHOLOGY   
[id number] [place],    
   
                                                    Platelet mean volume   
(Bld) [Entitic vol] 9.1 fL          Normal          7.5-11.2        The   
[facility]   
System  
   
                                        Comment on above:   Performed By: #### M  
DIFF, CBCDSAT ####[facility] PATHOLOGY   
[id number] [place],    
   
                                                    Platelets (Bld)   
[#/Vol]         216 10*3/uL     Normal          150-400         The   
[facility]   
System  
   
                                        Comment on above:   Performed By: #### M  
DIFF, CBCDSAT ####[facility] PATHOLOGY   
[id number] [place],    
   
                      RBC (Bld) [#/Vol] 3.10 10*6/uL Low        4.00-5.20  The   
[facility]   
System  
   
                                        Comment on above:   Performed By: #### M  
DIFF, CBCDSAT ####[facility] PATHOLOGY   
[id number] [place] [address],    
   
                      WBC (Bld) [#/Vol] 13.4 10*3/uL High       4.5-11.5   The   
[facility]   
System  
   
                                        Comment on above:   Performed By: #### M  
DIFF, CBCDSAT ####[facility] PATHOLOGY   
[id number] [place],    
   
                                                    Basophils (Bld)   
[#/Vol]         0.05 10*3/uL    Normal          0.00-0.20       The   
[facility]   
System  
   
                                        Comment on above:   Performed By: #### C  
BCDSAT ####[facility] PATHOLOGY [id number]   
[place],    
   
                                                    Basophils/100 WBC   
(Bld)           0.4 %           Normal          <=1.9           The   
[facility]   
System  
   
                                        Comment on above:   Performed By: #### C  
BCDSAT ####[facility] PATHOLOGY [id number]   
[place],    
   
                                                    Eosinophils (Bld)   
[#/Vol]         0.02 10*3/uL    Normal          0.00-0.70       The   
[facility]   
System  
   
                                        Comment on above:   Performed By: #### C  
BCDSAT ####[facility] PATHOLOGY [id number]   
[place],    
   
                                                    Eosinophils/100 WBC   
(Bld)           0.1 %           Normal          0.1-4.0         The   
[facility]  
   
                                        Comment on above:   Performed By: #### C  
RUSSELAT ####[facility] PATHOLOGY [id number] [place] [address],    
   
                                                    Erythrocyte   
distribution width   
(RBC) [Ratio]   17.6 %          High            11.5-14.5       The   
[facility]  
   
                                        Comment on above:   Performed By: #### C  
RUSSELAT ####[facility] PATHOLOGY [id number] [place] [address],    
   
                                                    Hematocrit (Bld)   
[Volume fraction] 27.6 %          Low             36.0-46.0       The   
[facility]  
   
                                        Comment on above:   Performed By: #### C  
RUSSELAT ####[facility] PATHOLOGY [id number] [place] [address],    
   
                                                    Hemoglobin (Bld)   
[Mass/Vol]      8.2 g/dL        Low             12.0-15.0       The   
[facility]   
[facility]  
   
                                        Comment on above:   Performed By: #### C  
RUSSELAT ####[facility] PATHOLOGY [id number] [place] [address],    
   
                                                    Lymphocytes (Bld)   
[#/Vol]         1.23 10*3/uL    Normal          1.00-4.80       The   
[facility]  
   
                                        Comment on above:   Performed By: #### RADHA GOODEAT ####[facility] PATHOLOGY [id number] [place] [address],    
   
                                                    Lymphocytes/100 WBC   
(Bld)           9.3 %           Low             24.0-44.0       The   
[facility]  
   
                                        Comment on above:   Performed By: #### C  
RUSSELAT ####S PATHOLOGY [id number] [place] [address],    
   
                                                    MCH (RBC) [Entitic   
mass]           25.2 pg         Low             26.0-34.0       The   
[facility]   
System  
   
                                        Comment on above:   Performed By: #### C  
RUSSELAT ####[facility] PATHOLOGY [id number] [place] [address],    
   
                      MCHC (RBC) [Mass/Vol] 29.9 g/dL  Low        32.0-35.9  The  
   
[facility]  
   
                                        Comment on above:   Performed By: #### RADHA GOODEAT ####S PATHOLOGY [id number]   
[place],    
   
                                                    MCV (RBC) [Entitic   
vol]            85 fL           Normal                    The   
[facility]  
   
                                        Comment on above:   Performed By: #### C  
BCDSAT ####[facility] PATHOLOGY [id number]   
[place],    
   
                                                    Monocytes (Bld)   
[#/Vol]         0.56 10*3/uL    Normal          0.20-1.00       The   
[facility]  
   
                                        Comment on above:   Performed By: #### C  
RUSSELAT ####[facility] PATHOLOGY [id number]   
[place],    
   
                                                    Monocytes/100 WBC   
(Bld)           4.2 %           Normal          2.0-11.0        The   
[facility]  
   
                                        Comment on above:   Performed By: #### C  
RUSSELAT ####[facility] PATHOLOGY [id number] [place] [address],    
   
                                                    Neutrophils (Bld)   
[#/Vol]         11.41 10*3/uL   High            1.50-8.00       The   
[facility]  
   
                                        Comment on above:   Performed By: #### C  
RUSSELAT ####[facility] PATHOLOGY [id number] [place] [address],    
   
                                                    Neutrophils/100 WBC   
(Bld)           86.0 %          High            31.0-76.0       The   
[facility]  
   
                                        Comment on above:   Performed By: #### RADHA  
BCDSAT ####[facility] PATHOLOGY [id number]   
[place],    
   
                                                    Platelet mean volume   
(Bld) [Entitic vol] 9.0 fL          Normal          7.5-11.2        The   
[facility]  
   
                                        Comment on above:   Performed By: #### C  
BCDSAT ####[facility] PATHOLOGY [id number]   
[place],    
   
                                                    Platelets (Bld)   
[#/Vol]         199 10*3/uL     Normal          150-400         The   
[facility]  
   
                                        Comment on above:   Performed By: #### C  
BCDSAT ####[facility] PATHOLOGY [id number]   
[place],    
   
                      RBC (Bld) [#/Vol] 3.27 10*6/uL Low        4.00-5.20  The   
[facility]  
   
                                        Comment on above:   Performed By: #### C  
BCDSAT ####[facility] PATHOLOGY [id number]   
[place],    
   
                      WBC (Bld) [#/Vol] 13.3 10*3/uL High       4.5-11.5   The   
[facility]   
System  
   
                                        Comment on above:   Performed By: #### C  
BCDSAT ####[facility] PATHOLOGY [id number]   
[place],    
   
                                                    CONFIRMATION ABO/RHon 01-10-  
2025   
   
                                                    ABO and Rh group Nom   
(Bld)                                   Blood group A Rh(D)   
negative            Normal                                  The   
[facility]   
System  
   
                                        Comment on above:   Performed By: #### A  
EMELYNH ####[facility] PATHOLOGY [id number]   
[place],    
   
                                                    Care Plan Noteon 01-   
   
                                                    Transcription   
Authentication   
Interface Message   
Text                                    Called [facility] [phone]   
for culture results.  
  
Blood c/s: NGTD  
Urine c/s +ve:   
Proteus; pansensitive.  
  
  
Cheng Blackman MD   
(She/Her)  
PGY-4, Internal   
Medicine/Pediatrics  
Pager: [phone]     Normal                                  The   
[facility]   
System  
   
                                                    Consultson 01-   
   
                                                    Transcription   
Authentication   
Interface Message   
Text                            Normal                          The   
[facility]   
System  
   
                                                    Transcription   
Authentication   
Interface Message   
Text                            Normal                          The   
[facility]   
System  
   
                                                    Transcription   
Authentication   
Interface Message   
Text                            Normal                          The   
[facility]   
System  
   
                                                    Transcription   
Authentication   
Interface Message   
Text                            Normal                          The   
[facility]   
System  
   
                                                    Transcription   
Authentication   
Interface Message   
Text                            Normal                          The   
[facility]   
System  
   
                                                    Transcription   
Authentication   
Interface Message   
Text                            Normal                          The   
[facility]   
System  
   
                                                    GLUCOSE, FINGERSTICK-IN OFFI  
CEon 01-   
   
                      Glucose [Mass/Vol] 132 mg/dL  High            The   
[facility]   
System  
   
                                        Comment on above:   Performed By: #### 8  
2948 ####NURSING GLUCOSE [id number]   
[place], [address]   
   
                      Glucose [Mass/Vol] 125 mg/dL  High            The   
[facility]   
System  
   
                                        Comment on above:   Performed By: #### 8  
2948 ####NURSING GLUCOSE [id number]   
[place], [address]   
   
                      Glucose [Mass/Vol] 167 mg/dL  High            The   
[facility]   
System  
   
                                        Comment on above:   Performed By: #### 8  
2948 ####NURSING GLUCOSE [id number]   
[place], [address]   
   
                      Glucose [Mass/Vol] 154 mg/dL  High            The   
[facility]   
System  
   
                                        Comment on above:   Performed By: #### 8  
2948 ####NURSING GLUCOSE [id number]   
[place], [address]   
   
                      Glucose [Mass/Vol] 170 mg/dL  High            The   
[facility]   
System  
   
                                        Comment on above:   Performed By: #### 8  
2948 ####NURSING GLUCOSE [id number]   
[place], [address]   
   
                                                    H AND Patricio 01-   
   
                                                    Transcription   
Authentication   
Interface Message   
Text                            Normal                          The   
[facility]   
System  
   
                                                    Transcription   
Authentication   
Interface Message   
Text                            Normal                          The   
[facility]   
System  
   
                                                    Transcription   
Authentication   
Interface Message   
Text                            Normal                          The   
[facility]   
System  
   
                                                    HEPATIC FUNCTION PANELon 01-  
   
   
                      Albumin [Mass/Vol] 2.6 g/dL   Low        3.5-5.7    The   
[facility]   
System  
   
                                        Comment on above:   Performed By: #### H  
MG FABIOLA, CH8, PHOS ####[facility]   
[id number] [place] [address],    
   
                      ALK        55 IU/L    Normal          The   
[facility]   
System  
   
                                        Comment on above:   Performed By: #### H  
FABIOLA MG, CH8, PHOS ####[facility]   
[id number] [place] [address],    
   
                                                    ALT [Catalytic   
activity/Vol]   7 U/L           Normal          7-52            The   
[facility]   
System  
   
                                        Comment on above:   Performed By: #### H  
FABIOLA MG, CH8, PHOS ####[facility]   
[id number] [place], [place],    
   
                                                    AST [Catalytic   
activity/Vol]   13 U/L          Normal          13-39           The   
[facility]   
System  
   
                                        Comment on above:   Performed By: #### MARGARET HICKS MG, CH8, PHOS ####[facility]   
[id number] [place], [place],    
   
                      Bilirubin [Mass/Vol] 0.3 mg/dL  Normal     0.3-1.0    The   
[facility]   
System  
   
                                        Comment on above:   Performed By: #### H  
FABIOLA MG, CH8, PHOS ####[facility]   
[id number] [place],    
   
                                                    Bilirubin.direct   
[Mass/Vol]      0.10 mg/dL      Normal          0.03-0.18       The   
[facility]  
   
                                        Comment on above:   Performed By: #### H  
MG FABIOLA, IRON8, PHOS ####S PATHOLOGY   
[id number] [place],    
   
                      Protein [Mass/Vol] 5.7 g/dL   Low        6.0-8.3    The   
[facility]   
System  
   
                                        Comment on above:   Performed By: #### H  
MG FABIOLA, CH8, PHOS ####S PATHOLOGY   
[id number] [place],    
   
                                                    HIV 1 and 2 Ab and HIV 1 p24  
 Ag panel IAon 01-   
   
                      HIV AG-AB SCREEN Non-Reactive Normal     Non-Reactive The   
[facility]  
   
                                        Comment on above:   Order Comment: HIV I  
nformation: ???[place] Rev. code   
3701.243(E):This information has been disclosed to you from   
confidential records protected from disclosure by state law.   
???You shall make no further disclosure of this information   
without the specific, written, and informed release of the   
individual to whom it pertains, or as otherwise permitted by   
state law. ???A general authorization for the release of medical   
or other information is not sufficient for the purpose of the   
release of HIV test results or diagnoses.   
   
                                                            Result Comment: No l  
aboratory evidence for HIV Infection.   
Negative result does not rule out acute HIV infection. If acute   
HIV infection is suspected, recommend ordering an HIV-1 RNA   
quanitification test.   
   
                                                            Performed By: #### 8  
5037-0 ####[facility] PATHOLOGY [id number]   
[place], [place],    
   
                                                    LACTIC ACIDon 01-   
   
                      CR LACT    1.4 mmol/L Normal     0.5-1.6    The   
[facility]  
   
                                        Comment on above:   Order Comment: This   
test was developed, and its performance   
characteristics determined by the Department of Pathology of The   
[facility]. It has not been cleared or approved by the   
FDA. This test is used for clinical purposes only.   
   
                                                            Performed By: #### L  
ACT ####[facility] PATHOLOGY [id number]   
[place], [place],    
   
                                                    MAGNESIUMon 01-   
   
                      Magnesium [Mass/Vol] 2.3 mg/dL  Normal     1.9-2.7    The   
[facility]   
System  
   
                                        Comment on above:   Performed By: #### C  
H8, MG, PHOS, TSH HS ####S PATHOLOGY   
[id number] [place],    
   
                      Magnesium [Mass/Vol] 2.2 mg/dL  Normal     1.9-2.7    The   
[facility]   
System  
   
                                        Comment on above:   Performed By: #### H  
EPATIC, MG, CH8, PHOS ####[facility] PATHOLOGY   
[id number] [place],    
   
                                                    MANUAL DIFF AND MORPHon   
0   
   
                      ANC        11.52 K/uL Normal                The   
[facility]   
System  
   
                                        Comment on above:   Performed By: #### M  
DIFF, CBCDSAT ####[facility] PATHOLOGY   
[id number] [place] [address],    
   
                      ANISOCYTOSIS Slight     Normal                The   
[facility]   
System  
   
                                        Comment on above:   Performed By: #### M  
DIFF, CBCDSAT ####[facility] PATHOLOGY   
[id number] [place] [address],    
   
                                                    BANDS % BY MANUAL   
COUNT           6 %             Normal          <=10            The   
[facility]   
System  
   
                                        Comment on above:   Performed By: #### M  
DIFF, CBCDSAT ####[facility] PATHOLOGY   
[id number] [place] [address],    
   
                                                    BANDS ABS BY MANUAL   
COUNT           0.80 K/uL       High            <0.01           The   
[facility]   
System  
   
                                        Comment on above:   Performed By: #### M  
DIFF, CBCDSAT ####S PATHOLOGY   
[id number] [place],    
   
                      LUIS CELLS Few        Normal                The   
[facility]  
   
                                        Comment on above:   Performed By: #### M  
DIFF, CBCDSAT ####S PATHOLOGY   
[id number] [place],    
   
                                                    CELLS COUNTED TOTAL #   
IN BLOOD        100             Normal                          The   
[facility]   
System  
   
                                        Comment on above:   Performed By: #### M  
DIFF, CBCDSAT ####S PATHOLOGY   
[id number] [place] [address],    
   
                                                    LYMPHOCYTES % BY   
MANUAL COUNT    12.0 %          Low             24.0-44.0       The   
[facility]   
System  
   
                                        Comment on above:   Performed By: #### M  
DIFF, CBCDSAT ####S PATHOLOGY   
[id number] [place],    
   
                                                    LYMPHOCYTES ABS BY   
MANUAL COUNT    1.61 K/uL       Normal          1.00-4.80       The   
[facility]   
System  
   
                                        Comment on above:   Performed By: #### M  
DIFF, CBCDSAT ####S PATHOLOGY   
[id number] [place],    
   
                                                    MONOCYTES % BY MANUAL   
COUNT           2.0 %           Normal          2.0-11.0        The   
[facility]   
System  
   
                                        Comment on above:   Performed By: #### M  
DIFF, CBCDSAT ####[facility] PATHOLOGY   
[id number] [place],    
   
                                                    MONOCYTES ABS BY   
MANUAL COUNT    0.27 K/uL       Normal          0.20-1.00       The   
[facility]   
System  
   
                                        Comment on above:   Performed By: #### M  
DIFF, CBCDSAT ####[facility] PATHOLOGY   
[id number] [place],    
   
                                                    NEUTROPHILS % BY   
MANUAL COUNT    80.0 %          High            31.0-76.0       The   
[facility]   
System  
   
                                        Comment on above:   Performed By: #### M  
DIFF, CBCDSAT ####[facility] PATHOLOGY   
[id number] [place],    
   
                                                    NEUTROPHILS ABS BY   
MANUAL COUNT    10.72 K/uL      High            1.50-8.00       The   
[facility]   
System  
   
                                        Comment on above:   Performed By: #### M  
DIFF, CBCDSAT ####[facility] PATHOLOGY   
[id number] [place],    
   
                      OVALOCYTES Few        Normal                The   
[facility]   
System  
   
                                        Comment on above:   Performed By: #### M  
DIFF, CBCDSAT ####[facility] PATHOLOGY   
[id number] [place],    
   
                                                    METRO GLUCOSE, FINGERSTICK-I  
N OFFICEon 01-   
   
                      Glucose [Mass/Vol] 170 mg/dL  High       74 - 109 mg/dL NO  
[facility]  
   
                                                    Interpretation and   
review of laboratory   
results         Abnormal                                        [facility]  
   
                                                                  CLINISYNC  
   
                                                                  [facility]  
   
                                                    PARTIAL THROMBOPLASTIN TIMEo  
n 01-   
   
                                                    aPTT Coag (Bld)   
[Time]          28 s            Normal          25-37           The   
[facility]   
System  
   
                                        Comment on above:   Performed By: #### P  
T, APTT ####S PATHOLOGY [id number]   
[place],    
   
                                                    PHOSPHORUSon 01-   
   
                      Phosphate [Mass/Vol] 5.2 mg/dL  High       2.5-5.0    [facility]   
[facility]   
System  
   
                                        Comment on above:   Performed By: #### C  
H8, MG, PHOS, TSH HS ####[facility] PATHOLOGY   
[id number] [place],    
   
                      Phosphate [Mass/Vol] 5.3 mg/dL  High       2.5-5.0    The   
[facility]   
System  
   
                                        Comment on above:   Performed By: #### H  
EPATIC, MG, CH8, PHOS ####[facility] PATHOLOGY   
[id number] [place],    
   
                                                    PROTHROMBIN TIME AND INRon 0  
1-   
   
                                                    INR Coag (PPP)   
[Relative time] 1.15 {INR}      High            0.90-1.10       [facility]   
[facility]   
System  
   
                                        Comment on above:   Performed By: #### P  
T, APTT ####[facility] PATHOLOGY [id number] [place] [address],    
   
                      PT Coag (PPP) [Time] 12.9 s     Normal     9.7-12.9   The   
[facility]   
System  
   
                                        Comment on above:   Performed By: #### P  
T, APTT ####[facility] PATHOLOGY [id number] [place] [address],    
   
                                                    Post-Procedure Noteon 01-10-  
2025   
   
                                                    Transcription   
Authentication   
Interface Message   
Text                            Normal                          The   
[facility]   
System  
   
                                                    TSHon 01-   
   
                      TSH        1.162 uIU/mL Normal     0.450-5.330 [facility]   
[facility]   
System  
   
                                        Comment on above:   Result Comment: Refe  
bharathi range for pregnant women as   
applicable:First Trimester: 0. 050 to 3.700 uIU/mLSecond   
Trimester: 0. 310 to 4.350 uIU/mLThird Trimester: 0. 410 to   
5.180 uIU/mL   
   
                                                            Performed By: #### C  
H8, MG, PHOS, TSH HS ####S PATHOLOGY   
[id number] [place],    
   
                                                    TYPE AND SCREENon 01-  
   
   
                                                    ABO and Rh group Nom   
(Bld)                                   Blood group A Rh(D)   
negative            Normal                                  The   
[facility]   
System  
   
                                        Comment on above:   Performed By: #### T  
S ####S PATHOLOGY [id number]   
[place],    
   
                                                    ABO and Rh group Nom   
(Bld)           No Previous Results Normal                          The   
[facility]   
System  
   
                                        Comment on above:   Performed By: #### T  
S ####[facility] [id number]   
[place],    
   
                      ABSC INT   Negative   Normal                The   
[facility]   
System  
   
                                        Comment on above:   Performed By: #### T  
S ####S PATHOLOGY [id number]   
[place],    
   
                                                    URINALYSIS WITH REFLEX CULTU  
RE PERFORMABLEon 01-   
   
                      Glucose Ql (U) Negative   Normal     Negative   The   
[facility]   
System  
   
                                        Comment on above:   Order Comment: A neg  
ative leukocyte esterase AND negative   
nitrite test or absence of pyuria (urine WBC count <= 5-10) make   
a UTI (urinary tract infection) very unlikely in a   
non-neutropenic adult (<=5% likelihood in many studies). A   
positive leukocyte esterase, nitrite and/or pyuria is a   
nonspecific result. This can be seen in conditions other than a   
UTI e.g. asymptomatic bacteriuria, gynecologic infections,   
sexually transmitted infections, and noninfectious conditions   
(positive predictive value for UTI around 50%)   
   
                                                            Performed By: #### C  
 URINE ####[facility] [id number]   
[place], [place]#### urinalysiswcul   
####[facility] PATHOLOGY [id number] [place],   
   
   
                                                    Protein (U)   
[Mass/Vol]      300 mg/dL       Abnormal        Negative        The   
[facility]   
System  
   
                                        Comment on above:   Order Comment: A neg  
ative leukocyte esterase AND negative   
nitrite test or absence of pyuria (urine WBC count <= 5-10) make   
a UTI (urinary tract infection) very unlikely in a   
non-neutropenic adult (<=5% likelihood in many studies). A   
positive leukocyte esterase, nitrite and/or pyuria is a   
nonspecific result. This can be seen in conditions other than a   
UTI e.g. asymptomatic bacteriuria, gynecologic infections,   
sexually transmitted infections, and noninfectious conditions   
(positive predictive value for UTI around 50%)   
   
                                                            Performed By: #### C  
 URINE ####[facility] [id number]   
[place], [place]#### urinalysiswcul   
####S PATHOLOGY [id number] [place]   
   
                      SQUAMOUS EPITHELIAL 3-5        Normal     0-10       The   
[facility]   
System  
   
                                        Comment on above:   Order Comment: A neg  
ative leukocyte esterase AND negative   
nitrite test or absence of pyuria (urine WBC count <= 5-10) make   
a UTI (urinary tract infection) very unlikely in a   
non-neutropenic adult (<=5% likelihood in many studies). A   
positive leukocyte esterase, nitrite and/or pyuria is a   
nonspecific result. This can be seen in conditions other than a   
UTI e.g. asymptomatic bacteriuria, gynecologic infections,   
sexually transmitted infections, and noninfectious conditions   
(positive predictive value for UTI around 50%)   
   
                                                            Performed By: #### C  
 URINE ####[facility] [id number]   
[place], [place]#### urinalysiswcul   
####[facility] PATHOLOGY [id number] [place], [place],   
   
   
                      U APPEAR   Extra Turbid Normal     Clear      The   
[facility]   
System  
   
                                        Comment on above:   Order Comment: A neg  
ative leukocyte esterase AND negative   
nitrite test or absence of pyuria (urine WBC count <= 5-10) make   
a UTI (urinary tract infection) very unlikely in a   
non-neutropenic adult (<=5% likelihood in many studies). A   
positive leukocyte esterase, nitrite and/or pyuria is a   
nonspecific result. This can be seen in conditions other than a   
UTI e.g. asymptomatic bacteriuria, gynecologic infections,   
sexually transmitted infections, and noninfectious conditions   
(positive predictive value for UTI around 50%)   
   
                                                            Performed By: #### C  
 URINE ####[facility] [id number]   
[place], [place]#### urinalysiswcul   
####[facility] PATHOLOGY [id number] [place],   
   
   
                      U BACTERIA Few        Normal                The   
[facility]   
System  
   
                                        Comment on above:   Order Comment: A neg  
ative leukocyte esterase AND negative   
nitrite test or absence of pyuria (urine WBC count <= 5-10) make   
a UTI (urinary tract infection) very unlikely in a   
non-neutropenic adult (<=5% likelihood in many studies). A   
positive leukocyte esterase, nitrite and/or pyuria is a   
nonspecific result. This can be seen in conditions other than a   
UTI e.g. asymptomatic bacteriuria, gynecologic infections,   
sexually transmitted infections, and noninfectious conditions   
(positive predictive value for UTI around 50%)   
   
                                                            Performed By: #### C  
 URINE ####[facility] [id number]   
[place], [place]#### urinalysiswcul   
####S PATHOLOGY [id number] [place] [address]   
   
   
                      U BILI     Negative   Normal     Negative   The   
[facility]   
System  
   
                                        Comment on above:   Order Comment: A neg  
ative leukocyte esterase AND negative   
nitrite test or absence of pyuria (urine WBC count <= 5-10) make   
a UTI (urinary tract infection) very unlikely in a   
non-neutropenic adult (<=5% likelihood in many studies). A   
positive leukocyte esterase, nitrite and/or pyuria is a   
nonspecific result. This can be seen in conditions other than a   
UTI e.g. asymptomatic bacteriuria, gynecologic infections,   
sexually transmitted infections, and noninfectious conditions   
(positive predictive value for UTI around 50%)   
   
                                                            Performed By: #### C  
 URINE ####[facility] [id number] [place], [place] [address]#### urinalysiswcul   
####[facility] PATHOLOGY [id number] [place] [address]   
   
   
                      U BLOOD    Large      Abnormal   Negative   The   
[facility]   
System  
   
                                        Comment on above:   Order Comment: A neg  
ative leukocyte esterase AND negative   
nitrite test or absence of pyuria (urine WBC count <= 5-10) make   
a UTI (urinary tract infection) very unlikely in a   
non-neutropenic adult (<=5% likelihood in many studies). A   
positive leukocyte esterase, nitrite and/or pyuria is a   
nonspecific result. This can be seen in conditions other than a   
UTI e.g. asymptomatic bacteriuria, gynecologic infections,   
sexually transmitted infections, and noninfectious conditions   
(positive predictive value for UTI around 50%)   
   
                                                            Performed By: #### C  
 URINE ####[facility] [id number]   
[place], [place]#### urinalysiswcul   
####[facility] [id number] [place] [address]   
   
   
                      U COLOR    Light Orange Normal     Colorless  [facility]   
[facility]   
System  
   
                                        Comment on above:   Order Comment: A neg  
ative leukocyte esterase AND negative   
nitrite test or absence of pyuria (urine WBC count <= 5-10) make   
a UTI (urinary tract infection) very unlikely in a   
non-neutropenic adult (<=5% likelihood in many studies). A   
positive leukocyte esterase, nitrite and/or pyuria is a   
nonspecific result. This can be seen in conditions other than a   
UTI e.g. asymptomatic bacteriuria, gynecologic infections,   
sexually transmitted infections, and noninfectious conditions   
(positive predictive value for UTI around 50%)   
   
                                                            Performed By: #### C  
 URINE ####[facility] [id number]   
[place], [place]#### urinalysiswcul   
####S PATHOLOGY [id number] [place],   
   
   
                      U KETONE   Negative   Normal     Negative   The   
[facility]   
System  
   
                                        Comment on above:   Order Comment: A neg  
ative leukocyte esterase AND negative   
nitrite test or absence of pyuria (urine WBC count <= 5-10) make   
a UTI (urinary tract infection) very unlikely in a   
non-neutropenic adult (<=5% likelihood in many studies). A   
positive leukocyte esterase, nitrite and/or pyuria is a   
nonspecific result. This can be seen in conditions other than a   
UTI e.g. asymptomatic bacteriuria, gynecologic infections,   
sexually transmitted infections, and noninfectious conditions   
(positive predictive value for UTI around 50%)   
   
                                                            Performed By: #### C  
 URINE ####[facility] [id number]   
[place], [place]#### urinalysiswcul   
####S PATHOLOGY [id number] [place],   
   
   
                      U LEUK     Positive   Abnormal   Negative   The   
[facility]   
System  
   
                                        Comment on above:   Order Comment: A neg  
ative leukocyte esterase AND negative   
nitrite test or absence of pyuria (urine WBC count <= 5-10) make   
a UTI (urinary tract infection) very unlikely in a   
non-neutropenic adult (<=5% likelihood in many studies). A   
positive leukocyte esterase, nitrite and/or pyuria is a   
nonspecific result. This can be seen in conditions other than a   
UTI e.g. asymptomatic bacteriuria, gynecologic infections,   
sexually transmitted infections, and noninfectious conditions   
(positive predictive value for UTI around 50%)   
   
                                                            Result Comment: Norm  
al urine specimens will not produce a   
positive reaction. Small amounts of leukocyte esterase, causing   
a positive reaction should be repeated, using a fresh urine   
specimen, from the same patient. Positive results require   
further testing for pyuria.   
   
                                                            Performed By: #### C  
 URINE ####[facility] [id number]   
[place], [place]#### urinalysiswcul   
####[facility] PATHOLOGY [id number] [place] [address]   
   
   
                      U MUCOUS   Present    Normal                The   
[facility]   
System  
   
                                        Comment on above:   Order Comment: A neg  
ative leukocyte esterase AND negative   
nitrite test or absence of pyuria (urine WBC count <= 5-10) make   
a UTI (urinary tract infection) very unlikely in a   
non-neutropenic adult (<=5% likelihood in many studies). A   
positive leukocyte esterase, nitrite and/or pyuria is a   
nonspecific result. This can be seen in conditions other than a   
UTI e.g. asymptomatic bacteriuria, gynecologic infections,   
sexually transmitted infections, and noninfectious conditions   
(positive predictive value for UTI around 50%)   
   
                                                            Performed By: #### C  
 URINE ####[address]#### urinalysiswcul   
####[facility] PATHOLOGY [id number] [place] [address],   
   
   
                      U NITRITE  Negative   Normal     Negative   The   
[facility]   
System  
   
                                        Comment on above:   Order Comment: A neg  
ative leukocyte esterase AND negative   
nitrite test or absence of pyuria (urine WBC count <= 5-10) make   
a UTI (urinary tract infection) very unlikely in a   
non-neutropenic adult (<=5% likelihood in many studies). A   
positive leukocyte esterase, nitrite and/or pyuria is a   
nonspecific result. This can be seen in conditions other than a   
UTI e.g. asymptomatic bacteriuria, gynecologic infections,   
sexually transmitted infections, and noninfectious conditions   
(positive predictive value for UTI around 50%)   
   
                                                            Performed By: #### C  
 URINE ####[facility] [address]   
[place], [place]#### urinalysiswcul   
####[facility] PATHOLOGY [id number] [place] [address]   
   
   
                      U PH       7.5        Normal     5.0-8.0    The   
[facility]   
System  
   
                                        Comment on above:   Order Comment: A neg  
ative leukocyte esterase AND negative   
nitrite test or absence of pyuria (urine WBC count <= 5-10) make   
a UTI (urinary tract infection) very unlikely in a   
non-neutropenic adult (<=5% likelihood in many studies). A   
positive leukocyte esterase, nitrite and/or pyuria is a   
nonspecific result. This can be seen in conditions other than a   
UTI e.g. asymptomatic bacteriuria, gynecologic infections,   
sexually transmitted infections, and noninfectious conditions   
(positive predictive value for UTI around 50%)   
   
                                                            Performed By: #### C  
 URINE ####[facility] [address]   
[place], [place]#### urinalysiswcul   
####S PATHOLOGY [id number] [place], [place],   
   
   
                      U RBC           Abnormal   0-2        The   
[facility]   
[facility]  
   
                                        Comment on above:   Order Comment: A neg  
ative leukocyte esterase AND negative   
nitrite test or absence of pyuria (urine WBC count <= 5-10) make   
a UTI (urinary tract infection) very unlikely in a   
non-neutropenic adult (<=5% likelihood in many studies). A   
positive leukocyte esterase, nitrite and/or pyuria is a   
nonspecific result. This can be seen in conditions other than a   
UTI e.g. asymptomatic bacteriuria, gynecologic infections,   
sexually transmitted infections, and noninfectious conditions   
(positive predictive value for UTI around 50%)   
   
                                                            Performed By: #### C  
 URINE ####[facility] [address]   
[place], [place]#### urinalysiswcul   
####[facility] PATHOLOGY [id number] [place], [place],   
   
   
                      U SG       1.009      Normal     <=1.030    The   
[facility] [place] [facility]   
System  
   
                                        Comment on above:   Order Comment: A neg  
ative leukocyte esterase AND negative   
nitrite test or absence of pyuria (urine WBC count <= 5-10) make   
a UTI (urinary tract infection) very unlikely in a   
non-neutropenic adult (<=5% likelihood in many studies). A   
positive leukocyte esterase, nitrite and/or pyuria is a   
nonspecific result. This can be seen in conditions other than a   
UTI e.g. asymptomatic bacteriuria, gynecologic infections,   
sexually transmitted infections, and noninfectious conditions   
(positive predictive value for UTI around 50%)   
   
                                                            Performed By: #### C  
 URINE ####[facility] [address]   
[place], [place]#### urinalysiswcul   
####[facility] PATHOLOGY [id number] [place] [address],   
   
   
                      U UROBILI  Negative   Normal     Negative   The   
[facility]   
System  
   
                                        Comment on above:   Order Comment: A neg  
ative leukocyte esterase AND negative   
nitrite test or absence of pyuria (urine WBC count <= 5-10) make   
a UTI (urinary tract infection) very unlikely in a   
non-neutropenic adult (<=5% likelihood in many studies). A   
positive leukocyte esterase, nitrite and/or pyuria is a   
nonspecific result. This can be seen in conditions other than a   
UTI e.g. asymptomatic bacteriuria, gynecologic infections,   
sexually transmitted infections, and noninfectious conditions   
(positive predictive value for UTI around 50%)   
   
                                                            Performed By: #### C  
 URINE ####[facility] [address]   
[place], [place]#### urinalysiswcul   
####[facility] PATHOLOGY [id number] [place] [address],   
   
   
                      U WBC      >100       Abnormal   0-2        The   
[facility]   
System  
   
                                        Comment on above:   Order Comment: A neg  
ative leukocyte esterase AND negative   
nitrite test or absence of pyuria (urine WBC count <= 5-10) make   
a UTI (urinary tract infection) very unlikely in a   
non-neutropenic adult (<=5% likelihood in many studies). A   
positive leukocyte esterase, nitrite and/or pyuria is a   
nonspecific result. This can be seen in conditions other than a   
UTI e.g. asymptomatic bacteriuria, gynecologic infections,   
sexually transmitted infections, and noninfectious conditions   
(positive predictive value for UTI around 50%)   
   
                                                            Performed By: #### C  
 URINE ####[facility] [address]kp[address]32   
[place], [place]#### urinalysiswcul   
####[facility] PATHOLOGY [id number] [place] [address],   
   
   
                                                    URINE CULTUREon 01-   
   
                                                    Bacteria identified   
Cx Nom (U)                              C URINE:  
  
**Positive Culture   
Report**  
  
ESCHERICHIA COLI   
(ESBL)  
>10,000 CFU/ml   
Escherichia coli   
(ESBL)  
This organism exhibits   
Extended Spectrum Beta   
Lactamase Production.  
  
PROTEUS MIRABILIS  
>10,000 CFU/ml Proteus   
mirabilis           Normal                                  The   
[facility]   
System  
   
                                        Comment on above:   Order Comment: THIS   
IS A PRELIMINARY REPORT. Final results   
will   
follow. Results of the preliminary report may be modified as   
additional information becomes available.   
   
                                                            Performed By: #### C  
 URINE ####[facility] [address]   
[place], [place]#### urinalysiswcul   
####[facility] PATHOLOGY [id number] [place] [address],   
   
   
                      LIZ                   Normal                The   
[facility]   
System  
   
                                        Comment on above:   Order Comment: THIS   
IS A PRELIMINARY REPORT. Final results   
will   
follow. Results of the preliminary report may be modified as   
additional information becomes available.   
   
                                                            Performed By: #### C  
 URINE ####[facility] [address] [place], [place] [address]#### urinalysiswcul   
####[facility] PATHOLOGY [id number] [place] [address],   
   
   
                                                    XA NEPH TUBE EXCHANGEon    
   
                      XA NEPH TUBE EXCHANGE            Normal                The  
   
[facility] [place] [facility]   
System  
   
                                                    Basic Metabolic Panelon    
   
                      Anion gap [Moles/Vol] 11.9 mmol/L Normal     6.0-15.0   Th  
e   
[facility]   
Physician   
Group  
   
                                        Comment on above:   Performed By: #### C  
MP, LIPID ####  
[facility]  
[address] [place] [address] [place]   
   
                      Calcium [Mass/Vol] 8.3 mg/dL  Low        8.6-10.3   The   
[facility]   
Physician   
Group  
   
                                        Comment on above:   Performed By: #### C  
MP, LIPID ####  
[facility]  
[address] [place] [id number] [place]   
   
                      Chloride [Moles/Vol] 106 mmol/L Normal          The   
[facility]   
Physician   
Group  
   
                                        Comment on above:   Performed By: #### C  
MP, LIPID ####  
[facility]  
[address] [id number] [place]   
   
                      CO2 [Moles/Vol] 21.4 mmol/L Normal     21.0-31.0  The   
[facility]   
Physician   
Group  
   
                                        Comment on above:   Performed By: #### C  
MP, LIPID ####  
[address]   
   
                      Creatinine [Mass/Vol] 3.86 mg/dL High       0.60-1.20  The  
   
[facility]   
Physician   
Group  
   
                                        Comment on above:   Performed By: #### C  
MP, LIPID ####  
[address]   
   
                                                    Creatinine Clr Calc   
Pharmacy        22.17           Normal                          The   
[facility]   
Physician   
Group  
   
                                        Comment on above:   Result Comment: PERF  
ORMED BY:  
[place] [address]  
[phone]  
PATHOLOGIST MEDICAL DIRECTOR  
CODEY SÁNCHEZ M.D.   
   
                                                            Performed By: #### C  
MP, LIPID ####  
[address]   
   
                      Estimated GFR 13.156 mL/Min Normal                The   
[facility]   
Physician   
Group  
   
                                        Comment on above:   Performed By: #### C  
MP, LIPID ####  
[address]   
   
                      Glucose [Mass/Vol] 120 mg/dL  High            The   
[facility]   
Physician   
Group  
   
                                        Comment on above:   Result Comment: Rand  
 Glucose Reference Range is dependent on  
   
time and  
content of last meal. Glucose of more than 200 mg/dL in a  
nonstressed, ambulatory subject supports the diagnosis of  
Diabetes Mellitus.  
ADA recommended reference range   
   
                                                            Performed By: #### C  
MP, LIPID ####  
[address]   
   
                      Potassium [Moles/Vol] 4.3 mmol/L Normal     3.5-5.1    The  
   
[facility]   
Physician   
Group  
   
                                        Comment on above:   Performed By: #### C  
MP, LIPID ####  
[place] [address] [place]   
   
                      Sodium [Moles/Vol] 135 mmol/L Low        136-145    The   
[facility]   
Physician   
Group  
   
                                        Comment on above:   Performed By: #### C  
MP, LIPID ####  
[address]   
   
                                                    Urea nitrogen   
[Mass/Vol]      50 mg/dL        High            7-25            The   
[facility]   
Physician   
Group  
   
                                        Comment on above:   Performed By: #### C  
MP, LIPID ####  
[place] [address] [place]   
   
                                                    Basophils Auto (Bld) [#/Vol]  
Ordered By: Milana Allen on 2025   
   
                                                    Basophils (Bld)   
[#/Vol]                                 Automated basophil   
count                                   0.0-0.2             [facility]  
   
                                                    Basophils/100 WBC Auto (Bld)  
Ordered By: Milana Allen on 2025   
   
                                                    Basophils/100 WBC   
(Bld)           Automated basophil %                 .               [facility]  
   
                                                    Calcium [Mass/volume] in Ser  
um or PlasmaOrdered By: Milana Allen on 2025  
   
   
                                        Calcium [Mass/Vol]  Calcium [Mass/volume  
]   
in Serum or Plasma  Low                 8.6-10.3            [facility]  
   
                                                    Carbon dioxide, total [Moles  
/volume] in Serum or PlasmaOrdered By: Milana Allen on   
2025   
   
                                        CO2 [Moles/Vol]     Carbon dioxide, tota  
l   
[Moles/volume] in   
Serum or Plasma                         21.0-31.0           [facility]  
   
                                                    Chloride [Moles/volume] in S  
chandler or PlasmaOrdered By: Milana Allen on   
2025   
   
                                        Chloride [Moles/Vol] Chloride   
[Moles/volume] in   
Serum or Plasma                                       [facility]  
   
                                                    Complete Blood Count Auto Di  
ffon 2025   
   
                                                    Basophils (Bld)   
[#/Vol]         0.0 10*3/uL     Normal          0.0-0.2         The   
[facility]   
Physician   
Group  
   
                                        Comment on above:   Result Comment: PERF  
ORMED BY:  
[place] [address]  
[phone]  
PATHOLOGIST MEDICAL DIRECTOR  
CODEY SÁNCHEZ M.D.   
   
                                                            Performed By: #### H  
H ####  
[place] [address] [place]   
   
                                                    Basophils/100 WBC   
(Bld)           0.4 %           Normal          .               The   
[facility]   
Physician   
Group  
   
                                        Comment on above:   Performed By: #### H  
H ####  
[place] [address] [place]   
   
                                                    Eosinophils (Bld)   
[#/Vol]         0.0 10*3/uL     Normal          0.0-0.45        The   
[facility]   
Physician   
Group  
   
                                        Comment on above:   Performed By: #### H  
H ####  
[address]   
   
                                                    Eosinophils/100 WBC   
(Bld)           0.0 %           Normal          .               The   
[facility]   
Physician   
Group  
   
                                        Comment on above:   Performed By: #### H  
H ####  
[address]   
   
                                                    Erythrocyte   
distribution width   
(RBC) [Ratio]   17.1 %          High            11.9-15.3       The   
[facility]   
Physician   
Group  
   
                                        Comment on above:   Performed By: #### H  
H ####  
[address]   
   
                                                    Hematocrit (Bld)   
[Volume fraction] 27.2 %          Low             34.0-46.4       The   
[facility]   
Physician   
Group  
   
                                        Comment on above:   Performed By: #### H  
H ####  
[address]   
   
                                                    Hemoglobin (Bld)   
[Mass/Vol]      8.6 g/dL        Low             11.8-15.4       The   
[facility]   
Physician   
Group  
   
                                        Comment on above:   Performed By: #### H  
H ####  
[address]   
   
                                                    Lymphocytes (Bld)   
[#/Vol]         1.0 10*3/uL     Normal          1.00-4.8        The   
[facility]   
Physician   
Group  
   
                                        Comment on above:   Performed By: #### H  
H ####  
[address]   
   
                                                    Lymphocytes/100 WBC   
(Bld)           7.5 %           Normal          .               The   
[facility]   
Physician   
Group  
   
                                        Comment on above:   Performed By: #### H  
H ####  
[address]   
   
                                                    MCH (RBC) [Entitic   
mass]           26.0 pg         Normal          24.7-34.3       The   
[facility]   
Physician   
Group  
   
                                        Comment on above:   Performed By: #### H  
H ####  
[address]   
   
                                                    MCV (RBC) [Entitic   
vol]            82.3 fL         Normal                    The   
[facility]   
Physician   
Group  
   
                                        Comment on above:   Performed By: #### H  
H ####  
[address]   
   
                                                    Mean Corpuscular HGB   
Conc            31.6 g/dL       Low             32.0-35.0       The   
[facility]   
Physician   
Group  
   
                                        Comment on above:   Performed By: #### H  
H ####  
[place] [address] [place]   
   
                                                    Monocytes (Bld)   
[#/Vol]         0.5 10*3/uL     Normal          0.0-0.8         The   
[facility]   
Physician   
Group  
   
                                        Comment on above:   Performed By: #### H  
H ####  
[place] [address] [place]   
   
                                                    Monocytes/100 WBC   
(Bld)           30.18 %         High            0.00-20.00      The   
[facility]   
Physician   
Group  
   
                                        Comment on above:   Result Comment: For   
adults in ED, MDW > 20.0 may be associated  
   
with a higher  
risk of sepsis during the first 12 hrs of hospital admission   
   
                                                            Performed By: #### H  
H ####  
[address]   
   
                                                    Monocytes/100 WBC   
(Bld)           3.4 %           Normal          .               The   
[facility]   
Physician   
Group  
   
                                        Comment on above:   Performed By: #### H  
H ####  
[address]   
   
                                                    Neutrophils (Bld)   
[#/Vol]         11.8 10*3/uL    High            1.8-7.7         The   
[facility]   
Physician   
Group  
   
                                        Comment on above:   Performed By: #### H  
H ####  
[address]   
   
                                                    Neutrophils/100 WBC   
(Bld)           88.7 %          Normal          .               The   
[facility]   
Physician   
Group  
   
                                        Comment on above:   Performed By: #### H  
H ####  
[address]   
   
                      NRBC%      0.0 /100{WBC} Normal     0-0.5      The   
[facility]   
Physician   
Group  
   
                                        Comment on above:   Performed By: #### H  
H ####  
[address]   
   
                                                    Platelet mean volume   
(Bld) [Entitic vol] 8.4 fL          Normal          6.3-10.7        The   
[facility]   
Physician   
Group  
   
                                        Comment on above:   Performed By: #### H  
H ####  
[address]   
   
                                                    Platelets (Bld)   
[#/Vol]         234 10*3/uL     Normal          150-450         The   
[facility]   
Physician   
Group  
   
                                        Comment on above:   Performed By: #### H  
H ####  
[address]   
   
                      RBC (Bld) [#/Vol] 3.30 10*6/uL Low        3.60-5.00  The   
[facility]   
Physician   
Group  
   
                                        Comment on above:   Performed By: #### H  
H ####  
[address]   
   
                      WBC (Bld) [#/Vol] 13.3 10*3/uL High       3.8-11.6   The   
[facility]   
Physician   
Group  
   
                                        Comment on above:   Performed By: #### H  
H ####  
[address]   
   
                                                    Creatinine [Mass/volume] in   
Serum or PlasmaOrdered By: Milana Allen on   
2025   
   
                                        Creatinine [Mass/Vol] Creatinine   
[Mass/volume] in Serum   
or Plasma           High                0.60-1.20           [facility]  
   
                                                    ECG 12 lead ECGon 2025  
   
   
                                        ECG 12 lead ECG     [facility]  
[address] [place] [address]  
  
Electrocardiograph   
Report  
Signed  
  
Patient: Cris Hearn MR#: [id number]  
971  
: 1970   
Acct:[id number]  
  
Age/Sex: 54 / F ADM   
Date: 25  
  
Loc:  Room: [address] [place] [address]   
Type: DIS IN  
Attending Dr: Renny Rowley MD  
  
Ordering Provider:   
Renny Rowley MD  
Date of Service:   
  
Accession #:   
([id number]) ECG/ECG   
12 lead ECG: chest   
pain  
  
  
Copies to:  
  
  
Test Reason :  
Blood Pressure : 89/52   
mmHG  
Vent. Rate : 162 BPM   
Atrial Rate : 159 BPM  
P-R Int : * ms QRS Dur   
: 86 ms  
QT Int : 286 ms P-R-T   
Axes : * 26 69 degrees  
QTcB Int : 469 ms  
  
Supraventricular   
tachycardia with   
occasional premature   
ventricular complexes  
Otherwise normal ECG  
When compared with ECG   
of 2025 19:18,   
(Unconfirmed)  
premature ventricular   
complexes are now   
present  
Vent. rate has   
increased by 60 bpm  
Rhythm is no longer   
sinus  
Confirmed by NUÑEZTIEN HURT MD, FACC ([id number])   
on 2025 9:20:10   
AM  
  
Referred By:   
Electronically Signed   
By: TIEN NUÑEZ MD   
[facility]  
  
  
  
Transcribed By: MUS  
Signed By Tien Nuñez MD, [facility]  
25 0920       Normal                                  [facility]   
Physician   
Group  
   
                                        ECG 12 lead ECG     [facility] [place] [id number]  
  
Electrocardiograph   
Report  
Signed  
  
Patient: Cris Hearn MR#: [id number]  
[id number]  
: 1970   
Acct:[id number]  
  
Age/Sex: 54 / F ADM   
Date: 25  
  
Loc:  Room: [address] [place] [address]   
Type: DIS IN  
Attending Dr: Renny Rowley MD  
  
Ordering Provider:   
Renny Rowley MD  
Date of Service:   
  
Accession #:   
([id number]) ECG/ECG   
12 lead ECG: Abdominal   
Pain  
  
  
Copies to:  
  
  
Test Reason :  
Blood Pressure :   
139/97 mmHG  
Vent. Rate : 102 BPM   
Atrial Rate : 102 BPM  
P-R Int : 182 ms QRS   
Dur : 96 ms  
QT Int : 356 ms P-R-T   
Axes : 56 49 75   
degrees  
QTcB Int : 463 ms  
  
Sinus tachycardia  
Otherwise normal ECG  
When compared with ECG   
of 2025 17:33,  
Vent. rate has   
decreased by 58 bpm  
  
Confirmed by TIEN NUÑEZ MD, FACC ([id number])   
on 2025 9:19:29   
AM  
  
Referred By:   
Electronically Signed   
By: TIEN NUÑEZ MD, FACC  
  
  
  
Transcribed By: MUS  
Signed By Tien Nuñez MD, [facility]  
25 0919       Normal                                  [facility]   
Physician   
Group  
   
                                        ECG 12 lead ECG     [facility] [address] [address]  
  
Electrocardiograph   
Report  
Signed  
  
Patient: Cris Hearn MR#: [id number]  
971  
: 1970   
Acct:[id number]  
  
Age/Sex: 54 / F ADM   
Date: 25  
  
Loc: ER Room: Type:   
REG ER  
Attending Dr:  
  
Ordering Provider:   
Miquel Asencio DO  
Date of Service:   
  
Accession #:   
([id number]) ECG/ECG   
12 lead ECG: Abdominal   
Pain  
  
  
Copies to:  
  
  
Test Reason :  
Blood Pressure : 84/50   
mmHG  
Vent. Rate : 160 BPM   
Atrial Rate : 16 BPM  
P-R Int : * ms QRS Dur   
: 84 ms  
QT Int : 290 ms P-R-T   
Axes : * 38 48 degrees  
QTcB Int : 473 ms  
  
Supraventricular   
tachycardia  
Nonspecific ST   
abnormality  
Abnormal ECG  
When compared with ECG   
of 2025 16:35,   
(Unconfirmed)  
fusion complexes are   
no longer present  
premature ventricular   
complexes are no   
longer present  
Confirmed by Miquel Asencio DO ([id number]) on   
2025 6:43:47 PM  
  
Referred By:   
Electronically Signed   
By: Miquel Asencio DO  
  
  
  
Transcribed By: MUS  
Signed By Miquel Asencio DO   
5 1843              Normal                                  The   
[facility]   
Physician   
Group  
   
                                        ECG 12 lead ECG     [facility] [place] [address]  
  
Electrocardiograph   
Report  
Signed  
  
Patient: Cris Hearn MR#: [id number]  
971  
: 1970   
Acct:[id number]  
  
Age/Sex: 54 / F ADM   
Date: 25  
  
Loc: ER Room: Type:   
[facility] ER  
Attending Dr:  
  
Ordering Provider:   
Miquel Asencio DO  
Date of Service:   
  
Accession #:   
([id number]) ECG/ECG   
12 lead ECG: Abdominal   
Pain  
  
  
Copies to:  
  
  
Test Reason :  
Blood Pressure : 90/51   
mmHG  
Vent. Rate : 153 BPM   
Atrial Rate : 163 BPM  
P-R Int : * ms QRS Dur   
: 86 ms  
QT Int : 302 ms P-R-T   
Axes : * 57 46 degrees  
QTcB Int : 482 ms  
  
Supraventricular   
tachycardia with   
premature ventricular   
complexes or fusion   
complexes  
Otherwise normal ECG  
When compared with ECG   
of 2025 16:19,   
(Unconfirmed)  
Sinus rhythm has   
replaced Atrial   
fibrillation  
Non-specific change in   
ST segment in Inferior   
leads  
T wave inversion no   
longer evident in   
Inferior leads  
Nonspecific T wave   
abnormality no longer   
evident in Lateral   
leads  
Confirmed by Miquel Asencio DO ([id number]) on   
2025 6:43:47 PM  
  
Referred By:   
Electronically Signed   
By: Miquel Asencio DO  
  
  
  
Transcribed By: MUS  
Signed By Miquel Asencio DO   
5 1843              Normal                                  The   
[facility]   
Physician   
Group  
   
                                        ECG 12 lead ECG     [facility]  
[address] [place], [place] [id number]  
  
Electrocardiograph   
Report  
Signed  
  
Patient: Cris Hearn MR#: [id number]  
971  
: 1970   
Acct:[id number]  
  
Age/Sex: 54 / F ADM   
Date: 25  
  
Loc: ER Room: Type:   
[facility] ER  
Attending Dr:  
  
Ordering Provider:   
Miquel Asencio DO  
Date of Service:   
  
Accession #:   
([id number]) ECG/ECG   
12 lead ECG: Abdominal   
Pain  
  
  
Copies to:  
  
  
Test Reason :  
Blood Pressure : 85/56   
mmHG  
Vent. Rate : 125 BPM   
Atrial Rate : 122 BPM  
P-R Int : * ms QRS Dur   
: 94 ms  
QT Int : 330 ms P-R-T   
Axes : * 78 97 degrees  
QTcB Int : 476 ms  
  
Atrial fibrillation   
with rapid ventricular   
response  
T wave abnormality,   
consider inferior   
ischemia  
Abnormal ECG  
When compared with ECG   
of 2025 03:49,  
Atrial fibrillation   
has replaced Sinus   
rhythm  
Inverted T waves have   
replaced nonspecific T   
wave abnormality in   
Inferior leads  
Confirmed by Miquel Asencio DO ([id number]) on   
2025 6:43:47 PM  
  
Referred By:   
Electronically Signed   
By: Miquel Asencio DO  
  
  
  
Transcribed By: MUS  
Signed By Miquel Asencio DO   
5 1843              Normal                                  The   
[facility]   
Physician   
Group  
   
                                        ECG 12 lead ECG     [facility]  
[address] [place] [address]  
  
Electrocardiograph   
Report  
Signed  
  
Patient: Cris Hearn MR#: [id number]  
971  
: 1970   
Acct:[id number]  
  
Age/Sex: 54 / F ADM   
Date: 25  
  
Loc: ER Room: Type:   
[facility] ER  
Attending Dr:  
  
Ordering Provider:   
Alexei Wise DO  
Date of Service:   
441  
Accession #:   
([id number]) ECG/ECG   
12 lead ECG: Abdominal   
Pain  
  
  
Copies to:  
  
  
Test Reason :  
Blood Pressure : 88/60   
mmHG  
Vent. Rate : 97 BPM   
Atrial Rate : 97 BPM  
P-R Int : 156 ms QRS   
Dur : 96 ms  
QT Int : 340 ms P-R-T   
Axes : 49 68 91   
degrees  
QTcB Int : 431 ms  
  
Sinus rhythm with   
occasional premature   
ventricular complexes   
and premature atrial   
complexes  
Confirmed by Alexei Wise DO ([id number]) on   
2025 4:56:20 AM  
  
Referred By:   
Electronically Signed   
By: Alexei Wise DO  
  
  
  
Transcribed By: LINDA  
Signed By Alexei Wise DO  0456         Normal                                  The   
[facility]   
Physician   
Group  
   
                                        ECG 12 lead ECG     [facility] [place] [id number]  
  
Electrocardiograph   
Report  
Signed  
  
Patient: Cris Hearn MR#: [id number]  
971  
: 1970   
Acct:[id number]  
  
Age/Sex: 54 / F ADM   
Date: 25  
  
Loc: ER Room: Type:   
[facility] ER  
Attending Dr:  
  
Ordering Provider:   
Alexei Wise DO  
Date of Service:   
[id number]  
Accession #:   
([id number]) ECG/ECG   
12 lead ECG: Abdominal   
Pain  
  
  
Copies to:  
  
  
Test Reason :  
Blood Pressure :   
105/56 mmHG  
Vent. Rate : 165 BPM   
Atrial Rate : 131 BPM  
P-R Int : * ms QRS Dur   
: 82 ms  
QT Int : 288 ms P-R-T   
Axes : * 22 72 degrees  
QTcB Int : 477 ms  
  
Supraventricular   
tachycardia  
Nonspecific ST   
abnormality  
  
Confirmed by Alexei Wise DO ([id number]) on   
2025 4:56:28 AM  
  
Referred By:   
Electronically Signed   
By: Alexei Wise DO  
  
  
  
Transcribed By: LINDA  
Signed By Alexei Wise DO  0456         Normal                                  The   
[facility]   
Physician   
Group  
   
                                                    Eosinophils Auto (Bld) [#/Vo  
l]Ordered By: Milana Allen on 2025   
   
                                                    Eosinophils (Bld)   
[#/Vol]                                 Automated eosinophil   
count                                   0.0-0.45            [facility]  
   
                                                    Eosinophils/100 WBC Auto (Bl  
d)Ordered By: Milana Allen on 2025   
   
                                                    Eosinophils/100 WBC   
(Bld)           Automated eosinophil %                 .               [facility]  
   
                                                    Erythrocyte distribution wid  
th Auto (RBC) [Ratio]Ordered By: Milana Allen on   
2025   
   
                                                    Erythrocyte   
distribution width   
(RBC) [Ratio]                           Erythrocyte   
distribution width   
[Ratio] by Automated   
count               High                11.9-15.3           [facility]  
   
                                                    Glucose Glucometer (BldC) [M  
ass/Vol]Ordered By: Renny Rowley on 2025   
   
                                        Glucose [Mass/Vol]  Capillary blood   
glucose measurement by   
glucometer   
(mass/volume)                                               [facility]  
   
                                        Comment on above:   Random Glucose Refer  
ence Range is dependent on time and   
content   
of last meal. Glucose of more than 200 mg/dL in a nonstressed,   
ambulatory subject supports the diagnosis of Diabetes Mellitus.   
   
                                                    Glucose Poct Glucometerson 0  
2025   
   
                      Commemt1   Glu2: Cleaned Meter Normal                The   
[facility]   
Physician   
Group  
   
                                        Comment on above:   Result Comment: PERF  
ORMED BY:  
[place] [address]  
[phone]  
PATHOLOGIST MEDICAL DIRECTOR  
CODEY SÁNCHEZ M.D.   
   
                                                            Performed By: #### G  
LULS ####  
Point of Care testing  
,   
   
                      Glucose [Mass/Vol] 168 mg/dL  Normal                The   
[facility]   
Physician   
Group  
   
                                        Comment on above:   Result Comment: Rand  
om Glucose Reference Range is dependent on  
   
time and  
content of last meal. Glucose of more than 200 mg/dL in a  
nonstressed, ambulatory subject supports the diagnosis of  
Diabetes Mellitus.   
   
                                                            Performed By: #### G  
LULS ####  
Point of Care testing  
,   
   
                                                    Glucose [Mass/volume] in Ser  
um or PlasmaOrdered By: Milana Allen on 2025  
   
   
                                        Glucose [Mass/Vol]  Glucose [Mass/volume  
]   
in Serum or Plasma  High                              [facility]  
   
                                        Comment on above:   ADA recommended refe  
rence rangeRandom Glucose Reference Range   
is   
dependent on time and content of last meal. Glucose of more than   
200 mg/dL in a nonstressed, ambulatory subject supports the   
diagnosis of Diabetes Mellitus.   
   
                                                    Hematocrit Auto (Bld) [Volum  
e fraction]Ordered By: Milana Allen on 2025   
   
                                                    Hematocrit (Bld)   
[Volume fraction]                       Hematocrit [Volume   
Fraction] of Blood by   
Automated count     Low                 34.0-46.4           [facility]  
   
                                                    Hemoglobinon 2025   
   
                                                    Hemoglobin (Bld)   
[Mass/Vol]      8.4 g/dL        Low             11.8-15.4       The   
[facility]   
Physician   
Group  
   
                                        Comment on above:   Result Comment: PERF  
ORMED BY:  
[place] [address]  
[phone]  
PATHOLOGIST MEDICAL DIRECTOR  
CODEY SÁNCHEZ M.D.   
   
                                                            Performed By: #### H  
H ####  
[address] [address]   
   
                                                    Hemoglobin [Mass/volume] in   
BloodOrdered By: Renny Rowley on 2025   
   
                                                    Hemoglobin (Bld)   
[Mass/Vol]                              Hemoglobin   
[Mass/volume] in Blood Low                 11.8-15.4           [facility]  
   
                                                    Lactate [Moles/volume] in Se  
rum or PlasmaOrdered By: Milana Allen on   
2025   
   
                                        Lactate [Moles/Vol] Lactate [Moles/volum  
e]   
in Serum or Plasma                      0.5-2.2             [facility]  
   
                                                    Lactic Acidon 2025   
   
                      Lactate [Moles/Vol] 0.9 mmol/L Normal     0.5-2.2    The   
[facility]   
Physician   
Group  
   
                                        Comment on above:   Result Comment: PERF  
ORMED BY:  
[place] [address]  
[phone]  
PATHOLOGIST MEDICAL DIRECTOR  
CODEY SÁNCHEZ M.D.   
   
                                                            Performed By: #### H  
H ####  
[address]   
   
                      Lactate [Moles/Vol] 1.0 mmol/L Normal     0.5-2.2    The   
[facility]   
Physician   
Group  
   
                                        Comment on above:   Result Comment: PERF  
ORMED BY:  
[place] [address]  
[phone]  
PATHOLOGIST MEDICAL DIRECTOR  
CODEY SÁNCHEZ M.D.   
   
                                                            Performed By: #### C  
MP, LIPID ####  
[facility]  
[address] [place] [address]   
   
                                                    Leukocytes [#/volume] correc  
sapna for nucleated erythrocytes in Blood by Automated  
   
counOrdered By: Milana Allen on 2025   
   
                                                    WBC corrected for   
nucl RBC Auto (Bld)   
[#/Vol]                                 Leukocytes [#/volume]   
corrected for   
nucleated erythrocytes   
in Blood by Automated   
coun                High                3.8-11.6            [facility]  
   
                                                    Lymphocytes Auto (Bld) [#/Vo  
l]Ordered By: Milana Allen on 2025   
   
                                                    Lymphocytes (Bld)   
[#/Vol]                                 Lymphocytes [#/volume]   
in Blood by Automated   
count                                   1.00-4.8            [facility]  
   
                                                    Lymphocytes/100 WBC Auto (Bl  
d)Ordered By: Milana Allen on 2025   
   
                                                    Lymphocytes/100 WBC   
(Bld)                                   Lymphocytes/100   
leukocytes in Blood by   
Automated count                         .                   [facility]  
   
                                                    MCH Auto (RBC) [Entitic mass  
]Ordered By: Milana Allen on 2025   
   
                                                    MCH (RBC) [Entitic   
mass]                                   MCH [Entitic mass] by   
Automated count                         24.7-34.3           [facility]  
   
                                                    MCHC Auto (RBC) [Mass/Vol]Or  
dered By: Milana Allen on 2025   
   
                                        MCHC (RBC) [Mass/Vol] MCHC [Mass/volume]  
 by   
Automated count     Low                 32.0-35.0           [facility]  
   
                                                    MCV Auto (RBC) [Entitic vol]  
Ordered By: Milana Allen on 2025   
   
                                                    MCV (RBC) [Entitic   
vol]                                    MCV [Entitic volume]   
by Automated count                                    [facility]  
   
                                                    Magnesiumon 2025   
   
                      Magnesium [Mass/Vol] 1.6 mg/dL  Low        1.9-2.7    The   
[facility]   
[facility]   
[facility]  
   
                                        Comment on above:   Result Comment: PERF  
ORMED BY:  
[place] [address]  
[phone]  
PATHOLOGIST MEDICAL DIRECTOR  
CODEY SÁNCHEZ M.D.   
   
                                                            Performed By: #### H  
H ####  
[address]   
   
                                                    Magnesium [Mass/volume] in S  
chandler or PlasmaOrdered By: Milana Allen on   
2025   
   
                                        Magnesium [Mass/Vol] Magnesium   
[Mass/volume] in Serum   
or Plasma           Low                 1.9-2.7             [facility]  
   
                                                    Monocyte distribution width   
[Entitic volume] in Blood by AutomatedOrdered By:   
Milana Allen on 2025   
   
                                                    Monocyte distribution   
width Auto (Bld)   
[Entitic vol]                           Monocyte distribution   
width [Entitic volume]   
in Blood by Automated High                0.00-20.00          [facility]  
   
                                        Comment on above:   For adults in ED, MD  
W > 20.0 may be associated with a higher   
risk of sepsis during the first 12 hrs of hospital admission   
   
                                                    Monocytes Auto (Bld) [#/Vol]  
Ordered By: Milana Allen on 2025   
   
                                                    Monocytes (Bld)   
[#/Vol]                                 Automated blood   
monocyte count                          0.0-0.8             [facility]  
   
                                                    Monocytes/100 WBC Auto (Bld)  
Ordered By: Milana Allen on 2025   
   
                                                    Monocytes/100 WBC   
(Bld)           Automated monocyte %                 .               [facility]  
   
                                                    Neutrophils Auto (Bld) [#/Vo  
l]Ordered By: Milana Allen on 2025   
   
                                                    Neutrophils (Bld)   
[#/Vol]                                 Neutrophils [#/volume]   
in Blood by Automated   
count               High                1.8-7.7             [facility]  
   
                                                    Neutrophils/100 WBC Auto (Bl  
d)Ordered By: Milana Allen on 2025   
   
                                                    Neutrophils/100 WBC   
(Bld)           Automated neutrophil %                 .               [facility]  
   
                                                    No Panel InformationOrdered   
By: Renny Rowley on 2025   
   
                                                    Bedside Glucose   
Comment         Glu2: cleaned meter                                 [facility]  
   
                                                    No Panel InformationOrdered   
By: Milana Allen on 2025   
   
                                                    Estimated GFR   
(CKD-EPI)       13.156 mL/Min                                   [facility]  
   
                                                    Pharmacy Creatinine   
Clearance (Chem 22.17                                           [facility]  
   
                                                    Nucleated erythrocytes [Pres  
ence] in Blood by Automated countOrdered By: Milana Allen on 2025   
   
                                                    Nucleated RBC Auto Ql   
(Bld)                                   Nucleated erythrocytes   
[Presence] in Blood by   
Automated count                         0-0.5               [facility]  
   
                                                    Platelet mean volume Auto (B  
ld) [Entitic vol]Ordered By: Milana Allen on   
2025   
   
                                                    Platelet mean volume   
(Bld) [Entitic vol]                     Platelet mean volume   
[Entitic volume] in   
Blood by Automated   
count                                   6.3-10.7            [facility]  
   
                                                    Platelets Auto (Bld) [#/Vol]  
Ordered By: Milana Allen on 2025   
   
                                                    Platelets (Bld)   
[#/Vol]                                 Platelets [#/volume]   
in Blood by Automated   
count                                   150-450             [facility]  
   
                                                    Potassium [Moles/volume] in   
Serum or PlasmaOrdered By: Milana Allen on   
2025   
   
                                        Potassium [Moles/Vol] Potassium   
[Moles/volume] in   
Serum or Plasma                         3.5-5.1             [facility]  
   
                                                    RBC Auto (Bld) [#/Vol]Ordere  
d By: Milana Allen on 2025   
   
                                        RBC (Bld) [#/Vol]   Erythrocytes   
[#/volume] in Blood by   
Automated count     Low                 3.60-5.00           [facility]  
   
                                                    Serum or plasma anion gap de  
terminationOrdered By: Milana Allen on 2025   
   
                                        Anion gap [Moles/Vol] Serum or plasma an  
ion   
gap determination                       6.0-15.0            [facility]  
   
                                                    Sodium [Moles/volume] in Ser  
um or PlasmaOrdered By: Milana Allen on 2025  
   
   
                                        Sodium [Moles/Vol]  Sodium [Moles/volume  
]   
in Serum or Plasma  Low                 136-145             [facility]  
   
                                                    Urea nitrogen [Mass/volume]   
in Serum or PlasmaOrdered By: Milana Allen on   
2025   
   
                                                    Urea nitrogen   
[Mass/Vol]                              Urea nitrogen   
[Mass/volume] in Serum   
or Plasma           High                7-25                [facility]  
   
                                                    WBC Auto (Bld) [#/Vol]Ordere  
d By: Milana Allen on 2025   
   
                                        WBC (Bld) [#/Vol]   Leukocytes [#/volume  
]   
in Blood by Automated   
count               High                3.8-11.6            [facility]  
   
                                                    X-ray reportOrdered By: Bimal Salcedo on 2025   
   
                                        Study report        [facility] [place] [address]  
  
XRay Report  
Signed  
  
Patient: Cris Hearn MR#: [id number]  
[id number]  
: 1970   
Acct:[id number]  
  
Age/Sex: 54 / F ADM   
Date:   
5  
Loc: ER Room: Type:   
[facility]  
Attending Dr:  
Copies to: MD Miquel Starr, DO~  
  
  
  
Ordering Provider:   
Milana Allen MD  
Date of Service:   
25  
Accession #:   
([id number] XR/XR   
chest 1V portable:   
CENTRAL LINE PLACEMENT  
  
  
  
  
XR chest 1V portable   
2025 5:24 PM  
  
SIGNS AND SYMPTOMS:   
Right IJ line   
placement  
  
PROTOCOL: Frontal   
radiograph of the   
chest  
  
COMPARISON: 5/15/2022  
  
FINDINGS:  
  
The trachea is   
midline. There is a   
right sided IJ line   
with the tip in the   
superior vena cava in   
satisfactory position.   
No pneumothorax. The   
heart and mediastinal   
structures are within   
normal limits. There   
is perihilar vascular   
prominence. There is   
interstitial   
prominence. No focal   
consolidation. The   
bony thorax is intact.  
  
  
ORDER #: [id number]   
XR/XR chest 1V   
portable  
IMPRESSION:  
  
There is a right sided   
IJ line with the tip   
in the superior vena   
cava in satisfactory   
position. No   
pneumothorax.  
  
Impression dictated   
by: Bimal Salcedo M.D.2025 5:42 PM  
  
  
Dictation Location:   
[facility]17  
  
  
  
Transcribed By: MARIA FERNANDA   
25  
Dictated By: Bimal Salcedo II, MD   
25  
  
Signed By:  
25  
                                                            [facility]  
Work Phone:   
[phone]  
   
                                                    XR chest 1V portableon    
   
                                        XR chest 1V portable [facility]  
[address] [place] [address]  
  
XRay Report  
Signed  
  
Patient: Cris Hearn MR#: [id number]  
[id number]  
: 1970   
Acct:[id number]  
  
Age/Sex: 54 / F ADM   
Date: 25  
  
Loc: ER Room: Type:   
[facility]  
Attending Dr:  
Copies to: MD Miquel Starr DO  
  
  
  
Ordering Provider:   
Milana Allen MD  
Date of Service:   
25  
Accession #:   
[id number] XR/XR   
chest 1V portable:   
CENTRAL LINE PLACEMENT  
  
  
  
  
XR chest 1V portable   
2025 5:24 PM  
  
SIGNS AND SYMPTOMS:   
Right IJ line   
placement  
  
PROTOCOL: Frontal   
radiograph of the   
chest  
  
COMPARISON: 5/15/2022  
  
FINDINGS:  
  
The trachea is   
midline. There is a   
right sided IJ line   
with the tip in the   
superior vena cava in  
satisfactory position.   
No pneumothorax. The   
heart and mediastinal   
structures are within   
normal  
limits. There is   
perihilar vascular   
prominence. There is   
interstitial   
prominence. No focal  
consolidation. The   
bony thorax is intact.  
  
  
ORDER #: [id number]   
XR/XR chest 1V   
portable  
IMPRESSION:  
  
There is a right sided   
IJ line with the tip   
in the superior vena   
cava in satisfactory   
position. No  
pneumothorax.  
  
Impression dictated   
by: Bimal Salcedo M.D.2025 5:42 PM  
  
  
Dictation Location:   
[facility]17  
  
  
  
Transcribed By: MARIA FERNANDA   
25  
Dictated By: Bimal Salcedo II, MD   
25  
  
Signed By:  
25       Normal                                  [facility]   
[facility]  
   
                                                    Alanine aminotransferase [En  
zymatic activity/volume] in Serum or PlasmaOrdered   
By:   
Miquel Asencio on 2025   
   
                                                    ALT [Catalytic   
activity/Vol]                           Alanine   
aminotransferase   
[Enzymatic   
activity/volume] in   
Serum or Plasma     Low                 7-52                [facility]  
   
                                                    Albumin [Mass/volume] in Ser  
um or Plasma by Bromocresol green (BCG) dye binding   
methoOrdered By: Miquel Asencio on 2025   
   
                                                    Albumin BCG dye   
[Mass/Vol]                              Albumin [Mass/volume]   
in Serum or Plasma by   
Bromocresol green   
(BCG) dye binding   
metho               Low                 3.5-5.7             [facility]  
   
                                                    Alkaline phosphatase [Enzyma  
tic activity/volume] in Serum or PlasmaOrdered By:   
Miquel Asencio on 2025   
   
                                                    ALP [Catalytic   
activity/Vol]                           Alkaline phosphatase   
[Enzymatic   
activity/volume] in   
Serum or Plasma                                       [facility]  
   
                                                    Appearance of UrineOrdered B  
y: Miquel Asencio on 2025   
   
                      Appearance (U) Urine appearance Abnormal   Clear      [facility]  
   
                                                    Arterial Blood Gason   
025   
   
                      ABG Base Excess -8.6 mmol/L Low        -3.0-3.0   The   
[facility]  
   
                                        Comment on above:   Performed By: #### C  
MP, LIPID ####  
[address]   
   
                      ABG Frac Inspired O2 32 %       Normal                The   
[facility]  
   
                                        Comment on above:   Performed By: #### C  
MP, LIPID ####  
[address]   
   
                      ABG Liter Flow 3          Normal                The   
[facility]  
   
                                        Comment on above:   Performed By: #### C  
MP, LIPID ####  
[address]   
   
                      ABG Oxygen Content 5.7 mmol/L Low        6.6-9.7    The   
[facility]  
   
                                        Comment on above:   Performed By: #### C  
MP, LIPID ####  
[address]   
   
                      ABG Oxygen Saturation 96.0 %     Normal     95.0-100.0 The  
   
[facility]  
   
                                        Comment on above:   Performed By: #### C  
MP, LIPID ####  
[address]   
   
                      ABG PCO2   41.6 mm[Hg] Normal     35.0-45.0  The   
[facility]  
   
                                        Comment on above:   Performed By: #### C  
MP, LIPID ####  
[address]   
   
                      ABG PH     7.25       Low        7.35-7.45  The   
[facility]  
   
                                        Comment on above:   Performed By: #### C  
MP, LIPID ####  
[address]   
   
                      ABG PO2    88.0 mm[Hg] Normal     80.0-100.0 The   
[facility]  
   
                                        Comment on above:   Performed By: #### C  
MP, LIPID ####  
[address]   
   
                      Respiratory Critical            Normal                The   
[facility]  
   
                                        Comment on above:   Result Comment: Crit  
ical Value called on: 2025 at 18:25  
PERFORMED BY:  
[place] [address]  
[phone]  
PATHOLOGIST MEDICAL DIRECTOR  
CODEY SÁNCHEZ M.D.   
   
                                                            Performed By: #### C  
MP, LIPID ####  
[address]   
   
                      VBG Draw Site Left Radial Normal                The   
[facility]  
   
                                        Comment on above:   Performed By: #### C  
MP, LIPID ####  
[address]   
   
                                                    Arterial Blood GasOrdered By  
: Miquel Asencio on 2025   
   
                      CO2 [Moles/Vol] 19.2 mmol/L Low        23.0-27.0  [facility]  
   
                                        Comment on above:   Performed By: #### C  
MP, LIPID ####  
[address]   
   
                                                    HCO3 (Bld)   
[Moles/Vol]     18.0 mmol/L     Low             23.0-29.0       [facility]  
   
                                        Comment on above:   Performed By: #### C  
MP, LIPID ####  
[address]   
   
                                                    Aspartate aminotransferase [  
Enzymatic activity/volume] in Serum or PlasmaOrdered  
By:   
Miquel Asencio on 2025   
   
                                                    AST [Catalytic   
activity/Vol]                           Aspartate   
aminotransferase   
[Enzymatic   
activity/volume] in   
Serum or Plasma     Low                 13-39               [facility]  
   
                                                    Bacteria [Presence] in Urine  
 by AutomatedOrdered By: Miquel Asencio on 2025   
   
                                        Bacteria Auto Ql (U) Bacteria [Presence]  
 in   
Urine by Automated  High                None Seen           [facility]  
   
                                                    Basic Metabolic Panelon    
   
                      Anion gap [Moles/Vol] 14.1 mmol/L Normal     6.0-15.0   Th  
e   
[facility]  
   
                                        Comment on above:   Performed By: #### G  
LULS ####  
Point of Care testing  
,   
   
                      Calcium [Mass/Vol] 8.9 mg/dL  Normal     8.6-10.3   The   
[facility]  
   
                                        Comment on above:   Performed By: #### G  
LULS ####  
Point of Care testing  
,   
   
                      Chloride [Moles/Vol] 101 mmol/L Normal          The   
[facility]  
   
                                        Comment on above:   Performed By: #### G  
LULS ####  
Point of Care testing  
,   
   
                      CO2 [Moles/Vol] 20.0 mmol/L Low        21.0-31.0  The   
[facility]  
   
                                        Comment on above:   Performed By: #### G  
LULS ####  
Point of Care testing  
,   
   
                      Creatinine [Mass/Vol] 3.86 mg/dL High       0.60-1.20  The  
   
[facility]  
   
                                        Comment on above:   Performed By: #### G  
LULS ####  
Point of Care testing  
,   
   
                                                    Creatinine Clr Calc   
Pharmacy        22.17           Normal                          The   
[facility]  
   
                                        Comment on above:   Performed By: #### G  
LULS ####  
Point of Care testing  
,   
   
                      Estimated GFR 13.156 mL/Min Normal                The   
[facility]  
   
                                        Comment on above:   Performed By: #### G  
LULS ####  
Point of Care testing  
,   
   
                      Glucose [Mass/Vol] 181 mg/dL  High            The   
[facility]  
   
                                        Comment on above:   Result Comment: Rand  
 Glucose Reference Range is dependent on  
   
time and  
content of last meal. Glucose of more than 200 mg/dL in a  
nonstressed, ambulatory subject supports the diagnosis of  
Diabetes Mellitus.  
ADA recommended reference range   
   
                                                            Performed By: #### G  
LULS ####  
Point of Care testing  
,   
   
                      Potassium [Moles/Vol] 4.1 mmol/L Normal     3.5-5.1    The  
   
[facility]  
   
                                        Comment on above:   Performed By: #### G  
LULS ####  
Point of Care testing  
,   
   
                      Sodium [Moles/Vol] 131 mmol/L Low        136-145    The   
[facility]  
   
                                        Comment on above:   Performed By: #### G  
LULS ####  
Point of Care testing  
,   
   
                                                    Urea nitrogen   
[Mass/Vol]      46 mg/dL        High            7-25            The   
[facility]  
   
                                        Comment on above:   Performed By: #### G  
LULS ####  
Point of Care testing  
,   
   
                                                    Bilirubin Test strip Ql (U)O  
rdered By: Miquel Asencio on 2025   
   
                                        Bilirubin Ql (U)    Bilirubin.total   
[Presence] in Urine by   
Test strip                              Negative            [facility]  
   
                                                    Bilirubin.direct [Mass/volum  
e] in Serum or PlasmaOrdered By: Miquel Asencio on   
2025   
   
                                                    Bilirubin.direct   
[Mass/Vol]                              Bilirubin.direct   
[Mass/volume] in Serum   
or Plasma           High                0.03-0.18           [facility]  
   
                                                    Bilirubin.total [Mass/volume  
] in Serum or PlasmaOrdered By: Miquel Asencio on   
2025   
   
                                        Bilirubin [Mass/Vol] Bilirubin.total   
[Mass/volume] in Serum   
or Plasma                               0.3-1.0             [facility]  
   
                                                    Blood Cultureon 2025   
   
                                                    Bacteria identified   
Cx Nom (Bld)                            NO GROWTH 5 DAYS  
PERFORMED BY:  
[place] [address]  
[phone]  
PATHOLOGIST MEDICAL   
DIRECTOR  
CODEY SÁNCHEZ M.D.                Normal                                  The   
[facility]  
   
                                        Comment on above:   Performed By: #### H  
H ####  
[address]   
   
                                                    Bacteria identified   
Cx Nom (Bld)                            NO GROWTH 5 DAYS  
PERFORMED BY:  
[place] [address]  
[phone]  
PATHOLOGIST MEDICAL   
DIRECTOR  
CODEY SÁNCHEZ M.D.                Normal                                  The   
[facility]  
   
                                        Comment on above:   Performed By: #### H  
H ####  
[address]   
   
                                                    CT abdomen pelvis wo conon 0  
2025   
   
                                                    CT abdomen pelvis wo   
con                                     [facility] [place] [address]  
  
CT Scan Report  
Signed  
  
Patient: Cris Hearn MR#: [id number]  
971  
: 1970   
Acct:[id number]  
  
Age/Sex: 54 / F ADM   
Date: 25  
  
Loc: ER Room: Type:   
[facility]  
Attending Dr:  
Copies to: Miquel Asencio DO  
  
  
  
Ordering Provider:   
Miquel Asencio DO  
Date of Service:   
25  
Accession #:   
([id number] CT/CT   
abdomen pelvis wo con:   
abd pain  
  
  
  
  
CT abdomen pelvis wo   
con 2025 11:47 AM  
  
SIGNS AND SYMPTOMS:   
Left-sided abdominal   
pain, nausea, weakness  
  
TECHNIQUE:   
Multidetector ct axial   
images of the abdomen   
and pelvis were   
obtained without IV  
contrast. Multiplanar   
reformats were   
performed and reviewed   
to further define   
anatomy and possible  
pathology. CT was   
performed with one or   
more of the following   
dose reduction   
techniques: Automated  
exposure control,   
adjustment of the mA   
and/or kV according to   
patient size, or use   
of iterative  
reconstruction   
technique.  
  
COMPARISON: 2024  
  
FINDINGS:  
  
Lower Chest:   
Atherosclerotic   
changes are present in   
the coronary arteries.   
There is scarring in   
the  
lung bases.  
  
ABDOMEN:  
Liver: Within normal   
limits.  
Bile Ducts: Normal   
caliber.  
Gallbladder:   
Previously removed  
Pancreas: Within   
normal limits.  
Spleen: There is a 3.4   
cm hypoattenuating   
cystic structure in   
the spleen similar to   
the prior exam.  
Adrenals: Within   
normal limits.  
Kidneys: There is a   
percutaneous   
approximately on the   
left which is similar   
to the prior exam.  
There is a 13 mm stone   
in the left renal   
pelvis. There is   
perinephric fat   
stranding with a new  
perinephric hematoma   
measuring 2.7 x 6.8 x   
5.4 cm in greatest   
dimension. Additional   
nonobstructing  
renal stones are   
noted, decreasing in   
size and number when   
compared to the prior   
exam suggesting  
recent intervention.  
  
Pelvis:  
Reproductive Organs:   
There is a 4.6 cm left   
adnexal cyst similar   
to the prior exam.  
Ureters: There is fat   
stranding surrounding   
the ureters.  
Bladder: Within normal   
limits.  
  
Bowel: Normal caliber.  
Mesenteric Lymph   
Nodes: No enlarged   
mesenteric lymph   
nodes.  
Peritoneum: No ascites   
or free air, no fluid   
collection.  
Vessels:   
Atherosclerotic   
changes are noted in   
the abdominal aorta   
and its branches.  
Retroperitoneum: There   
is fat stranding in   
the left paracolic   
gutter.  
Abdominal Wall: There   
is a fat-containing   
hernia in the lower   
right anterior   
abdominal wall.  
Bones: Degenerative   
changes are noted in   
the thoracolumbar   
spine and hips.  
  
  
ORDER #: [id number]   
CT/CT abdomen pelvis   
wo con  
IMPRESSION:  
  
There is a perinephric   
hematoma on the left   
which may be a result   
of recent lithotripsy   
or  
percutaneous   
intervention. This   
measures 2.7 x 6.8 x   
5.4 cm in greatest   
dimension.  
  
Diminishing size and   
number of stones in   
the left renal   
collecting system with   
a residual 14 mm  
stone in the left   
renal pelvis.  
  
There is a similar   
percutaneous   
nephrostomy tube on   
the left.  
  
There is fat stranding   
extending along the   
left paracolic gutter.  
  
There has been   
interval removal of   
the right-sided   
ureteral stent with   
fat stranding along   
the right  
ureter.  
  
Additional chronic   
findings are noted as   
above.  
  
Impression dictated   
by: Bimal Salcedo M.D.2025 1:26 PM  
  
  
Dictation Location:   
[place] [address]  
  
  
  
Transcribed By: [facility]   
25 1326  
Dictated By: Bimal Salcedo II, MD   
25 1320  
  
Signed By:  
25 1326       Normal                                  The   
[facility]  
   
                                                    Color Auto (U)Ordered By: Nhan Asencio on 2025   
   
                      Color (U)  Color of Urine by Auto            Yellow     [facility]  
   
                                                    Complete Blood Count Auto Di  
ffon 2025   
   
                                                    Basophils (Bld)   
[#/Vol]         0.0 10*3/uL     Normal          0.0-0.2         The   
[facility]  
   
                                        Comment on above:   Result Comment: PERF  
ORMED BY:  
[facility]  
[address]  
[place] [address]  
[phone]  
PATHOLOGIST MEDICAL DIRECTOR  
CODEY SÁNCHEZ M.D.   
   
                                                            Performed By: #### G  
LULS ####  
Point of Care testing  
,   
   
                                                    Basophils/100 WBC   
(Bld)           0.2 %           Normal          .               The   
[facility]  
   
                                        Comment on above:   Performed By: #### G  
LULS ####  
Point of Care testing  
,   
   
                                                    Eosinophils (Bld)   
[#/Vol]         0.0 10*3/uL     Normal          0.0-0.45        The   
[facility]  
   
                                        Comment on above:   Performed By: #### G  
LULS ####  
Point of Care testing  
,   
   
                                                    Eosinophils/100 WBC   
(Bld)           0.0 %           Normal          .               The   
[facility]  
   
                                        Comment on above:   Performed By: #### G  
LULS ####  
Point of Care testing  
,   
   
                                                    Erythrocyte   
distribution width   
(RBC) [Ratio]   16.9 %          High            11.9-15.3       The   
[facility]  
   
                                        Comment on above:   Performed By: #### G  
LULS ####  
Point of Care testing  
,   
   
                                                    Hematocrit (Bld)   
[Volume fraction] 30.6 %          Low             34.0-46.4       The   
[facility]  
   
                                        Comment on above:   Performed By: #### G  
LULS ####  
Point of Care testing  
,   
   
                                                    Hemoglobin (Bld)   
[Mass/Vol]      9.8 g/dL        Low             11.8-15.4       The   
[facility]  
   
                                        Comment on above:   Performed By: #### G  
LULS ####  
Point of Care testing  
,   
   
                                                    Lymphocytes (Bld)   
[#/Vol]         1.9 10*3/uL     Normal          1.00-4.8        The   
[facility]  
   
                                        Comment on above:   Performed By: #### G  
LULS ####  
Point of Care testing  
,   
   
                                                    Lymphocytes/100 WBC   
(Bld)           7.7 %           Normal          .               The   
[facility]  
   
                                        Comment on above:   Performed By: #### G  
LULS ####  
Point of Care testing  
,   
   
                                                    MCH (RBC) [Entitic   
mass]           26.1 pg         Normal          24.7-34.3       The   
[facility]  
   
                                        Comment on above:   Performed By: #### G  
LULS ####  
Point of Care testing  
,   
   
                                                    MCV (RBC) [Entitic   
vol]            81.2 fL         Normal                    The   
[facility]  
   
                                        Comment on above:   Performed By: #### G  
LULS ####  
Point of Care testing  
,   
   
                                                    Mean Corpuscular HGB   
Conc            32.1 g/dL       Normal          32.0-35.0       The   
[facility]  
   
                                        Comment on above:   Performed By: #### G  
LULS ####  
Point of Care testing  
,   
   
                                                    Monocytes (Bld)   
[#/Vol]         0.7 10*3/uL     Normal          0.0-0.8         The   
[facility]  
   
                                        Comment on above:   Performed By: #### G  
LULS ####  
Point of Care testing  
,   
   
                                                    Monocytes/100 WBC   
(Bld)           31.60 %         High            0.00-20.00      The   
[facility]  
   
                                        Comment on above:   Result Comment: For   
adults in ED, MDW > 20.0 may be associated  
   
with a higher  
risk of sepsis during the first 12 hrs of hospital admission   
   
                                                            Performed By: #### G  
LULS ####  
Point of Care testing  
,   
   
                                                    Monocytes/100 WBC   
(Bld)           2.9 %           Normal          .               The   
[facility]   
[facility]  
   
                                        Comment on above:   Performed By: #### G  
LULS ####  
Point of Care testing  
,   
   
                                                    Neutrophils (Bld)   
[#/Vol]         21.7 10*3/uL    High            1.8-7.7         The   
[facility]   
[facility]   
[facility]  
   
                                        Comment on above:   Performed By: #### G  
LULS ####  
Point of Care testing  
,   
   
                                                    Neutrophils/100 WBC   
(Bld)           89.2 %          Normal          .               The   
[facility]   
[facility]   
[facility]  
   
                                        Comment on above:   Performed By: #### G  
LULS ####  
Point of Care testing  
,   
   
                      NRBC%      0.0 /100{WBC} Normal     0-0.5      The   
[facility]   
[facility]   
[facility]  
   
                                        Comment on above:   Performed By: #### G  
LULS ####  
Point of Care testing  
,   
   
                                                    Platelet mean volume   
(Bld) [Entitic vol] 8.6 fL          Normal          6.3-10.7        The   
[facility]   
[facility]   
[facility]  
   
                                        Comment on above:   Performed By: #### G  
LULS ####  
Point of Care testing  
,   
   
                                                    Platelets (Bld)   
[#/Vol]         285 10*3/uL     Normal          150-450         The   
[facility]   
[facility]   
[facility]  
   
                                        Comment on above:   Performed By: #### G  
LULS ####  
Point of Care testing  
,   
   
                      RBC (Bld) [#/Vol] 3.77 10*6/uL Normal     3.60-5.00  The   
[facility]   
[facility]   
[facility]  
   
                                        Comment on above:   Performed By: #### G  
LULS ####  
Point of Care testing  
,   
   
                      WBC (Bld) [#/Vol] 24.3 10*3/uL High       3.8-11.6   The   
[facility]   
[facility]   
[facility]  
   
                                        Comment on above:   Performed By: #### G  
LULS ####  
Point of Care testing  
,   
   
                                                    Crystals.amorphous [Presence  
] in Urine by Computer assisted methodOrdered By:   
Miquel Asencio on 2025   
   
                                                    Crystals.amorphous   
Computer assisted Ql   
(U)                                     Crystals.amorphous   
[Presence] in Urine by   
Computer assisted   
method                                                      [facility]  
   
                                                    Dipstick and Microscopicon 0  
2025   
   
                                Appearance (U)  Turbid          Critically   
abnormal                  Clear                     The   
[facility]   
[facility]   
[facility]  
   
                                        Comment on above:   Order Comment: Name   
Collection Type:: Gonzalez Catheter   
   
                                                            Performed By: #### H  
H ####  
[address]   
   
                      Bacteria,Urine 3+         High       None Seen  The   
[facility]   
[facility]   
[facility]  
   
                                        Comment on above:   Order Comment: Name   
Collection Type:: Gonzalez Catheter   
   
                                                            Performed By: #### H  
H ####  
[address]   
   
                      Bilirubin,Urine Negative   Normal     Negative   The   
[facility]   
[facility]   
[facility]  
   
                                        Comment on above:   Order Comment: Name   
Collection Type:: Gonzalez Catheter   
   
                                                            Performed By: #### H  
H ####  
[address]   
   
                      Color (U)  Yellow     Normal     Yellow     The   
[facility]   
[facility]   
[facility]  
   
                                        Comment on above:   Order Comment: Name   
Collection Type:: Gonzalez Catheter   
   
                                                            Performed By: #### H  
H ####  
[address]   
   
                      Glucose Ql (U) 100 mg/dL  High       Normal     The   
[facility]   
[facility]   
[facility]  
   
                                        Comment on above:   Order Comment: Name   
Collection Type:: Gonzalez Catheter   
   
                                                            Performed By: #### H  
H ####  
[place] [address] [place]   
   
                      Hyaline Casts,Urine None       Normal     0-8        The   
[facility]   
[facility]   
[facility]  
   
                                        Comment on above:   Order Comment: Name   
Collection Type:: Gonzalez Catheter   
   
                                                            Performed By: #### H  
H ####  
[address]   
   
                      Ketones Ql (U) Negative   Normal     Negative   The   
[facility]   
[facility]   
[facility]  
   
                                        Comment on above:   Order Comment: Name   
Collection Type:: Gonzalez Catheter   
   
                                                            Performed By: #### H  
H ####  
[address]   
   
                                                    Leukocyte esterase   
Test strip Ql (U) 4+              High            Negative        The   
[facility]   
[facility]   
[facility]  
   
                                        Comment on above:   Order Comment: Name   
Collection Type:: Gonzalez Catheter   
   
                                                            Performed By: #### H  
H ####  
[place] [address] [place]   
   
                      Mucus,Urine Rare       Normal                The   
[facility]   
[facility]   
[facility]  
   
                                        Comment on above:   Order Comment: Name   
Collection Type:: Gonzalez Catheter   
   
                                                            Result Comment: PERF  
ORMED BY:  
[place] [address]  
[phone]  
PATHOLOGIST MEDICAL DIRECTOR  
CODEY SÁNCHEZ M.D.   
   
                                                            Performed By: #### H  
H ####  
[address]   
   
                      Nitrite,Urine Positive   High       Negative   The   
[facility]   
[facility]   
[facility]  
   
                                        Comment on above:   Order Comment: Name   
Collection Type:: Gonzalez Catheter   
   
                                                            Performed By: #### H  
H ####  
[address]   
   
                                                    Non-Squamous   
Epithelial Cell,U 1 [HPF]         High            None Seen       The   
[facility]   
[facility]   
[facility]  
   
                                        Comment on above:   Order Comment: Name   
Collection Type:: Gonzalez Catheter   
   
                                                            Performed By: #### H  
H ####  
[address]   
   
                      Occult Blood,Urine 2+         High       Negative   The   
[facility]   
[facility]   
[facility]  
   
                                        Comment on above:   Order Comment: Name   
Collection Type:: Gonzalez Catheter   
   
                                                            Result Comment: PERF  
ORMED BY:  
[place] [address]  
[phone]  
PATHOLOGIST MEDICAL DIRECTOR  
CODEY SÁNCHEZ M.D.   
   
                                                            Performed By: #### H  
H ####  
[address]   
   
                      pH (U)     6.0 [pH]   Normal     5.0-9.0    The   
[facility]   
[facility]   
[facility]  
   
                                        Comment on above:   Order Comment: Name   
Collection Type:: Gonzalez Catheter   
   
                                                            Performed By: #### H  
H ####  
[address]   
   
                                                    Protein (U)   
[Mass/Vol]      100 mg/dL       High            Negative        The   
[facility]   
[facility]   
[facility]  
   
                                        Comment on above:   Order Comment: Name   
Collection Type:: Gonzalez Catheter   
   
                                                            Performed By: #### H  
H ####  
[address]   
   
                      RBC,Urine  20 [HPF]   High       0-4        The   
[facility]   
[facility]   
[facility]  
   
                                        Comment on above:   Order Comment: Name   
Collection Type:: Gonzalez Catheter   
   
                                                            Performed By: #### H  
H ####  
[address] [place] [address] [place]   
   
                                                    Specificy   
Gravity,Urine   1.016           Normal          1.001-1.030     The   
[facility]   
[facility]   
[facility]  
   
                                        Comment on above:   Order Comment: Name   
Collection Type:: Gonzalez Catheter   
   
                                                            Performed By: #### H  
H ####  
[address]   
   
                                                    Squamous Epithelial   
Cell,Urine      5 [HPF]         High            0-2             The   
[facility]   
[facility]   
[facility]  
   
                                        Comment on above:   Order Comment: Name   
Collection Type:: Gonzalez Catheter   
   
                                                            Performed By: #### H  
H ####  
[address]   
   
                      Urobilinogen,Urine Normal     Normal     Normal     The   
[facility]   
[facility]   
[facility]  
   
                                        Comment on above:   Order Comment: Name   
Collection Type:: Gonzalez Catheter   
   
                                                            Performed By: #### H  
H ####  
[address]   
   
                      WBC CLUMP, Urine Many       High       None Seen  The   
[facility]   
[facility]   
[facility]  
   
                                        Comment on above:   Order Comment: Name   
Collection Type:: Gonzalez Catheter   
   
                                                            Performed By: #### H  
H ####  
[address]   
   
                      WBC,Urine  Innumerable High       0-4        The   
[facility]   
[facility]   
[facility]  
   
                                        Comment on above:   Order Comment: Name   
Collection Type:: Gonzalez Catheter   
   
                                                            Performed By: #### H  
H ####  
[address]   
   
                                                    Amorphous   
Crystal,Urine   3+              Normal                          The   
[facility]   
[facility]   
[facility]  
   
                                        Comment on above:   Order Comment: Reaso  
n for Exam Type 2 diabetes mellitus with   
complication,  
without long-ter   
   
                                                            Performed By: #### C  
MP, LIPID ####  
[place] [address] [place]   
   
                                Appearance (U)  Turbid          Critically   
abnormal                  Clear                     The   
[facility]   
[facility]   
[facility]  
   
                                        Comment on above:   Order Comment: Reaso  
n for Exam Type 2 diabetes mellitus with   
complication,  
without long-ter   
   
                                                            Performed By: #### C  
MP, LIPID ####  
[place] [address] [place]   
   
                      Bacteria,Urine 4+         High       None Seen  The   
[facility]   
[facility]   
[facility]  
   
                                        Comment on above:   Order Comment: Reaso  
n for Exam Type 2 diabetes mellitus with   
complication,  
without long-ter   
   
                                                            Performed By: #### C  
MP, LIPID ####  
[facility]  
[address] [place] [address] [place]   
   
                      Bilirubin,Urine Negative   Normal     Negative   The   
[facility]   
[facility]   
[facility]  
   
                                        Comment on above:   Order Comment: Reaso  
n for Exam Type 2 diabetes mellitus with   
complication,  
without long-ter   
   
                                                            Performed By: #### C  
MP, LIPID ####  
[facility]  
[address] [place] [address]   
   
                                Color (U)       Dark-Brown      Critically   
abnormal                  Yellow                    The   
[facility]   
[facility]   
[facility]  
   
                                        Comment on above:   Order Comment: Reaso  
n for Exam Type 2 diabetes mellitus with   
complication,  
without long-ter   
   
                                                            Performed By: #### C  
MP, LIPID ####  
[address]   
   
                      Glucose Ql (U) Normal     Normal     Normal     The   
[facility]   
[facility]   
[facility]  
   
                                        Comment on above:   Order Comment: Reaso  
n for Exam Type 2 diabetes mellitus with   
complication,  
without long-ter   
   
                                                            Performed By: #### C  
MP, LIPID ####  
[place] [address] [place]   
   
                      Hyaline Casts,Urine None       Normal     0-8        The   
[facility]   
[facility]   
[facility]  
   
                                        Comment on above:   Order Comment: Reaso  
n for Exam Type 2 diabetes mellitus with   
complication,  
without long-ter   
   
                                                            Performed By: #### C  
MP, LIPID ####  
[address]   
   
                      Ketones Ql (U) Negative   Normal     Negative   The   
[facility]   
[facility]   
[facility]  
   
                                        Comment on above:   Order Comment: Reaso  
n for Exam Type 2 diabetes mellitus with   
complication,  
without long-ter   
   
                                                            Performed By: #### C  
MP, LIPID ####  
[place] [address] [place]   
   
                                                    Leukocyte esterase   
Test strip Ql (U) 4+              High            Negative        The   
[facility]   
[facility]   
[facility]  
   
                                        Comment on above:   Order Comment: Reaso  
n for Exam Type 2 diabetes mellitus with   
complication,  
without long-ter   
   
                                                            Performed By: #### C  
MP, LIPID ####  
[place] [address] [place]   
   
                                Mucus,Urine     4+              Critically   
abnormal                                            The   
[facility]   
[facility]   
[facility]  
   
                                        Comment on above:   Order Comment: Reaso  
n for Exam Type 2 diabetes mellitus with   
complication,  
without long-ter   
   
                                                            Result Comment: PERF  
ORMED BY:  
[place] [address]  
[phone]  
PATHOLOGIST MEDICAL DIRECTOR  
CODEY SÁNCHEZ M.D.   
   
                                                            Performed By: #### C  
MP, LIPID ####  
[place] [address] [place]   
   
                      Nitrite,Urine Negative   Normal     Negative   The   
[facility]   
[facility]   
[facility]  
   
                                        Comment on above:   Order Comment: Reaso  
n for Exam Type 2 diabetes mellitus with   
complication,  
without long-ter   
   
                                                            Performed By: #### C  
MP, LIPID ####  
[address]   
   
                      Occult Blood,Urine 1+         High       Negative   The   
[facility]   
[facility]   
[facility]  
   
                                        Comment on above:   Order Comment: Reaso  
n for Exam Type 2 diabetes mellitus with   
complication,  
without long-ter   
   
                                                            Result Comment: PERF  
ORMED BY:  
[place] [address]  
[phone]  
PATHOLOGIST MEDICAL DIRECTOR  
CODEY SÁNCHEZ M.D.   
   
                                                            Performed By: #### C  
MP, LIPID ####  
[address]   
   
                      pH (U)     8.5 [pH]   Normal     5.0-9.0    The   
[facility]   
[facility]   
[facility]  
   
                                        Comment on above:   Order Comment: Reaso  
n for Exam Type 2 diabetes mellitus with   
complication,  
without long-ter   
   
                                                            Performed By: #### C  
MP, LIPID ####  
[address]   
   
                                                    Protein (U)   
[Mass/Vol]      200 mg/dL       High            Negative        The   
[facility]   
[facility]   
[facility]  
   
                                        Comment on above:   Order Comment: Reaso  
n for Exam Type 2 diabetes mellitus with   
complication,  
without long-ter   
   
                                                            Performed By: #### C  
MP, LIPID ####  
[place] [address] [place]   
   
                      RBC,Urine  10 [HPF]   High       0-4        The   
[facility]   
[facility]   
[facility]  
   
                                        Comment on above:   Order Comment: Reaso  
n for Exam Type 2 diabetes mellitus with   
complication,  
without long-ter   
   
                                                            Performed By: #### C  
MP, LIPID ####  
[address]   
   
                                                    Specificy   
Gravity,Urine   1.025           Normal          1.001-1.030     The   
[facility]   
[facility]   
[facility]  
   
                                        Comment on above:   Order Comment: Reaso  
n for Exam Type 2 diabetes mellitus with   
complication,  
without long-ter   
   
                                                            Performed By: #### C  
MP, LIPID ####  
[facility]  
[address] [place] [address] [place]   
   
                      Urobilinogen,Urine Normal     Normal     Normal     The   
[facility]   
[facility]   
[facility]  
   
                                        Comment on above:   Order Comment: Reaso  
n for Exam Type 2 diabetes mellitus with   
complication,  
without long-ter   
   
                                                            Performed By: #### C  
MP, LIPID ####  
[facility]  
[address] [place] [address] [place]   
   
                      WBC,Urine  20 [HPF]   High       0-4        The   
[facility]   
[facility]   
[facility]  
   
                                        Comment on above:   Order Comment: Reaso  
n for Exam Type 2 diabetes mellitus with   
complication,  
without long-ter   
   
                                                            Performed By: #### C  
MP, LIPID ####  
[facility]  
[address]   
   
                                                    Epithelial cells.non-squamou  
s [#/area] in Urine sediment by Automated   
countOrdered   
By: Miquel Asencio on 2025   
   
                                                    Epithelial   
cells.non-squamous   
Auto (Urine sed)   
[#/Area]                                Epithelial   
cells.non-squamous   
[#/area] in Urine   
sediment by Automated   
count               High                None Seen           [facility]  
   
                                                    Epithelial cells.squamous [#  
/area] in Urine sediment by Automated countOrdered   
By:   
Miquel Asencio on 2025   
   
                                                    Epithelial   
cells.squamous Auto   
(Urine sed) [#/Area]                    Epithelial   
cells.squamous   
[#/area] in Urine   
sediment by Automated   
count               High                0-2                 [facility]  
   
                                                    Erythrocytes [#/area] in Uri  
ne sediment by Automated countOrdered By: Miquel Asencio on   
2025   
   
                                                    RBC Auto (Urine sed)   
[#/Area]                                Erythrocytes [#/area]   
in Urine sediment by   
Automated count     High                0-4                 [facility]  
   
                                                    Globulin Calc (S) [Mass/Vol]  
Ordered By: Miquel Asencio on 2025   
   
                                                    Globulin (S)   
[Mass/Vol]                              Serum globulin   
measurement by   
calculation   
(mass/volume)                                               [facility]  
   
                                                    Glucose [Mass/volume] in Uri  
ne by Test stripOrdered By: Miquel Asencio on   
2025   
   
                                                    Glucose Test strip   
(U) [Mass/Vol]                          Glucose [Mass/volume]   
in Urine by Test strip High                Normal              [facility]  
   
                                                    Hemoglobin Test strip Ql (U)  
Ordered By: Miquel Asencio on 2025   
   
                                        Hemoglobin Ql (U)   Hemoglobin [Presence  
]   
in Urine by Test strip High                Negative            [facility]  
   
                                                    Hepatic Panelon 2025   
   
                      Albumin [Mass/Vol] 3.3 g/dL   Low        3.5-5.7    The   
[facility]   
[facility]   
[facility]  
   
                                        Comment on above:   Performed By: #### G  
LULS ####  
Point of Care testing  
,   
   
                                                    Albumin/Globulin   
[Mass ratio]    0.8 {ratio}     Normal                          The   
[facility]   
[facility]   
[facility]  
   
                                        Comment on above:   Performed By: #### G  
LULS ####  
Point of Care testing  
,   
   
                                                    ALP [Catalytic   
activity/Vol]   49 U/L          Normal                    The   
[facility]   
[facility]   
[facility]  
   
                                        Comment on above:   Performed By: #### G  
LULS ####  
Point of Care testing  
,   
   
                                                    ALT [Catalytic   
activity/Vol]   5 U/L           Low             7-52            The   
[facility]   
[facility]   
[facility]  
   
                                        Comment on above:   Performed By: #### G  
LULS ####  
Point of Care testing  
,   
   
                                                    AST [Catalytic   
activity/Vol]   8 U/L           Low             13-39           The   
[facility]   
[facility]   
[facility]  
   
                                        Comment on above:   Performed By: #### G  
LULS ####  
Point of Care testing  
,   
   
                      Bilirubin [Mass/Vol] 0.6 mg/dL  Normal     0.3-1.0    The   
[facility]   
[facility]   
[facility]  
   
                                        Comment on above:   Performed By: #### G  
LULS ####  
Point of Care testing  
,   
   
                      Bilirubin,Indirect 0.4 mg/dL  Normal                The   
[facility]   
[facility]   
[facility]  
   
                                        Comment on above:   Performed By: #### G  
LULS ####  
Point of Care testing  
,   
   
                                                    Bilirubin.indirect   
[Mass/Vol]      0.20 mg/dL      High            0.03-0.18       The   
[facility]   
[facility]   
[facility]  
   
                                        Comment on above:   Performed By: #### G  
LULS ####  
Point of Care testing  
,   
   
                                                    Globulin (S)   
[Mass/Vol]      4.0 g/dL        Normal                          The   
[facility]   
[facility]   
[facility]  
   
                                        Comment on above:   Performed By: #### G  
LULS ####  
Point of Care testing  
,   
   
                      Protein [Mass/Vol] 7.3 g/dL   Normal     6.4-8.9    The   
[facility]   
[facility]   
[facility]  
   
                                        Comment on above:   Performed By: #### G  
LULS ####  
Point of Care testing  
,   
   
                                                    Hyaline casts [#/area] in Ur  
ine sediment by Automated countOrdered By: Miquel Asencio on   
2025   
   
                                                    Hyaline casts Auto   
(Urine sed) [#/Area]                    Hyaline casts [#/area]   
in Urine sediment by   
Automated count                         0-8                 [facility]  
   
                                                    INR in Platelet poor plasma   
by Coagulation assayOrdered By: Miquel Asencio on   
2025   
   
                                                    INR Coag (PPP)   
[Relative time]                         INR in Platelet poor   
plasma by Coagulation   
assay                                                       [facility]  
   
                                        Comment on above:   INR Therapeutic Rang  
e A) Pre- and Peroperative OAT started two  
  
weeks before surgery. NOT HIP SURGERY: 1.5 - 2.5 HIP SURGERY: 2   
- 3B) Primary and secondary prevention of venous THROMBOSIS: 2 -   
3C) Active venous thrombosis, pulmonary embolismand prevention   
of recurrent venous thrombosis: 2 - 3D) Prevention of arterial   
thromboembolismincluding patients with mechanical heart valves:   
3 - 4.5   
   
                                                    Ketones Test strip Ql (U)Ord  
ered By: Miquel Asencio on 2025   
   
                                        Ketones Ql (U)      Ketones [Presence] i  
n   
Urine by Test strip                     Negative            [facility]  
   
                                                    Laboratory - Microbiology an  
d Antimicrobial susceptibilityOrdered By: Miquel Asencio on   
2025   
   
                                                    Bacteria identified   
Cx Nom (Bld)    NO GROWTH 5 DAYS                                 [facility]  
   
                                                    Bacteria identified   
Cx Nom (Bld)    NO GROWTH 5 DAYS                                 [facility]  
   
                                                    Lactic Acidon 2025   
   
                      Lactate [Moles/Vol] 1.8 mmol/L Normal     0.5-2.2    The   
[facility]   
[facility]   
[facility]  
   
                                        Comment on above:   Result Comment: PERF  
ORMED BY:  
[place] [address]  
[phone]  
PATHOLOGIST MEDICAL DIRECTOR  
CODEY SÁNCHEZ M.D.   
   
                                                            Performed By: #### H  
H ####  
[address]   
   
                                                    Leukocyte clumps [Presence]   
in Urine by AutomatedOrdered By: Miquel Asencio on   
2025   
   
                                                    Leukocyte clumps Auto   
Ql (U)                                  Leukocyte clumps   
[Presence] in Urine by   
Automated           High                None Seen           [facility]  
   
                                                    Leukocyte esterase [Presence  
] in Urine by Test stripOrdered By: Miquel Asencio on   
2025   
   
                                                    Leukocyte esterase   
Test strip Ql (U)                       Leukocyte esterase   
[Presence] in Urine by   
Test strip          High                Negative            [facility]  
   
                                                    Leukocytes [#/area] in Urine  
 sediment by Automated countOrdered By: Miquel Asencio   
on   
2025   
   
                                                    WBC Auto (Urine sed)   
[#/Area]                                Leukocytes [#/area] in   
Urine sediment by   
Automated count     High                0-4                 [facility]  
   
                                                    Lipaseon 2025   
   
                                                    Lipase [Catalytic   
activity/Vol]   28.0 U/L        Normal          11.0-82.0       The   
[facility]   
[facility]   
[facility]  
   
                                        Comment on above:   Result Comment: PERF  
ORMED BY:  
[facility]  
[address]  
[place] [address]  
[phone]  
PATHOLOGIST MEDICAL DIRECTOR  
CODEY SÁNCHEZ M.D.   
   
                                                            Performed By: #### G  
LULS ####  
Point of Care testing  
,   
   
                                                    Lipase [Enzymatic activity/v  
olume] in Serum or PlasmaOrdered By: Miquel Asencio on   
2025   
   
                                                    Lipase [Catalytic   
activity/Vol]                           Lipase [Enzymatic   
activity/volume] in   
Serum or Plasma                         11.0-82.0           [facility]  
   
                                                    Mucus [Presence] in Urine by  
 AutomatedOrdered By: Miquel Asencio on 2025   
   
                                        Mucus Auto Ql (U)   Mucus [Presence] in   
Urine by Automated                                          [facility]  
   
                                                    Nitrite Test strip Ql (U)Ord  
ered By: Miquel Asencio on 2025   
   
                                        Nitrite Ql (U)      Nitrite [Presence] i  
n   
Urine by Test strip High                Negative            [facility]  
   
                                                    No Panel InformationOrdered   
By: Miquel Asencio on 2025   
   
                                                    Arterial Blood Base   
Excess          -8.6 mmol/L     Low             -3.0-3.0        [facility]  
   
                                                    Arterial Blood Oxygen   
Content         5.7 mmol/L      Low             6.6-9.7         [facility]  
   
                                                    Arterial Blood Oxygen   
Saturation      96.0 %                          95.0-100.0      [facility]  
   
                                                    Arterial Blood   
Partial Pressure CO2 41.6 mm[Hg]                     35.0-45.0       [facility]  
   
                                                    Arterial Blood   
Partial Pressure O2 88.0 mm[Hg]                     80.0-100.0      [facility]  
   
                      Arterial Blood pH 7.25       Low        7.35-7.45  [facility]  
   
                                                    Blood Gas Critical   
Value           See comment                                     [facility]  
   
                                        Comment on above:   Critical Value vargas  
d on: 2025 at 18:25   
   
                      Blood Gas Liter Flow 3 L/min                          [facility]  
   
                      Blood Gas Sample Site Left radial                       Fi  
[facility]  
   
                      FiO2       32 %                             [facility]  
   
                                                    Partial Thromboplastin Timeo  
n 2025   
   
                                                    aPTT Coag (Bld)   
[Time]          33.8 s          Normal          25.1-36.5       The   
[facility]   
[facility]   
[facility]  
   
                                        Comment on above:   Result Comment: A he  
matocrit value greater than 55% may lead   
to   
inaccurate  
results in coagulation testing. Patients having hematocrit  
values >55% require a special collection tube for  
coagulation studies.  
Please contact the laboratory at [phone] for redraw  
instructions.  
PERFORMED BY:  
[facility]  
[address]  
[place] [address]  
[phone]  
PATHOLOGIST MEDICAL DIRECTOR  
CODEY SÁNCHEZ M.D.   
   
                                                            Performed By: #### G  
LULS ####  
Point of Care testing  
,   
   
                                                    Protein Test strip (U) [Mass  
/Vol]Ordered By: Miquel Asencio on 2025   
   
                                                    Protein (U)   
[Mass/Vol]                              Protein [Mass/volume]   
in Urine by Test strip High                Negative            [facility]  
   
                                                    Protein [Mass/volume] in Ser  
um or PlasmaOrdered By: Miquel Asencio on 2025   
   
                                        Protein [Mass/Vol]  Protein [Mass/volume  
]   
in Serum or Plasma                      6.4-8.9             [facility]  
   
                                                    Prothrombin Time INRon    
   
                                                    INR Coag (PPP)   
[Relative time] 1.5 {INR}       Normal                          The   
[facility]   
[facility]   
[facility]  
   
                                        Comment on above:   Result Comment: INR   
Therapeutic Range  
A) Pre- and Peroperative OAT started two weeks before  
surgery. NOT HIP SURGERY: 1.5 - 2.5  
HIP SURGERY: 2 - 3  
B) Primary and secondary prevention of venous  
THROMBOSIS: 2 - 3  
C) Active venous thrombosis, pulmonary embolism  
and prevention of recurrent venous thrombosis: 2 - 3  
D) Prevention of arterial thromboembolism  
including patients with mechanical heart valves: 3 - 4.5   
   
                                                            Performed By: #### G  
LULS ####  
Point of Care testing  
,   
   
                      PT Coag (PPP) [Time] 17.1 s     High       9.0-12.9   The   
[facility]   
[facility]   
[facility]  
   
                                        Comment on above:   Result Comment: A he  
matocrit value greater than 55% may lead   
to   
inaccurate  
results in coagulation testing. Patients having hematocrit  
values >55% require a special collection tube for  
coagulation studies.  
Please contact the laboratory at [phone] for redraw  
instructions.   
   
                                                            Performed By: #### G  
EDILMA ####  
Point of Care testing  
,   
   
                                                    Prothrombin time (PT)Ordered  
 By: Miquel Asencio on 2025   
   
                      PT Coag (PPP) [Time] Prothrombin time (PT) High       9.0-  
12.9   [facility]  
   
                                        Comment on above:   A hematocrit value g  
reater than 55% may lead to inaccurate   
results in coagulation testing. Patients having hematocrit   
values >55% require a special collection tube for coagulation   
studies. Please contact the laboratory at [phone] for   
redraw instructions.   
   
                                                    Serum or plasma albumin/glob  
ulin mass ratioOrdered By: Miquel Asencio on 2025  
   
   
                                                    Albumin/Globulin   
[Mass ratio]                            Serum or plasma   
albumin/globulin mass   
ratio                                                       [facility]  
   
                                                    Serum or plasma non-glucuron  
idated bilirubin measurement (mass/volume)Ordered   
By:   
Miquel Asencio on 2025   
   
                                                    Bilirubin.indirect   
[Mass/Vol]                              Serum or plasma   
non-glucuronidated   
bilirubin measurement   
(mass/volume)                                               [facility]  
   
                                                    Specific gravity Test strip   
(U) [Rel density]Ordered By: Miquel Asencio on   
2025   
   
                                                    Specific gravity (U)   
[Rel density]                           Specific gravity of   
Urine by Test strip                     1.001-1.030         [facility]  
   
                                                    Telephone Encounteron 2025   
   
                                                    Transcription   
Authentication   
Interface Message   
Text                            Normal                          The   
[facility]   
System  
   
                                                    Urine Cultureon 2025   
   
                                                    Bacteria identified   
Cx Nom (U)                              ORGANISM: Proteus   
mirabilis (O:PROMIR)  
[place] Count  
>100,000  
Aerobic LIZ Charge   
([id number])  
----------------------  
--- SUSCEPTIBILITY   
----------------------  
--  
ORGANISM: O:PROMIR  
ANTIBIOTIC   
INTERPRETATION LIZ  
Amikacin S <16  
Amoxacillin/K   
Clavulanate S <8  
Ampicillin S <8  
Ampicillin/Sulbactam S   
<4  
Aztreonam S <4  
Cefazolin S 4  
Cefepime S <2  
Ceftazidime S <1  
Ceftazidime/Avibactam   
S <4  
Ceftolozane/Tazobactam   
S <2  
Ceftriaxone S <1  
Cefuroxime S <4  
Ciprofloxacin S <0.25  
Ertapenem S <0.5  
Gentamicin S <2  
Levofloxacin S <0.5  
Meropenem S <1  
Meropenem/Vaborbactam   
S <2  
Piperacillin/Tazobacta  
m S <8  
Tobramycin S <2  
Trimethoprim/Sulfameth  
oxazole S <0.5  
S = SUSCEPTIBLE I =   
INTERMEDIATE R =   
RESISTANT  
BLANK = DATA NOT   
AVAILABLE, OR DRUG NOT   
ADVISABLE OR TESTED  
R* = RESISTANCE DUE TO   
EXTENDED SPECTRUM   
BETA-LACTAMASES  
ESBL = EXTENDED   
SPECTRUM   
BETA-LACTAMASE  
TFG =   
THYMIDINE-DEPENDENT   
STRAIN  
PERRY = BETA-LACTAMASE   
POSITIVE  
IB = INDUCIBLE   
BETA-LACTAMASE.   
APPEARS IN PLACE OF   
'S' WITH  
SPECIES KNOWN TO   
POSSESS INDUCIBLE   
BETA-LACTAMASES.  
POTENTIALLY THEY MAY   
BECOME RESISTANT TO   
ALL B-LACTAM DRUGS.  
PERFORMED BY:  
[facility]  
[address] [place]  
[place] [address]  
[phone]  
PATHOLOGIST MEDICAL   
DIRECTOR  
CODEY SÁNCHEZ M.D.                Normal                                  The   
[facility]   
[facility]   
[facility]  
   
                                        Comment on above:   Performed By: #### G  
LULS ####  
Point of Care testing  
,   
   
                                                    Urine cultureOrdered By: Gabbi Asencio on 2025   
   
                                                    Bacteria identified   
Cx Nom (U)                      Abnormal                        [facility]  
   
                                                    Urobilinogen Test strip (U)   
[Mass/Vol]Ordered By: Miquel Asencio on 2025   
   
                                                    Urobilinogen (U)   
[Mass/Vol]                              Urobilinogen   
[Mass/volume] in Urine   
by Test strip                           Normal              [facility]  
   
                                                    aPTT in Platelet poor plasma  
 by Coagulation assayOrdered By: Miquel Asencio on   
2025   
   
                                                    aPTT Coag (PPP)   
[Time]                                  Activated partial   
thromboplastin time   
(aPTT) in platelet   
poor plasma by   
coagulation a                           25.1-36.5           [facility]  
   
                                        Comment on above:   A hematocrit value g  
reater than 55% may lead to inaccurate   
results in coagulation testing. Patients having hematocrit   
values >55% require a special collection tube for coagulation   
studies. Please contact the laboratory at [phone] for   
redraw instructions.   
   
                                                    pH Test strip (U)Ordered By:  
 Miquel Asencio on 2025   
   
                                        pH (U)              pH of Urine by Test   
strip                                   5.0-9.0             [facility]  
   
                                                    Progress Noteson 10-   
   
                                                    Transcription   
Authentication   
Interface Message   
Text                            Normal                          The   
[facility]   
System  
   
                                                    Telephone Encounteron 2024   
   
                                                    Transcription   
Authentication   
Interface Message   
Text                                    Patient called back in   
states she will be   
look for care   
elsewhere.          Normal                                  The   
[facility]   
System  
   
                                                    Telephone Encounteron 2024   
   
                                                    Transcription   
Authentication   
Interface Message   
Text                            Normal                          The   
[facility]   
System  
   
                                                    Telephone Encounteron 2024   
   
                                                    Transcription   
Authentication   
Interface Message   
Text                            Normal                          The   
[facility]   
System  
   
                                                    Transcription   
Authentication   
Interface Message   
Text                            Normal                          The   
[facility]   
System  
   
                                                    Alanine aminotransferase [En  
zymatic activity/volume] in Serum or PlasmaOrdered   
By:   
Apollo Lopez on 2024   
   
                                                    ALT [Catalytic   
activity/Vol]   4 U/L           Low             7-52            [facility]  
   
                                        Comment on above:   Order Comment: Reaso  
n for Exam Type 2 diabetes mellitus with   
complication,  
without long-ter   
   
                                                            Performed By: #### C  
MP, LIPID ####  
[place] [address] [place]   
   
                                                    Albumin [Mass/volume] in Ser  
um or Plasma by Bromocresol green (BCG) dye binding   
methoOrdered By: Apollo Lopez on 2024   
   
                                                    Albumin BCG dye   
[Mass/Vol]      3.6 g/dL                        3.5-5.7         [facility]  
   
                                                    Alkaline phosphatase [Enzyma  
tic activity/volume] in Serum or PlasmaOrdered By:   
Apollo Lopez on 2024   
   
                                                    ALP [Catalytic   
activity/Vol]   48 U/L          Normal                    [facility]  
   
                                        Comment on above:   Order Comment: Reaso  
n for Exam Type 2 diabetes mellitus with   
complication,  
without long-ter   
   
                                                            Performed By: #### C  
MP, LIPID ####  
[facility]  
[address] [place] [address] [place]   
   
                                                    Aspartate aminotransferase [  
Enzymatic activity/volume] in Serum or PlasmaOrdered  
By:   
Apollo Lopez on 2024   
   
                                                    AST [Catalytic   
activity/Vol]   6 U/L           Low             13-39           [facility]  
   
                                        Comment on above:   Order Comment: Reaso  
n for Exam Type 2 diabetes mellitus with   
complication,  
without long-ter   
   
                                                            Performed By: #### C  
MP, LIPID ####  
[facility]  
[address] [place] [address] [place]   
   
                                                    Bilirubin.total [Mass/volume  
] in Serum or PlasmaOrdered By: Apollo Lopez on   
2024   
   
                      Bilirubin [Mass/Vol] 0.2 mg/dL  Low        0.3-1.0    [facility]  
   
                                        Comment on above:   Order Comment: Reaso  
n for Exam Type 2 diabetes mellitus with   
complication,  
without long-ter   
   
                                                            Performed By: #### C  
MP, LIPID ####  
[facility]  
[address] [place] [address] [place]   
   
                                                    Calcium [Mass/volume] in Ser  
um or PlasmaOrdered By: Apollo Lopez on 2024   
   
                      Calcium [Mass/Vol] 9.0 mg/dL  Normal     8.6-10.3   [facility]  
   
                                        Comment on above:   Order Comment: Reaso  
n for Exam Type 2 diabetes mellitus with   
complication,  
without long-ter   
   
                                                            Performed By: #### C  
MP, LIPID ####  
[facility]  
[address] [id number] [place]   
   
                                                    Carbon dioxide, total [Moles  
/volume] in Serum or PlasmaOrdered By: Apollo Lopez  
 on   
2024   
   
                      CO2 [Moles/Vol] 28.8 mmol/L Normal     21.0-31.0  [facility]  
   
                                        Comment on above:   Order Comment: Reaso  
n for Exam Type 2 diabetes mellitus with   
complication,  
without long-ter   
   
                                                            Performed By: #### C  
MP, LIPID ####  
[facility]  
[address] [id number] [place]   
   
                                                    Chloride [Moles/volume] in S  
chandler or PlasmaOrdered By: Apollo Lopez on   
2024   
   
                      Chloride [Moles/Vol] 105 mmol/L Normal          [facility]  
   
                                        Comment on above:   Order Comment: Reaso  
n for Exam Type 2 diabetes mellitus with   
complication,  
without long-ter   
   
                                                            Performed By: #### C  
MP, LIPID ####  
[facility]  
[address] [id number] [place]   
   
                                                    Cholesterol [Mass/volume] in  
 Serum or PlasmaOrdered By: Apollo Lopez on   
2024   
   
                                                    Cholesterol   
[Mass/Vol]      204 mg/dL       High            140-200         [facility]  
   
                                        Comment on above:   Chol less than 200 m  
g/dl low riskChol 201-239 mg/dl borderline  
   
riskChol 240 mg/dl and greater high risk   
   
                                                            Order Comment: Reaso  
n for Exam Type 2 diabetes mellitus with   
complication,  
without long-ter   
   
                                                            Result Comment: Chol  
 less than 200 mg/dl low risk  
Chol 201-239 mg/dl borderline risk  
Chol 240 mg/dl and greater high risk   
   
                                                            Performed By: #### C  
MP, LIPID ####  
[facility]  
[address] [id number] [place]   
   
                                                    Cholesterol in LDL Calc [Mas  
s/Vol]Ordered By: Apollo Lopez on 2024   
   
                                                    Cholesterol in LDL   
[Mass/Vol]      108 mg/dL       High            0-100           [facility]  
   
                                        Comment on above:   LDL ATP III CLASSIFI  
CATIONLDL less than 100 mg/dL OptimalLDL   
100-129 mg/dL Near or above optimalLDL 130-159 mg/dL Borderline   
highLDL 160-189 mg/dL HighLDL greater than 189 mg/dL Very high   
   
                                                    Cholesterol in VLDL Calc [Ma  
ss/Vol]Ordered By: Apollo Lopez on 2024   
   
                                                    Cholesterol in VLDL   
[Mass/Vol]      68 mg/dL                                        [facility]  
   
                                                    Comprehensive Metabolic Pane  
betty 2024   
   
                      Albumin [Mass/Vol] 3.6 g/dL   Normal     3.5-5.7    The   
[facility]   
[facility]  
   
                                        Comment on above:   Order Comment: Reaso  
n for Exam Type 2 diabetes mellitus with   
complication,  
without long-ter   
   
                                                            Performed By: #### C  
MP, LIPID ####  
[facility]  
[address] [id number] [place]   
   
                                                    GFR/1.73 sq   
M.predicted MDRD   
(S/P/Bld) [Vol   
rate/Area]                              20.695   
mL/min/{1.73_m2}    Normal                                  The   
[facility]   
[facility]   
[facility]  
   
                                        Comment on above:   Order Comment: Reaso  
n for Exam Type 2 diabetes mellitus with   
complication,  
without long-ter   
   
                                                            Performed By: #### C  
MP, LIPID ####  
[facility]  
[address] [id number] [place]   
   
                                                    Creatinine [Mass/volume] in   
Serum or PlasmaOrdered By: Apollo Lopez on   
2024   
   
                      Creatinine [Mass/Vol] 2.66 mg/dL High       0.60-1.20  [facility]  
   
                                        Comment on above:   Order Comment: Reaso  
n for Exam Type 2 diabetes mellitus with   
complication,  
without long-ter   
   
                                                            Performed By: #### C  
MP, LIPID ####  
[facility]  
[address] [id number] [place]   
   
                                                    Glucose [Mass/volume] in Ser  
um or PlasmaOrdered By: Apollo Lopez on 2024   
   
                      Glucose [Mass/Vol] 201 mg/dL  High            [facility]  
   
                                        Comment on above:   ADA recommended refe  
rence rangeRandom Glucose Reference Range   
is   
dependent on time and content of last meal. Glucose of more than   
200 mg/dL in a nonstressed, ambulatory subject supports the   
diagnosis of Diabetes Mellitus.   
   
                                                            Order Comment: Reaso  
n for Exam Type 2 diabetes mellitus with   
complication,  
without long-ter   
   
                                                            Result Comment: Rand  
om Glucose Reference Range is dependent on   
time and  
content of last meal. Glucose of more than 200 mg/dL in a  
nonstressed, ambulatory subject supports the diagnosis of  
Diabetes Mellitus.  
ADA recommended reference range   
   
                                                            Performed By: #### C  
MP, LIPID ####  
[facility]  
[address] [id number] [facility]   
   
                                                    Lipid Panelon 2024   
   
                                                    LDL   
Cholesterol,Calculate  
d               108 mg/dL       High            0-100           The   
[facility]   
[facility]   
[facility]  
   
                                        Comment on above:   Order Comment: Reaso  
n for Exam Type 2 diabetes mellitus with   
complication,  
without long-ter   
   
                                                            Result Comment: LDL   
ATP III CLASSIFICATION  
LDL less than 100 mg/dL Optimal  
-129 mg/dL Near or above optimal  
-159 mg/dL Borderline high  
-189 mg/dL High  
LDL greater than 189 mg/dL Very high   
   
                                                            Performed By: #### C  
MP, LIPID ####  
[facility]  
[address] [id number] [facility]   
   
                      Triglyceride w/Reflex 344 mg/dL  High       0-149      The  
   
[facility]   
[facility]   
[facility]  
   
                                        Comment on above:   Order Comment: Reaso  
n for Exam Type 2 diabetes mellitus with   
complication,  
without long-ter   
   
                                                            Result Comment: TRIG  
 ATP III CLASSIFICATION  
TRIG less than 150 mg/dL Normal  
TRIG 150-199 mg/dL Borderline high  
TRIG 200-500 mg/dL High  
TRIG greater than 500 mg/dL Very high  
Standard traceable to the Center for Disease Conrtrol and  
Prevention (CDC) test method.   
   
                                                            Performed By: #### C  
MP, LIPID ####  
[facility]  
[address] [id number] [facility]   
   
                      VLDL CHOLESTEROL 68 mg/dL   Normal                The   
[facility]   
[facility]   
[facility]  
   
                                        Comment on above:   Order Comment: Reaso  
n for Exam Type 2 diabetes mellitus with   
complication,  
without long-ter   
   
                                                            Performed By: #### C  
MP, LIPID ####  
[facility]  
[address]   
   
                                                    No Panel InformationOrdered   
By: Apollo Lopez on 2024   
   
                                                    Estimated GFR   
(CKD-EPI)       20.695 mL/Min                                   [facility]  
   
                                                    Pharmacy Creatinine   
Clearance (Chem N/A                                             [facility]  
   
                                                    Potassium [Moles/volume] in   
Serum or PlasmaOrdered By: Apollo Lopez on   
2024   
   
                      Potassium [Moles/Vol] 4.0 mmol/L Normal     3.5-5.1    [facility]  
   
                                        Comment on above:   Order Comment: Reaso  
n for Exam Type 2 diabetes mellitus with   
complication,  
without long-ter   
   
                                                            Performed By: #### C  
MP, LIPID ####  
[facility]  
[address] [place] [address]   
   
                                                    Protein [Mass/volume] in Ser  
um or PlasmaOrdered By: Apollo Lopez on 2024   
   
                      Protein [Mass/Vol] 7.1 g/dL   Normal     6.4-8.9    [facility]  
   
                                        Comment on above:   Order Comment: Reaso  
n for Exam Type 2 diabetes mellitus with   
complication,  
without long-ter   
   
                                                            Performed By: #### C  
MP, LIPID ####  
[facility]  
[address] [place] [address]   
   
                                                    Serum globulin measurement b  
y calculation (mass/volume)Ordered By: Apollo Lopez  
 on   
2024   
   
                                                    Globulin (S)   
[Mass/Vol]      3.5 g/dL        [facility]  
   
                                        Comment on above:   Order Comment: Reaso  
n for Exam Type 2 diabetes mellitus with   
complication,  
without long-ter   
   
                                                            Performed By: #### C  
MP, LIPID ####  
[facility]  
[address] [place] [address] [place]   
   
                                                    Serum or plasma albumin/glob  
ulin mass ratioOrdered By: Apollo Lopez on   
2024   
   
                                                    Albumin/Globulin   
[Mass ratio]    1.0 {ratio}     [facility]  
   
                                        Comment on above:   Order Comment: Reaso  
n for Exam Type 2 diabetes mellitus with   
complication,  
without long-ter   
   
                                                            Performed By: #### C  
MP, LIPID ####  
[facility]  
[address]   
   
                                                    Serum or plasma anion gap de  
terminationOrdered By: Apollo Lopez on 2024   
   
                      Anion gap [Moles/Vol] 9.2 mmol/L Normal     6.0-15.0   [facility]  
   
                                        Comment on above:   Order Comment: Reaso  
n for Exam Type 2 diabetes mellitus with   
complication,  
without long-ter   
   
                                                            Performed By: #### C  
MP, LIPID ####  
[facility]  
[address]   
   
                                                    Serum or plasma high density  
 lipoprotein (HDL) cholesterol measurementOrdered   
By:   
Apollo Lopez on 2024   
   
                                                    Cholesterol in HDL   
[Mass/Vol]      27 mg/dL        Normal          23-92           [facility]  
   
                                        Comment on above:   HDL CHOL ATP-III CLA  
SSIFICATION Cardiovascular RiskHDL > or   
equal to 60 mg/dL LOWHDL < 40 mg/dL HIGH   
   
                                                            Order Comment: Reaso  
n for Exam Type 2 diabetes mellitus with   
complication,  
without long-ter   
   
                                                            Result Comment: HDL   
CHOL ATP-III CLASSIFICATION  
Cardiovascular  
Risk  
HDL > or equal to 60 mg/dL LOW  
HDL < 40 mg/dL HIGH   
   
                                                            Performed By: #### C  
MP, LIPID ####  
[facility]  
[address] [place] [address]   
   
                                                    Serum or plasma total choles  
terol/high density lipoprotein (HDL) cholesterol   
mass   
ratOrdered By: Apollo Lopez on 2024   
   
                                                    Cholesterol.total/Cho  
lesterol in HDL [Mass   
ratio]          7.6 {ratio}     Normal          <5.0            [facility]  
   
                                        Comment on above:   Order Comment: Reaso  
n for Exam Type 2 diabetes mellitus with   
complication,  
without long-ter   
   
                                                            Result Comment: PERF  
ORMED BY:  
[place] [address]  
[phone]  
PATHOLOGIST MEDICAL DIRECTOR  
LUIS ALBERTO MARROQUIN M.D.   
   
                                                            Performed By: #### C  
MP, LIPID ####  
[facility]  
[address] [place] [address]   
   
                                                    Sodium [Moles/volume] in Ser  
um or PlasmaOrdered By: Apollo Lopez on 2024   
   
                      Sodium [Moles/Vol] 139 mmol/L Normal     136-145    [facility]  
   
                                        Comment on above:   Order Comment: Reaso  
n for Exam Type 2 diabetes mellitus with   
complication,  
without long-ter   
   
                                                            Performed By: #### C  
MP, LIPID ####  
[facility]  
[address] [place] [address] [facility]   
   
                                                    Triglyceride [Mass/volume] i  
n Serum or PlasmaOrdered By: Apollo Lopez on   
2024   
   
                                                    Triglyceride   
[Mass/Vol]      344 mg/dL       High            0-149           [facility]  
   
                                        Comment on above:   TRIG ATP III CLASSIF  
ICATIONTRIG less than 150 mg/dL NormalTRIG  
   
150-199 mg/dL Borderline highTRIG 200-500 mg/dL High TRIG   
greater than 500 mg/dL Very highStandard traceable to the Center   
for Disease Conrtrol and Prevention (CDC) test method.   
   
                                                    Urea nitrogen [Mass/volume]   
in Serum or PlasmaOrdered By: Apollo Lopez on   
2024   
   
                                                    Urea nitrogen   
[Mass/Vol]      31 mg/dL        High            7-25            [facility]  
   
                                        Comment on above:   Order Comment: Reaso  
n for Exam Type 2 diabetes mellitus with   
complication,  
without long-ter   
   
                                                            Performed By: #### C  
MP, LIPID ####  
[facility]  
[address] [place] [address] [facility]   
   
                                                    Telephone Encounteron 2024   
   
                                                    Transcription   
Authentication   
Interface Message   
Text                                    Left voice mail   
letting patient know   
2024 appointment   
date is available in  
[place] ask patient to   
call me back.       Normal                                  The   
[facility]   
[facility]  
   
                                                    Telephone Encounteron 06-10-  
2024   
   
                                                    Transcription   
Authentication   
Interface Message   
Text                                    Called patient to   
schedule CT and PO   
appointment patient   
stated appointment had  
to be at [place]   
therefore I work with   
Radiology to get   
appointment same day   
as  
 appointment   
Schedule for   
08/15/2024 per patient   
request.            Normal                                  The   
[facility]   
[facility]  
   
                                                    Telephone Encounteron 2024   
   
                                                    Transcription   
Authentication   
Interface Message   
Text                                    Left second voice   
mail. Hi left message   
nephrostomy drains   
need to come out.  
 left new message. Normal                                  The   
[facility]   
[facility]  
   
                                                    Telephone Encounteron 2024   
   
                                                    Transcription   
Authentication   
Interface Message   
Text                                    Patient called to   
reschedule the post op   
appointment she missed   
with Dr. Christiansen.  
Please call the   
patient back to   
reschedule her. Thank   
you.  
  
[phone]        Normal                                  The   
[facility]   
[facility]  
   
                                                    Transcription   
Authentication   
Interface Message   
Text                                    Left voice mail for   
patient to call back   
and schedule new   
appointment sooner  
then later.         Normal                                  The   
[facility]   
[facility]  
   
                                                    Telephone Encounteron 2024   
   
                                                    Transcription   
Authentication   
Interface Message   
Text                                    Spoke with patient   
advised her that caps   
would be mailed to her   
home. She is  
aware of Dr. Esparza   
FDC I went over   
upcoming 6/5   
appointments with her  
and encouraged her to   
keep those. She   
agreed.             Normal                                  The   
[facility]   
[facility]  
   
                                                    Telephone Encounteron 2024   
   
                                                    Transcription   
Authentication   
Interface Message   
Text                                    MARYCRUZ Iniguez MD P [facility] R[facility]  
Phone conv Has no   
issues. Feels fine Pl   
send her cap for lt   
neph tube. She  
said we mailed her one   
before. Keep sched ct   
6-5 w/ fu. Pl call her Normal                                  The   
[facility]   
[facility]  
   
                                                    Transcription   
Authentication   
Interface Message   
Text                            Normal                          The   
[facility]   
System  
   
                                                    Addendum Noteon 2024   
   
                                                    Transcription   
Authentication   
Interface Message   
Text                                    Addended by: ANGELIKA ASHER on: 2024   
12:39 PM  
  
Modules accepted:   
Orders              Normal                                  The   
[facility]   
System  
   
                                                    Telephone Encounteron 2024   
   
                                                    Transcription   
Authentication   
Interface Message   
Text                            Normal                          The   
[facility]   
System  
   
                                                    Transcription   
Authentication   
Interface Message   
Text                                    Situation: Patient   
states she was told to   
call [phone] back   
to speak with  
Dr. Keane.  
  
Background: Patient   
already triaged,   
message routed to   
Provider.  
  
Assessment: N/A  
  
Recommendation:   
Patient advised   
message was sent.   Normal                                  The   
[facility]   
[facility]  
   
                                                    Transcription   
Authentication   
Interface Message   
Text                            Normal                          The   
[facility]   
[facility]  
   
                                                    Anesthesia Postprocedure Radha  
luationon 2024   
   
                                                    Transcription   
Authentication   
Interface Message   
Text                            Normal                          The   
[facility]   
System  
   
                                                    Anesthesia Preprocedure Eval  
uationon 2024   
   
                                                    Transcription   
Authentication   
Interface Message   
Text                            Normal                          The   
[facility]   
System  
   
                                                    Anesthesia Transfer Of Careo  
n 2024   
   
                                                    Transcription   
Authentication   
Interface Message   
Text                            Normal                          The   
[facility]   
System  
   
                                                    Blood Attestationon 20   
   
                                                    Transcription   
Authentication   
Interface Message   
Text                            Normal                          The   
[facility]   
System  
   
                                                    Brief Operative Noteon    
   
                                                    Transcription   
Authentication   
Interface Message   
Text                            Normal                          The   
[facility]   
System  
   
                                                    GLUCOSE, FINGERSTICK-IN OFFI  
CEon 2024   
   
                      Glucose [Mass/Vol] 189 mg/dL  High       68 - 110 mg/dL [facility]  
   
                                                    Interpretation and   
review of laboratory   
results         Abnormal                                        [facility]  
   
                                                                  [facility]  
   
                      Glucose [Mass/Vol] 189 mg/dL  High            The   
[facility]   
System  
   
                                        Comment on above:   Performed By: #### 8  
[id number] ####NURSING GLUCOSE [id number]   
[facility] [place], [address]   
   
                                                    H AND Pat[facility]io 2024   
   
                                                    Transcription   
Authentication   
Interface Message   
Text                            Normal                          The   
[facility]   
System  
   
                                                    Progress Noteson 2024   
   
                                                    Transcription   
Authentication   
Interface Message   
Text                            Normal                          The   
[facility]   
System  
   
                                                    Telephone Encounteron 2024   
   
                                                    Transcription   
Authentication   
Interface Message   
Text                                    Called the patient to   
see if still on abx.   
LVM                 Normal                                  The   
[facility]   
System  
   
                                                    Patient Instructionson 05-10  
-2024   
   
                                                    Transcription   
Authentication   
Interface Message   
Text                            Normal                          The   
[facility]   
System  
   
                                                    PAT Call Historyon    
   
                                                    Transcription   
Authentication   
Interface Message   
Text                            Normal                          The   
[facility]   
System  
   
                                                    CT RENAL STONEon 2024   
   
                      CT RENAL STONE            Normal                The   
[facility]   
System  
   
                                                    Telephone Encounteron 2024   
   
                                                    Transcription   
Authentication   
Interface Message   
Text                            Normal                          The   
[facility]   
System  
   
                                                    Telephone Encounteron 2024   
   
                                                    Transcription   
Authentication   
Interface Message   
Text                            Normal                          The   
[facility]   
[facility]  
   
                                                    Transcription   
Authentication   
Interface Message   
Text                                    ----- Message from   
Mauricio Abernathy MD sent   
at 2024 1:25 PM   
EDT -----  
Augmentin has been   
ordered. please let   
them know           Normal                                  The   
[facility]   
[facility]  
   
                                                    Addendum Noteon 2024   
   
                                                    Transcription   
Authentication   
Interface Message   
Text                                    Addended by: ALBERTO WYLIE on: 2024   
02:28 PM  
  
Modules accepted:   
Orders              Normal                                  The   
[facility]   
[facility]  
   
                                                    Transcription   
Authentication   
Interface Message   
Text                                    Addended by: ALBERTO WYLIE on: 2024   
01:20 PM  
  
Modules accepted:   
Orders              Normal                                  The   
[facility]   
[facility]  
   
                                                    Progress Noteson 2024   
   
                                                    Transcription   
Authentication   
Interface Message   
Text                                    Stop eliquis 3 days   
before              Normal                                  The   
[facility]   
[facility]  
   
                                                    Transcription   
Authentication   
Interface Message   
Text                            Normal                          The   
[facility]   
[facility]  
   
                                                    URINE CULTURE (UROLOGY ONLY)  
on 2024   
   
                                                    Bacteria identified   
Cx Nom (U)                              C URINE:  
  
**Positive Culture   
Report**  
  
ESCHERICHIA COLI   
(ESBL)  
>10,000 CFU/ml   
Escherichia coli   
(ESBL)  
This organism exhibits   
Extended Spectrum Beta   
Lactamase Production. Normal                                  The   
[facility]  
   
                                        Comment on above:   Performed By: #### U  
RINE UROLOGY ONLY ####[facility]   
[id number] [place], [place]   
   
                      LIZ                   Normal                The   
[facility]   
System  
   
                                        Comment on above:   Performed By: #### U  
RINE UROLOGY ONLY ####[facility]   
[id number] [place], [place]   
   
                                                    Bacteria identified   
Cx Nom (U)                              C URINE:  
  
**Positive Culture   
Report**  
  
ESCHERICHIA COLI   
(ESBL)  
>10,000 CFU/ml   
Escherichia coli   
(ESBL)  
This organism exhibits   
Extended Spectrum Beta   
Lactamase Production. Normal                                  The   
[facility]  
   
                                        Comment on above:   Performed By: #### U  
RINE UROLOGY ONLY ####[facility]   
[id number] [place], [place]   
   
                      LIZ                   Normal                The   
[facility]  
   
                                        Comment on above:   Performed By: #### U  
RINE UROLOGY ONLY ####[facility]   
[id number] [place], [place]   
   
                                                    Addendum Noteon 2024   
   
                                                    Transcription   
Authentication   
Interface Message   
Text                                    Addended by: ANGELIKA ASHER on: 2024   
01:53 PM  
  
Modules accepted:   
Orders              Normal                                  The   
[facility]  
   
                                                    Telephone Encounteron 2024   
   
                                                    Transcription   
Authentication   
Interface Message   
Text                            Normal                          The   
[facility]  
   
                                                    Transcription   
Authentication   
Interface Message   
Text                            Normal                          [facility]   
[facility]  
   
                                                    Telephone Encounteron 2024   
   
                                                    Transcription   
Authentication   
Interface Message   
Text                                    Left message patient   
need to schedule phone   
PAT and post OP     Normal                                  The   
[facility]   
[facility]  
   
                                                    Anesthesia Postprocedure Radha  
luationon 2024   
   
                                                    Transcription   
Authentication   
Interface Message   
Text                            Normal                          The   
[facility]   
[facility]  
   
                                                    Anesthesia Preprocedure Eval  
uationon 2024   
   
                                                    Transcription   
Authentication   
Interface Message   
Text                            Normal                          The   
[facility]   
System  
   
                                                    Anesthesia Transfer Of Careo  
n 2024   
   
                                                    Transcription   
Authentication   
Interface Message   
Text                            Normal                          The   
[facility]   
[facility]  
   
                                                    Blood Attestationon 20   
   
                                                    Transcription   
Authentication   
Interface Message   
Text                            Normal                          The   
[facility]   
[facility]  
   
                                                    H AND Patricio 2024   
   
                                                    Transcription   
Authentication   
Interface Message   
Text                            Normal                          The   
[facility]   
System  
   
                                                    OP Noteon 2024   
   
                                                    Transcription   
Authentication   
Interface Message   
Text                            Normal                          The   
[facility]   
[facility]  
   
                                                    Progress Noteson 2024   
   
                                                    Transcription   
Authentication   
Interface Message   
Text                            Normal                          The   
[facility]   
[facility]  
   
                                                    Telephone Encounteron 2024   
   
                                                    Transcription   
Authentication   
Interface Message   
Text                            Normal                          The   
[facility]   
[facility]  
   
                                                    Transcription   
Authentication   
Interface Message   
Text                            Normal                          The   
[facility]   
[facility]  
   
                                                    Telephone Encounteron 2024   
   
                                                    Transcription   
Authentication   
Interface Message   
Text                            Normal                          The   
[facility]   
[facility]  
   
                                                    Telephone Encounteron 2024   
   
                                                    Transcription   
Authentication   
Interface Message   
Text                            Normal                          The   
[facility]   
[facility]  
   
                                                    Transcription   
Authentication   
Interface Message   
Text                                    ----- Message from MARYCRUZ Iniguez MD   
sent at 3/21/2024 9:09   
AM EDT -----  
Prefer augmentin   
875/125 bid start now.   
#20 . Start now and   
hold macrobid once  
augmentin available Normal                                  The   
[facility]  
   
                                                    Telephone Encounteron 2024   
   
                                                    Transcription   
Authentication   
Interface Message   
Text                                    Spoke with patient,   
she did receive   
voicemail about   
antibiotics. It wasn't   
ready  
yesterday but is today   
and plans to go pick   
up and starting taking   
script today.       Normal                                  The   
[facility]  
   
                                                    Telephone Encounteron 2024   
   
                                                    Transcription   
Authentication   
Interface Message   
Text                            Normal                          The   
[facility]  
   
                                                    Transcription   
Authentication   
Interface Message   
Text                            Normal                          The   
[facility]  
   
                                                    Progress Noteson 03-   
   
                                                    Transcription   
Authentication   
Interface Message   
Text                            Normal                          The   
[facility]   
[facility]  
   
                                                    URINE CULTURE (UROLOGY ONLY)  
on 03-   
   
                                                    Bacteria identified   
Cx Nom (U)                              C URINE:  
  
**Positive Culture   
Report**  
  
ESCHERICHIA COLI   
(ESBL)  
>10,000 CFU/ml   
Escherichia coli   
(ESBL)  
Refer to previous   
susceptibility      Normal                                  The   
[facility]   
[facility]  
   
                                        Comment on above:   Performed By: #### U  
RINE UROLOGY ONLY ####[facility]   
[id number] [place] [address]   
   
                                                    Bacteria identified   
Cx Nom (U)                              C URINE:  
  
**Positive Culture   
Report**  
  
ESCHERICHIA COLI   
(ESBL)  
>10,000 CFU/ml   
Escherichia coli   
(ESBL)  
This organism exhibits   
Extended Spectrum Beta   
Lactamase Production. Normal                                  The   
[facility]   
[facility]  
   
                                        Comment on above:   Order Comment: Susce  
ptibility interpretations may not be the   
most updated version. Please refer to the 34th ed. of the CLSI   
M100 (Under  Antibiogram Data  in the Pathology section of the   
[facility]) for the most current breakpoints.   
   
                                                            Performed By: #### U  
RINE UROLOGY ONLY ####[facility]   
[id number] [place] [address]   
   
                      LIZ                   Normal                The   
[facility]  
   
                                        Comment on above:   Order Comment: Susce  
ptibility interpretations may not be the   
most updated version. Please refer to the 34th ed. of the CLSI   
M100 (Under  Antibiogram Data  in the Pathology section of the   
[facility]) for the most current breakpoints.   
   
                                                            Performed By: #### U  
SARAH UROLOGY ONLY ####[facility]   
[id number] [facility] [place], [id number]   
   
                                                    PSE Call H AND Patricio    
   
                                                    Transcription   
Authentication   
Interface Message   
Text                            Normal                          The   
[facility]  
   
                                                    Telephone Encounteron 2024   
   
                                                    Transcription   
Authentication   
Interface Message   
Text                                    Left voice mail with   
new surgery date, ask   
patient to call back   
for new PAT and  
Nurse visit   
appointments.       Normal                                  The   
[facility]   
[facility]  
   
                                                    XA NEPH TUBE EXCHANGEon    
   
                      XA NEPH TUBE EXCHANGE            Normal                The  
   
[facility]   
System  
   
                                                    XA URETERAL STENT EXISTING N  
EPH TUBE (TANJA)on 2024   
   
                                                    XA URETERAL STENT   
EXISTING NEPH TUBE   
(TANJA)                           Normal                          The   
[facility]   
[facility]  
   
                                                    CBC panel Auto (Bld)on    
   
                                                    Erythrocyte   
distribution width   
(RBC) [Ratio]   16.4 %          High            11.5 - 14.5 %   [facility]   
[facility]  
   
                                                    Hematocrit (Bld)   
[Volume fraction] 32.0 %          Low             36.0 - 46.0 %   [facility]   
[facility]  
   
                                                    Hemoglobin (Bld)   
[Mass/Vol]          10.1 g/dL           Low                 12.0 - 15.0   
g/dL                                    [facility]   
[facility]   
[facility]  
   
                                                    Interpretation and   
review of laboratory   
results         Abnormal                                        [facility]   
[facility]   
[facility]  
   
                                                    MCH (RBC) [Entitic   
mass]           27.3 pg                         26.0 - 34.0 pg  [facility]   
[facility]  
   
                          MCHC (RBC) [Mass/Vol] 31.6 g/dL    Low          32.0 -  
 35.9   
g/dL                                    [facility]   
[facility]  
   
                                                    MCV (RBC) [Entitic   
vol]            86 fL                           80 - 100 fL     [facility]   
[facility]  
   
                                                    Platelet mean volume   
(Bld) [Entitic vol] 8.5 fL                          7.5 - 11.2 fL   [facility]   
[facility]  
   
                                                    Platelets (Bld)   
[#/Vol]         395 10*3/uL                     150 - 400 K/uL  [facility]   
[facility]  
   
                      RBC (Bld) [#/Vol] 3.70 10*6/uL Low                   [facility]   
[facility]   
[facility]  
   
                      WBC (Bld) [#/Vol] 14.2 10*3/uL High       4.5 - 11.5 K/uL   
[facility]   
[facility]  
   
                                                                  [facility]   
[facility]  
   
                                                    COMPLETE BLOOD COUNTon    
   
                                                    Erythrocyte   
distribution width   
(RBC) [Ratio]   16.4 %          High            11.5-14.5       The   
[facility]   
System  
   
                                        Comment on above:   Performed By: #### C  
BC ####[facility] PATHOLOGY [id number]   
[place], [place],    
   
                                                    Hematocrit (Bld)   
[Volume fraction] 32.0 %          Low             36.0-46.0       The   
[facility]   
System  
   
                                        Comment on above:   Performed By: #### C  
BC ####[facility] PATHOLOGY [id number]   
[place], [place],    
   
                                                    Hemoglobin (Bld)   
[Mass/Vol]      10.1 g/dL       Low             12.0-15.0       The   
[facility]  
   
                                        Comment on above:   Performed By: #### C  
BC ####[facility] PATHOLOGY [id number] [place] [address],    
   
                                                    MCH (RBC) [Entitic   
mass]           27.3 pg         Normal          26.0-34.0       The   
[facility]  
   
                                        Comment on above:   Performed By: #### C  
BC ####[facility] PATHOLOGY [id number] [place] [address],    
   
                      MCHC (RBC) [Mass/Vol] 31.6 g/dL  Low        32.0-35.9  The  
   
[facility]  
   
                                        Comment on above:   Performed By: #### C  
BC ####[facility] PATHOLOGY [id number] [place] [address],    
   
                                                    MCV (RBC) [Entitic   
vol]            86 fL           Normal                    The   
[facility]   
System  
   
                                        Comment on above:   Performed By: #### C  
BC ####[facility] PATHOLOGY [id number]   
[place], [place],    
   
                                                    Platelet mean volume   
(Bld) [Entitic vol] 8.5 fL          Normal          7.5-11.2        The   
[facility]   
System  
   
                                        Comment on above:   Performed By: #### C  
BC ####[facility] PATHOLOGY [id number]   
[place], [place],    
   
                                                    Platelets (Bld)   
[#/Vol]         395 10*3/uL     Normal          150-400         The   
[facility]  
   
                                        Comment on above:   Performed By: #### C  
BC ####[facility] PATHOLOGY [id number]   
[place], [place],    
   
                      RBC (Bld) [#/Vol] 3.70 10*6/uL Low        4.00-5.20  The   
[facility]   
System  
   
                                        Comment on above:   Performed By: #### C  
BC ####S PATHOLOGY [id number]   
[place], [place],    
   
                      WBC (Bld) [#/Vol] 14.2 10*3/uL High       4.5-11.5   The   
[facility]  
   
                                        Comment on above:   Performed By: #### C  
BC ####[facility] PATHOLOGY [id number]   
[place], [place],    
   
                                                    PROTHROMBIN TIME AND INRon 0  
2024   
   
                                                    INR Coag (PPP)   
[Relative time] 1.04 {INR}                      0.90 - 1.10     [facility]   
[facility]  
   
                                                    Interpretation and   
review of laboratory   
results         Normal                                          [facility]   
[facility]   
[facility]  
   
                      PT Coag (PPP) [Time] 11.6 s                           [facility]   
[facility]  
   
                                                                  [facility]   
[facility]  
   
                                                    INR Coag (PPP)   
[Relative time] 1.04 {INR}      Normal          0.90-1.10       The   
[facility]  
   
                                        Comment on above:   Performed By: #### P  
T ####[facility] PATHOLOGY [id number]   
[place], [place],    
   
                      PT Coag (PPP) [Time] 11.6 s     Normal     9.7-12.9   [facility]   
[facility]  
   
                                        Comment on above:   Performed By: #### P  
T ####S PATHOLOGY [id number]   
[place], [place],    
   
                                                    Post-Procedure Noteon 2024   
   
                                                    Transcription   
Authentication   
Interface Message   
Text                            Normal                          The   
[facility]   
System  
   
                                                    Pre-Procedure Noteon   
024   
   
                                                    Transcription   
Authentication   
Interface Message   
Text                            Normal                          The   
[facility]   
System  
   
                                                    Telephone Encounteron 2024   
   
                                                    Transcription   
Authentication   
Interface Message   
Text                                    Called to schedule IR   
appointment today,   
Bonnie is off Anuradha   
states she will call  
patient and schedule   
once order clears.  Normal                                  The   
[facility]  
   
                                                    Addendum Noteon 2024   
   
                                                    Transcription   
Authentication   
Interface Message   
Text                                    Addended by: ALBERTO WYLIE on: 2024   
11:51 AM  
  
Modules accepted:   
Orders              Normal                                  The   
[facility]   
System  
   
                                                    Telephone Encounteron 2024   
   
                                                    Transcription   
Authentication   
Interface Message   
Text                            Normal                          [facility]   
[facility]  
   
                                                    EKG 12 LEAD - PERFORMon -   
   
                                        Diagnosis           Sinus rhythm with 1s  
t   
degree A-V block with   
Fusion complexes  
Prolonged QT interval   
or tu fusion, consider   
myocardial disease,   
electrolyte imbalance,   
or drug effects  
Abnormal ECG  
When compared with ECG   
of 02-AUG-2023 02:39,  
No significant change   
was found  
Confirmed by WILTON TINAJERO ([id number]) on   
2024 2:19:42 PM  
                                                            [facility]  
   
                      P wave Atrium by EKG 84                    [facility]        [facility]  
   
                      P wave axis 64                    degrees    [facility]  
   
                      P-R Interval 212 ms                           [facility]  
   
                      Q-T interval 410 ms                           [facility]  
   
                                                    Q-T interval   
corrected       484 ms                                          [facility]  
   
                      QRS axis   30                    degrees    [facility]  
   
                      QRS duration 100 ms                           [facility]  
   
                      T wave axis 101                   degrees    [facility]  
   
                                                                  [facility]  
   
                                                    Basic metabolic 2000 panelon  
 2024   
   
                      Anion gap [Moles/Vol] 17 mmol/L             10 - 20    [facility]  
   
                          Calcium [Mass/Vol] 9.1 mg/dL                 8.6 - 10.  
3   
mg/dL                                   [facility]  
   
                                        Comment on above:   Note updated referen  
ce ranges.   
   
                      Chloride [Moles/Vol] 109 mmol/L High       98 - 107 mmol/L  
 [facility]  
   
                                        Comment on above:   Note updated referen  
ce ranges.   
   
                      CO2 [Moles/Vol] 16 mmol/L  Low        21 - 31 mmol/L [facility]  
   
                                        Comment on above:   Note updated referen  
ce ranges.   
   
                          Creatinine [Mass/Vol] 3.56 mg/dL   High         0.60 -  
 1.20   
mg/dL                                   [facility]  
   
                                        Comment on above:   Note updated referen  
ce ranges.   
   
                                                    GFR/1.73 sq   
M.predicted CKD-EPI   
(S/P/Bld) [Vol   
rate/Area]      15              Low             - PINF          [facility]  
   
                                        Comment on above:    CKD EPI Equatio  
n using Creatinine without Race  
Comment: Estimated glomerular filtration rate (eGFR) is   
calculated without a race coefficient. Values should be   
interpreted in the context of the patient's full clinical   
presentation.  
Reference:  
1. Alfrdeo C, Delmi M, Valdez FLYNN, et al.. A Unifying Approach   
for GFR Estimation: Recommendations of the NKF-ASN Task Force on   
Reassessing the Inclusion of Race in Diagnosing Kidney Disease.   
American Journal of Kidney Diseases 202;79(2):268-88.e1.  
2. N Engl J Med 1 Vol. 385 Issue 19 Pages 7564-4231  
  
   
   
                      Glucose [Mass/Vol] 144 mg/dL  High       74 - 109 mg/dL [facility]  
   
                                                    Interpretation and   
review of laboratory   
results         Abnormal                                        [facility]  
   
                          Potassium [Moles/Vol] 4.1 mmol/L                3.5 -   
5.0   
mmol/L                                  [facility]  
   
                                        Comment on above:   Note updated referen  
ce ranges.  
Note updated reference ranges.  
   
   
                          Sodium [Moles/Vol] 138 mmol/L                136 - 145  
   
mmol/L                                  [facility]  
   
                                        Comment on above:   Note updated referen  
ce ranges.   
   
                                                    Urea nitrogen   
[Mass/Vol]      42 mg/dL        High            7 - 25 mg/dL    [facility]  
   
                                        Comment on above:   Note updated referen  
ce ranges.   
   
                                                                  [facility]  
   
                                                    CBC panel Auto (Bld)on    
   
                                                    Erythrocyte   
distribution width   
(RBC) [Ratio]   16.5 %          High            11.5 - 14.5 %   [facility]  
   
                                                    Hematocrit (Bld)   
[Volume fraction] 33.0 %          Low             36.0 - 46.0 %   [facility]  
   
                                                    Hemoglobin (Bld)   
[Mass/Vol]          10.6 g/dL           Low                 12.0 - 15.0   
g/dL                                    [facility]  
   
                                                    Interpretation and   
review of laboratory   
results         Abnormal                                        [facility]  
   
                                                    MCH (RBC) [Entitic   
mass]           27.7 pg                         26.0 - 34.0 pg  [facility]  
   
                          MCHC (RBC) [Mass/Vol] 32.2 g/dL                 32.0 -  
 35.9   
g/dL                                    [facility]  
   
                                                    MCV (RBC) [Entitic   
vol]            86 fL                           80 - 100 fL     [facility]  
   
                                                    Platelet mean volume   
(Bld) [Entitic vol] 9.1 fL                          7.5 - 11.2 fL   [facility]  
   
                                                    Platelets (Bld)   
[#/Vol]         369 10*3/uL                     150 - 400 K/uL  [facility]  
   
                      RBC (Bld) [#/Vol] 3.83 10*6/uL Low                   [facility]  
   
                      WBC (Bld) [#/Vol] 16.6 10*3/uL High       4.5 - 11.5 K/uL   
[facility]  
   
                                                    Creatinine [Mass/volume] in   
Serum or PlasmaOrdered By: Azael Aj on   
2023   
   
                      Creatinine [Mass/Vol] 2.19 mg/dL            0.60-1.20  [facility]  
   
                                                    No Panel InformationOrdered   
By: Azael Aj on 2023   
   
                                                    Estimated GFR   
(CKD-EPI)       26.296 mL/Min                                   [facility]  
   
                                                    Pharmacy Creatinine   
Clearance (Chem 39.45                                           [facility]  
   
                                                    Serum or plasma trough vanco  
mycin levelOrdered By: Azael Aj on 2023   
   
                                                    Vancomycin trough   
[Mass/Vol]      11.2 ug/mL                      10.0-20.0       [facility]  
   
                                        Comment on above:   Last dose: -   
   
                                                    Basophils Auto (Bld) [#/Vol]  
Ordered By: Obeddamargaret Guillenomar on 10-   
   
                                                    Basophils (Bld)   
[#/Vol]         0.1 10*3/uL                     0.0-0.2         [facility]  
   
                                                    Basophils/100 WBC Auto (Bld)  
Ordered By: Obeddamargaret Guillenomar on 10-   
   
                                                    Basophils/100 WBC   
(Bld)           0.5 %                           .               [facility]  
   
                                                    Calcium [Mass/volume] in Ser  
um or PlasmaOrdered By: Amanda Guillenomar on   
10-   
   
                      Calcium [Mass/Vol] 8.8 mg/dL             8.6-10.3   [facility]  
   
                                                    Carbon dioxide, total [Moles  
/volume] in Serum or PlasmaOrdered By: Obeddamargaret Guillenomar on   
10-   
   
                      CO2 [Moles/Vol] 28.5 mmol/L            21.0-31.0  [facility]  
   
                                                    Chloride [Moles/volume] in S  
chandler or PlasmaOrdered By: Obeddamargaret Guillenomar on   
10-   
   
                      Chloride [Moles/Vol] 107 mmol/L                 [facility]  
   
                                                    Creatinine [Mass/volume] in   
Serum or PlasmaOrdered By: Obeddamargaret Guillenomar on   
10-   
   
                      Creatinine [Mass/Vol] 2.77 mg/dL            0.60-1.20  [facility]  
   
                                                    Eosinophils Auto (Bld) [#/Vo  
l]Ordered By: Obeddamargaret Guillenomar on 10-   
   
                                                    Eosinophils (Bld)   
[#/Vol]         0.9 10*3/uL                     0.0-0.45        [facility]  
   
                                                    Eosinophils/100 WBC Auto (Bl  
d)Ordered By: Obeddamargaret Guillenomar on 10-   
   
                                                    Eosinophils/100 WBC   
(Bld)           9.7 %                           .               [facility]  
   
                                                    Erythrocyte distribution wid  
th Auto (RBC) [Ratio]Ordered By: Amanda Murillo on   
10-   
   
                                                    Erythrocyte   
distribution width   
(RBC) [Ratio]   15.0 %                          11.9-15.3       [facility]  
   
                                                    Glucose Glucometer (BldC) [M  
ass/Vol]Ordered By: Amanda Murillo on 10-   
   
                      Glucose [Mass/Vol] 174 mg/dL                        [facility]  
   
                                        Comment on above:   Random Glucose Refer  
ence Range is dependent on time and   
content   
of last meal. Glucose of more than 200 mg/dL in a nonstressed,   
ambulatory subject supports the diagnosis of Diabetes Mellitus.   
   
                                                    Glucose [Mass/volume] in Ser  
um or PlasmaOrdered By: Amanda Murillo on   
10-   
   
                      Glucose [Mass/Vol] 110 mg/dL                  [facility]  
   
                                        Comment on above:   ADA recommended refe  
rence rangeRandom Glucose Reference Range   
is   
dependent on time and content of last meal. Glucose of more than   
200 mg/dL in a nonstressed, ambulatory subject supports the   
diagnosis of Diabetes Mellitus.   
   
                                                    Hematocrit Auto (Bld) [Volum  
e fraction]Ordered By: Amanda Murillo on 10-  
  
   
                                                    Hematocrit (Bld)   
[Volume fraction] 28.6 %                          34.0-46.4       [facility]  
   
                                                    Hemoglobin [Mass/volume] in   
BloodOrdered By: Amanda Murillo on 10-   
   
                                                    Hemoglobin (Bld)   
[Mass/Vol]      9.4 g/dL                        11.8-15.4       [facility]  
   
                                                    Leukocytes [#/volume] correc  
sapna for nucleated erythrocytes in Blood by Automated  
   
counOrdered By: Amanda Murillo on 10-   
   
                                                    WBC corrected for   
nucl RBC Auto (Bld)   
[#/Vol]         9.6 10*3/uL                     3.8-11.6        [facility]  
   
                                                    Lymphocytes Auto (Bld) [#/Vo  
l]Ordered By: Amanda Murillo on 10-   
   
                                                    Lymphocytes (Bld)   
[#/Vol]         2.1 10*3/uL                     1.00-4.8        [facility]  
   
                                                    Lymphocytes/100 WBC Auto (Bl  
d)Ordered By: Amanda Murillo on 10-   
   
                                                    Lymphocytes/100 WBC   
(Bld)           22.0 %                          .               [facility]  
   
                                                    MCH Auto (RBC) [Entitic mass  
]Ordered By: Amanda Guillenomar on 10-   
   
                                                    MCH (RBC) [Entitic   
mass]           28.2 pg                         24.7-34.3       [facility]  
   
                                                    MCHC Auto (RBC) [Mass/Vol]Or  
dered By: Obeddamargaret Guillenomar on 10-   
   
                      MCHC (RBC) [Mass/Vol] 32.7 g/dL             32.0-35.0  [facility]  
   
                                                    MCV Auto (RBC) [Entitic vol]  
Ordered By: Obsage Guillenomar on 10-   
   
                                                    MCV (RBC) [Entitic   
vol]            86.2 fL                                   [facility]  
   
                                                    Monocytes Auto (Bld) [#/Vol]  
Ordered By: Obsage Guillenomar on 10-   
   
                                                    Monocytes (Bld)   
[#/Vol]         0.6 10*3/uL                     0.0-0.8         [facility]  
   
                                                    Monocytes/100 WBC Auto (Bld)  
Ordered By: Obsaeg Guillenomar on 10-   
   
                                                    Monocytes/100 WBC   
(Bld)           6.3 %                           .               [facility]  
   
                                                    Neutrophils Auto (Bld) [#/Vo  
l]Ordered By: Amanda Guillenomar on 10-   
   
                                                    Neutrophils (Bld)   
[#/Vol]         5.9 10*3/uL                     1.8-7.7         [facility]  
   
                                                    Neutrophils/100 WBC Auto (Bl  
d)Ordered By: Amanda Curryr on 10-   
   
                                                    Neutrophils/100 WBC   
(Bld)           61.5 %                          .               [facility]  
   
                                                    No Panel InformationOrdered   
By: Amanda Murillo on 10-   
   
                                                    Estimated GFR   
(CKD-EPI)       19.836 mL/Min                                   [facility]  
   
                                                    Pharmacy Creatinine   
Clearance (Chem 30.99                                           [facility]  
   
                                                    Nucleated erythrocytes [Pres  
ence] in Blood by Automated countOrdered By: Amanda Murillo on 10-   
   
                                                    Nucleated RBC Auto Ql   
(Bld)           0.1 /100{WBC}                   0-0.5           [facility]  
   
                                                    Platelet mean volume Auto (B  
ld) [Entitic vol]Ordered By: Obaydah Daromar on   
10-   
   
                                                    Platelet mean volume   
(Bld) [Entitic vol] 8.1 fL                          6.3-10.7        [facility]  
   
                                                    Platelets Auto (Bld) [#/Vol]  
Ordered By: Obaydah Daromar on 10-   
   
                                                    Platelets (Bld)   
[#/Vol]         353 10*3/uL                     150-450         [facility]  
   
                                                    Potassium [Moles/volume] in   
Serum or PlasmaOrdered By: Obaydah Daromar on   
10-   
   
                      Potassium [Moles/Vol] 3.6 mmol/L            3.5-5.1    [facility]  
   
                                                    RBC Auto (Bld) [#/Vol]Ordere  
d By: Obaydah Daromar on 10-   
   
                      RBC (Bld) [#/Vol] 3.32 10*6/uL            3.60-5.00  [facility]  
   
                                                    Serum or plasma anion gap de  
terminationOrdered By: Obaydah Daromar on 10-  
   
   
                      Anion gap [Moles/Vol] 8.1 mmol/L            6.0-15.0   [facility]  
   
                                                    Sodium [Moles/volume] in Ser  
um or PlasmaOrdered By: Obeddah Daromar on   
10-   
   
                      Sodium [Moles/Vol] 140 mmol/L            136-145    [facility]  
   
                                                    Urea nitrogen [Mass/volume]   
in Serum or PlasmaOrdered By: Obaydah Daromar on   
10-   
   
                                                    Urea nitrogen   
[Mass/Vol]      20 mg/dL                        7-25            [facility]  
   
                                                    WBC Auto (Bld) [#/Vol]Ordere  
d By: Obaydah Daromar on 10-   
   
                      WBC (Bld) [#/Vol] 9.6 10*3/uL            3.8-11.6   [facility]  
   
                                                    Ferritin [Mass/volume] in Se  
rum or PlasmaOrdered By: Roselia Chowdhury on 10-   
   
                      Ferritin [Mass/Vol] 138.6 ng/mL            11.0-306.8 [facility]  
   
                                                    Folate [Mass/volume] in Seru  
m or PlasmaOrdered By: Roselia Chowdhury on 10-   
   
                      Folate [Mass/Vol] 9.6 ng/mL             >5.9       [facility]  
   
                                        Comment on above:   Folate reference ran  
ge: >5.9 ng/mlThe WHO technical   
consultation   
on folate and vitamin [id number] has determined that folate   
concentrations lessthan 4 ng/ml are considered deficient.   
   
                                                    Iron [Mass/volume] in Serum   
or PlasmaOrdered By: Roselia Chowdhury on 10-   
   
                      Iron [Mass/Vol] 20 ug/dL                   [facility]  
   
                                                    Iron binding capacity [Mass/  
volume] in Serum or PlasmaOrdered By: Roselia Chowdhury   
on   
10-   
   
                                                    Iron binding capacity   
[Mass/Vol]      190 ug/dL                       255-450         [facility]  
   
                                                    Iron saturation [Mass Fracti  
on] in Serum or PlasmaOrdered By: Roselia Chowdhury on   
10-   
   
                                                    Iron saturation [Mass   
fraction]       10.5 %                          20-50           [facility]  
   
                                                    Transferrin [Mass/volume] in  
 Serum or PlasmaOrdered By: Roselia Chowdhury on   
10-   
   
                                                    Transferrin   
[Mass/Vol]      136 mg/dL                       203-362         [facility]  
   
                                                    Vitamin B12 ser/plasOrdered   
By: Roselia Chowdhury on 10-   
   
                                                    Cobalamin (Vitamin   
B12) [Mass/Vol] 155 pg/mL                       180-914         [facility]  
   
                                                    Alanine aminotransferase [En  
zymatic activity/volume] in Serum or PlasmaOrdered   
By:   
Amanda Curryr on 10-   
   
                                                    ALT [Catalytic   
activity/Vol]   8 U/L                           7-52            [facility]  
   
                                                    Albumin [Mass/volume] in Ser  
um or Plasma by Bromocresol green (BCG) dye binding   
methoOrdered By: Obsage Guillenomar on 10-   
   
                                                    Albumin BCG dye   
[Mass/Vol]      2.9 g/dL                        3.5-5.7         [facility]  
   
                                                    Alkaline phosphatase [Enzyma  
tic activity/volume] in Serum or PlasmaOrdered By:   
Obtrudyh Wilmeromar on 10-   
   
                                                    ALP [Catalytic   
activity/Vol]   48 U/L                                    [facility]  
   
                                                    Aspartate aminotransferase [  
Enzymatic activity/volume] in Serum or PlasmaOrdered  
By:   
Sathyadamargaret Guillenomar on 10-   
   
                                                    AST [Catalytic   
activity/Vol]   10 U/L                          13-39           [facility]  
   
                                                    Bilirubin.total [Mass/volume  
] in Serum or PlasmaOrdered By: Amanda Murillo on   
10-   
   
                      Bilirubin [Mass/Vol] 0.3 mg/dL             0.3-1.0    [facility]  
   
                                                    Creatinine [Mass/volume] in   
UrineOrdered By: Roselia Chowdhury on 10-   
   
                                                    Creatinine (U)   
[Mass/Vol]      59.0 mg/dL                      11.0-20.0       [facility]  
   
                                                    Globulin Calc (S) [Mass/Vol]  
Ordered By: Amanda Murillo on 10-   
   
                                                    Globulin (S)   
[Mass/Vol]      3.4 g/dL                                        [facility]  
   
                                                    No Panel InformationOrdered   
By: Amanda Murillo on 10-   
   
                                                    Bedside Glucose   
Comment         Glu2: cleaned meter                                 [facility]  
   
                                                    Protein [Mass/volume] in Ser  
um or PlasmaOrdered By: Amanad Murillo on   
10-   
   
                      Protein [Mass/Vol] 6.3 g/dL              6.4-8.9    [facility]  
   
                                                    Serum or plasma albumin/glob  
ulin mass ratioOrdered By: Amanda Murillo on   
10-   
   
                                                    Albumin/Globulin   
[Mass ratio]    0.9 {ratio}                                     [facility]  
   
                                                    Sodium [Moles/volume] in Uri  
neOrdered By: Roselia Chowdhury on 10-   
   
                                                    Sodium (U)   
[Moles/Vol]     37 mmol/L                                       [facility]  
   
                                        Comment on above:   No reference range e  
stablished   
   
                                                    Activated partial thrombopla  
stin time (aPTT) in platelet poor plasma by   
coagulation   
aOrdered By: Alexei Wise on 10-   
   
                                                    aPTT Coag (PPP)   
[Time]          33.3 s                          25.1-36.5       [facility]  
   
                                        Comment on above:   A hematocrit value g  
reater than 55% may lead to inaccurate   
results in coagulation testing. Patients having hematocrit   
values >55% require a special collection tube for coagulation   
studies. Please contact the laboratory at [phone] for   
redraw instructions.   
   
                                                    Anaerobic cultureOrdered By:  
 Alexei Wise on 10-   
   
                                                    Bacteria identified   
Anaer cx Nom (Unsp   
spec)                                   No Anaerobes Isolated   
3 Days                                                      [facility]  
   
                                                    Bacteria identified   
Anaer cx Nom (Unsp   
spec)                                   No Anaerobes Isolated   
3 Days                                                      [facility]  
   
                                                    Automated erythrocytes count  
 in urine sediment (number/area)Ordered By: Alexei Wise on 10-   
   
                                                    RBC Auto (Urine sed)   
[#/Area]        20-49 [HPF]                     0-4             [facility]  
   
                                                    Automated leukocytes count i  
n urine sediment (number/area)Ordered By: Alexei Wise on 10-   
   
                                                    WBC Auto (Urine sed)   
[#/Area]        Innumerable [HPF]                 0-4             [facility]  
   
                                                    Bacteria identified Aer cx N  
om (Unsp spec)Ordered By: Alexei Wise on   
10-   
   
                                        Aerobic Culture     Escherichia coli   
(ESBL)                                                      [facility]  
   
                                        Aerobic Culture     Corynebacterium   
striatum group                                              [facility]  
   
                                        Aerobic Culture     Escherichia coli   
(ESBL)                                                      [facility]  
   
                                        Aerobic Culture     Corynebacterium   
striatum group                                              [facility]  
   
                                                    Bacterial blood cultureOrder  
ed By: Alexei Wise on 10-   
   
                                                    Bacteria identified   
Cx Nom (Bld)                            Escherichia coli   
(ESBL)                                                      [facility]  
   
                                                    Bacteria identified   
Cx Nom (Bld)                            Escherichia coli   
(ESBL)                                                      [facility]  
   
                                                    Basophils Auto (Bld) [#/Vol]  
Ordered By: Alexei Wise on 10-   
   
                                                    Basophils (Bld)   
[#/Vol]         0.1 10*3/uL                     0.0-0.2         [facility]  
   
                                                    Basophils/100 WBC Auto (Bld)  
Ordered By: Alexei Wise on 10-   
   
                                                    Basophils/100 WBC   
(Bld)           0.4 %                           .               [facility]  
   
                                                    Bilirubin Test strip Ql (U)O  
rdered By: Alexei Wise on 10-   
   
                      Bilirubin Ql (U) Negative              Negative   [facility]  
   
                                                    Bilirubin.total [Mass/volume  
] in Serum or PlasmaOrdered By: Alexei Wise on   
10-   
   
                      Bilirubin [Mass/Vol] 0.3 mg/dL             0.3-1.0    [facility]  
   
                                                    Calcium [Mass/volume] in Ser  
um or PlasmaOrdered By: Alexei Wise on   
10-   
   
                      Calcium [Mass/Vol] 8.8 mg/dL             8.6-10.3   [facility]  
   
                                                    Carbon dioxide, total [Moles  
/volume] in Serum or PlasmaOrdered By: Alexei Wise   
on 10-   
   
                      CO2 [Moles/Vol] 20.3 mmol/L            21.0-31.0  [facility]  
   
                                                    Chloride [Moles/volume] in S  
chandler or PlasmaOrdered By: Alexei Wise on   
10-   
   
                      Chloride [Moles/Vol] 102 mmol/L                 [facility]  
   
                                                    Color Auto (U)Ordered By: Skip Wise on 10-   
   
                      Color (U)  Yellow                Yellow     [facility]  
   
                                                    Creatinine [Mass/volume] in   
Serum or PlasmaOrdered By: Alexei Wise on   
10-   
   
                      Creatinine [Mass/Vol] 3.06 mg/dL            0.60-1.20  [facility]  
   
                                                    Eosinophils Auto (Bld) [#/Vo  
l]Ordered By: Alexei Wise on 10-   
   
                                                    Eosinophils (Bld)   
[#/Vol]         0.7 10*3/uL                     0.0-0.45        [facility]  
   
                                                    Eosinophils/100 WBC Auto (Bl  
d)Ordered By: Alexei Wise on 10-   
   
                                                    Eosinophils/100 WBC   
(Bld)           3.6 %                           .               [facility]  
   
                                                    Erythrocyte distribution wid  
th Auto (RBC) [Ratio]Ordered By: Alexei Wise on  
   
10-   
   
                                                    Erythrocyte   
distribution width   
(RBC) [Ratio]   14.6 %                          11.9-15.3       [facility]  
   
                                                    Glucose [Mass/volume] in Ser  
um or PlasmaOrdered By: Alexei Wise on   
10-   
   
                      Glucose [Mass/Vol] 160 mg/dL                  [facility]  
   
                                        Comment on above:   ADA recommended refe  
rence rangeRandom Glucose Reference Range   
is   
dependent on time and content of last meal. Glucose of more than   
200 mg/dL in a nonstressed, ambulatory subject supports the   
diagnosis of Diabetes Mellitus.   
   
                                                    Gram stain for investigation  
 of transfusion reactionOrdered By: Alexei Wise  
 on   
10-   
   
                                                    Microscopic   
observation Gram   
stain Nom (Unsp spec)                                                 [facility]  
   
                                                    Microscopic   
observation Gram   
stain Nom (Unsp spec)                                                 [facility]  
   
                                                    Hematocrit Auto (Bld) [Volum  
e fraction]Ordered By: Alexei Wise on   
10-   
   
                                                    Hematocrit (Bld)   
[Volume fraction] 31.2 %                          34.0-46.4       [facility]  
   
                                                    Hemoglobin [Mass/volume] in   
BloodOrdered By: Alexei Wise on 10-   
   
                                                    Hemoglobin (Bld)   
[Mass/Vol]      10.2 g/dL                       11.8-15.4       [facility]  
   
                                                    INR in Platelet poor plasma   
by Coagulation assayOrdered By: Alexei Wise on   
10-   
   
                                                    INR Coag (PPP)   
[Relative time] 1.2 {INR}                                       [facility]  
   
                                        Comment on above:   INR Therapeutic Rang  
e A) Pre- and Peroperative OAT started two  
  
weeks before surgery. NOT HIP SURGERY: 1.5 - 2.5 HIP SURGERY: 2   
- 3B) Primary and secondary prevention of venous THROMBOSIS: 2 -   
3C) Active venous thrombosis, pulmonary embolismand prevention   
of recurrent venous thrombosis: 2 - 3D) Prevention of arterial   
thromboembolismincluding patients with mechanical heart valves:   
3 - 4.5   
   
                                                    Ketones Auto test strip (U)   
[Mass/Vol]Ordered By: Alexei Wise on 10-  
   
   
                                                    Ketones (U)   
[Mass/Vol]      Negative                        Negative        [facility]  
   
                                                    Laboratory - UrinalysisOrder  
ed By: Alexei Wise on 10-   
   
                                                    Hyaline casts LM Ql   
(Urine sed)     0-8 [LPF]                       0-8             [facility]  
   
                                                    Lactate [Moles/volume] in Se  
rum or PlasmaOrdered By: Alexei Wise on   
10-   
   
                      Lactate [Moles/Vol] 0.6 mmol/L            0.5-2.2    [facility]  
   
                                                    Leukocytes [#/volume] correc  
sapna for nucleated erythrocytes in Blood by Automated  
   
counOrdered By: Alexei Wise on 10-   
   
                                                    WBC corrected for   
nucl RBC Auto (Bld)   
[#/Vol]         19.2 10*3/uL                    3.8-11.6        [facility]  
   
                                                    Lymphocytes Auto (Bld) [#/Vo  
l]Ordered By: Alexei Wise on 10-   
   
                                                    Lymphocytes (Bld)   
[#/Vol]         3.5 10*3/uL                     1.00-4.8        [facility]  
   
                                                    Lymphocytes/100 WBC Auto (Bl  
d)Ordered By: Alexei Wise on 10-   
   
                                                    Lymphocytes/100 WBC   
(Bld)           18.5 %                          .               [facility]  
   
                                                    MCH Auto (RBC) [Entitic mass  
]Ordered By: Alexei Wise on 10-   
   
                                                    MCH (RBC) [Entitic   
mass]           27.9 pg                         24.7-34.3       [facility]  
   
                                                    MCHC Auto (RBC) [Mass/Vol]Or  
dered By: Alexei Wise on 10-   
   
                      MCHC (RBC) [Mass/Vol] 32.6 g/dL             32.0-35.0  [facility]  
   
                                                    MCV Auto (RBC) [Entitic vol]  
Ordered By: Alexei Wise on 10-   
   
                                                    MCV (RBC) [Entitic   
vol]            85.7 fL                                   [facility]  
   
                                                    Monocyte distribution width   
[Entitic volume] in Blood by AutomatedOrdered By:   
Alexei Wise on 10-   
   
                                                    Monocyte distribution   
width Auto (Bld)   
[Entitic vol]   27.49 %                         0.00-20.00      [facility]  
   
                                        Comment on above:   For adults in ED, MD  
W > 20.0 may be associated with a higher   
risk of sepsis during the first 12 hrs of hospital admission   
   
                                                    Monocytes Auto (Bld) [#/Vol]  
Ordered By: Alexei Wise on 10-   
   
                                                    Monocytes (Bld)   
[#/Vol]         1.2 10*3/uL                     0.0-0.8         [facility]  
   
                                                    Monocytes/100 WBC Auto (Bld)  
Ordered By: Alexei Wise on 10-   
   
                                                    Monocytes/100 WBC   
(Bld)           6.1 %                           .               [facility]  
   
                                                    Neutrophils Auto (Bld) [#/Vo  
l]Ordered By: Alexei Wise on 10-   
   
                                                    Neutrophils (Bld)   
[#/Vol]         13.7 10*3/uL                    1.8-7.7         [facility]  
   
                                                    Neutrophils/100 WBC Auto (Bl  
d)Ordered By: Alexei Wise on 10-   
   
                                                    Neutrophils/100 WBC   
(Bld)           71.4 %                          .               [facility]  
   
                                                    Nitrite Test strip Ql (U)Ord  
ered By: Alexei Wise on 10-   
   
                      Nitrite Ql (U) Positive              Negative   [facility]  
   
                                                    No Panel InformationOrdered   
By: Alexei Wise on 10-   
   
                                                    Bacterial ID (NA   
Multiplex Assay)                                                 [facility]  
   
                                                    Bacterial ID (NA   
Multiplex Assay)                                                 [facility]  
   
                                                    Estimated GFR   
(CKD-EPI)       17.602 mL/Min                                   [facility]  
   
                                                    Pharmacy Creatinine   
Clearance (Chem 28.23                                           [facility]  
   
                                                    Nucleated erythrocytes [Pres  
ence] in Blood by Automated countOrdered By: Alexei Wise on 10-   
   
                                                    Nucleated RBC Auto Ql   
(Bld)           0.0 /100{WBC}                   0-0.5           [facility]  
   
                                                    Platelet mean volume Auto (B  
ld) [Entitic vol]Ordered By: Alexei Wise on   
10-   
   
                                                    Platelet mean volume   
(Bld) [Entitic vol] 8.9 fL                          6.3-10.7        [facility]  
   
                                                    Platelets Auto (Bld) [#/Vol]  
Ordered By: Alexei Wise on 10-   
   
                                                    Platelets (Bld)   
[#/Vol]         332 10*3/uL                     150-450         [facility]  
   
                                                    Potassium [Moles/volume] in   
Serum or PlasmaOrdered By: Alexei Wise on   
10-   
   
                      Potassium [Moles/Vol] 3.5 mmol/L            3.5-5.1    [facility]  
   
                                                    Protein Auto test strip (U)   
[Mass/Vol]Ordered By: Alexei Wise on 10-  
   
   
                                                    Protein (U)   
[Mass/Vol]      100 mg/dL                       Negative        [facility]  
   
                                                    Prothrombin time (PT)Ordered  
 By: Alexei Wise on 10-   
   
                      PT Coag (PPP) [Time] 14.7 s                9.0-12.9   [facility]  
   
                                        Comment on above:   A hematocrit value g  
reater than 55% may lead to inaccurate   
results in coagulation testing. Patients having hematocrit   
values >55% require a special collection tube for coagulation   
studies. Please contact the laboratory at [phone] for   
redraw instructions.   
   
                                                    RBC Auto (Bld) [#/Vol]Ordere  
d By: Alexei Wise on 10-   
   
                      RBC (Bld) [#/Vol] 3.65 10*6/uL            3.60-5.00  [facility]  
   
                                                    Serum or plasma anion gap de  
terminationOrdered By: Alexei Wise on   
10-   
   
                      Anion gap [Moles/Vol] 14.2 mmol/L            6.0-15.0   [facility]  
   
                                                    Sodium [Moles/volume] in Ser  
um or PlasmaOrdered By: Alexei Wise on   
10-   
   
                      Sodium [Moles/Vol] 133 mmol/L            136-145    [facility]  
   
                                                    Specific gravity Auto test s  
trip (U) [Rel density]Ordered By: Alexei Wise   
on   
10-   
   
                                                    Specific gravity (U)   
[Rel density]   1.013                           1.001-1.030     [facility]  
   
                                                    Squamous epithelial cells de  
tection in urine sediment by light microscopyOrdered  
By:   
Alexei Wise on 10-   
   
                                                    Epithelial   
cells.squamous LM Ql   
(Urine sed)     10-19 [HPF]                     0-2             [facility]  
   
                                                    Urea nitrogen [Mass/volume]   
in Serum or PlasmaOrdered By: Alexei Wise on   
10-   
   
                                                    Urea nitrogen   
[Mass/Vol]      28 mg/dL                        7-25            [facility]  
   
                                                    Urine bacteria detection by   
automated methodOrdered By: Alexei Wise on   
10-   
   
                      Bacteria Auto Ql (U) 1+                    None Seen  [facility]  
   
                                                    Urine clarity by refractomet  
ry automatedOrdered By: Alexei Wise on   
10-   
   
                                                    Clarity Refractometry   
automated (U)   Turbid                          Clear           [facility]  
   
                                                    Urine culture routineOrdered  
 By: Alexei Wise on 10-   
   
                                                    Bacteria identified   
Cx Nom (U)                              Escherichia coli   
(ESBL)                                                      [facility]  
   
                                                    Bacteria identified   
Cx Nom (U)                              Corynebacterium   
striatum group                                              [facility]  
   
                                                    Bacteria identified   
Cx Nom (U)                              Escherichia coli   
(ESBL)                                                      [facility]  
   
                                                    Bacteria identified   
Cx Nom (U)                              Corynebacterium   
striatum group                                              [facility]  
   
                                                    Urine glucose measurement by  
 automated test strip (mass/volume)Ordered By:   
Alexei Wise on 10-   
   
                                                    Glucose Auto test   
strip (U) [Mass/Vol] 500 mg/dL                       Normal          [facility]  
   
                                                    Urine hemoglobin detection b  
y automated test stripOrdered By: Alexei Wise   
on   
10-   
   
                                                    Hemoglobin Auto test   
strip Ql (U)    3+                              Negative        [facility]  
   
                                                    Urine leukocyte esterase det  
ection by automated test stripOrdered By: Alexei Wise on 10-   
   
                                                    Leukocyte esterase   
Auto test strip Ql   
(U)             4+                              Negative        [facility]  
   
                                                    Urobilinogen Auto test strip  
 (U) [Mass/Vol]Ordered By: Alexei Wise on   
10-   
   
                                                    Urobilinogen (U)   
[Mass/Vol]      Normal mg/dL                    Normal          [facility]  
   
                                                    WBC Auto (Bld) [#/Vol]Ordere  
d By: Alexei Wise on 10-   
   
                      WBC (Bld) [#/Vol] 19.2 10*3/uL            3.8-11.6   [facility]  
   
                                                    Yeast detection in urine sed  
iment by light microscopyOrdered By: Alexei Wise on   
10-   
   
                                                    Yeast LM Ql (Urine   
sed)            None seen [HPF]                 None Seen       [facility]  
   
                                                    pH Auto test strip (U)Ordere  
d By: Alexei Wise on 10-   
   
                      pH (U)     5.5 [pH]              5.0-9.0    [facility]  
   
                                                    CT Abdomen and Pelvis WO con  
trastOrdered By: Anju Langford on 10-   
   
                      CT DLP     1496.03 (mGy.cm)                       [facility]  
Work Phone:   
[phone]  
   
                                        CT Series           Topogram,Topogram,DE  
   
Renal Stone                                                 [facility]  
Work Phone:   
[phone]  
   
                                        CTDI VOL            0.01 (mGy),0.01   
(mGy),33.53 (mGy)                                           [facility]  
Work Phone:   
[phone]  
   
                                        PHANTOM TYPE        [employer]osimetry   
Phantom,[employer] Body   
Dosimetry Phantom,[employer]EC   
Body Dosimetry Phantom                                         [facility]  
Work Phone:   
[phone]  
   
                                                                  [facility]  
Work Phone:   
[phone]  
   
                                                    CT Abdomen and Pelvis WO con  
traston 10-   
   
                                                            EXAMINATION: CT   
ABDOMEN/PELVIS W/O   
CONTRAST RENAL STONE   
DUAL ENERGY 10/02/2023   
02:38 PM  
CLINICAL HISTORY:   
Renal stone, prior  
ASSOCIATED DIAGNOSIS:   
Renal stones  
ORDERING PROVIDER: MARYCRUZ INIGUEZ  
  
TECHNIQUE: CT abdomen   
and pelvis without   
contrast performed for   
renal stone protocol.   
Contiguous unenhanced   
axial images were   
obtained from above   
the kidneys through   
the level of the pubic   
symphysis. 2D sagittal   
and coronal   
reconstructions were   
obtained from the   
axial data.  
  
COMPARISON: Renal   
ultrasound on   
2023, outside CT   
renal stone protocol   
on 2023.  
  
FINDINGS:  
All image numbers are   
referenced to SERIES   
3, unless specified   
otherwise.  
  
Limitations:  
Please note,   
evaluation of the   
parenchymal viscera,   
lymph nodes and   
vessels is limited on   
the noncontrast scan.  
Superior portions of   
the liver and spleen   
and bilateral   
subphrenic regions are   
not included into the   
scan.  
  
  
Included images of the   
lower thorax: No   
pleural effusion   
bilaterally.  
  
Hepatobiliary: No   
obvious focal liver   
lesion within the   
limitations of a   
noncontrast scan.   
Gallbladder is   
surgically absent. No   
gross biliary ductal   
dilatation.  
  
Pancreas: No   
pancreatic ductal   
dilatation or   
discernible focal   
lesion.  
  
Spleen: No   
splenomegaly. There   
are 2 hypodense   
lesions in the   
visualized spleen,   
measuring 2.8 cm and 8   
mm (images 14, 17),   
incompletely evaluated   
on current scan.  
  
Adrenal Glands: A 1.3   
cm right adrenal   
adenoma (image 11).   
Unremarkable left   
adrenal gland.  
  
Kidneys, ureters, and   
bladder:  
Bilateral nephrostomy   
tubes are present.  
Bilateral staghorn   
calculi are again   
seen. No   
ureterolithiasis on   
either side.  
No   
hydroureteronephrosis   
bilaterally.  
The bladder is   
underdistended,   
limited for the   
evaluation.  
  
Abdominal and pelvic   
vasculature:   
Atherosclerotic   
calcifications in the   
aorta and bilateral   
iliac and femoral   
arteries. No aortic   
aneurysm.  
  
GI tract: The stomach   
is physiologically   
distended. No bowel   
dilatation. Normal   
appendix. Normal gas   
and bowel content   
throughout the colon.   
Colonic diverticula   
are present without   
evidence of   
diverticulitis.  
Focal area of soft   
tissue density with a   
central bubble of gas   
in the left perianal   
region is suggestive   
of perianal fistula   
(image 133).  
  
Peritoneum and   
retroperitoneum: No   
free fluid or free air   
is noted.  
  
Lymph Nodes: No   
abdominal or pelvic   
lymphadenopathy is   
evident.  
  
Uterus and adnexa: The   
uterus and the ovaries   
are present, not well   
evaluated by CT scan.   
There is a 6.2 x 3.8   
cm well-defined cystic   
lesion presumably   
associated with the   
left adnexa (image   
69), incompletely   
evaluated on current   
scan. The finding   
measured 7 x 3.5 cm on   
2023 CT scan.  
  
Anterior abdominal   
wall:  
There is rectus   
muscles diastasis and   
anterior fascial plane   
laxity with protrusion   
of abdominal content   
to the area of   
diastasis.  
There is 7 x 6.5 cm   
fat-containing right   
paraumbilical hernia   
(image 121) with the   
hernia neck measuring   
1.6 cm (image 107).  
  
Visualized   
musculoskeletal   
structures: No acute   
fracture or   
destructive osseous   
lesion.  
  
IMPRESSION:  
  
* Bilateral   
nephrostomy tubes and   
bilateral staghorn   
renal calculi. No   
ureterolithiasis or   
hydroureteronephrosis   
on either side.  
* A 6.2 cm cystic   
lesion presumably   
associated with the   
left adnexa,   
incompletely evaluated   
on current scan. Given   
the location in the   
mid/ superior pelvis,   
the finding is   
unlikely to be   
adequately visualized   
with ultrasound. If   
further imaging is   
desired, pelvic MR can   
be obtained.  
* Findings suggestive   
of left perianal   
fistula.  
* Additional nonacute   
findings, as detailed   
above.  
  
  
MACRO: (**[id number]**)  
                                                            RADIOLOGY  
   
                                                            Anju Langford MD   
- 10/02/2023   
Formatting of this   
note might be   
different from the   
original.  
EXAMINATION: CT   
ABDOMEN/PELVIS W/O   
CONTRAST RENAL STONE   
DUAL ENERGY 10/02/2023   
02:38 PM  
CLINICAL HISTORY:   
Renal stone, prior  
ASSOCIATED DIAGNOSIS:   
Renal stones  
ORDERING PROVIDER: MARYCRUZ INIGUEZ  
  
TECHNIQUE: CT abdomen   
and pelvis without   
contrast performed for   
renal stone protocol.   
Contiguous unenhanced   
axial images were   
obtained from above   
the kidneys through   
the level of the pubic   
symphysis. 2D sagittal   
and coronal   
reconstructions were   
obtained from the   
axial data.  
  
COMPARISON: Renal   
ultrasound on   
2023, outside CT   
renal stone protocol   
on 2023.  
  
FINDINGS:  
All image numbers are   
referenced to SERIES   
3, unless specified   
otherwise.  
  
Limitations:  
Please note,   
evaluation of the   
parenchymal viscera,   
lymph nodes and   
vessels is limited on   
the noncontrast scan.  
Superior portions of   
the liver and spleen   
and bilateral   
subphrenic regions are   
not included into the   
scan.  
  
  
Included images of the   
lower thorax: No   
pleural effusion   
bilaterally.  
  
Hepatobiliary: No   
obvious focal liver   
lesion within the   
limitations of a   
noncontrast scan.   
Gallbladder is   
surgically absent. No   
gross biliary ductal   
dilatation.  
  
Pancreas: No   
pancreatic ductal   
dilatation or   
discernible focal   
lesion.  
  
Spleen: No   
splenomegaly. There   
are 2 hypodense   
lesions in the   
visualized spleen,   
measuring 2.8 cm and 8   
mm (images 14, 17),   
incompletely evaluated   
on current scan.  
  
Adrenal Glands: A 1.3   
cm right adrenal   
adenoma (image 11).   
Unremarkable left   
adrenal gland.  
  
Kidneys, ureters, and   
bladder:  
Bilateral nephrostomy   
tubes are present.  
Bilateral staghorn   
calculi are again   
seen. No   
ureterolithiasis on   
either side.  
No   
hydroureteronephrosis   
bilaterally.  
The bladder is   
underdistended,   
limited for the   
evaluation.  
  
Abdominal and pelvic   
vasculature:   
Atherosclerotic   
calcifications in the   
aorta and bilateral   
iliac and femoral   
arteries. No aortic   
aneurysm.  
  
GI tract: The stomach   
is physiologically   
distended. No bowel   
dilatation. Normal   
appendix. Normal gas   
and bowel content   
throughout the colon.   
Colonic diverticula   
are present without   
evidence of   
diverticulitis.  
Focal area of soft   
tissue density with a   
central bubble of gas   
in the left perianal   
region is suggestive   
of perianal fistula   
(image 133).  
  
Peritoneum and   
retroperitoneum: No   
free fluid or free air   
is noted.  
  
Lymph Nodes: No   
abdominal or pelvic   
lymphadenopathy is   
evident.  
  
Uterus and adnexa: The   
uterus and the ovaries   
are present, not well   
evaluated by CT scan.   
There is a 6.2 x 3.8   
cm well-defined cystic   
lesion presumably   
associated with the   
left adnexa (image   
69), incompletely   
evaluated on current   
scan. The finding   
measured 7 x 3.5 cm on   
2023 CT scan.  
  
Anterior abdominal   
wall:  
There is rectus   
muscles diastasis and   
anterior fascial plane   
laxity with protrusion   
of abdominal content   
to the area of   
diastasis.  
There is 7 x 6.5 cm   
fat-containing right   
paraumbilical hernia   
(image 121) with the   
hernia neck measuring   
1.6 cm (image 107).  
  
Visualized   
musculoskeletal   
structures: No acute   
fracture or   
destructive osseous   
lesion.  
  
IMPRESSION:  
  
* Bilateral   
nephrostomy tubes and   
bilateral staghorn   
renal calculi. No   
ureterolithiasis or   
hydroureteronephrosis   
on either side.  
* A 6.2 cm cystic   
lesion presumably   
associated with the   
left adnexa,   
incompletely evaluated   
on current scan. Given   
the location in the   
mid/ superior pelvis,   
the finding is   
unlikely to be   
adequately visualized   
with ultrasound. If   
further imaging is   
desired, pelvic MR can   
be obtained.  
* Findings suggestive   
of left perianal   
fistula.  
* Additional nonacute   
findings, as detailed   
above.  
  
  
MACRO: (**[id number]**)  
  
                                                            [facility]  
   
                                                            Addendum by Anju Velazquez MD on   
10/03/2023 12:14 AM   
EDT  
Based on dual-energy   
data, bilateral   
staghorn renal calculi   
are  
predominantly uric   
acid stones, however a   
few calcific stones   
embedded in  
the uric acid stones   
are demonstrated, as   
follows:  
* A 7 mm calcific   
stone in the right   
renal pelvis (image   
37, series 3 and  
series 906);  
* A 7 mm calcific   
stone in the left   
renal pelvis (image   
32, series 3 and  
series 906);  
* A 1.3 cm calcific   
stone in the anterior   
inferior left   
infundibulum  
(image 54 series 6,   
image 38 series 906).  
  
  
                                                            [facility]  
   
                                                    Radiology Study   
observation   
(narrative)                                                     [facility]  
   
                                                    Alanine aminotransferase [En  
zymatic activity/volume] in Serum or PlasmaOrdered   
By:   
Johnnie Henriquez on 2023   
   
                                                    ALT [Catalytic   
activity/Vol]   8 U/L                           7-52            [facility]  
   
                                                    Albumin [Mass/volume] in Ser  
um or Plasma by Bromocresol green (BCG) dye binding   
methoOrdered By: Johnnie Henriquez on 2023   
   
                                                    Albumin BCG dye   
[Mass/Vol]      3.8 g/dL                        3.5-5.7         [facility]  
   
                                                    Alkaline phosphatase [Enzyma  
tic activity/volume] in Serum or PlasmaOrdered By:   
Johnnie Henriquez on 2023   
   
                                                    ALP [Catalytic   
activity/Vol]   42 U/L                                    [facility]  
   
                                                    Aspartate aminotransferase [  
Enzymatic activity/volume] in Serum or PlasmaOrdered  
By:   
Johnnie Henriquez on 2023   
   
                                                    AST [Catalytic   
activity/Vol]   9 U/L                           13-39           [facility]  
   
                                                    Automated erythrocytes count  
 in urine sediment (number/area)Ordered By: Johnnie Henriquez   
on 2023   
   
                                                    RBC Auto (Urine sed)   
[#/Area]         [HPF]                    0-4             [facility]  
   
                                                    Automated leukocytes count i  
n urine sediment (number/area)Ordered By: Johnnie Henriquez on   
2023   
   
                                                    WBC Auto (Urine sed)   
[#/Area]        Innumerable [HPF]                 0-4             [facility]  
   
                                                    Automated urine hyaline cast  
s count (number/volume)Ordered By: Johnnie Henriquez on   
2023   
   
                                                    Hyaline casts Auto   
(U) [#/Vol]     None seen [LPF]                 0-1             [facility]  
   
                                                    Basophils Auto (Bld) [#/Vol]  
Ordered By: Johnnie Henriquez on 2023   
   
                                                    Basophils (Bld)   
[#/Vol]         0.1 10*3/uL                     0.0-0.2         [facility]  
   
                                                    Basophils/100 WBC Auto (Bld)  
Ordered By: Johnnie Henriquez on 2023   
   
                                                    Basophils/100 WBC   
(Bld)           0.8 %                           .               [facility]  
   
                                                    Bilirubin Test strip Ql (U)O  
rdered By: Johnnie Henriquez on 2023   
   
                      Bilirubin Ql (U) Negative              Negative   [facility]  
   
                                                    Bilirubin.direct [Mass/volum  
e] in Serum or PlasmaOrdered By: Johnnie Henriquez on   
2023   
   
                                                    Bilirubin.direct   
[Mass/Vol]      0.10 mg/dL                      0.03-0.18       [facility]  
   
                                                    Bilirubin.total [Mass/volume  
] in Serum or PlasmaOrdered By: Johnnie Henriquez on   
2023   
   
                      Bilirubin [Mass/Vol] 0.2 mg/dL             0.3-1.0    [facility]  
   
                                                    Calcium [Mass/volume] in Ser  
um or PlasmaOrdered By: Johnnie Henriquez on 2023   
   
                      Calcium [Mass/Vol] 9.9 mg/dL             8.6-10.3   [facility]  
   
                                                    Carbon dioxide, total [Moles  
/volume] in Serum or PlasmaOrdered By: Johnnie Henriquez   
on   
2023   
   
                      CO2 [Moles/Vol] 26.5 mmol/L            21.0-31.0  [facility]  
   
                                                    Casts typing in urine sedime  
nt by light microscopyOrdered By: Johnnie Henriquez on   
2023   
   
                                                    Casts LM Nom (Urine   
sed)            None seen [LPF]                 None Seen       [facility]  
   
                                                    Chloride [Moles/volume] in S  
chandler or PlasmaOrdered By: Johnnie Henriquez on 2023  
   
   
                      Chloride [Moles/Vol] 104 mmol/L                 [facility]  
   
                                                    Color Auto (U)Ordered By: Wily Henriquez on 2023   
   
                      Color (U)  Yellow                Yellow     [facility]  
   
                                                    Creatinine [Mass/volume] in   
Serum or PlasmaOrdered By: Johnnie Henriquez on   
2023   
   
                      Creatinine [Mass/Vol] 2.16 mg/dL            0.60-1.20  [facility]  
   
                                                    Eosinophils Auto (Bld) [#/Vo  
l]Ordered By: Johnnie Henriquez on 2023   
   
                                                    Eosinophils (Bld)   
[#/Vol]         1.1 10*3/uL                     0.0-0.45        [facility]  
   
                                                    Eosinophils/100 WBC Auto (Bl  
d)Ordered By: Johnnie Henriquez on 2023   
   
                                                    Eosinophils/100 WBC   
(Bld)           7.0 %                           .               [facility]  
   
                                                    Erythrocyte distribution wid  
th Auto (RBC) [Ratio]Ordered By: Johnnie Henriquez on   
2023   
   
                                                    Erythrocyte   
distribution width   
(RBC) [Ratio]   15.0 %                          11.9-15.3       [facility]  
   
                                                    Globulin Calc (S) [Mass/Vol]  
Ordered By: Johnnie Henriquez on 2023   
   
                                                    Globulin (S)   
[Mass/Vol]      3.7 g/dL                                        [facility]  
   
                                                    Glucose [Mass/volume] in Ser  
um or PlasmaOrdered By: Johnnie Henriquez on 2023   
   
                      Glucose [Mass/Vol] 153 mg/dL                  [facility]  
   
                                        Comment on above:   ADA recommended refe  
rence rangeRandom Glucose Reference Range   
is   
dependent on time and content of last meal. Glucose of more than   
200 mg/dL in a nonstressed, ambulatory subject supports the   
diagnosis of Diabetes Mellitus.   
   
                                                    Hematocrit Auto (Bld) [Volum  
e fraction]Ordered By: Johnnie Henriquez on 2023   
   
                                                    Hematocrit (Bld)   
[Volume fraction] 34.4 %                          34.0-46.4       [facility]  
   
                                                    Hemoglobin [Mass/volume] in   
BloodOrdered By: Johnnie Henriquez 2023   
   
                                                    Hemoglobin (Bld)   
[Mass/Vol]      11.2 g/dL                       11.8-15.4       [facility]  
   
                                                    Ketones Auto test strip (U)   
[Mass/Vol]Ordered By: Johnnie Henriquez on 2023   
   
                                                    Ketones (U)   
[Mass/Vol]      Negative                        Negative        [facility]  
   
                                                    Leukocytes [#/volume] correc  
sapna for nucleated erythrocytes in Blood by Automated  
   
counOrdered By: Johnnie Henriquez on 2023   
   
                                                    WBC corrected for   
nucl RBC Auto (Bld)   
[#/Vol]         15.2 10*3/uL                    3.8-11.6        [facility]  
   
                                                    Lipase [Enzymatic activity/v  
olume] in Serum or PlasmaOrdered By: Johnnie Henriquez on  
  
2023   
   
                                                    Lipase [Catalytic   
activity/Vol]   323.0 U/L                       11.0-82.0       [facility]  
   
                                                    Lymphocytes Auto (Bld) [#/Vo  
l]Ordered By: Johnnie Henriquez on 2023   
   
                                                    Lymphocytes (Bld)   
[#/Vol]         3.5 10*3/uL                     1.00-4.8        [facility]  
   
                                                    Lymphocytes/100 WBC Auto (Bl  
d)Ordered By: Johnnie Henriquez on 2023   
   
                                                    Lymphocytes/100 WBC   
(Bld)           23.2 %                          .               [facility]  
   
                                                    MCH Auto (RBC) [Entitic mass  
]Ordered By: Johnnie Henriquez on 2023   
   
                                                    MCH (RBC) [Entitic   
mass]           28.1 pg                         24.7-34.3       [facility]  
   
                                                    MCHC Auto (RBC) [Mass/Vol]Or  
dered By: Johnnie Henriquez on 2023   
   
                      MCHC (RBC) [Mass/Vol] 32.5 g/dL             32.0-35.0  [facility]  
   
                                                    MCV Auto (RBC) [Entitic vol]  
Ordered By: Johnnie Henriquez on 2023   
   
                                                    MCV (RBC) [Entitic   
vol]            86.3 fL                                   [facility]  
   
                                                    Monocyte distribution width   
[Entitic volume] in Blood by AutomatedOrdered By:   
Johnnie Henriquez on 2023   
   
                                                    Monocyte distribution   
width Auto (Bld)   
[Entitic vol]   19.22 %                         0.00-20.00      [facility]  
   
                                                    Monocytes Auto (Bld) [#/Vol]  
Ordered By: Johnnie Henriquez on 2023   
   
                                                    Monocytes (Bld)   
[#/Vol]         0.7 10*3/uL                     0.0-0.8         [facility]  
   
                                                    Monocytes/100 WBC Auto (Bld)  
Ordered By: Johnnie Henriquez on 2023   
   
                                                    Monocytes/100 WBC   
(Bld)           4.6 %                           .               [facility]  
   
                                                    Neutrophils Auto (Bld) [#/Vo  
l]Ordered By: Johnnie Henriquez on 2023   
   
                                                    Neutrophils (Bld)   
[#/Vol]         9.8 10*3/uL                     1.8-7.7         [facility]  
   
                                                    Neutrophils/100 WBC Auto (Bl  
d)Ordered By: Johnnie Henriquez on 2023   
   
                                                    Neutrophils/100 WBC   
(Bld)           64.4 %                          .               [facility]  
   
                                                    Nitrite Test strip Ql (U)Ord  
ered By: Johnnie Henriquez on 2023   
   
                      Nitrite Ql (U) Negative              Negative   [facility]  
   
                                                    No Panel InformationOrdered   
By: Johnnie Henriquez on 2023   
   
                                                    Estimated GFR   
(CKD-EPI)       26.736 mL/Min                                   [facility]  
   
                                                    Pharmacy Creatinine   
Clearance (Chem 40.82                                           [facility]  
   
                                                    Nucleated erythrocytes [Pres  
ence] in Blood by Automated countOrdered By: Johnnie Henriquez   
on 2023   
   
                                                    Nucleated RBC Auto Ql   
(Bld)           0.1 /100{WBC}                   0-0.5           [facility]  
   
                                                    Platelet mean volume Auto (B  
ld) [Entitic vol]Ordered By: Johnnie Henriquez on   
2023   
   
                                                    Platelet mean volume   
(Bld) [Entitic vol] 9.0 fL                          6.3-10.7        [facility]  
   
                                                    Platelets Auto (Bld) [#/Vol]  
Ordered By: Johnnie Henriquez on 2023   
   
                                                    Platelets (Bld)   
[#/Vol]         284 10*3/uL                     150-450         [facility]  
   
                                                    Potassium [Moles/volume] in   
Serum or PlasmaOrdered By: Johnnie Henriquez on   
2023   
   
                      Potassium [Moles/Vol] 3.3 mmol/L            3.5-5.1    [facility]  
   
                                                    Protein Auto test strip (U)   
[Mass/Vol]Ordered By: Johnnie Henriquez on 2023   
   
                                                    Protein (U)   
[Mass/Vol]      300 mg/dL                       Negative        [facility]  
   
                                                    Protein [Mass/volume] in Ser  
um or PlasmaOrdered By: Johnnie Henriquez on 2023   
   
                      Protein [Mass/Vol] 7.5 g/dL              6.4-8.9    [facility]  
   
                                                    RBC Auto (Bld) [#/Vol]Ordere  
d By: Johnnie Henriquez on 2023   
   
                      RBC (Bld) [#/Vol] 3.99 10*6/uL            3.60-5.00  [facility]  
   
                                                    Serum or plasma albumin/glob  
ulin mass ratioOrdered By: Johnnie Henriquez on   
2023   
   
                                                    Albumin/Globulin   
[Mass ratio]    1.0 {ratio}                                     [facility]  
   
                                                    Serum or plasma anion gap de  
terminationOrdered By: Johnnie Henriquez on 2023   
   
                      Anion gap [Moles/Vol] 10.8 mmol/L            6.0-15.0   [facility]  
   
                                                    Serum or plasma non-glucuron  
idated bilirubin measurement (mass/volume)Ordered   
By:   
Johnnie Henriquez on 2023   
   
                                                    Bilirubin.indirect   
[Mass/Vol]      0.1 mg/dL                                       [facility]  
   
                                                    Sodium [Moles/volume] in Ser  
um or PlasmaOrdered By: Johnnie Henriquez on 2023   
   
                      Sodium [Moles/Vol] 138 mmol/L            136-145    [facility]  
   
                                                    Specific gravity Auto test s  
trip (U) [Rel density]Ordered By: Johnnie Henriquez on   
2023   
   
                                                    Specific gravity (U)   
[Rel density]   1.013                           1.001-1.030     [facility]  
   
                                                    Squamous epithelial cells de  
tection in urine sediment by light microscopyOrdered  
By:   
Johnnie Henriquez on 2023   
   
                                                    Epithelial   
cells.squamous LM Ql   
(Urine sed)     0-1 [HPF]                       0-2             [facility]  
   
                                                    Urea nitrogen [Mass/volume]   
in Serum or PlasmaOrdered By: Johnnie Henriquez on   
2023   
   
                                                    Urea nitrogen   
[Mass/Vol]      34 mg/dL                        7-25            [facility]  
   
                                                    Urine bacteria detection by   
automated methodOrdered By: Johnnie Henriquez on   
2023   
   
                      Bacteria Auto Ql (U) 4+                    None Seen  [facility]  
   
                                                    Urine clarity by refractomet  
ry automatedOrdered By: Johnnie Henriquez on 2023   
   
                                                    Clarity Refractometry   
automated (U)   Turbid                          Clear           [facility]  
   
                                                    Urine culture routineOrdered  
 By: Johnnie Henriquez on 2023   
   
                                                    Bacteria identified   
Cx Nom (U)                              Enterobacter cloacae   
complex                                                     [facility]  
   
                                                    Bacteria identified   
Cx Nom (U)                              Enterobacter cloacae   
complex#2                                                   [facility]  
   
                                                    Bacteria identified   
Cx Nom (U)                              Enterobacter cloacae   
complex                                                     [facility]  
   
                                                    Bacteria identified   
Cx Nom (U)                              Enterobacter cloacae   
complex#2                                                   [facility]  
   
                                                    Urine glucose measurement by  
 automated test strip (mass/volume)Ordered By:   
Johnnie Henriquez on 2023   
   
                                                    Glucose Auto test   
strip (U) [Mass/Vol] Normal mg/dL                    Normal          [facility]  
   
                                                    Urine hemoglobin detection b  
y automated test stripOrdered By: Johnnie Henriquez on   
2023   
   
                                                    Hemoglobin Auto test   
strip Ql (U)    3+                              Negative        [facility]  
   
                                                    Urine leukocyte esterase det  
ection by automated test stripOrdered By: Johnnie Henriquez on   
2023   
   
                                                    Leukocyte esterase   
Auto test strip Ql   
(U)             4+                              Negative        [facility]  
   
                                                    Urobilinogen Auto test strip  
 (U) [Mass/Vol]Ordered By: Johnnie Henriquez on   
2023   
   
                                                    Urobilinogen (U)   
[Mass/Vol]      Normal mg/dL                    Normal          [facility]  
   
                                                    WBC Auto (Bld) [#/Vol]Ordere  
d By: Johnnie Henriquez on 2023   
   
                      WBC (Bld) [#/Vol] 15.2 10*3/uL            3.8-11.6   [facility]  
   
                                                    Yeast detection in urine sed  
iment by light microscopyOrdered By: Johnnie Henriquez on  
   
2023   
   
                                                    Yeast LM Ql (Urine   
sed)            None seen [HPF]                 None Seen       [facility]  
   
                                                    pH Auto test strip (U)Ordere  
d By: Johnnie Henriquez on 2023   
   
                      pH (U)     5.5 [pH]              5.0-9.0    [facility]  
   
                                                    Basic metabolic 2000 panelon  
 2023   
   
                      Anion gap [Moles/Vol] 12 mmol/L             10 - 20    [facility]  
   
                          Calcium [Mass/Vol] 8.0 mg/dL    Low          8.4 - 10.  
4   
mg/dL                                   [facility]  
   
                      Chloride [Moles/Vol] 111 mmol/L            97 - 111 mmol/L  
 [facility]  
   
                      CO2 [Moles/Vol] 22 mmol/L             21 - 30 mmol/L [facility]  
   
                          Creatinine [Mass/Vol] 3.79 mg/dL   High         0.50 -  
 1.10   
mg/dL                                   [facility]  
   
                                                    GFR/1.73 sq   
M.predicted MDRD   
(S/P/Bld) [Vol   
rate/Area]      14 mL/min/{1.73_m2} Low             - PINF          [facility]  
   
                                        Comment on above:    CKD EPI Equatio  
n using Creatinine without Race  
Comment: Estimated glomerular filtration rate (eGFR) is   
calculated without a race coefficient. Values should be   
interpreted in the context of the patient's full clinical   
presentation.  
Reference:  
1. Alfredo C, Delmi M, Valdez DC, et al.. A Unifying Approach   
for GFR Estimation: Recommendations of the NKF-ASN Task Force on   
Reassessing the Inclusion of Race in Diagnosing Kidney Disease.   
American Journal of Kidney Diseases ;79(2):268-88.e1.  
2. N Engl J Med  Vol. 385 Issue 19 Pages 9305-7052  
  
   
   
                      Glucose [Mass/Vol] 102 mg/dL             68 - 110 mg/dL [facility]  
   
                                                    Interpretation and   
review of laboratory   
results         Abnormal                                        [facility]  
   
                          Potassium [Moles/Vol] 3.3 mmol/L                3.3 -   
5.3   
mmol/L                                  [facility]  
   
                          Sodium [Moles/Vol] 142 mmol/L                135 - 148  
   
mmol/L                                  [facility]  
   
                                                    Urea nitrogen   
[Mass/Vol]      43 mg/dL        High            8 - 22 mg/dL    [facility]  
   
                                                                  [facility]  
   
                                                    GLUCOSE, FINGERSTICK-IN OFFI  
CEon 2023   
   
                      Glucose [Mass/Vol] 147 mg/dL  High       68 - 110 mg/dL [facility]  
   
                                                    Interpretation and   
review of laboratory   
results         Abnormal                                        [facility]  
   
                                                                  [facility]  
   
                      Glucose [Mass/Vol] 157 mg/dL  High       68 - 110 mg/dL [facility]  
   
                                                    Interpretation and   
review of laboratory   
results         Abnormal                                        [facility]  
   
                                                                  [facility]  
   
                      Glucose [Mass/Vol] 120 mg/dL  High       68 - 110 mg/dL [facility]  
   
                                                    Interpretation and   
review of laboratory   
results         Abnormal                                        [facility]  
   
                                                                  [facility]  
   
                                                    LAVENDER TOP TUBE, BLOODon 0  
2023   
   
                      Extra Tube Done                             [facility]  
   
                                                                  [facility]  
   
                                                    Basic metabolic 2000 panelon  
 2023   
   
                      Anion gap [Moles/Vol] 13 mmol/L             10 - 20    [facility]  
   
                          Calcium [Mass/Vol] 7.9 mg/dL    Low          8.4 - 10.  
4   
mg/dL                                   [facility]  
   
                      Chloride [Moles/Vol] 110 mmol/L            97 - 111 mmol/L  
 [facility]  
   
                      CO2 [Moles/Vol] 21 mmol/L             21 - 30 mmol/L [facility]  
   
                          Creatinine [Mass/Vol] 4.06 mg/dL   High         0.50 -  
 1.10   
mg/dL                                   [facility]  
   
                                                    GFR/1.73 sq   
M.predicted MDRD   
(S/P/Bld) [Vol   
rate/Area]      13 mL/min/{1.73_m2} Low             - PINF          [facility]  
   
                                        Comment on above:    CKD EPI Equatio  
n using Creatinine without Race  
Comment: Estimated glomerular filtration rate (eGFR) is   
calculated without a race coefficient. Values should be   
interpreted in the context of the patient's full clinical   
presentation.  
Reference:  
1. Alfredo C, Delmi M, Valdez DC, et al.. A Unifying Approach   
for GFR Estimation: Recommendations of the NKF-ASN Task Force on   
Reassessing the Inclusion of Race in Diagnosing Kidney Disease.   
American Journal of Kidney Diseases 202;79(2):268-88.e1.  
2. N Engl J Med  Vol. 385 Issue 19 Pages 2793-4890  
  
   
   
                      Glucose [Mass/Vol] 100 mg/dL             68 - 110 mg/dL [facility]  
   
                                                    Interpretation and   
review of laboratory   
results         Abnormal                                        [facility]  
   
                          Potassium [Moles/Vol] 3.5 mmol/L                3.3 -   
5.3   
mmol/L                                  [facility]  
   
                          Sodium [Moles/Vol] 140 mmol/L                135 - 148  
   
mmol/L                                  [facility]  
   
                                                    Urea nitrogen   
[Mass/Vol]      44 mg/dL        High            8 - 22 mg/dL    [facility]  
   
                                                                  [facility]  
   
                                                    CBC WITH DIFFERENTIALon 08-0  
   
   
                                                    Basophils (Bld)   
[#/Vol]             0.04 10*3/uL                            0.00 - 0.20   
K/uL                                    [facility]  
   
                                                    Basophils/100 WBC   
(Bld)           0.4 %                           NINF - 1.9 %    [facility]  
   
                                                    Eosinophils (Bld)   
[#/Vol]             0.62 10*3/uL                            0.00 - 0.70   
K/uL                                    [facility]  
   
                                                    Eosinophils/100 WBC   
(Bld)           6.2 %           High            0.1 - 4.0 %     [facility]  
   
                                                    Erythrocyte   
distribution width   
(RBC) [Ratio]   15.6 %          High            11.5 - 14.5 %   [facility]  
   
                                                    Hematocrit (Bld)   
[Volume fraction] 30.5 %          Low             36.0 - 46.0 %   [facility]  
   
                                                    Hemoglobin (Bld)   
[Mass/Vol]          9.8 g/dL            Low                 12.0 - 15.0   
g/dL                                    [facility]  
   
                                                    Interpretation and   
review of laboratory   
results         Abnormal                                        [facility]  
   
                                                    Lymphocytes (Bld)   
[#/Vol]             1.83 10*3/uL                            1.00 - 4.80   
K/uL                                    [facility]  
   
                                                    Lymphocytes/100 WBC   
(Bld)           18.3 %          Low             24.0 - 44.0 %   [facility]  
   
                                                    MCH (RBC) [Entitic   
mass]           28.2 pg                         26.0 - 34.0 pg  [facility]  
   
                          MCHC (RBC) [Mass/Vol] 32.0 g/dL                 32.0 -  
 35.9   
g/dL                                    [facility]  
   
                                                    MCV (RBC) [Entitic   
vol]            88 fL                           80 - 100 fL     [facility]  
   
                                                    Monocyte distribution   
width Auto (Bld)   
[Entitic vol]                                                   [facility]  
   
                                                    Monocytes (Bld)   
[#/Vol]             0.87 10*3/uL                            0.20 - 1.00   
K/uL                                    [facility]  
   
                                                    Monocytes/100 WBC   
(Bld)           8.7 %                           2.0 - 11.0 %    [facility]  
   
                                                    Neutrophils (Bld)   
[#/Vol]             6.63 10*3/uL                            1.50 - 8.00   
K/uL                                    [facility]  
   
                                                    Neutrophils/100 WBC   
(Bld)           66.4 %                          31.0 - 76.0 %   [facility]  
   
                                                    Platelet mean volume   
(Bld) [Entitic vol] 8.4 fL                          7.5 - 11.2 fL   [facility]  
   
                                                    Platelets (Bld)   
[#/Vol]         228 10*3/uL                     150 - 400 K/uL  [facility]  
   
                      RBC (Bld) [#/Vol] 3.47 10*6/uL Low                   [facility]  
   
                      WBC (Bld) [#/Vol] 10.0 10*3/uL            4.5 - 11.5 K/uL   
[facility]  
   
                                                    GLUCOSE, FINGERSTICK-IN OFFI  
CEon 2023   
   
                      Glucose [Mass/Vol] 146 mg/dL  High       68 - 110 mg/dL [facility]  
   
                                                    Interpretation and   
review of laboratory   
results         Abnormal                                        [facility]  
   
                      Glucose [Mass/Vol] 148 mg/dL  High       68 - 110 mg/dL [facility]  
   
                                                    Interpretation and   
review of laboratory   
results         Abnormal                                        [facility]  
   
                      Glucose [Mass/Vol] 155 mg/dL  High       68 - 110 mg/dL [facility]  
   
                                                    Interpretation and   
review of laboratory   
results         Abnormal                                        [facility]  
   
                      Glucose [Mass/Vol] 108 mg/dL             68 - 110 mg/dL [facility]  
   
                                                    Interpretation and   
review of laboratory   
results         Normal                                          [facility]  
   
                                                    MAGNESIUMon 2023   
   
                                                    Interpretation and   
review of laboratory   
results         Normal                                          [facility]  
   
                      Magnesium [Mass/Vol] 1.6 mg/dL             1.6 - 2.8 mg/dL  
 [facility]  
   
                                                    VANCOMYCIN TROUGHon 20   
   
                                                    Interpretation and   
review of laboratory   
results         Normal                                          [facility]  
   
                                                    Vancomycin trough   
[Mass/Vol]          13.2 ug/mL                              10.0 - 20.0   
ug/mL                                   [facility]  
   
                                                                  [facility]  
   
                                                    Basic metabolic 2000 panelon  
 2023   
   
                      Anion gap [Moles/Vol] 15 mmol/L             10 - 20    [facility]  
   
                          Calcium [Mass/Vol] 7.8 mg/dL    Low          8.4 - 10.  
4   
mg/dL                                   [facility]  
   
                      Chloride [Moles/Vol] 109 mmol/L            97 - 111 mmol/L  
 [facility]  
   
                      CO2 [Moles/Vol] 20 mmol/L  Low        21 - 30 mmol/L [facility]  
   
                          Creatinine [Mass/Vol] 4.45 mg/dL   High         0.50 -  
 1.10   
mg/dL                                   [facility]  
   
                                                    GFR/1.73 sq   
M.predicted MDRD   
(S/P/Bld) [Vol   
rate/Area]      11 mL/min/{1.73_m2} Low             - PINF          [facility]  
   
                                        Comment on above:    CKD EPI Equatio  
n using Creatinine without Race  
Comment: Estimated glomerular filtration rate (eGFR) is   
calculated without a race coefficient. Values should be   
interpreted in the context of the patient's full clinical   
presentation.  
Reference:  
1. Alfredo C, Delmi M, Valdez FLYNN, et al.. A Unifying Approach   
for GFR Estimation: Recommendations of the NKF-ASN Task Force on   
Reassessing the Inclusion of Race in Diagnosing Kidney Disease.   
American Journal of Kidney Diseases 202;79(2):268-88.e1.  
2. N Engl J Med  Vol. 385 Issue 19 Pages 7448-2473  
  
   
   
                      Glucose [Mass/Vol] 99 mg/dL              68 - 110 mg/dL [facility]  
   
                                                    Interpretation and   
review of laboratory   
results         Abnormal                                        [facility]  
   
                          Potassium [Moles/Vol] 3.7 mmol/L                3.3 -   
5.3   
mmol/L                                  [facility]  
   
                          Sodium [Moles/Vol] 140 mmol/L                135 - 148  
   
mmol/L                                  [facility]  
   
                                                    Urea nitrogen   
[Mass/Vol]      46 mg/dL        High            8 - 22 mg/dL    [facility]  
   
                                                    CBC WITH DIFFERENTIALon    
   
                                                    Basophils (Bld)   
[#/Vol]             0.04 10*3/uL                            0.00 - 0.20   
K/uL                                    [facility]  
   
                                                    Basophils/100 WBC   
(Bld)           0.4 %                           NINF - 1.9 %    [facility]  
   
                                                    Eosinophils (Bld)   
[#/Vol]             0.58 10*3/uL                            0.00 - 0.70   
K/uL                                    [facility]  
   
                                                    Eosinophils/100 WBC   
(Bld)           5.4 %           High            0.1 - 4.0 %     [facility]  
   
                                                    Erythrocyte   
distribution width   
(RBC) [Ratio]   15.6 %          High            11.5 - 14.5 %   [facility]  
   
                                                    Hematocrit (Bld)   
[Volume fraction] 31.0 %          Low             36.0 - 46.0 %   [facility]  
   
                                                    Hemoglobin (Bld)   
[Mass/Vol]          9.9 g/dL            Low                 12.0 - 15.0   
g/dL                                    [facility]  
   
                                                    Interpretation and   
review of laboratory   
results         Abnormal                                        [facility]  
   
                                                    Lymphocytes (Bld)   
[#/Vol]             1.42 10*3/uL                            1.00 - 4.80   
K/uL                                    [facility]  
   
                                                    Lymphocytes/100 WBC   
(Bld)           13.3 %          Low             24.0 - 44.0 %   [facility]  
   
                                                    MCH (RBC) [Entitic   
mass]           28.3 pg                         26.0 - 34.0 pg  [facility]  
   
                          MCHC (RBC) [Mass/Vol] 32.1 g/dL                 32.0 -  
 35.9   
g/dL                                    [facility]  
   
                                                    MCV (RBC) [Entitic   
vol]            88 fL                           80 - 100 fL     [facility]  
   
                                                    Monocyte distribution   
width Auto (Bld)   
[Entitic vol]                                                   [facility]  
   
                                                    Monocytes (Bld)   
[#/Vol]             0.79 10*3/uL                            0.20 - 1.00   
K/uL                                    [facility]  
   
                                                    Monocytes/100 WBC   
(Bld)           7.4 %                           2.0 - 11.0 %    [facility]  
   
                                                    Neutrophils (Bld)   
[#/Vol]             7.83 10*3/uL                            1.50 - 8.00   
K/uL                                    [facility]  
   
                                                    Neutrophils/100 WBC   
(Bld)           73.4 %                          31.0 - 76.0 %   [facility]  
   
                                                    Platelet mean volume   
(Bld) [Entitic vol] 8.7 fL                          7.5 - 11.2 fL   [facility]  
   
                                                    Platelets (Bld)   
[#/Vol]         209 10*3/uL                     150 - 400 K/uL  [facility]  
   
                      RBC (Bld) [#/Vol] 3.51 10*6/uL Low                   [facility]  
   
                      WBC (Bld) [#/Vol] 10.7 10*3/uL            4.5 - 11.5 K/uL   
[facility]  
   
                                                                  [facility]  
   
                                                    GLUCOSE, FINGERSTICK-IN OFFI  
CEon 2023   
   
                      Glucose [Mass/Vol] 142 mg/dL  High       68 - 110 mg/dL [facility]  
   
                                                    Interpretation and   
review of laboratory   
results         Abnormal                                        [facility]  
   
                                                                  [facility]  
   
                      Glucose [Mass/Vol] 115 mg/dL  High       68 - 110 mg/dL [facility]  
   
                                                    Interpretation and   
review of laboratory   
results         Abnormal                                        [facility]  
   
                                                                  [facility]  
   
                      Glucose [Mass/Vol] 102 mg/dL             68 - 110 mg/dL [facility]  
   
                                                    Interpretation and   
review of laboratory   
results         Normal                                          [facility]  
   
                                                                  [facility]  
   
                      Glucose [Mass/Vol] 108 mg/dL             68 - 110 mg/dL [facility]  
   
                                                    Interpretation and   
review of laboratory   
results         Normal                                          [facility]  
   
                                                                  [facility]  
   
                                                    MAGNESIUMon 2023   
   
                                                    Interpretation and   
review of laboratory   
results         Abnormal                                        [facility]  
   
                      Magnesium [Mass/Vol] 1.5 mg/dL  Low        1.6 - 2.8 mg/dL  
 [facility]  
   
                                                                  [facility]  
   
                                                    Basic metabolic 2000 panelon  
 2023   
   
                      Anion gap [Moles/Vol] 17 mmol/L             10 - 20    [facility]  
   
                          Calcium [Mass/Vol] 7.7 mg/dL    Low          8.4 - 10.  
4   
mg/dL                                   [facility]  
   
                      Chloride [Moles/Vol] 107 mmol/L            97 - 111 mmol/L  
 [facility]  
   
                      CO2 [Moles/Vol] 20 mmol/L  Low        21 - 30 mmol/L [facility]  
   
                          Creatinine [Mass/Vol] 4.73 mg/dL   High         0.50 -  
 1.10   
mg/dL                                   [facility]  
   
                                                    GFR/1.73 sq   
M.predicted MDRD   
(S/P/Bld) [Vol   
rate/Area]      10 mL/min/{1.73_m2} Low             - PINF          [facility]  
   
                                        Comment on above:    CKD EPI Equatio  
n using Creatinine without Race  
Comment: Estimated glomerular filtration rate (eGFR) is   
calculated without a race coefficient. Values should be   
interpreted in the context of the patient's full clinical   
presentation.  
Reference:  
1. Alfredo C, Delmi M, Valdez DC, et al.. A Unifying Approach   
for GFR Estimation: Recommendations of the NKF-ASN Task Force on   
Reassessing the Inclusion of Race in Diagnosing Kidney Disease.   
American Journal of Kidney Diseases 202;79(2):268-88.e1.  
2. N Engl J Med  Vol. 385 Issue 19 Pages 0331-0968  
  
   
   
                      Glucose [Mass/Vol] 98 mg/dL              68 - 110 mg/dL [facility]  
   
                                                    Interpretation and   
review of laboratory   
results         Abnormal                                        [facility]  
   
                          Potassium [Moles/Vol] 3.5 mmol/L                3.3 -   
5.3   
mmol/L                                  [facility]  
   
                          Sodium [Moles/Vol] 140 mmol/L                135 - 148  
   
mmol/L                                  [facility]  
   
                                                    Urea nitrogen   
[Mass/Vol]      44 mg/dL        High            8 - 22 mg/dL    [facility]  
   
                                                                  [facility]  
   
                                                    CBC WITH DIFFERENTIALon 080  
   
   
                                                    Basophils (Bld)   
[#/Vol]             0.04 10*3/uL                            0.00 - 0.20   
K/uL                                    [facility]  
   
                                                    Basophils/100 WBC   
(Bld)           0.3 %                           NINF - 1.9 %    [facility]  
   
                                                    Eosinophils (Bld)   
[#/Vol]             0.43 10*3/uL                            0.00 - 0.70   
K/uL                                    [facility]  
   
                                                    Eosinophils/100 WBC   
(Bld)           3.2 %                           0.1 - 4.0 %     [facility]  
   
                                                    Erythrocyte   
distribution width   
(RBC) [Ratio]   15.8 %          High            11.5 - 14.5 %   [facility]  
   
                                                    Hematocrit (Bld)   
[Volume fraction] 30.9 %          Low             36.0 - 46.0 %   [facility]  
   
                                                    Hemoglobin (Bld)   
[Mass/Vol]          9.9 g/dL            Low                 12.0 - 15.0   
g/dL                                    [facility]  
   
                                                    Interpretation and   
review of laboratory   
results         Abnormal                                        [facility]  
   
                                                    Lymphocytes (Bld)   
[#/Vol]             1.36 10*3/uL                            1.00 - 4.80   
K/uL                                    [facility]  
   
                                                    Lymphocytes/100 WBC   
(Bld)           10.3 %          Low             24.0 - 44.0 %   [facility]  
   
                                                    MCH (RBC) [Entitic   
mass]           28.4 pg                         26.0 - 34.0 pg  [facility]  
   
                          MCHC (RBC) [Mass/Vol] 32.1 g/dL                 32.0 -  
 35.9   
g/dL                                    [facility]  
   
                                                    MCV (RBC) [Entitic   
vol]            89 fL                           80 - 100 fL     [facility]  
   
                                                    Monocyte distribution   
width Auto (Bld)   
[Entitic vol]                                                   [facility]  
   
                                                    Monocytes (Bld)   
[#/Vol]             0.84 10*3/uL                            0.20 - 1.00   
K/uL                                    [facility]  
   
                                                    Monocytes/100 WBC   
(Bld)           6.4 %                           2.0 - 11.0 %    [facility]  
   
                                                    Neutrophils (Bld)   
[#/Vol]             10.59 10*3/uL       High                1.50 - 8.00   
K/uL                                    [facility]  
   
                                                    Neutrophils/100 WBC   
(Bld)           79.9 %          High            31.0 - 76.0 %   [facility]  
   
                                                    Platelet mean volume   
(Bld) [Entitic vol] 9.3 fL                          7.5 - 11.2 fL   [facility]  
   
                                                    Platelets (Bld)   
[#/Vol]         200 10*3/uL                     150 - 400 K/uL  [facility]  
   
                      RBC (Bld) [#/Vol] 3.50 10*6/uL Low                   [facility]  
   
                      WBC (Bld) [#/Vol] 13.3 10*3/uL High       4.5 - 11.5 K/uL   
[facility]  
   
                                                    CREATININE, RANDOM URINEon 0  
2023   
   
                                                    Creatinine (U)   
[Mass/Vol]      36 mg/dL                        10 - 300 mg/dL  [facility]  
   
                                                    GLUCOSE, FINGERSTICK-IN OFFI  
CEon 2023   
   
                      Glucose [Mass/Vol] 123 mg/dL  High       68 - 110 mg/dL [facility]  
   
                                                    Interpretation and   
review of laboratory   
results         Abnormal                                        [facility]  
   
                      Glucose [Mass/Vol] 115 mg/dL  High       68 - 110 mg/dL [facility]  
   
                                                    Interpretation and   
review of laboratory   
results         Abnormal                                        [facility]  
   
                      Glucose [Mass/Vol] 150 mg/dL  High       68 - 110 mg/dL [facility]  
   
                                                    Interpretation and   
review of laboratory   
results         Abnormal                                        [facility]  
   
                      Glucose [Mass/Vol] 139 mg/dL  High       68 - 110 mg/dL [facility]  
   
                                                    Interpretation and   
review of laboratory   
results         Abnormal                                        [facility]  
   
                      Glucose [Mass/Vol] 143 mg/dL  High       68 - 110 mg/dL [facility]  
   
                                                    Interpretation and   
review of laboratory   
results         Abnormal                                        [facility]  
   
                      Glucose [Mass/Vol] 142 mg/dL  High       68 - 110 mg/dL [facility]  
   
                                                    Interpretation and   
review of laboratory   
results         Abnormal                                        [facility]  
   
                                                    GOLD TOP - SST TUBE, BLOODon  
 2023   
   
                      Extra Tube Done                             [facility]  
   
                                                    No Panel Informationon    
   
                                                    Interpretation and   
review of laboratory   
results         Normal                                          [facility]  
   
                                                    SODIUM, RANDOM URINEon    
   
                                                    Sodium (U)   
[Moles/Vol]     60 mmol/L                       15 - 220 mmol/L [facility]  
   
                                                    VANCOMYCIN TROUGHon 20   
   
                                                    Interpretation and   
review of laboratory   
results         Normal                                          [facility]  
   
                                                    Vancomycin trough   
[Mass/Vol]          19.3 ug/mL                              10.0 - 20.0   
ug/mL                                   [facility]  
   
                                                                  [facility]  
   
                                                    Basic metabolic 2000 panelon  
 2023   
   
                      Anion gap [Moles/Vol] 14 mmol/L             10 - 20    [facility]  
   
                          Calcium [Mass/Vol] 7.7 mg/dL    Low          8.4 - 10.  
4   
mg/dL                                   [facility]  
   
                      Chloride [Moles/Vol] 107 mmol/L            97 - 111 mmol/L  
 [facility]  
   
                      CO2 [Moles/Vol] 20 mmol/L  Low        21 - 30 mmol/L [facility]  
   
                          Creatinine [Mass/Vol] 4.75 mg/dL   High         0.50 -  
 1.10   
mg/dL                                   [facility]  
   
                                                    GFR/1.73 sq   
M.predicted MDRD   
(S/P/Bld) [Vol   
rate/Area]      10 mL/min/{1.73_m2} Low             - PINF          [facility]  
   
                                        Comment on above:    CKD EPI Equatio  
n using Creatinine without Race  
Comment: Estimated glomerular filtration rate (eGFR) is   
calculated without a race coefficient. Values should be   
interpreted in the context of the patient's full clinical   
presentation.  
Reference:  
1. Alfredo C, Delmi M, Valdez FLYNN, et al.. A Unifying Approach   
for GFR Estimation: Recommendations of the NKF-ASN Task Force on   
Reassessing the Inclusion of Race in Diagnosing Kidney Disease.   
American Journal of Kidney Diseases 202;79(2):268-88.e1.  
2. N Engl J Med  Vol. 385 Issue 19 Pages 5570-3659  
  
   
   
                      Glucose [Mass/Vol] 128 mg/dL  High       68 - 110 mg/dL [facility]  
   
                                                    Interpretation and   
review of laboratory   
results         Abnormal                                        [facility]  
   
                          Potassium [Moles/Vol] 4.0 mmol/L                3.3 -   
5.3   
mmol/L                                  [facility]  
   
                          Sodium [Moles/Vol] 137 mmol/L                135 - 148  
   
mmol/L                                  [facility]  
   
                                                    Urea nitrogen   
[Mass/Vol]      41 mg/dL        High            8 - 22 mg/dL    [facility]  
   
                                                                  [facility]  
   
                                                    CBC WITH DIFFERENTIALon 08-0  
3-2023   
   
                                                    Basophils (Bld)   
[#/Vol]             0.07 10*3/uL                            0.00 - 0.20   
K/uL                                    [facility]  
   
                                                    Basophils/100 WBC   
(Bld)           0.4 %                           NINF - 1.9 %    [facility]  
   
                                                    Eosinophils (Bld)   
[#/Vol]             0.16 10*3/uL                            0.00 - 0.70   
K/uL                                    [facility]  
   
                                                    Eosinophils/100 WBC   
(Bld)           0.9 %                           0.1 - 4.0 %     [facility]  
   
                                                    Erythrocyte   
distribution width   
(RBC) [Ratio]   15.8 %          High            11.5 - 14.5 %   [facility]  
   
                                                    Hematocrit (Bld)   
[Volume fraction] 32.1 %          Low             36.0 - 46.0 %   [facility]  
   
                                                    Hemoglobin (Bld)   
[Mass/Vol]          10.2 g/dL           Low                 12.0 - 15.0   
g/dL                                    [facility]  
   
                                                    Interpretation and   
review of laboratory   
results         Abnormal                                        [facility]  
   
                                                    Lymphocytes (Bld)   
[#/Vol]             1.63 10*3/uL                            1.00 - 4.80   
K/uL                                    [facility]  
   
                                                    Lymphocytes/100 WBC   
(Bld)           9.2 %           Low             24.0 - 44.0 %   [facility]  
   
                                                    MCH (RBC) [Entitic   
mass]           28.3 pg                         26.0 - 34.0 pg  [facility]  
   
                          MCHC (RBC) [Mass/Vol] 31.8 g/dL    Low          32.0 -  
 35.9   
g/dL                                    [facility]  
   
                                                    MCV (RBC) [Entitic   
vol]            89 fL                           80 - 100 fL     [facility]  
   
                                                    Monocyte distribution   
width Auto (Bld)   
[Entitic vol]                                                   [facility]  
   
                                                    Monocytes (Bld)   
[#/Vol]             0.91 10*3/uL                            0.20 - 1.00   
K/uL                                    [facility]  
   
                                                    Monocytes/100 WBC   
(Bld)           5.1 %                           2.0 - 11.0 %    [facility]  
   
                                                    Neutrophils (Bld)   
[#/Vol]             14.93 10*3/uL       High                1.50 - 8.00   
K/uL                                    [facility]  
   
                                                    Neutrophils/100 WBC   
(Bld)           84.4 %          High            31.0 - 76.0 %   [facility]  
   
                                                    Platelet mean volume   
(Bld) [Entitic vol] 8.9 fL                          7.5 - 11.2 fL   [facility]  
   
                                                    Platelets (Bld)   
[#/Vol]         207 10*3/uL                     150 - 400 K/uL  [facility]  
   
                      RBC (Bld) [#/Vol] 3.60 10*6/uL Low                   [facility]  
   
                      WBC (Bld) [#/Vol] 17.7 10*3/uL High       4.5 - 11.5 K/uL   
[facility]  
   
                                                                  [facility]  
   
                                                    GLUCOSE, FINGERSTICK-IN OFFI  
CEon 2023   
   
                      Glucose [Mass/Vol] 165 mg/dL  High       68 - 110 mg/dL [facility]  
   
                                                    Interpretation and   
review of laboratory   
results         Abnormal                                        [facility]  
   
                                                                  [facility]  
   
                      Glucose [Mass/Vol] 134 mg/dL  High       68 - 110 mg/dL [facility]  
   
                                                    Interpretation and   
review of laboratory   
results         Abnormal                                        [facility]  
   
                                                                  [facility]  
   
                      Glucose [Mass/Vol] 137 mg/dL  High       68 - 110 mg/dL [facility]  
   
                                                    Interpretation and   
review of laboratory   
results         Abnormal                                        [facility]  
   
                                                                  [facility]  
   
                      Glucose [Mass/Vol] 133 mg/dL  High       68 - 110 mg/dL [facility]  
   
                                                    Interpretation and   
review of laboratory   
results         Abnormal                                        [facility]  
   
                                                                  [facility]  
   
                                                    URINE CULTUREOrdered By: Marisol Walker on 2023   
   
                                                    Bacteria identified   
Cx Nom (U)                              10,000 - 50,000 CFU/ml   
Multiple bacteria   
present suggesting   
contamination.                                              [facility]  
   
                                                    Interpretation and   
review of laboratory   
results         Normal                                          [facility]  
   
                                                            IF clinically   
indicated, suggest   
appropriate   
recollection with   
timely delivery to the   
laboratory. For   
additional   
information, call ext   
[phone].                                                      [facility]  
   
                                                    VANCOMYCIN RANDOMon 20   
   
                                                    Interpretation and   
review of laboratory   
results         Normal                                          [facility]  
   
                          Vancomycin [Mass/Vol] 8.3 ug/mL                 5.0 -   
40.0   
ug/mL                                   [facility]  
   
                                                                  [facility]  
   
                                                    Basic metabolic 2000 panelon  
 2023   
   
                      Anion gap [Moles/Vol] 15 mmol/L             10 - 20    [facility]  
   
                          Calcium [Mass/Vol] 8.3 mg/dL    Low          8.4 - 10.  
4   
mg/dL                                   [facility]  
   
                      Chloride [Moles/Vol] 107 mmol/L            97 - 111 mmol/L  
 [facility]  
   
                      CO2 [Moles/Vol] 22 mmol/L             21 - 30 mmol/L [facility]  
   
                          Creatinine [Mass/Vol] 4.36 mg/dL   High         0.50 -  
 1.10   
mg/dL                                   [facility]  
   
                                                    GFR/1.73 sq   
M.predicted MDRD   
(S/P/Bld) [Vol   
rate/Area]      12 mL/min/{1.73_m2} Low             - PINF          [facility]  
   
                                        Comment on above:    CKD EPI Equatio  
n using Creatinine without Race  
Comment: Estimated glomerular filtration rate (eGFR) is   
calculated without a race coefficient. Values should be   
interpreted in the context of the patient's full clinical   
presentation.  
Reference:  
1. Delmi Molina M, Valdez FLYNN, et al.. A Unifying Approach   
for GFR Estimation: Recommendations of the NKF-ASN Task Force on   
Reassessing the Inclusion of Race in Diagnosing Kidney Disease.   
American Journal of Kidney Diseases ;79(2):268-88.e1.  
2. N Engl J Med  Vol. 385 Issue 19 Pages 5497-0621  
  
   
   
                      Glucose [Mass/Vol] 133 mg/dL  High       68 - 110 mg/dL [facility]  
[facility]  
   
                          Potassium [Moles/Vol] 4.3 mmol/L                3.3 -   
5.3   
mmol/L                                  [facility]  
   
                          Sodium [Moles/Vol] 140 mmol/L                135 - 148  
   
mmol/L                                  [facility]  
   
                                                    Urea nitrogen   
[Mass/Vol]      40 mg/dL        High            8 - 22 mg/dL    [facility]  
   
                                                    CBC WITH DIFFERENTIALon 0  
   
   
                                                    Basophils (Bld)   
[#/Vol]             0.12 10*3/uL                            0.00 - 0.20   
K/uL                                    [facility]  
   
                                                    Basophils/100 WBC   
(Bld)           0.7 %                           NINF - 1.9 %    [facility]  
   
                                                    Eosinophils (Bld)   
[#/Vol]             0.36 10*3/uL                            0.00 - 0.70   
K/uL                                    [facility]  
   
                                                    Eosinophils/100 WBC   
(Bld)           2.0 %                           0.1 - 4.0 %     [facility]  
   
                                                    Erythrocyte   
distribution width   
(RBC) [Ratio]   15.7 %          High            11.5 - 14.5 %   [facility]  
   
                                                    Hematocrit (Bld)   
[Volume fraction] 35.0 %          Low             36.0 - 46.0 %   [facility]  
   
                                                    Hemoglobin (Bld)   
[Mass/Vol]          11.0 g/dL           Low                 12.0 - 15.0   
g/dL                                    [facility]  
   
                                                    Interpretation and   
review of laboratory   
results         Abnormal                                        [facility]  
   
                                                    Lymphocytes (Bld)   
[#/Vol]             2.14 10*3/uL                            1.00 - 4.80   
K/uL                                    [facility]  
   
                                                    Lymphocytes/100 WBC   
(Bld)           11.8 %          Low             24.0 - 44.0 %   [facility]  
   
                                                    MCH (RBC) [Entitic   
mass]           28.0 pg                         26.0 - 34.0 pg  [facility]  
   
                          MCHC (RBC) [Mass/Vol] 31.5 g/dL    Low          32.0 -  
 35.9   
g/dL                                    [facility]  
   
                                                    MCV (RBC) [Entitic   
vol]            89 fL                           80 - 100 fL     [facility]  
   
                                                    Monocyte distribution   
width Auto (Bld)   
[Entitic vol]                                                   [facility]  
   
                                                    Monocytes (Bld)   
[#/Vol]             0.86 10*3/uL                            0.20 - 1.00   
K/uL                                    [facility]  
   
                                                    Monocytes/100 WBC   
(Bld)           4.7 %                           2.0 - 11.0 %    [facility]  
   
                                                    Neutrophils (Bld)   
[#/Vol]             14.66 10*3/uL       High                1.50 - 8.00   
K/uL                                    [facility]  
   
                                                    Neutrophils/100 WBC   
(Bld)           80.8 %          High            31.0 - 76.0 %   [facility]  
   
                                                    Platelet mean volume   
(Bld) [Entitic vol] 8.5 fL                          7.5 - 11.2 fL   [facility]  
   
                                                    Platelets (Bld)   
[#/Vol]         240 10*3/uL                     150 - 400 K/uL  [facility]  
   
                      RBC (Bld) [#/Vol] 3.95 10*6/uL Low                   [facility]  
   
                      WBC (Bld) [#/Vol] 18.1 10*3/uL High       4.5 - 11.5 K/uL   
[facility]  
   
                                                                  [facility]  
   
                                                    Diabetes tracking panelOrder  
ed By: Uriah Tran on 2023   
   
                                                    Average glucose   
Estimated from   
glycated hemoglobin   
(Bld) [Mass/Vol] 146 mg/dL                                       [facility]  
   
                                                    HbA1c (Bld) [Mass   
fraction]       6.7 %           High            4.0 - 5.6 %     [facility]  
   
                                                    Interpretation and   
review of laboratory   
results         Abnormal                                        [facility]  
   
                                                    EKG 12 LEAD - PERFORMon    
   
                                        Diagnosis           Sinus rhythm with 1s  
t   
degree A-V block  
Prolonged QT interval   
or tu fusion, consider   
myocardial disease,   
electrolyte imbalance,   
or drug effects  
Abnormal ECG  
No previous ECGs   
available  
Confirmed by WILTON TINAJERO ([id number]) on   
2023 10:39:20 AM  
                                                            [facility]  
   
                      P wave Atrium by EKG 89                    BPM        [facility]  
   
                      P wave axis 59                    degrees    [facility]  
   
                      P-R Interval 236 ms                           [facility]  
   
                      Q-T interval 402 ms                           [facility]  
   
                                                    Q-T interval   
corrected       489 ms                                          [facility]  
   
                      QRS axis   19                    degrees    [facility]  
   
                      QRS duration 108 ms                           [facility]  
   
                      T wave axis 83                    degrees    [facility]  
   
                                                                  [facility]  
   
                                                    GLUCOSE, FINGERSTICK-IN OFFI  
CEon 2023   
   
                      Glucose [Mass/Vol] 143 mg/dL  High       68 - 110 mg/dL [facility]  
   
                                                    Interpretation and   
review of laboratory   
results         Abnormal                                        [facility]  
   
                      Glucose [Mass/Vol] 169 mg/dL  High       68 - 110 mg/dL [facility]  
   
                                                    Interpretation and   
review of laboratory   
results         Abnormal                                        [facility]  
   
                      Glucose [Mass/Vol] 166 mg/dL  High       68 - 110 mg/dL [facility]  
   
                                                    Interpretation and   
review of laboratory   
results         Abnormal                                        [facility]  
   
                      Glucose [Mass/Vol] 187 mg/dL  High       68 - 110 mg/dL [facility]  
   
                                                    Interpretation and   
review of laboratory   
results         Abnormal                                        [facility]  
   
                                                    HEPATIC FUNCTION PANELon    
   
                      Albumin [Mass/Vol] 2.8 g/dL   Low        3.4 - 5.1 g/dL [facility]  
   
                                                    ALP [Catalytic   
activity/Vol]   68 U/L                                          [facility]  
   
                                                    ALT [Catalytic   
activity/Vol]   6 U/L           Low                             [facility]  
   
                                                    AST [Catalytic   
activity/Vol]   10 U/L                                          [facility]  
   
                      Bilirubin [Mass/Vol] 0.4 mg/dL             0.1 - 1.5 mg/dL  
 [facility]  
   
                                                    Bilirubin.direct   
[Mass/Vol]          0.07 mg/dL          Low                 0.10 - 0.30   
mg/dL                                   [facility]  
   
                      Protein [Mass/Vol] 5.8 g/dL   Low        6.2 - 8.3 g/dL [facility]  
   
                                                    MAGNESIUMon 2023   
   
                      Magnesium [Mass/Vol] 1.1 mg/dL  Low        1.6 - 2.8 mg/dL  
 [facility]  
   
                                                    No Panel Informationon    
   
                                                    Interpretation and   
review of laboratory   
results         Abnormal                                        [facility]  
   
                                                    PROTHROMBIN TIME AND INRon 0  
2023   
   
                                                    INR Coag (PPP)   
[Relative time] 1.31 {INR}      High            0.90 - 1.10     [facility]  
   
                                                    Interpretation and   
review of laboratory   
results         Abnormal                                        [facility]  
   
                      PT Coag (PPP) [Time] 14.7 s     High                  [facility]  
[facility]  
   
                                                    RF Guidance for percutaneous  
 placement of nephrostomy tube of Kidney -   
bilateral-- W   
contrast via tubeon 2023   
   
                                                            EXAMINATION: XA NEPH  
   
TUBE PLACEMENT (TANJA),   
XA NEPH TUBE PLACEMENT   
(TANJA) 2023 07:23   
AM  
CLINICAL HISTORY: Rad   
Procedure required: =   
Bilateral nephrostomy   
tube placement,BL   
obstructive   
nephropathy with   
worsening CULLEN and UTI  
ASSOCIATED DIAGNOSIS:  
ORDERING PROVIDER:   
NITHYA PERLA  
TECHNOLOGISTS NOTE:   
Moderate sedation   
intra-service start:   
6:23 End: 7:17 10Fr.   
Bilateral nephrostomy   
tubes  
FLUOROSCOPIST: NITHYA PERLA TIME: 9   
Minutes  
INTRA-PROCEDURE MEDS:  
iohexol (OMNIPAQUE)   
350 MG/ML injection 20   
mL Route: Other  
  
  
SEDATION TIME:  
Start time: 06:23  
Stop time: 07:17  
  
ATTENDING PHYSICIAN:   
Nithya Perla  
RESIDENT/FELLOW   
PHYSICIAN: Azael Roy  
  
INFORMED CONSENT:  
Written informed   
consent was obtained.   
The procedure, risks,   
benefits, and   
alternatives were   
discussed. All   
questions were   
answered.  
  
TIMEOUT: Physician led   
timeout was conducted   
documenting correct   
patient, procedure,   
site, fire risk,   
antibiotics and   
allergies.  
  
COMPLICATIONS: None  
  
ESTIMATED BLOOD LOSS:   
Less than 10 mL  
  
TECHNIQUE:  
The patient was placed   
in the prone position   
on the angiography   
table and the right   
and left flanks were   
prepped and draped in   
usual aseptic fashion.   
Ultrasound evaluation   
was carried out and   
the skin site-marked.   
Using real-time   
ultrasound guidance, a   
21 gauge Chiba needle   
was advanced into the   
right renal collecting   
system via a middle   
pole calyx on the left   
and middle calyx on   
the right. A contrast   
injection was made   
through the needle. A   
0.018 inch guide wire   
was then advanced   
through the needle and   
the needle was   
removed. The tract was   
dilated and a Gutiérrez   
introducer sheath was   
advanced over the wire   
over a stiffener and   
matched dilator. The   
stiffener and dilator   
were removed. A 0.035   
inch wire was then   
advanced through the   
sheath into the   
ureter. A 10 F   
nephrostomy tube was   
placed over the wire   
over a stiffener and   
the stiffener and wire   
were removed. The   
catheter loop was   
shaped and the locking   
suture secured.  
  
The catheter was   
secured to the skin   
with silk suture and   
attached to a drainage   
bag.  
  
The patient tolerated   
the procedure well   
without immediate   
complication and was   
transferred from the   
angio suite in stable   
condition.  
  
FINDINGS:  
Ultrasound examination   
of the bilateral   
kidneys shows dilation   
of the collecting   
system and localizes   
the target calyx.   
Multiple hyperechoic   
structures consistent   
with stones are seen   
bilaterally.  
  
Upper tract contrast   
injection with imaging   
over the kidney   
confirms collecting   
system opacification.  
  
Contrast injection   
after nephrostomy tube   
placement shows   
satisfactory   
positioning of the   
nephrostomy tube.  
  
IMPRESSION:  
Technically successful   
uncomplicated   
bilateral percutaneous   
nephrostomy tube   
placement.  
  
MACRO: None  
  
                                                            RADIOLOGY  
   
                                                            Nithya Perla MD -  
   
2023 Formatting   
of this note might be   
different from the   
original.  
EXAMINATION: XA NEPH   
TUBE PLACEMENT (TANJA),   
XA NEPH TUBE PLACEMENT   
(TANJA) 2023 07:23   
AM  
CLINICAL HISTORY: Rad   
Procedure required: =   
Bilateral nephrostomy   
tube placement,BL   
obstructive   
nephropathy with   
worsening CULLEN and UTI  
ASSOCIATED DIAGNOSIS:  
ORDERING PROVIDER:   
NITHYA PERLA  
TECHNOLOGISTS NOTE:   
Moderate sedation   
intra-service start:   
6:23 End: 7:17 10Fr.   
Bilateral nephrostomy   
tubes  
FLUOROSCOPIST: NITHYA PERLA FLUORO TIME: 9   
Minutes  
INTRA-PROCEDURE MEDS:  
iohexol (OMNIPAQUE)   
350 MG/ML injection 20   
mL Route: Other  
  
  
SEDATION TIME:  
Start time: 06:23  
Stop time: 07:17  
  
ATTENDING PHYSICIAN:   
Nithya Perla  
RESIDENT/FELLOW   
PHYSICIAN: Aazel Roy  
  
INFORMED CONSENT:  
Written informed   
consent was obtained.   
The procedure, risks,   
benefits, and   
alternatives were   
discussed. All   
questions were   
answered.  
  
TIMEOUT: Physician led   
timeout was conducted   
documenting correct   
patient, procedure,   
site, fire risk,   
antibiotics and   
allergies.  
  
COMPLICATIONS: None  
  
ESTIMATED BLOOD LOSS:   
Less than 10 mL  
  
TECHNIQUE:  
The patient was placed   
in the prone position   
on the angiography   
table and the right   
and left flanks were   
prepped and draped in   
usual aseptic fashion.   
Ultrasound evaluation   
was carried out and   
the skin site-marked.   
Using real-time   
ultrasound guidance, a   
21 gauge Chiba needle   
was advanced into the   
right renal collecting   
system via a middle   
pole calyx on the left   
and middle calyx on   
the right. A contrast   
injection was made   
through the needle. A   
0.018 inch guide wire   
was then advanced   
through the needle and   
the needle was   
removed. The tract was   
dilated and a Gutiérrez   
introducer sheath was   
advanced over the wire   
over a stiffener and   
matched dilator. The   
stiffener and dilator   
were removed. A 0.035   
inch wire was then   
advanced through the   
sheath into the   
ureter. A 10 F   
nephrostomy tube was   
placed over the wire   
over a stiffener and   
the stiffener and wire   
were removed. The   
catheter loop was   
shaped and the locking   
suture secured.  
  
The catheter was   
secured to the skin   
with silk suture and   
attached to a drainage   
bag.  
  
The patient tolerated   
the procedure well   
without immediate   
complication and was   
transferred from the   
angio suite in stable   
condition.  
  
FINDINGS:  
Ultrasound examination   
of the bilateral   
kidneys shows dilation   
of the collecting   
system and localizes   
the target calyx.   
Multiple hyperechoic   
structures consistent   
with stones are seen   
bilaterally.  
  
Upper tract contrast   
injection with imaging   
over the kidney   
confirms collecting   
system opacification.  
  
Contrast injection   
after nephrostomy tube   
placement shows   
satisfactory   
positioning of the   
nephrostomy tube.  
  
IMPRESSION:  
Technically successful   
uncomplicated   
bilateral percutaneous   
nephrostomy tube   
placement.  
  
MACRO: None  
  
  
                                                            [facility]  
   
                                                    Radiology Study   
observation   
(narrative)                                                     [facility]  
   
                                                    RF Guidance for percutaneous  
 placement of nephrostomy tube of Kidney -   
bilateral-- W   
contrast via tubeOrdered By: Nithya Perla on 2023   
   
                                                                  [facility]  
Work Phone:   
[phone]  
   
                                                    URINALYSIS WITH REFLEX CULTU  
RE PERFORMABLEon 2023   
   
                      Appearance (U) Extra Turbid            Clear      [facility]  
   
                                                    Bacteria LM.HPF   
(Urine sed) [#/Area] Moderate                        /HPF            [facility]  
   
                      Bilirubin Ql (U) Negative              Negative   [facility]  
   
                      Color (U)  Yellow                Colorless  [facility]  
   
                                                    Epithelial   
cells.squamous LM.HPF   
(Urine sed) [#/Area] 3-5                                             [facility]  
   
                                                    Glucose Auto test   
strip (U) [Mass/Vol] 100 mg/dL       Abnormal        Negative        [facility]  
   
                      Hemoglobin Ql (U) Moderate   Abnormal   Negative   [facility]  
   
                                                    Interpretation and   
review of laboratory   
results         Abnormal                                        [facility]  
   
                      Ketones Ql (U) Negative              Negative mg/dL [facility]  
[facility]  
   
                                                    Leukocyte esterase   
Test strip Ql (U) Positive        Abnormal        Negative        [facility]  
   
                                        Comment on above:   Normal urine specime  
ns will not produce a positive reaction.   
Small amounts of leukocyte esterase, causing a positive reaction   
should be repeated, using a fresh urine specimen, from the same   
patient. Positive results require further testing for pyuria.   
   
                      Mucus Ql (Urine sed) Present                          [facility]  
   
                      Nitrite Ql (U) Negative              Negative   [facility]  
   
                      pH (U)     6.0 [pH]              5.0 - 8.0  [facility]  
   
                                                    Protein (U)   
[Mass/Vol]      70 mg/dL        Abnormal        Negative        [facility]  
   
                                                    Specific gravity (U)   
[Rel density]   1.010                           NINF - 1.030    [facility]  
   
                      Urobilinogen Qn (U) Negative              Negative mg/dL [facility]  
[facility]  
   
                      WBC (U) [#/Vol] 11-30      Abnormal              [facility]  
   
                                                    WBC LM.HPF (Urine   
sed) [#/Area]   /[HPF]          Abnormal                        [facility]  
   
                                                            A negative leukocyte  
   
esterase AND negative   
nitrite test or   
absence of  
pyuria (urine WBC   
count <= 5-10) make a   
UTI (urinary tract   
infection)  
very unlikely in a   
non-neutropenic adult   
(<=5% likelihood in   
many  
studies).  
A positive leukocyte   
esterase, nitrite   
and/or pyuria is a   
nonspecific  
result. This can be   
seen in conditions   
other than a UTI e.g.   
asymptomatic  
bacteriuria,   
gynecologic   
infections, sexually   
transmitted   
infections, and  
noninfectious   
conditions (positive   
predictive value for   
UTI around 50%)                                             [facility]  
   
                                                    US Kidney - bilateral and Ur  
inary bladderon 2023   
   
                                                            EXAMINATION: US   
KIDNEY+BLADDER   
2023 02:54 PM  
CLINICAL HISTORY:   
Assess known bl stones   
with hydro  
ASSOCIATED DIAGNOSIS:  
ORDERING PROVIDER:   
BRAYAN WATERS  
  
COMPARISON:  
Outside CT abdomen and   
pelvis on 2023   
uploaded in the   
in-hospital PACs   
system.  
Images obtained during   
bilateral nephrostomy   
earlier today.  
  
TECHNIQUE: Ultrasound   
real time scan with   
image documentation of   
the kidneys and   
urinary bladder was   
performed.  
  
FINDINGS:  
  
Limitations: The   
examination is limited   
due to poor acoustic   
window (overlying ribs   
and excessive bowel   
gas).  
  
Right kidney  
Size: 13.1 cm.  
Renal cortex: Normal.  
Hydronephrosis: No   
obvious   
hydronephrosis,   
although evaluation is   
limited due to   
extensive   
nephrolithiasis and   
related posterior   
acoustic shadowing   
obscuring the renal   
collecting system.  
Calculi, cysts or   
masses: Extensive   
nephrolithiasis better   
seen on prior CT scan.  
  
Left kidney  
Size: 11.1 cm.  
Renal cortex: Not   
adequately visualized   
due to poor acoustic   
window.  
Hydronephrosis: No   
obvious   
hydronephrosis,   
although evaluation is   
limited due to   
extensive   
nephrolithiasis and   
related posterior   
acoustic shadowing   
obscuring the renal   
collecting system.  
Calculi, cysts or   
masses: Extensive   
nephrolithiasis better   
seen on prior CT scan.  
  
Urinary bladder: The   
bladder is not   
visualized, likely   
completely   
decompressed.  
  
Other findings:   
Bilateral nephrostomy   
tubes are partially   
visualized.  
  
IMPRESSION:  
  
* Status post   
bilateral nephrostomy   
earlier today.   
Bilateral nephrostomy   
tubes are partially   
visualized.  
* Bilateral extensive   
nephrolithiasis better   
seen on prior CT scan.  
* No obvious   
hydronephrosis on   
either side within the   
limitations described   
above.  
  
  
MACRO: None  
                                                            RADIOLOGY  
   
                                                            Anju Langford MD   
- 2023   
Formatting of this   
note might be   
different from the   
original.  
EXAMINATION: US   
KIDNEY+BLADDER   
2023 02:54 PM  
CLINICAL HISTORY:   
Assess known bl stones   
with hydro  
ASSOCIATED DIAGNOSIS:  
ORDERING PROVIDER:   
BRAYAN WATERS  
  
COMPARISON:  
Outside CT abdomen and   
pelvis on 2023   
uploaded in the   
in-hospital PACs   
system.  
Images obtained during   
bilateral nephrostomy   
earlier today.  
  
TECHNIQUE: Ultrasound   
real time scan with   
image documentation of   
the kidneys and   
urinary bladder was   
performed.  
  
FINDINGS:  
  
Limitations: The   
examination is limited   
due to poor acoustic   
window (overlying ribs   
and excessive bowel   
gas).  
  
Right kidney  
Size: 13.1 cm.  
Renal cortex: Normal.  
Hydronephrosis: No   
obvious   
hydronephrosis,   
although evaluation is   
limited due to   
extensive   
nephrolithiasis and   
related posterior   
acoustic shadowing   
obscuring the renal   
collecting system.  
Calculi, cysts or   
masses: Extensive   
nephrolithiasis better   
seen on prior CT scan.  
  
Left kidney  
Size: 11.1 cm.  
Renal cortex: Not   
adequately visualized   
due to poor acoustic   
window.  
Hydronephrosis: No   
obvious   
hydronephrosis,   
although evaluation is   
limited due to   
extensive   
nephrolithiasis and   
related posterior   
acoustic shadowing   
obscuring the renal   
collecting system.  
Calculi, cysts or   
masses: Extensive   
nephrolithiasis better   
seen on prior CT scan.  
  
Urinary bladder: The   
bladder is not   
visualized, likely   
completely   
decompressed.  
  
Other findings:   
Bilateral nephrostomy   
tubes are partially   
visualized.  
  
IMPRESSION:  
  
* Status post   
bilateral nephrostomy   
earlier today.   
Bilateral nephrostomy   
tubes are partially   
visualized.  
* Bilateral extensive   
nephrolithiasis better   
seen on prior CT scan.  
* No obvious   
hydronephrosis on   
either side within the   
limitations described   
above.  
  
  
MACRO: None  
  
                                                            [facility]  
   
                                                    Radiology Study   
observation   
(narrative)                                                     [facility] Kidney - bilateral and Ur  
inary bladderOrdered By: Anju Langford on   
2023   
   
                                                                  [facility]  
Work Phone:   
[phone]  
   
                                                    Bacteria identified Aer cx N  
om (Unsp spec)Ordered By: Brisa Acevedo on   
2023   
   
                                                    Superficial Wound   
Culture         Pseudomonas aeruginosa                                 [facility]  
   
                                                    Superficial Wound   
Culture                                 Pseudomonas   
aeruginosa#2                                                [facility]  
   
                                                    Superficial Wound   
Culture                                 Corynebacterium   
striatum group                                              [facility]  
   
                                                    Clostridioides difficile tox  
in B tcdB gene [Presence] in Stool by MADIE with probe  
   
deteOrdered By: Patty Kirby on 2023   
   
                                                    C. difficile toxin B   
tcdB gene MADIE+probe   
Ql (Stl)        Negative                        Negative        [facility]  
   
                                        Comment on above:   Testing performed by  
 RT-PCR   
   
                                                    Fecal occult blood detection  
 by immunochemistryOrdered By: Patty Kirby on   
2023   
   
                                                    Hemoglobin.gastrointe  
stinal Ql (Stl)                                                 [facility]  
   
                                                    Glucose Glucometer (BldC) [M  
ass/Vol]Ordered By: Brisa Acevedo on 2023   
   
                      Glucose [Mass/Vol] 112 mg/dL                        [facility]  
   
                                        Comment on above:   Random Glucose Refer  
ence Range is dependent on time and   
content   
of last meal. Glucose of more than 200 mg/dL in a nonstressed,   
ambulatory subject supports the diagnosis of Diabetes Mellitus.   
   
                                                    No Panel InformationOrdered   
By: Patty Kirby on 2023   
   
                                                    Ova and Parasite   
Result 1                                                        [facility]  
   
                      Ova and Parasites                                  [facility]  
[facility]  
   
                                                    Stool lactoferrin detectionO  
rdered By: Patty Kirby on 2023   
   
                      Lactoferrin Ql (Stl)                                  [facility]  
   
                                                    Alanine aminotransferase [En  
zymatic activity/volume] in Serum or PlasmaOrdered   
By:   
Jordan De Leon on 2023   
   
                                                    ALT [Catalytic   
activity/Vol]   7 U/L                           7-52            [facility]  
   
                                                    Albumin [Mass/volume] in Ser  
um or Plasma by Bromocresol green (BCG) dye binding   
methoOrdered By: Jordan De Leon on 2023   
   
                                                    Albumin BCG dye   
[Mass/Vol]      3.3 g/dL                        3.5-5.7         [facility]  
   
                                                    Alkaline phosphatase [Enzyma  
tic activity/volume] in Serum or PlasmaOrdered By:   
Jordan De Leon on 2023   
   
                                                    ALP [Catalytic   
activity/Vol]   62 U/L                                    [facility]  
   
                                                    Aspartate aminotransferase [  
Enzymatic activity/volume] in Serum or PlasmaOrdered  
By:   
Jordan De Leon on 2023   
   
                                                    AST [Catalytic   
activity/Vol]   10 U/L                          13-39           [facility]  
   
                                                    Automated erythrocytes count  
 in urine sediment (number/area)Ordered By: Jordan De Leon   
on 2023   
   
                                                    RBC Auto (Urine sed)   
[#/Area]        3-4 [HPF]                       0-4             [facility]  
   
                                                    Automated leukocytes count i  
n urine sediment (number/area)Ordered By: Jordan De Leon   
on 2023   
   
                                                    WBC Auto (Urine sed)   
[#/Area]        Innumerable [HPF]                 0-4             [facility]  
   
                                                    Basophils Auto (Bld) [#/Vol]  
Ordered By: Jordan De Leon on 2023   
   
                                                    Basophils (Bld)   
[#/Vol]         0.2 10*3/uL                     0.0-0.2         [facility]  
   
                                                    Basophils/100 WBC Auto (Bld)  
Ordered By: Jordan De Leon on 2023   
   
                                                    Basophils/100 WBC   
(Bld)           1.2 %                           .               [facility]  
   
                                                    Bilirubin Test strip Ql (U)O  
rdered By: Jordan De Leon on 2023   
   
                      Bilirubin Ql (U) Negative              Negative   [facility]  
   
                                                    Bilirubin.direct [Mass/volum  
e] in Serum or PlasmaOrdered By: Jordan De Leon on   
2023   
   
                                                    Bilirubin.direct   
[Mass/Vol]      0.10 mg/dL                      0.03-0.18       [facility]  
   
                                                    Bilirubin.total [Mass/volume  
] in Serum or PlasmaOrdered By: Jordan De Leon on   
2023   
   
                      Bilirubin [Mass/Vol] 0.3 mg/dL             0.3-1.0    [facility]  
   
                                                    Calcium [Mass/volume] in Ser  
um or PlasmaOrdered By: Jordan De Leon on 2023   
   
                      Calcium [Mass/Vol] 8.8 mg/dL             8.6-10.3   [facility]  
   
                                                    Carbon dioxide, total [Moles  
/volume] in Serum or PlasmaOrdered By: Jordan De Leon  
 on   
2023   
   
                      CO2 [Moles/Vol] 26.8 mmol/L            21.0-31.0  [facility]  
   
                                                    Chloride [Moles/volume] in S  
chandler or PlasmaOrdered By: Jordan De Leon on   
2023   
   
                      Chloride [Moles/Vol] 104 mmol/L                 [facility]  
   
                                                    Color Auto (U)Ordered By: Lashonda De Leon on 2023   
   
                      Color (U)  Yellow                Yellow     [facility]  
   
                                                    Creatinine [Mass/volume] in   
Serum or PlasmaOrdered By: Jordan De Leon on   
2023   
   
                      Creatinine [Mass/Vol] 2.58 mg/dL            0.60-1.20  [facility]  
   
                                                    Eosinophils Auto (Bld) [#/Vo  
l]Ordered By: Jordan De Leon on 2023   
   
                                                    Eosinophils (Bld)   
[#/Vol]         1.0 10*3/uL                     0.0-0.45        [facility]  
   
                                                    Eosinophils/100 WBC Auto (Bl  
d)Ordered By: Jordan De Leon on 2023   
   
                                                    Eosinophils/100 WBC   
(Bld)           5.5 %                           .               [facility]  
   
                                                    Erythrocyte distribution wid  
th Auto (RBC) [Ratio]Ordered By: Jordan De Leon on   
2023   
   
                                                    Erythrocyte   
distribution width   
(RBC) [Ratio]   15.2 %                          11.9-15.3       [facility]  
   
                                                    Globulin Calc (S) [Mass/Vol]  
Ordered By: Jordan De Leon on 2023   
   
                                                    Globulin (S)   
[Mass/Vol]      3.9 g/dL                                        [facility]  
   
                                                    Glucose [Mass/volume] in Ser  
um or PlasmaOrdered By: Jordan De Leon on 2023   
   
                      Glucose [Mass/Vol] 125 mg/dL                  [facility]  
   
                                        Comment on above:   ADA recommended refe  
rence rangeRandom Glucose Reference Range   
is   
dependent on time and content of last meal. Glucose of more than   
200 mg/dL in a nonstressed, ambulatory subject supports the   
diagnosis of Diabetes Mellitus.   
   
                                                    Hematocrit Auto (Bld) [Volum  
e fraction]Ordered By: Jordan De Leon on 2023   
   
                                                    Hematocrit (Bld)   
[Volume fraction] 37.9 %                          34.0-46.4       [facility]  
   
                                                    Hemoglobin [Mass/volume] in   
BloodOrdered By: Jordan De Leon on 2023   
   
                                                    Hemoglobin (Bld)   
[Mass/Vol]      12.2 g/dL                       11.8-15.4       [facility]  
   
                                                    Ketones Auto test strip (U)   
[Mass/Vol]Ordered By: Jordan De Leon on 2023   
   
                                                    Ketones (U)   
[Mass/Vol]      Negative                        Negative        [facility]  
   
                                                    Laboratory - UrinalysisOrder  
ed By: Jordan De Leon on 2023   
   
                                                    Hyaline casts LM Ql   
(Urine sed)     0-8 [LPF]                       0-8             [facility]  
   
                                                    Leukocytes [#/volume] correc  
sapna for nucleated erythrocytes in Blood by Automated  
   
counOrdered By: Jordan De Leon on 2023   
   
                                                    WBC corrected for   
nucl RBC Auto (Bld)   
[#/Vol]         19.2 10*3/uL                    3.8-11.6        [facility]  
   
                                                    Lipase [Enzymatic activity/v  
olume] in Serum or PlasmaOrdered By: Jordan De Leon   
on   
2023   
   
                                                    Lipase [Catalytic   
activity/Vol]   114.0 U/L                       11.0-82.0       [facility]  
   
                                                    Lymphocytes Auto (Bld) [#/Vo  
l]Ordered By: Jordan De Leon on 2023   
   
                                                    Lymphocytes (Bld)   
[#/Vol]         3.2 10*3/uL                     1.00-4.8        [facility]  
   
                                                    Lymphocytes/100 WBC Auto (Bl  
d)Ordered By: Jordan De Leon on 2023   
   
                                                    Lymphocytes/100 WBC   
(Bld)           16.7 %                          .               [facility]  
   
                                                    MCH Auto (RBC) [Entitic mass  
]Ordered By: Jordan De Leon on 2023   
   
                                                    MCH (RBC) [Entitic   
mass]           27.9 pg                         24.7-34.3       [facility]  
   
                                                    MCHC Auto (RBC) [Mass/Vol]Or  
dered By: Jordan De Leon on 2023   
   
                      MCHC (RBC) [Mass/Vol] 32.3 g/dL             32.0-35.0  [facility]  
   
                                                    MCV Auto (RBC) [Entitic vol]  
Ordered By: Jordan De Leon on 2023   
   
                                                    MCV (RBC) [Entitic   
vol]            86.3 fL                                   [facility]  
   
                                                    Monocyte distribution width   
[Entitic volume] in Blood by AutomatedOrdered By:   
Jordan De Leon on 2023   
   
                                                    Monocyte distribution   
width Auto (Bld)   
[Entitic vol]   20.01 %                         0.00-20.00      [facility]  
   
                                        Comment on above:   For adults in ED, MD  
W > 20.0 may be associated with a higher   
risk of sepsis during the first 12 hrs of hospital admission   
   
                                                    Monocytes Auto (Bld) [#/Vol]  
Ordered By: Jordan De Leon on 2023   
   
                                                    Monocytes (Bld)   
[#/Vol]         1.0 10*3/uL                     0.0-0.8         [facility]  
   
                                                    Monocytes/100 WBC Auto (Bld)  
Ordered By: Jordan De Leon on 2023   
   
                                                    Monocytes/100 WBC   
(Bld)           5.2 %                           .               [facility]  
   
                                                    Neutrophils Auto (Bld) [#/Vo  
l]Ordered By: Jordan De Leon on 2023   
   
                                                    Neutrophils (Bld)   
[#/Vol]         13.7 10*3/uL                    1.8-7.7         [facility]  
   
                                                    Neutrophils/100 WBC Auto (Bl  
d)Ordered By: Jordan De Leon on 2023   
   
                                                    Neutrophils/100 WBC   
(Bld)           71.4 %                          .               [facility]  
   
                                                    Nitrite Test strip Ql (U)Ord  
ered By: Jordan De Leon on 2023   
   
                      Nitrite Ql (U) Positive              Negative   [facility]  
   
                                                    No Panel InformationOrdered   
By: Jordan De Leon on 2023   
   
                                                    Estimated GFR   
(CKD-EPI)       21.601 mL/Min                                   [facility]  
   
                                                    Pharmacy Creatinine   
Clearance (Chem 34.07                                           [facility]  
   
                                                    Nucleated erythrocytes [Pres  
ence] in Blood by Automated countOrdered By: Jordan De Leon on 2023   
   
                                                    Nucleated RBC Auto Ql   
(Bld)           0.0 /100{WBC}                   0-0.5           [facility]  
   
                                                    Platelet mean volume Auto (B  
ld) [Entitic vol]Ordered By: Jordan De Leon on   
2023   
   
                                                    Platelet mean volume   
(Bld) [Entitic vol] 8.4 fL                          6.3-10.7        [facility]  
   
                                                    Platelets Auto (Bld) [#/Vol]  
Ordered By: Jordan De Leon on 2023   
   
                                                    Platelets (Bld)   
[#/Vol]         285 10*3/uL                     150-450         [facility]  
   
                                                    Potassium [Moles/volume] in   
Serum or PlasmaOrdered By: Jordan De Leon on   
2023   
   
                      Potassium [Moles/Vol] 3.6 mmol/L            3.5-5.1    [facility]  
   
                                                    Protein Auto test strip (U)   
[Mass/Vol]Ordered By: Jordan De Leon on 2023   
   
                                                    Protein (U)   
[Mass/Vol]      100 mg/dL                       Negative        [facility]  
   
                                                    Protein [Mass/volume] in Ser  
um or PlasmaOrdered By: Jordan De Leon on 2023   
   
                      Protein [Mass/Vol] 7.2 g/dL              6.4-8.9    [facility]  
   
                                                    RBC Auto (Bld) [#/Vol]Ordere  
d By: Jordan De Leon on 2023   
   
                      RBC (Bld) [#/Vol] 4.39 10*6/uL            3.60-5.00  [facility]  
   
                                                    Serum or plasma albumin/glob  
ulin mass ratioOrdered By: Jordan De Leon on   
2023   
   
                                                    Albumin/Globulin   
[Mass ratio]    0.8 {ratio}                                     [facility]  
   
                                                    Serum or plasma anion gap de  
terminationOrdered By: Jordan De Leon on 2023   
   
                      Anion gap [Moles/Vol] 10.8 mmol/L            6.0-15.0   [facility]  
   
                                                    Serum or plasma non-glucuron  
idated bilirubin measurement (mass/volume)Ordered   
By:   
Jordan De Leon on 2023   
   
                                                    Bilirubin.indirect   
[Mass/Vol]      0.2 mg/dL                                       [facility]  
   
                                                    Sodium [Moles/volume] in Ser  
um or PlasmaOrdered By: Jordan De Leon on 2023   
   
                      Sodium [Moles/Vol] 138 mmol/L            136-145    [facility]  
   
                                                    Specific gravity Auto test s  
trip (U) [Rel density]Ordered By: Jordan De Leon on   
2023   
   
                                                    Specific gravity (U)   
[Rel density]   1.015                           1.001-1.030     [facility]  
   
                                                    Squamous epithelial cells de  
tection in urine sediment by light microscopyOrdered  
By:   
Jordan De Leon on 2023   
   
                                                    Epithelial   
cells.squamous LM Ql   
(Urine sed)     0-1 [HPF]                       0-2             [facility]  
   
                                                    Urea nitrogen [Mass/volume]   
in Serum or PlasmaOrdered By: Jordan De Leon on   
2023   
   
                                                    Urea nitrogen   
[Mass/Vol]      33 mg/dL                        7-25            [facility]  
   
                                                    Urine bacteria detection by   
automated methodOrdered By: Jordan De Leon on   
2023   
   
                      Bacteria Auto Ql (U) 1+                    None Seen  [facility]  
   
                                                    Urine clarity by refractomet  
ry automatedOrdered By: Jordan De Leon on 2023   
   
                                                    Clarity Refractometry   
automated (U)   Turbid                          Clear           [facility]  
   
                                                    Urine culture routineOrdered  
 By: Jordan De Leon on 2023   
   
                                                    Bacteria identified   
Cx Nom (U)                              Enterobacter cloacae   
complex                                                     [facility]  
   
                                                    Bacteria identified   
Cx Nom (U)                              Enterobacter cloacae   
complex#2                                                   [facility]  
   
                                                    Urine glucose measurement by  
 automated test strip (mass/volume)Ordered By:   
Jordan De Leon on 2023   
   
                                                    Glucose Auto test   
strip (U) [Mass/Vol] 250 mg/dL                       Normal          [facility]  
   
                                                    Urine hemoglobin detection b  
y automated test stripOrdered By: Jordan De Leon on   
2023   
   
                                                    Hemoglobin Auto test   
strip Ql (U)    3+                              Negative        [facility]  
   
                                                    Urine leukocyte esterase det  
ection by automated test stripOrdered By: Jordan De Leon   
on 2023   
   
                                                    Leukocyte esterase   
Auto test strip Ql   
(U)             4+                              Negative        [facility]  
   
                                                    Urine sediment crystal ident  
ification by light microscopyOrdered By: Jordan De Leon on   
2023   
   
                                                    Crystals LM Nom   
(Urine sed)     None seen [HPF]                                 [facility]  
   
                                                    Urobilinogen Auto test strip  
 (U) [Mass/Vol]Ordered By: Jordan De Leon on   
2023   
   
                                                    Urobilinogen (U)   
[Mass/Vol]      Normal mg/dL                    Normal          [facility]  
   
                                                    WBC Auto (Bld) [#/Vol]Ordere  
d By: Jordan De Leon on 2023   
   
                      WBC (Bld) [#/Vol] 19.2 10*3/uL            3.8-11.6   [facility]  
   
                                                    Yeast detection in urine sed  
iment by light microscopyOrdered By: Jordan De Leon   
on   
2023   
   
                                                    Yeast LM Ql (Urine   
sed)            None seen [HPF]                 None Seen       [facility]  
   
                                                    pH Auto test strip (U)Ordere  
d By: Jordan De Leon on 2023   
   
                      pH (U)     5.5 [pH]              5.0-9.0    [facility]  
   
                                                    Alanine aminotransferase [En  
zymatic activity/volume] in Serum or PlasmaOrdered   
By:   
Apollo Lopez on 2023   
   
                                                    ALT [Catalytic   
activity/Vol]   6 U/L                           7-52            [facility]  
   
                                                    Albumin [Mass/volume] in Ser  
um or Plasma by Bromocresol green (BCG) dye binding   
methoOrdered By: Apollo Lopez on 2023   
   
                                                    Albumin BCG dye   
[Mass/Vol]      3.6 g/dL                        3.5-5.7         [facility]  
   
                                                    Alkaline phosphatase [Enzyma  
tic activity/volume] in Serum or PlasmaOrdered By:   
Apollo Lopez on 2023   
   
                                                    ALP [Catalytic   
activity/Vol]   43 U/L                                    [facility]  
   
                                                    Aspartate aminotransferase [  
Enzymatic activity/volume] in Serum or PlasmaOrdered  
By:   
Apollo Lopez on 2023   
   
                                                    AST [Catalytic   
activity/Vol]   7 U/L                           13-39           [facility]  
   
                                                    Basophils Auto (Bld) [#/Vol]  
Ordered By: Apollo Lopez on 2023   
   
                                                    Basophils (Bld)   
[#/Vol]         0.1 10*3/uL                     0.0-0.2         [facility]  
   
                                                    Basophils/100 WBC Auto (Bld)  
Ordered By: Apollo Lopez on 2023   
   
                                                    Basophils/100 WBC   
(Bld)           0.5 %                           .               [facility]  
   
                                                    Bilirubin.total [Mass/volume  
] in Serum or PlasmaOrdered By: Apollo Lopez on   
2023   
   
                      Bilirubin [Mass/Vol] 0.2 mg/dL             0.3-1.0    [facility]  
   
                                                    Calcium [Mass/volume] in Ser  
um or PlasmaOrdered By: Apollo Lopez on 2023   
   
                      Calcium [Mass/Vol] 9.0 mg/dL             8.6-10.3   [facility]  
   
                                                    Carbon dioxide, total [Moles  
/volume] in Serum or PlasmaOrdered By: Apollo Lopez  
 on   
2023   
   
                      CO2 [Moles/Vol] 31.7 mmol/L            21.0-31.0  [facility]  
   
                                                    Chloride [Moles/volume] in S  
chandler or PlasmaOrdered By: Apollo Lopez on   
2023   
   
                      Chloride [Moles/Vol] 104 mmol/L                 [facility]  
   
                                                    Cholesterol [Mass/volume] in  
 Serum or PlasmaOrdered By: Apollo Lopez on   
2023   
   
                                                    Cholesterol   
[Mass/Vol]      190 mg/dL                       140-200         [facility]  
   
                                        Comment on above:   Chol less than 200 m  
g/dl low riskChol 201-239 mg/dl borderline  
   
riskChol 240 mg/dl and greater high risk   
   
                                                    Cholesterol in LDL Calc [Mas  
s/Vol]Ordered By: Apollo Lopez on 2023   
   
                                                    Cholesterol in LDL   
[Mass/Vol]      81 mg/dL                        0-100           [facility]  
   
                                        Comment on above:   LDL ATP III CLASSIFI  
CATIONLDL less than 100 mg/dL OptimalLDL   
100-129 mg/dL Near or above optimalLDL 130-159 mg/dL Borderline   
highLDL 160-189 mg/dL HighLDL greater than 189 mg/dL Very high   
   
                                                    Cholesterol in VLDL Calc [Ma  
ss/Vol]Ordered By: Apollo Lopez on 2023   
   
                                                    Cholesterol in VLDL   
[Mass/Vol]      75 mg/dL                                        [facility]  
   
                                                    Creatinine [Mass/volume] in   
Serum or PlasmaOrdered By: Apollo Lopez on   
2023   
   
                      Creatinine [Mass/Vol] 1.88 mg/dL            0.60-1.20  [facility]  
   
                                                    Eosinophils Auto (Bld) [#/Vo  
l]Ordered By: Apollo Lopez on 2023   
   
                                                    Eosinophils (Bld)   
[#/Vol]         0.7 10*3/uL                     0.0-0.45        [facility]  
   
                                                    Eosinophils/100 WBC Auto (Bl  
d)Ordered By: Apollo Lopez on 2023   
   
                                                    Eosinophils/100 WBC   
(Bld)           5.4 %                           .               [facility]  
   
                                                    Erythrocyte distribution wid  
th Auto (RBC) [Ratio]Ordered By: Apollo Lopez on   
2023   
   
                                                    Erythrocyte   
distribution width   
(RBC) [Ratio]   16.5 %                          11.9-15.3       [facility]  
   
                                                    Ferritin [Mass/volume] in Se  
rum or PlasmaOrdered By: Apollo Lopez on 2023  
   
   
                      Ferritin [Mass/Vol] 26.6 ng/mL            11.0-306.8 [facility]  
   
                                                    Folate [Mass/volume] in Seru  
m or PlasmaOrdered By: Apollo Lopez on 2023   
   
                      Folate [Mass/Vol] 9.3 ng/mL             >5.9       [facility]  
   
                                        Comment on above:   Folate reference ran  
ge: >5.9 ng/mlThe WHO technical   
consultation   
on folate and vitamin [id number] has determined that folate   
concentrations lessthan 4 ng/ml are considered deficient.   
   
                                                    Globulin Calc (S) [Mass/Vol]  
Ordered By: Apollo Lopez on 2023   
   
                                                    Globulin (S)   
[Mass/Vol]      3.0 g/dL                                        [facility]  
   
                                                    Glucose [Mass/volume] in Ser  
um or PlasmaOrdered By: Apollo Lopez on 2023   
   
                      Glucose [Mass/Vol] 163 mg/dL                  [facility]  
   
                                        Comment on above:   ADA recommended refe  
rence rangeRandom Glucose Reference Range   
is   
dependent on time and content of last meal. Glucose of more than   
200 mg/dL in a nonstressed, ambulatory subject supports the   
diagnosis of Diabetes Mellitus.   
   
                                                    Hematocrit Auto (Bld) [Volum  
e fraction]Ordered By: Apollo Lopez on 2023   
   
                                                    Hematocrit (Bld)   
[Volume fraction] 33.8 %                          34.0-46.4       [facility]  
   
                                                    Hemoglobin [Mass/volume] in   
BloodOrdered By: Apollo Lopez on 2023   
   
                                                    Hemoglobin (Bld)   
[Mass/Vol]      10.9 g/dL                       11.8-15.4       [facility]  
   
                                                    Iron [Mass/volume] in Serum   
or PlasmaOrdered By: Apollo Lopez on 2023   
   
                      Iron [Mass/Vol] 46 ug/dL                   [facility]  
   
                                                    Iron binding capacity [Mass/  
volume] in Serum or PlasmaOrdered By: Apollo Lopez   
on   
2023   
   
                                                    Iron binding capacity   
[Mass/Vol]      308 ug/dL                       255-450         [facility]  
   
                                                    Iron saturation [Mass Fracti  
on] in Serum or PlasmaOrdered By: Apollo Lopez on   
2023   
   
                                                    Iron saturation [Mass   
fraction]       14.9 %                          20-50           [facility]  
   
                                                    Leukocytes [#/volume] correc  
sapna for nucleated erythrocytes in Blood by Automated  
   
counOrdered By: Apollo Lopez on 2023   
   
                                                    WBC corrected for   
nucl RBC Auto (Bld)   
[#/Vol]         13.9 10*3/uL                    3.8-11.6        [facility]  
   
                                                    Lymphocytes Auto (Bld) [#/Vo  
l]Ordered By: Apollo Lopez on 2023   
   
                                                    Lymphocytes (Bld)   
[#/Vol]         3.0 10*3/uL                     1.00-4.8        [facility]  
   
                                                    Lymphocytes/100 WBC Auto (Bl  
d)Ordered By: Apollo Lopez on 2023   
   
                                                    Lymphocytes/100 WBC   
(Bld)           21.9 %                          .               [facility]  
   
                                                    MCH Auto (RBC) [Entitic mass  
]Ordered By: Apollo Lopez on 2023   
   
                                                    MCH (RBC) [Entitic   
mass]           28.5 pg                         24.7-34.3       [facility]  
   
                                                    MCHC Auto (RBC) [Mass/Vol]Or  
dered By: Apollo Lopez on 2023   
   
                      MCHC (RBC) [Mass/Vol] 32.4 g/dL             32.0-35.0  [facility]  
   
                                                    MCV Auto (RBC) [Entitic vol]  
Ordered By: Apollo Lopez on 2023   
   
                                                    MCV (RBC) [Entitic   
vol]            88.1 fL                                   [facility]  
   
                                                    Monocytes Auto (Bld) [#/Vol]  
Ordered By: Apollo Lopez on 2023   
   
                                                    Monocytes (Bld)   
[#/Vol]         0.6 10*3/uL                     0.0-0.8         [facility]  
   
                                                    Monocytes/100 WBC Auto (Bld)  
Ordered By: Apollo Lopez on 2023   
   
                                                    Monocytes/100 WBC   
(Bld)           4.5 %                           .               [facility]  
   
                                                    Neutrophils Auto (Bld) [#/Vo  
l]Ordered By: Apollo Lopez on 2023   
   
                                                    Neutrophils (Bld)   
[#/Vol]         9.4 10*3/uL                     1.8-7.7         [facility]  
   
                                                    Neutrophils/100 WBC Auto (Bl  
d)Ordered By: Apollo oLpez on 2023   
   
                                                    Neutrophils/100 WBC   
(Bld)           67.7 %                          .               [facility]  
   
                                                    No Panel InformationOrdered   
By: Apollo Lopez on 2023   
   
                                                    Estimated GFR   
(CKD-EPI)       31.582 mL/Min                                   [facility]  
   
                                                    Pharmacy Creatinine   
Clearance (Chem N/A                                             [facility]  
   
                                                    Nucleated erythrocytes [Pres  
ence] in Blood by Automated countOrdered By: Apollo Lopez on 2023   
   
                                                    Nucleated RBC Auto Ql   
(Bld)           0.1 /100{WBC}                   0-0.5           [facility]  
   
                                                    Platelet mean volume Auto (B  
ld) [Entitic vol]Ordered By: Apollo Lopez on   
2023   
   
                                                    Platelet mean volume   
(Bld) [Entitic vol] 9.1 fL                          6.3-10.7        [facility]  
   
                                                    Platelets Auto (Bld) [#/Vol]  
Ordered By: Apollo Lopez on 2023   
   
                                                    Platelets (Bld)   
[#/Vol]         265 10*3/uL                     150-450         [facility]  
   
                                                    Potassium [Moles/volume] in   
Serum or PlasmaOrdered By: Apollo Lopez on   
2023   
   
                      Potassium [Moles/Vol] 4.2 mmol/L            3.5-5.1    [facility]  
   
                                                    Protein [Mass/volume] in Ser  
um or PlasmaOrdered By: Apollo Lopez on 2023   
   
                      Protein [Mass/Vol] 6.6 g/dL              6.4-8.9    [facility]  
   
                                                    RBC Auto (Bld) [#/Vol]Ordere  
d By: Apollo Lopez on 2023   
   
                      RBC (Bld) [#/Vol] 3.84 10*6/uL            3.60-5.00  [facility]  
   
                                                    Serum or plasma albumin/glob  
ulin mass ratioOrdered By: Apollo Lopez on   
2023   
   
                                                    Albumin/Globulin   
[Mass ratio]    1.2 {ratio}                                     [facility]  
   
                                                    Serum or plasma anion gap de  
terminationOrdered By: Apollo Lopez on 2023   
   
                      Anion gap [Moles/Vol] 9.5 mmol/L            6.0-15.0   [facility]  
   
                                                    Serum or plasma high density  
 lipoprotein (HDL) cholesterol measurementOrdered   
By:   
Apollo Lopez on 2023   
   
                                                    Cholesterol in HDL   
[Mass/Vol]      34 mg/dL                        23-92           [facility]  
   
                                        Comment on above:   HDL CHOL ATP-III CLA  
SSIFICATION Cardiovascular RiskHDL > or   
equal to 60 mg/dL LOWHDL < 40 mg/dL HIGH   
   
                                                    Serum or plasma total choles  
terol/high density lipoprotein (HDL) cholesterol   
mass   
ratOrdered By: Apollo Lopez on 2023   
   
                                                    Cholesterol.total/Cho  
lesterol in HDL [Mass   
ratio]          5.6 {ratio}                     <5.0            [facility]  
   
                                                    Sodium [Moles/volume] in Ser  
um or PlasmaOrdered By: Apollo Lopez on 2023   
   
                      Sodium [Moles/Vol] 141 mmol/L            136-145    [facility]  
   
                                                    Transferrin [Mass/volume] in  
 Serum or PlasmaOrdered By: Apollo Lopez on   
2023   
   
                                                    Transferrin   
[Mass/Vol]      220 mg/dL                       203-362         [facility]  
   
                                                    Triglyceride [Mass/volume] i  
n Serum or PlasmaOrdered By: Apollo Lopez on   
2023   
   
                                                    Triglyceride   
[Mass/Vol]      375 mg/dL                       0-149           [facility]  
   
                                        Comment on above:   TRIG ATP III CLASSIF  
ICATIONTRIG less than 150 mg/dL NormalTRIG  
   
150-199 mg/dL Borderline highTRIG 200-500 mg/dL High TRIG   
greater than 500 mg/dL Very highStandard traceable to the Center   
for Disease Conrtrol and Prevention (CDC) test method.   
   
                                                    Urea nitrogen [Mass/volume]   
in Serum or PlasmaOrdered By: Apollo Lopez on   
2023   
   
                                                    Urea nitrogen   
[Mass/Vol]      27 mg/dL                        7-25            [facility]  
   
                                                    Vitamin B12 ser/plasOrdered   
By: Apollo Lopez on 2023   
   
                                                    Cobalamin (Vitamin   
B12) [Mass/Vol] 172 pg/mL                       180-914         [facility]  
   
                                                    WBC Auto (Bld) [#/Vol]Ordere  
d By: Apollo Lopez on 2023   
   
                      WBC (Bld) [#/Vol] 13.9 10*3/uL            3.8-11.6   [facility]  
   
                                                    Basophils Auto (Bld) [#/Vol]  
Ordered By: Jill Rodriguez on 2022   
   
                                                    Basophils (Bld)   
[#/Vol]         0.1 10*3/uL                     0.0-0.2         [facility]  
   
                                                    Basophils/100 WBC Auto (Bld)  
Ordered By: Jill Abelser on 2022   
   
                                                    Basophils/100 WBC   
(Bld)           0.7 %                           .               [facility]  
   
                                                    Blood hemoglobin measurement  
 (mass/volume)Ordered By: Jill Rodriguez on   
2022   
   
                                                    Hemoglobin (Bld)   
[Mass/Vol]      11.2 g/dL                       11.8-15.4       [facility]  
   
                                                    Blood leukocytes automated c  
ount (number/volume)Ordered By: Jill Rodriguez on  
   
2022   
   
                      WBC (Bld) [#/Vol] 10.4 10*3/uL            4.5-11.0   [facility]  
   
                                                    CT biopsyOrdered By: Santiago Rodriguez on 2022   
   
                                                    Transferrin   
[Mass/Vol]      264 mg/dL                       180-380         [facility]  
   
                                                    Eosinophils Auto (Bld) [#/Vo  
l]Ordered By: Jill Jennifer on 2022   
   
                                                    Eosinophils (Bld)   
[#/Vol]         0.6 10*3/uL                     0.0-0.45        [facility]  
   
                                                    Eosinophils/100 WBC Auto (Bl  
d)Ordered By: Jill Jennifer on 2022   
   
                                                    Eosinophils/100 WBC   
(Bld)           6.1 %                           .               [facility]  
   
                                                    Erythrocyte distribution wid  
th Auto (RBC) [Ratio]Ordered By: Jill Rodriguez   
on   
2022   
   
                                                    Erythrocyte   
distribution width   
(RBC) [Ratio]   17.6 %                          11.9-15.3       [facility]  
   
                                                    Hematocrit Auto (Bld) [Volum  
e fraction]Ordered By: Jill Abelser on   
2022   
   
                                                    Hematocrit (Bld)   
[Volume fraction] 35.5 %                          34.0-46.4       [facility]  
   
                                                    Iron [Mass/volume] in Serum   
or PlasmaOrdered By: Jill [place] on 2022  
   
   
                      Iron [Mass/Vol] 33 ug/dL                   [facility]  
   
                                                    Iron binding capacity [Mass/  
volume] in Serum or PlasmaOrdered By: Jill   
[place]   
on 2022   
   
                                                    Iron binding capacity   
[Mass/Vol]      370 ug/dL                       255-450         [facility]  
   
                                                    Iron saturation [Mass Fracti  
on] in Serum or PlasmaOrdered By: Jill Jennifer   
on   
2022   
   
                                                    Iron saturation [Mass   
fraction]       8.0 %                           20-50           [facility]  
   
                                                    Laboratory - Hematology and   
Cell countsOrdered By: Jill Abelser on   
2022   
   
                                                    Nucleated RBC/100 WBC   
(Bld) [Ratio]   0.1 %                           0-0.5           [facility]  
   
                                                    Lymphocytes Auto (Bld) [#/Vo  
l]Ordered By: Jill [place] on 2022   
   
                                                    Lymphocytes (Bld)   
[#/Vol]         2.2 10*3/uL                     1.00-4.8        [facility]  
   
                                                    Lymphocytes/100 WBC Auto (Bl  
d)Ordered By: Jill [place] on 2022   
   
                                                    Lymphocytes/100 WBC   
(Bld)           21.2 %                          .               [facility]  
   
                                                    MCH Auto (RBC) [Entitic mass  
]Ordered By: Jill Jennifer on 2022   
   
                                                    MCH (RBC) [Entitic   
mass]           26.3 pg                         24.7-34.3       [facility]  
   
                                                    MCHC Auto (RBC) [Mass/Vol]Or  
dered By: Jill [place] on 2022   
   
                      MCHC (RBC) [Mass/Vol] 31.6 g/dL             32.0-35.0  [facility]  
   
                                                    MCV Auto (RBC) [Entitic vol]  
Ordered By: Jill Abelser on 2022   
   
                                                    MCV (RBC) [Entitic   
vol]            83.2 fL                                   [facility]  
   
                                                    Monocytes Auto (Bld) [#/Vol]  
Ordered By: Jill [place] on 2022   
   
                                                    Monocytes (Bld)   
[#/Vol]         0.4 10*3/uL                     0.0-0.8         [facility]  
   
                                                    Monocytes/100 WBC Auto (Bld)  
Ordered By: Jill Jennifer on 2022   
   
                                                    Monocytes/100 WBC   
(Bld)           4.1 %                           .               [facility]  
   
                                                    Neutrophils Auto (Bld) [#/Vo  
l]Ordered By: Jill Jennifer on 2022   
   
                                                    Neutrophils (Bld)   
[#/Vol]         7.1 10*3/uL                     1.8-7.7         [facility]  
   
                                                    Neutrophils/100 WBC Auto (Bl  
d)Ordered By: Jill Jennifer on 2022   
   
                                                    Neutrophils/100 WBC   
(Bld)           67.9 %                          .               [facility]  
   
                                                    Platelet mean volume Auto (B  
ld) [Entitic vol]Ordered By: Jill [place] on   
2022   
   
                                                    Platelet mean volume   
(Bld) [Entitic vol] 8.7 fL                          6.3-10.7        [facility]  
   
                                                    Platelets Auto (Bld) [#/Vol]  
Ordered By: Jill Abelser on 2022   
   
                                                    Platelets (Bld)   
[#/Vol]         268 10*3/uL                     150-450         [facility]  
   
                                                    RBC Auto (Bld) [#/Vol]Ordere  
d By: Jill Jennifer on 2022   
   
                      RBC (Bld) [#/Vol] 4.26 10*6/uL            3.60-5.00  [facility]  
   
                                                    GLYCOHEMOGLOBIN A1Con 2017   
   
                      Glucose mass conc 192 mg/dL  Normal                [facility]  
   
                                        Comment on above:   Performed By: #### A  
1C ####[facility] [id number]   
[address]   
   
                                                    Hemoglobin   
A1c/Hemoglobin.total   
mass fraction (Bld) 8.3 %           Critically high <=6.0           [facility]  
   
                                        Comment on above:   Performed By: #### A  
1C ####[facility] [id number]   
[place], [place] [address]   
   
                                                    XR MODIFIED BARIUM SWALLOWon  
 2017   
   
                                                    XR MODIFIED BARIUM   
SWALLOW                                 [address] [place]   
[address] Phone   
[phone] Patient:   
CRIS HEARN Exam   
Date: 2017DOB:   
1970 Gender:F   
MRN: [id number] :   
RUPERTO BENJAMINShelton FELIX .   
Admission #:   
[id number] : Order   
#: [id number]   
HERE TO VIEW EXAM   
RADIOLOGY REPORT   
PROCEDURE: RADIOGRAPH   
MODIFIED BARIUM   
SWALLOW COMPARISON:   
None. INDICATIONS:   
Dysphagia R13.10   
TECHNIQUE: A   
swallowing evaluation   
was performed with   
fluoroscopy in the   
usual manner. The   
procedure was   
recorded. Speech   
pathology was present.   
FLUORO TIME/IMAGES:   
1.8min/2 images   
FINDINGS: ORAL PHASE:   
Normal deglutition.   
PHARYNGEAL PHASE:   
Normal swallowing.   
Transient filling of   
the vallecula   
ASPIRATION: None.   
STRUCTURE: Normal. No   
visible obstruction,   
stricture, or   
dilatation. OTHER:   
Negative. CONCLUSION:   
1. No penetration or   
aspiration Dictated   
by: Lorene Lopes M.D.   
on 2017 at 12:06   
Approved by: Lorene Lopes M.D. on   
2017 at 12:07  Normal                                  [facility]  
  
  
  
Vital Signs  
  
  
                          Date Time    Vital Sign   Value        Performing   
Clinician                               Facility  
   
                                                    2025   
18:          [place]                         [employer]  
Work Phone:   
[phone]                           [facility]  
   
                                                    2025   
18:          [place] [facility] [place]                       [employer]  
Work Phone:   
[phone]                           [facility]  
   
                                                    2025   
17:          [place]                         [facility] Juliocesar  
Work Phone:   
[phone]                           [facility]  
   
                                                    2025   
17:          [facility] [place]                       [facility]ant[facility] Juliocesar  
Work Phone:   
[phone]                           [facility]  
   
                                                    2025   
16:          [place]                         [address] [employer]  
Work Phone:   
[phone]                           [facility]  
   
                                                    2025   
16:          [facility] [place]                       Santos Gandara  
Work Phone:   
[phone]                           [facility]  
   
                                                    2025   
15:          Heart rate          77 /min             Santos Gandara  
Work Phone:   
[phone]                           [facility]  
   
                                                    2025   
15:                              SaO2% (BldA) [Mass   
fraction]                 93 %                      Sant[facility] Machadoer  
Work Phone:   
[phone]                           [facility]  
   
                                                    2025   
15:          Body temperature    97.88 [degF]        Santos Gandara  
Work Phone:   
[phone]                           [facility]  
   
                                                    2025   
15:                              Diastolic blood   
pressure                  78 mm[Hg]                 Santos Gandara  
Work Phone:   
[phone]                           [facility]  
   
                                                    2025   
15:          Mean blood pressure 94 mm[Hg]           Santos Machadoer  
Work Phone:   
[phone]                           [facility]  
   
                                                    2025   
15:                              Systolic blood   
pressure                  127 mm[Hg]                Santos Gandara  
Work Phone:   
[phone]                           [facility]  
   
                                                    2025   
10:                              Blood Pressure   
Location                                            Santos Gandara  
Work Phone:   
[phone]                           [facility]  
   
                                                    2025   
10:          Body temperature    100.04 [degF]       Santos Machadoer  
Work Phone:   
[phone]                           [facility]  
   
                                                    2025   
10:                              Diastolic blood   
pressure                  79 mm[Hg]                 Sant[facility] Machadoer  
Work Phone:   
[phone]                           [facility]  
   
                                                    2025   
10:          Heart rate          80 /min             Santos Machadoer  
Work Phone:   
[phone]                           [facility]  
   
                                                    2025   
10:          Respiratory rate    18 /min             Santos Machadoer  
Work Phone:   
[phone]                           [facility]  
   
                                                    2025   
10:                              SaO2% (BldA) [Mass   
fraction]                 94 %                      Santos Gandara  
Work Phone:   
[phone]                           [facility]  
   
                                                    2025   
10:                              Systolic blood   
pressure                  136 mm[Hg]                Santos Machadoer  
Work Phone:   
[phone]                           [facility]  
   
                                                    2025   
07:                              SaO2% (BldA) [Mass   
fraction]                 93 %                      Santos Gandara  
Work Phone:   
[phone]                           [facility]  
   
                                                    2025   
07:          Body temperature    100.76 [degF]       Santos Machadoer  
Work Phone:   
[phone]                           [facility]  
   
                                                    2025   
07:                              Diastolic blood   
pressure                  81 mm[Hg]                 Santos Gandara  
Work Phone:   
[phone]                           [facility]  
   
                                                    2025   
07:          Heart rate          79 /min             Santos Gandara  
Work Phone:   
[phone]                           [facility]  
   
                                                    2025   
07:                              Systolic blood   
pressure                  141 mm[Hg]                Santos Gandara  
Work Phone:   
[phone]                           [facility]  
   
                                                    2025   
00:                              Blood Pressure   
Location                                            Santos Gandara  
Work Phone:   
[phone]                           [facility]  
   
                                                    2025   
00:          Body temperature    98.06 [degF]        Santos Gandara  
Work Phone:   
[phone]                           [facility]  
   
                                                    2025   
19:          Respiratory rate    18 /min             Santos Machadoer  
Work Phone:   
[phone]                           [facility]  
   
                                                    2025   
19:          Body temperature    98.24 [degF]        Santos Machadoer  
Work Phone:   
[phone]                           [facility]  
   
                                                    2025   
19:          Mean blood pressure 100 mm[Hg]          Santos Machadoer  
Work Phone:   
[phone]                           [facility]  
   
                                                    2025   
16:          Mean blood pressure 99 mm[Hg]           Santos Machadoer  
Work Phone:   
[phone]                           [facility]  
   
                                                    2025   
20:          Body temperature    98.24 [degF]        Santos Mortoncker  
Work Phone:   
[phone]                           [facility]  
   
                                                    2025   
20:          gluc                173 mg/dL           Sant[facility] Mortoncker  
Work Phone:   
[phone]                           [facility]  
   
                                                    2025   
20:          Heart rate          87 /min             Santos Mortoncker  
Work Phone:   
[phone]                           [facility]  
   
                                                    2025   
20:          Mean blood pressure 97 mm[Hg]           Sant[facility] Mortoncker  
Work Phone:   
[phone]                           [facility]  
   
                                                    2025   
23:          Heart rate          95 /min             Sant[facility] Mortoncker  
Work Phone:   
[phone]                           [facility]  
   
                                                    2025   
23:          Mean blood pressure 86 mm[Hg]           Sant[facility] Mortoncker  
Work Phone:   
[phone]                           [facility]  
   
                                                    2025   
14:          Heart rate          89 /min             Sant[facility] Mortoncker  
Work Phone:   
[phone]                           [facility]  
   
                                                    2025   
11:          Mean blood pressure 87 mm[Hg]           Santos Machadoer  
Work Phone:   
[phone]                           [facility]  
   
                                                    2025   
11:          Respiratory rate    14 /min             Santos Machadoer  
Work Phone:   
[phone]                           [facility]  
   
                                                    2025   
10:          Respiratory rate    14 /min             Santos Mortoncker  
Work Phone:   
[phone]                           [facility]  
   
                                                    2025   
09:          Respiratory rate    20 /min             Santos Mortoncker  
Work Phone:   
[phone]                           [facility]  
   
                                                    2025   
16:          gluc                115 mg/dL           Santos Mortoncker  
Work Phone:   
[phone]                           [facility]  
   
                                                    2025   
23:          Body temperature    97.7 [degF]         [facility]  
Work Phone:   
[phone]                          [facility]  
   
                                                    2025   
23:                              Diastolic blood   
pressure                  54 mm[Hg]                 [facility]  
Work Phone:   
[phone]                          [facility]  
   
                                                    2025   
23:          Heart rate          63 /min             [facility]  
Work Phone:   
[phone]                          [facility]  
   
                                                    2025   
23:                              Inhaled oxygen flow   
rate                      3 L/min                   [facility]  
Work Phone:   
[phone]                          [facility]  
   
                                                    2025   
23:          Respiratory rate    16 /min             [facility]  
Work Phone:   
[phone]                          [facility]  
   
                                                    2025   
23:                              SaO2% (BldA) [Mass   
fraction]                 97 %                      [facility]  
Work Phone:   
[phone]                          [facility]  
   
                                                    2025   
23:                              Systolic blood   
pressure                  96 mm[Hg]                 [facility]  
Work Phone:   
[phone]                          [facility]  
   
                                                    2025   
14:          Body height         167.64 cm           [facility]  
Work Phone:   
[phone]                          [facility]  
   
                                                    2025   
02:          Body weight         128.5 kg            [facility]  
Work Phone:   
[phone]                          [facility]  
   
                                                    2025   
00:                              Diastolic blood   
pressure                  55 mm[Hg]                 [facility]  
Work Phone:   
[phone]                          [facility]  
   
                                                    2025   
00:          Heart rate          80 /min             [facility]  
Work Phone:   
[phone]                          [facility]  
   
                                                    2025   
00:          Respiratory rate    16 /min             [facility]  
Work Phone:   
[phone]                          [facility]  
   
                                                    2025   
00:                              SaO2% (BldA) [Mass   
fraction]                 98 %                      [facility]  
Work Phone:   
[phone]                          [facility]  
   
                                                    2025   
00:                              Systolic blood   
pressure                  107 mm[Hg]                [facility]  
Work Phone:   
[phone]                          [facility]  
   
                                                    2025   
22:          Body height         167.64 cm           [facility]  
Work Phone:   
[phone]                          [facility]  
   
                                                    2025   
22:          Body temperature    98.5 [degF]         [facility]  
Work Phone:   
[phone]                          [facility]  
   
                                                    2025   
22:          Body weight         124.28 kg           [facility]  
Work Phone:   
[phone]                          [facility]  
   
                                                    2025   
22:                              Inhaled oxygen flow   
rate                      3 L/min                   [facility]  
Work Phone:   
[phone]                          [facility]  
   
                                                    2025   
22:          Heart rate          88 /min             [facility]  
Work Phone:   
[phone]                          [facility]  
   
                                                    2025   
22:                              Diastolic blood   
pressure                  58 mm[Hg]                 [facility]  
Work Phone:   
[phone]                          [facility]  
   
                                                    2025   
22:                              Inhaled oxygen flow   
rate                      4 L/min                   [facility]  
Work Phone:   
[phone]                          [facility]  
   
                                                    2025   
22:          Respiratory rate    22 /min             [facility]  
Work Phone:   
[phone]                          [facility]  
   
                                                    2025   
22:                              SaO2% (BldA) [Mass   
fraction]                 98 %                      [facility]  
Work Phone:   
[phone]                          [facility]  
   
                                                    2025   
22:                              Systolic blood   
pressure                  111 mm[Hg]                [facility]  
Work Phone:   
[phone]                          [facility]  
   
                                                    2025   
19:          Body temperature    98 [degF]           [facility]  
Work Phone:   
[phone]                          [facility]  
   
                                                    2025   
11:          Body height         167.64 cm           [facility]  
Work Phone:   
[phone]                          [facility]  
   
                                                    2025   
11:          Body weight         124.28 kg           [facility]  
Work Phone:   
[phone]                          [facility]  
   
                                                    2024   
11:          Body height         167.64 cm           [facility]  
Work Phone:   
[phone]                          [facility]  
   
                                                    2024   
11:                              Body mass index   
(BMI) [Ratio]             43.7 kg/m2                [facility]  
Work Phone:   
[phone]                          [facility]  
   
                                                    2024   
11:          Body temperature    97.2 [degF]         [facility]  
Work Phone:   
[phone]                          [facility]  
   
                                                    2024   
11:          Body weight         122.92 kg           [facility]  
Work Phone:   
[phone]                          [facility]  
   
                                                    2024   
11:                              Diastolic blood   
pressure                  63 mm[Hg]                 [facility]  
Work Phone:   
[phone]                          [facility]  
   
                                                    2024   
11:          Heart rate          64 /min             [facility]  
Work Phone:   
[phone]                          [facility]  
   
                                                    2024   
11:          Respiratory rate    20 /min             [facility]  
Work Phone:   
[phone]                          [facility]  
   
                                                    2024   
11:                              SaO2% (BldA) [Mass   
fraction]                 97 %                      [facility]  
Work Phone:   
[phone]                          [facility]  
   
                                                    2024   
11:                              Systolic blood   
pressure                  100 mm[Hg]                [facility]  
Work Phone:   
[phone]                          [facility]  
   
                                                    2024   
17:          Body temperature    97.2 [degF]         MARYCRUZ Iniguez MD  
Work Phone:   
[phone]                          [facility]  
   
                                                    2024   
17:                              Diastolic blood   
pressure                  71 mm[Hg]                 MARYCRUZ Iniguez MD  
Work Phone:   
[phone]                          [facility]  
   
                                                    2024   
17:          Heart rate          89 /min             MARYCRUZ Iniguez MD  
Work Phone:   
[phone]                          [facility]  
   
                                                    2024   
17:          Respiratory rate    18 /min             MARYCRUZ Iniguez MD  
Work Phone:   
[phone]                          [facility]  
   
                                                    2024   
17:                              SaO2% (BldA) [Mass   
fraction]                 96 %                      MARYCRUZ Iniguez MD  
Work Phone:   
[phone]                          [facility]  
   
                                                    2024   
17:                              Systolic blood   
pressure                  132 mm[Hg]                MARYCRUZ Iniguez MD  
Work Phone:   
[phone]                          [facility]  
   
                                                    2024   
10:          Body height         167.6 cm            MARYCRUZ Iniguez MD  
Work Phone:   
[phone]                          [facility]  
   
                                                    2024   
10:                              Body mass index   
(BMI) [Ratio]             40.35 kg/m2               MARYCRUZ Iniguez MD  
Work Phone:   
[phone]                          [facility]  
   
                                                    2024   
10:          Body weight         113.4 kg            MARYCRUZ Iniguez MD  
Work Phone:   
[phone]                          [facility]  
   
                                                    05-   
10:          Body height         167.6 cm            Amanda Hilary   
APRN-CNP  
Work Phone:   
[phone]                          [facility]  
   
                                                    05-   
10:                              Body mass index   
(BMI) [Ratio]             40.35 kg/m2               Amanda Hilary   
APRN-CNP  
Work Phone:   
[phone]                          [facility]  
   
                                                    05-   
10:          Body weight         113.4 kg            Amanda Bettses   
APRN-CNP  
Work Phone:   
[phone]                          [facility]  
   
                                                    2024   
16:                              Diastolic blood   
pressure                  63 mm[Hg]                 MARYCRUZ Iniguez MD  
Other Phone:   
[phone]                          [facility]  
   
                                                    2024   
16:          Heart rate          100 /min            MARYCRUZ Iniguez MD  
Other Phone:   
[phone]                          [facility]   
[facility]  
   
                                                    2024   
16:          Respiratory rate    20 /min             MARYCRUZ Iniguez MD  
Other Phone:   
[phone]                          [facility]   
[facility]  
   
                                                    2024   
16:                              SaO2% (BldA) [Mass   
fraction]                 96 %                      MARYCRUZ Iniguez MD  
Other Phone:   
[phone]                          [facility]   
[facility]  
   
                                                    2024   
16:                              Systolic blood   
pressure                  91 mm[Hg]                 MARYCRUZ Iniguez MD  
Other Phone:   
[phone]                          [facility]   
[facility]  
   
                                                    2024   
15:          Body temperature    97.9 [degF]         MARYCRUZ Iniguez MD  
Other Phone:   
[phone]                          [facility]   
[facility]  
   
                                                    2024   
11:                              Body mass index   
(BMI) [Ratio]             40.67 kg/m2               MARYCRUZ Iniguez MD  
Other Phone:   
[phone]                          [facility]   
[facility]  
   
                                                    2024   
11:          Body weight         114.31 kg           MARYCRUZ Iniguez MD  
Other Phone:   
[phone]                          [facility]  
   
                                                    2024   
14:      Body height     167.6 cm        Negar Gayle RN [facility]   
[facility]  
   
                                                    2024   
14:                              Body mass index   
(BMI) [Ratio]       40.67 kg/m2         Negar Gayle RN  [facility]   
[facility]  
   
                                                    2024   
14:      Body weight     114.31 kg       Negar Gayle RN [facility]  
   
[facility]  
   
                                                    2024   
12:                              Diastolic blood   
pressure            90 mm[Hg]            2                [facility]  
   
                                                    2024   
12:      Heart rate      82 /min          2            [facility]  
   
                                                    2024   
12:      Respiratory rate 16 /min          2            [facility]  
   
[facility]  
   
                                                    2024   
12:                              SaO2% (BldA) [Mass   
fraction]           97 %                 2                [facility]  
   
                                                    2024   
12:                              Systolic blood   
pressure            101 mm[Hg]          Mh 2                [facility]  
   
                                                    2024   
09:      Body height     167.6 cm         2            [facility]  
   
                                                    2024   
09:                              Body mass index   
(BMI) [Ratio]       40.67 kg/m2          2                [facility]  
   
                                                    2024   
09:      Body temperature 98.2 [degF]      2            [facility]  
   
[facility]  
   
                                                    2024   
09:      Body weight     114.31 kg        2            [facility]  
   
                                                    2024   
00:          Body height         167.64 cm           [facility]  
Work Phone:   
[phone]                          [facility]  
   
                                                    2024   
00:          Body temperature    97.6 [degF]         [facility]  
Work Phone:   
[phone]                          [facility]  
   
                                                    2024   
00:          Body weight         115.9 kg            [facility]  
Work Phone:   
[phone]                          [facility]  
   
                                                    2024   
00:                              Diastolic blood   
pressure                  63 mm[Hg]                 [facility]  
Work Phone:   
[phone]                          [facility]  
   
                                                    2024   
00:          Heart rate          79 /min             [facility]  
Work Phone:   
[phone]                          [facility]  
   
                                                    2024   
00:          Respiratory rate    18 /min             [facility]  
Work Phone:   
[phone]                          [facility]  
   
                                                    2024   
00:                              SaO2% (BldA) [Mass   
fraction]                 94 %                      [facility]  
Work Phone:   
[phone]                          [facility]  
   
                                                    2024   
00:                              Systolic blood   
pressure                  104 mm[Hg]                [facility]  
Work Phone:   
[phone]                          [facility]  
   
                                                    2024   
14:          Heart rate          84 /min             Foster Stephenson APRN-CNP  
Work Phone:   
[phone]                          [facility]  
   
                                                    2024   
07:          Body height         167.6 cm            Foster Stephenson APRN-CNP  
Work Phone:   
[phone]                          [facility]  
   
                                                    2024   
07:                              Body mass index   
(BMI) [Ratio]             40.84 kg/m2               Edward Zienkowski   
APRN-CNP  
Work Phone:   
[phone]                          [facility]  
   
                                                    2024   
07:          Body temperature    97.2 [degF]         Foster Stephenson   
APRN-CNP  
Work Phone:   
[phone]                          [facility]  
   
                                                    2024   
07:          Body weight         114.76 kg           Foster Stephenson   
APRN-CNP  
Work Phone:   
[phone]                          [facility]  
   
                                                    2024   
07:                              Diastolic blood   
pressure                  66 mm[Hg]                 Foster Stephenson   
APRN-CNP  
Work Phone:   
[phone]                          [facility]  
   
                                                    2024   
07:          Heart rate          88 /min             Foster Stephenson   
APRN-CNP  
Work Phone:   
[phone]                          [facility]  
   
                                                    2024   
07:          Respiratory rate    16 /min             Foster Stephenson   
APRN-CNP  
Work Phone:   
[phone]                          [facility]  
   
                                                    2024   
07:                              SaO2% (BldA) [Mass   
fraction]                 100 %                     Foster Stephenson   
APRN-CNP  
Work Phone:   
[phone]                          [facility]  
   
                                                    2024   
07:                              Systolic blood   
pressure                  95 mm[Hg]                 Foster Stephenson   
APRN-CNP  
Work Phone:   
[phone]                          [facility]  
   
                                                    2023   
09:          Body temperature    97.5 [degF]         DO Apollo Linderuitt  
Work Phone:   
[phone]                          [facility]  
   
                                                    2023   
09:                              Diastolic blood   
pressure                  72 mm[Hg]                 DO Apollo Jessica  
Work Phone:   
[phone]                          [facility]  
   
                                                    2023   
09:          Heart rate          81 /min             DO Apollo Jessica  
Work Phone:   
[phone]                          [facility]  
   
                                                    2023   
09:          Respiratory rate    18 /min             DO Apollo Jessica  
Work Phone:   
[phone]                          [facility]  
   
                                                    2023   
09:                              SaO2% (BldA) [Mass   
fraction]                 95 %                      DO Apollo Jessica  
Work Phone:   
[phone]                          [facility]  
   
                                                    2023   
09:                              Systolic blood   
pressure                  126 mm[Hg]                DO Apollo Jessica  
Work Phone:   
[phone]                          [facility]  
   
                                                    10-   
08:          Body height         167.64 cm           DO Apollo Jessica  
Work Phone:   
[phone]                          [facility]  
   
                                                    10-   
08:          Body weight         121.36 kg           DO Apollo Jessica  
Work Phone:   
[phone]                          [facility]  
   
                                                    10-   
15:          Body temperature    98.1 [degF]         DO Apollo Jessica  
Work Phone:   
[phone]                          [facility]  
   
                                                    10-   
15:                              Diastolic blood   
pressure                  71 mm[Hg]                 DO Apollo Jessica  
Work Phone:   
[phone]                          [facility]  
   
                                                    10-   
15:          Heart rate          78 /min             DO Apollo Jessica  
Work Phone:   
[phone]                          [facility]  
   
                                                    10-   
15:                              Inhaled oxygen flow   
rate                      3 L/min                   DO Apollo Jessica  
Work Phone:   
[phone]                          [facility]  
   
                                                    10-   
15:          Respiratory rate    16 /min             DO Apollo Jessica  
Work Phone:   
[phone]                          [facility]  
   
                                                    10-   
15:                              SaO2% (BldA) [Mass   
fraction]                 99 %                      DO Apollo Jessica  
Work Phone:   
[phone]                          [facility]  
   
                                                    10-   
15:                              Systolic blood   
pressure                  118 mm[Hg]                DO Apollo Jessica  
Work Phone:   
[phone]                          [facility]  
   
                                                    10-   
05:          Body weight         120 kg              DO Apollo Jessica  
Work Phone:   
[phone]                          [facility]  
   
                                                    10-   
14:          Body height         167.64 cm           DO Apollo Jessica  
Work Phone:   
[phone]                          [facility]  
   
                                                    10-   
10:          Body height         167.64 cm           DO Apollo Jessica  
Work Phone:   
[phone]                          [facility]  
   
                                                    10-   
10:          Body temperature    97.9 [degF]         DO Apollo Jessica  
Work Phone:   
[phone]                          [facility]  
   
                                                    10-   
10:          Body weight         119.7 kg            DO Apollo Jessica  
Work Phone:   
[phone]                          [facility]  
   
                                                    10-   
10:                              Diastolic blood   
pressure                  71 mm[Hg]                 DO Apollo Jessica  
Work Phone:   
[phone]                          [facility]  
   
                                                    10-   
10:          Heart rate          88 /min             DO Apollo Jessica  
Work Phone:   
[phone]                          [facility]  
   
                                                    10-   
10:                              Inhaled oxygen flow   
rate                      3 L/min                   DO Apollo Jessica  
Work Phone:   
[phone]                          [facility]  
   
                                                    10-   
10:          Respiratory rate    18 /min             DO Apollo Jessica  
Work Phone:   
[phone]                          [facility]  
   
                                                    10-   
10:                              SaO2% (BldA) [Mass   
fraction]                 99 %                      DO Apollo Jessica  
Work Phone:   
[phone]                          [facility]  
   
                                                    10-   
10:                              Systolic blood   
pressure                  106 mm[Hg]                DO Apollo Jessica  
Work Phone:   
[phone]                          [facility]  
   
                                                    10-   
10:          Body height         167.64 cm           Yann Sparks  
Other Phone:   
[phone]                           [facility] [employer]   
[employer]  
Other Phone:   
[phone]  
   
                                                    10-   
10:                              Body mass index   
(BMI) [Ratio]             43.09 kg/m2               Yann Sparks  
Other Phone:   
[phone]                           [facility]   
[employer]   
[employer]  
Other Phone:   
[phone]  
   
                                                    10-   
10:          Body temperature    97.2 [degF]         Yann Robbinsdano  
Other Phone:   
[phone]                           [facility]   
[employer]   
[employer]  
Other Phone:   
[phone]  
   
                                                    10-   
10:          Body weight         121.11 kg           Yann   
Bridger  
Other Phone:   
[phone]                           [facility]   
[employer]   
[employer]  
Other Phone:   
[phone]  
   
                                                    10-   
10:                              Diastolic blood   
pressure                  65 mm[Hg]                 Yann   
Bridger  
Other Phone:   
[phone]                           [facility]   
[employer]   
[employer]  
Other Phone:   
[phone]  
   
                                                    10-   
10:          Respiratory rate    20 /min             Yann Robbinsdano  
Other Phone:   
[phone]                           [facility]   
[employer]   
[employer]  
Other Phone:   
[phone]  
   
                                                    10-   
10:                              SaO2% (BldA) [Mass   
fraction]                 93 %                      Yann   
Bridger  
Other Phone:   
[phone]                           [facility]   
[employer]   
[employer]  
Other Phone:   
[phone]  
   
                                                    10-   
10:                              Systolic blood   
pressure                  99 mm[Hg]                 Yann   
Bridger  
Other Phone:   
[phone]                           [facility]   
[employer]   
[employer]  
Other Phone:   
[phone]  
   
                                                    2023   
18:          Body temperature    98.5 [degF]         DO Apollo Jessica  
Work Phone:   
[phone]                          [facility]  
   
                                                    2023   
18:                              Diastolic blood   
pressure                  77 mm[Hg]                 DO Apollo Jessica  
Work Phone:   
[phone]                          [facility]  
   
                                                    2023   
18:          Heart rate          96 /min             DO Apollo Jessica  
Work Phone:   
[phone]                          [facility]  
   
                                                    2023   
18:          Respiratory rate    20 /min             DO Apollo Jessica  
Work Phone:   
[phone]                          [facility]  
   
                                                    2023   
18:                              SaO2% (BldA) [Mass   
fraction]                 98 %                      DO Apollo Lopez  
Work Phone:   
[phone]                          [facility]  
   
                                                    2023   
18:                              Systolic blood   
pressure                  132 mm[Hg]                DO Apollo Lopez  
Work Phone:   
[phone]                          [facility]  
   
                                                    2023   
14:          Body height         167.64 cm           DO Apollo Lopez  
Work Phone:   
[phone]                          [facility]  
   
                                                    2023   
14:          Body weight         125.64 kg           DO Apollo Lopez  
Work Phone:   
[phone]                          [facility]  
   
                                                    2023   
19:          Heart rate          80 /min             Ramo Dexter MD  
Work Phone:   
[phone]                          [facility]  
   
                                                    2023   
19:          Respiratory rate    18 /min             Ramo Dexter MD  
Work Phone:   
[phone]                          [facility]  
   
                                                    2023   
14:          Body temperature    98.2 [degF]         Ramo Dexter MD  
Work Phone:   
[phone]                          [facility]  
   
                                                    2023   
14:                              Diastolic blood   
pressure                  74 mm[Hg]                 Ramo Dexter MD  
Work Phone:   
[phone]                          [facility]  
   
                                                    2023   
14:                              SaO2% (BldA) [Mass   
fraction]                 100 %                     Ramo Dexter MD  
Work Phone:   
[phone]                          [facility]  
   
                                                    2023   
14:                              Systolic blood   
pressure                  141 mm[Hg]                Ramo Dexter MD  
Work Phone:   
[phone]                          [facility]  
   
                                                    2023   
11:          Heart rate          89 /min             Ramo Dexter MD  
Work Phone:   
[phone]                          [facility]  
   
                                                    2023   
01:                              Body mass index   
(BMI) [Ratio]             44.71 kg/m2               Ramo Dexter MD  
Work Phone:   
[phone]                          [facility]  
   
                                                    2023   
01:          Body weight         125.65 kg           Ramo Dexter MD  
Work Phone:   
[phone]                          [facility]  
   
                                                    2023   
01:          Body height         167.6 cm            Ramo Dexter MD  
Work Phone:   
[phone]                          [facility]  
   
                                                    2023   
00:          Body temperature    98.1 [degF]         DO Apollo Jessica  
Work Phone:   
[phone]                          [facility]  
   
                                                    2023   
00:                              Diastolic blood   
pressure                  70 mm[Hg]                 DO Apollo Jessica  
Work Phone:   
[phone]                          [facility]  
   
                                                    2023   
00:          Heart rate          82 /min             DO Apollo Jessica  
Work Phone:   
[phone]                          [facility]  
   
                                                    2023   
00:                              Inhaled oxygen flow   
rate                      2 L/min                   DO Apollo Jessica  
Work Phone:   
[phone]                          [facility]  
   
                                                    2023   
00:          Respiratory rate    18 /min             DO Apollo Jessica  
Work Phone:   
[phone]                          [facility]  
   
                                                    2023   
00:                              SaO2% (BldA) [Mass   
fraction]                 95 %                      DO Apollo Jessica  
Work Phone:   
[phone]                          [facility]  
   
                                                    2023   
00:                              Systolic blood   
pressure                  110 mm[Hg]                DO Apollo Jessica  
Work Phone:   
[phone]                          [facility]  
   
                                                    2023   
14:          Body height         167.64 cm           DO Apollo Jessica  
Work Phone:   
[phone]                          [facility]  
   
                                                    2023   
03:          Body weight         125.1 kg            DO Apollo Jessica  
Work Phone:   
[phone]                          [facility]  
   
                                                    01-   
16:          Body height         167.64 cm           Yann Sparks  
Other Phone:   
[phone]                           [facility]   
[employer]   
[employer]  
Other Phone:   
[phone]  
   
                                                    01-   
16:                              Body mass index   
(BMI) [Ratio]             51.64 kg/m2               Yann Macariono  
Other Phone:   
[phone]                           [facility]   
[employer]   
[employer]  
Other Phone:   
[phone]  
   
                                                    01-   
16:          Body temperature    96.6 [degF]         Yann Macariono  
Other Phone:   
[phone]                           [facility]   
[employer]   
[employer]  
Other Phone:   
[phone]  
   
                                                    01-   
16:          Body weight         145.15 kg           Yann Macariono  
Other Phone:   
[phone]                           [facility]   
[employer]   
[employer]  
Other Phone:   
[phone]  
   
                                                    01-   
16:                              Diastolic blood   
pressure                  82 mm[Hg]                 Yann Robbinsdano  
Other Phone:   
[phone]                           [facility]   
[employer]   
[facility]  
Other Phone:   
[phone]  
   
                                                    01-   
16:          Respiratory rate    20 /min             Yann Macariono  
Other Phone:   
[phone]                           [facility]   
[employer]   
[employer]  
Other Phone:   
[phone]  
   
                                                    01-   
16:                              SaO2% (BldA) [Mass   
fraction]                 95 %                      Yann Robbinsdano  
Other Phone:   
[phone]                           [facility]   
[employer]   
[employer]  
Other Phone:   
[phone]  
   
                                                    01-   
16:                              Systolic blood   
pressure                  124 mm[Hg]                Aidanlin Macariono  
Other Phone:   
[phone]                           [facility]   
[employer]   
[employer]  
Other Phone:   
[phone]  
  
  
  
Encounters  
  
  
                          Encounter Date Encounter Type Care Provider Facility  
   
                                                    Start: 2025  
End: 2025           Clinisync Result Encounter Adams Dorman DO  
Work Phone:   
[phone]                          [facility]   
Department   
Unsolicited  
   
                                                    Start: 2025  
End: 2025           Clinisync Result Encounter Adams Dorman DO  
Work Phone:   
[phone]                          [facility]   
Department   
Unsolicited  
   
                          Start: 2025 ambulatory   RUDDY ELDER Facility  
:[facility]  
   
                                                    Start: 2025  
End: 2025                         Evaluation and management   
of inpatient              Santos Gandara       [place]  
   
                                        Start: 2025   Emergency department  
   
patient visit             Charlie Ortega                [place]  
   
                                                    Start: 2025  
End: 2025                         Evaluation and management   
of inpatient                            Santos Gandara  
Work Phone:   
[phone]                           [facility]  
Work Phone:   
[phone]  
   
                                                    Start: 2025  
End: 2025           Clinisync Result Encounter Generic External   
Data Provider                           [facility] External   
Department   
Unsolicited  
   
                                                    Start: 2025  
End: 2025           Clinisync Result Encounter Generic External   
Data Provider                           [facility] External   
Department   
Unsolicited  
   
                                                    Start: 2025  
End: 2025           Clinisync Result Encounter Generic External   
Data Provider                           [facility]   
Department   
Unsolicited  
   
                                                    Start: 2025  
End: 2025           Clinisync Result Encounter Generic External   
Data Provider                           [facility]   
Department   
Unsolicited  
   
                                        Start: 2025   Evaluation and manag  
ement   
of inpatient              RUDDY JOEL GHISLAINE            Facility:[facility]  
   
                                                    Start: 2025  
End: 2025           Clinisync Result Encounter Generic External   
Data Provider                           [facility]   
Department   
Unsolicited  
   
                                                    Start: 2025  
End: 2025           Clinisync Result Encounter Generic External   
Data Provider                           [facility]   
Department   
Unsolicited  
   
                                                    Start: 2025  
End: 2025           Clinisync Result Encounter Generic External   
Data Provider                           [facility]   
Department   
Unsolicited  
   
                                                    Start: 2025  
End: 2025           Clinisync Result Encounter Generic External   
Data Provider                           [facility]   
Department   
Unsolicited  
   
                                                    Start: 2025  
End: 2025           Clinisync Result Encounter Generic External   
Data Provider                           [facility]   
Department   
Unsolicited  
   
                                                    Start: 2025  
End: 2025           Clinisync Result Encounter Generic External   
Data Provider                           [facility]   
Department   
Unsolicited  
   
                                        Start: 2025   Evaluation and manag  
ement   
of inpatient              RUDDY ELDER            Facility:[facility]  
   
                                                    Start: 2025  
End: 2025           Clinisync Result Encounter Generic External   
Data Provider                           [facility]   
Department   
Unsolicited  
   
                                                    Start: 2025  
End: 2025           Clinisync Result Encounter Generic External   
Data Provider                           [facility]   
Department   
Unsolicited  
   
                                                    Start: 2025  
End: 2025           Clinisync Result Encounter Generic External   
Data Provider                           [facility]   
Department   
Unsolicited  
   
                                                    Start: 2025  
End: 2025           Clinisync Result Encounter Generic External   
Data Provider                           [facility]   
Department   
Unsolicited  
   
                                                    Start: 2025  
End: 2025                         Evaluation and management   
of inpatient              RUDDY CEJAShelton GHISLAINE            Facility:[facility]  
   
                                                    Start: 2025  
End: 2025           Clinisync Result Encounter Generic External   
Data Provider                           [facility]   
Department   
Unsolicited  
   
                                                    Start: 2025  
End: 2025           Clinisync Result Encounter Generic External   
Data Provider                           [facility] External   
Department   
Unsolicited  
   
                                                    Start: 2025  
End: 2025     ambulatory          Azael Blank       Facility:[facility]  
   
                                                    Start: 2025  
End: 2025                         Evaluation and management   
of inpatient                            [facility]   
Health  
Work Phone:   
[phone]                          [facility] [place]   
[employer]  
Work Phone:   
[phone]  
   
                                                    Start: 2025  
End: 2025           Clinisync Result Encounter Generic External   
Data Provider                           [facility]   
Department   
Unsolicited  
   
                                                    Start: 2025  
End: 2025           Clinisync Result Encounter Generic External   
Data Provider                           [facility]   
Department   
Unsolicited  
   
                                                    Start: 2025  
End: 2025           Clinisync Result Encounter Generic External   
Data Provider                           [facility]   
Department   
Unsolicited  
   
                                                    Start: 2025  
End: 2025           Clinisync Result Encounter Generic External   
Data Provider                           [facility]   
Department   
Unsolicited  
   
                                                    Start: 2025  
End: 2025           Clinisync Result Encounter Generic External   
Data Provider                           [facility]   
Department   
Unsolicited  
   
                                                    Start: 2025  
End: 2025           Clinisync Result Encounter Generic External   
Data Provider                           [facility]   
Department   
Unsolicited  
   
                                                    Start: 01-  
End: 01-           Clinisync Result Encounter Generic External   
Data Provider                           [facility]   
Department   
Unsolicited  
   
                                                    Start: 01-  
End: 01-           Clinisync Result Encounter Generic External   
Data Provider                           [facility]   
Department   
Unsolicited  
   
                                                    Start: 2025  
End: 2025           Clinisync Result Encounter Generic External   
Data Provider                           [facility]   
Department   
Unsolicited  
   
                                                    Start: 2025  
End: 2025           Clinisync Result Encounter Generic External   
Data Provider                           [facility]   
Department   
Unsolicited  
   
                                                    Start: 2025  
End: 2025           Clinisync Result Encounter Generic External   
Data Provider                           [facility]   
Department   
Unsolicited  
   
                                                    Start: 2025  
End: 2025           Clinisync Result Encounter Generic External   
Data Provider                           [facility]   
Department   
Unsolicited  
   
                                                    Start: 2025  
End: 2025           Clinisync Result Encounter Generic External   
Data Provider                           [facility]   
Department   
Unsolicited  
   
                                                    Start: 2025  
End: 2025           Clinisync Result Encounter Generic External   
Data Provider                           [facility]   
Department   
Unsolicited  
   
                                        Start: 2025   Evaluation and manag  
ement   
of inpatient              RUDDY ELDER            Facility:[facility]  
   
                                                    Start: 2025  
End: 2025           Clinisync Result Encounter Generic External   
Data Provider                           [facility]   
Department   
Unsolicited  
   
                                                    Start: 2025  
End: 2025           Clinisync Result Encounter Generic External   
Data Provider                           [facility]   
Department   
Unsolicited  
   
                                        Start: 2025   Evaluation and manag  
ement   
of inpatient              RUDDY ELDER            Facility:[facility]  
   
                                                    Start: 01-  
End: 01-           Clinisync Result Encounter Generic External   
Data Provider                           [facility]   
Department   
Unsolicited  
   
                                                    Start: 01-  
End: 01-           Clinisync Result Encounter Generic External   
Data Provider                           [facility]   
Department   
Unsolicited  
   
                                                    Start: 01-  
End: 01-     ambulatory          UNKNOWN PROVIDER    Facility:[facility]  
   
                                                    Start: 2025  
End: 2025                         Evaluation and management   
of inpatient              AKILAH ANDRES      Facility:[facility]  
   
                                                    Start: 2025  
End: 2025                         Evaluation and management   
of inpatient                            [facility]   
Health  
Work Phone:   
[phone]                          [facility] [place]   
Critical Care  
Work Phone:   
[phone]  
   
                                                    Start: 2025  
End: 2025     Telephone encounter [facility]  
   
Genito-Urinary   
Surgeons  
   
                                                    Start: 2025  
End: 2025           Telephone encounter       Farhan Landers DO  
Work Phone:   
[phone]                          [facility]   
Medicine  
   
                                                    Start: 2024  
End: 2024           Telephone encounter       Kaity French RN,   
BSN                                     [facility]  
   
                                                    Start: 10-  
End: 10-     ambulatory          UNKNOWN PROVIDER    Facility:[facility]  
   
                                                    Start: 10-  
End: 10-                         Office outpatient visit 25   
minutes                                 Kenji Christiansen MD  
Work Phone:   
[phone]                          [facility]   
Surgery  
   
                                        Comment on above:   Renal stones (Primar  
y Dx)   
   
                                                    Start: 2024  
End: 2024     Telephone encounter Angelika Asher RN     [facility]  
   
Surgery  
   
                                                    Start: 2024  
End: 2024     Telephone encounter Rahel Medellin RN    [facility]  
   
                                        Comment on above:   Forms Completion   
   
                                                    Start: 2024  
End: 2024           ambulatory                [facility]   
Health  
Work Phone:   
[phone]                          [facility]  
Work Phone:   
[phone]  
   
                                                    Start: 2024  
End: 2024           Departed Referred         [facility] [place]  
Work Phone:   
[phone]                          [facility]  
   
                                                    Start: 2024  
End: 2024           ambulatory                Ivet Vallejo RN                                      [facility]  
   
                                        Comment on above:   Post-op Symptoms   
   
                                                    Start: 2024  
End: 2024     ambulatory          Zehra Zapata RN     [facility]  
   
                                        Comment on above:   Right nephrostomy dr cohen completely out   
   
                                                    Start: 2024  
End: 2024                         Patient encounter   
procedure                               [facility]   
Health  
Work Phone:   
[phone]                          [facility]   
[facility] Pul[facility]   
Disease  
Work Phone:   
[phone]  
   
                                                    Start: 2024  
End: 2024     ambulatory          MARYCRUZ INIGUEZ  Facility:[facility]  
   
                                                    Start: 2024  
End: 2024                         Subsequent hospital visit   
by physician                            MARYCRUZ Iniguez MD  
Work Phone:   
[phone]                          [facility]   
Diagnostic Radiology  
   
                                        Comment on above:   Arrived   
   
                                                    Start: 2024  
End: 2024     ambulatory          MARYCRUZ INIGUEZ  Facility:[facility]  
   
                                                    Start: 2024  
End: 2024                         Subsequent hospital visit   
by physician                            MARYCRUZ Iniguez MD  
Work Phone:   
[phone]                          [facility]   
Ambulatory Surgery  
   
                                        Comment on above:   Renal stones (Primar  
y Dx);  
Acute post-operative pain   
   
                          Start: 05- ambulatory   UNKNOWN PROVIDER Facili  
ty:[facility]  
   
                                                    Start: 05-  
End: 05-           Patient encounter status  Amanda Richard   
APRN-CNP  
Work Phone:   
[phone]                          [facility]  
Work Phone:   
[phone]  
   
                                                    Start: 05-  
End: 05-           Telephone encounter       Amanda Richard   
APRN-CNP  
Work Phone:   
[phone]                          [facility]   
Pre-Admission Testing  
   
                                        Comment on above:   Arrived   
   
                          Start: 2024 Telephone encounter Angelika Asher RN   
[facility]   
Surgery  
   
                                Start: 2024 Orders Only     Mauricio Abernathy MD  
Work Phone:   
[phone]                          [facility]   
Surgery  
   
                                                    Start: 2024  
End: 2024                         Office outpatient visit 25   
minutes                                 MARYCRUZ Iniguez MD  
Work Phone:   
[phone]                          [facility]   
Urology  
   
                                        Comment on above:   Renal stones (Primar  
y Dx);  
Urinary tract infection without hematuria, site unspecified   
   
                                                    Start: 2024  
End: 2024     ambulatory          UNKNOWN PROVIDER    Facility:[facility]  
   
                                                    Start: 2024  
End: 2024                         Subsequent hospital visit   
by physician              Saira Ip/Op Ct Scan 1       [facility]   
Radiology CT Scan  
   
                                        Comment on above:   Renal stones   
   
                          Start: 2024 Telephone encounter Angelika Asher RN   
[facility]   
Surgery  
   
                                        Start: 2024   Admission to [facility]   
surgery center                          MARYCRUZ Iniguez MD  
Work Phone:   
[phone]                          [facility]   
Surgery  
   
                                                    Start: 2024  
End: 2024     ambulatory          MARYCRUZ INIGUEZ  [facility]  
   
                                                    Start: 2024  
End: 2024                         Subsequent hospital visit   
by physician                            MARYCRUZ Iniguez MD  
Other Phone:   
[phone]                          [facility] Radiology  
   
                                                    Start: 2024  
End: 2024     ambulatory          MARYCRUZ INIGUEZ  [facility]  
   
                                                    Start: 2024  
End: 2024                         Subsequent hospital visit   
by physician                            MARYCRUZ Iniguez MD  
Other Phone:   
[phone]                          [facility] Main OR  
   
                                        Comment on above:   Renal stones (Primar  
y Dx);  
Acute post-operative pain   
   
                          Start: 2024 Telephone encounter Angelika Asher RN   
[facility]   
Surgery  
   
                          Start: 2024 Telephone encounter Alberto CAMARGO [facility]   
Surgery  
   
                          Start: 2024 Telephone encounter Angelika Asher RN   
[facility]   
Surgery  
   
                          Start: 2024 Telephone encounter Alberto CAMARGO [facility]   
Surgery  
   
                          Start: 2024 Telephone encounter Angelika Asher RN   
[facility]   
Surgery  
   
                                                    Start: 03-  
End: 03-                         Nursing evaluation of   
patient and report        [facility]                 [facility]   
Urology  
   
                                        Comment on above:   Renal stones (Primar  
y Dx)   
   
                          Start: 03- ambulatory   UNKNOWN PROVIDER Facili  
ty:[facility]  
   
                                                    Start: 2024  
End: 2024                         Nursing evaluation of   
patient and report        Negar Gayle RN        [facility]   
Pre-Admission Testing  
   
                                        Comment on above:   Pre-op evaluation (P  
rimary Dx)   
   
                                                    Start: 2024  
End: 2024                         Pre-operative examination,   
unspecified               Negar Gayle RN        [facility]   
SYSTEM  
   
                          Start: 2024 ambulatory   UNKNOWN PROVIDER Facili  
ty:[facility]  
   
                                        Start: 2024   Encounter for other   
preprocedural examination UNKNOWN PROVIDER          [facility]   
System  
   
                                                    Start: 2024  
End: 2024     ambulatory          MARYCRUZ INIGUEZ  Facility:[facility]  
   
                                                    Start: 2024  
End: 2024                         Subsequent hospital visit   
by physician               2                      [facility] Rad[facility]  
   
                                        Comment on above:   Bleeding (Primary Dx  
);  
Renal stones   
   
                                Start: 2024 Non-patient / Non-visit [facility]  
Work Phone:   
[phone]                          [facility] Phys[facility]   
[facility]  
Work Phone:   
[phone]  
   
                          Start: 2024 ambulatory   Jesus Read  
[facility]  
   
                                        Comment on above:   Other sympt/complt b  
ladder   
   
                          Start: 2024 Orders Only  Angelika Asher RN [facility]  
radha Urologic   
Surgery  
   
                                        Start: 2024   Admission to [facility]                          MARYCRUZ Iniguez MD  
Work Phone:   
[phone]                          [facility]   
Surgery  
   
                                        Start: 2024   Patient encounter   
procedure                               Alberto Rico MD  
Work Phone:   
[phone]                          [facility]   
Infectious Disease   
[facility]  
   
                          Start: 2024 Telephone encounter Alberto CAMARGO [facility] Urologic   
Surgery  
   
                                                    Start: 2024  
End: 2024                         Patient encounter   
procedure                               Foster Stephenson   
APRN-CNP  
Work Phone:   
[phone]                          [facility]   
Pre-Surgical   
Evaluation  
   
                                        Comment on above:   Preop testing (Prima  
ry Dx);  
Atrioventricular block, first degree;  
Abnormal electrocardiogram (ECG) (EKG)   
   
                                                    Start: 2024  
End: 2024           Patient encounter status  Mehranjenise Sachin   
APRN-CNP  
Work Phone:   
[phone]                          [facility]  
Work Phone:   
[phone]  
   
                          Start: 2023 Orders Only  Angelika garcia Urologic   
Surgery  
   
                          Start: 2023 ambulatory   [facility]  
   
                                        Comment on above:   Other sympt urinary   
system   
   
                          Start: 2023 Telephone encounter Alberto CAMARGO [facility] Urologic   
Surgery  
   
                                Start: 2023 Transcribe Orders MARYCRUZ vizcarra MD  
Work Phone:   
[phone]                          [facility] Urologic   
Surgery  
   
                          Start: 2023 Telephone encounter Alberto CAMARGO [facility] Urologic   
Surgery  
   
                                                    Start: 2023  
End: 2023           ambulatory                DO Apollo Lopez  
Work Phone:   
[phone]                          [facility]  
Work Phone:   
[phone]  
   
                                                    Start: 2023  
End: 2023           Discharged Recurring      DO Apollo Lopez  
Work Phone:   
[phone]                          [facility]   
Therapy - O/P  
Work Phone:   
[phone]  
   
                                                    Start: 10-  
End: 10-                         Evaluation and management   
of inpatient                            DO Apollo Lopez  
Work Phone:   
[phone]                          [facility] [place]   
Progressive  
Work Phone:   
[phone]  
   
                                                    Start: 10-  
End: 10-           ambulatory                Yann Sparks  
Other Phone:   
[phone]                           [facility]   
Professional   
Corporation  
Other Phone:   
[phone]  
   
                                        Start: 10-   Office outpatient vi  
sit 15   
minutes                   Yann Sparks      [facility]  
   
                                                    Start: 10-  
End: 10-                         Subsequent hospital visit   
by physician                             Op Ct Scan 1   
(Force)                                 [facility] Radiology   
CT  
   
                                        Comment on above:   Renal stones   
   
                                                    Start: 10-  
End: 10-                         Office outpatient visit 15   
minutes                                 MARYCRUZ Iniguez MD  
Work Phone:   
[phone]                          [facility] Urologic   
Surgery  
   
                                        Comment on above:   Renal stones (Primar  
y Dx)   
   
                          Start: 2023 Telephone encounter Alberto CAMARGO [facility] Urologic   
Surgery  
   
                                                    Start: 2023  
End: 2023                         Emergency department   
patient visit                           DO Apollo Lopez  
Work Phone:   
[phone]                          [facility]   
Room  
Work Phone:   
[phone]  
   
                          Start: 2023 Telephone encounter Roxann CAMARGO [facility] Radiology  
   
                                                    Start: 2023  
End: 2023                         Patient encounter   
procedure                               Milana Daniels   
APRN-CNP  
Work Phone:   
[phone]                          [facility] Pre   
Surgical Evaluation  
   
                                        Comment on above:   Preop testing (Prima  
ry Dx)   
   
                                                    Start: 2023  
End: 2023           Patient encounter status  Milana Daniels   
APRN-CNP  
Work Phone:   
[phone]                          [facility]  
Work Phone:   
[phone]  
   
                          Start: 2023 Telephone encounter Angelika Asher RN   
[facility] Urologic   
Surgery  
   
                                Start: 2023 Orders Only     Ted macias MD  
Work Phone:   
[phone]                          [facility] Urologic   
Surgery  
   
                                                    Start: 2023  
End: 2023                         Office outpatient new 30   
minutes                                 MARYCRUZ Iniguez MD  
Work Phone:   
[phone]                          [facility] Urologic   
Surgery  
   
                                        Comment on above:   Renal stones (Primar  
y Dx)   
   
                                        Start: 2023   Admission to [facility] da  
y   
surgery center                          Ted Gentile MD  
Work Phone:   
[phone]                          [facility] Urologic   
Surgery  
   
                          Start: 2023 Telephone encounter Rossy Lassiter RN [facility] Line  
   
                                        Comment on above:   Hospital follow-up   
   
                                Start: 2023 Telephone encounter Bonnie hubbard RN  
Work Phone:   
[phone]                          [facility]  
   
                                        Comment on above:   Health Information   
   
                          Start: 2023 ambulatory   Linda Nuñez RN [facility]   
Nephrology (Renal)  
   
                                        Start: 2023   Patient encounter   
procedure                 Linda Nuñez RN      [facility]   
Nephrology (Renal)  
   
                                        Comment on above:   Nephrology Referral   
   
                                                    Start: 2023  
End: 2023                         Evaluation and management   
of inpatient                            Ramo Dexter MD  
Work Phone:   
[phone]                          [address]  
   
                                        Comment on above:   Obstructive nephropa  
thy (Primary Dx);  
Atrial fibrillation, unspecified type (HCC);  
CULLEN (acute kidney injury) (HCC);  
Acute pyelonephritis;  
Sepsis with acute renal failure without septic shock, due to   
unspecified organism, unspecified acute renal failure type (HCC);  
[place];  
Atrioventricular block, first degree;  
Abnormal electrocardiogram (ECG) (EKG);  
Type 2 diabetes mellitus without complication, without long-term   
current use of insulin (HCC);  
Recurrent major depressive disorder, in partial remission (HCC)   
   
                                Start: 2023 Telephone encounter Ramo causey MD  
Work Phone:   
[phone]                          [facility]   
Medicine  
   
                                                    Start: 2023  
End: 2023                         Evaluation and management   
of inpatient                            DO Apollo Lopez  
Work Phone:   
[phone]                          [place] [address] [place]   
Med Surg  
Work Phone:   
[phone]  
   
                                                    Start: 2023  
End: 2023           Departed Referred         DO Apollo Lopez  
Work Phone:   
[phone]                          [facility]  
   
                                                    Start: 2023  
End: 2023           ambulatory                Yann Sparks  
Other Phone:   
[phone]                           [facility]   
Professional   
Corporation  
Other Phone:   
[phone]  
   
                                        Start: 2023   Office outpatient vi  
sit 15   
minutes                   Christopher Bridger      [facility]  
   
                                                    Start: 01-  
End: 01-           ambulatory                Christkodyer Bridger  
Other Phone:   
[phone]                           [facility]   
Professional   
Corporation  
Other Phone:   
[phone]  
   
                                        Start: 01-   Office outpatient vi  
sit 25   
minutes                   Christopher Bridger      [facility]  
   
                                                    Start: 2022  
End: 2022           Departed Referred         MD Jill Rodriguez  
Work Phone:   
[phone]                          [facility]  
   
                          Start: 2018 Ambulatory   [facility]  
y:[facility]  
   
                          Start: 2017 Ambulatory   [facility]  
y:[facility]  
   
                                                    Start: 2017  
End: 09-     Ambulatory          RUPERTO FELIX     Facility:H[facility]  
  
  
  
Procedures  
  
  
                          Date         Procedure    Procedure Detail Performing   
Clinician  
   
                          Start: 2025 MRI BRAIN WO CON              Adams colunga DO  
Work Phone:   
[phone]  
   
                          Start: 2025 ALL MAGNESIUM              Generic E  
xternal Data   
Provider  
   
                          Start: 2025 [facility] WITH DIFFERENTIAL           
     Generic External Data   
Provider  
   
                          Start: 2025 ALL MAGNESIUM              Generic E  
xternal Data   
Provider  
   
                          Start: 2025 [facility] CBC WITH DIFFERENTIAL           
     Generic External Data   
Provider  
   
                          Start: 2025 [facility] CBC WITH DIFFERENTIAL           
     Generic External Data   
Provider  
   
                          Start: 2025 ALL MAGNESIUM              Generic E  
xternal Data   
Provider  
   
                          Start: 2025 [facility] BASIC METABOLIC PANEL           
     Generic External Data   
Provider  
   
                          Start: 2025 [facility] CBC WITH DIFFERENTIAL           
     Generic External Data   
Provider  
   
                          Start: 2025 ALL LACTOSE TOLERANCE              G  
eneric External Data   
Provider  
   
                          Start: 2025 [facility] CBC WITH DIFFERENTIAL           
     Generic External Data   
Provider  
   
                                        Start: 2025   [facility] GLUCOSE,   
FINGERSTICK-IN OFFICE                               Generic External Data   
Provider  
   
                          Start: 2025 Antibody screen              Service  
[facility]  
   
                                        Comment on above:   Order Comment: Trans  
fuse now? Y Number of units to transfuse   
now? 1   
   
                                                            Result Comment: PERF  
ORMED BY:  
[address] [place] [address]  
[phone]  
PATHOLOGIST MEDICAL DIRECTOR  
CODEY SÁNCEHZ M.D.   
   
                                        Start: 2025   Bacteria identified   
in Blood   
by Culture                                          Services [employer] Phone:   
[phone]  
   
                          Start: 2025 Urine culture              Services   
[employer] Phone:   
[phone]  
   
                                        Start: 2025   CT of abdomen and pe  
lvis   
without contrast                                    Services [employer] Phone:   
[phone]  
   
                          Start: 2025 [facility] BASIC METABOLIC PANEL           
     Generic External Data   
Provider  
   
                          Start: 2025 [facility] CBC WITH DIFFERENTIAL           
     Generic External Data   
Provider  
   
                          Start: 2025 ALL MAGNESIUM              Generic E  
xternal Data   
Provider  
   
                          Start: 2025 [facility] BASIC METABOLIC PANEL           
     Generic External Data   
Provider  
   
                          Start: 2025 ALL MAGNESIUM              Generic E  
xternal Data   
Provider  
   
                          Start: 2025 [facility] BASIC METABOLIC PANEL           
     Generic External Data   
Provider  
   
                          Start: 2025 [facility] CBC WITH DIFFERENTIAL           
     Generic External Data   
Provider  
   
                          Start: 01- ALL MAGNESIUM              Generic E  
xternal Data   
Provider  
   
                          Start: 01- [facility] BASIC METABOLIC PANEL           
     Generic External Data   
Provider  
   
                          Start: 2025 [facility]RO BASIC METABOLIC PANEL           
     Generic External Data   
Provider  
   
                          Start: 2025 [facility] CBC WITH DIFFERENTIAL           
     Generic External Data   
Provider  
   
                                        Start: 2025   METRO GLUCOSE,   
FINGERSTICK-IN OFFICE                               Generic External Data   
Provider  
   
                          Start: 2025 [facility] CBC WITH DIFFERENTIAL           
     Generic External Data   
Provider  
   
                          Start: 2025 [facility] BLOOD GAS, VENOUS               
 Generic External Data   
Provider  
   
                          Start: 2025 [facility] BLOOD GAS, VENOUS               
 Generic External Data   
Provider  
   
                          Start: 2025 [facility] CALCIUM, IONIZED                
Generic External Data   
Provider  
   
                                        Start: 01-   [facility] GLUCOSE,   
FINGERSTICK-IN OFFICE                               Generic External Data   
Provider  
   
                          Start: 2025 Plain chest X-ray              Servi  
[facility]  
Work Phone:   
[phone]  
   
                                        Start: 2025   Bacteria identified   
in Blood   
by Culture                                          Services [place]  
Work Phone:   
[phone]  
   
                          Start: 2025 Urine culture              Services   
[place]  
Work Phone:   
[phone]  
   
                                        Start: 2025   CT of abdomen and pe  
lvis   
without contrast                                    [facility] [place]  
Work Phone:   
[phone]  
   
                                        Start: 2024   Fluoroscopy up to 1   
hour   
physician/qhp time                                  MARYCRUZ Iniguez MD  
Work Phone:   
[phone]  
   
                          Start: 2024 Glucose blood reagent strip           
     MARYCRUZ Iniguez MD  
Work Phone:   
[phone]  
   
                                        Start: 2024   Fluoroscopy up to 1   
hour   
physician/qhp time                                  MARYCRUZ Iniguez MD  
Other Phone:   
[phone]  
   
                                        Start: 2024   Blood count complete  
   
automated                                           Twan Lloyd MD  
Other Phone:   
[phone]  
   
                                        Start: 2024   Ecg routine ecg w/le  
ast 12   
lds trcg only w/o i&r                               To Be Assigned  
   
                                        Start: 2024   Blood count complete  
   
automated                                           Foster ABRAMS-CNP  
Work Phone:   
[phone]  
   
                                        Start: 10-   Insertion of periphe  
rally   
inserted central catheter                           DO Apollo Lopez  
Work Phone:   
[phone]  
   
                          Start: 10- Aerobic microbial culture             
   DO Apollo Lopez  
Work Phone:   
[phone]  
   
                          Start: 10- Anaerobic microbial culture           
     DO Apollo Lopez  
Work Phone:   
[phone]  
   
                                        Start: 10-   Bacterial ID (NA Mul  
tiplex   
Assay)                                              DO Apollo Lopez  
Work Phone:   
[phone]  
   
                                        Start: 10-   Blood culture for ba  
cteria,   
including anaerobic screen                           DO Apollo Lopez  
Work Phone:   
[phone]  
   
                                        Start: 10-   Investigation of tra  
nsfusion   
reaction                                            DO Apollo Lopez  
Work Phone:   
[phone]  
   
                          Start: 10- Urine culture              DO Apollo Lopez  
Work Phone:   
[phone]  
   
                                        Start: 10-   Ct abdomen & pelvis   
w/o   
contrast material                                   Apollo Lu MD  
Work Phone:   
[phone]  
   
                                        Start: 2023   CT of abdomen and pe  
lvis   
without contrast                                    DO Apollo Lopez  
Work Phone:   
[phone]  
   
                          Start: 2023 Urine culture              DO Apollo Lopez  
Work Phone:   
[phone]  
   
                          Start: 2023 Glucose blood reagent strip           
     Eyad Cancilla DO  
Work Phone:   
[phone]  
   
                          Start: 2023 Glucose blood reagent strip           
     Eyad Cancilla DO  
Work Phone:   
[phone]  
   
                          Start: 2023 Glucose blood reagent strip           
     Eyad Cancilla DO  
Work Phone:   
[phone]  
   
                          Start: 2023 EXTRA TUBE                Ramo causey MD  
Work Phone:   
[phone]  
   
                          Start: 2023 LAVENDER TOP TUBE, BLOOD              
  Ramo Dexter MD  
Work Phone:   
[phone]  
   
                                        Start: 2023   Basic metabolic pane  
l   
calcium total                                       Ramo Dexter MD  
Work Phone:   
[phone]  
   
                          Start: 2023 Glucose blood reagent strip           
     Ramo Dexter MD  
Work Phone:   
[phone]  
   
                          Start: 2023 Glucose blood reagent strip           
     Ramo Dexter MD  
Work Phone:   
[phone]  
   
                          Start: 2023 Glucose blood reagent strip           
     Ramo Dexter MD  
Work Phone:   
[phone]  
   
                                        Start: 2023   Drug screen quantita  
tive   
vancomycin                                          [facility]  
   
                          Start: 2023 Glucose blood reagent strip           
     Ramo Dexter MD  
Work Phone:   
[phone]  
   
                                        Start: 2023   Basic metabolic pane  
l   
calcium total                                       Ramo Dexter MD  
Work Phone:   
[phone]  
   
                          Start: 2023 Glucose blood reagent strip           
     Ramo Dexter MD  
Work Phone:   
[phone]  
   
                          Start: 2023 Glucose blood reagent strip           
     Ramo Dexter MD  
Work Phone:   
[phone]  
   
                          Start: 2023 Glucose blood reagent strip           
     Ramo Dexter MD  
Work Phone:   
[phone]  
   
                          Start: 2023 Glucose blood reagent strip           
     Ramo Dexter MD  
Work Phone:   
[phone]  
   
                                                    Start: 2023  
End: 2023     Assay of magnesium                      Ramo Dexter MD  
Work Phone:   
[phone]  
   
                          Start: 2023 Glucose blood reagent strip           
     Ramo Dexter MD  
Work Phone:   
[phone]  
   
                          Start: 2023 Assay of magnesium              Иван Dexter MD  
Work Phone:   
[phone]  
   
                                        Start: 2023   Drug screen quantita  
tive   
vancomycin                                          Ramo Dexter MD  
Work Phone:   
[phone]  
   
                          Start: 2023 Glucose blood reagent strip           
     Ramo Dexter MD  
Work Phone:   
[phone]  
   
                          Start: 2023 Glucose blood reagent strip           
     Ramo Dexter MD  
Work Phone:   
[phone]  
   
                          Start: 2023 Glucose blood reagent strip           
     Ramo Dexter MD  
Work Phone:   
[phone]  
   
                                                    Start: 2023  
End: 2023     Glucose blood reagent strip                     Ramo Dexter MD  
Work Phone:   
[phone]  
   
                          Start: 2023 EXTRA TUBE                Ramo causey MD  
Work Phone:   
[phone]  
   
                          Start: 2023 GOLD TOP - SST TUBE, BLOOD            
    Ramo Dexter MD  
Work Phone:   
[phone]  
   
                                        Start: 2023   Basic metabolic pane  
l   
calcium total                                       Farhan Landers DO  
Work Phone:   
[phone]  
   
                          Start: 2023 Creatinine other source               
 Ramo Dexter MD  
Work Phone:   
[phone]  
   
                          Start: 2023 Glucose blood reagent strip           
     Ramo Dexter MD  
Work Phone:   
[phone]  
   
                          Start: 2023 Glucose blood reagent strip           
     Ramo Dexter MD  
Work Phone:   
[phone]  
   
                                        Start: 2023   Drug screen quantita  
tive   
vancomycin                                          Brayan Waters DO  
Work Phone:   
[phone]  
   
                          Start: 2023 Glucose blood reagent strip           
     Ramo Dexter MD  
Work Phone:   
[phone]  
   
                          Start: 2023 Glucose blood reagent strip           
     Ramo Dexter MD  
Work Phone:   
[phone]  
   
                                        Start: 2023   Basic metabolic pane  
l   
calcium total                                       Ramo Dexter MD  
Work Phone:   
[phone]  
   
                          Start: 2023 Glucose blood reagent strip           
     Ramo Dexter MD  
Work Phone:   
[phone]  
   
                          Start: 2023 Glucose blood reagent strip           
     Brayan Waters DO  
Work Phone:   
[phone]  
   
                                        Start: 2023   Us retroperitoneal r  
eal time   
w/image complete                                    Brayan Waters DO  
Work Phone:   
[phone]  
   
                          Start: 2023 Glucose blood reagent strip           
     Brayan Waters DO  
Work Phone:   
[phone]  
   
                          Start: 2023 Glucose blood reagent strip           
     Brayan Waters DO  
Work Phone:   
[phone]  
   
                                        Start: 2023   Plmt nephrostomy cat  
h prq   
new access rs&i                                     Brayan Jones DO  
Work Phone:   
[phone]  
   
                          Start: 2023 Hemoglobin glycosylated a1c           
     Brayan Waters DO  
Work Phone:   
[phone]  
   
                          Start: 2023 Hepatic function panel                
Brayan Waters DO  
Work Phone:   
[phone]  
   
                          Start: 2023 Urine culture              Brayan devi DO  
Work Phone:   
[phone]  
   
                                        Start: 2023   Urnls dip stick/tabl  
et rgnt   
auto w/o microscopy                                 Brayan Waters DO  
Work Phone:   
[phone]  
   
                                        Start: 2023   Ecg routine ecg w/le  
ast 12   
lds trcg only w/o i&r                               Brayan Waters DO  
Work Phone:   
[phone]  
   
                                        Start: 2023   Radiology Comparison  
 study -   
date and time                                       Brayan Waters DO  
Work Phone:   
[phone]  
   
                          Start: 2023 Aerobic microbial culture             
   DO Apollo Lopez  
Work Phone:   
[phone]  
   
                          Start: 2023 Lactoferrin measurement               
 DO Apollo Lopez  
Work Phone:   
[phone]  
   
                          Start: 2023 Ova and Parasite Result 1             
   DO Apollo Lopez  
Work Phone:   
[phone]  
   
                          Start: 2023 Ova and Parasites              DO Andrew Lopez  
Work Phone:   
[phone]  
   
                                        Start: 2023   Ova OR parasites   
identification                                      DO pAollo Lopez  
Work Phone:   
[phone]  
   
                                        Start: 2023   Screening for occult  
 blood   
in feces                                            DO Apollo Lopez  
Work Phone:   
[phone]  
   
                                        Start: 2023   CT of abdomen and pe  
lvis   
without contrast                                    DO Apollo oLpez  
Work Phone:   
[phone]  
   
                          Start: 2023 Urine culture              DO Apollo Lopez  
Work Phone:   
[phone]  
  
  
  
Plan of Treatment  
  
  
                          Date         Care Activity Detail       Author  
   
                                                    Start:   
2025          Creatinine measurement Basic Metabolic Panel [facility]  
   
                                                    Start:   
2025          Lipid panel         Lipid Profile       [facility]  
   
                                                    Start:   
2025          Creatinine measurement Basic Metabolic Panel [facility]  
   
                                                    Start:   
2025  
End: 2025                         Patient encounter   
procedure                               2025 2:40 PM EST   
Office Visit [facility]   
[facility]   
[address]   
[place] [address]   
[phone] Wilton Hudson MD [address] [place] [address]   
[phone] (Work)   
[phone] (Fax)                      [facility] U[facility]   
Surgery  
   
                                                    Start:   
2025                                                  [facility]  
   
                                                    Start:   
2025                                                  [facility]  
   
                                                    Start:   
2025                                                  [facility]  
   
                                                    Start:   
2025                                                  [facility]  
   
                                                    Start:   
2025                                                  [facility]  
   
                                                    Start:   
2025                                                  [facility]  
   
                                                    Start:   
2025                                                  [facility]  
   
                                                    Start:   
2025                              Comprehensive metabolic   
 panel - Serum or   
Plasma                                              [facility]  
   
                                                    Start:   
2025                                                  [facility]  
   
                                                    Start:   
2025                              Comprehensive metabolic   
 panel - Serum or   
Plasma                                              [facility]  
   
                                                    Start:   
2025                                                  [facility]  
   
                                                    Start:   
2025                              Comprehensive metabolic   
 panel - Serum or   
Plasma                                              [facility]  
   
                                                    Start:   
2025                                                  [facility]  
   
                                                    Start:   
2025                              Referral to   
nephrologist                                        [facility]  
   
                                                    Start:   
2025                              Bacteria identified in   
Blood by Culture          Blood Culture             [facility]  
   
                                                    Start:   
2025                              Bacteria identified in   
Urine by Culture          Urine Culture             [facility]  
   
                                                    Start:   
2025                              Referral to infectious   
diseases physician                                  [facility]  
   
                                                    Start:   
2025                              Comprehensive metabolic   
 panel - Serum or   
Plasma                                              [facility]  
   
                                                    Start:   
2025  
End: 2025                                             [facility]  
   
                                                    Start:   
2025          Urine culture                           [facility]  
   
                                                    Start:   
2025                              Physical therapy   
procedure                                           [facility]  
   
                                                    Start:   
2025                              Referral to   
occupational therapist                              [facility]  
   
                                                    Start:   
2025          Hospital admission                      [facility]  
   
                                                    Start:   
2025                              Comprehensive metabolic   
 panel - Serum or   
Plasma                                              [facility]  
   
                                                    Start:   
2025  
End: 2025                                             [facility]  
   
                                                    Start:   
2025                              Comprehensive metabolic   
 panel - Serum or   
Plasma                                              [facility]  
   
                                                    Start:   
2025                                                  [facility]  
   
                                                    Start:   
2025                              Comprehensive metabolic   
 panel - Serum or   
Plasma                                              [facility]  
   
                                                    Start:   
2025                                                  [facility]  
   
                                                    Start:   
01-                              Comprehensive metabolic   
 panel - Serum or   
Plasma                                              [facility]  
   
                                                    Start:   
01-                                                  [facility]  
   
                                                    Start:   
2025                              Comprehensive metabolic   
 panel - Serum or   
Plasma                                              [facility]  
   
                                                    Start:   
2025                                                  [facility]  
   
                                                    Start:   
2025                              Comprehensive metabolic   
 panel - Serum or   
Plasma                                              [facility]  
   
                                                    Start:   
2025                                                  [facility]  
   
                                                    Start:   
2025                              Comprehensive metabolic   
 panel - Serum or   
Plasma                                              [facility]  
   
                                                    Start:   
2025                                                  [facility]  
   
                                                    Start:   
2025                              Comprehensive metabolic   
 panel - Serum or   
Plasma                                              [facility]  
   
                                                    Start:   
2025                                                  [facility]  
   
                                                    Start:   
01-                                                  [facility]  
   
                                                    Start:   
2025                                                  [facility]  
   
                                                    Start:   
2025                              Referral to   
cardiologist                                        [facility]  
   
                                                    Start:   
2025          Referral to urologist                     [facility]  
   
                                                    Start:   
2025          Consultation                            [facility]  
   
                                                    Start:   
2025          Hospital admission                      [facility]  
   
                                                    Start:   
2025                                                  [facility]  
   
                                                    Start:   
2025                              Bacteria identified in   
Blood by Culture          Blood Culture             [facility]  
   
                                                    Start:   
2025  
End: 2025     Urine culture                           [facility]  
   
                                                    Start:   
2025                                                  [facility]  
   
                                                    Start:   
2025          Creatinine measurement Basic Metabolic Panel [facility]  
   
                                                    Start:   
2025  
End: 2025                         Patient encounter   
procedure                               2025 1:40 PM EST   
Appointment [facility]   
Radiology CT [address]   
[facility] [place] [address]   
[phone]                            [facility]   
Radiology CT  
   
                                                    Start:   
2024  
End: 2024                         Patient encounter   
procedure                               2024 3:40 PM EST   
Office Visit [facility]   
Urologic Surgery [address]   
[place] [address]   
[phone] Kenji Christiansen MD [address]   
[place] [address]   
[phone] (Work)   
[phone] (Fax)                      [facility] Urologic   
Surgery  
   
                                                    Start:   
2024  
End: 2024                         Patient encounter   
procedure                               2024 11:50 AM EST   
Appointment [facility] CT Scan   
[address] [place]   
[address] [phone]                      [facility] CT   
Scan  
   
                                                    Start:   
2024  
End: 10-                         CT Abdomen and Pelvis   
WO contrast                             CT [facility]   
Routine Renal stones   
Expected: 2024   
(Approximate), Expires:   
10/17/2025                              [facility]   
SYSTEM  
Work Phone:   
[phone]  
   
                                                    Comment on   
above:                                  Expected: 2024 (Approximate), Expi  
res: 10/17/2025   
   
                                                    Start:   
10-          Influenza vaccination Influenza Vaccine (#1) [facility]  
   
                                                    Start:   
2024                              COVID-19 Vaccine ( season)                         COVID-19 Vaccine ( season)                         [facility]  
   
                                                    Start:   
2024                              COVID-19 Vaccine ( season)                         COVID-19 Vaccine ( season)                         [facility]  
   
                                                    Start:   
2024          Influenza vaccination                     [facility]  
   
                                                    Start:   
08-  
End: 08-                         Patient encounter   
procedure                               08/15/2024 10:20 AM EDT   
Office Visit [facility] [address] [place] [address]   
[phone] Mauricio Abernathy MD [address] [place] [address]   
[phone] (Work)   
[phone] (Fax)                      [facility]   
Urology  
   
                                                    Start:   
2024          Creatinine measurement Basic Metabolic Panel [facility]  
   
                                                    Start:   
2024          Lipid panel         Lipid Profile       [facility]  
   
                                                    Start:   
2024  
End: 2024                         Patient encounter   
procedure                               2024 11:40 AM EDT   
Office Visit [facility]   
Urologic Surgery [address] [place] [address]   
[phone] MARYCRUZ Iniguez MD [address] [place] [phone] (Work)   
[phone] (Fax)                      [facility] Urologic   
Surgery  
   
                                                    Start:   
2024  
End: 2024                         Patient encounter   
procedure                               2024 10:20 AM EDT   
Appointment [facility]   
Radiology CT [address] [place], [place] [id number]   
[phone]                            [facility]   
Radiology CT  
   
                                                    Start:   
2024  
End: 2024                         Admission to same day   
surgery center                          2024 1:50 PM EDT -   
2024 3:12 PM EDT   
Surgery [facility]   
Ambulatory Surgery [address] [place] [address]   
[phone] MARYCRUZ Iniguez MD [address] [place] [phone] (Fax)   
Ureteroscopy with Laser   
Lithotripsy                             [facility]   
Ambulatory Surgery  
   
                                                    Comment on   
above:                                  Ureteroscopy with Laser Lithotripsy   
   
                                                    Start:   
2024                              Subsequent hospital   
visit by physician                      2024 1:50 PM EDT   
Hospital Encounter   
[facility]   
Ambulatory Surgery [address] [place] [address]   
[phone] MARYCRUZ Iniguez MD [address] [place] [phone] (Fax)                      [facility]   
Ambulatory Surgery  
   
                                                    Start:   
2024  
End: 2024                         URETEROSCOPY,   
LITHOTRIPSY, LASER                      URETEROSCOPY, LITHOTRIPSY,   
LASER Routine scheduled   
Renal stones 2024   
1:50 PM EDT                             [facility]   
SYSTEM  
Work Phone:   
[phone]  
   
                                                    Start:   
2024  
End: 2024                         Admission to same day   
surgery center                                      [facility]   
Ambulatory Surgery  
   
                                                    Comment on   
above:                                  Ureteroscopy with Laser Lithotripsy   
   
                                                    Start:   
2024                              Subsequent hospital   
visit by physician                                  [facility]   
Ambulatory Surgery  
   
                                                    Start:   
2024  
End: 2024                         URETEROSCOPY,   
LITHOTRIPSY, LASER                                  [facility]  
   
                                                    Start:   
2024  
End: 2024                         Patient encounter   
procedure                               2024 2:20 PM EDT   
Office Visit [facility] [address] [place] [address]   
[phone] MARYCRUZ Iniguez MD [address] [place] [address]   
[phone] (Work)   
[phone] (Fax) Arrived              [facility]   
Urology  
   
                                                    Comment on   
above:                                  Arrived   
   
                                                    Start:   
2024  
End: 2024                         Bacteria identified in   
Urine by Culture                                    [facility]   
SYSTEM  
Work Phone:   
[phone]  
   
                                                    Comment on   
above:                                  Expected: 2024, Expires:    
   
                                                    Start:   
2024  
End: 2024                         URETEROSCOPY,   
LITHOTRIPSY, LASER                      URETEROSCOPY, LITHOTRIPSY,   
LASER Routine scheduled   
Renal stones 2024   
12:12 PM EDT                            [facility]   
SYSTEM  
Work Phone:   
[phone]  
   
                                                    Start:   
2024  
End: 2024                         Admission to same day   
surgery center                          2024 7:30 AM EDT -   
2024 9:30 AM EDT   
Surgery [facility]   
OR [address] [place] [address]   
[phone] MARYCRUZ Iniguez MD [address] [place] [phone]   
[phone] (Fax)   
Ureteroscopy with Laser   
Lithotripsy                             [facility]  
   
                                                    Comment on   
above:                                  Ureteroscopy with Laser Lithotripsy   
   
                                                    Start:   
2024                              Subsequent hospital   
visit by physician                      2024 7:30 AM EDT   
Hospital Encounter   
[facility] OR [address] [place] [address]   
[phone] MARYCRUZ Iniguez MD [address] [place] [phone] (Fax)                      [facility] OR  
   
                                                    Start:   
2024  
End: 2024                         URETEROSCOPY,   
LITHOTRIPSY, LASER                      URETEROSCOPY, LITHOTRIPSY,   
LASER Routine scheduled   
Renal stones 2024   
7:30 AM EDT                             [facility]   
SYSTEM  
Work Phone:   
[phone]  
   
                                                    Start:   
2024  
End: 2024                         Patient encounter   
procedure                                           [facility] Urologic   
Surgery  
   
                                                    Start:   
03-  
End: 03-                         Nursing evaluation of   
patient and report                      03/15/2024 10:00 AM EDT   
Nurse Visit [facility] [address] [place] [address]   
[phone]                            [facility]   
Urology  
   
                                                    Start:   
2024  
End: 2024                         Admission to same day   
surgery center                                      [facility]  
   
                                                    Comment on   
above:                                  Ureteroscopy with Laser Lithotripsy   
   
                                                    Start:   
2024                              Subsequent hospital   
visit by physician                                  [facility]  
   
                                                    Start:   
2024  
End: 2024                         URETEROSCOPY,   
LITHOTRIPSY, LASER                      URETEROSCOPY, LITHOTRIPSY,   
LASER Routine scheduled   
Renal stones 2024   
9:54 AM EDT                             [facility]  
   
                                                    Start:   
2024                              Hemoglobin A1c   
measurement               Hemoglobin A1C            [facility]  
   
                                                    Start:   
2024  
End: 2025                         Bacteria identified in   
Urine by Culture                        URINE CULTURE (UROLOGY   
ONLY) Microbiology Routine   
Renal stones Expected:   
2024, Expires:   
2025                              [facility]   
SYSTEM  
Work Phone:   
[phone]  
   
                                                    Comment on   
above:                                  Expected: 2024, Expires:    
   
                                                    Start:   
2024  
End: 2024                         Patient encounter   
procedure                                           [facility] Urologic   
Surgery  
   
                                                    Start:   
2024  
End: 2024                         Nursing evaluation of   
patient and report                                  [facility]   
Pre-Surgical   
Evaluation  
   
                                                    Start:   
2024                              Hemoglobin A1c   
measurement               Hemoglobin A1C            [facility]  
   
                                                    Start:   
2024                                                  [facility]  
   
                                                    Start:   
2024  
End: 2024                                             [facility]  
   
                                                    Start:   
2024          Hospital admission                      [facility]  
   
                                                    Start:   
2024  
End: 2024                         Patient encounter   
procedure                               2024 12:40 PM EST   
Office Visit [facility]   
Urologic Surgery [address] [place] [address]   
[phone] MARYCRUZ Iniguez MD [address] [place] [address] [phone] (Work)   
[phone] (Fax)                      [facility] Urologic   
Surgery  
   
                                                    Start:   
2024  
End: 2024                         Admission to same day   
surgery center                          2024 9:54 AM EST -   
2024 11:54 AM EST   
[facility]   
OR [address] [place] [address]   
[phone] MARYCRUZ Iniguez MD [address] [place] [address] [phone] (Work)   
[phone] (Fax)   
Ureteroscopy with Laser   
Lithotripsy                             [facility] Main OR  
   
                                                    Comment on   
above:                                  Ureteroscopy with Laser Lithotripsy   
   
                                                    Start:   
2024  
End: 2024           Anesthesia consultation   2024 9:54 AM EST   
Anesthesia Event   
[facility] [address] [place] [address]   
[phone] Don Vivas MD [address] [place] [address]   
[phone] (Work)   
[phone] (Fax)                      [facility] Main OR  
   
                                                    Start:   
2024                              Subsequent hospital   
visit by physician                      2024 9:54 AM EST   
Hospital Encounter   
[facility] [address] [place] [address]   
[phone] MARYCRUZ Iniguez MD [address] [place] [address] [phone] (Work)   
[phone] (Fax)                      [facility]  
   
                                                    Start:   
2024  
End: 2024                         URETEROSCOPY,   
LITHOTRIPSY, LASER                      URETEROSCOPY, LITHOTRIPSY,   
LASER Routine scheduled   
Renal stones 2024   
9:54 AM EST                             [facility]  
   
                                                    Start:   
2023  
End: 2023                         Nursing evaluation of   
patient and report                      2023 2:00 PM EST   
Nurse Visit [facility]   
Urologic Surgery [address] [place] [address]   
[phone]                            [facility]   
[facility]  
   
                                                    Start:   
2023  
End: 2023                         Patient encounter   
procedure                               2023 12:45 PM EST   
[facility]   
Pre Surgical Evaluation   
[address] [place] [address]   
[phone] Melva Francis, APRN-CNP [address] [place] [address]   
[phone] (Work)   
[phone] (Fax)                      [facility] Pre   
Surgical Evaluation  
   
                                                    Start:   
2023  
End: 2024                         XA INTERVENTIONAL   
RADIOLOGY VASCULAR BODY   
PROCEDURES                              XA INTERVENTIONAL   
RADIOLOGY VASCULAR BODY   
PROCEDURES Imaging Routine   
Renal stones Expected:   
2023, Expires:   
2024                              [facility] [employer]   
SYSTEM  
Work Phone:   
[phone]  
   
                                                    Comment on   
above:                                  Expected: 2023, Expires:   
4   
   
                                                    Start:   
2023  
End: 2024                         XA INTERVENTIONAL   
RADIOLOGY VASCULAR BODY   
PROCEDURES                              XA INTERVENTIONAL   
RADIOLOGY VASCULAR BODY   
PROCEDURES Imaging Routine   
Renal stones Expected:   
2023, Expires:   
2024                              [facility] [employer]   
SYSTEM  
Work Phone:   
[phone]  
   
                                                    Comment on   
above:                                  Expected: 2023, Expires:   
4   
   
                                                    Start:   
2023  
End: 2023                         Patient encounter   
procedure                                           [facility]   
Surgery  
   
                                                    Start:   
2023  
End: 2024                         RF Guidance for   
exchange of nephrostomy   
tube of Kidney -   
bilateral-- W contrast                  XA NEPH TUBE EXCHANGE   
Imaging STAT Renal stones   
Expected: 2023,   
Expires: 2024                     [facility] [employer]   
SYSTEM  
Work Phone:   
[phone]  
   
                                                    Comment on   
above:                                  Expected: 2023, Expires:   
4   
   
                                                    Start:   
2023  
End: 2023                         Patient encounter   
procedure                               2023 8:40 AM EDT   
[facility]   
Urologic Surgery [address] [place] [address]   
[phone] MARYCRUZ Iniguez MD [address] [place] [address]    
[phone] (Work)   
[phone] (Fax)                      [facility] Urologic   
Surgery  
   
                                                    Start:   
10-  
End: 10-                         Admission to same day   
surgery center                          10/24/2023 12:50 PM EDT -   
10/24/2023 2:02 PM EDT   
Surgery [facility]   
Ambulatory Surgery [address] [place] [address]   
[phone] MARYCRUZ Iniguez MD [address] [place] [address]    
[phone] (Work)   
[phone] (Fax)   
Ureteroscopy with Laser   
Lithotripsy                             [facility]   
Ambulatory Surgery  
   
                                                    Comment on   
above:                                  Ureteroscopy with Laser Lithotripsy   
   
                                                    Start:   
10-                              Subsequent hospital   
visit by physician                      10/24/2023 12:50 PM EDT   
Hospital Encounter   
[facility]   
Ambulatory Surgery [address] [place] [address]   
[phone] MARYCRUZ Iniguez MD [address] [place] [address]    
[phone] (Work)   
[phone] (Fax)                      [facility]   
Ambulatory Surgery  
   
                                                    Start:   
10-  
End: 10-                         URETEROSCOPY,   
LITHOTRIPSY, LASER                      URETEROSCOPY, LITHOTRIPSY,   
LASER Routine scheduled   
Renal stones 10/24/2023   
12:50 PM EDT                            [facility]  
   
                                                    Start:   
10-  
End: 10-                         Admission to same day   
surgery center                          10/24/2023 11:13 AM EDT -   
10/24/2023 12:25 PM EDT   
Surgery [facility]   
Ambulatory Surgery [address] [place], [place] [id number]   
[phone] MARYCRUZ Iniguez MD [address] [place] [address]    
[phone] (Work)   
[phone] (Fax)   
Ureteroscopy with Laser   
Lithotripsy                             [facility]   
Ambulatory Surgery  
   
                                                    Comment on   
above:                                  Ureteroscopy with Laser Lithotripsy   
   
                                                    Start:   
10-                              Subsequent hospital   
visit by physician                      10/24/2023 11:13 AM EDT   
Hospital Encounter   
[facility]   
Ambulatory Surgery [address]   
[place] [address]   
[phone] MARYCRUZ [place]    
[place] [address]   
[phone] (Work)   
[phone] (Fax)                      [facility]   
Ambulatory Surgery  
   
                                                    Start:   
10-  
End: 10-                         URETEROSCOPY,   
LITHOTRIPSY, LASER                      URETEROSCOPY, LITHOTRIPSY,   
LASER Routine scheduled   
Renal stones 10/24/2023   
11:13 AM EDT                            [facility]  
   
                                                    Start:   
10-                                                  [facility]  
   
                                                    Start:   
10-                              Referral to   
nephrologist                                        [facility]  
   
                                                    Start:   
10-                              Comprehensive metabolic   
2000 panel - Serum or   
Plasma                                              [facility]  
   
                                                    Start:   
10-                                                  [facility]  
   
                                                    Start:   
10-          Aerobic Culture     Aerobic Culture     [facility]  
   
                                                    Start:   
10-          Anaerobic Culture   Anaerobic Culture   [facility]  
   
                                                    Start:   
10-                              Bacteria identified in   
Blood by Culture          Blood Culture             [facility]  
   
                                                    Start:   
10-                              Bacteria identified in   
Urine by Culture                                    [facility]  
   
                                                    Start:   
10-                              Insertion of Infusion   
Device into Superior   
Vena Cava, Percutaneous   
Approach                                Insertion of Infusion   
Device into Superior Vena   
Cava, Percutaneous   
Approach                                [facility]  
   
                                                    Start:   
10-                              Microscopic observation   
[Identifier] in   
Unspecified specimen by   
Gram stain                Gram Stain                [facility]  
   
                                                    Start:   
10-          Hospital admission                      [facility]  
   
                                                    Start:   
10-                              Referral to infectious   
diseases physician                                  [facility]  
   
                                                    Start:   
10-                                                  [facility]  
   
                                                    Start:   
10-                              Microbial culture, body   
fluid                                               [facility]  
   
                                                    Start:   
10-  
End: 10-                         Patient encounter   
procedure                                           [facility] Urologic   
Surgery  
   
                                                    Start:   
10-  
End: 10-                         Patient encounter   
procedure                                           [facility] Urologic   
Surgery  
   
                                                    Start:   
10-  
End: 10-                         Patient encounter   
procedure                               10/02/2023 10:40 AM EDT   
Office Visit [facility]   
Urologic Surgery    
[place] [address]   
[phone] MARYCRUZ Iniguez MD [address] [place] [address]    
[phone] (Work)   
[phone] (Fax)                      [facility] Urologic   
Surgery  
   
                                                    Start:   
10-          Influenza vaccination Influenza Vaccine (#1) [facility]  
   
                                                    Start:   
2023  
End: 2023                         Admission to same day   
surgery center                          2023 11:45 AM EDT -   
2023 12:57 PM EDT   
Surgery [facility]   
Ambulatory Surgery [address] [place] [address]   
[phone] MARYCRUZ Iniguez MD [address] [place] [address]    
[phone] (Work)   
[phone] (Fax)   
Ureteroscopy with Laser   
Lithotripsy                             [facility]   
Ambulatory Surgery  
   
                                                    Comment on   
above:                                  Ureteroscopy with Laser Lithotripsy   
   
                                                    Start:   
2023                              Subsequent hospital   
visit by physician                      2023 11:45 AM EDT   
Hospital Encounter   
[facility]   
Ambulatory Surgery [address] [place] [address]   
[phone] MARYCRUZ Iniguez MD [address] [place] [address]    
[phone] (Work)   
[phone] (Fax)                      [facility]   
Ambulatory Surgery  
   
                                                    Start:   
2023  
End: 2023                         URETEROSCOPY,   
LITHOTRIPSY, LASER                      URETEROSCOPY, LITHOTRIPSY,   
LASER Routine scheduled   
Renal stones 2023   
11:45 AM EDT                            [facility]  
   
                                                    Start:   
2023  
End: 2023                         Admission to same day   
surgery center                          2023 10:33 AM EDT -   
2023 11:45 AM EDT   
Surgery [facility]   
Ambulatory Surgery [address] [place] [address]   
[phone] MARYCRUZ Iniguez MD [address] [place] [address]    
[phone] (Work)   
[phone] (Fax)   
Ureteroscopy with Laser   
Lithotripsy                             [facility]   
Ambulatory Surgery  
   
                                                    Comment on   
above:                                  Ureteroscopy with Laser Lithotripsy   
   
                                                    Start:   
2023                              Subsequent hospital   
visit by physician                      2023 10:33 AM EDT   
Hospital Encounter   
[facility]   
Ambulatory Surgery [address]   
[place] [address]   
[phone] MARYCRUZ Iniguez MD [address] [place] [phone]   
[phone] (Work)   
[phone] (Fax)                      [facility]   
Ambulatory Surgery  
   
                                                    Start:   
2023  
End: 2023                         URETEROSCOPY,   
LITHOTRIPSY, LASER                      URETEROSCOPY, LITHOTRIPSY,   
LASER Routine scheduled   
Renal stones 2023   
10:33 AM EDT                            [facility]  
   
                                                    Start:   
2023                              Bacteria identified in   
Urine by Culture                                    [facility]  
   
                                                    Start:   
2023  
End: 2023                         Patient encounter   
procedure                                           [facility] Pre   
Surgical Evaluation  
   
                                                    Start:   
2023                              COVID-19 Vaccine (2 -   
2023-24 season)                         COVID-19 Vaccine (2 -   
2023-24 season)                         [facility]  
   
                                                    Start:   
2023          Influenza vaccination Influenza Vaccine (#1) [facility]  
   
                                                    Start:   
2023  
End: 2024                         CT Abdomen and Pelvis   
WO contrast                             CT RENAL STONE Imaging   
Routine Renal stones   
Expected: 2023,   
Expires: 2024                     [facility] [employer]   
SYSTEM  
Work Phone:   
[phone]  
   
                                                    Comment on   
above:                                  Expected: 2023, Expires:    
   
                                                    Start:   
2023  
End: 2023                         Patient encounter   
procedure                               2023 3:00 PM EDT   
Office Visit [facility]   
Urologic Surgery [address] [place] [address]   
[phone] MARYCRUZ Iniguez MD [address] [place] [phone] (Work)   
[phone] (Fax)                      [facility] Urologic   
Surgery  
   
                                                    Start:   
2023  
End: 2024                         RF Guidance for   
exchange of nephrostomy   
tube of Kidney -   
bilateral-- W contrast                  XA NEPH TUBE EXCHANGE   
Imaging Routine Renal   
stones Expected:   
2023, Expires:   
2024                              [facility]   
SYSTEM  
Work Phone:   
[phone]  
   
                                                    Comment on   
above:                                  Expected: 2023, Expires:    
   
                                                    Start:   
2023          Blood chemistry                         [facility]  
   
                                                    Start:   
2023                                                  [facility]  
   
                                                    Start:   
2023                              Superficial Wound   
Culture                   Superficial Wound Culture [facility]  
   
                                                    Start:   
2023          Hospital admission                      [facility]  
   
                                                    Start:   
2023  
End: 2023                                             [facility]  
   
                                                    Start:   
2023                              Bacteria identified in   
Urine by Culture          Urine Culture             [facility]  
   
                                                    Start:   
2021                              COVID-19 Vaccine (2 -   
Pfizer series)                          COVID-19 Vaccine (2 -   
Pfizer series)                          [facility]  
   
                                                    Start:   
12-                              Administration of   
varicella zoster   
vaccine                                 Zoster (Shingles) Vaccine   
(1 of 2)                                [facility]  
   
                                                    Start:   
12-                              Shingles (RZV) Vaccine   
(1 of 2)                                Shingles (RZV) Vaccine (1   
of 2)                                   [facility]  
   
                                                    Start:   
12-                              Cholesterol   
[Mass/volume] in Serum   
or Plasma                 Cholesterol               [facility]  
   
                                                    Start:   
12-                              Screening for malignant   
neoplasm of colon                                   [facility]  
   
                                                    Start:   
12-                              Screening for malignant   
neoplasm of breast                                  [facility]  
   
                                                    Start:   
12-                              Screening for malignant   
neoplasm of cervix                                  [facility]  
   
                                                    Start:   
12-                              Screening for malignant   
neoplasm of cervix        Pap Smear                 [facility]  
   
                                                    Start:   
12-                              DTaP,Tdap and Td   
Vaccines (1 - Tdap)                     DTaP,Tdap and Td Vaccines   
(1 - Tdap)                              [facility]  
   
                                                    Start:   
12-                              Hepatitis A (HAV)   
Vaccine (optional start   
19+ years)                              Hepatitis A (HAV) Vaccine   
(optional start 19+ years)              [facility]  
   
                                                    Start:   
12-                Hepatitis B vaccination   Hepatitis B (HBV) Vaccine   
(1 of 3 - 19+ 3-dose   
series)                                 [facility]  
   
                                                    Start:   
12-          Adult BMI Screening Adult BMI Screening [facility]  
   
                                                    Start:   
12-          Hepatitis C screening Hepatitis C Antibody [facility]  
   
                                                    Start:   
12-                              Tetanus + diphtheria +   
acellular pertussis   
vaccine (product)         Tdap Booster              [facility]  
   
                                                    Start:   
12-          HIV screening       HIV Test            [facility]  
   
                                                    Start:   
12-          Depression Screening Depression Screening [facility]  
   
                                                    Start:   
12-          Tobacco Screening   Tobacco Screening   [facility]  
   
                                                    Start:   
12-                              Pneumococcal   
vaccination                                         [facility]  
   
                                                    Start:   
06-          COVID-19 Vaccine (#1) COVID-19 Vaccine (#1) [facility]  
   
                                                    Start:   
1969                              Diabetic retinal eye   
exam                      Eye Exam                  [facility]  
   
                                                    Start:   
1969          Glaucoma screening  Eye Exam            [facility]  
   
                                                    Start:   
1969          Lipid panel         Lipid Profile       [facility]  
   
                                                    Start:   
1969                              Urine screening for   
protein                                             [facility]  
   
                                                    Start:   
1969                              COVID-19 Vaccine   
([id number] formulation)                 COVID-19 Vaccine   
([id number] formulation)                 [facility]  
   
                                                    Start:   
1969                              Cyanocobalamin vitamin   
b-12                      Vitamin B12               [facility]  
   
                                                    Start:   
1969                              Diabetic foot   
examination               Foot Exam                 [facility]  
   
                                                    Start:   
1969                Hepatitis B vaccination   Hepatitis B (HBV) Vaccine   
(1 of 3 - 3-dose series)                [facility]  
   
                                                    Start:   
1969                              Pulmonary Function   
Testing                   Pulmonary Function Testing [facility]  
   
                                                    Start:   
1969                              Screening for malignant   
neoplasm of colon                                   [facility]  
   
                                       Albumin/Globulin ratio              [facility]  
   
                                       Albumin/Globulin ratio              [facility]  
   
                                       Anion gap measurement              [facility]  
   
                                       Anion gap measurement              [facility]  
   
                                       Anion gap measurement              [facility]  
   
                                                            Bacteria identified   
in   
Blood by Culture                                    [facility]  
   
                                                            Bacteria identified   
in   
Blood by Culture                                    [facility]  
   
                                                            Bacteria identified   
in   
Unspecified specimen by   
Aerobe culture                                      [facility]  
   
                                                            Bacteria identified   
in   
Unspecified specimen by   
Anaerobe culture                                    [facility]  
   
                                                      
End: 10-                         Bacteria identified in   
Urine by Culture                        URINE CULTURE Microbiology   
Routine Preop testing 1   
Occurrences starting   
2023 until   
10/03/2023                              [facility]  
   
                                                    Comment on   
above:                                  1 Occurrences starting 2023 until   
10/03/2023   
   
                                                      
End: 2024                         Bacteria identified in   
Urine by Culture                        URINE CULTURE (UROLOGY   
ONLY) Microbiology Routine   
Renal stones 1 Occurrences   
starting 2023 until   
2024                              [facility]   
[facility]  
Work Phone:   
[phone]  
   
                                                    Comment on   
above:                                  1 Occurrences starting 2023 until   
2024   
   
                                                            Bacteria identified   
in   
Urine by Culture                                    [facility]  
   
                                                            Bacteria identified   
in   
Urine by Culture                                    [facility]   
SYSTEM  
Work Phone:   
[phone]  
   
                                                            Basic metabolic 2000  
   
panel - Serum or Plasma                 BASIC METABOLIC PANEL Lab   
Routine Daily until   
discontinued starting   
2023, 4 completed                 [facility]   
SYSTEM  
Work Phone:   
[phone]  
   
                                                    Comment on   
above:                                  Daily until discontinued starting 2023, 4 completed   
   
                                                      
End: 10-                         Basic metabolic 2000   
panel - Serum or Plasma                 BASIC METABOLIC PANEL Lab   
Routine Preop testing 1   
Occurrences starting   
2023 until   
10/03/2023                              [facility]   
SYSTEM  
Work Phone:   
[phone]  
   
                                                    Comment on   
above:                                  1 Occurrences starting 2023 until   
10/03/2023   
   
                                                            Basophils [#/volume]  
 in   
Blood by Automated   
count                                               [facility]  
   
                                                            Basophils [#/volume]  
 in   
Blood by Automated   
count                                               [facility]  
   
                                                            Basophils/100   
leukocytes in Blood by   
Automated count                                     [facility]  
   
                                                            Basophils/100   
leukocytes in Blood by   
Automated count                                     [facility]  
   
                                                      
End: 10-                         CBC panel - Blood by   
Automated count                         COMPLETE BLOOD COUNT Lab   
Routine Preop testing 1   
Occurrences starting   
2023 until   
10/03/2023                              [facility]  
   
                                                    Comment on   
above:                                  1 Occurrences starting 2023 until   
10/03/2023   
   
                                                      
End: 2024                         CT Abdomen and Pelvis   
WO contrast                                         [facility]   
SYSTEM  
Work Phone:   
[phone]  
   
                                                    Comment on   
above:                                  1 Occurrences starting 2024 until   
2024   
   
                                                            Cysto w/ureteroscopy  
   
w/lithotripsy                           CYSTOURETHROSCOPY   
W/URETEROSCOPY &/OR   
PYELOSCOPY; W/LITHOTRIPSY   
Procedures Routine Renal   
stones Ordered: 2024              [facility]  
   
                                                    Comment on   
above:                                  Ordered: 2024   
   
                                                      
End: 2024                         Ecg routine ecg w/least   
12 lds trcg only w/o   
i&r                                     EKG 12 LEAD - PERFORM MUSE   
Routine Preop testing 1   
Occurrences starting   
2024 until   
2024                              [facility]   
SYSTEM  
Work Phone:   
[phone]  
   
                                                    Comment on   
above:                                  1 Occurrences starting 2024 until   
2024   
   
                                                            Eosinophils/100   
leukocytes in Blood by   
Automated count                                     [facility]  
   
                                                            Eosinophils/100   
leukocytes in Blood by   
Automated count                                     [facility]  
   
                                                            Erythrocyte   
distribution width   
[Ratio] by Automated   
count                                               [facility]  
   
                                                            Erythrocyte   
distribution width   
[Ratio] by Automated   
count                                               [facility]  
   
                                                            Erythrocytes [#/volu  
me]   
in Blood                                            [facility]  
   
                                                            Erythrocytes [#/volu  
me]   
in Blood                                            [facility]  
   
                                                            Globulin [Mass/volum  
e]   
in Serum                                            [facility]  
   
                                                            Globulin [Mass/volum  
e]   
in Serum                                            [facility]  
   
                                                      
End: 10-                         Glucose blood reagent   
strip                                   GLUCOSE, FINGERSTICK-IN   
OFFICE Lab Routine Preop   
testing 1 Occurrences   
starting 2023 until   
10/03/2023                              [facility]  
   
                                                    Comment on   
above:                                  1 Occurrences starting 2023 until   
10/03/2023   
   
                                                      
End: 2024                         Guidance for placement   
of stent in Ureter                                  [facility]   
SYSTEM  
Work Phone:   
[phone]  
   
                                                    Comment on   
above:                                  1 Occurrences starting 2024 until   
2024   
   
                                                            Hematocrit [Volume   
Fraction] of Blood                                  [facility]  
   
                                                            Hematocrit [Volume   
Fraction] of Blood                                  [facility]  
   
                                                            Hemoglobin   
[Mass/volume] in Blood                              [facility]  
   
                                                            Hemoglobin   
[Mass/volume] in Blood                              [facility]  
   
                                                            Leukocytes [#/volume  
]   
corrected for nucleated   
erythrocytes in Blood   
by Automated coun                                   [facility]  
   
                                                            Leukocytes [#/volume  
]   
corrected for nucleated   
erythrocytes in Blood   
by Automated coun                                   [facility]  
   
                                                            Leukocytes [#/volume  
]   
in Blood                                            [facility]  
   
                                                            Leukocytes [#/volume  
]   
in Blood                                            [facility]  
   
                                                            Lymphocytes [#/volum  
e]   
in Blood by Automated   
count                                               [facility]  
   
                                                            Lymphocytes [#/volum  
e]   
in Blood by Automated   
count                                               [facility]  
   
                                                            Lymphocytes/100   
leukocytes in Blood by   
Automated count                                     [facility]  
   
                                                            Lymphocytes/100   
leukocytes in Blood by   
Automated count                                     [facility]  
   
                                                            MCH [Entitic mass] b  
y   
Automated count                                     [facility]  
   
                                                            MCH [Entitic mass] b  
y   
Automated count                                     [facility]  
   
                                                            MCHC [Mass/volume] b  
y   
Automated count                                     [facility]  
   
                                                            MCHC [Mass/volume] b  
y   
Automated count                                     [facility]  
   
                                                            MCV [Entitic volume]  
 by   
Automated count                                     [facility]  
   
                                                            MCV [Entitic volume]  
 by   
Automated count                                     [facility]  
   
                                                            Microscopic observat  
ion   
[Identifier] in   
Unspecified specimen by   
Gram stain                                          [facility]  
   
                                                            Monocytes [#/volume]  
 in   
Blood by Automated   
count                                               [facility]  
   
                                                            Monocytes [#/volume]  
 in   
Blood by Automated   
count                                               [facility]  
   
                                                            Monocytes/100   
leukocytes in Blood by   
Automated count                                     [facility]  
   
                                                            Monocytes/100   
leukocytes in Blood by   
Automated count                                     [facility]  
   
                                                            Neutrophils [#/volum  
e]   
in Blood by Automated   
count                                               [facility]  
   
                                                            Neutrophils [#/volum  
e]   
in Blood by Automated   
count                                               [facility]  
   
                                                            Neutrophils/100   
leukocytes in Blood by   
Automated count                                     [facility]  
   
                                                            Neutrophils/100   
leukocytes in Blood by   
Automated count                                     [facility]  
   
                                                            Nucleated erythrocyt  
es   
[Presence] in Blood by   
Automated count                                     [facility]  
   
                                                            Nucleated erythrocyt  
es   
[Presence] in Blood by   
Automated count                                     [facility]  
   
                                       Patient Education              [facility]  
Work Phone:   
[phone]  
   
                                       Patient referral              [facility]  
Work Phone:   
[phone]  
   
                                                            Platelet mean volume  
   
[Entitic volume] in   
Blood by Automated   
count                                               [facility]  
   
                                                            Platelet mean volume  
   
[Entitic volume] in   
Blood by Automated   
count                                               [facility]  
   
                                                            Platelets [#/volume]  
 in   
Blood                                               [facility]  
   
                                                            Platelets [#/volume]  
 in   
Blood                                               [facility]  
   
                                                      
End: 2024                         RF Guidance for   
exchange of nephrostomy   
tube of Kidney -   
bilateral-- W contrast                              [facility]   
SYSTEM  
Work Phone:   
[phone]  
   
                                                    Comment on   
above:                                  1 Occurrences starting 2024 until   
2024   
   
                                                            URETEROSCOPY,   
LITHOTRIPSY, LASER                      URETEROSCOPY, LITHOTRIPSY,   
LASER Routine scheduled   
Renal stones                            [facility]  
   
                                                      
End: 2024                         Urine pregnancy test   
visual color cmprsn   
meths                                   URINE HCG-IN OFFICE Lab   
Routine One time for 1   
Occurrences starting   
2024 until   
2024                              [facility]   
SYSTEM  
Work Phone:   
[phone]  
   
                                                    Comment on   
above:                                  One time for 1 Occurrences starting  until 2024   
   
                                                      
End: 2024                         Urine pregnancy test   
visual color cmprsn   
meths                                   URINE HCG-IN OFFICE Lab   
Routine One time for 1   
Occurrences starting   
2024 until   
2024                              [facility]   
SYSTEM  
Work Phone:   
[phone]  
   
                                                    Comment on   
above:                                  One time for 1 Occurrences starting  until 2024   
   
                                                      
End: 2024                         Urine pregnancy test   
visual color cmprsn   
meths                                               [facility]   
SYSTEM  
Work Phone:   
[phone]  
   
                                                    Comment on   
above:                                  One time for 1 Occurrences starting 02/2  
3/2024 until 2024   
   
                                                            Urnls dip stick/tabl  
et   
rgnt auto w/o   
microscopy                              URINALYSIS,AUTO-IN OFFICE   
Lab Routine Renal stones   
Ordered: 10/02/2023                     [facility]   
SYSTEM  
Work Phone:   
[phone]  
   
                                                    Comment on   
above:                                  Ordered: 10/02/2023   
   
                                                      
End: 2025                         X-ray urinary tract   
exam with contrast   
material                                CONTRAST X-RAY URINARY   
TRACT. [id number] Procedures   
Routine Renal stones 1   
Occurrences starting   
2024 until   
2025                              [facility]  
   
                                                    Comment on   
above:                                  1 Occurrences starting 2024 until   
2025   
   
                                                      
End: 2025                         X-ray urinary tract   
exam with contrast   
material                                CONTRAST X-RAY URINARY   
TRACT. [id number] Procedures   
Routine Renal stones 1   
Occurrences starting   
2024 until   
2025                              [facility]   
SYSTEM  
Work Phone:   
[phone]  
   
                                                    Comment on   
above:                                  1 Occurrences starting 2024 until   
2025   
  
  
  
Immunizations  
  
  
                      Immunization Date Immunization Notes      Care Provider [facility]  
   
                      2023 Hemoglobin A1C            Alberto Wylie RN [facility]  
   
                          2023   Hemoglobin A1C              Ramo Dexter MD  
Work Phone:   
[phone]                          [facility]  
   
                                        10-          COVID-19 mRNA,   
Comirnaty (Pfizer)                                  MD Jill Rodriguez  
Work Phone:   
[phone]                          [facility]  
   
                                        10-          influenza, injectabl  
e,   
quadrivalent,   
preservative free                                   MD Jill Rodriguez  
Work Phone:   
[phone]                          [facility]  
   
                                        10-          influenza virus   
vaccine, unspecified   
formulation                                         Ramo Dexter MD  
Work Phone:   
[phone]                          [facility]  
   
                                        11-          influenza, high dose  
   
seasonal,   
preservative-free                                   MARYCRUZ Iniguez MD  
Work Phone:   
[phone]                          [facility]  
  
  
  
Payers  
  
  
                          Date         Payer Category Payer        Policy ID  
   
                          2025   Unknown                   [id number]  
   
                          2024   Self-pay                  [id number]  
[id number]  
[id number]  
   
                                2023      Medicaid        [employer]   
ANTH   
MEDICAID [id number]   
2023-Present [address]   
[address] [place] [address]   
[address] Medicaid O                      [id number]  
[id number]  
   
                                2023      Medicaid (Managed Care) [employer]  
YASMINE Member   
Subscriber Plan / Payer   
(Effective   
2023-Present) Name:   
Shruthi Cris Member ID:   
[id number] Relation to   
Subscriber: Self Name:   
Cris Hearn Subscriber   
ID: [id number] Payer   
ID: [id number] (NAIC) Group ID:   
[id number] Type: Medicaid   
HMO Address: [address] [address] [address]                [id number]  
[id number]  
   
                          2023   Medicaid                  [id number]   
[id number]  
   
                          2023   Medicaid                  [id number]  
   
                          1970   Unknown                   [id number]   
[id number]  
727  
   
                          1970   Unknown                   [id number]   
[id number].  
727  
   
                          1970   Unknown                   [id number]   
[id number].  
727  
   
                          1970   Unknown                   [id number]   
[id number].  
727  
   
                          1970   Unknown                   [id number]   
[id number].  
727  
   
                          1970   Unknown                   [id number]   
[id number].  
727  
   
                          1970   Unknown                   [id number]   
[id number].  
727  
   
                          1970   Unknown                   [id number]   
[id number].  
727  
   
                          1970   Unknown                   [id number]   
[id number].  
727  
   
                          1970   Unknown                   [id number]   
[id number].  
727  
   
                          1970   Unknown                   [id number]   
[id number].  
732  
   
                          1969   Unknown                   [id number]   
[id number].  
732  
   
                          1970   Unknown                   [id number]   
[id number].  
732  
   
                          1970   Unknown                   [id number]   
[id number].  
732  
   
                          1969   Unknown                   [id number]   
[id number].  
732  
   
                          1970   Unknown                   [id number]   
[id number].  
732  
   
                          1970   Unknown                   [id number]   
.[id number].  
732  
   
                          1969   Unknown                   [id number]   
[id number].  
732  
   
                          1969   Unknown                   [id number]   
[id number].  
732  
   
                          1969   Unknown                   [id number]   
[id number].  
732  
   
                          1969   Unknown                   [id number]   
[id number]   Unknown                   [id number]   
[id number].  
732  
   
                          1969   Unknown                   [id number]   
[id number].  
732  
   
                          1969   Unknown                   [id number]   
[id number].  
732  
   
                          1969   Unknown                   [id number]   
[id number].  
732  
   
                          1969   Unknown                   [id number]   
[id number].  
732  
   
                          1970   Unknown                   [id number]   
[id number].  
732  
   
                          1970   Unknown                   [id number]   
[id number].  
732  
   
                          1969   Unknown                   [id number]   
[id number].  
732  
   
                          1970   Unknown                   [id number]   
[id number].  
732  
   
                          1969   Unknown                   [id number]   
[id number].  
732  
   
                          1969   Unknown                   [id number]   
[id number].  
732  
   
                          1969   Unknown                   [id number]   
[id number].  
732  
   
                          1969   Unknown                   [id number]   
[id number].  
727  
   
                          1969   Unknown                   [id number]   
[id number].  
727  
   
                          1960   Self-pay                  [id number]  
   
                          1961   Unknown                   [id number]  
   
                                       Medicaid                  [id number]   
[id number]  
[id number]  
   
                                       Unknown                   [id number]   
[id number].  
531  
   
                                       Unknown                   [id number]   
[id number].  
531  
   
                                       Unknown                   [id number]   
[id number].  
531  
  
  
  
Social History  
  
  
                          Date         Type         Detail       Facility  
   
                                                    Start:   
2022                              Tobacco smoking status   
[facility]                                    Unknown if ever   
smoked                                  [facility]  
   
                                                    Start:   
1970          Sex Assigned At Birth Female              [facility]  
   
                                                    Start:   
2019  
End: 2023     Sex Assigned At Birth                     [facility]  
   
                                                    Start:   
1970          Sex Assigned At Birth Not on file         [facility]  
   
                                                    Start:   
2023  
End: 2025                         Tobacco smoking status   
NHIS                      Smoker (finding)          [facility]  
   
                                                    Start:   
2024  
End: 05-                         Tobacco smoking status   
NHIS                      Smokes tobacco daily      [facility]  
Work Phone:   
[phone]  
   
                                       History of tobacco use Cigarette Smoker M  
[facility]  
   
                                                    Start:   
2019  
End: 2024                         Cigarettes smoked current   
(pack per day) - Reported 1                         [facility]  
   
                                                    Start:   
2024  
End: 05-           Tobacco use and exposure  Smokeless tobacco   
non-user                                [facility]  
   
                                                    Start:   
2024  
End: 10-     Alcohol intake      Ex-drinker (finding) [facility]  
   
                                                            Within the last year  
, have   
you been afraid of your   
partner or ex-partner?    No                        [facility]  
   
                                                            (I/We) worried wheth  
er   
(my/our) food would run   
out before (I/we) got   
money to buy more.        Never true                [facility]  
   
                                                            In the past 12 month  
s, has   
lack of transportation   
kept you from medical   
appointments or from   
getting medications?      No                        [facility]  
   
                                                    Start:   
2019  
End: 2025     Sex                 Female (finding)    [facility]  
   
                                                    Start:   
2025                              Tobacco smoking status   
NHIS                      Ex-smoker (finding)       [facility]  
   
                                                Tobacco smoking status No Smokin  
g Status   
Entered                                 [facility]  
Work Phone:   
[phone]  
  
  
  
Medical Equipment  
  
  
                                Procedure Code  Equipment Code  Equipment Origin  
al   
Text                                    Equipment   
Identifier                              Dates  
   
                                                                Ureteral/Percufl  
ex   
Glide X28cm Ea1   
[id number] -   
[id number]                [id number]                Start:   
2023  
   
                                                                Stent Percuflex   
Plus [id number] Ea1   
[id number] -   
[id number]                [id number]                Start:   
2024  
   
                                                                Ureteral Stent   
Glidex 26cm Ea1   
[id number] -   
[id number]                [id number]                Start:   
2024  
   
                                        Comment on above:   Description: Left in  
 place. Right removed  
   
   
                                                                Stent Percuflex   
Plus [id number] Ea1   
[id number] -   
[id number]                [id number]                Start:   
2024  
   
                                                                Ureteral Stent   
Glidex 26cm Ea1   
[id number] -   
[id number]                [id number]                Start:   
2024  
   
                                        Comment on above:   Description: Left in  
 place. Right removed  
   
   
                                                                Ureteral/Percufl  
ex   
Glide X28cm Ea1   
[id number] -   
[id number]                [id number]                Start:   
2024  
   
                                                                Ureteral/Percufl  
ex   
Glide X28cm Ea   
[id number] -   
[id number]                [id number]                Start:   
2024  
  
  
  
Goals  
  
  
                                Date            Patient Goal    Desired Activity  
/State  
   
                                                                  
  
  
  
Functional Status  
  
  
                          Date         Assessment   Result       Facility  
   
                          2025   Functional Status N/A          [facility]  
   
                          2025   Functional Status              [facility]  
   
                          2025   Functional status Patient Not at Baseline  
 [facility]  
Work Phone: [phone]  
   
                          10-   Functional status Patient at Baseline [facility]  
Work Phone: [phone]  
   
                          2023   Functional status Patient at Baseline [facility]  
Work Phone: [phone]  
  
  
  
Mental Status  
  
  
                          Date         Assessment   Result       Facility  
   
                                2025      Cognitive function Cognitive Sta  
tus Patient at   
Baseline                                [facility]  
Work Phone: [phone]  
   
                                10-      Cognitive function Cognitive Sta  
tus Patient at   
Baseline                                [facility]   
Ctr  
Work Phone: [phone]  
   
                                2023      Cognitive function Cognitive Sta  
tus Patient at   
Baseline                                [facility]  
Work Phone: [phone]  
  
  
  
Clinical Notes 01- to 2025  
  
  
                                Note Date & Type Note            Facility  
   
                                                    2025 Hospital Discharg  
e   
instructions                              
  
  
Patient Education  
  
  
2025 16:11:10  
  
  
Core Measures: Stroke   
(Cerebrovascular Accident) [facility]   
[place]  
  
  
Stroke (Cerebrovascular   
Accident)  
  
A stroke is acute death of brain   
tissue, and it is a neurologic   
emergency. A stroke can cause   
permanent loss of function of   
the central nervous system   
(brain). If the symptoms of a   
stroke end without complications   
in 24 hours, it is diagnosed as   
a transient ischemic attack   
(TIA). If the symptoms are not   
resolved within 24 hours, it is   
defined as a stroke.  
  
CAUSES  
A stroke is caused by a decrease   
of oxygen supply to an area of   
your brain. It is usually the   
result of a small blood clot or   
hardening of the arteries.   
Blockages in, or damage to, the   
carotid arteries leading to the   
brain can also cause a stroke.   
Bleeding in the brain can cause,   
or accompany, a stroke.  
  
SYMPTOMS  
These symptoms usually develop   
suddenly (or may be newly   
present upon awakening from   
sleep):  
Loss of vision.  
Double vision.  
Confusion.  
Numbness or weakness on one side   
of the face or body.  
Inability to speak (aphasia).  
  
DIAGNOSIS  
Your caregiver can often   
determine the presence or   
absence of a stroke based on   
your symptoms, history, and   
examination. A CT scan of the   
brain is usually performed to   
confirm the stroke, look for   
causes, and determine the   
severity. Other tests may be   
done to find the cause of the   
stroke, including:  
An EKG and heart monitoring.  
An echocardiogram (ultrasound   
evaluation of the heart).  
An ultrasound evaluation of your   
carotid arteries.  
Determination of blood oxygen   
level and blood tests.  
  
PREVENTION  
The likelihood of a stroke can   
be decreased by appropriate   
treatment of high blood   
pressure, high cholesterol,   
diabetes, and by stopping   
smoking.  
  
RISK FACTORS: If you have been   
told by your doctor or nurse   
practitioner that you have any   
of the following risk factors   
for stroke, work with your   
health care professional to   
control them.  
  
  
High Blood Pressure: High blood   
pressure is one of the main   
causes of stroke. It is the most   
important risk factor to   
control. Take your blood   
pressure medication, lose   
weight, increase your activity,   
and limit your salt intake to   
help control your blood   
pressure.Take your your blood   
pressure and write it down and   
then take them to your next   
doctor's appointment.  
  
Smoking: If you smoke: QUIT! We   
can help. Please call   
[place] Smoking Cessation   
Program at [phone] ([facility]   
or [phone], ext. [phone]  
  
Diabetes: Work with your   
healthcare professional to keep   
your blood sugar under control.   
Check your blood sugar and take   
the results to your next   
doctor's visit. Take your   
medications as directed. Eating   
a healthy diet and exercising   
will also help keep your   
diabetes under control. For   
information on [facility]   
[facility] please   
call, [phone].  
  
Carotid or other Artery   
Diseases: The carotid arteries   
in your neck carry blood to the   
brain. A stroke can be caused by   
a blood clot blocking an artery   
that has been damaged by a fatty   
buildup inside the artery wall.   
Discuss ways to manage this with   
your health care provider.  
  
Atrial Fibrillation (A Fib): In   
A fib, your heart does not have   
a normal beat. This may allow   
clots to form and puts you at a   
greater risk for having a   
stroke. Work with your health   
care provider to control your A   
fib. Your doctor may order   
special medication that helps   
prevent clots from forming.  
  
High blood cholesterol or high   
blood fats: High cholesterol   
increases your risk of stroke.   
Exercise regularly, but talk to   
your health care provider first.   
A diet low in fat and   
cholesterol can help. If you   
have any questions about a low   
fat, low cholesterol diet, you   
can call our [facility]   
dietician at [phone] Ext.   
[phone]. The goal for total   
cholesterol is less than 200,   
and for LDL or the bad   
cholesterol is less than 100.  
  
Lifestyle Management: You   
increase your risk of stroke if   
you are overweight or obese, are   
not very active, or drink too   
much alcohol. Enjoy a diet rich   
in fruits and vegetables.   
Exercise regularly and drink   
alcohol in moderation or no more   
than two drinks a day for men   
and no more than one drink a day   
for non-pregnant women, or don't   
drink at all. This will help   
decrease your risk of stroke.  
  
Oral Contraceptives: Taking   
birth control pills or  the   
pill  can be a risk factor for   
stroke especially if you smoke.   
Discuss using the oral   
contraceptives and your risk of   
stroke with your health care   
professional.  
  
  
TREATMENT  
TIME IS OF THE ESSENCE!   
Medications to dissolve a blood   
clot can only be used within   
four and a half hours of the   
onset of symptoms. After that   
time, treatment of stroke   
depends on duration of symptoms,   
severity, and cause. Medications   
and diet measures may be used to   
address diabetes, high blood   
pressure, and other risk   
factors. Physical therapy,   
speech therapy, and occupational   
therapy specialists will assess   
you and work to improve any   
functions impaired by the   
stroke. Measures will be taken   
to prevent short and long term   
complications, including   
aspiration pneumonia, blood   
clots in the legs, bedsores, and   
falls.  
  
HOME CARE INSTRUCTIONS  
Care at home after a stroke can   
be complicated.  
Medications Blood thinners may   
be used to prevent another   
stroke. Blood thinners need to   
be used exactly as instructed.   
Medicines may also be used to   
control risk factors for a   
stroke. Be sure you understand   
all your medication   
instructions. It is very   
important to not run out of your   
medicine. Get more while you   
still have a one-week supply. Do   
not stop taking your medicine   
without speaking to your   
healthcare professional. Take   
all of your medications or an   
updated list of your medications   
to all of your doctor's   
appointments.  
Physical, occupational, and   
speech therapy Ongoing therapy   
is often necessary to maximize   
recovery after a stroke. If you   
have been advised to use a   
walker or a cane, use it at all   
times. Be sure you keep your   
therapy appointments.  
Diet Certain diets may be   
prescribed to address high blood   
pressure, high cholesterol, or   
diabetes. Foods may need to be a   
special consistency (soft,   
pureed, small bites) to avoid   
food going into your lungs or   
choking.  
Home safety A safe home   
environment is important to   
reduce the risk of falls. Your   
caregiver may arrange for   
specialists to evaluate your   
home. Grab bars in the bedroom   
and bathroom are often   
important. Your caregiver may   
arrange for special equipment to   
be used at home, such as raised   
toilets and a seat for the   
shower.  
  
It s important to know and   
control your risk factors, but   
it is also important to   
recognize the signs and symptoms   
of stroke/TIA and know what to   
do:  
  
Call 911 if any of these things   
happen:  
Sudden numbness or weakness of   
the face, arm, or leg especially   
on one side of the body.  
Sudden confusion, trouble   
speaking, or understanding.  
Sudden trouble seeing in one or   
both eyes.  
Sudden trouble walking,   
dizziness, loss of balance or   
coordination  
Sudden severe headache with no   
known cause  
  
* It is very important for you   
to follow-up with your Primary   
Care Doctor and your Neurologist   
after you go home. Make sure   
that you keep your doctor   
visits.  
  
** Remember: TIME LOST is BRAIN   
LOST**  
  
  
Resources: for more information   
on strokes, log onto   
[email or web address] or   
www.strokeassociation.org or   
call the American Heart   
Association at (312) 321- 1006.  
  
Revised 2019  
  
  
  
Follow Up Care  
  
  
2025 15:58:55  
  
  
With:Recommend referral to   
nephrology in the outpatient   
setting for your acute kidney   
injury  
Address:Unknown  
When:  
Unknown  
  
  
With:Follow-up with [facility]   
urology who placed your   
nephrostomy tube  
Address:Unknown  
When:  
Unknown  
  
  
With:LORENE BARRERA  
Address:  
[address] [place] [address]-  
[phone] Business (1)  
  
When:  
Unknown  
Comments:Call for followup   
appointment  
  
  
With:Nahum Pérez MD, NEU  
Address:  
[address] [place] [address]-  
[phone]  
  
When:2 to 4 weeks                       [facility]  
Work Phone:   
[phone]  
   
                                        2025 Note     Discharge Summary  
Admission and Discharge   
Information  
Admit Date/Time:2025 10:29  
Admitting Physician -  
Santos Gandara DO  
Consulting Physician -  
Nahum Pérez MD  
Admitting Diagnoses:  
Discharge Order Date  
Discharge Patient - Ordered  
-- 25 16:15:00 EST, to   
[facility]  
Discharge Diagnoses  
1. Metabolic encephalopathy,   
2025  
2. History of stroke, 2025  
3. Acute kidney injury   
superimposed on chronic kidney   
disease, 2025  
4. UTI (urinary tract   
infection), 2025  
Chronic kidney disease,   
unspecified, 2025  
Potential stroke, 2025  
Weakness or fatigue, 2025  
Hospital Course  
Significant Findings  
Please refer to history and   
physical details of admission.  
Patient presented to the   
emergency room    
because of the sharp pain on her   
left side. It was a 10 out of 10   
to the point where she could not   
walk. She reported nausea and   
vomiting before she came to the   
ER but no fevers, chills or   
sweats although she says she   
feels cold all the time. She has   
nephrostomy placed couple years   
ago at [facility] on the left side.   
No urinary complaints. In   
reviewing the ER note from   
 they noted that from   
[facility]   
where she was residing and doing   
rehab there was concern for   
right-sided weakness and family   
noted that she has some slurred   
speech and increased drowsiness.   
She was recently admitted to   
[facility] twice within   
the past month and placed on   
antibiotics for sepsis and   
discharged home. Granulation she   
was too weak when she was   
discharged home so she went back   
to [place] and transferred to   
[facility] where she was   
there for several days and   
discharged at [facility].   
Anyway ER gave her NIH score of   
4 and stroke center activated   
was contacted. I talked with a   
Dr. Price and felt that the   
altered mental status is   
probably metabolic   
encephalopathy. She had an   
elevated creatinine of 4.1 so   
they did not recommend doing a   
CT of the head neck and she is   
not a thrombolytic candidate as   
she is on Eliquis. ER contacted   
a Dr. Dillon who accepted the   
patient in transfer at [facility].   
Because she has a history of   
drug-resistant UTIs in the past   
he was recommended to place the   
patient on ertapenem. She said   
the ED for over 40 hours as   
there was no beds available at   
[facility]. She is admitted to the   
medical service.  
On the medical service patient   
was admitted for metabolic   
encephalopathy but she appeared   
to be back at her baseline.   
There was a history of a stroke   
so I consulted neurology and we   
did an MRI of the brain without   
contrast that came back   
nonacute. She has this acute   
kidney injury and it was unknown   
if she had chronic kidney   
disease I initially met her. I   
did give her a liter normal   
saline and saline locked   
afterwards. With the concern for   
drug-resistant bacteria and UTI   
history in the past, I continued   
the ertapenem started in the ED.  
Neurology saw the patient after   
the MRI and feels that she needs   
outpatient follow-up with   
neurology to determine if she   
needs a multiple sclerosis   
disease modifying medication.   
Will continue her Eliquis and   
pravastatin. She does have an   
aspirin allergy. With regards to   
the UTI, urine culture negative.   
With fluid hydration her serum   
creatinine did decrease to 3.3   
today. We canceled the transfer   
to [facility] as she had no pain and   
was anxious for discharge. She   
wanted to go home however the   
son wanted her to go to an ECF.   
She was accepted at [facility].  
With regards to her acute kidney   
injury with possible chronic   
kidney disease, I had no   
baseline creatinine. I did   
sideline nephrology and felt it   
was more dehydration at this   
time. She does not see an   
outpatient nephrologist for her   
son so I recommended she   
follow-up with her PCP and he   
can refer her to nephrology. She   
is to avoid all nephrotoxic type   
medications.  
NOTE -it took 40 minutes to   
evaluate and coordinate patient   
for discharge  
Procedures and Treatment   
Provided  
1. CT of the head without   
contrast 2025  
2. Neurology consultation  
3. MRI of the brain without   
contrast 2025  
Services Consulted  
- Completed  
-- 25 13:05:21 EST  
Consult to Dietitian Adult -   
Ordered  
-- 25 13:05:21 EST  
Consult to Neurology - Ordered  
-- 25 10:40:00 EST,   
Stroke, Consult and Co-manage  
Consult to Neurology - Ordered  
-- 25 16:52:00 EST, ?   
stroke vs acute metabolic   
encephalopathy secondary to UTI,   
Consult and Co-manage  
 Consult -   
Completed  
-- 25 13:05:21 EST  
Physical Exam  
Vitals & Measurements  
T: 36.6 ???C(Axillary) TMIN:   
36.6 ???C(Axillary) TMAX: 38.2   
???C(Axillary) HR: 77(Monitored)   
RR: 18 BP: 127/78 SpO2: 93% WT:   
122.7 kg  
Constitutional: Awake and alert;   
oriented x 3 with no apparent   
distress or respiratory distress  
Head/neck: Neck supple with no   
palpable lymphadenopathy, bruits   
or masses; trachea midline  
Chest/lungs: Clear to   
auscultation bilaterally no   
wheezes or rhonchi noted   
bilaterally  
Cardiovascular: Regular rate and   
rhythm; (more content not   
included)...                            [facility]  
   
                                        Comment on above:   Result Comment: Elec  
tronically Signed By: Santos Gandara DO.br\Date and Time Signed: 25 16:25   
EST   
   
                                        2025 Note     Progress Note-Physic  
kassie  
Assessment/Plan  
54-year-old female admitted for   
acute metabolic encephalopathy   
which may be secondary to a   
urinary tract infection and   
acute kidney injury superimposed   
on chronic kidney disease. There   
was a questionable stroke   
although patient denies this.   
She has multiple comorbidities   
including MS, A-fib on Eliquis,   
COPD 3 L nasal cannula   
continuous, history of asthma,   
hypertension, history of kidney   
stones with a nephrostomy tube   
placed at [facility] on the left side   
2 years ago this this August,   
type 2 diabetes on insulin and   
chronic kidney disease.  
1. Metabolic encephalopathy   
(G93.41: Metabolic   
encephalopathy)  
- Multifactorial; may be   
secondary to urinary tract   
infection plus acute kidney   
injury superimposed on chronic   
kidney disease  
- Appears to be back to her   
baseline mentation wise  
Ordered:  
[facility] Care/Day Moderate   
35 Minutes 62362  
  
2. History of stroke (Z86.73:   
Personal history of transient   
ischemic attack (TIA), and   
cerebral infarction without   
residual deficits)  
- MRI of the brain without   
contrast shows no acute infarct   
or intracranial hemorrhage   
identified; mild to moderate   
nonspecific white matter changes  
- Neurology recommends   
outpatient follow-up to   
determine if she needs a MS   
Disease modifying medication; no   
further recommendations at this   
time  
Ordered:  
[facility] Hospital Care/Day Moderate   
35 Minutes 92799  
  
3. Acute kidney injury   
superimposed on chronic kidney   
disease (N17.9: Acute kidney   
failure, unspecified)  
- Serum creatinine at 3.3 which   
is improved; no baseline  
- Check labs in a.m.  
Ordered:  
[facility] Care/Day Moderate   
35 Minutes 22099  
  
4. UTI (urinary tract infection)   
(N39.0: Urinary tract infection,   
site not specified)  
- Continue ertapenem day 7  
- According to [facility] hospitalist   
who accepted her, she has a   
history of multidrug-resistant   
bacteria in her urine cultures   
so he recommended ertapenem  
- Urine culture no growth to   
date  
Ordered:  
[facility] Care/Day Moderate   
35 Minutes 80828  
  
Chronic kidney disease,   
unspecified (N18.9: Chronic   
kidney disease, unspecified)  
  
Orders:  
Basic Metabolic Panel  
Capillary Glucose POC  
eGFR  
Referral to Resource Center  
PLAN:  
1. As outlined above  
2. Will stop the ertapenem after   
today as urine culture is no   
growth to date  
3. Check lab in a.m.  
4. DuoNeb 4 times daily as   
needed given her COPD history  
5. Maintain amiodarone,   
apixaban, Pravachol,   
amitriptyline and duloxetine  
6. Sliding scale insulin for   
coverage for her diabetes  
7. Continue physical therapy  
8. Pain control as needed  
9. DVT prophylaxis with SCDs and   
apixaban  
10. Full CODE STATUS  
11. Discharge planning pending   
pre-CERT to [facility]  
Subjective  
Patient seen and examined   
earlier today  
Complains of just feeling tired   
and fatigued; no other   
complaints  
Review of Systems  
Constitutional: no fever, no   
chills, no sweats, no weakness  
Respiratory: no shortness of   
breath, no cough, no orthopnea,   
no wheezing  
Cardiovascular: no chest pain,   
no palpitations, no edema  
Additional ROS info: Except as   
noted in the above Review of   
Systems and in the History of   
Present Illness all other   
systems have been reviewed and   
are negative or noncontributory.  
Objective  
Vitals & Measurements  
T: 36.6 ???C(Axillary) TMIN:   
36.6 ???C(Axillary) TMAX: 38.2   
???C(Axillary) HR: 77(Monitored)   
RR: 18 BP: 127/78 SpO2: 93% WT:   
122.7 kg  
Intake & Output  
  
This visit (24 hour periods   
starting at 07:00 EST)  
  
25 *  
25  
Total Summary  
  
Intake mL  
--  
229  
206  
Output mL  
--  
900  
1,950  
Fluid Balance  
--  
-671  
-1,744  
Intake (2)  
  
Oral Intake mL  
--  
220  
200  
heparin flush mL  
--  
9  
6  
Total  
--  
229  
206  
Output (1)  
  
Urine Voided mL  
--  
900  
1,950  
Total  
--  
900  
1,950  
Counts (0)  
  
* This column has not completed   
the indicated time period.  
Physical Exam  
Constitutional: Awake and alert;   
oriented x 3 with no apparent   
distress or respiratory distress  
Head/neck: Neck supple with no   
palpable lymphadenopathy, bruits   
or masses; trachea midline  
Chest/lungs: Clear to   
auscultation bilaterally no   
wheezes or rhonchi noted   
bilaterally  
Cardiovascular: Regular rate and   
rhythm; normal S1-S2 with no   
murmur; no pitting edema and 1+   
pulses bilaterally  
Gastrointestinal: Soft,   
nontender, nondistended,   
positive bowel sounds; obese  
Neurological: Nonfocal; cranial   
nerves II through XII appear   
intact  
Psychological: Pleasant affect  
Lab Results  
Glucose Lvl: 103 mg/dL (25   
05:45:00)  
BUN: 32 mg/dL High (25   
05:45:00)  
Creatinine: 3.3 mg/dL High   
(25 05:45:00)  
eGFR: 16 mL/min/1.73 m2 Low   
(25 05:45:00)  
BUN/Creat Ratio: 10 (25   
05:45:00)  
Sodium Lvl: 137 mmol/L (25   
05:45:00)  
Potassium Lvl: 4 mmol/L   
(25 05:45:00)  
Chloride: 97 mmol/L Low   
(25 05:45:00)  
CO2: 37 mmol/L High (25   
05:45:00)  
AGAP: 7 mEq/L (25   
05:45:00)  
Paulo (more content not   
included)...                            [facility]  
   
                                        Comment on above:   Result Comment: Elec  
tronically Signed By: Santos Gandara DO\.br\Date and Time Signed: 25 15:38   
EST   
  
  
  
                                        2025 Evaluation + Plan note Extrac  
sapna from:  
  
  
  
                                Title:Discharge Note Author:Santos Gandara DO Date:25  
  
  
  
                                                    stable  
  
  
  
Discharge To, Anticipated II -  
Skilled Nursing Unit  
  
Discharged to -  
Rehabilitation unit  
  
Transported by, Anticipated -  
[facility]  
  
[facility]  
  
Discharge Diet(s): Fat Modified- Low cholesterol (25 16:09:00)  
  
  
Prescriptions  
traMADOL 50 mg Tab, 25 mg= 0.5 tab(s), Oral, q12hr, PRN  
  
Home  
acetaminophen 500 mg Tab, 500 mg= 1 tab(s), Oral, q4hr, PRN  
albuterol-ipratropium Inh Sol 3 mL UD, 3 mL, NEB, q6hr  
amiodarone 200 mg Tab, 400 mg= 2 tab(s), Oral, Daily, Pre-arrival medication  
amitriptyline 50 mg Tab, 50 mg= 1 tab(s), Oral, Once a day (at bedtime)  
ammonium lactate Top 12% Lotion, 1 govind, Topical, BID  
Cymbalta 60 mg Cap-DR, 1 cap(s), Oral, Daily, Pre-arrival medication  
Eliquis 5 mg oral tablet, 5 mg= 1 tab(s), Oral, BID  
fluticasone Nasal 0.05 mg/inh Spry, 1 spray(s), Nasal, BID  
Lidoderm, 1 patch(es), Topical, Daily, Not taking  
losartan 25 mg Tab, 25 mg= 1 tab(s), Oral, Daily  
Pantoprazole 40 mg DR Tab, 40 mg= 1 tab(s), Oral, Daily  
Potassium Chloride (Eqv-Klor-Con 10) 10 mEq oral tablet, extended release, 10 
mEq= 1   
tab(s), Oral, Daily  
pravastatin 40 mg Tab, 40 mg= 1 tab(s), Oral, Once a day (at bedtime)  
Proventil HFA 90 mcg/inh Aerosol, 2 puff(s), Inhalation, q4hr, PRN  
senna 8.6 mg Tab, 8.6 mg= 1 tab(s), Oral, Daily  
Sodium Chloride 0.9% IV Sol 250 mL, 0.09 gm= 10 mL, IV Push, Bedtime  
Sodium Chloride 0.9% IV Sol 250 mL, 0.09 gm= 10 mL, IV Push, Bedtime  
traMADol 25 mg oral tablet, 25 mg= 1 tab(s), Oral, q12hr  
Vitamin D 50,000 intl units (1.25 mg) oral capsule, 77994 International_Unit= 1   
cap(s), Oral, qWeek  
  
  
  
REFILL:  
  
1. Tramadol 50 mg 1 p.o. twice daily #10 with no refill  
  
  
With When Contact Information Recommend referral to nephrology in the outpatient
   
setting for your acute kidney injury  
Additional Instructions:  
  
Follow-up with [facility] urology who placed your nephrostomy tube  
Additional Instructions:  
  
LORENE BARRERA  
[address] [place] [id number]-  
[phone] Business (1)  
Additional Instructions: Call for followup appointment  
Beto MATTHEWS, HUMAIRA Sidhu Within 2 to 4 weeks  
[place] [address] [place] [address]-  
[phone]  
Additional Instructions:  
  
  
  
  
Core Measures: [place] [facility] [place]  
  
  
  
  
  
Extracted from:  
  
                                Title:APSO Note Author:Santos Gandara DO  
ate:25  
  
  
  
                                                    54-year-old female admitted   
for acute metabolic encephalopathy which may be   
secondary   
to a urinary tract infection and acute kidney injury superimposed on chronic 
kidney   
disease. There was a questionable stroke although patient denies this. She has   
multiple comorbidities including MS, A-fib on Eliquis, COPD 3 L nasal cannula   
continuous, history of asthma, hypertension, history of kidney stones with a   
nephrostomy tube placed at [facility] on the left side 2 years ago this this August, 
type   
2 diabetes on insulin and chronic kidney disease.  
1. Metabolic encephalopathy (G93.41: Metabolic encephalopathy)  
- Multifactorial; may be secondary to urinary tract infection plus acute kidney   
injury superimposed on chronic kidney disease  
- Appears to be back to her baseline mentation wise  
  
Ordered:  
[facility] Hospital Care/Day Moderate 35 Minutes 40616  
  
2. History of stroke (Z86.73: Personal history of transient ischemic attack 
(TIA),   
and cerebral infarction without residual deficits)  
- MRI of the brain without contrast shows no acute infarct or intracranial 
hemorrhage   
identified; mild to moderate nonspecific white matter changes  
- Neurology recommends outpatient follow-up to determine if she needs a MS  
Disease modifying medication; no further recommendations at this time  
Ordered:  
[facility] Hospital Care/Day Moderate 35 Minutes 56634  
  
3. Acute kidney injury superimposed on chronic kidney disease (N17.9: Acute 
kidney   
failure, unspecified)  
- Serum creatinine at 3.3 which is improved; no baseline  
- Check labs in a.m.  
  
Ordered:  
[facility] Care/Day Moderate 35 Minutes 19638  
  
4. UTI (urinary tract infection) (N39.0: Urinary tract infection, site not 
specified)  
- Continue ertapenem day 7  
- According to [facility] hospitalist who accepted her, she has a history of   
multidrug-resistant bacteria in her urine cultures so he recommended ertapenem  
- Urine culture no growth to date  
  
Ordered:  
[facility] Care/Day Moderate 35 Minutes 28839  
  
Chronic kidney disease, unspecified (N18.9: Chronic kidney disease, unspecified)  
  
Orders:  
Basic Metabolic Panel  
Capillary Glucose POC  
eGFR  
Referral to Resource Center  
  
PLAN:  
1. As outlined above  
2. Will stop the ertapenem after today as urine culture is no growth to date  
3. Check lab in a.m.  
4. DuoNeb 4 times daily as needed given her COPD history  
5. Maintain amiodarone, apixaban, Pravachol, amitriptyline and duloxetine  
6. Sliding scale insulin for coverage for her diabetes  
7. Continue physical therapy  
8. Pain control as needed  
9. DVT prophylaxis with SCDs and apixaban  
10. Full CODE STATUS  
11. Discharge planning pending pre-CERT to [facility]  
  
  
  
  
  
  
  
  
  
  
  
  
  
  
Extracted from:  
  
                                Title:APSO Note Author:Santos Gandara DO  
ate:25  
  
  
  
                                                    54-year-old female admitted   
for acute metabolic encephalopathy which may be   
secondary   
to a urinary tract infection and acute kidney injury superimposed on chronic 
kidney   
disease. There was a questionable stroke although patient denies this. She has   
multiple comorbidities including MS, A-fib on Eliquis, COPD 3 L nasal cannula   
continuous, history of asthma, hypertension, history of kidney stones with a   
nephrostomy tube placed at [facility] on the left side 2 years ago this this August, 
type   
2 diabetes on insulin and chronic kidney disease.  
1. Metabolic encephalopathy (G93.41: Metabolic encephalopathy)  
- Multifactorial; may be secondary to urinary tract infection plus acute kidney   
injury superimposed on chronic kidney disease  
- Appears to be back to her baseline mentation wise  
  
2. History of stroke (Z86.73: Personal history of transient ischemic attack 
(TIA),   
and cerebral infarction without residual deficits)  
- MRI of the brain without contrast shows no acute infarct or intracranial 
hemorrhage   
identified; mild to moderate nonspecific white matter changes  
- Neurology recommends outpatient follow-up to determine if she needs a MS  
Disease modifying medication; no further recommendations at this time  
3. Acute kidney injury superimposed on chronic kidney disease (N17.9: Acute 
kidney   
failure, unspecified)  
- Serum creatinine at 3.4 which is improved; no baseline  
- Check labs in a.m.  
  
4. UTI (urinary tract infection) (N39.0: Urinary tract infection, site not 
specified)  
- Continue ertapenem day 6  
- According to [facility] hospitalist who accepted her, she has a history of   
multidrug-resistant bacteria in her urine cultures so he recommended ertapenem  
- Urine culture no growth to date  
  
Chronic kidney disease, unspecified (N18.9: Chronic kidney disease, unspecified)  
Orders:  
Basic Metabolic Panel  
Capillary Glucose POC  
eGFR  
Referral to Resource Center  
Referral to Resource Center  
  
PLAN:  
1. Continue IV ertapenem  
2. Continue DuoNebs 4 times daily as needed for COPD history  
3. Maintain amiodarone, apixaban, Pravachol as well as amitriptyline and 
duloxetine  
4. Sliding scale insulin coverage for her diabetes  
5. Pain control as needed  
6. DVT prophylaxis with SCDs and apixaban  
7. Full CODE STATUS  
  
Patient anxious for discharge. I do not feel she needs to be transferred to 
[facility] at   
this time as her pain is controlled and urine cultures are no growth. She is   
asymptomatic. I did talk to the patient about going to SNF versus home and she 
like   
to go home with home care. Later in the afternoon I met with the son at the 
bedside   
and he was worried about her going home. I explained to him her updates on her   
condition. She would need to follow-up with urology in the outpatient setting-
she did   
have a follow-up in November but did not have a ride. Also she does not know if 
she   
is seeing a nephrologist. Will check labs in AM. She will need outpatient 
follow-up   
with her PCP who she saw 1 time and may be referral to nephrology.  
  
Looking for SNF placement.  
  
  
  
  
  
  
  
  
  
  
  
  
  
  
Extracted from:  
  
                                Title:APSO Note Author:Santos Gandara DO  
ate:25  
  
  
  
                                                    54-year-old female admitted   
for acute metabolic encephalopathy which may be   
secondary   
to a urinary tract infection and acute kidney injury superimposed on chronic 
kidney   
disease. There was a questionable stroke although patient denies this. She has   
multiple comorbidities including MS, A-fib on Eliquis, COPD 3 L nasal cannula   
continuous, history of asthma, hypertension, history of kidney stones with a   
nephrostomy tube placed at [facility] on the left side 2 years ago this this August, 
type   
2 diabetes on insulin and chronic kidney disease.  
1. Metabolic encephalopathy (G93.41: Metabolic encephalopathy)  
Multifactorial; may be secondary to urinary tract infection plus acute kidney 
injury   
superimposed on chronic kidney disease  
Appears to be back to her baseline mentation wise  
  
Ordered:  
[facility] Hospital Care/Day Moderate 35 Minutes 05783  
  
2. History of stroke (Z86.73: Personal history of transient ischemic attack 
(TIA),   
and cerebral infarction without residual deficits)  
Appreciate neurology input  
MRI of the brain without contrast shows no acute infarct or intracranial 
hemorrhage   
identified; mild to moderate nonspecific white matter changes  
Neurology recommends outpatient follow-up to determine if she needs a MS  
Disease modifying medication; no further recommendations at this time  
Ordered:  
[facility] Hospital Care/Day Moderate 35 Minutes 44966  
  
3. Acute kidney injury superimposed on chronic kidney disease (N17.9: Acute 
kidney   
failure, unspecified)  
Serum creatinine at 3.4 which is improved; no baseline  
Check labs in a.m.  
  
Ordered:  
[facility] Care/Day Moderate 35 Minutes 18412  
  
4. UTI (urinary tract infection) (N39.0: Urinary tract infection, site not 
specified)  
Continue ertapenem day 5  
According to [facility] hospitalist who accepted her, she has a history of   
multidrug-resistant bacteria in her urine cultures so he recommended ertapenem  
Urine culture no growth to date  
  
Ordered:  
[facility] Care/Day Moderate 35 Minutes 61368  
  
Chronic kidney disease, unspecified (N18.9: Chronic kidney disease, unspecified)  
  
Orders:  
Basic Metabolic Panel  
Capillary Glucose POC  
CBC w/ Auto Diff  
eGFR  
PLAN:  
1. As noted above  
2. Repeat lab in a.m.  
3. Continue DuoNebs 4 times daily as needed for COPD  
4. Maintain supplemental O2 and wean as tolerated  
5. Continue amiodarone, apixaban and pravastatin as well as her amitriptyline 
and   
duloxetine  
6. Sliding scale insulin coverage for diabetes  
7. DVT prophylaxis with SCDs and the apixaban  
8. Full CODE STATUS  
  
Awaiting transfer to [facility] to evaluate her nephrostomy tube on the left  
  
  
  
  
  
  
  
  
  
  
  
  
Extracted from:  
  
                                Title:APSO Note- Neurology Author:Adina CHANG, GISELLE GARAY Date:25  
  
  
  
                                                    ASSESSMENT:  
  
I was consulted for right hemiparesis but upon examining her she does not seem 
to   
have any significant focal neurological deficits and subjectively she was saying
 she   
had not had any unilateral weakness at all.  
  
MRI is without any acute findings. I do not think she has acute cerebrovascular   
disease.  
  
MRI of her brain shows periventricular white matter changes that look consistent
 with   
a chronic demyelinating disease, so I believe that she likely does have multiple
   
sclerosis as was mentioned in her chart history. She is not on any disease 
modifying   
medication. I do not believe her to have acute demyelination, though it should 
be   
noted that contrast could not be given with the MRI because her GFR is very low.  
  
PLAN:  
  
1. Outpatient follow-up with neurology to determine if she needs a multiple 
sclerosis   
disease modifying medication  
2. Continue the Eliquis and pravastatin. Aspirin allergy.  
3. No other recommendations from neurology standpoint at this time  
  
  
  
  
  
  
  
  
  
  
  
  
  
  
1. Metabolic encephalopathy (G93.41: Metabolic encephalopathy)  
2. History of stroke (Z86.73: Personal history of transient ischemic attack 
(TIA),   
and cerebral infarction without residual deficits)  
3. Acute kidney injury superimposed on chronic kidney disease (N17.9: Acute 
kidney   
failure, unspecified)  
4. UTI (urinary tract infection) (N39.0: Urinary tract infection, site not 
specified)  
Chronic kidney disease, unspecified (N18.9: Chronic kidney disease, unspecified)  
  
  
  
Extracted from:  
  
                                Title:APSO Note Author:Santos Gandara DO  
ate:25  
  
  
  
                                                    54-year-old female admitted   
for acute metabolic encephalopathy which may be   
secondary   
to a urinary tract infection and acute kidney injury superimposed on chronic 
kidney   
disease. There was a questionable stroke although patient denies this. She has   
multiple comorbidities including MS, A-fib on Eliquis, COPD 3 L nasal cannula   
continuous, history of asthma, hypertension, history of kidney stones with a   
nephrostomy tube placed at [facility] on the left side 2 years ago this this August, 
type   
2 diabetes on insulin and chronic kidney disease.  
1. Metabolic encephalopathy (G93.41: Metabolic encephalopathy)  
Multifactorial; may be secondary to urinary tract infection plus acute kidney 
injury   
superimposed on chronic kidney disease  
Appears to be back to her baseline mentation wise  
  
2. History of stroke (Z86.73: Personal history of transient ischemic attack 
(TIA),   
and cerebral infarction without residual deficits)  
Appreciate neurology input  
MRI of the brain without contrast shows no acute infarct or intracranial 
hemorrhage   
identified; mild to moderate nonspecific white matter changes  
  
3. Acute kidney injury superimposed on chronic kidney disease (N17.9: Acute 
kidney   
failure, unspecified)  
Patient received normal saline x 1 and is currently saline locked  
Serum creatinine at 3.7 which is improved; no baseline  
Check labs in a.m.  
  
4. UTI (urinary tract infection) (N39.0: Urinary tract infection, site not 
specified)  
Continue ertapenem day 4  
According to [facility] in the hospital so excepted her, she has a history of   
multidrug-resistant bacteria in her urine cultures so he recommended ertapenem  
Urine culture no growth to date  
  
Chronic kidney disease, unspecified (N18.9: Chronic kidney disease, unspecified)  
Orders:  
albuterol-ipratropium, 3 mL, Soln-Inh, NEB, q6hrFT PRN Shortness of breath or   
wheezing, Routine, Start date 25 9:27:00 EST  
Sodium Chloride 0.9% intravenous solution 1,000 mL, 1,000 mL, IV, 75 mL/hr, for 
1   
time(s), Stop date 25 23:57:00 EST, Routine, Start date 25 10:40:00 
EST,   
13.3 hour(s), Total volume (mL): 1,000, 127 kg, 2.46, m2  
Basic Metabolic Panel  
Capillary Glucose POC  
CBC w/ Auto Diff  
eGFR  
HgbA1c  
Lipid Panel  
Neurological Assessment  
Occupational Therapy Additional Tx  
Occupational Therapy Evaluate Patient, Develop a Plan of Care and Implement Plan  
Oxygen Protocol  
Physical Therapy Additional Tx  
Physical Therapy Evaluate Patient, Develop a Plan of Care and Implement Plan  
  
PLAN:  
1. As noted above  
2. Repeat lab in a.m.  
3. Continue DuoNebs 4 times daily as needed for COPD  
4. Maintain supplemental O2 and wean as tolerated  
5. Continue amiodarone, apixaban and pravastatin as well as her amitriptyline 
and   
duloxetine  
6. Sliding scale insulin coverage for diabetes  
7. DVT prophylaxis with SCDs and the apixaban  
8. Full CODE STATUS  
  
I contacted [facility]. She was being transferred there for her nephrostomy 
tube   
on the left and she is on the waiting list still. They still have no beds at 
this   
time and they are boarding several patients in the ER there.  
  
  
  
  
  
  
  
  
  
  
  
  
  
Extracted from:  
  
                                Title:Consult Note- Neurology Author:Vidya Holden RN Date:25  
  
  
  
                                                      
ASSESSMENT:  
  
I was consulted for right hemiparesis and she was reported to have history of 
stroke   
with some right sided strength deficits, but upon examining her she does not 
seem to   
have any significant focal neurological deficits. On her noncontrast CT head I 
see   
some vague hypodensities in the left frontal periventricular white matter and 
the   
left parietal white matter which based on her history could possibly represent 
old   
demyelinating lesion or old ischemic lesion. Perhaps her presenting symptoms and
   
transient unilateral weakness could relate to recrudescence related to those 
possible   
lesions. MRI will hopefully clear up some confusion.  
  
PLAN:  
  
1. MRI brain without contrast (GFR only 14)  
2. Continue the Eliquis and pravastatin. Aspirin allergy.  
3. Further recommendations to follow.  
  
  
  
  
  
  
  
  
  
1. Metabolic encephalopathy (G93.41: Metabolic encephalopathy)  
2. History of stroke (Z86.73: Personal history of transient ischemic attack 
(TIA),   
and cerebral infarction without residual deficits)  
3. Acute kidney injury superimposed on chronic kidney disease (N17.9: Acute 
kidney   
failure, unspecified)  
4. UTI (urinary tract infection) (N39.0: Urinary tract infection, site not 
specified)  
Chronic kidney disease, unspecified (N18.9: Chronic kidney disease, unspecified)  
  
  
  
Extracted from:  
  
                                Title:Admission H & P Author:Jina Gandara DO Date:2/3/25  
  
  
  
                                                    54-year-old female admitted   
for acute metabolic encephalopathy which may be   
secondary   
to a urinary tract infection and acute kidney injury superimposed on chronic 
kidney   
disease. There was a questionable stroke although patient denies this. She has   
multiple comorbidities including MS, A-fib on Eliquis, COPD 3 L nasal cannula   
continuous, history of asthma, hypertension, history of kidney stones with a   
nephrostomy tube placed at [facility] on the left side 2 years ago this this August, 
type   
2 diabetes on insulin and chronic kidney disease.  
1. Metabolic encephalopathy (G93.41: Metabolic encephalopathy)  
Multifactorial; may be secondary to urinary tract infection plus acute kidney 
resume   
present chronic kidney disease  
Appears to be back to her baseline  
  
2. History of stroke (Z86.73: Personal history of transient ischemic attack 
(TIA),   
and cerebral infarction without residual deficits)  
Without knowing her meds, I initially started on Plavix but I will stop that as 
she   
is on Eliquis  
I will consult neurology  
  
3. Acute kidney injury superimposed on chronic kidney disease (N17.9: Acute 
kidney   
failure, unspecified)  
I do not have a baseline creatinine; I did start her on normal saline x 1 L and   
reassess in a.m.  
4. UTI (urinary tract infection) (N39.0: Urinary tract infection, site not 
specified)  
Will continue to the ertapenem and follow urine culture results  
Chronic kidney disease, unspecified (N18.9: Chronic kidney disease, unspecified)  
Orders:  
acetaminophen, 650 mg = 2 tab(s), Tab, Oral, q6hr PRN Pain, Routine, Start date   
25 10:40:00 EST, 25 10:40:00 EST  
albuterol-ipratropium, 3 mL, Soln-Inh, NEB, q6hrFT, Routine, Start date 25
   
17:00:00 EST  
amiodarone, 400 mg = 2 tab(s), Tab, Oral, Daily, Routine, Start date 25 
9:00:00   
EST, 25 15:50:00 EST  
amitriptyline, 50 mg = 1 tab(s), Tab, Oral, Once a day (at bedtime), Routine, 
Start   
date 25 21:00:00 EST, 25 15:51:00 EST  
apixaban, 5 mg = 1 tab(s), Tab, Oral, BID, Routine, Start date 25 21:00:00
 EST,   
25 15:51:00 EST  
duloxetine, 60 mg = 1 cap(s), Cap-DR, Oral, Daily, Routine, Start date 25   
9:00:00 EST, 25 15:52:00 EST  
ertapenem + Sodium Chloride 0.9% intravenous solution 50 mL, 500 mg = 0.5 EA,   
Injection, IV Piggyback, q24hr, Routine, Start date 25 20:30:00 EST, 100 
mL/hr,   
Infuse over 30 minute(s)  
fluticasone nasal, 1 spray(s), Spray, Nasal, BID, Routine, Start date 25   
21:00:00 EST  
hydrALAZINE, 10 mg = 0.5 mL, Injection, IV Push, q6hr PRN Other (see comment),   
Routine, Start date 25 10:40:00 EST, 25 10:40:00 EST  
insulin lispro, 0-10 unit(s), Injection-Insulin, SubCutaneous, QIDACHS, NOW, 
Start   
date 25 16:32:00 EST  
ondansetron, 4 mg = 2 mL, Injection, IV Push, q6hr PRN Nausea/Vomiting, Routine,
   
Start date 25 10:40:00 EST, 25 10:40:00 EST  
pravastatin, 40 mg = 1 tab(s), Tab, Oral, Once a day (at bedtime), Routine, 
Start   
date 25 21:00:00 EST, 25 15:53:00 EST  
Sodium Chloride 0.9% intravenous solution 1,000 mL, 1,000 mL, IV, 75 mL/hr, for 
1   
time(s), Stop date 25 23:57:00 EST, Routine, Start date 25 10:40:00 
EST,   
13.3 hour(s), Total volume (mL): 1,000, 127 kg, 2.46, m2  
tramadol, 25 mg = 0.5 tab(s), Tab, Oral, q12hr for 7 day(s), Stop date 02/10/25   
15:59:00 EST, Routine, Start date 25 16:00:00 EST, 25 15:54:00 EST  
Basic Metabolic Panel  
Basic Metabolic Panel  
Below the Knee Intermittent Pneumatic Compression Device  
Capillary Glucose POC  
Cardiac Monitoring  
CBC w/ Auto Diff  
CBC w/ Auto Diff  
Communication Order  
Communication Order  
Communication Order Physician to Nursing  
Consult to Neurology  
Consult to Neurology  
eGFR  
Evaluate Need For Continued Telemetry  
HgbA1c  
Lipid Panel  
Neurological Assessment  
Neurological Assessment  
Notify Provider Vital Signs  
Notify Provider Vital Signs  
Occupational Therapy Evaluate Patient, Develop a Plan of Care and Implement Plan  
Physical Therapy Evaluate Patient, Develop a Plan of Care and Implement Plan  
Pulse Oximetry  
Referral to Resource Center  
Regular Diet  
Resuscitation Status - Full  
Routine Capillary Glucose POC  
Stroke Education  
Stroke Quality Measures  
Up ad Kesha  
Vital Signs  
  
PLAN:  
1. Patient is placed on the medical service  
2. Will consult neurology in a.m.  
3. Normal saline x 1 L and reassess  
4. Lab for a.m.  
5. DuoNeb 4 times daily for her COPD  
6. Supplemental O2  
7. I continued her amiodarone, apixaban, pravastatin along with Cymbalta and   
amitriptyline from [facility]  
8. Sliding scale insulin coverage for her diabetes  
9. DVT prophylaxis with SCDs and the apixaban  
10. Full CODE STATUS  
  
Anticipate greater than 2 midnight stays for inpatient status  
  
Will contact [facility] in a.m.; her sole reason for coming to the ER according to 
the   
patient was left flank pain which she has a nephrostomy tube and she would like 
to   
get it removed  
  
  
  
  
  
  
  
  
  
  
  
  
  
  
  
  
  
Extracted from:  
  
                                Title:ED Note   Author:Charlie Ortega MD Date:  
5  
  
  
  
                                                    1. Metabolic encephalopathy   
(G93.41: Metabolic encephalopathy)  
2. History of stroke (Z86.73: Personal history of transient ischemic attack 
(TIA),   
and cerebral infarction without residual deficits)  
3. Acute on chronic renal insufficiency (N28.9: Disorder of kidney and ureter,   
unspecified)  
Chronic kidney disease, unspecified (N18.9: Chronic kidney disease, unspecified)  
Orders:  
BB Draw & Hold  
Cardiac Monitoring  
CBC w/ Auto Diff  
Communication Order  
Communication Order  
Comprehensive Metabolic Panel  
Continuous Pulse Oximetry  
CT Head or Brain w/o Contrast  
Dysphagia Screen  
ECG 12 Lead Adult  
eGFR  
Extra Grey Tube  
Extra SST Tube  
Neurological Assessment  
NIH Stroke Scale  
NPO Diet  
Oxygen Protocol  
PT & PTT  
Rapid Response Form  
Routine Capillary Glucose POC  
Troponin 0 Hr.  
Troponin 1 Hr.  
Troponin 3 Hr.  
Troponin 6 Hr.  
UA with Cult Rflx  
Urine Culture  
XR Chest Single View  
  
  
  
  
                                                    Addendum by Chris Bailey DO  
 on 2025 10:22:04 EST                     
  
Patient signed out to me by multiple prior   
ER providers.  I did call [facility] we try to   
transfer to [facility] we try to escalate care we   
try to transfer him to the ER and [facility] is   
unable to facilitate this transfer.   
Therefore given that the patient has now   
been in the ER for nearly 43 hours I was   
able to have the hospitalist accept the   
patient here pending bed assignment from   
[facility]. Case discussed with the hospitalist   
for admission.  
  
  
  
[facility]  
Work Phone: [phone]- NoteProgress Note-Physician  
Assessment/Plan  
54-year-old female admitted for acute metabolic encephalopathy which may be 
secondary to a urinary tract infection and acute kidney injury superimposed on 
chronic kidney disease. There was a questionable stroke although patient denies 
this. She has multiple comorbidities including MS, A-fib on Eliquis, COPD 3 L 
nasal cannula continuous, history of asthma, hypertension, history of kidney 
stones with a nephrostomy tube placed at [facility] on the left side 2 years ago this
 this August, type 2 diabeteson insulin and chronic kidney disease.  
1. Metabolic encephalopathy (G93.41: Metabolic encephalopathy)  
- Multifactorial; may be secondary to urinary tract infection plus acute kidney 
injury superimposedon chronic kidney disease  
- Appears to be back to her baseline mentation wise  
2. History of stroke (Z86.73: Personal history of transient ischemic attack 
(TIA), and cerebral infarction without residual deficits)  
- MRI of the brain without contrast shows no acute infarct or intracranial 
hemorrhage identified; mild to moderate nonspecific white matter changes  
- Neurology recommends outpatient follow-up to determine if she needs a MS 
Disease modifying medication; no further recommendations at this time  
3. Acute kidney injury superimposed on chronic kidney disease (N17.9: Acute 
kidney failure, unspecified)  
- Serum creatinine at 3.4 which is improved; no baseline  
- Check labs in a.m.  
4. UTI (urinary tract infection) (N39.0: Urinary tract infection, site not 
specified)  
- Continue ertapenem day 6  
- According to [facility] hospitalist who accepted her, she has a history of 
multidrug-resistant bacteria in her urine cultures so he recommended ertapenem  
- Urine culture no growth to date  
Chronic kidney disease, unspecified (N18.9: Chronic kidney disease, unspecified)  
Orders:  
Basic Metabolic Panel  
Capillary Glucose POC  
eGFR  
Referral to Resource Center  
Referral to Resource Center  
PLAN:  
1. Continue IV ertapenem  
2. Continue DuoNebs 4 times daily as needed for COPD history  
3. Maintain amiodarone, apixaban, Pravachol as well as amitriptyline and 
duloxetine  
4. Sliding scale insulin coverage for her diabetes  
5. Pain control as needed  
6. DVT prophylaxis with SCDs and apixaban  
7. Full CODE STATUS  
Patient anxious for discharge. I do not feel she needs to be transferred to 
[facility] at this time as her pain is controlled and urine cultures are no growth. 
She is asymptomatic. I did talk to the patient about going to SNF versus home 
and she like to go home with home care. Later in the afternoon I met with the 
son at the bedside and he was worried about her going home. I explained to him 
her updates on her condition. She would need to follow-up with urology in the 
outpatient setting-she did havea follow-up in November but did not have a ride. 
Also she does not know if she is seeing a nephrologist. Will check labs in AM. 
She will need outpatient follow-up with her PCP who she saw 1 time and may be 
referral to nephrology.  
Looking for SNF placement.  
Subjective  
Day 6 IV ertapenem  
Patient seen and examined earlier today voicing no complaints. Anxious for 
discharge. See below.  
Review of Systems  
Constitutional: no fever, no chills, no sweats, no weakness  
Respiratory: no shortness of breath, no cough, no orthopnea, no wheezing  
Cardiovascular: no chest pain, no palpitations, no edema  
Additional ROS info: Except as noted in the above Review of Systems and in the 
History of Present Illness all other systems have been reviewed and are negative
 or noncontributory.  
Objective  
Vitals & Measurements  
T: 36.7 ???C(Axillary) TMIN: 36.2 ???C(Axillary) TMAX: 36.8 ???C(Oral) HR: 
88(Monitored) RR: 18 BP:129/77 SpO2: 96% HT: 167.64 cm WT: 124.6 kg  
Intake & Output  
  
This visit (24 hour periods starting at 07:00 EST)  
  
25 *  
25  
Total Summary  
  
Intake mL  
120  
206  
455.83  
Output mL  
--  
1,950  
545  
Fluid Balance  
120  
-1,744  
-89.17  
Intake (4)  
  
Oral Intake mL  
120  
200  
400  
Sodium Chloride 0.9%, ertapenem mL  
--  
--  
46.33  
heparin flush mL  
--  
6  
9  
lorazepam mL  
--  
--  
0.5  
Total  
120  
206  
455.83  
Output (1)  
  
Urine Voided mL  
--  
1,950  
545  
Total  
--  
1,950  
545  
Counts (1)  
  
Diaper Count  
--  
--  
2  
* This column has not completed the indicated time period.  
Physical Exam  
Constitutional: Awake and alert; oriented x 3 with no apparent distress or 
respiratory distress  
Head/neck: Neck supple with no palpable lymphadenopathy, bruits or masses; 
trachea midline  
Chest/lungs: Clear to auscultation bilaterally no wheezes or rhonchi noted 
bilaterally  
Cardiovascular: Regular rate and rhythm; normal S1-S2 with no murmur; no pitting
 edema and 1+ pulses bilaterally  
Gastrointestinal: Soft, nontender, nondistended, positive bowel sounds; obese  
Neurological: Nonfocal; cranial nerves II through X (more content not 
included)...[facility] NorthComment on above:Result Comment: 
Electronically Signed By: Santos Gandara DO\.br\Date and Time Signed: 
25 16:37 [id number]- NoteProgress Note-Physician  
Assessment/Plan  
54-year-old female admitted for acute metabolic encephalopathy which may be 
secondary to a urinary tract infection and acute kidney injury superimposed on 
chronic kidney disease. There was a questionable stroke although patient denies 
this. She has multiple comorbidities including MS, A-fib on Eliquis, COPD 3 L 
nasal cannula continuous, history of asthma, hypertension, history of kidney 
stones with a nephrostomy tube placed at [facility] on the left side 2 years ago this
 this August, type 2 diabeteson insulin and chronic kidney disease.  
1. Metabolic encephalopathy (G93.41: Metabolic encephalopathy)  
Multifactorial; may be secondary to urinary tract infection plus acute kidney 
injury superimposed on chronic kidney disease  
Appears to be back to her baseline mentation wise  
Ordered:  
[facility] Hospital Care/Day Moderate 35 Minutes 39971  
  
2. History of stroke (Z86.73: Personal history of transient ischemic attack 
(TIA), and cerebral infarction without residual deficits)  
Appreciate neurology input  
MRI of the brain without contrast shows no acute infarct or intracranial 
hemorrhage identified; mild to moderate nonspecific white matter changes  
Neurology recommends outpatient follow-up to determine if she needs a MS Disease
 modifying medication; no further recommendations at this time  
Ordered:  
[facility] Hospital Care/Day Moderate 35 Minutes 70500  
  
3. Acute kidney injury superimposed on chronic kidney disease (N17.9: Acute 
kidney failure, unspecified)  
Serum creatinine at 3.4 which is improved; no baseline  
Check labs in a.m.  
Ordered:  
[facility] Hospital Care/Day Moderate 35 Minutes 72659  
  
4. UTI (urinary tract infection) (N39.0: Urinary tract infection, site not 
specified)  
Continue ertapenem day 5  
According to [facility] hospitalist who accepted her, she has a history of multidrug-
resistant bacteria in her urine cultures so he recommended ertapenem  
Urine culture no growth to date  
Ordered:  
[facility] Hospital Care/Day Moderate 35 Minutes 45146  
  
Chronic kidney disease, unspecified (N18.9: Chronic kidney disease, unspecified)  
  
Orders:  
Basic Metabolic Panel  
Capillary Glucose POC  
CBC w/ Auto Diff  
eGFR  
PLAN:  
1. As noted above  
2. Repeat lab in a.m.  
3. Continue DuoNebs 4 times daily as needed for COPD  
4. Maintain supplemental O2 and wean as tolerated  
5. Continue amiodarone, apixaban and pravastatin as well as her amitriptyline 
and duloxetine  
6. Sliding scale insulin coverage for diabetes  
7. DVT prophylaxis with SCDs and the apixaban  
8. Full CODE STATUS  
Awaiting transfer to [facility] to evaluate her nephrostomy tube on the left  
Subjective  
Day 5 of ertapenem  
Patient seen and examined earlier today voicing no complaints  
Review of Systems  
Constitutional: no fever, no chills, no sweats, no weakness  
Respiratory: no shortness of breath, no cough, no orthopnea, no wheezing  
Cardiovascular: no chest pain, no palpitations, no edema  
Additional ROS info: Except as noted in the above Review of Systems and in the 
History of Present Illness all other systems have been reviewed and are negative
 or noncontributory.  
Objective  
Vitals & Measurements  
T: 36.7 ???C(Axillary) TMIN: 36.3 ???C(Axillary) TMAX: 36.7 ???C(Oral) HR: 
81(Monitored) RR: 16 BP:117/73 SpO2: 95%  
Intake & Output  
  
This visit (24 hour periods starting at 07:00 EST)  
  
25 *  
25  
Total Summary  
  
Intake mL  
--  
455.83  
791.9  
Output mL  
--  
545  
685  
Fluid Balance  
--  
-89.17  
106.9  
Intake (5)  
  
Oral Intake mL  
--  
400  
300  
Sodium Chloride 0.9% intravenous solution 1,000 mL mL  
--  
--  
438.9  
Sodium Chloride 0.9%, ertapenem mL  
--  
46.33  
50  
heparin flush mL  
--  
9  
3  
lorazepam mL  
--  
0.5  
--  
Total  
--  
455.83  
791.9  
Output (2)  
  
Urine Catheter mL  
--  
--  
510  
Urine Voided mL  
--  
545  
175  
Total  
--  
545  
685  
Counts (1)  
  
Diaper Count  
--  
2  
2  
* This column has not completed the indicated time period.  
Physical Exam  
Constitutional: Awake and alert; oriented x 3 with no apparent distress or 
respiratory distress  
Head/neck: Neck supple with no palpable lymphadenopathy, bruits or masses; 
trachea midline  
Chest/lungs: Clear to auscultation bilaterally no wheezes or rhonchi noted 
bilaterally  
Cardiovascular: Regular rate and rhythm; normal S1-S2 with no murmur; no pitting
 edema and 1+ pulses bilaterally  
Gastrointestinal: Soft, nontender, nondistended, positive bowel sounds; obese  
Neurological: Nonfocal; cranial nerves II through XII appear intact  
Psychological: Pleasant affect  
Lab Results  
WBC: 8.7 E9/L (25 05:40:00)  
RBC: 2.7 E12/L Low (25 05:40:00)  
HGB: 7.3 gm/dL Low (25 05:40:00)  
Hct: 22.8 % Low (25 05:40:00)  
MCV: 85 fL (25 05:40:00)  
MCH: 27.4 pg (25 05:40:00)  
MCHC: 32.3 gm/dL (25 05:40:00)  
RDW: 17.2 % High (25 05:40:00)  
Platelet: (more content not included)...[facility] NorthComment on 
above:Result Comment: Electronically Signed By: Santos Gandara DO\Date
 and Time Signed: 25 14:17  NoteProgress Note-Physician  
Assessment/Plan  
ASSESSMENT:  
I was consulted for right hemiparesis but upon examining her she does not seem 
to have any significant focal neurological deficits and subjectively she was 
saying she had not had any unilateral weakness at all.  
MRI is without any acute findings. I do not think she has acute cerebrovascular 
disease.  
MRI of her brain shows periventricular white matter changes that look consistent
 with a chronic demyelinating disease, so I believe that she likely does have 
multiple sclerosis as was mentioned in her chart history. She is not on any 
disease modifying medication. I do not believe her to have acute demyelination, 
though it should be noted that contrast could not be given with the MRI because 
her GFR is very low.  
PLAN:  
1. Outpatient follow-up with neurology to determine if she needs a multiple 
sclerosis disease modifying medication  
2. Continue the Eliquis and pravastatin. Aspirin allergy.  
3. No other recommendations from neurology standpoint at this time  
  
1. Metabolic encephalopathy (G93.41: Metabolic encephalopathy)  
2. History of stroke (Z86.73: Personal history of transient ischemic attack 
(TIA), and cerebral infarction without residual deficits)  
3. Acute kidney injury superimposed on chronic kidney disease (N17.9: Acute 
kidney failure, unspecified)  
4. UTI (urinary tract infection) (N39.0: Urinary tract infection, site not 
specified)  
Chronic kidney disease, unspecified (N18.9: Chronic kidney disease, unspecified)  
Subjective  
She still feels generally okay in terms of limb strength. Has not noticed any 
unilateral weakness. No new symptoms to report to me. No overnight events.  
Review of Systems  
GEN: No fevers or chills.  
CV/PULM: No chest pain. No shortness of breath. No palpitations.  
NEURO: No headaches. No loss of vision. No double vision. No dysphagia. No 
speech changes. No focalweakness. No sensory loss. [1]  
Objective  
Vitals & Measurements  
T: 36.7 ???C(Oral) TMIN: 36.3 ???C(Axillary) TMAX: 36.7 ???C(Axillary) HR: 
82(Monitored) RR: 16 BP:128/73 SpO2: 94% HT: 167.64 cm  
Intake & Output  
  
This visit (24 hour periods starting at 07:00 EST)  
  
25 *  
25  
Total Summary  
  
Intake mL  
--  
455.83  
791.9  
Output mL  
--  
545  
685  
Fluid Balance  
--  
-89.17  
106.9  
Intake (5)  
  
Oral Intake mL  
--  
400  
300  
Sodium Chloride 0.9% intravenous solution 1,000 mL mL  
--  
--  
438.9  
Sodium Chloride 0.9%, ertapenem mL  
--  
46.33  
50  
heparin flush mL  
--  
9  
3  
lorazepam mL  
--  
0.5  
--  
Total  
--  
455.83  
791.9  
Output (2)  
  
Urine Catheter mL  
--  
--  
510  
Urine Voided mL  
--  
545  
175  
Total  
--  
545  
685  
Counts (1)  
  
Diaper Count  
--  
2  
2  
* This column has not completed the indicated time period.  
Physical Exam  
GEN: General appearance normal. Well-kempt. No distress. No visualized 
deformities or trauma.  
CARDIO/VASC: Limbs without significant edema and appear well-perfused.  
PULM: Normal work of breathing.  
SKIN: Visualized skin is intact and without lesions aside from age-related 
findings.  
MS: Affect is normal. Patient is alert and generally oriented. Normal attention.  
LANG: Speech is fluent and non-dysarthric.  
EYES: Pupils equal/reactive/consensual. Ocular motility full. No pathologic 
nystagmus.  
CN: Facial sensation normal. Hearing acuity normal. Face without droop and with 
normal motor function.  
MOTOR: Muscle bulk normal. Muscle tone normal. Muscle strength normal. No 
tremors.  
REFLEXES: Reflexes normoactive throughout. No pathologic reflexes.  
SENSORY: Light touch normal. Vibratory sensation intact in distal extremities.  
CEREBELLAR: No limb ataxia.  
  
Lab Results  
WBC: 8.7 E9/L (25 05:40:00)  
RBC: 2.7 E12/L Low (25 05:40:00)  
HGB: 7.3 gm/dL Low (25 05:40:00)  
Hct: 22.8 % Low (25 05:40:00)  
MCV: 85 fL (25 05:40:00)  
MCH: 27.4 pg (25 05:40:00)  
MCHC: 32.3 gm/dL (25 05:40:00)  
RDW: 17.2 % High (25 05:40:00)  
Platelet: 349 E9/L (25 05:40:00)  
MPV: 7.4 fL (25 05:40:00)  
Neutro Auto: 66.4 % (25 05:40:00)  
Lymph Auto: 18.4 % (25 05:40:00)  
Mono Auto: 9.2 % (25 05:40:00)  
Eos Auto: 5.6 % (25 05:40:00)  
Basophil Auto: 0.4 % (25 05:40:00)  
Neutro Absolute: 5.8 E9/L (25 05:40:00)  
Lymph Absolute: 1.6 E9/L (25 05:40:00)  
Mono Absolute: 0.8 E9/L (25 05:40:00)  
Eos Absolute: 0.5 E9/L (25 05:40:00)  
Basophil Absolute: 0 E9/L (25 05:40:00)  
Glucose Lvl: 93 mg/dL (25 05:40:00)  
BUN: 29 mg/dL High (25 05:40:00)  
Creatinine: 3.4 mg/dL High (25 05:40:00)  
eGFR: 15 mL/min/1.73 m2 Low (25 05:40:00)  
BUN/Creat Ratio: 8 Low (25 05:40:00)  
Sodium Lvl: 138 mmol/L (25 05:40:00)  
Potassium Lvl: 4.2 mmol/L (25 05:40:00)  
Chloride: 99 mmol/L Low (25 05:40:00)  
CO2: 35 mmol/L High (25 05:40:00)  
AGAP: 8 mEq/L (25 05:40:00)  
Calcium Lvl: 9 mg (more content not included)...[facility]
Comment on above:Result Comment: Electronically Signed By: Vidya Holden RN\.br\Date and Time Signed: 25 07:00 EST\.br\Electronically Co-Signed By: 
Adams Dorman DO\.br\Date and Time Co-Signed: 25 10:54 [id number]- Note
Progress Note-Physician  
Assessment/Plan  
54-year-old female admitted for acute metabolic encephalopathy which may be 
secondary to a urinary tract infection and acute kidney injury superimposed on 
chronic kidney disease. There was a questionable stroke although patient denies 
this. She has multiple comorbidities including MS, A-fib on Eliquis, COPD 3 L 
nasal cannula continuous, history of asthma, hypertension, history of kidney 
stones with a nephrostomy tube placed at [facility] on the left side 2 years ago this
 this August, type 2 diabeteson insulin and chronic kidney disease.  
1. Metabolic encephalopathy (G93.41: Metabolic encephalopathy)  
Multifactorial; may be secondary to urinary tract infection plus acute kidney 
injury superimposed on chronic kidney disease  
Appears to be back to her baseline mentation wise  
2. History of stroke (Z86.73: Personal history of transient ischemic attack 
(TIA), and cerebral infarction without residual deficits)  
Appreciate neurology input  
MRI of the brain without contrast shows no acute infarct or intracranial 
hemorrhage identified; mild to moderate nonspecific white matter changes  
3. Acute kidney injury superimposed on chronic kidney disease (N17.9: Acute 
kidney failure, unspecified)  
Patient received normal saline x 1 and is currently saline locked  
Serum creatinine at 3.7 which is improved; no baseline  
Check labs in a.m.  
4. UTI (urinary tract infection) (N39.0: Urinary tract infection, site not 
specified)  
Continue ertapenem day 4  
According to [facility] in the hospital so excepted her, she has a history of 
multidrug-resistant bacteria in her urine cultures so he recommended ertapenem  
Urine culture no growth to date  
Chronic kidney disease, unspecified (N18.9: Chronic kidney disease, unspecified)  
Orders:  
albuterol-ipratropium, 3 mL, Soln-Inh, NEB, q6hrFT PRN Shortness of breath or 
wheezing, Routine, Start date 25 9:27:00 EST  
Sodium Chloride 0.9% intravenous solution 1,000 mL, 1,000 mL, IV, 75 mL/hr, for 
1 time(s), Stop date 25 23:57:00 EST, Routine, Start date 25 
10:40:00 EST, 13.3 hour(s), Total volume (mL):1,000, 127 kg, 2.46, m2  
Basic Metabolic Panel  
Capillary Glucose POC  
CBC w/ Auto Diff  
eGFR  
HgbA1c  
Lipid Panel  
Neurological Assessment  
Occupational Therapy Additional Tx  
Occupational Therapy Evaluate Patient, Develop a Plan of Care and Implement Plan  
Oxygen Protocol  
Physical Therapy Additional Tx  
Physical Therapy Evaluate Patient, Develop a Plan of Care and Implement Plan  
PLAN:  
1. As noted above  
2. Repeat lab in a.m.  
3. Continue DuoNebs 4 times daily as needed for COPD  
4. Maintain supplemental O2 and wean as tolerated  
5. Continue amiodarone, apixaban and pravastatin as well as her amitriptyline 
and duloxetine  
6. Sliding scale insulin coverage for diabetes  
7. DVT prophylaxis with SCDs and the apixaban  
8. Full CODE STATUS  
I contacted [facility]. She was being transferred there for her nephrostomy 
tube on the left and she is on the waiting list still. They still have no beds 
at this time and they are boarding several patients in the ER there.  
Subjective  
Day 4 IV ertapenem  
Patient seen and examined earlier today  
Appreciate neurology consult  
Patient voicing no complaints  
Objective  
Vitals & Measurements  
T: 36.3 ???C(Axillary) TMIN: 36.3 ???C(Axillary) TMAX: 37.8 ???C(Axillary) HR: 
83(Monitored) RR: 18BP: 146/85 SpO2: 94% HT: 167.64 cm  
Intake & Output  
  
This visit (24 hour periods starting at 07:00 EST)  
  
25 *  
25  
Total Summary  
  
Intake mL  
0.5  
791.9  
--  
Output mL  
225  
685  
300  
Fluid Balance  
-224.5  
106.9  
-300  
Intake (5)  
  
Oral Intake mL  
--  
300  
--  
Sodium Chloride 0.9% intravenous solution 1,000 mL mL  
--  
438.9  
--  
Sodium Chloride 0.9%, ertapenem mL  
--  
50  
--  
heparin flush mL  
--  
3  
--  
lorazepam mL  
0.5  
--  
--  
Total  
0.5  
791.9  
--  
Output (2)  
  
Urine Catheter mL  
--  
510  
--  
Urine Voided mL  
225  
175  
300  
Total  
225  
685  
300  
Counts (1)  
  
Diaper Count  
--  
2  
--  
* This column has not completed the indicated time period.  
Physical Exam  
Constitutional: Awake and alert; oriented x 3 with no apparent distress or 
respiratory distress  
Head/neck: Neck supple with no palpable lymphadenopathy, bruits or masses; 
trachea midline  
Chest/lungs: Clear to auscultation bilaterally no wheezes or rhonchi noted 
bilaterally  
Cardiovascular: Regular rate and rhythm; normal S1-S2 with no murmur; no pitting
 edema and 1+ pulses bilaterally  
Gastrointestinal: Soft, nontender, nondistended, positive bowel sounds; obese  
Neurological: Nonfocal; cranial nerves II through XII appear intact  
Psychological: Pleasant affect  
Lab Results  
WBC: 8.3 E9/L (25 05:34:00)  
RBC: 2.6 E12/L Low (25 05:34:00)  
HGB: 7.2 gm/dL Low (25 05:34:00)  
Hct: 22.3 % Low (25 05:34:00)  
MCV: 85.1 fL (/ (more content not included)...[facility]
Comment on above:Result Comment: Electronically Signed By: Santos Gandara DO.carson\Date and Time Signed: 25 18:15 [id number]- NoteInterdisciplinary 
Note - OT  
OT evaluation completed with [facility] Score: 16/24 = SNF recommended at discharge.
[facility]02- NoteConsultation Note  
Chief Complaint  
renal insufficiency  
Reason for Consultation  
stroke vs metabolic encephalopathy  
History of Present Illness  
54-year-old woman. What she tells me is fairly different than what I see 
documented in the chart. She says she had developed diarrhea and back pain and 
abdominal pain and felt too weak to stand so was brought here for evaluation. I 
am consulted for right sided weakness. There is reportedly history of stroke. 
She makes a sound like she had history of possible  mini strokes  or TIA. She 
also says she was diagnosed with multiple sclerosis about 15 years ago but did 
not know details about that, anddanica has not been on any disease modifying 
medication. She had a CT of her head done here that showssome subtle left 
frontal periventricular and left parietal white matter changes which could 
represent old demyelinating plaques, evolving ischemia, or be artifactual. 
Kidney function is very poor with a GFR of 14. She is on Eliquis and pravastatin
 at home. She reports an allergy to aspirin.  
Review of Systems  
GEN: No fevers or chills.  
CV/PULM: No chest pain. No shortness of breath. No palpitations.  
NEURO: No headaches. No loss of vision. No double vision. No dysphagia. No 
speech changes. No focalweakness. No sensory loss.  
Physical Exam  
GEN: General appearance normal. Well-kempt. No distress. No visualized 
deformities or trauma.  
CARDIO/VASC: Limbs without significant edema and appear well-perfused.  
PULM: Normal work of breathing.  
SKIN: Visualized skin is intact and without lesions aside from age-related 
findings.  
MS: Affect is normal. Patient is alert and generally oriented. Normal attention.  
LANG: Speech is fluent and non-dysarthric.  
EYES: Pupils equal/reactive/consensual. Ocular motility full. No pathologic 
nystagmus.  
CN: Facial sensation normal. Hearing acuity normal. Face without droop and with 
normal motor function.  
MOTOR: Muscle bulk normal. Muscle tone normal. Muscle strength normal. No 
tremors.  
REFLEXES: Reflexes normoactive throughout. No pathologic reflexes.  
SENSORY: Light touch normal. Vibratory sensation intact in distal extremities.  
CEREBELLAR: No limb ataxia.  
Date/Time:2025 @0924  
Level of Consciousness: Alert = 0  
Current month and age: Answers both correctly = 0  
Open and close eyes/ release hand: Obeys both correctly = 0  
Best gaze: Normal = 0  
Visual field testing: No visual field loss = 0  
Facial paresis: Normal symmetric movement = 0  
Motor function left arm: Normal = 0  
Motor function right arm: Normal = 0  
Motor function left leg: Normal = 0  
Motor function right leg: Normal = 0  
Limb ataxia: No ataxia = 0  
Sensory: Normal = 0  
Best language: No aphasia = 0  
Dysarthria: Normal articulation = 0  
Extinction and inattention: Normal = 0  
Total Score (severe deficit >22): 0  
Notes:  
Assessment/Plan  
ASSESSMENT:  
I was consulted for right hemiparesis and she was reported to have history of 
stroke with some right sided strength deficits, but upon examining her she does 
not seem to have any significant focal neurological deficits. On her noncontrast
 CT head I see some vague hypodensities in the left frontal periventricular 
white matter and the left parietal white matter which based on her history could
 possibly represent old demyelinating lesion or old ischemic lesion. Perhaps her
 presenting symptoms and transient unilateral weakness could relate to 
recrudescence related to those possible lesions. MRI will hopefully clear up 
some confusion.  
PLAN:  
1. MRI brain without contrast (GFR only 14)  
2. Continue the Eliquis and pravastatin. Aspirin allergy.  
3. Further recommendations to follow.  
  
1. Metabolic encephalopathy (G93.41: Metabolic encephalopathy)  
2. History of stroke (Z86.73: Personal history of transient ischemic attack 
(TIA), and cerebral infarction without residual deficits)  
3. Acute kidney injury superimposed on chronic kidney disease (N17.9: Acute 
kidney failure, unspecified)  
4. UTI (urinary tract infection) (N39.0: Urinary tract infection, site not 
specified)  
Chronic kidney disease, unspecified (N18.9: Chronic kidney disease, unspecified)  
Problem List/Past Medical History  
Ongoing  
Smoker  
Historical  
No qualifying data  
Medications  
Inpatient  
acetaminophen 325 mg Tab, 650 mg= 2 tab(s), Oral, q6hr, PRN  
albuterol-ipratropium Inh Sol 3 mL UD, 3 mL, NEB, q6hrFT  
amiodarone 200 mg Tab, 400 mg= 2 tab(s), Oral, Daily  
amitriptyline 50 mg Tab, 50 mg= 1 tab(s), Oral, Once a day (at bedtime)  
duloxetine 60 mg Cap-DR, 60 mg= 1 cap(s), Oral, Daily  
Eliquis 5 mg oral tablet, 5 mg= 1 tab(s), Oral, BID  
ertapenem additive + Sodium Chloride 0.9% intravenous solution 50 mL  
heparin flush 100 units/mL Soln 5 mL, 300 unit(s)= 3 mL, IV Push, q12hr, PRN  
heparin flush 100 units/mL Soln 5 mL, 300 unit(s)= 3 mL, IV Push, q12hr  
hydrALAZINE 20 mg/mL Inj, 10 mg= 0.5 mL, IV Push, q6hr, PRN  
Insulin Lispro Sliding Scale, 0-10 unit(s), SubCutaneous, QIDACHS  
ondansetron 4 mg (more content not included)...[facility]
Comment on above:Result Comment: Electronically Signed By: Vidya Holden RN\.br\Date and Time Signed: 25 07:22 EST\.br\Electronically Co-Signed By: 
Adams Dorman DO\.br\Date and Time Co-Signed: 25 10:20 
EST\.br\Electronically Co-Signed By: Vidya Holden RN Y74- Note
Consultation Note  
Chief Complaint  
renal insufficiency  
Reason for Consultation  
stroke vs metabolic encephalopathy  
History of Present Illness  
54-year-old woman. What she tells me is fairly different than what I see 
documented in the chart. She says she had developed diarrhea and back pain and 
abdominal pain and felt too weak to stand so was brought here for evaluation. I 
am consulted for right sided weakness. There is reportedly history of stroke. 
She makes a sound like she had history of possible  mini strokes  or TIA. She 
also says she was diagnosed with multiple sclerosis about 15 years ago but did 
not know details about that, anddanica has not been on any disease modifying 
medication. She had a CT of her head done here that showssome subtle left 
frontal periventricular and left parietal white matter changes which could 
represent old demyelinating plaques, evolving ischemia, or be artifactual. 
Kidney function is very poor with a GFR of 14. She is on Eliquis and pravastatin
 at home. She reports an allergy to aspirin.  
Review of Systems  
GEN: No fevers or chills.  
CV/PULM: No chest pain. No shortness of breath. No palpitations.  
NEURO: No headaches. No loss of vision. No double vision. No dysphagia. No 
speech changes. No focalweakness. No sensory loss.  
Physical Exam  
GEN: General appearance normal. Well-kempt. No distress. No visualized 
deformities or trauma.  
CARDIO/VASC: Limbs without significant edema and appear well-perfused.  
PULM: Normal work of breathing.  
SKIN: Visualized skin is intact and without lesions aside from age-related 
findings.  
MS: Affect is normal. Patient is alert and generally oriented. Normal attention.  
LANG: Speech is fluent and non-dysarthric.  
EYES: Pupils equal/reactive/consensual. Ocular motility full. No pathologic 
nystagmus.  
CN: Facial sensation normal. Hearing acuity normal. Face without droop and with 
normal motor function.  
MOTOR: Muscle bulk normal. Muscle tone normal. Muscle strength normal. No 
tremors.  
REFLEXES: Reflexes normoactive throughout. No pathologic reflexes.  
SENSORY: Light touch normal. Vibratory sensation intact in distal extremities.  
CEREBELLAR: No limb ataxia.  
Date/Time:2025 @0924  
Level of Consciousness: Alert = 0  
Current month and age: Answers both correctly = 0  
Open and close eyes/ release hand: Obeys both correctly = 0  
Best gaze: Normal = 0  
Visual field testing: No visual field loss = 0  
Facial paresis: Normal symmetric movement = 0  
Motor function left arm: Normal = 0  
Motor function right arm: Normal = 0  
Motor function left leg: Normal = 0  
Motor function right leg: Normal = 0  
Limb ataxia: No ataxia = 0  
Sensory: Normal = 0  
Best language: No aphasia = 0  
Dysarthria: Normal articulation = 0  
Extinction and inattention: Normal = 0  
Total Score (severe deficit >22): 0  
Notes:  
Assessment/Plan  
ASSESSMENT:  
I was consulted for right hemiparesis and she was reported to have history of 
stroke with some right sided strength deficits, but upon examining her she does 
not seem to have any significant focal neurological deficits. On her noncontrast
 CT head I see some vague hypodensities in the left frontal periventricular 
white matter and the left parietal white matter which based on her history could
 possibly represent old demyelinating lesion or old ischemic lesion. Perhaps her
 presenting symptoms and transient unilateral weakness could relate to 
recrudescence related to those possible lesions. MRI will hopefully clear up 
some confusion.  
PLAN:  
1. MRI brain without contrast (GFR only 14)  
2. Continue the Eliquis and pravastatin. Aspirin allergy.  
3. Further recommendations to follow.  
  
1. Metabolic encephalopathy (G93.41: Metabolic encephalopathy)  
2. History of stroke (Z86.73: Personal history of transient ischemic attack 
(TIA), and cerebral infarction without residual deficits)  
3. Acute kidney injury superimposed on chronic kidney disease (N17.9: Acute 
kidney failure, unspecified)  
4. UTI (urinary tract infection) (N39.0: Urinary tract infection, site not 
specified)  
Chronic kidney disease, unspecified (N18.9: Chronic kidney disease, unspecified)  
Problem List/Past Medical History  
Ongoing  
Smoker  
Historical  
No qualifying data  
Medications  
Inpatient  
acetaminophen 325 mg Tab, 650 mg= 2 tab(s), Oral, q6hr, PRN  
albuterol-ipratropium Inh Sol 3 mL UD, 3 mL, NEB, q6hrFT  
amiodarone 200 mg Tab, 400 mg= 2 tab(s), Oral, Daily  
amitriptyline 50 mg Tab, 50 mg= 1 tab(s), Oral, Once a day (at bedtime)  
duloxetine 60 mg Cap-DR, 60 mg= 1 cap(s), Oral, Daily  
Eliquis 5 mg oral tablet, 5 mg= 1 tab(s), Oral, BID  
ertapenem additive + Sodium Chloride 0.9% intravenous solution 50 mL  
heparin flush 100 units/mL Soln 5 mL, 300 unit(s)= 3 mL, IV Push, q12hr, PRN  
heparin flush 100 units/mL Soln 5 mL, 300 unit(s)= 3 mL, IV Push, q12hr  
hydrALAZINE 20 mg/mL Inj, 10 mg= 0.5 mL, IV Push, q6hr, PRN  
Insulin Lispro Sliding Scale, 0-10 unit(s), SubCutaneous, QIDACHS  
ondansetron 4 mg (more content not included)...[facility]
Comment on above:Result Comment: Electronically Signed By: Vidya Holden RN\.br\Date and Time Signed: 25 07:22 EST\.br\Electronically Co-Signed By: 
Adams Dorman DO\.br\Date and Time Co-Signed: 25 10:20 [id number]- Note
Interdisciplinary Note - PT  
PT Evaluation done this date. Pt. with 10/24 on AM-PAC this date. She requires 
mod to max Ax1 with all activity, unable to take any steps due to weakness. 
Recommend return to SNF at d/c, will continue to follow while here to continue 
to progress functional activities and ther ex.[facility]
2025 NoteHistory and Physical  
Basic Information  
Admit Date/Time:2025 10:29  
Chief Complaint  
renal insufficiency  
History of Present Illness  
This is a 54-year-old white female whose past medical history is significant 
for:  
1. MS  
2. A-fib on Eliquis  
3. COPD 3 L per nasal cannula continuous  
4. Hypertension  
5. History of asthma  
6. History of kidney stones with 2 ESWL's; she also [facility]  
7. Type 2 diabetes on insulin  
8. Chronic kidney disease  
Patient presented to the ER because of sharp pain on her left side. It was a 10 
out of 10 and she said the pain was bad enough to the point where she could not 
walk. She reported nausea and vomiting before she came to the ER but no fevers, 
chills or sweats although she says she is cold all the time. She came into the 
ER to be evaluated. No urinary complaints.  
She typically leans/lays to the right side. ER felt that she may be having a 
stroke so she was admitted for possible stroke workup. She had no visual 
disturbances, speech impediments, headaches or focal weaknesses in her arms or 
legs.  
In the emergency room on , her blood pressure is 112/68 with a pulse 
of 80 and a respiratory rate of 18. She is satting 98% on 6 L and she is 
afebrile. She was actually brought down to 3 L nasal cannula with sats in the 
mid to upper 90s. CBC on  shows a white count of 8.9 with no shift and
 her H&H is 7.3 and 22.4. PT is elevated at 17.9 and PTT elevated 39.4. BUN 29 
with acreatinine of 4.2 and the rest of her CMP is unremarkable. Initial 
troponin is 21.2 with a repeat of 19.7 and her BNP is 154. Urinalysis shows 2+ 
protein with trace glucose and 3+ blood and greater than 75 RBCs and 500 
leukocyte esterase. There is 31-75 WBCs and 1+ bacteria. Urine culture negative.
CT of the head without contrast is nonacute and chest x-ray shows possible 
opacity versus atelectasis involving the lung bases and possible interstitial 
pulmonary edema.  
Urine culture was sent from the ER and she was started on IV or ertapenem and 
given Haldol x 1 and nystatin and Seroquel. ER felt that she had a stroke so did
 an NIH score of 4. According to the ER note, she is doing nicardipine and 
nursing there and noted relating to right and seemed to have some right-sided 
weakness. Family did say she had slurred speech with increased drowsiness she 
was recently admitted to [facility] twice within the past month and placed
 on antibiotics for sepsis anddischarged home. She was too weak at home to 
function so she went back to [place] and transferred to [facility] where 
she was there for several days and discharged to [facility]. Stroke center 
at [place] was contacted. They talked with Dr. Price felt that the altered 
mental status is probably metabolic encephalopathy. With her elevated creatinine
 did not recommend doing a CT of the head neck as she is not a thrombolytic 
candidate given the fact that she is on Eliquis already. [facility] clinic 
transfer line was contacted however it was found out that she was likely the 
[facility] and not the [facility]. [facility] was contacted and he 
talked with Dr. Dillon who accepted the patient in transfer however there is 
no beds available at this time and recommended ertapenem for treatment of her 
urinary tract infection given her multidrug-resistant urine in the past. She was
 already on ertapenem.  
Because of over 40 hours in the ER, she was admitted to the medical service for 
further evaluation and treatment.  
PAST MEDICAL HISTORY  
see above  
PAST SURGICAL HISTORY  
1. Cholecystectomy secondary to gallstones  
2.  x 1  
3. Bilateral tubal ligation  
4. Umbilical hernia repair  
5. ESWL x 2  
6. Nephrostomy tube left side 2 years ago August at [facility]  
FAMILY HISTORY  
Mother is alive with COPD  
Father  from complications of lung cancer  
1 brother alive with high blood pressure  
1 sister alive with depression  
1 brother  from complications of lung cancer  
3 children alive and well  
SOCIAL HISTORY  
Patient is single living with her son and his fianc???e  
She quit smoking about a month ago with a history of 1 to 1-1/2 packs a day for 
30 years  
No alcohol or drug abuse history  
Full CODE STATUS  
Review of Systems  
Constitutional: no fever, no chills, no sweats, no weakness  
Skin: no Jaundice, no rash, no lesions, nopetechiae  
ENMT: no ear pain, no sore throat, no congestion, no hoarseness  
Respiratory: no shortness of breath, no cough, no orthopnea, no wheezing  
Cardiovascular: no chest pain, no palpitations, no edema  
Gastrointestinal: no nausea, no vomiting, no diarrhea, no GI bleeding  
Genitourinary: no dysuria, no hematuria, no discharge, no pain  
Musculoskeletal: no back pain, no trauma  
Neurologic: no headache, no dizziness, no numbness, no weakness  
Psychiatric: no sleeping problems, no irritability, no mood swings/depression.  
Heme/Lymph: no bleeding tendency, no bruising t (more content not included)...
[facility] NorthComment on above:Result Comment: Electronically 
Signed By: Santos Gandara DO\Date and Time Signed: 25 16:58 EST
2025 NoteInterdisciplinary Note - OT  
No H&P in chart and per  pt pending transfer to [facility].
 Will hold OT evaluation today and follow up as appropriate.[facility]01- NoteThe [facility]02- Progress note  
  
  
  
                                        Author              Yamilet Terry  
[facility]  
   
                                        Note Date/Time      2025 5  
:13pm  
   
                                                    [facility]  
ENTER  
[address] [place] [address]  
  
Hospitalist Progress Note  
Signed with Jeremy  
  
Patient: Cris Hearn MR#: [id number]  
[id number]  
: 1970 Acct:[id number]  
  
Age/Sex: 54 / F Adm Date:   
5  
Loc:  Room: [address] [place] [address] Type: ADM IN  
Attending Dr: Yamilet Terry MD  
  
Copies to: ~  
  
  
  
**ADDENDUM**1  
Sitting in the chair. She was awake and oriented x 3, she does not appear to 
bein   
respiratory distress, at this point she does not appear to be toxic or septic, 
she   
was not lethargic, she was able to follow commands and answer the questions 
clearly,   
however she was complaining of generalized weakness and was complaining of the 
pain   
in the left flank area.  
  
Urostomy on the left noted, draining quite clear urine. At this point no Gonzalez   
catheter.  
  
Because of worsening of leukocytosis with left shift, I did order CT scan of   
theabdomen and pelvis, which showed worsening of perinephric/retroperitoneal   
collection which could be consistent with urinomaand retroperitoneal hematoma   
measuring up to 15 cm. She was noted to drop in hematocrit from 7.8-7.1, without
 any   
obvious external signs of bleeding. Her blood pressure at the lowerrange. I did 
order   
1 unit of packed red blood cells, started IV fluids, broadenantibiotic, urine   
cultures and blood cultures still pending. We will try to transfer the patient 
back   
to [facility].  
  
Addendum Documented By: Yamilet Terry MD 25  
Addendum Signed By: <Electronically signed by Yamilet Terry MD> 25  
  
  
  
Date of Service:  
2025  
  
  
Subjective  
Subjective  
Narrative:  
Patient was admitted after midnight. The patient has been seen and examined. I   
personally obtained the key and critical portions of the history and physical 
exam. I   
reviewed the chart, the team's documentation, and discussed the patientcare with
 the   
team. I agree with the team's medical decision making and have edited the note 
to   
reflect my clinical findings and my assessment and plan.  
  
MD Brendan  
  
Exam  
Physical Exam  
Vital Signs:  
  
  
  
  
Temp Pulse Resp BP Pulse Ox O2 Del Method O2 Flow Rate  
  
36.6 C 66 18 113/54 L 100 Nasal Cannula 3  
  
25 11:54 25 11:54 25 11:54 25 11:54 25 11:54 
25   
11:56 25 11:56  
  
  
  
  
Objective  
Lab Results  
  
25 22:50  
  
25 01:38  
  
  
Meds  
Allergies and Active Meds  
Allergies  
  
aspirin Allergy (Unknown, Verified 25 22:30)  
Hives  
ibuprofen (From Advil) Allergy (Unknown, Verified 25 22:30)  
Hives  
naproxen (From Aleve) Allergy (Unknown, Verified 25 22:30)  
Hives  
diphenhydramine (From Benadryl) Allergy (Verified 25 22:30)  
Swelling  
amoxicillin Adverse Reaction (Verified 25 22:30)  
thrush  
azithromycin Adverse Reaction (Verified 25 22:30)  
Flushing  
  
  
Active Meds:  
Active Medications  
  
  
  
Generic Name Dose Route Start Last Admin  
  
Trade Name Freq PRN Reason Stop Dose Admin  
  
Acetaminophen 650 mg 25 00:43  
  
Acetaminophen 325 Mg Tablet PO 26 00:42  
  
Q6HR PRN  
  
Pain Scale 1 - 3 or fever  
  
Albuterol 2.5 mg 25 00:40  
  
Albuterol Neb 2.5 Mg/3 Ml Vial.Neb INHALATION 26 00:39  
  
Q4HR PRN  
  
Shortness Of Breath  
  
Amitriptyline HCl 50 mg 25 21:00  
  
Amitriptyline 50 Mg Tablet PO 26 20:59  
  
QPM HAMILTON  
  
Apixaban 5 mg 25 09:00 25 09:01  
  
Apixaban 5 Mg Tablet PO 26 08:59 5 mg  
  
BID HAMILTON Administration  
  
Ceftriaxone Sodium 1 gm 25 01:00  
  
Ceftriaxone 1 Gm/10 Ml Syringe IV-PUSH  
  
Q24H HAMILTON  
  
Cyanocobalamin 1,000 mcg 25 09:00 25 09:01  
  
Cyanocobalamin 1,000 Mcg Tablet PO 26 08:59 1,000 mcg  
  
QAM HAMILTON Administration  
  
Dapagliflozin 5 mg 25 09:00 25 09:01  
  
Dapagliflozin 5 Mg Tablet PO 26 08:59 5 mg  
  
QAM HAMILTON Administration  
  
Duloxetine HCl 60 mg 25 09:00 25 09:01  
  
Duloxetine 60 Mg Capsule. PO 26 08:59 60 mg  
  
QAM HAMLITON Administration  
  
Multivitamins 1 tab 25 09:00 25 09:01  
  
Multivitamin 1 Tab Tablet PO 26 08:59 1 tab  
  
DAILY HAMILTON Administration  
  
Ondansetron HCl 4 mg 25 00:43  
  
Ondansetron 4 Mg/2 Ml Vial IV-PUSH 26 00:42  
  
Q8H PRN  
  
Nausea And Vomiting  
  
Oxycodone/Acetaminophen 1 tab 25 00:43 25 10:21  
  
Oxycodone/Acetaminophen 5-325 Mg Tablet PO 1 tab  
  
Q4H PRN Administration  
  
Pain Scale 4 - 7  
  
Pantoprazole Sodium 40 mg 25 09:00 25 09:01  
  
Pantoprazole 40 Mg Tablet. PO 26 08:59 40 mg  
  
BID HAMILTON Administration  
  
Pravastatin Sodium 40 mg 25 21:00  
  
Pravastatin 40 Mg Tablet PO 26 20:59  
  
QPM HAMILTON  
  
Sodium Chloride 0 ml 25 22:30 25 02:28  
  
Sodium Chloride 0.9 % 10 Ml Syringe IV-PUSH 26 22:29 10 ml  
  
PRN PRN Administration  
  
Flush  
  
Sotalol HCl 120 mg 25 09:00 25 09:01  
  
Sotalol 120 Mg Tablet PO 26 08:59 120 mg  
  
QAM HAMILTON Administration  
  
  
  
  
A&P - Hospitalist  
Assessment/Plan  
(1) Hypomagnesemia:  
(2) Weakness:  
(3) Multiple sclerosis:  
(4) Chronic kidney disease, stage IV (severe):  
(5) Diabetes mellitus:  
  
Plan  
Cris Hearn is a 53-year-old female past medical history of chronic kidney 
disease   
stage III/IV, atrial fibrillation on Eliquis, h/o bilateral nephrolithiasis s/p   
bilateral percutaneous nephrostomy tube, persistent L sided nephrostomy tube 
lost to   
follow up and no longer draining, status post ureteralstent, COPD with active 
tobacco   
smoking, diabetes mellitus type 2 and other medical comorbidities, multiple 
sclerosis   
was recently admitted for urosepsis and was transferred to [facility] from where she was discharged yesterday morning. She mentioned that she   
arrived home and continuedto have weakness for which she called EMS who brought 
her   
to [facility]'s ED. Shedenies any fever chills headache shortness of breath or 
chest   
pain or abdominal pain or nausea or vomiting dysuria urgency frequency or loose   
stools. In the EDher blood pressure is soft. Lab work is remarkable for WBC of 
21,000   
and hemoglobin of 7.8, normal electrolytes. Elevated BUN and creatinine around   
baseline normal liver enzymes.  
  
Plan:  
-Admit in the regular nursing floor  
-Continue on ceftriaxone and follow-up urine cultures  
-Will start on IV maintenance fluid for the night  
-Follow-up morning labs  
-Continue POA medication-amitriptyline, Eliquis, for 6, vitamin B12,   
duloxetine,fenofibrate, multivitamin, Protonix, pravastatin, sotalol  
-PT OT  
-Follow-up blood culture  
  
Full code  
  
  
  
Documented By: Yamilet Terry MD 25 [id number]  
Signed By: <Electronically signed by Yamilet Terry MD>  
25 [id number]  
   
  
[facility]  
Work Phone: [phone][place] [address]  
  
Hospitalist Progress Note  
Signed with Addenda  
  
Patient: Cris Hearn MR#: [id number]  
[id number]  
: 1970 Acct:[id number]  
  
Age/Sex: 54 / F Adm Date:   
5  
Loc:  Room: [address] [place] [address] Type: ADM IN  
Attending Dr: Yamilet Terry MD  
  
Copies to: ~  
  
  
  
**ADDENDUM**1  
Sitting in the chair. She was awake and oriented x 3, she does not appear to 
bein respiratory distress, at this point she does not appear to be toxic or 
septic, she was not lethargic, she was able tofollow commands and answer the 
questions clearly, however she was complaining of generalized weakness and was 
complaining of the pain in the left flank area.  
  
Urostomy on the left noted, draining quite clear urine. At this point no Gonzalez 
catheter.  
  
Because of worsening of leukocytosis with left shift, I did order CT scan of 
theabdomen and pelvis,which showed worsening of perinephric/retroperitoneal 
collection which could be consistent with urinomaand retroperitoneal hematoma 
measuring up to 15 cm. She was noted to drop in hematocrit from 7.8-7.1, without
 any obvious external signs of bleeding. Her blood pressure at the lowerrange. I
 did order 1 unit of packed red blood cells, started IV fluids, 
broadenantibiotic, urine cultures and bloodcultures still pending. We will try 
to transfer the patient back to [facility].  
  
Addendum Documented By: Yamilet Terry MD 25  
Addendum Signed By: 25  
  
  
  
Date of Service:  
2025  
  
  
Subjective  
Subjective  
Narrative:  
Patient was admitted after midnight. The patient has been seen and examined. I 
personally obtained the key and critical portions of the history and physical 
exam. I reviewed the chart, the team's documentation, and discussed the 
patientcare with the team. I agree with the team's medical decision making and 
have edited the note to reflect my clinical findings and my assessment and plan.  
  
MD Brendan  
  
Exam  
Physical Exam  
Vital Signs:  
  
  
  
  
Temp Pulse Resp BP Pulse Ox O2 Del Method O2 Flow Rate  
  
36.6 C 66 18 113/54 L 100 Nasal Cannula 3  
  
25 11:54 25 11:54 25 11:54 25 11:54 25 11:54 
25 11:56 25 11:56  
  
  
  
  
Objective  
Lab Results  
  
25 22:50  
  
25 01:38  
  
  
Meds  
Allergies and Active Meds  
Allergies  
  
aspirin Allergy (Unknown, Verified 25 22:30)  
Hives  
ibuprofen (From Advil) Allergy (Unknown, Verified 25 22:30)  
Hives  
naproxen (From Aleve) Allergy (Unknown, Verified 25 22:30)  
Hives  
diphenhydramine (From Benadryl) Allergy (Verified 25 22:30)  
Swelling  
amoxicillin Adverse Reaction (Verified 25 22:30)  
thrush  
azithromycin Adverse Reaction (Verified 25 22:30)  
Flushing  
  
  
Active Meds:  
Active Medications  
  
  
  
Generic Name Dose Route Start Last Admin  
  
Trade Name Freq PRN Reason Stop Dose Admin  
  
Acetaminophen 650 mg 25 00:43  
  
Acetaminophen 325 Mg Tablet PO 26 00:42  
  
Q6HR PRN  
  
Pain Scale 1 - 3 or fever  
  
Albuterol 2.5 mg 25 00:40  
  
Albuterol Neb 2.5 Mg/3 Ml Vial.Neb INHALATION 26 00:39  
  
Q4HR PRN  
  
Shortness Of Breath  
  
Amitriptyline HCl 50 mg 25 21:00  
  
Amitriptyline 50 Mg Tablet PO 26 20:59  
  
QPM HAMILTON  
  
Apixaban 5 mg 25 09:00 25 09:01  
  
Apixaban 5 Mg Tablet PO 26 08:59 5 mg  
  
BID HAMILTON Administration  
  
Ceftriaxone Sodium 1 gm 25 01:00  
  
Ceftriaxone 1 Gm/10 Ml Syringe IV-PUSH  
  
Q24H HAMILTON  
  
Cyanocobalamin 1,000 mcg 25 09:00 25 09:01  
  
Cyanocobalamin 1,000 Mcg Tablet PO 26 08:59 1,000 mcg  
  
QAM HAMILTON Administration  
  
Dapagliflozin 5 mg 25 09:00 25 09:01  
  
Dapagliflozin 5 Mg Tablet PO 26 08:59 5 mg  
  
QAM HAMILTON Administration  
  
Duloxetine HCl 60 mg 25 09:00 25 09:01  
  
Duloxetine 60 Mg Capsule. PO 26 08:59 60 mg  
  
QAM HAMILTON Administration  
  
Multivitamins 1 tab 25 09:00 25 09:01  
  
Multivitamin 1 Tab Tablet PO 26 08:59 1 tab  
  
DAILY HAMILTON Administration  
  
Ondansetron HCl 4 mg 25 00:43  
  
Ondansetron 4 Mg/2 Ml Vial IV-PUSH 26 00:42  
  
Q8H PRN  
  
Nausea And Vomiting  
  
Oxycodone/Acetaminophen 1 tab 25 00:43 25 10:21  
  
Oxycodone/Acetaminophen 5-325 Mg Tablet PO 1 tab  
  
Q4H PRN Administration  
  
Pain Scale 4 - 7  
  
Pantoprazole Sodium 40 mg 25 09:00 25 09:01  
  
Pantoprazole 40 Mg Tablet. PO 26 08:59 40 mg  
  
BID HAMILTON Administration  
  
Pravastatin Sodium 40 mg 25 21:00  
  
Pravastatin 40 Mg Tablet PO 26 20:59  
  
QPM HAMILTON  
  
Sodium Chloride 0 ml 25 22:30 25 02:28  
  
Sodium Chloride 0.9 % 10 Ml Syringe IV-PUSH 26 22:29 10 ml  
  
PRN PRN Administration  
  
Flush  
  
Sotalol HCl 120 mg 25 09:00 25 09:01  
  
Sotalol 120 Mg Tablet PO 26 08:59 120 mg  
  
QAM HAMILTON Administration  
  
  
  
  
A&P - Hospitalist  
Assessment/Plan  
(1) Hypomagnesemia:  
(2) Weakness:  
(3) Multiple sclerosis:  
(4) Chronic kidney disease, stage IV (severe):  
(5) Diabetes mellitus:  
  
Plan  
Cris Hearn is a 53-year-old female past medical history of chronic kidney 
disease stage III/IV, atrial fibrillation on Eliquis, h/o bilateral 
nephrolithiasis s/p bilateral percutaneous nephrostomytube, persistent L sided 
nephrostomy tube lost to follow up and no longer draining, status post uret
eralstent, COPD with active tobacco smoking, diabetes mellitus type 2 and other 
medical comorbidities, multiple sclerosis was recently admitted for urosepsis 
and was transferred to [facility] from where she was 
discharged yesterday morning. She mentioned that she arrived home and c
ontinuedto have weakness for which she called EMS who brought her to [facility]'s 
ED. Shedenies any fever chills headache shortness of breath or chest pain or 
abdominal pain or nausea or vomiting dysuria urgency frequency or loose stools. 
In the EDher blood pressure is soft. Lab work is remarkable for WBC of 21,000 
and hemoglobin of 7.8, normal electrolytes. Elevated BUN and creatinine around 
baseline normal liver enzymes.  
  
Plan:  
-Admit in the regular nursing floor  
-Continue on ceftriaxone and follow-up urine cultures  
-Will start on IV maintenance fluid for the night  
-Follow-up morning labs  
-Continue POA medication-amitriptyline, Eliquis, for 6, vitamin B12, 
duloxetine,fenofibrate, multivitamin, Protonix, pravastatin, sotalol  
-PT OT  
-Follow-up blood culture  
  
Full code  
  
  
  
Documented By: Yamilet Terry MD 25 1257  
Signed By:  
25 1258  
[facility]01- Radiology Diagnostic study note
[facility]  
[address] [place] [address]  
  
CT Scan Report  
Signed  
  
Patient: Cris Hearn MR#: [id number]  
[id number]  
: 1970 Acct:[id number]  
  
Age/Sex: 54 / F ADM Date:   
5  
Loc:  Room: [address] [place] [address] Type: ADM  
IN  
Attending Dr: Yamilet Terry MD  
Copies to: Yamilet Terry MD~  
  
  
  
Ordering Provider: Yamilet Terry MD  
Date of Service: 25  
Accession #: ([id number] CT/CT abdomen pelvis wo con: Nephrostomy, 
leukocytosis  
  
  
  
  
CT ABDOMEN AND PELVIS WITHOUT INTRAVENOUS CONTRAST:  
  
CLINICAL HISTORY: Nephrostomy, leukocytosis, weakness, C. difficile  
  
COMPARISON: 2025  
  
TECHNIQUE: Spiral images were obtained through the abdomen and pelvis without 
intravenous contrast.This CT exam was performed using one or more following dose
 reduction techniques: Automated exposure control, adjustment of the mA and/or 
kV according to patient size, or use of iterative reconstruction technique.  
  
FINDINGS:  
  
Lung Bases: [Consolidation both lower lobes likely areas of atelectasis. Left-
sided effusion. Smallright-sided effusion.]  
  
Organs:Gallbladder is absent. Otherwise the liver, left adrenal gland and 
pancreas are unremarkable. Bilateral nephrolithiasis. Left-sided nephrostomy 
tube. Residual calculus left renal collecting system 9 mm in size. The left 
renal collecting system appears satisfactory decompressed when compared to the 
previous examination. There is however progression of the left-sided 
perinephric/retroperitoneal hematoma measuring 14.5 x 7.4 cm.[Splenic cyst 
unchanged.. Right adrenal gland adenoma. Thickened left adrenal gland.  
  
GI: Moderate retained stool within the colon. No bowel obstruction. Scattered 
colonic diverticulosis.  
  
Pelvis:[Mild bladder distention. Uterus unremarkable as visualized. No definite 
adnexal mass. Mild bladder distention]  
  
Peritoneum/Retroperitoneum:Progression of the retroperitoneal 
hematoma[/perinephric hematoma. Aortic and iliac vascular disease. Stable 
retroperitoneal lymph nodes.  
  
Abd wall/Bones:Degenerative changes of the lower lumbar spine. Fat-containing 
right lower quadrant hernia identified containing omentum.[  
  
  
  
ORDER #: [id number] CT/CT abdomen pelvis wo con  
IMPRESSION:  
  
Progression of the loculated retroperitoneal/perinephric collection suggestive 
of a combination of urinoma and retroperitoneal hematoma this measures up to 15 
cm in greatest dimension.  
  
  
Bilateral nephrolithiasis with left-sided nephrostomy  
  
Impression dictated by: Taye López M.D.2025 3:04 PM  
  
  
Dictation Location: [facility]-29  
  
  
  
Transcribed By: [facility] 25 1504  
Dictated By: Taye López MD 25 1449  
  
Signed By:  
25 1504  
[facility]  
Work Phone: [phone]- History and physical note  
  
  
  
                                        Author              Shyam Guadarrama  
[facility]  
   
                                        Note Date/Time      2025 1  
:15am  
   
                                                    [facility]  
ENTER  
[address] [place] [address]  
  
Hospitalist H&P  
Signed  
  
Patient: Cris Hearn MR#: [id number]  
[id number]  
: 1970 Acct:[id number]  
  
Age/Sex: 54 / F Adm Date:   
5  
Loc:  Room: [address] [place] [address] Type: ADM IN  
Attending Dr: Shyam Guadarrama MD  
  
Copies to: Shyam Guadarrama MD  
[facility] SERVICES~  
  
  
[facility]  
DATE OF EXAMINATION: 25  
CHIEF COMPLAINT: Generalized weakness  
HISTORY OF PRESENT ILLNESS:  
Cris Hearn is a 53-year-old female past medical history of chronic kidney 
disease   
stage III/IV, atrial fibrillation on Eliquis, h/o bilateral nephrolithiasis s/p   
bilateral percutaneous nephrostomy tube, persistent L sided nephrostomy tube 
lost to   
follow up and no longer draining, status post ureteralstent, COPD with active 
tobacco   
smoking, diabetes mellitus type 2 and other medical comorbidities, multiple 
sclerosis   
was recently admitted for urosepsis and was transferred to [facility] from where she was discharged yesterday morning. She mentioned that she   
arrived home and continued to have weakness for which she called EMS who brought
 her   
to [facility]'s ED. She denies any fever chills headache shortness of breath or 
chest   
pain or abdominal pain ornausea or vomiting dysuria urgency frequency or loose   
stools. In the ED her blood pressure is soft. Lab work is remarkable for WBC of   
21,000 and hemoglobinof 7.8, normal electrolytes. Elevated BUN and creatinine 
around   
baseline normal liver enzymes.  
  
Review of Systems  
Review of Systems  
All other systems reviewed & are negative unless noted below or in HPI  
  
[facility]  
Medical History  
  
Chronic respiratory failure  
GERD (gastroesophageal reflux disease)  
Esophageal dilatation  
Vitamin D deficiency  
C. difficile diarrhea  
Chronic renal disease, stage III  
Afib  
Chronic respiratory failure with hypoxia  
Oxygen dependent  
Chronic anticoagulation  
KWAKU (obstructive sleep apnea)  
Depression  
Obesity  
Hyperlipemia  
Multiple sclerosis  
Hypertension  
COPD (chronic obstructive pulmonary disease)  
Diabetes  
Smoking  
  
  
Surgical History (Reviewed 24 @ 11:19 by Valeria Dumont LPN)  
  
Status post incision and drainage  
Umbilical hernia  
Hx of lithotripsy  
H/O tubal ligation  
S/P   
S/P cholecystectomy  
  
  
Family History (Reviewed 24 @ 11:19 by Valeria Dumont LPN)  
Brother Cancer  
Father Cancer  
Legacy FamHx Problem: Diagnosed with Cancer  
Mother COPD (chronic obstructive pulmonary disease)  
  
  
Social History  
Smoking Status: Former smoker  
Tobacco Type: cigarettes  
Substance Use Type: None  
Social History Comments: ex- significant other and younger son  
  
Meds  
Medications and Allergies  
Allergies  
  
aspirin Allergy (Unknown, Verified 25 22:30)  
Hives  
ibuprofen (From Advil) Allergy (Unknown, Verified 25 22:30)  
Hives  
naproxen (From Aleve) Allergy (Unknown, Verified 25 22:30)  
Hives  
diphenhydramine (From Benadryl) Allergy (Verified 25 22:30)  
Swelling  
amoxicillin Adverse Reaction (Verified 25 22:30)  
thrush  
azithromycin Adverse Reaction (Verified 25 22:30)  
Flushing  
  
  
Home Medications  
  
amitriptyline 50 mg tablet 50 mg PO QPM #30 tabs 10/07/21 [Rx Confirmed 
25]  
canagliflozin 100 mg tablet (Invokana) 100 mg PO QAM #30 tabs 10/07/21 [Rx 
Confirmed   
25]  
duloxetine 60 mg capsule,delayed release 60 mg PO QAM #30 caps 10/07/21 [Rx 
Confirmed   
25]  
pravastatin 40 mg tablet 40 mg PO QPM #30 tabs 10/07/21 [Rx Confirmed 25]  
sotalol 120 mg tablet (Sotalol AF) 120 mg PO QAM #30 tabs 10/07/21 [Rx Confirmed
   
25]  
potassium chloride 10 mEq capsule,extended release 10 meq PO DAILY #30 caps 
22   
[Rx Confirmed 25]  
dulaglutide 0.75 mg/0.5 mL subcutaneous pen injector (Trulicity) 0.7 mg subcut 
QWEEK   
23 [History Confirmed 25]  
multivitamin 1 tab PO DAILY 23 [History Confirmed 25]  
apixaban 5 mg tablet (Eliquis) 5 mg PO BID 23 [History Confirmed 25]  
cyanocobalamin (vitamin B-12) 1,000 mcg tablet 1,000 mcg PO QAM #30 tabs 
10/20/23 [Rx   
Confirmed 25]  
fenofibrate nanocrystallized 145 mg tablet 145 mg PO DAILY 24 [History   
Confirmed 25]  
furosemide 80 mg tablet 80 mg PO DAILY 24 [History Confirmed 25]  
linezolid 600 mg tablet 600 mg PO BID 24 [History Confirmed 25]  
omeprazole 20 mg capsule,delayed release 20 mg PO BID 24 [History 
Confirmed   
25]  
CPAP (Continuous Positive Airway Pressure) 24 [History Confirmed 25]  
Oxygen 24 [History Confirmed 25]  
albuterol sulfate 2.5 mg/3 mL (0.083 %) solution for nebulization 2.5 mg (3 mL)   
inhalation Q4HR PRN Shortness Of Breath 30 days #270 mL 24 [Rx Confirmed   
25]  
  
  
  
Exam  
Physical Exam  
Vital Signs:  
  
  
  
  
Temp Pulse Resp BP Pulse Ox O2 Del Method O2 Flow Rate  
  
98.5 F 80 16 107/55 L 98 Room Air 3  
  
25 22:31 25 00:29 25 00:29 25 00:29 25 00:29 
25   
00:29 25 22:31  
  
  
  
Narrative:  
General: Awake alert, no acute distress, morbidly obese  
HEENT: head atraumatic, normocephalic, moist mucous membranes  
Neck: supple no masses, no lymphadenopathy  
CVS: regular rate and rhythm, no murmurs or gallops  
Respiratory: clear to auscultation bilaterally, no wheezing or crackles, 
symmetric   
expansion  
GI: soft, nondistended, nontender, positive bowel sounds with no organomegaly  
Extremity: moves all extremities, no restrictions of movements, no calf 
tenderness,   
unable to lift her legs from the bed.  
Neuro: AOx3, CN II-VII intact. Moves all extremities in all planes of motion.  
Skin: intact no rashes or lesions  
  
Results - Hospitalist H&P  
Lab Results  
Labs:  
Laboratory Last Values  
  
  
  
Corrected WBC 21.0 X10E3/uL (3.8-11.6) H 25 22:50  
  
Uncorrected WBC Count 21.0 x10E3/uL (3.8-11.6) H 25 22:50  
  
RBC 3.02 x10E6/uL (3.60-5.00) L 25 22:50  
  
Hgb 7.8 g/dL (11.8-15.4) L 25 22:50  
  
Hct 24.7 % (34.0-46.4) L 25 22:50  
  
MCV 81.8 fl () 25 22:50  
  
MCH 25.8 pg (24.7-34.3) 25 22:50  
  
MCHC 31.6 g/dL (32.0-35.0) L 25 22:50  
  
RDW 17.0 % (11.9-15.3) H 25 22:50  
  
Plt Count 359 x10E3/uL (150-450) 25 22:50  
  
MPV 7.7 fl (6.3-10.7) 25 22:50  
  
Neut % (Auto) 82.6 % (.) 25 22:50  
  
Lymph % (Auto) 10.5 % (.) 25 22:50  
  
Mono % (Auto) 5.0 % (.) 25 22:50  
  
Eos % (Auto) 1.7 % (.) 25 22:50  
  
Baso % (Auto) 0.2 % (.) 25 22:50  
  
Nucleat RBC Rel Count 0.1 /100 WBC (0-0.5) 25 22:50  
  
Neut # (Auto) 17.3 x10E3/uL (1.8-7.7) H 25 22:50  
  
Lymph # (Auto) 2.2 x10E3/uL (1.00-4.8) 25 22:50  
  
Mono # (Auto) 1.1 x10E3/uL (0.0-0.8) H 25 22:50  
  
Eos # (Auto) 0.4 x10E3/uL (0.0-0.45) 25 22:50  
  
Baso # (Auto) 0.1 x10E3/uL (0.0-0.2) 25 22:50  
  
Monocyte Dist Width 23.15 % (0.00-20.00) H 25 22:50  
  
PHA Creatinine Clear 28.92 25 22:50  
  
Sodium 139 mmol/L (136-145) 25 22:50  
  
Potassium 4.1 mmol/L (3.5-5.1) 25 22:50  
  
Chloride 103 mmol/L () 25 22:50  
  
Carbon Dioxide 25.3 mmol/L (21.0-31.0) 25 22:50  
  
Anion Gap 14.8 mEq/L (6.0-15.0) 25 22:50  
  
BUN 52 mg/dL (7-25) H 25 22:50  
  
Creatinine 2.96 mg/dL (0.60-1.20) H 25 22:50  
  
Est GFR (CKD-EPI) 18.091 mL/Min 25 22:50  
  
Glucose 155 mg/dL () H 25 22:50  
  
POC Glucose 169 mg/dl 25 23:12  
  
Calcium 8.3 mg/dL (8.6-10.3) L 25 22:50  
  
Magnesium 1.4 mg/dL (1.9-2.7) L 25 22:50  
  
Total Bilirubin 0.4 mg/dl (0.3-1.0) 25 22:50  
  
AST 9 U/L (13-39) L 25 22:50  
  
ALT 5 U/L (7-52) L 25 22:50  
  
Alkaline Phosphatase 41 U/L () 25 22:50  
  
Total Protein 6.4 gm/dL (6.4-8.9) 25 22:50  
  
Albumin 3.0 gm/dL (3.5-5.7) L 25 22:50  
  
Globulin 3.4 gm/dL 25 22:50  
  
Albumin/Globulin Ratio 0.9 25 22:50  
  
  
  
  
Assessment & Plan  
Assessment/Plan  
(1) Hypomagnesemia:  
(2) Weakness:  
(3) Multiple sclerosis:  
(4) Chronic kidney disease, stage IV (severe):  
(5) Diabetes mellitus:  
  
Plan  
Cris Hearn is a 53-year-old female past medical history of chronic kidney 
disease   
stage III/IV, atrial fibrillation on Eliquis, h/o bilateral nephrolithiasis s/p   
bilateral percutaneous nephrostomy tube, persistent L sided nephrostomy tube 
lost to   
follow up and no longer draining, status post ureteralstent, COPD with active 
tobacco   
smoking, diabetes mellitus type 2 and other medical comorbidities, multiple 
sclerosis   
was recently admitted for urosepsis and was transferred to [facility] from where she was discharged yesterday morning. She mentioned that she   
arrived home and continuedto have weakness for which she called EMS who brought 
her   
to [facility] ED. Shedenies any fever chills headache shortness of breath or 
chest   
pain or abdominal pain or nausea or vomiting dysuria urgency frequency or loose   
stools. In the EDher blood pressure is soft. Lab work is remarkable for WBC of 
21,000   
and hemoglobin of 7.8, normal electrolytes. Elevated BUN and creatinine around   
baseline normal liver enzymes.  
  
Plan:  
-Admit in the regular nursing floor  
-Continue on ceftriaxone and follow-up urine cultures  
-Will start on IV maintenance fluid for the night  
-Follow-up morning labs  
-Continue POA medication-amitriptyline, Eliquis, for 6, vitamin B12,   
duloxetine,fenofibrate, multivitamin, Protonix, pravastatin, sotalol  
-PT OT  
-Follow-up blood culture  
  
Full code  
  
IP vs OBS Justification  
Based on differential dx, clinical care plan, and risk of adverse events, if   
untreated, in my clinical judgement this patient requires an acute care setting 
as:   
INPATIENT because of an expectation of an over 2 midnight stay.  
Estimated length of stay (# of days): 3  
  
  
Documented By: Shyam Guadarrama MD 25 [id number]  
Signed By: <Electronically signed by Shyam Guadarrama MD>  
25 0115  
   
  
[facility]  
Work Phone: [phone]- [place] [address]  
  
Hospitalist H&P  
Signed  
  
Patient: Cris Hearn MR#: [id number]  
[id number]  
: 1970 Acct:[id number]  
  
Age/Sex: 54 / F Adm Date:   
5  
Loc:  Room: [address] [place] [address] Type: ADM IN  
Attending Dr: Shyam Guadarrama MD  
  
Copies to: Shyam Guadarrama MD  
[facility]~  
  
  
HPI  
DATE OF EXAMINATION: 25  
CHIEF COMPLAINT: Generalized weakness  
HISTORY OF PRESENT ILLNESS:  
Cris Hearn is a 53-year-old female past medical history of chronic kidney 
disease stage III/IV, atrial fibrillation on Eliquis, h/o bilateral 
nephrolithiasis s/p bilateral percutaneous nephrostomytube, persistent L sided 
nephrostomy tube lost to follow up and no longer draining, status post uret
eralstent, COPD with active tobacco smoking, diabetes mellitus type 2 and other 
medical comorbidities, multiple sclerosis was recently admitted for urosepsis 
and was transferred to [facility] from where she was 
discharged yesterday morning. She mentioned that she arrived home and continued 
to have weakness for which she called EMS who brought her to [facility]'s ED. She 
denies anyfever chills headache shortness of breath or chest pain or abdominal 
pain ornausea or vomiting dysuria urgency frequency or loose stools. In the ED 
her blood pressure is soft. Lab work is remarkable for WBC of 21,000 and 
hemoglobinof 7.8, normal electrolytes. Elevated BUN and creatinine around basel
ine normal liver enzymes.  
  
Review of Systems  
Review of Systems  
All other systems reviewed & are negative unless noted below or in HPI  
  
[facility]  
Medical History  
  
Chronic respiratory failure  
GERD (gastroesophageal reflux disease)  
Esophageal dilatation  
Vitamin D deficiency  
C. difficile diarrhea  
Chronic renal disease, stage III  
Afib  
Chronic respiratory failure with hypoxia  
Oxygen dependent  
Chronic anticoagulation  
KWAKU (obstructive sleep apnea)  
Depression  
Obesity  
Hyperlipemia  
Multiple sclerosis  
Hypertension  
COPD (chronic obstructive pulmonary disease)  
Diabetes  
Smoking  
  
  
Surgical History (Reviewed 24 @ 11:19 by Valeria Dumont LPN)  
  
Status post incision and drainage  
Umbilical hernia  
Hx of lithotripsy  
H/O tubal ligation  
S/P   
S/P cholecystectomy  
  
  
Family History (Reviewed 24 @ 11:19 by Valeria Dumont LPN)  
Brother Cancer  
Father Cancer  
Legacy FamHx Problem: Diagnosed with Cancer  
Mother COPD (chronic obstructive pulmonary disease)  
  
  
Social History  
Smoking Status: Former smoker  
Tobacco Type: cigarettes  
Substance Use Type: None  
Social History Comments: ex- significant other and younger son  
  
Meds  
Medications and Allergies  
Allergies  
  
aspirin Allergy (Unknown, Verified 25 22:30)  
Hives  
ibuprofen (From Advil) Allergy (Unknown, Verified 25 22:30)  
Hives  
naproxen (From Aleve) Allergy (Unknown, Verified 25 22:30)  
Hives  
diphenhydramine (From Benadryl) Allergy (Verified 25 22:30)  
Swelling  
amoxicillin Adverse Reaction (Verified 25 22:30)  
thrush  
azithromycin Adverse Reaction (Verified 25 22:30)  
Flushing  
  
  
Home Medications  
  
amitriptyline 50 mg tablet 50 mg PO QPM #30 tabs 10/07/21 [Rx Confirmed 
25]  
canagliflozin 100 mg tablet (Invokana) 100 mg PO QAM #30 tabs 10/07/21 [Rx 
Confirmed 25]  
duloxetine 60 mg capsule,delayed release 60 mg PO QAM #30 caps 10/07/21 [Rx 
Confirmed 25]  
pravastatin 40 mg tablet 40 mg PO QPM #30 tabs 10/07/21 [Rx Confirmed 25]  
sotalol 120 mg tablet (Sotalol AF) 120 mg PO QAM #30 tabs 10/07/21 [Rx Confirmed
 25]  
potassium chloride 10 mEq capsule,extended release 10 meq PO DAILY #30 caps 
22 [Rx Confirmed 25]  
dulaglutide 0.75 mg/0.5 mL subcutaneous pen injector (Trulicity) 0.7 mg subcut 
QWEEK 23 [History Confirmed 25]  
multivitamin 1 tab PO DAILY 23 [History Confirmed 25]  
apixaban 5 mg tablet (Eliquis) 5 mg PO BID 23 [History Confirmed 25]  
cyanocobalamin (vitamin B-12) 1,000 mcg tablet 1,000 mcg PO QAM #30 tabs 
10/20/23 [Rx Confirmed 25]  
fenofibrate nanocrystallized 145 mg tablet 145 mg PO DAILY 24 [History 
Confirmed 25]  
furosemide 80 mg tablet 80 mg PO DAILY 24 [History Confirmed 25]  
linezolid 600 mg tablet 600 mg PO BID 24 [History Confirmed 25]  
omeprazole 20 mg capsule,delayed release 20 mg PO BID 24 [History 
Confirmed 25]  
CPAP (Continuous Positive Airway Pressure) 24 [History Confirmed 25]  
Oxygen 24 [History Confirmed 25]  
albuterol sulfate 2.5 mg/3 mL (0.083 %) solution for nebulization 2.5 mg (3 mL) 
inhalation Q4HR PRNShortness Of Breath 30 days #270 mL 24 [Rx Confirmed 
25]  
  
  
  
Exam  
Physical Exam  
Vital Signs:  
  
  
  
  
Temp Pulse Resp BP Pulse Ox O2 Del Method O2 Flow Rate  
  
98.5 F 80 16 107/55 L 98 Room Air 3  
  
25 22:31 25 00:29 25 00:29 25 00:29 25 00:29 
25 00:29 25 22:31  
  
  
  
Narrative:  
General: Awake alert, no acute distress, morbidly obese  
HEENT: head atraumatic, normocephalic, moist mucous membranes  
Neck: supple no masses, no lymphadenopathy  
CVS: regular rate and rhythm, no murmurs or gallops  
Respiratory: clear to auscultation bilaterally, no wheezing or crackles, 
symmetric expansion  
GI: soft, nondistended, nontender, positive bowel sounds with no organomegaly  
Extremity: moves all extremities, no restrictions of movements, no calf 
tenderness, unable to lift her legs from the bed.  
Neuro: AOx3, CN II-VII intact. Moves all extremities in all planes of motion.  
Skin: intact no rashes or lesions  
  
Results - Hospitalist H&P  
Lab Results  
Labs:  
Laboratory Last Values  
  
  
  
Corrected WBC 21.0 X10E3/uL (3.8-11.6) H 25 22:50  
  
Uncorrected WBC Count 21.0 x10E3/uL (3.8-11.6) H 25 22:50  
  
RBC 3.02 x10E6/uL (3.60-5.00) L 25 22:50  
  
Hgb 7.8 g/dL (11.8-15.4) L 25 22:50  
  
Hct 24.7 % (34.0-46.4) L 25 22:50  
  
MCV 81.8 fl () 25 22:50  
  
MCH 25.8 pg (24.7-34.3) 25 22:50  
  
MCHC 31.6 g/dL (32.0-35.0) L 25 22:50  
  
RDW 17.0 % (11.9-15.3) H 25 22:50  
  
Plt Count 359 x10E3/uL (150-450) 25 22:50  
  
MPV 7.7 fl (6.3-10.7) 25 22:50  
  
Neut % (Auto) 82.6 % (.) 25 22:50  
  
Lymph % (Auto) 10.5 % (.) 25 22:50  
  
Mono % (Auto) 5.0 % (.) 25 22:50  
  
Eos % (Auto) 1.7 % (.) 25 22:50  
  
Baso % (Auto) 0.2 % (.) 25 22:50  
  
Nucleat RBC Rel Count 0.1 /100 WBC (0-0.5) 25 22:50  
  
Neut # (Auto) 17.3 x10E3/uL (1.8-7.7) H 25 22:50  
  
Lymph # (Auto) 2.2 x10E3/uL (1.00-4.8) 25 22:50  
  
Mono # (Auto) 1.1 x10E3/uL (0.0-0.8) H 25 22:50  
  
Eos # (Auto) 0.4 x10E3/uL (0.0-0.45) 25 22:50  
  
Baso # (Auto) 0.1 x10E3/uL (0.0-0.2) 25 22:50  
  
Monocyte Dist Width 23.15 % (0.00-20.00) H 25 22:50  
  
PHA Creatinine Clear 28.92 25 22:50  
  
Sodium 139 mmol/L (136-145) 25 22:50  
  
Potassium 4.1 mmol/L (3.5-5.1) 25 22:50  
  
Chloride 103 mmol/L () 25 22:50  
  
Carbon Dioxide 25.3 mmol/L (21.0-31.0) 25 22:50  
  
Anion Gap 14.8 mEq/L (6.0-15.0) 25 22:50  
  
BUN 52 mg/dL (7-25) H 25 22:50  
  
Creatinine 2.96 mg/dL (0.60-1.20) H 25 22:50  
  
Est GFR (CKD-EPI) 18.091 mL/Min 25 22:50  
  
Glucose 155 mg/dL () H 25 22:50  
  
POC Glucose 169 mg/dl 25 23:12  
  
Calcium 8.3 mg/dL (8.6-10.3) L 25 22:50  
  
Magnesium 1.4 mg/dL (1.9-2.7) L 25 22:50  
  
Total Bilirubin 0.4 mg/dl (0.3-1.0) 25 22:50  
  
AST 9 U/L (13-39) L 25 22:50  
  
ALT 5 U/L (7-52) L 25 22:50  
  
Alkaline Phosphatase 41 U/L () 25 22:50  
  
Total Protein 6.4 gm/dL (6.4-8.9) 25 22:50  
  
Albumin 3.0 gm/dL (3.5-5.7) L 25 22:50  
  
Globulin 3.4 gm/dL 25 22:50  
  
Albumin/Globulin Ratio 0.9 25 22:50  
  
  
  
  
Assessment & Plan  
Assessment/Plan  
(1) Hypomagnesemia:  
(2) Weakness:  
(3) Multiple sclerosis:  
(4) Chronic kidney disease, stage IV (severe):  
(5) Diabetes mellitus:  
  
Plan  
Cris Hearn is a 53-year-old female past medical history of chronic kidney 
disease stage III/IV, atrial fibrillation on Eliquis, h/o bilateral 
nephrolithiasis s/p bilateral percutaneous nephrostomytube, persistent L sided 
nephrostomy tube lost to follow up and no longer draining, status post uret
eralstent, COPD with active tobacco smoking, diabetes mellitus type 2 and other 
medical comorbidities, multiple sclerosis was recently admitted for urosepsis 
and was transferred to [facility] from where she was 
discharged yesterday morning. She mentioned that she arrived home and c
ontinuedto have weakness for which she called EMS who brought her to [facility]'s 
ED. Shedenies any fever chills headache shortness of breath or chest pain or 
abdominal pain or nausea or vomiting dysuria urgency frequency or loose stools. 
In the EDher blood pressure is soft. Lab work is remarkable for WBC of 21,000 
and hemoglobin of 7.8, normal electrolytes. Elevated BUN and creatinine around 
baseline normal liver enzymes.  
  
Plan:  
-Admit in the regular nursing floor  
-Continue on ceftriaxone and follow-up urine cultures  
-Will start on IV maintenance fluid for the night  
-Follow-up morning labs  
-Continue POA medication-amitriptyline, Eliquis, for 6, vitamin B12, 
duloxetine,fenofibrate, multivitamin, Protonix, pravastatin, sotalol  
-PT OT  
-Follow-up blood culture  
  
Full code  
  
IP vs OBS Justification  
Based on differential dx, clinical care plan, and risk of adverse events, if 
untreated, in my clinical judgement this patient requires an acute care setting 
as: INPATIENT because of an expectation ofan over 2 midnight stay.  
Estimated length of stay (# of days): 3  
  
  
Documented By: Shyam Guadarrama MD 25 0106  
Signed By:  
25 0115  
[facility]01- NoteThe Met[facility]idd[facility] HospitalQxgnxg61-
NoteAdministrative note to close consult order, please see ID notes.  
  
  
Alesia Barrett MDThe [facility]01- History and physical note  
  
  
  
                                        Author              Renny Rowley  
[facility]  
   
                                        Note Date/Time      2025 9:  
00pm  
   
                                                    [facility]  
ENTER  
[address] [place] [address]  
  
Hospitalist H&P  
Signed  
  
Patient: Cris Hearn MR#: [id number]  
[id number]  
: 1970 Acct:[id number]  
  
Age/Sex: 54 / F Adm Date:   
5  
Loc:  Room: [address] [place] [address] Type: ADM IN  
Attending Dr: Renny Rowley MD  
  
Copies to: [facility]  
Renny Rowley MD~  
  
  
[facility]  
DATE OF EXAMINATION: 25  
CHIEF COMPLAINT: Flank Pain  
HISTORY OF PRESENT ILLNESS:  
Cris Hearn is a 53-year-old female past medical history of chronic kidney 
disease   
stage III/IV, atrial fibrillation on Eliquis, h/o bilateral nephrolithiasis s/p   
bilateral percutaneous nephrostomy tube, persistent L sided nephrostomy tube 
lost to   
follow up and no longer draining, status post ureteralstent, COPD with active 
tobacco   
smoking, diabetes mellitus type 2 and other medical comorbidities, presented 
from   
home with left sided flank pain around site of nephrostomy tube, found to have 
UTI   
and evidence of new perinephric hematoma, 2.7 x 6.8 x 5.4 cm in greatest 
dimension.   
Plan for transfer to outside hospital for intervention, previous interventions   
conducted at [facility] by Dr. Iniguez, accepted at [facility] pending bed 
placement.   
Medicine called for admission in [facility] until bed availability.  
  
On evaluation in ER, patient restless with increased work of breathing, tired 
and   
uncomfortable but able to oriented and conversant. Noted consistently low blood   
pressures with systolic blood pressure < 100 despite 5 L crystalloids overthe 
past 30   
hours, now having increasingly sustained runs of tachycardia, SVT and atrial   
fibrillation, up to 160-170. Discussed with ER attending at bedside who agreed 
with   
plan for vasopressor therapy and central line placement. Gonzalez catheter in place
and   
continues to drain with 900 cc charted past 24 hours.  
  
Review of Systems  
Review of Systems  
Review of systems:  
As above in HPI  
  
[facility]  
Medical History  
  
Chronic respiratory failure  
GERD (gastroesophageal reflux disease)  
Esophageal dilatation  
Vitamin D deficiency  
C. difficile diarrhea  
Chronic renal disease, stage III  
Afib  
Chronic respiratory failure with hypoxia  
Oxygen dependent  
Chronic anticoagulation  
KWAKU (obstructive sleep apnea)  
Depression  
Obesity  
Hyperlipemia  
Multiple sclerosis  
Hypertension  
COPD (chronic obstructive pulmonary disease)  
Diabetes  
Smoking  
  
  
Surgical History (Reviewed 24 @ 11:19 by Valeria Dumont LPN)  
  
Status post incision and drainage  
Umbilical hernia  
Hx of lithotripsy  
H/O tubal ligation  
S/P   
S/P cholecystectomy  
  
  
Family History (Reviewed 24 @ 11:19 by Valeria Dumont LPN)  
Brother Cancer  
Father Cancer  
Legacy FamHx Problem: Diagnosed with Cancer  
Mother COPD (chronic obstructive pulmonary disease)  
  
  
Social History  
Smoking Status: Current every day smoker  
Tobacco Type: cigarettes  
Substance Use Type: None  
Social History Comments: ex- significant other and younger son  
  
Meds  
Medications and Allergies  
Allergies  
  
aspirin Allergy (Unknown, Verified 25 11:40)  
Hives  
ibuprofen (From Advil) Allergy (Unknown, Verified 25 11:40)  
Hives  
naproxen (From Aleve) Allergy (Unknown, Verified 25 11:40)  
Hives  
diphenhydramine (From Benadryl) Allergy (Verified 25 11:40)  
Swelling  
amoxicillin Adverse Reaction (Verified 25 11:40)  
thrush  
azithromycin Adverse Reaction (Verified 25 11:40)  
Flushing  
  
  
Home Medications  
  
amitriptyline 50 mg tablet 50 mg PO QPM #30 tabs 10/07/21 [Rx Confirmed 
25]  
canagliflozin 100 mg tablet (Invokana) 100 mg PO QAM #30 tabs 10/07/21 [Rx 
Confirmed   
25]  
duloxetine 60 mg capsule,delayed release 60 mg PO QAM #30 caps 10/07/21 [Rx 
Confirmed   
25]  
pravastatin 40 mg tablet 40 mg PO QPM #30 tabs 10/07/21 [Rx Confirmed 25]  
sotalol 120 mg tablet (Sotalol AF) 120 mg PO QAM #30 tabs 10/07/21 [Rx Confirmed
   
25]  
potassium chloride 10 mEq capsule,extended release 10 meq PO DAILY #30 caps 
22   
[Rx Confirmed 25]  
dulaglutide 0.75 mg/0.5 mL subcutaneous pen injector (Trulicity) 0.7 mg subcut 
QWEEK   
23 [History Confirmed 25]  
fluticasone propionate 50 mcg/actuation nasal spray,suspension 2 spray 
intranasal   
DAILY PRN Allergy Symptoms 23 [History Confirmed 25]  
multivitamin 1 tab PO DAILY 23 [History Confirmed 25]  
apixaban 5 mg tablet (Eliquis) 5 mg PO BID 23 [History Confirmed 25]  
cyanocobalamin (vitamin B-12) 1,000 mcg tablet 1,000 mcg PO QAM #30 tabs 
10/20/23 [Rx   
Confirmed 25]  
fenofibrate nanocrystallized 145 mg tablet 145 mg PO DAILY 24 [History   
Confirmed 25]  
furosemide 80 mg tablet 80 mg PO DAILY 24 [History Confirmed 25]  
linezolid 600 mg tablet 600 mg PO BID 24 [History Confirmed 25]  
omeprazole 20 mg capsule,delayed release 20 mg PO BID 24 [History 
Confirmed   
25]  
CPAP (Continuous Positive Airway Pressure) 24 [History Confirmed 25]  
Oxygen 24 [History Confirmed 25]  
albuterol sulfate 2.5 mg/3 mL (0.083 %) solution for nebulization 2.5 mg (3 mL)   
inhalation Q4HR PRN Shortness Of Breath 30 days #270 mL 24 [Rx Confirmed   
25]  
  
  
  
Exam  
Physical Exam  
Vital Signs:  
  
  
  
  
Temp Pulse Resp BP Pulse Ox O2 Del Method O2 Flow Rate  
  
98.2 F 157 H 18 80/53 L 98 Nasal Cannula 4  
  
25 14:15 25 17:37 25 17:37 25 17:37 25 17:37 
25   
17:37 25 17:37  
  
  
  
Narrative:  
General: tired, uncomfortable, distracted but redirectable  
Head: normal to inspection  
Neck: normal visual inspection  
Cardio: no JVD, fast rate, intermittently irregular rhythm  
Chest palpation & inspection: normal inspection of the chest  
Resp Effort & Inspection: normal respiratory effort  
Abd: soft, non-tender, non-distended  
Back: tender to palpation around L nephrostomy tube  
Extremities: Warm  
  
Results - Hospitalist H&P  
Lab Results  
Labs:  
Laboratory Last Values  
  
  
  
Corrected WBC 13.3 X10E3/uL (3.8-11.6) H 25 06:18  
  
Uncorrected WBC Count 13.3 x10E3/uL (3.8-11.6) H 25 06:18  
  
RBC 3.30 x10E6/uL (3.60-5.00) L 25 06:18  
  
Hgb 8.6 g/dL (11.8-15.4) L 25 06:18  
  
Hct 27.2 % (34.0-46.4) L 25 06:18  
  
MCV 82.3 fl () 25 06:18  
  
MCH 26.0 pg (24.7-34.3) 25 06:18  
  
MCHC 31.6 g/dL (32.0-35.0) L 25 06:18  
  
RDW 17.1 % (11.9-15.3) H 25 06:18  
  
Plt Count 234 x10E3/uL (150-450) 25 06:18  
  
MPV 8.4 fl (6.3-10.7) 25 06:18  
  
Neut % (Auto) 88.7 % (.) 25 06:18  
  
Lymph % (Auto) 7.5 % (.) 25 06:18  
  
Mono % (Auto) 3.4 % (.) 25 06:18  
  
Eos % (Auto) 0.0 % (.) 25 06:18  
  
Baso % (Auto) 0.4 % (.) 25 06:18  
  
Nucleat RBC Rel Count 0.0 /100 WBC (0-0.5) 25 06:18  
  
Neut # (Auto) 11.8 x10E3/uL (1.8-7.7) H 25 06:18  
  
Lymph # (Auto) 1.0 x10E3/uL (1.00-4.8) 25 06:18  
  
Mono # (Auto) 0.5 x10E3/uL (0.0-0.8) 25 06:18  
  
Eos # (Auto) 0.0 x10E3/uL (0.0-0.45) 25 06:18  
  
Baso # (Auto) 0.0 x10E3/uL (0.0-0.2) 25 06:18  
  
Monocyte Dist Width 30.18 % (0.00-20.00) H 25 06:18  
  
PT 17.1 Seconds (9.0-12.9) H 25 12:30  
  
INR 1.5 25 12:30  
  
APTT 33.8 Seconds (25.1-36.5) 25 12:30  
  
Sample Site Left radial 25 18:22  
  
ABG pH 7.25 (7.35-7.45) L 25 18:22  
  
ABG pCO2 41.6 mmHg (35.0-45.0) 25 18:22  
  
ABG pO2 88.0 mmHg (80.0-100.0) 25 18:22  
  
ABG HCO3 18.0 mmol/L (23.0-29.0) L 25 18:22  
  
ABG Total CO2 19.2 mmol/L (23.0-27.0) L 25 18:22  
  
ABG O2 Saturation 96.0 % (95.0-100.0) 25 18:22  
  
ABG O2 Content 5.7 mmol/L (6.6-9.7) L 25 18:22  
  
ABG Base Excess -8.6 mmol/L (-3.0-3.0) L 25 18:22  
  
Liter Flow 3 L/min 25 18:22  
  
FiO2 32 % 25 18:22  
  
Critical Value 25 18:22  
  
PHA Creatinine Clear 22.17 25 06:18  
  
Sodium 135 mmol/L (136-145) L 25 06:18  
  
Potassium 4.3 mmol/L (3.5-5.1) 25 06:18  
  
Chloride 106 mmol/L () 25 06:18  
  
Carbon Dioxide 21.4 mmol/L (21.0-31.0) 25 06:18  
  
Anion Gap 11.9 mEq/L (6.0-15.0) 25 06:18  
  
BUN 50 mg/dL (7-25) H 25 06:18  
  
Creatinine 3.86 mg/dL (0.60-1.20) H 25 06:18  
  
Est GFR (CKD-EPI) 13.156 mL/Min 25 06:18  
  
Glucose 120 mg/dL () H 25 06:18  
  
Lactic Acid 1.0 mmol/L (0.5-2.2) 25 06:18  
  
Calcium 8.3 mg/dL (8.6-10.3) L 25 06:18  
  
Magnesium 1.6 mg/dL (1.9-2.7) L 25 06:18  
  
Total Bilirubin 0.6 mg/dl (0.3-1.0) 25 12:30  
  
Direct Bilirubin 0.20 mg/dL (0.03-0.18) H 25 12:30  
  
Indirect Bilirubin 0.4 mg/dL 25 12:30  
  
AST 8 U/L (13-39) L 25 12:30  
  
ALT 5 U/L (7-52) L 25 12:30  
  
Alkaline Phosphatase 49 U/L () 25 12:30  
  
Total Protein 7.3 gm/dL (6.4-8.9) 25 12:30  
  
Albumin 3.3 gm/dL (3.5-5.7) L 25 12:30  
  
Globulin 4.0 gm/dL 25 12:30  
  
Albumin/Globulin Ratio 0.8 25 12:30  
  
Lipase 28.0 U/L (11.0-82.0) 25 12:30  
  
Urine Color Yellow (Yellow) 25 17:02  
  
Urine Appearance Turbid (Clear) A 25 17:02  
  
Urine pH 6.0 (5.0-9.0) 25 17:02  
  
Ur Specific Gravity 1.016 (1.001-1.030) 25 17:02  
  
Urine Protein 100 mg/dL (Negative) H 25 17:02  
  
Urine Glucose (UA) 100 mg/dL (Normal) H 25 17:02  
  
Urine Ketones Negative (Negative) 25 17:02  
  
Urine Occult Blood 2+ (Negative) H 25 17:02  
  
Urine Nitrite Positive (Negative) H 25 17:02  
  
Urine Bilirubin Negative (Negative) 25 17:02  
  
Urine Urobilinogen Normal mg/dL (Normal) 25 17:02  
  
Ur Leukocyte Esterase 4+ (Negative) H 25 17:02  
  
Urine RBC 20-49 /HPF (0-4) H 25 17:02  
  
Urine WBC Innumerable /HPF (0-4) H 25 17:02  
  
Urine WBC Clumps Many /LPF (None Seen) H 25 17:02  
  
Ur Squamous Epith Cells 5-9 /HPF (0-2) H 25 17:02  
  
U Non-Squamous Epi Cells 1-2 /HPF (None Seen) H 25 17:02  
  
Amorphous Crystals 3+ /HPF 25 12:30  
  
Urine Bacteria 3+ /HPF (None Seen) H 25 17:02  
  
Hyaline Casts None /LPF (0-8) 25 17:02  
  
Urine Mucus Rare /LPF 25 17:02  
  
  
  
Microbiology Results  
Micro:  
Microbiology - Results from entire visit  
  
25 12:30 Urine - Nephrostomy Urine Culture - Preliminary  
Proteus mirabilis  
25 12:15 Blood - Left Antecubital Blood Culture - Preliminary  
No Growth 1 Day  
25 12:30 Blood - Left Hand Blood Culture - Preliminary  
No Growth 1 Day  
  
  
  
Assessment & Plan  
Assessment/Plan  
(1) Perinephric hematoma:  
(2) Chronic kidney disease, stage IV (severe):  
(3) Complicated UTI (urinary tract infection):  
(4) Acute and chronic respiratory failure with hypercapnia:  
(5) Encephalopathy:  
(6) Septic shock:  
  
Plan  
Cris Hearn is a 53-year-old female past medical history of chronic kidney 
disease   
stage III/IV, atrial fibrillation on Eliquis, h/o bilateral nephrolithiasis s/p   
bilateral percutaneous nephrostomy tube, persistent L sided nephrostomy tube 
lost to   
follow up and no longer draining, status post ureteralstent, COPD with active 
tobacco   
smoking, diabetes mellitus type 2 and other medical comorbidities, presented 
from   
home with left sided flank pain around site of nephrostomy tube, found to have 
UTI   
and evidence of new perinephric hematoma, 2.7 x 6.8 x 5.4 cm in greatest 
dimension.   
Plan for transfer to outside hospital for intervention, previous interventions   
conducted at [facility] by Dr. Iniguez, accepted at [facility] pending bed 
placement.   
Medicine called for admission in [facility] until bed availability.  
  
1. Progression of urosepsis to early septic shock  
- will admit to ICU while awaiting bed for transfer  
- systolic blood pressure < 100 mmHg despite 5 L crystalloids, encephalopathic 
and on   
supplemental oxygen, increasingly sustained runs of tachycardia likely 
reflective of   
compensatory response. Has received ertapenem empiric in the ER,  
- started on norepinephrine 5 mcg/hr and continued to have increased runs of SVT
and   
afib to 170 with associated drops in blood pressure  
- Discussed case with Dr. Fischer, urology who agrees with ICU level transfer to   
outside facility, hold all anticoagulation if possible  
- Discussed case with Dr. Bryant, cardiology who agrees that benefits of 
amiodarone   
without anticoagulation outweigh risks given on antiarrhythmic therapy prior to   
admission (sotalol), tachycardia associated drops in blood pressure.  
  
2. Chronic conditions: Afib, COPD, DM II  
- continue to hold apixiban, sotalol, amitriptyline, dapagliflozin, duloxetine,   
trulicity  
- sliding scale insulin for glucose control  
  
Diet: NPO for likely procedure  
Daily Labs: CBC, BMP  
Lines/Drains: RIJ CVL, gonzalez catheter, non-functioning perc nephrostomy tube  
DVT ppx: contraindicated  
Code status: Full  
Status: inpatient  
  
Discussed with patient and ER attendings.  
  
Renny Rowley MD  
Internal Medicine  
Hospitalist Attending Physician  
IP vs OBS Justification  
Based on differential dx, clinical care plan, and risk of adverse events, if   
untreated, in my clinical judgement this patient requires an acute care setting 
as:   
INPATIENT because of an expectation of an over 2 midnight stay.  
Estimated length of stay (# of days): 3  
  
  
Documented By: Renny Rowley MD [id number]  
Signed By: <Electronically signed by Renny Rowley MD>  
25  
   
  
[facility]  
Work Phone: [phone]- History and physical [place] [phone]  
  
Hospitalist H&P  
Signed  
  
Patient: Cris Hearn MR#: [id number]  
[id number]  
: 1970 Acct:[id number]  
  
Age/Sex: 54 / F Adm Date:   
5  
Loc:  Room: [address] [place] [address] Type: ADM IN  
Attending Dr: Renny Rowley MD  
  
Copies to: [facility]  
Renny Rowley MD~  
  
  
HPI  
DATE OF EXAMINATION: 25  
CHIEF COMPLAINT: Flank Pain  
HISTORY OF PRESENT ILLNESS:  
Cris Hearn is a 53-year-old female past medical history of chronic kidney 
disease stage III/IV, atrial fibrillation on Eliquis, h/o bilateral 
nephrolithiasis s/p bilateral percutaneous nephrostomytube, persistent L sided 
nephrostomy tube lost to follow up and no longer draining, status post uret
eralstent, COPD with active tobacco smoking, diabetes mellitus type 2 and other 
medical comorbidities, presented from home with left sided flank pain around 
site of nephrostomy tube, found to have UTI and evidence of new perinephric 
hematoma, 2.7 x 6.8 x 5.4 cm in greatest dimension. Plan for transfer to outside
hospital for intervention, previous interventions conducted at [facility] by Dr. Iniguez, accepted at [facility] pending bed placement. Medicine called for 
admission in interm until bed availability.  
  
On evaluation in ER, patient restless with increased work of breathing, tired 
and uncomfortable butable to oriented and conversant. Noted consistently low 
blood pressures with systolic blood pressure < 100 despite 5 L crystalloids 
overthe past 30 hours, now having increasingly sustained runs oftachycardia, SVT
and atrial fibrillation, up to 160-170. Discussed with ER attending at bedside 
whoagreed with plan for vasopressor therapy and central line placement. Gonzalez 
catheter in place and continues to drain with 900 cc charted past 24 hours.  
  
Review of Systems  
Review of Systems  
Review of systems:  
As above in HPI  
  
[facility]  
Medical History  
  
Chronic respiratory failure  
GERD (gastroesophageal reflux disease)  
Esophageal dilatation  
Vitamin D deficiency  
C. difficile diarrhea  
Chronic renal disease, stage III  
Afib  
Chronic respiratory failure with hypoxia  
Oxygen dependent  
Chronic anticoagulation  
KWAKU (obstructive sleep apnea)  
Depression  
Obesity  
Hyperlipemia  
Multiple sclerosis  
Hypertension  
COPD (chronic obstructive pulmonary disease)  
Diabetes  
Smoking  
  
  
Surgical History (Reviewed 24 @ 11:19 by Valeria Dumont LPN)  
  
Status post incision and drainage  
Umbilical hernia  
Hx of lithotripsy  
H/O tubal ligation  
S/P   
S/P cholecystectomy  
  
  
Family History (Reviewed 24 @ 11:19 by Valeria Dumont LPN)  
Brother Cancer  
Father Cancer  
Legacy FamHx Problem: Diagnosed with Cancer  
Mother COPD (chronic obstructive pulmonary disease)  
  
  
Social History  
Smoking Status: Current every day smoker  
Tobacco Type: cigarettes  
Substance Use Type: None  
Social History Comments: ex- significant other and younger son  
  
Meds  
Medications and Allergies  
Allergies  
  
aspirin Allergy (Unknown, Verified 25 11:40)  
Hives  
ibuprofen (From Advil) Allergy (Unknown, Verified 25 11:40)  
Hives  
naproxen (From Aleve) Allergy (Unknown, Verified 25 11:40)  
Hives  
diphenhydramine (From Benadryl) Allergy (Verified 25 11:40)  
Swelling  
amoxicillin Adverse Reaction (Verified 25 11:40)  
thrush  
azithromycin Adverse Reaction (Verified 25 11:40)  
Flushing  
  
  
Home Medications  
  
amitriptyline 50 mg tablet 50 mg PO QPM #30 tabs 10/07/21 [Rx Confirmed 
25]  
canagliflozin 100 mg tablet (Invokana) 100 mg PO QAM #30 tabs 10/07/21 [Rx 
Confirmed 25]  
duloxetine 60 mg capsule,delayed release 60 mg PO QAM #30 caps 10/07/21 [Rx 
Confirmed 25]  
pravastatin 40 mg tablet 40 mg PO QPM #30 tabs 10/07/21 [Rx Confirmed 25]  
sotalol 120 mg tablet (Sotalol AF) 120 mg PO QAM #30 tabs 10/07/21 [Rx Confirmed
 25]  
potassium chloride 10 mEq capsule,extended release 10 meq PO DAILY #30 caps 
22 [Rx Confirmed 25]  
dulaglutide 0.75 mg/0.5 mL subcutaneous pen injector (Trulicity) 0.7 mg subcut 
QWEEK 23 [History Confirmed 25]  
fluticasone propionate 50 mcg/actuation nasal spray,suspension 2 spray 
intranasal DAILY PRN AllergySymptoms 23 [History Confirmed 25]  
multivitamin 1 tab PO DAILY 23 [History Confirmed 25]  
apixaban 5 mg tablet (Eliquis) 5 mg PO BID 23 [History Confirmed 25]  
cyanocobalamin (vitamin B-12) 1,000 mcg tablet 1,000 mcg PO QAM #30 tabs 
10/20/23 [Rx Confirmed 25]  
fenofibrate nanocrystallized 145 mg tablet 145 mg PO DAILY 24 [History 
Confirmed 25]  
furosemide 80 mg tablet 80 mg PO DAILY 24 [History Confirmed 25]  
linezolid 600 mg tablet 600 mg PO BID 24 [History Confirmed 25]  
omeprazole 20 mg capsule,delayed release 20 mg PO BID 24 [History 
Confirmed 25]  
CPAP (Continuous Positive Airway Pressure) 24 [History Confirmed 25]  
Oxygen 24 [History Confirmed 25]  
albuterol sulfate 2.5 mg/3 mL (0.083 %) solution for nebulization 2.5 mg (3 mL) 
inhalation Q4HR PRNShortness Of Breath 30 days #270 mL 24 [Rx Confirmed 
25]  
  
  
  
Exam  
Physical Exam  
Vital Signs:  
  
  
  
  
Temp Pulse Resp BP Pulse Ox O2 Del Method O2 Flow Rate  
  
98.2 F 157 H 18 80/53 L 98 Nasal Cannula 4  
  
25 14:15 25 17:37 25 17:37 25 17:37 25 17:37 
25 17:37 25 17:37  
  
  
  
Narrative:  
General: tired, uncomfortable, distracted but redirectable  
Head: normal to inspection  
Neck: normal visual inspection  
Cardio: no JVD, fast rate, intermittently irregular rhythm  
Chest palpation & inspection: normal inspection of the chest  
Resp Effort & Inspection: normal respiratory effort  
Abd: soft, non-tender, non-distended  
Back: tender to palpation around L nephrostomy tube  
Extremities: Warm  
  
Results - Hospitalist H&P  
Lab Results  
Labs:  
Laboratory Last Values  
  
  
  
Corrected WBC 13.3 X10E3/uL (3.8-11.6) H 25 06:18  
  
Uncorrected WBC Count 13.3 x10E3/uL (3.8-11.6) H 25 06:18  
  
RBC 3.30 x10E6/uL (3.60-5.00) L 25 06:18  
  
Hgb 8.6 g/dL (11.8-15.4) L 25 06:18  
  
Hct 27.2 % (34.0-46.4) L 25 06:18  
  
MCV 82.3 fl () 25 06:18  
  
MCH 26.0 pg (24.7-34.3) 25 06:18  
  
MCHC 31.6 g/dL (32.0-35.0) L 25 06:18  
  
RDW 17.1 % (11.9-15.3) H 25 06:18  
  
Plt Count 234 x10E3/uL (150-450) 25 06:18  
  
MPV 8.4 fl (6.3-10.7) 25 06:18  
  
Neut % (Auto) 88.7 % (.) 25 06:18  
  
Lymph % (Auto) 7.5 % (.) 25 06:18  
  
Mono % (Auto) 3.4 % (.) 25 06:18  
  
Eos % (Auto) 0.0 % (.) 25 06:18  
  
Baso % (Auto) 0.4 % (.) 25 06:18  
  
Nucleat RBC Rel Count 0.0 /100 WBC (0-0.5) 25 06:18  
  
Neut # (Auto) 11.8 x10E3/uL (1.8-7.7) H 25 06:18  
  
Lymph # (Auto) 1.0 x10E3/uL (1.00-4.8) 25 06:18  
  
Mono # (Auto) 0.5 x10E3/uL (0.0-0.8) 25 06:18  
  
Eos # (Auto) 0.0 x10E3/uL (0.0-0.45) 25 06:18  
  
Baso # (Auto) 0.0 x10E3/uL (0.0-0.2) 25 06:18  
  
Monocyte Dist Width 30.18 % (0.00-20.00) H 25 06:18  
  
PT 17.1 Seconds (9.0-12.9) H 25 12:30  
  
INR 1.5 25 12:30  
  
APTT 33.8 Seconds (25.1-36.5) 25 12:30  
  
Sample Site Left radial 25 18:22  
  
ABG pH 7.25 (7.35-7.45) L 25 18:22  
  
ABG pCO2 41.6 mmHg (35.0-45.0) 25 18:22  
  
ABG pO2 88.0 mmHg (80.0-100.0) 25 18:22  
  
ABG HCO3 18.0 mmol/L (23.0-29.0) L 25 18:22  
  
ABG Total CO2 19.2 mmol/L (23.0-27.0) L 25 18:22  
  
ABG O2 Saturation 96.0 % (95.0-100.0) 25 18:22  
  
ABG O2 Content 5.7 mmol/L (6.6-9.7) L 25 18:22  
  
ABG Base Excess -8.6 mmol/L (-3.0-3.0) L 25 18:22  
  
Liter Flow 3 L/min 25 18:22  
  
FiO2 32 % 25 18:22  
  
Critical Value 25 18:22  
  
PHA Creatinine Clear 22.17 25 06:18  
  
Sodium 135 mmol/L (136-145) L 25 06:18  
  
Potassium 4.3 mmol/L (3.5-5.1) 25 06:18  
  
Chloride 106 mmol/L () 25 06:18  
  
Carbon Dioxide 21.4 mmol/L (21.0-31.0) 25 06:18  
  
Anion Gap 11.9 mEq/L (6.0-15.0) 25 06:18  
  
BUN 50 mg/dL (7-25) H 25 06:18  
  
Creatinine 3.86 mg/dL (0.60-1.20) H 25 06:18  
  
Est GFR (CKD-EPI) 13.156 mL/Min 25 06:18  
  
Glucose 120 mg/dL () H 25 06:18  
  
Lactic Acid 1.0 mmol/L (0.5-2.2) 25 06:18  
  
Calcium 8.3 mg/dL (8.6-10.3) L 25 06:18  
  
Magnesium 1.6 mg/dL (1.9-2.7) L 25 06:18  
  
Total Bilirubin 0.6 mg/dl (0.3-1.0) 25 12:30  
  
Direct Bilirubin 0.20 mg/dL (0.03-0.18) H 25 12:30  
  
Indirect Bilirubin 0.4 mg/dL 25 12:30  
  
AST 8 U/L (13-39) L 25 12:30  
  
ALT 5 U/L (7-52) L 25 12:30  
  
Alkaline Phosphatase 49 U/L () 25 12:30  
  
Total Protein 7.3 gm/dL (6.4-8.9) 25 12:30  
  
Albumin 3.3 gm/dL (3.5-5.7) L 25 12:30  
  
Globulin 4.0 gm/dL 25 12:30  
  
Albumin/Globulin Ratio 0.8 25 12:30  
  
Lipase 28.0 U/L (11.0-82.0) 25 12:30  
  
Urine Color Yellow (Yellow) 25 17:02  
  
Urine Appearance Turbid (Clear) A 25 17:02  
  
Urine pH 6.0 (5.0-9.0) 25 17:02  
  
Ur Specific Gravity 1.016 (1.001-1.030) 25 17:02  
  
Urine Protein 100 mg/dL (Negative) H 25 17:02  
  
Urine Glucose (UA) 100 mg/dL (Normal) H 25 17:02  
  
Urine Ketones Negative (Negative) 25 17:02  
  
Urine Occult Blood 2+ (Negative) H 25 17:02  
  
Urine Nitrite Positive (Negative) H 25 17:02  
  
Urine Bilirubin Negative (Negative) 25 17:02  
  
Urine Urobilinogen Normal mg/dL (Normal) 25 17:02  
  
Ur Leukocyte Esterase 4+ (Negative) H 25 17:02  
  
Urine RBC 20-49 /HPF (0-4) H 25 17:02  
  
Urine WBC Innumerable /HPF (0-4) H 25 17:02  
  
Urine WBC Clumps Many /LPF (None Seen) H 25 17:02  
  
Ur Squamous Epith Cells 5-9 /HPF (0-2) H 25 17:02  
  
U Non-Squamous Epi Cells 1-2 /HPF (None Seen) H 25 17:02  
  
Amorphous Crystals 3+ /HPF 25 12:30  
  
Urine Bacteria 3+ /HPF (None Seen) H 25 17:02  
  
Hyaline Casts None /LPF (0-8) 25 17:02  
  
Urine Mucus Rare /LPF 25 17:02  
  
  
  
Microbiology Results  
Micro:  
Microbiology - Results from entire visit  
  
25 12:30 Urine - Nephrostomy Urine Culture - Preliminary  
Proteus mirabilis  
25 12:15 Blood - Left Antecubital Blood Culture - Preliminary  
No Growth 1 Day  
25 12:30 Blood - Left Hand Blood Culture - Preliminary  
No Growth 1 Day  
  
  
  
Assessment & Plan  
Assessment/Plan  
(1) Perinephric hematoma:  
(2) Chronic kidney disease, stage IV (severe):  
(3) Complicated UTI (urinary tract infection):  
(4) Acute and chronic respiratory failure with hypercapnia:  
(5) Encephalopathy:  
(6) Septic shock:  
  
Plan  
Cris Hearn is a 53-year-old female past medical history of chronic kidney 
disease stage III/IV, atrial fibrillation on Eliquis, h/o bilateral 
nephrolithiasis s/p bilateral percutaneous nephrostomytube, persistent L sided 
nephrostomy tube lost to follow up and no longer draining, status post uret
eralstent, COPD with active tobacco smoking, diabetes mellitus type 2 and other 
medical comorbidities, presented from home with left sided flank pain around 
site of nephrostomy tube, found to have UTI and evidence of new perinephric 
hematoma, 2.7 x 6.8 x 5.4 cm in greatest dimension. Plan for transfer to outside
hospital for intervention, previous interventions conducted at [facility] by Dr. Iniguez, accepted at [facility] pending bed placement. Medicine called for 
admission in [facility] until bed availability.  
  
1. Progression of urosepsis to early septic shock  
- will admit to ICU while awaiting bed for transfer  
- systolic blood pressure < 100 mmHg despite 5 L crystalloids, encephalopathic 
and on supplemental oxygen, increasingly sustained runs of tachycardia likely 
reflective of compensatory response. Has received ertapenem empiric in the ER,  
- started on norepinephrine 5 mcg/hr and continued to have increased runs of SVT
and afib to 170 with associated drops in blood pressure  
- Discussed case with Dr. Fischer, urology who agrees with ICU level transfer to 
outside facility, hold all anticoagulation if possible  
- Discussed case with Dr. Bryant, cardiology who agrees that benefits of 
amiodarone without anticoagulation outweigh risks given on antiarrhythmic 
therapy prior to admission (sotalol), tachycardia associated drops in blood 
pressure.  
  
2. Chronic conditions: Afib, COPD, DM II  
- continue to hold apixiban, sotalol, amitriptyline, dapagliflozin, duloxetine, 
trulicity  
- sliding scale insulin for glucose control  
  
Diet: NPO for likely procedure  
Daily Labs: CBC, BMP  
Lines/Drains: RIJ CVL, gonzalez catheter, non-functioning perc nephrostomy tube  
DVT ppx: contraindicated  
Code status: Full  
Status: inpatient  
  
Discussed with patient and ER attendings.  
  
Renny Rowley MD  
Internal Medicine  
Hospitalist Attending Physician  
IP vs OBS Justification  
Based on differential dx, clinical care plan, and risk of adverse events, if 
untreated, in my clinical judgement this patient requires an acute care setting 
as: INPATIENT because of an expectation ofan over 2 midnight stay.  
Estimated length of stay (# of days): 3  
  
  
Documented By: Renny Rowley MD 25 [id number]  
Signed By:  
25 [address] [place], [place] [id number]- Evaluation note*   
  
                      Diagnosis  Onset Date Resolution Status     Admit Date  
   
                                                    Chronic kidney disease, stag  
e IV   
(severe)                                        acute              
6:28pm  
   
                                                    Complicated UTI (urinary tra  
ct   
infection)                                      acute              
6:28pm  
   
                      Encephalopathy                       acute         
6:28pm  
   
                      Flank pain                       acute      , 2  
025   
6:28pm  
   
                                                    Paroxysmal supraventricular   
tachycardia                                     acute              
6:28pm  
   
                      Perinephric hematoma                       acute      2025   
6:28pm  
   
                      Pyelonephritis                       acute         
6:28pm  
   
                      Septic shock                       acute      2025   
6:28pm  
   
                                                    Acute and chronic respirator  
y   
failure with hypercapnia                                 resolved        2025   
6:28pm  
  
  
[facility]  
Work Phone: [phone]- Evaluation note*   
  
                      Diagnosis  Onset Date Resolution Status     Admit Date  
   
                                                    Chronic kidney disease, stag  
e IV   
(severe)                                        acute              
6:28pm  
   
                                                    Complicated UTI (urinary tra  
ct   
infection)                                      acute              
6:28pm  
   
                      Encephalopathy                       acute         
6:28pm  
   
                      Flank pain                       acute       2  
025   
6:28pm  
   
                                                    Paroxysmal supraventricular   
tachycardia                                     acute              
6:28pm  
   
                      Perinephric hematoma                       acute      2025   
6:28pm  
   
                      Pyelonephritis                       acute         
6:28pm  
   
                      Septic shock                       acute      2025   
6:28pm  
   
                                                    Acute and chronic respirator  
y   
failure with hypercapnia                                 resolved        2025   
6:28pm  
   
                                                    Chronic kidney disease, stag  
e IV   
(severe)                                        acute              
12:25am  
   
                      Hypomagnesemia                       acute         
12:25am  
   
                      Multiple sclerosis                       acute      ua2025   
12:25am  
   
                      Weakness                         acute      2025   
12:25am  
   
                      Diabetes mellitus                       chronic    2025   
12:25am  
  
  
[facility]  
Work Phone: [phone]- Miscellaneous Notes* Telephone Encounter - 
  Swathi Miller - 2025 3:37 PM ESTFormatting of this note might be 
  different from the original.  
Ana Paula with [facility] access calling regarding transporting patient from 
[facility] to [place]. Dx: perinephric hematoma.  
  
Call back number: [phone]  
Electronically signed by Swathi Miller at 2025 3:39 PM EST  
  
documented in this encounterCopley HospitalAdTapsy [facility] HospitalJfjpyt35- Telephone 
encounter Note* Telephone Encounter - Swathi Miller - 2025 3:37 PM EST
  Formatting of this note might be different from the original.  
Ana Paula with [email or web address] access calling regarding transporting patient from 
[facility] to [place]. Dx: perinephric hematoma.  
  
Call back number: [phone]  
Electronically signed by Swathi Miller at 2025 3:39 PM EST  
  
[facility]- Telephone encounter Note* Telephone Encounter 
  - Farhan Landers DO - 2025 6:45 PM ESTFormatting of this note might be 
  different from the original.  
[facility] TRANSFER from [facility] to [facility]  
  
Report given by : Dr. Ibarra  
  
Patient is a 54 year old female with a history of COPD on 4L O2, HTN, HLD, T2DM,
who initially presented to [facility] ED on 25 for the complaint of 
pyelonephritis & perinephric hematoma. 1 year ago neph tubes placed for stones. 
Left nephrostomy still in place. Looked infected, hasn't beenfollowing-up with 
urology for a long time. Came in for left flank pain. WBC 24.3. Has CKD (2.66 
-&gt; now 3.86, CULLEN). UA ++infection (nits, LE< bact). Perinephric hematoma on 
left. No recent procedures. Urology said likely IR for hematoma.  
  
Reason for transfer to [facility]: Continuity of care  
  
VS: HR 83 bpm, pox 100% (on 4L home O2), BP 98/57 MAP 73 ; Temp 99.8F.  
  
Hb 9.8 (stable from chronic anemia).  
LA 1.8.  
  
Patient was treated in the ED with got IVF, ertapenem based on past 
sensitivities (ESBL E.coli lastUCX)  
  
Patient was accepted to [facility] regular nursing floor under the care of 
general internal medicine service, barring any acute change in clinical status 
requiring escalation of level of care. Team to be assigned when bed is assigned.  
  
If the facility number was unknown or unavailable at the time of acceptance, [facility]
can supply the call back number. The receiving team is responsible for calling 
the sending facility for provider to provider report if there has been 
significant delay between the time the patient was accepted and the time the bed
was assigned. If there is concern for clinical stability by the receiving team 
they will call the PIC (physician in charge) to discuss the case.  
  
Farhan Landers DO  
25  
6:45 PM  
Electronically signed by Farhan Landers DO at 2025 6:52 PM EST  
  
[facility]  
Work Phone: [phone]- Miscellaneous Notes* Telephone Encounter - 
  Farhan Landers DO - 2025 6:45 PM ESTFormatting of this note might be 
  different from the original.  
[facility] TRANSFER from [facility] to [facility]  
  
Report given by : Dr. Ibarra  
  
Patient is a 54 year old female with a history of COPD on 4L O2, HTN, HLD, T2DM,
who initially presented to [facility] ED on 25 for the complaint of 
pyelonephritis & perinephric hematoma. 1 year ago neph tubes placed for stones. 
Left nephrostomy still in place. Looked infected, hasn't beenfollowing-up with 
urology for a long time. Came in for left flank pain. WBC 24.3. Has CKD (2.66 
-&gt; now 3.86, CULLEN). UA ++infection (nits, LE< bact). Perinephric hematoma on 
left. No recent procedures. Urology said likely IR for hematoma.  
  
Reason for transfer to [facility]: Continuity of care  
  
VS: HR 83 bpm, pox 100% (on 4L home O2), BP 98/57 MAP 73 ; Temp 99.8F.  
  
Hb 9.8 (stable from chronic anemia).  
LA 1.8.  
  
Patient was treated in the ED with got IVF, ertapenem based on past 
sensitivities (ESBL E.coli lastUCX)  
  
Patient was accepted to [facility] regular nursing floor under the care of 
general internal medicine service, barring any acute change in clinical status 
requiring escalation of level of care. Team to be assigned when bed is assigned.  
  
If the facility number was unknown or unavailable at the time of acceptance, [facility]
can supply the call back number. The receiving team is responsible for calling 
the sending facility for provider to provider report if there has been 
significant delay between the time the patient was accepted and the time the bed
was assigned. If there is concern for clinical stability by the receiving team 
they will call the PIC (physician in charge) to discuss the case.  
  
Farhan Landers DO  
25  
6:45 PM  
Electronically signed by Farhan Landers DO at 2025 6:52 PM EST  
  
documented in this [id number]- Radiology Diagnostic study 
[facility]  
[address] [place] [address]  
  
CT Scan Report  
Signed  
  
Patient: Cris Hearn MR#: [id number]  
[id number]  
: 1970 Acct:[id number]  
  
Age/Sex: 54 / F ADM Date:   
5  
Loc: ER Room: Type: [facility] ER  
Attending Dr:  
Copies to: Miquel Asencio DO~  
  
  
  
Ordering Provider: Miquel Asencio DO  
Date of Service: 25  
Accession #: [id number] CT/CT abdomen pelvis wo con: abd pain  
  
  
  
  
CT abdomen pelvis wo con 2025 11:47 AM  
  
SIGNS AND SYMPTOMS: Left-sided abdominal pain, nausea, weakness  
  
TECHNIQUE: Multidetector ct axial images of the abdomen and pelvis were 
obtainedwithout IV contrast. Multiplanar reformats were performed and reviewed 
to further define anatomy and possible pathology. CT was performed with one or 
more of the following dose reduction techniques: Automated exposure control, 
adjustment of the mA and/or kV according to patient size, or use of iterative 
reconstructiontechnique.  
  
COMPARISON: 2024  
  
FINDINGS:  
  
Lower Chest: Atherosclerotic changes are present in the coronary arteries. There
is scarring in thelung bases.  
  
ABDOMEN:  
Liver: Within normal limits.  
Bile Ducts: Normal caliber.  
Gallbladder: Previously removed  
Pancreas: Within normal limits.  
Spleen: There is a 3.4 cm hypoattenuating cystic structure in the spleen 
similarto the prior exam.  
Adrenals: Within normal limits.  
Kidneys: There is a percutaneous approximately on the left which is similar to 
the prior exam. There is a 13 mm stone in the left renal pelvis. There is 
perinephric fat stranding with a new perinephric hematoma measuring 2.7 x 6.8 x 
5.4 cm in greatest dimension. Additional nonobstructing renal stones are 
noted,decreasing in size and number when compared to the prior exam suggesting 
recent intervention.  
  
Pelvis:  
Reproductive Organs: There is a 4.6 cm left adnexal cyst similar to the prior 
exam.  
Ureters: There is fat stranding surrounding the ureters.  
Bladder: Within normal limits.  
  
Bowel: Normal caliber.  
Mesenteric Lymph Nodes: No enlarged mesenteric lymph nodes.  
Peritoneum: No ascites or free air, no fluid collection.  
Vessels: Atherosclerotic changes are noted in the abdominal aorta and its 
branches.  
Retroperitoneum: There is fat stranding in the left paracolic gutter.  
Abdominal Wall: There is a fat-containing hernia in the lower right anterior 
abdominal wall.  
Bones: Degenerative changes are noted in the thoracolumbar spine and hips.  
  
  
ORDER #: [id number] CT/CT abdomen pelvis wo con  
IMPRESSION:  
  
There is a perinephric hematoma on the left which may be a result of recent 
lithotripsy or percutaneous intervention. This measures 2.7 x 6.8 x 5.4 cm in 
greatest dimension.  
  
Diminishing size and number of stones in the left renal collecting system with 
aresidual 14 mm stone in the left renal pelvis.  
  
There is a similar percutaneous nephrostomy tube on the left.  
  
There is fat stranding extending along the left paracolic gutter.  
  
There has been interval removal of the right-sided ureteral stent with fat 
stranding along the right ureter.  
  
Additional chronic findings are noted as above.  
  
Impression dictated by: Bimal Salcedo M.D.2025 1:26 PM  
  
  
Dictation Location: [place] [address]  
  
  
  
Transcribed By: [facility] 25 1326  
Dictated By: Bimal Salcedo II, MD 25 1320  
  
Signed By:  
25 1326  
[facility]  
Work Phone: [phone]- History of Present illness Narrative* 
  Kenji Christiansen MD - 10/17/2024 3:11 PM EDTFormatting of this note is 
  different from the original.  
Images from the original note were not included.  
[facility]rology Clinic Note  
Cris Hearn  
MRN: [id number]  
: 1970  
Appt DOS: 10/17/24  
  
CC: nephrolithiasis  
  
S: lost to follow up after b/L URS LL. No nephrostograms or CTs done after 
surgery as she was lost to follow up. Since surgery R PCNT has fallen out. L 
PCNT in place and capped. Eager to have remaining PCNT removed. No flank pain 
b/L. No GH. No f/c, n/v, CP, SOB.  
  
States she desires to move care to local urologist in [place], however unable 
to until nephrostomytubes removed and care with  has completed following stone
episode.  
  
12 point ROS negative unless otherwise stated above.  
  
Past Medical History:  
Diagnosis Date  
A-fib (HCC)  
Benign paroxysmal vertigo of both ears  
COPD (chronic obstructive pulmonary disease) (HCC)  
HLD (hyperlipidemia)  
HTN (hypertension)  
Iron deficiency anemia  
Mild intermittent asthma, uncomplicated (HCC)  
Multiple sclerosis (HCC)  
KWAKU on CPAP  
Pulmonary emphysema (HCC)  
Renal stones  
Type 2 diabetes mellitus without complications (HCC)  
  
  
Past Surgical History:  
Procedure Laterality Date  
Bilateral ureteral stent removal and insertion, bilateral nephrostomy tube 
exchange 2024  
Done in interventional radiology  
 DELIVERY ONLY  
CHOLECYSTECTOMY  
FOOT SURGERY Right  
+ Hardware  
REPAIR UMBILICAL HERNIA [id number]  
TUBAL LIGATION  
URETEROSCOPY, LITHOTRIPSY, LASER Right 3/28/2024  
Procedure: Ureteroscopy with Laser Lithotripsy, retrograde pyelogram with 
interpritation, cystoscopy, stent exchange; Surgeon: MARYCRUZ Iniguez MD; 
Location: PERIOPERATIVE SERVICES; Service: Urology  
URETEROSCOPY, LITHOTRIPSY, LASER Bilateral 2024  
Procedure: Ureteroscopy with Laser Lithotripsy; Surgeon: MARYCRUZ Iniguez MD; Location: [facility]; Service: Urology  
  
  
Cancer-related family history is not on file.  
  
Social Determinants of Health  
  
Tobacco Use: High Risk (10/17/2024)  
Patient History  
Smoking Tobacco Use: Every Day  
Smokeless Tobacco Use: Never  
Passive Exposure: Not on file  
Alcohol Use: Not on file  
Financial Resource Strain: Not on file  
Food Insecurity: No Food Insecurity (2023)  
Hunger Vital Sign  
Worried About Running Out of Food in the Last Year: Never true  
Ran Out of Food in the Last Year: Never true  
Transportation Needs: No Transportation Needs (2023)  
PRAPARE - Transportation  
Lack of Transportation (Medical): No  
Lack of Transportation (Non-Medical): No  
Physical Activity: Not on file  
Stress: Not on file  
Social Connections: Not on file  
Depression: Not on file  
Housing Stability: Unknown (2023)  
Housing Stability Vital Sign  
Unable to Pay for Housing in the Last Year: No  
Number of Places Lived in the Last Year: Not on file  
Unstable Housing in the Last Year: No  
Utilities: Not on file  
  
O:  
Physical Exam:  
Gen: AA&Ox3  
Resp: NLB  
Cardiac: RR  
ABD: Soft, obese, nTTP, ND  
: right PCNT tract well healed without tubes. Left PCNT capped and in place. 
No CVAT b/l  
Ext: in wheelchair, lymphedema in BLE  
  
Urine Culture  
No lab values to display.  
  
  
Imaging: no new  
  
A/P: Cris Hearn is a 53 year old female with PMHx of bilateral staghorn 
calculi s/p b/L PCNT (2023) and right URS LL (3/2024) and b/L URS (2024) who
was lost to follow up after procedure.  
  
- Obtain CT stone to assess remaining stone burden and possible L PCNT removal  
- Plan AM CT and PM clinic to minimize travel burden to [place]  
- Patient desires to follow up with local [place] urologists after nephrostomy 
tube is removed. Weagree with this as it will make care easier for her given 
need for travel.  
  
D/w Dr. Dianne Pollard MD  
Urology Resident (PGY-5)  
  
Teaching Physician Note:  
I saw and evaluated the patient. I personally obtained the key and critical 
portions of the historyand physical exam. I reviewed the resident's 
documentation and discussed the patient with the resident. I agree with the 
resident's medical decision making as documented in the resident's note.  
  
Kenji Christiansen MD  
  
  
Electronically signed by Kenji Christiansen MD at 10/17/2024 4:30 PM EDT  
  
documented in this [id number]- Telephone encounter Note* 
  Telephone Encounter - Angelika Asher RN - 2024 1:40 PM EDTFormatting of 
  this note might be different from the original.  
Attempted to reach pt. No answer. Left VM informing pt that we haven't seen her 
in several months and want to make sure she is seeing another Urologist closer 
to her home since she still has b/L nephrostomy tubes. Asked that she return 
this call to let us know. Call back number provided. If she's not seeing another
Urologist, an appointment will need to be scheduled for pt.  
Electronically signed by Angelika Asher RN at 2024 1:42 PM EDT  
  
[id number]- Miscellaneous Notes* Telephone Encounter - Angelika Asher RN - 2024 1:40 PM EDTFormatting of this note might be different from the
  original.  
Attempted to reach pt. No answer. Left VM informing pt that we haven't seen her 
in several months and want to make sure she is seeing another Urologist closer 
to her home since she still has b/L nephrostomy tubes. Asked that she return 
this call to let us know. Call back number provided. If she's not seeing another
Urologist, an appointment will need to be scheduled for pt.  
Electronically signed by Angelika Asher RN at 2024 1:42 PM EDT  
  
documented in this [id number]- Telephone encounter Note* 
  Telephone Encounter - Alberto Wylie RN - 2024 2:50 PM EDTFormatting 
  of this note might be different from the original.  
Returned patients call. Advised her that we had encouraged her heavily to make 
and keep appointmentlast week with urology. She said she arranged a ride but the
ride never responded to her or picked her up last week. She lives out in 
[place]. I told her its not good that she has these capped tubesin her back 
which put her at risk for infection and calcification. Told her waiting till 
August 15 appointment is not advised and that she should have CT scan completed 
prior to same day visit at [facility] so we can see imaging and not just get a report
like from [facility]. She said  OMG I thought my appointment was on 7/15 not 
August 15  I really want to go to [place] not [facility] and the [facility] clinic. Can we please 
find a sooner [place] date and I can get the CT scan done there prior to same day 
visit. Told heryes we can work on that and call her back once its done.  
Electronically signed by Alberto Wylie, RN at 2024 2:53 PM EDT  
  
[id number]- Miscellaneous Notes* Telephone Encounter - Alberto Wylie RN - 2024 2:50 PM EDTFormatting of this note might be 
  different from the original.  
Returned patients call. Advised her that we had encouraged her heavily to make 
and keep appointmentlast week with urology. She said she arranged a ride but the
ride never responded to her or picked her up last week. She lives out in 
[place]. I told her its not good that she has these capped tubesin her back 
which put her at risk for infection and calcification. Told her waiting till 
August 15 appointment is not advised and that she should have CT scan completed 
prior to same day visit at [facility] so we can see imaging and not just get a report
like from [facility]. She said  OMG I thought my appointment was on 7/15 not 
August 15  I really want to go to [place] not [facility] and the [facility] clinic. Can we please 
find a sooner [place] date and I can get the CT scan done there prior to same day 
visit. Told yes we can work on that and call her back once its done.  
Electronically signed by Alberto Wylie RN at 2024 2:53 PM EDT  
  
* Telephone Encounter - Rahel Medellin RN - 2024 12:57 PM EDTFormatting 
  of this note might be different from the original.  
Situation: Patient requesting CT order to be [facility] to [facility]  
  
Background: Will be new patient of Dr Abernathy  
  
Assessment: States spoke with someone last week who sent visit notes but not the
CT order to [facility]  
  
Recommendation: Requesting order be faxed to [facility] @ [phone].  
  
Electronically signed by Rahel Medellin RN at 2024 1:00 PM EDT  
  
documented in this [id number]- Telephone encounter Note* 
  Telephone Encounter - Rahel Medellin RN - 2024 12:57 PM EDTFormatting 
  of this note might be different from the original.  
Situation: Patient requesting CT order to be sent to [facility]  
  
Background: Will be new patient of Dr Abernathy  
  
Assessment: States spoke with someone last week who sent visit notes but not the
CT order to [facility]  
  
Recommendation: Requesting order be faxed to [facility] @ [phone].  
  
Electronically signed by Rahel Medellin RN at 2024 1:00 PM EDT  
  
[id number]- Telephone encounter Note* Telephone Encounter - Alberto Wylie RN - 2024 1:56 PM EDTFormatting of this note might be 
  different from the original.  
Spoke with patient advised her that caps would be mailed to her home. She is 
aware of Dr. Esparza [facility]DC I went over upcoming 6/5 appointments with her 
and encouraged her to keep those. She agreed.  
Electronically signed by Alberto Wylie RN at 2024 1:57 PM EDT  
  
[id number]- Miscellaneous Notes* Telephone Encounter - Alberto Wylie RN - 2024 1:56 PM EDTFormatting of this note might be 
  different from the original.  
Spoke with patient advised her that caps would be mailed to her home. She is 
aware of Dr. Esparza [facility] I went over upcoming 6/5 appointments with her 
and encouraged her to keep those. She agreed.  
Electronically signed by Ablerto Wylie RN at 2024 1:57 PM EDT  
  
* Telephone Encounter - Alberto Wylie RN - 2024 4:43 PM EDTFormatting 
  of this note might be different from the original.  
MARYCRUZ Iniguez MD P Urology Rn Pool  
Phone conv Has no issues. Feels fine Pl send her cap for lt neph tube. She said 
we mailed her one before. Keep sched ct 6-5 w/ fu. Pl call her  
Electronically signed by Alberto Wylie RN at 2024 4:43 PM EDT  
  
* Telephone Encounter - Ivet Vallejo RN - 2024 12:34 PM EDT
  Formatting of this note might be different from the original.  
  
Situation: Patient is calling with concern for removing nephrostomy tube.  
  
Background: pt was admitted to the hospital on 24 for renal stones. She 
had surgery on 24 with Nithya Nielsen:  
  
Operation:  
cystourethroscopy,  
bilateral retrograde pyelogram with intraoperative interpretation,  
bilateral ureteroscopy with laser lithotripsy and pyeloscopy  
bilateral ureteral stent removal  
  
Her PCTN S were left and connected to drainage  
  
Assessment: see nurse triage below, pt reports had nephrostomy tubes that were 
connected to drainage . She reports about a week ago the right tube came out 
completely and notified the provider and did not need to come into the office. 
She states now the left tube is no longer draining urine, she reports the left 
tube was draining a very small amount of urine the day after surgery but has had
no output from the drain since then 05/15/24..  
  
She reports she is not collecting her urine or measuring it at all. She reports 
she is urinating normally and is not having any pain or burning. She reports 
urine is yellow in color, denies odor, shedenies fever, abd pain, frequency, or 
urgency, or blood in the urine, flank, or back pain.  
  
She has a follow up appt scheduled for 24.  
  
She would like to know if she can cap off the left tube and discard the bag as 
it is no longer collecting any urine. However she does not have supplies to do 
this at home, or if she should schedule asooner appt to be seen.  
  
Recommendation: per disposition will send high priority message to the 
provider/office for for further recommendation. Instructed patient/caregiver to 
call back with any new or worsening symptoms, patient/caregiver verbalized 
understanding and appreciation.  
  
Ivet Vallejo RN  
  
Reason for Disposition  
[1] Caller has NON-URGENT question AND [2] triager unable to answer question  
  
Answer Assessment - Initial Assessment Questions  
1. SYMPTOM:  What's the main symptom you're concerned about?  (e.g., pain, 
fever, vomiting)  
Nephrostomy tubes  
  
2. ONSET:  When did symptoms start?   
Pt reports no drainage from the left tube since 05/15/24  
  
3. SURGERY:  What surgery did you have?   
Operation:  
cystourethroscopy,  
bilateral retrograde pyelogram with intraoperative interpretation,  
bilateral ureteroscopy with laser lithotripsy and pyeloscopy  
bilateral ureteral stent removal  
  
4. DATE of SURGERY:  When was the surgery?   
24  
  
5. ANESTHESIA:   What type of anesthesia did you have?  (e.g., general, spinal, 
epidural, local)  
General  
  
6. PAIN:  Is there any pain?  If Yes, ask:  How bad is it?  (Scale 1-10; or 
mild, moderate, severe)  
Denies  
  
7. FEVER:  Do you have a fever?  If Yes, ask:  What is your temperature, how was
 it measured, and when did it start?   
Denies  
  
8. VOMITING:  Is there any vomiting?  If Yes, ask:  How many times?   
N/a  
  
9. BLEEDING:  Is there any bleeding?  If Yes, ask:  How much?  and  Where?   
Denies  
  
10. OTHER SYMPTOMS:  Do you have any other symptoms?  (e.g., drainage from 
wound, painful urination, constipation)  
Denies  
  
Protocols used: Post-Op Symptoms and Grdowbgaj-H-NY  
  
Electronically signed by Ivet Vallejo RN at 2024 12:53 PM EDT  
  
documented in this [id number]- Telephone encounter Note* 
  Telephone Encounter - Alberto Wylie RN - 2024 4:43 PM EDTFormatting 
  of this note might be different from the original.  
MARYCRUZ Iniguez MD P Urology Rn [employer] conv Has no issues. Feels fine Pl send her cap for lt neph tube. She said 
we mailed her one before. Keep sched ct 6-5 w/ fu. Pl call her  
Electronically signed by Alberto Wylie RN at 2024 4:43 PM EDT  
  
[id number]- Miscellaneous Notes* Telephone Encounter - Alberto Wylie RN - 2024 4:43 PM EDTFormatting of this note might be 
  different from the original.  
MARYCRUZ Iniguez MD P Urology Rn [employer] conv Has no issues. Feels fine Pl send her cap for lt neph tube. She said 
we mailed her one before. Keep sched ct 6-5 w/ fu. Pl call her  
Electronically signed by Alberto Wylie RN at 2024 4:43 PM EDT  
  
* Telephone Encounter - Ivet Vallejo RN - 2024 12:34 PM EDT
  Formatting of this note might be different from the original.  
  
Situation: Patient is calling with concern for removing nephrostomy tube.  
  
Background: pt was admitted to the hospital on 24 for renal stones. She 
had surgery on 24 with Nithya Nielsen:  
  
Operation:  
cystourethroscopy,  
bilateral retrograde pyelogram with intraoperative interpretation,  
bilateral ureteroscopy with laser lithotripsy and pyeloscopy  
bilateral ureteral stent removal  
  
Her PCTN S were left and connected to drainage  
  
Assessment: see nurse triage below, pt reports had nephrostomy tubes that were 
connected to drainage . She reports about a week ago the right tube came out 
completely and notified the provider and did not need to come into the office. 
She states now the left tube is no longer draining urine, she reports the left 
tube was draining a very small amount of urine the day after surgery but has had
 no output from the drain since then 05/15/24..  
  
She reports she is not collecting her urine or measuring it at all. She reports 
she is urinating normally and is not having any pain or burning. She reports 
urine is yellow in color, denies odor, shedenies fever, abd pain, frequency, or 
urgency, or blood in the urine, flank, or back pain.  
  
She has a follow up appt scheduled for 24.  
  
She would like to know if she can cap off the left tube and discard the bag as 
it is no longer collecting any urine. However she does not have supplies to do 
this at home, or if she should schedule asooner appt to be seen.  
  
Recommendation: per disposition will send high priority message to the 
provider/office for for further recommendation. Instructed patient/caregiver to 
call back with any new or worsening symptoms, patient/caregiver verbalized 
understanding and appreciation.  
  
Ivet Vallejo RN  
  
Reason for Disposition  
[1] Caller has NON-URGENT question AND [2] triager unable to answer question  
  
Answer Assessment - Initial Assessment Questions  
1. SYMPTOM:  What's the main symptom you're concerned about?  (e.g., pain, 
fever, vomiting)  
Nephrostomy tubes  
  
2. ONSET:  When did symptoms start?   
Pt reports no drainage from the left tube since 05/15/24  
  
3. SURGERY:  What surgery did you have?   
Operation:  
cystourethroscopy,  
bilateral retrograde pyelogram with intraoperative interpretation,  
bilateral ureteroscopy with laser lithotripsy and pyeloscopy  
bilateral ureteral stent removal  
  
4. DATE of SURGERY:  When was the surgery?   
24  
  
5. ANESTHESIA:   What type of anesthesia did you have?  (e.g., general, spinal, 
epidural, local)  
General  
  
6. PAIN:  Is there any pain?  If Yes, ask:  How bad is it?  (Scale 1-10; or 
mild, moderate, severe)  
Denies  
  
7. FEVER:  Do you have a fever?  If Yes, ask:  What is your temperature, how was
 it measured, and when did it start?   
Denies  
  
8. VOMITING:  Is there any vomiting?  If Yes, ask:  How many times?   
N/a  
  
9. BLEEDING:  Is there any bleeding?  If Yes, ask:  How much?  and  Where?   
Denies  
  
10. OTHER SYMPTOMS:  Do you have any other symptoms?  (e.g., drainage from 
wound, painful urination, constipation)  
Denies  
  
Protocols used: Post-Op Symptoms and Ruiervkjz-R-KJ  
  
Electronically signed by Ivet Vallejo RN at 2024 12:53 PM EDT  
  
documented in this [id number]- Telephone encounter Note* 
  Telephone Encounter - Ivet Vallejo RN - 2024 12:34 PM EDT
  Formatting of this note might be different from the original.  
  
Situation: Patient is calling with concern for removing nephrostomy tube.  
  
Background: pt was admitted to the hospital on 24 for renal stones. She 
had surgery on 24 with Nithya Nielsen:  
  
Operation:  
cystourethroscopy,  
bilateral retrograde pyelogram with intraoperative interpretation,  
bilateral ureteroscopy with laser lithotripsy and pyeloscopy  
bilateral ureteral stent removal  
  
Her PCTN S were left and connected to drainage  
  
Assessment: see nurse triage below, pt reports had nephrostomy tubes that were 
connected to drainage . She reports about a week ago the right tube came out 
completely and notified the provider and did not need to come into the office. 
She states now the left tube is no longer draining urine, she reports the left 
tube was draining a very small amount of urine the day after surgery but has had
 no output from the drain since then 05/15/24..  
  
She reports she is not collecting her urine or measuring it at all. She reports 
she is urinating normally and is not having any pain or burning. She reports 
urine is yellow in color, denies odor, shedenies fever, abd pain, frequency, or 
urgency, or blood in the urine, flank, or back pain.  
  
She has a follow up appt scheduled for 24.  
  
She would like to know if she can cap off the left tube and discard the bag as 
it is no longer collecting any urine. However she does not have supplies to do 
this at home, or if she should schedule asooner appt to be seen.  
  
Recommendation: per disposition will send high priority message to the 
provider/office for for further recommendation. Instructed patient/caregiver to 
call back with any new or worsening symptoms, patient/caregiver verbalized 
understanding and appreciation.  
  
Ivet Vallejo RN  
  
Reason for Disposition  
[1] Caller has NON-URGENT question AND [2] triager unable to answer question  
  
Answer Assessment - Initial Assessment Questions  
1. SYMPTOM:  What's the main symptom you're concerned about?  (e.g., pain, 
fever, vomiting)  
Nephrostomy tubes  
  
2. ONSET:  When did symptoms start?   
Pt reports no drainage from the left tube since 05/15/24  
  
3. SURGERY:  What surgery did you have?   
Operation:  
cystourethroscopy,  
bilateral retrograde pyelogram with intraoperative interpretation,  
bilateral ureteroscopy with laser lithotripsy and pyeloscopy  
bilateral ureteral stent removal  
  
4. DATE of SURGERY:  When was the surgery?   
24  
  
5. ANESTHESIA:   What type of anesthesia did you have?  (e.g., general, spinal, 
epidural, local)  
General  
  
6. PAIN:  Is there any pain?  If Yes, ask:  How bad is it?  (Scale 1-10; or 
mild, moderate, severe)  
Denies  
  
7. FEVER:  Do you have a fever?  If Yes, ask:  What is your temperature, how was
 it measured, and when did it start?   
Denies  
  
8. VOMITING:  Is there any vomiting?  If Yes, ask:  How many times?   
N/a  
  
9. BLEEDING:  Is there any bleeding?  If Yes, ask:  How much?  and  Where?   
Denies  
  
10. OTHER SYMPTOMS:  Do you have any other symptoms?  (e.g., drainage from 
wound, painful urination, constipation)  
Denies  
  
Protocols used: Post-Op Symptoms and Ssssjpfyw-U-MN  
  
Electronically signed by Ivet Vallejo RN at 2024 12:53 PM EDT  
  
[id number]- Note* Addendum Note - Angelika Asher RN - 2024 12:39
   PM EDTAddended by: ANGELIKA ASHER on: 2024 12:39 PM  
  
Modules accepted: Orders  
  
  
Electronically signed by Angelika Asher RN at 2024 12:39 PM EDT  
  
[id number]- Miscellaneous Notes* Addendum Note - Angelika Asher RN - 
  2024 12:39 PM EDTAddended by: ANGELIKA ASHER on: 2024 12:39 PM  
  
Modules accepted: Orders  
  
  
Electronically signed by Angelika Asher RN at 2024 12:39 PM EDT  
  
* Telephone Encounter - Angelika Asher RN - 2024 11:21 AM EDTFormatting of
   this note might be different from the original.  
Called and spoke with patient. Informed her that Dr. Iniguez says no need to 
replace her nephrostomy tube that fell out. Pt denies pain/fever/chills. 
Informed her that she's currently scheduled for follow up with Dr. Iniguez on 
 and that he wanted her to have a nephrostogram prior to that appointment. 
Our office will be reaching out to assist her with scheduling that. She 
verbalized understanding.  
Electronically signed by Angelika Asher RN at 2024 11:30 AM EDT  
  
* Telephone Encounter - Mari Bowles RN - 2024 10:22 AM EDTFormatting 
  of this note might be different from the original.  
Situation: Patient states she was told to call [phone] back to speak with 
Dr. Keane.  
  
Background: Patient already triaged, message routed to Provider.  
  
Assessment: N/A  
  
Recommendation: Patient advised message was sent.  
  
Electronically signed by Mari Bowles RN at 2024 10:24 AM EDT  
  
* Telephone Encounter - Zehra Zapata RN - 2024 10:05 AM EDTFormatting of
   this note might be different from the original.  
Situation: Right Nephrostomy drain is completely out of Pt  
  
Background: See nurse triage. ED visit 2024  
  
MARYCRUZ Iniguez MD  
Ureteroscopy with Laser Lithotripsy  
  
Assessment: See nurse triage  
  
Recommendation: Pt advised to speak with Provider today. Pt verbalized 
understanding and agreed to plan of care. Pt advised, if hasn't heard from 
Provider by 1600 today, to call RN Triage back.  
  
No prior visit found with PCP (RUDDY ELDER)  
  
  
Reason for Disposition  
[1] Caller has URGENT question AND [2] triager unable to answer question  
  
Answer Assessment - Initial Assessment Questions  
1. SYMPTOM:  What's the main symptom you're concerned about?  (e.g., pain, 
fever, vomiting)  
  
Right nephrostomy tube completely out of Pt  
  
2. ONSET:  When did Sx start?   
0330 this am  
  
3. SURGERY:  What surgery did you have?   
Laser to break up kidney stones  
  
4. DATE of SURGERY:  When was the surgery?   
2024  
  
5. ANESTHESIA:   What type of anesthesia did you have?  (e.g., general, spinal, 
epidural, local)  
Pt put to sleep  
  
6. PAIN:  Is there any pain?  If Yes, ask:  How bad is it?  (Scale 1-10; or 
mild, moderate, severe)  
0/10  
  
7. FEVER:  Do you have a fever?  If Yes, ask:  What is your temperature, how was
 it measured, and when did it start?   
Denies  
  
8. VOMITING:  Is there any vomiting?  If Yes, ask:  How many times?   
Denies  
  
9. BLEEDING:  Is there any bleeding?  If Yes, ask:  How much?  and  Where?   
Denies  
  
10. OTHER SYMPTOMS:  Do you have any other symptoms?  (e.g., drainage from 
wound, painful urination, constipation)  
Denies  
  
Denies any pain, redness, drainage, swelling, fever. Pt states, had Nephrostomy 
drain x 2 placed; Left & right, 2024  
  
Protocols used: Post-Op Symptoms and Zyyjkfpuq-Y-HC  
  
Electronically signed by Zehra Zapata RN at 2024 10:18 AM EDT  
  
documented in this [id number]- Telephone encounter Note* 
  Telephone Encounter - Angelika Asher RN - 2024 11:21 AM EDTFormatting of
   this note might be different from the original.  
Called and spoke with patient. Informed her that Dr. Iniguez says no need to 
replace her nephrostomy tube that fell out. Pt denies pain/fever/chills. 
Informed her that she's currently scheduled for follow up with Dr. Iniguez on 
 and that he wanted her to have a nephrostogram prior to that appointment. 
Our office will be reaching out to assist her with scheduling that. She 
verbalized understanding.  
Electronically signed by Angelika Asher RN at 2024 11:30 AM EDT  
  
KwshzAfvwkc27- Miscellaneous Notes* Telephone Encounter - Angelika Asher RN - 2024 11:21 AM EDTFormatting of this note might be different from 
  the original.  
Called and spoke with patient. Informed her that Dr. Iniguez says no need to 
replace her nephrostomy tube that fell out. Pt denies pain/fever/chills. 
Informed her that she's currently scheduled for follow up with Dr. Iniguez on 
 and that he wanted her to have a nephrostogram prior to that appointment. 
Our office will be reaching out to assist her with scheduling that. She 
verbalized understanding.  
Electronically signed by Angelika Asher RN at 2024 11:30 AM EDT  
  
* Telephone Encounter - Mari Bowles RN - 2024 10:22 AM EDTFormatting 
  of this note might be different from the original.  
Situation: Patient states she was told to call [phone] back to speak with 
Dr. Keane.  
  
Background: Patient already triaged, message routed to Provider.  
  
Assessment: N/A  
  
Recommendation: Patient advised message was sent.  
  
Electronically signed by Mari Bowles RN at 2024 10:24 AM EDT  
  
* Telephone Encounter - Zehra Zapata RN - 2024 10:05 AM EDTFormatting of
   this note might be different from the original.  
Situation: Right Nephrostomy drain is completely out of Pt  
  
Background: See nurse triage. ED visit 2024  
  
MARYCRUZ Iniguez MD  
Ureteroscopy with Laser Lithotripsy  
  
Assessment: See nurse triage  
  
Recommendation: Pt advised to speak with Provider today. Pt verbalized 
understanding and agreed to plan of care. Pt advised, if hasn't heard from 
Provider by 1600 today, to call RN Triage back.  
  
No prior visit found with PCP (RUDDY ELDER)  
  
  
Reason for Disposition  
[1] Caller has URGENT question AND [2] triager unable to answer question  
  
Answer Assessment - Initial Assessment Questions  
1. SYMPTOM:  What's the main symptom you're concerned about?  (e.g., pain, 
fever, vomiting)  
  
Right nephrostomy tube completely out of Pt  
  
2. ONSET:  When did Sx start?   
0330 this am  
  
3. SURGERY:  What surgery did you have?   
Laser to break up kidney stones  
  
4. DATE of SURGERY:  When was the surgery?   
2024  
  
5. ANESTHESIA:   What type of anesthesia did you have?  (e.g., general, spinal, 
epidural, local)  
Pt put to sleep  
  
6. PAIN:  Is there any pain?  If Yes, ask:  How bad is it?  (Scale 1-10; or 
mild, moderate, severe)  
0/10  
  
7. FEVER:  Do you have a fever?  If Yes, ask:  What is your temperature, how was
 it measured, and when did it start?   
Denies  
  
8. VOMITING:  Is there any vomiting?  If Yes, ask:  How many times?   
Denies  
  
9. BLEEDING:  Is there any bleeding?  If Yes, ask:  How much?  and  Where?   
Denies  
  
10. OTHER SYMPTOMS:  Do you have any other symptoms?  (e.g., drainage from 
wound, painful urination, constipation)  
Denies  
  
Denies any pain, redness, drainage, swelling, fever. Pt states, had Nephrostomy 
drain x 2 placed; Left & right, 2024  
  
Protocols used: Post-Op Symptoms and Omnpprqcg-S-GT  
  
Electronically signed by Zehra Zapata RN at 2024 10:18 AM EDT  
  
documented in this [id number]- Telephone encounter Note* 
  Telephone Encounter - Mari Bowles, RN - 2024 10:22 AM EDTFormatting 
  of this note might be different from the original.  
Situation: Patient states she was told to call [phone] back to speak with 
Dr. Keane.  
  
Background: Patient already triaged, message routed to Provider.  
  
Assessment: N/A  
  
Recommendation: Patient advised message was sent.  
  
Electronically signed by Mari Bowles, RN at 2024 10:24 AM EDT  
  
MhxrfVqtcrl92- Miscellaneous Notes* Telephone Encounter - Mari Bowles RN - 2024 10:22 AM EDTFormatting of this note might be different
   from the original.  
Situation: Patient states she was told to call [phone] back to speak with 
Dr. Keane.  
  
Background: Patient already triaged, message routed to Provider.  
  
Assessment: N/A  
  
Recommendation: Patient advised message was sent.  
  
Electronically signed by Mari Bowles RN at 2024 10:24 AM EDT  
  
* Telephone Encounter - Zehra Zapata RN - 2024 10:05 AM EDTFormatting of
   this note might be different from the original.  
Situation: Right Nephrostomy drain is completely out of Pt  
  
Background: See nurse triage. ED visit 2024  
  
MARYCRUZ Iniguez MD  
Ureteroscopy with Laser Lithotripsy  
  
Assessment: See nurse triage  
  
Recommendation: Pt advised to speak with Provider today. Pt verbalized 
understanding and agreed to plan of care. Pt advised, if hasn't heard from 
Provider by 1600 today, to call RN Triage back.  
  
No prior visit found with PCP (RUDDY ELDER)  
  
  
Reason for Disposition  
[1] Caller has URGENT question AND [2] triager unable to answer question  
  
Answer Assessment - Initial Assessment Questions  
1. SYMPTOM:  What's the main symptom you're concerned about?  (e.g., pain, 
fever, vomiting)  
  
Right nephrostomy tube completely out of Pt  
  
2. ONSET:  When did Sx start?   
0330 this am  
  
3. SURGERY:  What surgery did you have?   
Laser to break up kidney stones  
  
4. DATE of SURGERY:  When was the surgery?   
2024  
  
5. ANESTHESIA:   What type of anesthesia did you have?  (e.g., general, spinal, 
epidural, local)  
Pt put to sleep  
  
6. PAIN:  Is there any pain?  If Yes, ask:  How bad is it?  (Scale 1-10; or 
mild, moderate, severe)  
0/10  
  
7. FEVER:  Do you have a fever?  If Yes, ask:  What is your temperature, how was
 it measured, and when did it start?   
Denies  
  
8. VOMITING:  Is there any vomiting?  If Yes, ask:  How many times?   
Denies  
  
9. BLEEDING:  Is there any bleeding?  If Yes, ask:  How much?  and  Where?   
Denies  
  
10. OTHER SYMPTOMS:  Do you have any other symptoms?  (e.g., drainage from 
wound, painful urination, constipation)  
Denies  
  
Denies any pain, redness, drainage, swelling, fever. Pt states, had Nephrostomy 
drain x 2 placed; Left & right, 2024  
  
Protocols used: Post-Op Symptoms and Uiwgjqodm-Y-EQ  
  
Electronically signed by Zehra Zapata RN at 2024 10:18 AM EDT  
  
documented in this [id number]- Telephone encounter Note* 
  Telephone Encounter - Zehra Zapata RN - 2024 10:05 AM EDTFormatting of
   this note might be different from the original.  
Situation: Right Nephrostomy drain is completely out of Pt  
  
Background: See nurse triage. ED visit 2024  
  
MARYCRUZ Iniguez MD  
Ureteroscopy with Laser Lithotripsy  
  
Assessment: See nurse triage  
  
Recommendation: Pt advised to speak with Provider today. Pt verbalized 
understanding and agreed to plan of care. Pt advised, if hasn't heard from 
Provider by 1600 today, to call RN Triage back.  
  
No prior visit found with PCP (RUDDY ELDER)  
  
  
Reason for Disposition  
[1] Caller has URGENT question AND [2] triager unable to answer question  
  
Answer Assessment - Initial Assessment Questions  
1. SYMPTOM:  What's the main symptom you're concerned about?  (e.g., pain, 
fever, vomiting)  
  
Right nephrostomy tube completely out of Pt  
  
2. ONSET:  When did Sx start?   
0330 this am  
  
3. SURGERY:  What surgery did you have?   
Laser to break up kidney stones  
  
4. DATE of SURGERY:  When was the surgery?   
2024  
  
5. ANESTHESIA:   What type of anesthesia did you have?  (e.g., general, spinal, 
epidural, local)  
Pt put to sleep  
  
6. PAIN:  Is there any pain?  If Yes, ask:  How bad is it?  (Scale 1-10; or 
mild, moderate, severe)  
0/10  
  
7. FEVER:  Do you have a fever?  If Yes, ask:  What is your temperature, how was
 it measured, and when did it start?   
Denies  
  
8. VOMITING:  Is there any vomiting?  If Yes, ask:  How many times?   
Denies  
  
9. BLEEDING:  Is there any bleeding?  If Yes, ask:  How much?  and  Where?   
Denies  
  
10. OTHER SYMPTOMS:  Do you have any other symptoms?  (e.g., drainage from 
wound, painful urination, constipation)  
Denies  
  
Denies any pain, redness, drainage, swelling, fever. Pt states, had Nephrostomy 
drain x 2 placed; Left & right, 2024  
  
Protocols used: Post-Op Symptoms and Gnfqjjofk-S-WH  
  
Electronically signed by Zehra Zapata RN at 2024 10:18 AM EDT  
  
[id number]- Hospital Discharge instructions* Discharge Instructions*   
  
Rosendo Weathers MD - 2024 3:40 PM EDT  
  
Formatting of this note might be different from the original.  
DEPARTMENT OF Urology  
DISCHARGE INSTRUCTIONS Ureteroscopy Laser Lithotripsy  
Outpatient Surgery  
  
C O N [facility] I [facility] N T I [facility] I N F O R M A T I O N  
 
--------------------------------------------------------------------------------  
-----------------------------------  
  
Cris Hearn  
  
Call [phone] option 1 during regular daytime business hours (8:00 am - 5:00
 pm) and after 5:00pm call [phone] and ask for the Urology resident with 
any questions or concerns.  
  
If it is a life-threatening situation, proceed to the nearest emergency 
department.  
  
Follow-up appointment: Follow up will be scheduled in ~3 weeks  
  
Thank you for the opportunity to care for you today. Your health and healing are
 very important to us. We hope we made you feel as comfortable as possible and 
are committed to your recovery and continued well-being.  
  
The following is a brief overview of your Ureteroscopy Laser Lithotripsy 
procedure today. Some of the information contained on this summary may be 
confidential. This information should be kept in your records and should be 
shared with your regular doctor.  
  
Physicians:  
Dr. Iniguez  
  
Procedure performed: Lasering of your kidney stone  
  
What to Expect During your Recovery and Home Care  
Anesthesia Side Effects  
You received anesthesia today. You may feel sleepy, tired, or have a sore 
throat.  
You may also feel drowsiness, dizziness, or inability to think clearly. For your
 safety, do not drive, drink alcoholic beverages, take any unprescribed 
medication or make any important decisions for 24 hours. A responsible adult 
should be with you for 24 hours.  
  
Activity and Recovery  
No heavy lifting today. Rest for the next 24 hours.  
  
Pain Control  
Unfortunately, you may experience pain after your procedure. Frequency and 
urgency to urinate and mild discomfort are expected. Adequate pain management 
can include alternative measures to help ease your pain and that can include 
over the counter Tylenol/ibuprofen and a heating pad or Tramadol which can be 
taken as prescribed as needed for breakthrough pain. Do not take more than 
4,000mg of Tylenol in a 24-hour period.  
  
You may have also been prescribed Pyridium (for burning sensation) and 
Ditropan(for bladder spasms)for pain control. Pyridium will turn your urine 
bright orange.  
  
Nausea/Vomiting  
Clear liquids are best tolerated at first. Start slow, advance your diet as 
tolerated to normal foods. Avoid spicy, greasy, heavy foods at first. Also, you 
may feel nauseous or like you need to vomitif you take any type of medication on
 an empty stomach. Call your physician if you are unable to eat or drink and 
have persistent vomiting.  
  
Signs of Bleeding  
You could have some blood in your urine off and on over the next several weeks. 
Your urine will be light pink to yellow.  
  
Treatment/wound care:  
It is okay to shower 24 hours after time of surgery.  
  
Signs of Infection  
Signs of infection can include fever, chills, burning sensation with passing of 
urine, or severe abdominal pain. If you see any of these occur, please contact 
your doctor's office at [phone] option 1 during regular business hours, 
after business hours call [phone] and ask for the Urology resident on call.
 Any fever higher than 100.4, especially if associated with an ill feeling, 
abdominal pain, chills, or nausea should be reported to your surgeon.  
  
Assist in bowel movements/urination  
Increase fiber in diet  
Increase water (6 to 8 glasses)  
Increase walking  
Urination should occur within 6 hours of anesthesia  
If you have tried these methods and your bladder still feels full and you cannot
 use the bathroom, please go to your nearest Emergency room/contact your 
physician.  
  
Additional Instructions:  
Pieces of your kidney stone may pass in the urine for a few days and may cause 
some mild pain. You also had a stent placed today from your kidney down into 
your bladder. This helps keep the urinary tract open and prevents blockages of 
urine flow. The stent can be irritating and can cause some frequency, urgency 
and blood in your urine when you go to the bathroom. It is important to drink 
plenty of water and take medications as prescribed. You may be sent home with 
Pyridium which turns your urine bright orange. Applying a heating pad to your 
back will also help with this kind of pain. It will be determined at your follow
 up appointment when the stent will be removed.  
  
Electronically signed by Rosendo Weathers MD at 2024 3:40 PM EDT  
  
  
  
documented in this [id number]- Miscellaneous Notes* Brief 
  Operative Note - MARYCRUZ Iniguez MD - 2024 2:04 PM EDTFormatting of
   this note is different from the original.  
Brief Operative Note  
[facility] OR 2  
Cris Hearn 53 year old female  
Medical Record Number: [id number]  
Surgical Case Number: [id number]  
Contact Serial Number: [id number]  
  
Preoperative Diagnosis:  
Pre-op Diagnosis  
* Renal stones [N20.0]  
  
Postoperative Diagnosis:  
  
* Renal stones [N20.0]  
  
Procedures:  
Surgical CPTs  
Procedures  
CONTRAST X-RAY URINARY TRACT. 260380  
CYSTOURETHROSCOPY W/URETEROSCOPY &/OR PYELOSCOPY; W/LITHOTRIPSY  
  
No data filed  
  
  
Surgeon(s):  
Surgeon(s):  
MARYCRUZ Iniguez MD  
  
Staff:  
Scrub: Usha Loyd; Nya Moss  
Circulator Nurse: Saima Schmidt; Lorene Mcclellan RN; Wallace Guerra  
Surgical Resident: Rosendo Weathers MD  
  
Anesthesia:  
General  
Anesthesiologist: Azael Mcfadden MD  
CAA: Mariel Benjamin CAA  
Anesthesia Student: Cortney Landin  
  
Specimen(s):  
* No specimens in log *  
  
Estimated Blood Loss:  
less than 5 cc  
  
Lines/Drains:  
* No LDAs found *  
  
Temporarily Retained Foreign Object: Yes  
Location: Neph tubes no stents  
Object: N-tube  
Anticipated removal date: 4 weeks  
  
Findings:  
all clinically signif stones treated. Neds bilat n-grams 4 weeks w/ fu visit 
same day may remov tubes then  
  
Complications:  
None  
  
Status at end of surgery:  
Stable  
  
Activity:  
Ad Kesha  
  
Surgical wound class:  
No wound.  
  
Patient Class: Outpatient Surgery.  
Is this a patient scheduled as an outpatient that needs to be admitted as an 
inpatient? No  
  
Dr. iniguez was present in the OR for the critical portion of the procedure and 
procedure sign-out.  
  
Signed by MARYCRUZ Iniguez MD 2024 5:12 PM  
  
Electronically signed by MARYCRUZ Iniguez MD at 2024 5:14 PM EDT  
  
* Blood Attestation - Azael Mcfadden MD - 2024 11:21 AM EDTFormatting of 
  this note is different from the original.  
Blood Attestation:  
  
ATTESTATION OF INFORMED CONSENT FOR BLOOD: The transfusion of blood and/or blood
 components were discussed with the patient and/or legal representative. The 
risks, benefits and alternatives were reviewed. Questions regarding blood 
transfusions were answered. The patient /or the patient s legal representative 
agree with the plan for transfusion of blood and/or blood components.  
Electronically signed by Azael Mcfadden MD at 2024 11:21 AM EDT  
  
documented in this [id number]- Surgery Postoperative 
evaluation and management note* Brief Operative Note - MARYCRUZ Iniguez MD -
   2024 2:04 PM EDTFormatting of this note is different from the original.  
Brief Operative Note  
[facility] OR 2  
Cris Hearn 53 year old female  
Medical Record Number: [id number]  
Surgical Case Number: [id number]  
Contact Serial Number: [id number]  
  
Preoperative Diagnosis:  
Pre-op Diagnosis  
* Renal stones [N20.0]  
  
Postoperative Diagnosis:  
  
* Renal stones [N20.0]  
  
Procedures:  
Surgical CPTs  
Procedures  
CONTRAST X-RAY URINARY TRACT. [id number]  
CYSTOURETHROSCOPY W/URETEROSCOPY &/OR PYELOSCOPY; W/LITHOTRIPSY  
  
No data filed  
  
  
Surgeon(s):  
Surgeon(s):  
MARYCRUZ Iniguez MD  
  
Staff:  
Scrub: Usha Loyd; Nya Moss  
Circulator Nurse: Saima Schmidt; Lorene Mcclellan RN; Wallace Guerra  
Surgical Resident: Rosendo Weathers MD  
  
Anesthesia:  
General  
Anesthesiologist: Azael Mcfadden MD  
CAA: Mariel Benjamin CAA  
Anesthesia Student: Cortney Landin  
  
Specimen(s):  
* No specimens in log *  
  
Estimated Blood Loss:  
less than 5 cc  
  
Lines/Drains:  
* No LDAs found *  
  
Temporarily Retained Foreign Object: Yes  
Location: Neph tubes no stents  
Object: N-tube  
Anticipated removal date: 4 weeks  
  
Findings:  
all clinically signif stones treated. Neds bilat n-grams 4 weeks w/ fu visit 
same day may remov tubes then  
  
Complications:  
None  
  
Status at end of surgery:  
Stable  
  
Activity:  
Ad Kesha  
  
Surgical wound class:  
No wound.  
  
Patient Class: Outpatient Surgery.  
Is this a patient scheduled as an outpatient that needs to be admitted as an 
inpatient? No  
  
Dr. iniguez was present in the OR for the critical portion of the procedure and 
procedure sign-out.  
  
Signed by MARYCRUZ Iniguez MD 2024 5:12 PM  
  
Electronically signed by MARYCRUZ Iniguez MD at 2024 5:14 PM EDT  
  
EctvoVyacel96- History and physical note* Rosendo Weathers MD - 
  2024 1:31 PM EDTFormatting of this note might be different from the 
  original.  
I have reviewed the patient's History and Physical Examination. I have 
personally seen and evaluated the patient, repeating key portions. There is no 
significant interval change.  
  
Surgery is still indicated. Yes  
  
Consent reviewed and signed by patient/family: Yes  
  
Operative site verified and marked: Yes  
  
Rosendo Weathers MD  
Urology PGY-2  
Pager [phone]  
  
Electronically signed by MARYCRUZ Iniguez MD at 2024 3:34 PM EDT  
  
BiztdKsvgny41- History and physical note* Rosendo Weathers MD - 
  2024 1:31 PM EDTFormatting of this note might be different from the 
  original.  
I have reviewed the patient's History and Physical Examination. I have 
personally seen and evaluated the patient, repeating key portions. There is no 
significant interval change.  
  
Surgery is still indicated. Yes  
  
Consent reviewed and signed by patient/family: Yes  
  
Operative site verified and marked: Yes  
  
Rosendo Weathers MD  
Urology PGY-2  
Pager [phone]  
  
Electronically signed by MARYCRUZ Iniguez MD at 2024 3:34 PM EDT  
  
documented in this [id number]- Progress note* Blood 
  Attestation - Azael Mcfadden MD - 2024 11:21 AM EDTFormatting of this 
  note is different from the original.  
Blood Attestation:  
  
ATTESTATION OF INFORMED CONSENT FOR BLOOD: The transfusion of blood and/or blood
 components were discussed with the patient and/or legal representative. The 
risks, benefits and alternatives were reviewed. Questions regarding blood 
transfusions were answered. The patient /or the patient s legal representative 
agree with the plan for transfusion of blood and/or blood components.  
Electronically signed by Azael Mcfadden MD at 2024 11:21 AM EDT  
  
[facility]  
Work Phone: [phone]- History of Present illness Narrative* 
  Derrick Teixeira, Brandy - 2024 8:53 AM EDTFormatting of this note is
   different from the original.  
Images from the original note were not included.  
Pharmacy Preoperative Antibiotic Review - Urologic Procedures  
  
Cris Hearn is schedule for Ureteroscopy with Dr. Iniguez on 24.  
  
Patient currently has no IV antibiotics scheduled to be administered prior to 
procedure.  
  
ALLERGIES  
Allergies as of 2024 - Verified 2024  
Allergen Reaction Noted  
Advil [ibuprofen] 2023  
Aleve [naproxen] 2023  
Asa [aspirin] 2023  
Benadryl [diphenhydramine] 2023  
  
MOST RECENT URINE CULTURE  
Urine Culture  
  
Urine culture  
24 1431 **Positive Culture Report**  
  
>10,000 CFU/ml Escherichia coli (ESBL)  
Comment: This organism exhibits Extended Spectrum Beta Lactamase Production.  
  
24 1327 **Positive Culture Report**  
  
>10,000 CFU/ml Escherichia coli (ESBL)  
Comment: This organism exhibits Extended Spectrum Beta Lactamase Production.  
  
24 1327 **Positive Culture Report**  
  
>10,000 CFU/ml Escherichia coli (ESBL)  
Comment: This organism exhibits Extended Spectrum Beta Lactamase Production.  
  
  
  
  
PAST URINE CULTURES  
Urine Culture (last 1 year)  
  
Urine culture  
24 1431 **Positive Culture Report**  
  
>10,000 CFU/ml Escherichia coli (ESBL)  
Comment: This organism exhibits Extended Spectrum Beta Lactamase Production.  
  
24 1327 **Positive Culture Report**  
  
>10,000 CFU/ml Escherichia coli (ESBL)  
Comment: This organism exhibits Extended Spectrum Beta Lactamase Production.  
  
24 1327 **Positive Culture Report**  
  
>10,000 CFU/ml Escherichia coli (ESBL)  
Comment: This organism exhibits Extended Spectrum Beta Lactamase Production.  
  
03/15/24 1032 **Positive Culture Report**  
  
>10,000 CFU/ml Escherichia coli (ESBL)  
Comment: Refer to previous susceptibility  
  
03/15/24 1020 **Positive Culture Report**  
  
>10,000 CFU/ml Escherichia coli (ESBL)  
Comment: This organism exhibits Extended Spectrum Beta Lactamase Production.  
  
24 0906 **Positive Culture Report**  
  
>10,000 CFU/ml Escherichia coli (ESBL)  
Comment: This organism exhibits Extended Spectrum Beta Lactamase Production.  
  
>10,000 CFU/ml Enterococcus faecium (VRE)  
Comment: Isolate shows resistance to Vancomycin(Vancomycin Resistant 
Enterococcus).  
[C]  
  
23 0244 10,000 - 50,000 CFU/ml Multiple bacteria present suggesting 
contamination.  
  
  
  
[C] - Corrected Result  
  
  
  
RECOMMENDATION  
  
Patient's chart was reviewed and Ertapenem 500 mg once PLUS linezolid 600 mg 
once is recommended prior to procedure.  
  
Medications have been sign and held per consult agreement.  
  
Derrick Teixeira PharmD  
Clinical Pharmacist  
Phone: [phone] Pager: [phone]  
  
  
Electronically signed by Derrick Teixeira PharmD at 2024 8:57 AM EDT  
  
documented in this [email or web address]- Instructions* Patient 
  Instructions*   
  
Amanda Richard APRN-CNP - 05/10/2024 11:01 AM EDT  
  
Formatting of this note is different from the original.  
On the morning of your surgery, please take only the following medications, with
 a small sip of water:  
amoxicillin-clavulanate (AUGMENTIN) 875-125 MG per tablet  
Sotalol HCl  MG TABS  
omeprazole (PRILOSEC) 20 MG capsule  
ipratropium (ATROVENT) 0.02 % nebulizer solution  
albuterol (PROVENTIL HFA) INHALATION HFA inhaler (VENTOLIN,PROAIR,PROVENTIL) 
90mcg  
duloxetine (CYMBALTA) 60 MG capsule  
  
Do not take any Aspirin 7 days before surgery .Do not take any Ibuprofen, Aleve,
 Advil, or Motrin,Meloxicam,Naprosyn,Toradaol or any other NSAIDS 3 days before 
surgery  
May take over the counter Acetaminophen (Tylenol) as needed for pain  
  
Please hold all Vitamin E, Omega 3, fish oil and herbal supplements for 1 week 
prior to surgery  
  
Stop Eliquis for 2 days before surgery  
  
Stop Invokana 4 days before surgery  
  
Don't take any metformin the morning of surgery  
Do not take any diabetes medication on the morning of the surgery  
  
Stop Trulicity 7 days before surgery  
  
Please use this CHECKLIST to prepare for your surgery/procedure:  
? Assume that any lab or testing done during your Pre-admission testing 
appointment is  
within normal limits unless otherwise contacted.  
? Expect a call from [facility] one business day prior to surgery for surgery 
arrival time  
and location.  
? Please plan to restart your medications the day after surgery unless otherwise
 explicitly  
instructed.  
? Please contact your surgeon s/proceduralist s office for any surgical or 
recovery types of  
questions.  
? CANCELLING YOUR SURGERY/PROCEDURE: If you get a cold, are not feeling well, or  
become pregnant, please call your surgeon s office as soon as possible.  
? Refer to your Preparing for Your Surgery/Procedure booklet or  
[email or web address] if you have questions. Contact the Pre-Admission Testing  
department at [phone] or your surgeon's office with any questions that are 
not  
answered.  
? Eating and drinking before surgery:  
Adult Patients:  
No food after midnight and clear liquids( water) up 2 hours before surgery check
 in time .  
For Enhanced Recovery After Surgery (ERAS), bariatric, and  
endoscopy/colonoscopy patients should follow their  
surgeon s/proceduralist s instructions for clear fluids prior to surgery.  
ON THE DAY OF SURGERY:  
? DO bring your ID, insurance card, medication list, and a small amount of cash 
for filling  
prescriptions and any medical co-pays.  
? Do NOT wear any jewelry, (including rings, earrings, or mouth, tongue, or body  
piercings). Metal jewelry could cause constriction, amputation, or burns. Loose 
or bulky  
things in your mouth can be unsafe and result in breathing problems.  
? DO bring glasses if you wear contacts and other assistance items such as 
oxygen,  
inhaler, cane, walker, etc.  
? Do NOT bring valuables, credit cards, or large amounts of cash.  
? Do NOT wear lotion or strong-smelling fragrance (perfume, cologne, cream or 
lotion).  
? ARRANGE FOR A RIDE: If you are scheduled to go home the same day of surgery, a  
responsible adult MUST drive or accompany you home in a car, cab, shared ride 
service,  
or Metro-van. You will not be allowed to drive yourself home or travel home 
alone. Your  
surgery may be cancelled if you do not have a ride. A responsible adult must 
stay with  
you after surgery. Please call [facility] if you need 
transportation  
assistance or have concerns about going home [phone].  
? PEDIATRIC or ADOLESCENTS: Parents or a legal guardian must remain at the 
hospital  
during surgery. You will need to make childcare arrangements for your other 
small  
children to remain at home or bring an adult with you who can supervise them in 
the  
waiting area while you are with your child. Please bring legal guardianship 
papers with  
you if applicable. Patients who whose assigned sex at birth was female, and are 
starting  
puberty, will be tested for pregnancy per hospital policy.  
? SLEEP APNEA PATIENTS: Bring your sleep apnea machine and mask.  
? PLEASE BE ON TIME. A late arrival may result in the cancellation/ delay of 
your surgery.  
[facility]; it is our pleasure to care for you  
  
Electronically signed by Amanda Richard APRN-CNP at 05/10/2024 11:01 AM EDT  
  
  
  
documented in this [id number]- Evaluation note* PAT Call 
  History - Amanda Richard APRN-CNP - 2024 11:39 AM EDTFormatting of 
  this note is different from the original.  
Images from the original note were not included.  
Telephone History  
  
Cris Hearn, [id number]  
2024  
  
53 year old  
  
DOS [facility],Bmp  
  
Date of Surgery: 2204  
Surgeon: Dr Iniguez  
Type of Surgery: Ureteroscopy with Laser Lithotripsy  
  
HISTORY OF PRESENT ILLNESS: Patient has b/L pcn and stents . Treatments have not
 been effective ,nolonger effective ,no alternate treatment options 
available.Surgical intervention warranted .  
  
OSH @OSH years ago ,wears cpap and oxygen at night  
  
EXERCISE CAPACITY: <4 mets, Uses a wheel chair, and Uses a walker  
  
ALLERGIES: Advil [ibuprofen], Aleve [naproxen], Asa [aspirin], and Benadryl 
[diphenhydramine]  
  
PREVIOUS ANESTHETIC EXPERIENCES AND INTUBATION HISTORY: patient had remote h/o 
sob one time while being put under anesthesia -no recent issues  
  
FAMILY HISTORY OF ANESTHETIC COMPLICATIONS: No  
  
PAST MEDICAL HISTORY:  
Past Medical History:  
Diagnosis Date  
A-fib (HCC)  
Benign paroxysmal vertigo of both ears  
COPD (chronic obstructive pulmonary disease) (HCC)  
HLD (hyperlipidemia)  
HTN (hypertension)  
Iron deficiency anemia  
Mild intermittent asthma, uncomplicated  
Multiple sclerosis (HCC)  
KWAKU on CPAP  
Pulmonary emphysema (HCC)  
Renal stones  
Type 2 diabetes mellitus without complications (HCC)  
  
PROBLEM LIST:  
Patient Active Problem List:  
Obstructive nephropathy [N13.8]  
Atrial fibrillation, unspecified type (HCC) [I48.91]  
CULLEN (acute kidney injury) [N17.9]  
Acute pyelonephritis [N10]  
Sepsis with acute renal failure without septic shock, due to unspecified 
organism, unspecified acute renal failure type (HCC) [A41.9, R65.20, N17.9]  
Recurrent major depressive disorder, in partial remission (HCC) [F33.41]  
Type 2 diabetes mellitus without complication, without long-term current use of 
insulin (HCC) [E11.9]  
Renal stones [N20.0]  
Chronic obstructive pulmonary disease (HCC) [J44.9]  
Disorder associated with type 2 diabetes mellitus (HCC) [E11.8]  
Essential hypertension [I10]  
Hidradenitis suppurativa [L73.2]  
Iron deficiency anemia [D50.9]  
Mixed hyperlipidemia [E78.2]  
Morbid obesity (HCC) [E66.01]  
Multiple sclerosis (HCC) [G35]  
Obstructive sleep apnea syndrome [G47.33]  
Recurrent major depression (HCC) [F33.9]  
Stage 4 chronic kidney disease (HCC) [N18.4]  
  
Past Medical History and Review of Systems  
Pulmonary  
(+) sleep apnea on CPAP, COPD (inhalers), asthma, home oxygen (wears 3 L NC at 
all times)  
  
Dental  
ROS (+) teeth problems broken  
  
Endo  
(+) diabetes mellitus type 2  
  
OB/Gyn  
(+) post-menopausal  
Comment: Tubal ligation  
Neuro/Psych  
(+) depression, anxiety/panic attacks  
  
Comment: MS  
  
Cardiovascular  
(+) hypertension, hyperlipidemia  
  
Comment: Afib  
  
GI/Hepatic/Renal  
(+) renal disease (CKD stage 4)  
Comment: Kidney stones s/p lithotripsy ,cholecystectomy  
Umbilical hernia repair  
  
Heme/Other  
(+) anemia  
  
Other ROS: Foot surgery  
  
PAST SURGICAL HISTORY:  
Past Surgical History:  
Procedure Laterality Date  
Bilateral ureteral stent removal and insertion, bilateral nephrostomy tube 
exchange 2024  
Done in interventional radiology  
 DELIVERY ONLY  
CHOLECYSTECTOMY  
FOOT SURGERY Right  
+ Hardware  
REPAIR UMBILICAL HERNIA [id number]  
TUBAL LIGATION  
URETEROSCOPY, LITHOTRIPSY, LASER Right 3/28/2024  
Procedure: Ureteroscopy with Laser Lithotripsy, retrograde pyelogram with 
interpritation, cystoscopy, stent exchange; Surgeon: MARYCRUZ Iniguez MD; 
Location: [facility]OPER[facility]IVE SERVICES; Service: Urology  
  
SOCIAL HISTORY:  
Social History  
  
Socioeconomic History  
Marital status: Unknown  
Tobacco Use  
Smoking status: Every Day  
Current packs/day: 1.00  
Average packs/day: 1 pack/day for 30.0 years (30.0 ttl pk-yrs)  
Types: Cigarettes  
Smokeless tobacco: Never  
Substance and Sexual Activity  
Alcohol use: Not Currently  
Drug use: Never  
  
Social Determinants of Health  
  
Food Insecurity: No Food Insecurity (2023)  
Hunger Vital Sign  
Worried About Running Out of Food in the Last Year: Never true  
Ran Out of Food in the Last Year: Never true  
Transportation Needs: No Transportation Needs (2023)  
PRAPARE - Transportation  
Lack of Transportation (Medical): No  
Lack of Transportation (Non-Medical): No  
Intimate Partner Violence: Not At Risk (2023)  
Humiliation, Afraid, Rape, and Kick questionnaire  
Fear of Current or Ex-Partner: No  
Emotionally Abused: No  
Physically Abused: No  
Sexually Abused: No  
  
PAIN ASSESSMENT: Severity: 0  
Location: N/A  
  
LABORATORY DATA:  
Type & Screen  
  
None  
  
  
  
CBC (last 3 years, up to 5 values) (Last 5 results in the past 3 years)  
  
WBC RBC Hgb Hct MCV RDW Plt  
24 0858 14.2  
3.70  
10.1  
32.0  
86  
16.4  
395  
  
24 0802 16.6  
3.83  
10.6  
33.0  
86  
16.5  
369  
  
23 1028 16.5  
3.87  
11.0  
34.0  
88  
16.8  
267  
  
23 0230 10.0  
3.47  
9.8  
30.5  
88  
15.6  
228  
  
23 0350 10.7  
3.51  
9.9  
31.0  
88  
15.6  
209  
  
  
  
  
Basic Metabolic Panel (Last 5 results in the past 3 years)  
  
Na K Cl CO2 Gap Glu BUN Cr Ca  
24 0802 138  
Comment: Note updated reference ranges.  
4.1  
Comment: Note updated reference ranges.  
Note updated reference ranges.  
109  
Comment: Note updated reference ranges.  
16  
Comment: Note updated reference ranges.  
17  
144  
42  
Comment: Note updated reference ranges.  
3.56  
Comment: Note updated reference ranges.  
9.1  
Comment: Note updated reference ranges.  
  
23 0053 142  
3.3  
111  
22  
12  
102  
43  
3.79  
8.0  
  
23 0230 140  
3.5  
110  
21  
13  
100  
44  
4.06  
7.9  
  
23 0357 140  
3.7  
109  
20  
15  
99  
46  
4.45  
7.8  
  
23 0354 140  
3.5  
107  
20  
17  
98  
44  
4.73  
7.7  
  
  
  
  
Basic Metabolic Panel  
  
None  
  
  
  
PT/PTT/INR (last 3 years, up to 5 values)  
  
PT aPTT INR  
24 0858 1.04  
  
23 1028 1.08  
  
23 0244 1.31  
  
  
  
  
Arterial Blood Gases  
  
None  
  
  
  
No result for BNP  
LFT's (last 3 years, up to 5 values)  
  
T Prot Albumin D Bili T Bili Alk Phos ALT AST  
23 0244 5.8  
2.8  
0.07  
0.4  
68  
6  
10  
  
  
  
  
TESTS REVIEWED:  
CXRay: No Chest x-ray found  
[id number]  
No significant change was found  
ECHO: 2022  
EF 55-60 %  
Concentric LVH ,mild diastolic dysfunction  
No results found for this basename: LVEF  
  
Stress test date: Last StressTest: none found going back to 2015  
  
CURRENT MEDICATION LIST:  
Current Outpatient Medications  
Medication Sig Dispense Refill  
amoxicillin-clavulanate (AUGMENTIN) 875-125 MG per tablet Take 1 Tablet by mouth
 2 times daily for 14 days. 28 Tablet 0  
tamsulosin (FLOMAX) 0.4 MG capsule Take 1 Capsule by mouth daily. For stent 
related pain 30 Capsule0  
amitriptyline (ELAVIL) 50 MG tablet Take 50 mg by mouth at bedtime.  
doxycycline (VIBRA-TABS) 100 MG tablet TAKE 1 TABLET BY MOUTH EVERY DAY FOR 90 
DAYS for 30  
canagliflozin (Invokana) 100 MG TABS tablet 1 tablet before the first meal of 
the day Orally Once aday for 30 day(s)  
dulaglutide (Trulicity) 0.75 MG/0.5ML SOPN injection pen Inject 0.75 mg under 
the skin once weekly.  
Fluticasone Furoate 50 MCG/ACT AEPB Inhale by mouth.  
metformin (GLUCOPHAGE) 500 MG tablet Take 500 mg by mouth 2 times daily (with 
meals).  
vitamin D2 (ERGOCALCIFEROL) 1.25 MG (22419 UT) capsule Take 50,000 Units by 
mouth.  
ammonium lactate (LAC-HYDRIN) 12 % lotion Apply 12 g topically 2 times daily. 
Apply thin layer to affected area.  
Sotalol HCl  MG TABS Take by mouth.  
potassium chloride SA (K-DUR) 10 MEQ controlled release tablet Take 10 mEq by 
mouth daily.  
budesonide-formoterol (Symbicort) 160-4.5 MCG/ACT inhaler Inhale 2 Puffs by 
mouth 2 times daily.  
pravastatin (PRAVACHOL) 40 MG tablet Take 40 mg by mouth daily.  
omeprazole (PRILOSEC) 20 MG capsule Take 20 mg by mouth daily.  
ipratropium (ATROVENT) 0.02 % nebulizer solution Use 2.5 mL via nebulizer 4 
times daily.  
Apixaban (ELIQUIS) 2.5 MG tablet Take 5 mg by mouth 2 times daily.  
Sodium Chloride Flush (sodium chloride 0.9%) 0.9 % SOLN flush syringe Inject 10 
mL intravenously every 12 hours. 10 mL 0  
albuterol (PROVENTIL HFA) INHALATION HFA inhaler (VENTOLIN,PROAIR,PROVENTIL) 
90mcg Inhale 2 Puffs by mouth every 4 hours as needed for Shortness of Breath or
 Wheezing. 8.5 g 0  
fluticasone -salmeterol (Advair HFA) 230-21 MCG/ACT inhaler Inhale 2 Puffs by 
mouth 2 times daily. 12 Each 0  
duloxetine (CYMBALTA) 60 MG capsule Take 1 Capsule by mouth daily. 30 Capsule 3  
pantoprazole (PROTONIX) 40 MG tablet Take 1 Tablet by mouth daily. 30 Tablet 3  
  
No current facility-administered medications for this visit.  
  
CURRENT MEDICATIONS:  
Aspirin: No  
NSAIDS: No  
Other Antiplatelet Medication: No  
Anticoagulants: Yes and Last Dose - fax letter to OSH pcp Dr Lopez about 
holding eliquis , held for same surgery in January -see scanned clearance  
Steroids: No  
  
PATIENT MEDICATION INSTRUCTIONS:  
On the morning of your surgery, please take only the following medications, with
 a small sip of water:  
amoxicillin-clavulanate (AUGMENTIN) 875-125 MG per tablet  
Sotalol HCl  MG TABS  
omeprazole (PRILOSEC) 20 MG capsule  
ipratropium (ATROVENT) 0.02 % nebulizer solution  
albuterol (PROVENTIL HFA) INHALATION HFA inhaler (VENTOLIN,PROAIR,PROVENTIL) 
90mcg  
duloxetine (CYMBALTA) 60 MG capsule  
  
Do not take any Aspirin 7 days before surgery .Do not take any Ibuprofen, Aleve,
 Advil, or Motrin,Meloxicam,Naprosyn,Toradaol or any other NSAIDS 3 days before 
surgery  
May take over the counter Acetaminophen (Tylenol) as needed for pain  
  
Please hold all Vitamin E, Omega 3, fish oil and herbal supplements for 1 week 
prior to surgery  
  
Stop Eliquis for 2 days before surgery  
  
Stop Invokana 4 days before surgery  
  
Don't take any metformin the morning of surgery  
Do not take any diabetes medication on the morning of the surgery  
  
Stop Trulicity 7 days before surgery  
  
Please use this CHECKLIST to prepare for your surgery/procedure:  
? Assume that any lab or testing done during your Pre-admission testing 
appointment is  
within normal limits unless otherwise contacted.  
? Expect a call from [facility] one business day prior to surgery for surgery 
arrival time  
and location.  
? Please plan to restart your medications the day after surgery unless otherwise
 explicitly  
instructed.  
? Please contact your surgeon s/proceduralist s office for any surgical or 
recovery types of  
questions.  
? CANCELLING YOUR SURGERY/PROCEDURE: If you get a cold, are not feeling well, or  
become pregnant, please call your surgeon s office as soon as possible.  
? Refer to your Preparing for Your Surgery/Procedure booklet or  
[email or web address] if you have questions. Contact the Pre-Admission Testing  
department at [phone] or your surgeon's office with any questions that are 
not  
answered.  
? Eating and drinking before surgery:  
Adult Patients:  
No food after midnight and clear liquids( water) up 2 hours before surgery check
 in time .  
For Enhanced Recovery After Surgery (ERAS), bariatric, and  
endoscopy/colonoscopy patients should follow their  
surgeon s/proceduralist s instructions for clear fluids prior to surgery.  
ON THE DAY OF SURGERY:  
? DO bring your ID, insurance card, medication list, and a small amount of cash 
for filling  
prescriptions and any medical co-pays.  
? Do NOT wear any jewelry, (including rings, earrings, or mouth, tongue, or body  
piercings). Metal jewelry could cause constriction, amputation, or burns. Loose 
or bulky  
things in your mouth can be unsafe and result in breathing problems.  
? DO bring glasses if you wear contacts and other assistance items such as 
oxygen,  
inhaler, cane, walker, etc.  
? Do NOT bring valuables, credit cards, or large amounts of cash.  
? Do NOT wear lotion or strong-smelling fragrance (perfume, cologne, cream or 
lotion).  
? ARRANGE FOR A RIDE: If you are scheduled to go home the same day of surgery, a  
responsible adult MUST drive or accompany you home in a car, cab, shared ride 
service,  
or [employer]-van. You will not be allowed to drive yourself home or travel home 
alone. Your  
surgery may be cancelled if you do not have a ride. A responsible adult must 
stay with  
you after surgery. Please call [email or web address] if you need 
transportation  
assistance or have concerns about going home [phone].  
? PEDIATRIC or ADOLESCENTS: Parents or a legal guardian must remain at the 
hospital  
during surgery. You will need to make childcare arrangements for your other 
small  
children to remain at home or bring an adult with you who can supervise them in 
the  
waiting area while you are with your child. Please bring legal guardianship 
papers with  
you if applicable. Patients who whose assigned sex at birth was female, and are 
starting  
puberty, will be tested for pregnancy per hospital policy.  
? SLEEP APNEA PATIENTS: Bring your sleep apnea machine and mask.  
? PLEASE BE ON TIME. A late arrival may result in the cancellation/ delay of 
your surgery.  
Thank you for choosing [facility]; it is our pleasure to care for you  
  
DAY OF SURGERY NOTES:  
  
No solid foods after midnight but may have water up to 2 hours before surgery 
check in time.  
  
If the patient has diagnosed KWAKU, they are to bring their CPAP with them on the 
morning of surgery.  
  
See scanned cardiology clearance from 2024  
  
SHAJI Pelletier  
  
Time Spent Performing this Telephone History: 30 minutes  
  
Electronically signed by Amanda Richard APRN-CNP at 05/10/2024 1:36 PM EDT  
  
[id number]- Miscellaneous Notes* PAT Call History - Amanda Richard APRN-CNP - 2024 11:39 AM EDTFormatting of this note is different from 
  the original.  
Images from the original note were not included.  
Telephone History  
  
Cris Hearn, [id number]  
2024  
  
53 year old  
  
DOS [facility],Bmp  
  
Date of Surgery: 2204  
Surgeon: Dr Iniguez  
Type of Surgery: Ureteroscopy with Laser Lithotripsy  
  
HISTORY OF PRESENT ILLNESS: Patient has b/L pcn and stents . Treatments have not
 been effective ,nolonger effective ,no alternate treatment options 
available.Surgical intervention warranted .  
  
OSH @OSH years ago ,wears cpap and oxygen at night  
  
EXERCISE CAPACITY: <4 mets, Uses a wheel chair, and Uses a walker  
  
ALLERGIES: Advil [ibuprofen], Aleve [naproxen], Asa [aspirin], and Benadryl 
[diphenhydramine]  
  
PREVIOUS ANESTHETIC EXPERIENCES AND INTUBATION HISTORY: patient had remote h/o 
sob one time while being put under anesthesia -no recent issues  
  
FAMILY HISTORY OF ANESTHETIC COMPLICATIONS: No  
  
PAST MEDICAL HISTORY:  
Past Medical History:  
Diagnosis Date  
A-fib (HCC)  
Benign paroxysmal vertigo of both ears  
COPD (chronic obstructive pulmonary disease) (HCC)  
HLD (hyperlipidemia)  
HTN (hypertension)  
Iron deficiency anemia  
Mild intermittent asthma, uncomplicated  
Multiple sclerosis (HCC)  
KWAKU on CPAP  
Pulmonary emphysema (HCC)  
Renal stones  
Type 2 diabetes mellitus without complications (HCC)  
  
PROBLEM LIST:  
Patient Active Problem List:  
Obstructive nephropathy [N13.8]  
Atrial fibrillation, unspecified type (HCC) [I48.91]  
CULLEN (acute kidney injury) [N17.9]  
Acute pyelonephritis [N10]  
Sepsis with acute renal failure without septic shock, due to unspecified 
organism, unspecified acute renal failure type (HCC) [A41.9, R65.20, N17.9]  
Recurrent major depressive disorder, in partial remission (HCC) [F33.41]  
Type 2 diabetes mellitus without complication, without long-term current use of 
insulin (HCC) [E11.9]  
Renal stones [N20.0]  
Chronic obstructive pulmonary disease (HCC) [J44.9]  
Disorder associated with type 2 diabetes mellitus (HCC) [E11.8]  
Essential hypertension [I10]  
Hidradenitis suppurativa [L73.2]  
Iron deficiency anemia [D50.9]  
Mixed hyperlipidemia [E78.2]  
Morbid obesity (HCC) [E66.01]  
Multiple sclerosis (HCC) [G35]  
Obstructive sleep apnea syndrome [G47.33]  
Recurrent major depression (HCC) [F33.9]  
Stage 4 chronic kidney disease (HCC) [N18.4]  
  
Past Medical History and Review of Systems  
Pulmonary  
(+) sleep apnea on CPAP, COPD (inhalers), asthma, home oxygen (wears 3 L NC at 
all times)  
  
Dental  
ROS (+) teeth problems broken  
  
Endo  
(+) diabetes mellitus type 2  
  
OB/Gyn  
(+) post-menopausal  
Comment: Tubal ligation  
Neuro/Psych  
(+) depression, anxiety/panic attacks  
  
Comment: MS  
  
Cardiovascular  
(+) hypertension, hyperlipidemia  
  
Comment: Afib  
  
GI/Hepatic/Renal  
(+) renal disease (CKD stage 4)  
Comment: Kidney stones s/p lithotripsy ,cholecystectomy  
Umbilical hernia repair  
  
Heme/Other  
(+) anemia  
  
Other ROS: Foot surgery  
  
PAST SURGICAL HISTORY:  
Past Surgical History:  
Procedure Laterality Date  
Bilateral ureteral stent removal and insertion, bilateral nephrostomy tube 
exchange 2024  
Done in interventional radiology  
 DELIVERY ONLY  
CHOLECYSTECTOMY  
FOOT SURGERY Right  
+ Hardware  
REPAIR UMBILICAL HERNIA [id number]  
TUBAL LIGATION  
URETEROSCOPY, LITHOTRIPSY, LASER Right 3/28/2024  
Procedure: Ureteroscopy with Laser Lithotripsy, retrograde pyelogram with 
interpritation, cystoscopy, stent exchange; Surgeon: MARYCRUZ Iniguez MD; 
Location: PERIOPERATIVE SERVICES; Service: Urology  
  
SOCIAL HISTORY:  
Social History  
  
Socioeconomic History  
Marital status: Unknown  
Tobacco Use  
Smoking status: Every Day  
Current packs/day: 1.00  
Average packs/day: 1 pack/day for 30.0 years (30.0 ttl pk-yrs)  
Types: Cigarettes  
Smokeless tobacco: Never  
Substance and Sexual Activity  
Alcohol use: Not Currently  
Drug use: Never  
  
Social Determinants of Health  
  
Food Insecurity: No Food Insecurity (2023)  
Hunger Vital Sign  
Worried About Running Out of Food in the Last Year: Never true  
Ran Out of Food in the Last Year: Never true  
Transportation Needs: No Transportation Needs (2023)  
PRAPARE - Transportation  
Lack of Transportation (Medical): No  
Lack of Transportation (Non-Medical): No  
Intimate Partner Violence: Not At Risk (2023)  
Humiliation, Afraid, Rape, and Kick questionnaire  
Fear of Current or Ex-Partner: No  
Emotionally Abused: No  
Physically Abused: No  
Sexually Abused: No  
  
PAIN ASSESSMENT: Severity: 0  
Location: N/A  
  
LABORATORY DATA:  
Type & Screen  
  
None  
  
  
  
CBC (last 3 years, up to 5 values) (Last 5 results in the past 3 years)  
  
WBC RBC Hgb Hct [facility] [address]  
24 0858 14.2  
3.70  
10.1  
32.0  
86  
16.4  
395  
  
24 0802 16.6  
3.83  
10.6  
33.0  
86  
16.5  
369  
  
23 1028 16.5  
3.87  
11.0  
34.0  
88  
16.8  
267  
  
23 0230 10.0  
3.47  
9.8  
30.5  
88  
15.6  
228  
  
23 0350 10.7  
3.51  
9.9  
31.0  
88  
15.6  
209  
  
  
  
  
Basic Metabolic Panel (Last 5 results in the past 3 years)  
  
Na K Cl CO2 Gap Glu BUN Cr Ca  
24 0802 138  
Comment: Note updated reference ranges.  
4.1  
Comment: Note updated reference ranges.  
Note updated reference ranges.  
109  
Comment: Note updated reference ranges.  
16  
Comment: Note updated reference ranges.  
17  
144  
42  
Comment: Note updated reference ranges.  
3.56  
Comment: Note updated reference ranges.  
9.1  
Comment: Note updated reference ranges.  
  
23 0053 142  
3.3  
111  
22  
12  
102  
43  
3.79  
8.0  
  
23 0230 140  
3.5  
110  
21  
13  
100  
44  
4.06  
7.9  
  
23 0357 140  
3.7  
109  
20  
15  
99  
46  
4.45  
7.8  
  
23 0354 140  
3.5  
107  
20  
17  
98  
44  
4.73  
7.7  
  
  
  
  
Basic Metabolic Panel  
  
None  
  
  
  
PT/PTT/INR (last 3 years, up to 5 values)  
  
PT aPTT INR  
24 0858 1.04  
  
23 1028 1.08  
  
23 0244 1.31  
  
  
  
  
Arterial Blood Gases  
  
None  
  
  
  
No result for BNP  
LFT's (last 3 years, up to 5 values)  
  
T Prot Albumin D Bili T Bili Alk Phos ALT AST  
23 0244 5.8  
2.8  
0.07  
0.4  
68  
6  
10  
  
  
  
  
TESTS REVIEWED:  
CXRay: No Chest x-ray found  
[id number]  
No significant change was found  
ECHO: 2022  
EF 55-60 %  
Concentric LVH ,mild diastolic dysfunction  
No results found for this basename: LVEF  
  
Stress test date: Last StressTest: none found going back to 2015  
  
CURRENT MEDICATION LIST:  
Current Outpatient Medications  
Medication Sig Dispense Refill  
amoxicillin-clavulanate (AUGMENTIN) 875-125 MG per tablet Take 1 Tablet by mouth
 2 times daily for 14 days. 28 Tablet 0  
tamsulosin (FLOMAX) 0.4 MG capsule Take 1 Capsule by mouth daily. For stent 
related pain 30 Capsule0  
amitriptyline (ELAVIL) 50 MG tablet Take 50 mg by mouth at bedtime.  
doxycycline (VIBRA-TABS) 100 MG tablet TAKE 1 TABLET BY MOUTH EVERY DAY FOR 90 
DAYS for 30  
canagliflozin (Invokana) 100 MG TABS tablet 1 tablet before the first meal of 
the day Orally Once aday for 30 day(s)  
dulaglutide (Trulicity) 0.75 MG/0.5ML SOPN injection pen Inject 0.75 mg under 
the skin once weekly.  
Fluticasone Furoate 50 MCG/ACT AEPB Inhale by mouth.  
metformin (GLUCOPHAGE) 500 MG tablet Take 500 mg by mouth 2 times daily (with 
meals).  
vitamin D2 (ERGOCALCIFEROL) 1.25 MG (75834 UT) capsule Take 50,000 Units by 
mouth.  
ammonium lactate (LAC-HYDRIN) 12 % lotion Apply 12 g topically 2 times daily. 
Apply thin layer to affected area.  
Sotalol HCl  MG TABS Take by mouth.  
potassium chloride SA (K-DUR) 10 MEQ controlled release tablet Take 10 mEq by 
mouth daily.  
budesonide-formoterol (Symbicort) 160-4.5 MCG/ACT inhaler Inhale 2 Puffs by 
mouth 2 times daily.  
pravastatin (PRAVACHOL) 40 MG tablet Take 40 mg by mouth daily.  
omeprazole (PRILOSEC) 20 MG capsule Take 20 mg by mouth daily.  
ipratropium (ATROVENT) 0.02 % nebulizer solution Use 2.5 mL via nebulizer 4 
times daily.  
Apixaban (ELIQUIS) 2.5 MG tablet Take 5 mg by mouth 2 times daily.  
Sodium Chloride Flush (sodium chloride 0.9%) 0.9 % SOLN flush syringe Inject 10 
mL intravenously every 12 hours. 10 mL 0  
albuterol (PROVENTIL HFA) INHALATION HFA inhaler (VENTOLIN,PROAIR,PROVENTIL) 
90mcg Inhale 2 Puffs by mouth every 4 hours as needed for Shortness of Breath or
 Wheezing. 8.5 g 0  
fluticasone -salmeterol (Advair HFA) 230-21 MCG/ACT inhaler Inhale 2 Puffs by 
mouth 2 times daily. 12 Each 0  
duloxetine (CYMBALTA) 60 MG capsule Take 1 Capsule by mouth daily. 30 Capsule 3  
pantoprazole (PROTONIX) 40 MG tablet Take 1 Tablet by mouth daily. 30 Tablet 3  
  
No current facility-administered medications for this visit.  
  
CURRENT MEDICATIONS:  
Aspirin: No  
NSAIDS: No  
Other Antiplatelet Medication: No  
Anticoagulants: Yes and Last Dose - fax letter to OSH pcp Dr Lopez about 
holding eliquis , held for same surgery in January -see scanned clearance  
Steroids: No  
  
PATIENT MEDICATION INSTRUCTIONS:  
On the morning of your surgery, please take only the following medications, with
 a small sip of water:  
amoxicillin-clavulanate (AUGMENTIN) 875-125 MG per tablet  
Sotalol HCl  MG TABS  
omeprazole (PRILOSEC) 20 MG capsule  
ipratropium (ATROVENT) 0.02 % nebulizer solution  
albuterol (PROVENTIL HFA) INHALATION HFA inhaler (VENTOLIN,PROAIR,PROVENTIL) 
90mcg  
duloxetine (CYMBALTA) 60 MG capsule  
  
Do not take any Aspirin 7 days before surgery .Do not take any Ibuprofen, Aleve,
 Advil, or Motrin,Meloxicam,Naprosyn,Toradaol or any other NSAIDS 3 days before 
surgery  
May take over the counter Acetaminophen (Tylenol) as needed for pain  
  
Please hold all Vitamin E, Omega 3, fish oil and herbal supplements for 1 week 
prior to surgery  
  
Stop Eliquis for 2 days before surgery  
  
Stop Invokana 4 days before surgery  
  
Don't take any metformin the morning of surgery  
Do not take any diabetes medication on the morning of the surgery  
  
Stop Trulicity 7 days before surgery  
  
Please use this CHECKLIST to prepare for your surgery/procedure:  
? Assume that any lab or testing done during your Pre-admission testing 
appointment is  
within normal limits unless otherwise contacted.  
? Expect a call from [facility] one business day prior to surgery for surgery 
arrival time  
and location.  
? Please plan to restart your medications the day after surgery unless otherwise
 explicitly  
instructed.  
? Please contact your surgeon s/proceduralist s office for any surgical or 
recovery types of  
questions.  
? CANCELLING YOUR SURGERY/PROCEDURE: If you get a cold, are not feeling well, or  
become pregnant, please call your surgeon s office as soon as possible.  
? Refer to your Preparing for Your Surgery/Procedure booklet or  
[email or web address] if you have questions. Contact the Pre-Admission Testing  
department at [phone] or your surgeon's office with any questions that are 
not  
answered.  
? Eating and drinking before surgery:  
Adult Patients:  
No food after midnight and clear liquids( water) up 2 hours before surgery check
 in time .  
For Enhanced Recovery After Surgery (ERAS), bariatric, and  
endoscopy/colonoscopy patients should follow their  
surgeon s/proceduralist s instructions for clear fluids prior to surgery.  
ON THE DAY OF SURGERY:  
? DO bring your ID, insurance card, medication list, and a small amount of cash 
for filling  
prescriptions and any medical co-pays.  
? Do NOT wear any jewelry, (including rings, earrings, or mouth, tongue, or body  
piercings). Metal jewelry could cause constriction, amputation, or burns. Loose 
or bulky  
things in your mouth can be unsafe and result in breathing problems.  
? DO bring glasses if you wear contacts and other assistance items such as 
oxygen,  
inhaler, cane, walker, etc.  
? Do NOT bring valuables, credit cards, or large amounts of cash.  
? Do NOT wear lotion or strong-smelling fragrance (perfume, cologne, cream or 
lotion).  
? ARRANGE FOR A RIDE: If you are scheduled to go home the same day of surgery, a  
responsible adult MUST drive or accompany you home in a car, cab, shared ride 
service,  
or Metro-van. You will not be allowed to drive yourself home or travel home 
alone. Your  
surgery may be cancelled if you do not have a ride. A responsible adult must 
stay with  
you after surgery. Please call [facility] [employer] if you need 
transportation  
assistance or have concerns about going home [phone].  
? PEDIATRIC or ADOLESCENTS: Parents or a legal guardian must remain at the 
hospital  
during surgery. You will need to make childcare arrangements for your other 
small  
children to remain at home or bring an adult with you who can supervise them in 
the  
waiting area while you are with your child. Please bring legal guardianship 
papers with  
you if applicable. Patients who whose assigned sex at birth was female, and are 
starting  
puberty, will be tested for pregnancy per hospital policy.  
? SLEEP APNEA PATIENTS: Bring your sleep apnea machine and mask.  
? PLEASE BE ON TIME. A late arrival may result in the cancellation/ delay of 
your surgery.  
Thank you for choosing [facility]; it is our pleasure to care for you  
  
DAY OF SURGERY NOTES:  
  
No solid foods after midnight but may have water up to 2 hours before surgery 
check in time.  
  
If the patient has diagnosed KWAKU, they are to bring their CPAP with them on the 
morning of surgery.  
  
See scanned cardiology clearance from 2024  
  
SHAJI Pelletier  
  
Time Spent Performing this Telephone History: 30 minutes  
  
Electronically signed by Amanda Richard APRN-CNP at 05/10/2024 1:36 PM EDT  
  
documented in this [id number]- Telephone encounter Note* 
  Telephone Encounter - Angelika Asher RN - 2024 2:14 PM EDTFormatting of 
  this note might be different from the original.  
Called and spoke with patient. Asked her if she received my voicemail yesterday 
and she states no she didn't. Informed her of urine culture results and that Dr. Iniguez's covering provider, Dr. Abernathy, sent in a script for antibiotics to 
treat her infection. Script was sent to [facility] in [place] on [address]. Pt verbalized understanding and states she will  and start taking
 today.She didn't have any further questions.  
Electronically signed by Angelika Asher RN at 2024 2:17 PM EDT  
  
[id number]- Miscellaneous Notes* Telephone Encounter - Angelika Asher RN - 2024 2:14 PM EDTFormatting of this note might be different from the
   original.  
Called and spoke with patient. Asked her if she received my voicemail yesterday 
and she states no she didn't. Informed her of urine culture results and that Dr. Iniguez's covering provider, Dr. Abernathy, sent in a script for antibiotics to 
treat her infection. Script was sent to [facility] in [place] on [address]. Pt verbalized understanding and states she will  and start taking
 today.She didn't have any further questions.  
Electronically signed by Angelika Asher RN at 2024 2:17 PM EDT  
  
documented in this [id number]- Telephone encounter Note* 
  Telephone Encounter - Angelika Asher RN - 2024 1:49 PM EDTFormatting of 
  this note might be different from the original.  
Attempted to reach pt. No answer. Left VM informing her of urine culture results
 and that antibiotic, Augmentin, was sent in to the [facility] in [place] on [address]. Advised that she pick upand start taking today. Call back 
number provided if she has questions.  
Electronically signed by Angelika Asher RN at 2024 1:56 PM EDT  
  
[id number]- Telephone encounter Note* Telephone Encounter - Angelika Asher RN - 2024 1:49 PM EDTFormatting of this note might be different 
  from the original.  
----- Message from Mauricio Abernathy MD sent at 2024 1:25 PM EDT -----  
Augmentin has been ordered. please let them know  
Electronically signed by Angelika Asher RN at 2024 1:49 PM EDT  
  
[id number]- Miscellaneous Notes* Telephone Encounter - Angelika Asher RN - 2024 1:49 PM EDTFormatting of this note might be different from the
   original.  
Attempted to reach pt. No answer. Left VM informing her of urine culture results
 and that antibiotic, Augmentin, was sent in to the [facility] in [place] on [address]. Advised that she pick upand start taking today. Call back 
number provided if she has questions.  
Electronically signed by Angelika Asher RN at 2024 1:56 PM EDT  
  
* Telephone Encounter - Angelika Asher RN - 2024 1:49 PM EDTFormatting of 
  this note might be different from the original.  
----- Message from Mauricio Abernathy MD sent at 2024 1:25 PM EDT -----  
Augmentin has been ordered. please let them know  
Electronically signed by Angelika Asher RN at 2024 1:49 PM EDT  
  
documented in this [id number]- History of Present illness 
Narrative* MARYCRUZ Iniguez MD - 2024 4:09 PM EDTFormatting of this 
  note might be different from the original.  
Stop eliquis 3 days before  
Electronically signed by MARYCRUZ Iniguez MD at 2024 4:13 PM EDT  
  
documented in this [id number]- Note* Addendum Note - Alberto Wylie RN - 2024 2:28 PM EDTAddended by: ALBERTO WYLIE on: 
  2024 02:28 PM  
  
Modules accepted: Orders  
  
  
Electronically signed by Alberto Wylie RN at 2024 2:28 PM EDT  
  
[id number]- Note* Addendum Note - Alberto Wylie RN - 2024 
  2:28 PM EDTAddended by: ALBERTO WYLIE on: 2024 02:28 PM  
  
Modules accepted: Orders  
  
  
Electronically signed by Alberto Wylie RN at 2024 2:28 PM EDT  
  
[id number]- Note* Addendum Note - Alberto Wylie RN - 2024 
  2:28 PM EDTAddended by: ALBERTO WYLIE on: 2024 02:28 PM  
  
Modules accepted: Orders  
  
  
Electronically signed by Alberto Wylie, RN at 2024 2:28 PM EDT  
  
[id number]- Note* Addendum Note - Alberto Wylie RN - 2024 
  2:28 PM EDTAddended by: ALBERTO WYLIE on: 2024 02:28 PM  
  
Modules accepted: Orders  
  
  
Electronically signed by Alberto Wylie, RN at 2024 2:28 PM EDT  
  
[id number]- Miscellaneous Notes* Addendum Note - Alberto Wylie RN -
   2024 2:28 PM EDTAddended by: ALBERTO WYLIE on: 2024 02:28 PM  
  
Modules accepted: Orders  
  
  
Electronically signed by Alberto Wylie, RN at 2024 2:28 PM EDT  
  
documented in this [id number]- Miscellaneous Notes* Addendum 
  Note - Alberto Wylie RN - 2024 2:28 PM EDTAddended by: ALBERTO WYLIE on: 2024 02:28 PM  
  
Modules accepted: Orders  
  
  
Electronically signed by Alberto Wylie, RN at 2024 2:28 PM EDT  
  
documented in this [id number]- Note* Addendum Note - Alberto Wylie RN - 2024 1:20 PM EDTAddended by: ALBERTO WYLIE on: 
  2024 01:20 PM  
  
Modules accepted: Orders  
  
  
Electronically signed by Alberto Wylie, RN at 2024 1:20 PM EDT  
  
[id number]- Note* Addendum Note - Alberto Wylie RN - 2024 
  1:20 PM EDTAddended by: ALBERTO WYLIE on: 2024 01:20 PM  
  
Modules accepted: Orders  
  
  
Electronically signed by Alberto Wylie, RN at 2024 1:20 PM EDT  
  
[id number]- Miscellaneous Notes* Addendum Note - Alberto Wylie RN -
   2024 1:20 PM EDTAddended by: ALBERTO WYLEI on: 2024 01:20 PM  
  
Modules accepted: Orders  
  
  
Electronically signed by Alberto Wylie RN at 2024 1:20 PM EDT  
  
documented in this [id number]- Hospital Discharge 
instructions* Discharge Instructions*   
  
Merly Stephens RN - 2024 10:12 PM EDT  
  
Formatting of this note might be different from the original.  
Images from the original note were not included.  
DEPARTMENT OF Urology  
DISCHARGE INSTRUCTIONS Ureteroscopy Laser Lithotripsy  
Outpatient Surgery  
  
C O N F I D E N T I A L I N F O R M A T I O N  
 
--------------------------------------------------------------------------------  
-----------------------------------  
  
Cris Hearn  
  
Call [phone] option 1 during regular daytime business hours (8:00 am - 5:00
 pm) and after 5:00pm call [phone] and ask for the Urology resident with 
any questions or concerns.  
  
If it is a life-threatening situation, proceed to the nearest emergency 
department.  
  
Follow-up appointment: The office will contact you to schedule follow-up.  
  
Thank you for the opportunity to care for you today. Your health and healing are
 very important to us. We hope we made you feel as comfortable as possible and 
are committed to your recovery and continued well-being.  
  
The following is a brief overview of your Ureteroscopy Laser Lithotripsy 
procedure today. Some of the information contained on this summary may be 
confidential. This information should be kept in your records and should be 
shared with your regular doctor.  
  
Physicians:  
Dr. Iniguez  
  
Procedure performed: Lasering of your kidney stone  
  
What to Expect During your Recovery and Home Care  
Anesthesia Side Effects  
You received general anesthesia today. You may feel sleepy, tired, or have a 
sore throat.  
You may also feel drowsiness, dizziness, or inability to think clearly. For your
 safety, do not drive, drink alcoholic beverages, take any unprescribed 
medication or make any important decisions for 24 hours. A responsible adult 
should be with you for 24 hours.  
  
Activity and Recovery  
No heavy lifting today. Rest for the next 24 hours.  
  
Pain Control  
Unfortunately, you may experience pain after your procedure. Frequency and 
urgency to urinate and mild discomfort are expected. Adequate pain management 
can include alternative measures to help ease your pain and that can include 
over the counter Tylenol/ibuprofen and a heating pad or tramadol which can be 
taken as prescribed as needed for breakthrough pain. Do not take more than 
4,000mg of Tylenol in a 24-hour period.  
  
You may have also been prescribed Pyridium (for burning sensation) and 
Ditropan(for bladder spasms)for pain control. Pyridium will turn your urine 
bright orange.  
  
Nausea/Vomiting  
Clear liquids are best tolerated at first. Start slow, advance your diet as 
tolerated to normal foods. Avoid spicy, greasy, heavy foods at first. Also, you 
may feel nauseous or like you need to vomitif you take any type of medication on
 an empty stomach. Call your physician if you are unable to eat or drink and 
have persistent vomiting.  
  
Signs of Bleeding  
You could have some blood in your urine off and on over the next several weeks. 
Your urine will be light pink to yellow.  
  
Treatment/wound care:  
It is okay to shower 24 hours after time of surgery.  
  
Signs of Infection  
Signs of infection can include fever, chills, burning sensation with passing of 
urine, or severe abdominal pain. If you see any of these occur, please contact 
your doctor's office at [phone] option 1 during regular business hours, 
after business hours call [phone] and ask for the Urology resident on call.
 Any fever higher than 100.4, especially if associated with an ill feeling, 
abdominal pain, chills, or nausea should be reported to your surgeon.  
  
Assist in bowel movements/urination  
Increase fiber in diet  
Increase water (6 to 8 glasses)  
Increase walking  
Urination should occur within 6 hours of anesthesia  
If you have tried these methods and your bladder still feels full and you cannot
 use the bathroom, please go to your nearest Emergency room/contact your 
physician.  
  
Additional Instructions:  
Pieces of your kidney stone may pass in the urine for a few days and may cause 
some mild pain. You also had a stent placed today from your kidney down into 
your bladder. This helps keep the urinary tract open and prevents blockages of 
urine flow. The stent can be irritating and can cause some frequency, urgency 
and blood in your urine when you go to the bathroom. It is important to drink 
plenty of water and take medications as prescribed. You may be sent home with 
Pyridium which turns your urine bright orange. Applying a heating pad to your 
back will also help with this kind of pain. It will be determined at your follow
 up appointment when the stent will be removed.  
PERIOPERATIVE DISCHARGE/HOME-GOING INSTRUCTIONS  
ANESTHESIA - POST OP ADULT  
  
If a problem arises, you may contact your physician by calling [phone] and 
asking for the Resident on call for Urology service.  
  
Possible post-operative precautions. Call your doctor or clinic for:  
1. Signs of infection such as fever or chills.  
2. Severe pain that in not relieved by Tylenol or your pain medicine 
prescription.  
3. You may have a sore throat - it is usually gone in 1 to 2 days.  
  
Post-anesthesia safety: Possible side effects include drowsiness, dizziness, or 
inability to think clearly. For your safety, do not drive, drink alcoholic 
beverages, take any unprescribed medication or make any important decisions for 
24 hours. A responsible adult should be with you for 24 hours.  
  
The day after surgery please call to let us know how you are doing. The number 
is  
[phone].  
PERIOPERATIVE DISCHARGE/HOME-GOING INSTRUCTIONS  
Obstructive Sleep Apnea (KWAKU) Recommendations:  
  
You have had an evaluation for Obstructive Sleep Apnea (KWAKU) during your pre-
operative interview and have been identified as being at risk for having KWAKU.  
  
Sleep apnea, if untreated, can affect how you feel, but also increases the risk 
of developing serious medical conditions such as high blood pressure, heart 
disease, stroke, and diabetes.  
  
We encourage you to talk to your primary care provider about this issue. You can
 also make an appointment with the sleep clinic at [phone] to be evaluated 
and scheduled for the recommended sleepstudy.  
  
If you have diagnosed or have suspected sleep apnea, your anesthesiologist 
recommends that if you have been given narcotic prescriptions today, do not fill
 them. Wait to fill and start your narcotic prescriptions until tomorrow.  
  
If you have a CPAP or BiPAP machine you use for your KWAKU, use it today if you 
take a nap in addition to your usual nightly use (unless directed not to by your
 surgeon).  
  
If you experience an emergency with your breathing, you or your family member 
should immediately call 911.  
  
Falls Prevention  
  
Each year, 1 in every 3 adults over the age of 65 are treated for fall-related 
injuries. The risk of falling increases with each decade of life. The good news 
is, many falls are preventable.  
  
Here are some fall prevention tips:  
  
 Exercise can increase strength and improve balance, making falls much less 
likely.  
For more informati on visit: 
[email or web address]
[email or web address]  
Some medications or combinations of medications can lead to side effects that 
cause falls.  
Have a doctor or pharmacist review all medications to help reduce the chance of 
risky side effects.  
  
Poor vision can lead to falls.  
Have your eyes checked every year. Ensure that your glasses are the correct 
strength.  
  
Eliminate hazards in your home by completing this home safety checklist.  
Remove things you can trip over from stairs and places you walk  
Install handrails and lights on all staircases.  
Remove small throw rugs or use double-sided tape to keep rugs from slipping.  
Keep items you use often in cabinets you can reach easily without using a step 
stool.  
Put grab bars inside and next to the tub or shower and next to your toilet.Use 
non-slip mats in thebathtub and on shower floors.  
Improve the lighting in your home. Hang lightweight curtains or shades to reduce
 glare.  
Wear shoes both inside and outside the house. Avoid going barefoot or wearing 
slippers.  
  
To lower the risk of hip fractures:  
Get adequate calcium and vitamin D, from food and/or supplements.  
Do weight bearing exercise.  
Get screened for osteoporosis and treated if needed  
  
  
Electronically signed by Merly Stephens RN at 2024 3:46 PM EDT  
  
  
  
documented in this encounterTHE [facility] Medical [facility]ors  
Work Phone: [phone]- History of Present illness Narrative* 
  Derrick Teixeira PharmD - 2024 7:15 AM EDTFormatting of this note is
   different from the original.  
Images from the original note were not included.  
Pharmacy Preoperative Antibiotic Review - Urologic Procedures  
  
Cris Hearn is schedule for Ureteroscopy with Dr. Iniguez on 24.  
  
Patient currently has no IV antibiotics scheduled to be administered prior to 
procedure.  
  
ALLERGIES  
Allergies as of 2024 - Verified 2023  
Allergen Reaction Noted  
Advil [ibuprofen] 2023  
Aleve [naproxen] 2023  
Asa [aspirin] 2023  
Benadryl [diphenhydramine] 2023  
  
MOST RECENT URINE CULTURE  
Urine Culture  
  
Urine culture  
03/15/24 1032 **Positive Culture Report**  
  
>10,000 CFU/ml Escherichia coli (ESBL)  
Comment: Refer to previous susceptibility  
  
03/15/24 1020 **Positive Culture Report**  
  
>10,000 CFU/ml Escherichia coli (ESBL)  
Comment: This organism exhibits Extended Spectrum Beta Lactamase Production.  
  
  
  
  
PAST URINE CULTURES  
Urine Culture (last 1 year)  
  
Urine culture  
03/15/24 1032 **Positive Culture Report**  
  
>10,000 CFU/ml Escherichia coli (ESBL)  
Comment: Refer to previous susceptibility  
  
03/15/24 1020 **Positive Culture Report**  
  
>10,000 CFU/ml Escherichia coli (ESBL)  
Comment: This organism exhibits Extended Spectrum Beta Lactamase Production.  
  
24 0906 **Positive Culture Report**  
  
>10,000 CFU/ml Escherichia coli (ESBL)  
Comment: This organism exhibits Extended Spectrum Beta Lactamase Production.  
  
>10,000 CFU/ml Enterococcus faecium (VRE)  
Comment: Isolate shows resistance to Vancomycin(Vancomycin Resistant 
Enterococcus).  
[C]  
  
23 0244 10,000 - 50,000 CFU/ml Multiple bacteria present suggesting 
contamination.  
  
  
  
[C] - Corrected Result  
  
  
  
RECOMMENDATION  
  
Patient's chart was reviewed and Ertapenem 1000 mg once is recommended prior to 
procedure. Medication has been sign and held per consult agreement.  
  
Derrick Teixeira, Brandy  
Clinical Pharmacist  
Phone: [phone] Pager: [phone]  
  
  
Electronically signed by Derrick Teixeira PharmD at 2024 7:16 AM EDT  
  
documented in this encounterTHE [facility]  
Work Phone: [phone]- History of Present illness Narrative* 
  Cindy Willis RN - 03/15/2024 11:55 AM EDTFormatting of this note might be 
  different from the original.  
..Identification was verified by patient verbalizing her name and date of birth.
 Pt presents in stable condition.  
S: pt here for urine culture collection from left nephrostomy tube and straight 
cath.  
  
B: pt scheduled for surgery on 3/28/24. Urine culture collection to be done 
before surgery.  
  
A: urine culture collection from left nephrostomy tube. Pt straight cath for 
urine culture today aswell. Pt tolerated well. Both specimens sent to lab.  
  
R: pt aware of surgery on 3/28/24.  
Pt declined after visit summary.  
Pt left clinic in stable condition.  
Electronically signed by Cindy Willis RN at 03/15/2024 12:00 PM EDT  
  
documented in this encounterTHE [facility]  
Work Phone: [phone]- Instructions* Discharge Instructions*   
  
Negar Gayle RN - 2024 4:28 PM EST  
  
Formatting of this note is different from the original.  
CURRENT MEDICATIONS:  
Aspirin: No  
NSAIDS: No  
Other Antiplatelet Medication: No  
Anticoagulants: Yes, holding Eliquis 2 days prior to surgery as 
recently/previously done  
Steroids: Yes  
  
PATIENT MEDICATION INSTRUCTIONS:  
On the morning of your surgery, please take only the following medications, with
 a small sip of water:  
doxycycline (VIBRA-TABS) 100 MG tablet  
Fluticasone Furoate 50 MCG/ACT AEPB  
metformin (GLUCOPHAGE) 500 MG tablet  
Sotalol HCl  MG TABS  
potassium chloride SA (K-DUR) 10 MEQ controlled release tablet  
pravastatin (PRAVACHOL) 40 MG tablet  
omeprazole (PRILOSEC) 20 MG capsule  
ipratropium (ATROVENT) 0.02 % nebulizer solution  
albuterol (PROVENTIL HFA) INHALATION HFA inhaler (VENTOLIN,PROAIR,PROVENTIL) 
90mcg  
Hold Trulicity 7 days prior to surgery ( and )  
Hold Invokana 4 days prior to surgery ()  
Hold Eliquis 2 days prior to surgery ()  
  
DAY OF SURGERY NOTES:  
  
Please use this CHECKLIST to prepare for your surgery/procedure:  
? Assume that any lab or testing done during your Pre-admission testing 
appointment is  
within normal limits unless otherwise contacted.  
? Expect a call from [facility] one business day prior to surgery for surgery 
arrival time  
and location.  
? Please plan to restart your medications the day after surgery unless otherwise
 explicitly  
instructed.  
? Please contact your surgeon s/proceduralist s office for any surgical or 
recovery types of  
questions.  
? CANCELLING YOUR SURGERY/PROCEDURE: If you get a cold, are not feeling well, or  
become pregnant, please call your surgeon s office as soon as possible.  
? Refer to your Preparing for Your Surgery/Procedure booklet or  
[email or web address] if you have questions. Contact the Pre-Admission Testing  
department at [phone] or your surgeon's office with any questions that are 
not  
answered.  
? Eating and drinking before surgery:  
Adult Patients:  
No food or drink for 8 hours prior to surgery check in time.  
A sip of water with morning medications is acceptable.  
  
ON THE DAY OF SURGERY:  
? DO bring your ID, insurance card, medication list, and a small amount of cash 
for filling  
prescriptions and any medical co-pays.  
? Do NOT wear any jewelry, (including rings, earrings, or mouth, tongue, or body  
piercings). Metal jewelry could cause constriction, amputation, or burns. Loose 
or bulky  
things in your mouth can be unsafe and result in breathing problems.  
? DO bring glasses if you wear contacts and other assistance items such as 
oxygen,  
inhaler, cane, walker, etc.  
? Do NOT bring valuables, credit cards, or large amounts of cash.  
? Do NOT wear lotion or strong-smelling fragrance (perfume, cologne, cream or 
lotion).  
? ARRANGE FOR A RIDE: If you are scheduled to go home the same day of surgery, a  
responsible adult MUST drive or accompany you home in a car, cab, shared ride 
service,  
or Metro-van. You will not be allowed to drive yourself home or travel home 
alone. Your  
surgery may be cancelled if you do not have a ride. A responsible adult must 
stay with  
you after surgery. Please call [email or web address] if you need 
transportation  
assistance or have concerns about going home [phone].  
  
? SLEEP APNEA PATIENTS: Bring your sleep apnea machine and mask.  
? PLEASE BE ON TIME. A late arrival may result in the cancellation/ delay of 
your surgery.  
Thank you for choosing [facility]; it is our pleasure to care for you  
Electronically signed by Negar Gayle RN at 2024 4:28 PM EST  
  
  
  
documented in this encounterTHE [facility]  
Work Phone: [phone]- Note* Addendum Note - Alberto Wylie RN -
   2024 11:51 AM ESTAddended by: ALBERTO WYLIE on: 2024 11:51 AM  
  
Modules accepted: Orders  
  
  
Electronically signed by Alberto Wylie RN at 2024 11:51 AM EST  
  
[id number]- Miscellaneous Notes* Addendum Note - Alberto Wylie RN -
   2024 11:51 AM ESTAddended by: ALBERTO WYLIE on: 2024 11:51 AM  
  
Modules accepted: Orders  
  
  
Electronically signed by Alberto Wylie RN at 2024 11:51 AM EST  
  
* Telephone Encounter - Alberto Wylie RN - 2023 11:50 AM ESTFormatting 
  of this note might be different from the original.  
Spoke with patient and advised her  said ok to remove right 
nephrostomy drain, but the left drain and the bilateral stents need to stay in. 
She verbalized understanding. She asked we message IR to call her to schedule.  
  
Electronically signed by Alberto Wylie RN at 2023 11:51 AM EST  
  
* Addendum Note - Alberto Wylie RN - 2023 10:42 AM ESTAddended by: 
  ALBERTO WYLIE on: 2023 10:42 AM  
  
Modules accepted: Orders  
  
  
Electronically signed by Alberto Wylie RN at 2023 10:42 AM EST  
  
* Telephone Encounter - Alberto Wylie RN - 2023 2:04 PM ESTFormatting 
  of this note might be different from the original.  
Checked in on patient today. She has both her nephrostomy drains capped. No 
pain. Just having a lotof bladder pressure and urinary frequency from stents. 
Told her would update Dr. Iniguez.  
  
Electronically signed by Alberto Wylie RN at 2023 2:05 PM EST  
  
* Telephone Encounter - Alberto Wylie RN - 2023 9:16 AM ESTFormatting 
  of this note might be different from the original.  
Patient called in today, she states she has lots of questions. She had her 
bilateral nephrostomy tubes exchanged yesterday and bilateral stents placed. She
 said she voided yesterday when she got homearound 4:30 but then didn't void 
again until 3:30 this morning, asked if that's ok. Advised her this is ok she 
has bilateral nephrostomy drains in so she may not void as much. She also states
 she is having a lot of bladder pressure. Advised her this is normal with those 
stents in. She asked when the blood in her urine would go away. Advised her with
 the stents in she can have blood off and on in her urine. Now if she develops 
bladder pain, burning with urination, increased blood in her urine orclots, 
fever or chills, she should notify us.  
  
She said she was instructed by IR to cap her nephrostomy bags tomorrow but there
 is nothing to cap them with, they did give her caps though but there is no were
 to put them. Advised her she has to detach and unscrew her drainage bag from 
her tubing and screw the cap on at the end where the drainagebag would go. We 
also went over how to flush this way as well. Patient had leur locks on her last
 drainage tubes. Told her after she caps her drains if she develops any pain 
over the holiday weekend to uncap her tubes and put the drainage bags back on. I
 will follow up with her on Monday to see howdanica is doing. She verbalized 
understanding.  
Electronically signed by Alberto Wylie RN at 2023 9:23 AM EST  
  
documented in this [id number]- Telephone encounter Note* 
  Telephone Encounter - Brayan Waters DO - 2024 10:28 PM ESTFormatting of
   this note might be different from the original.  
Transfer from [facility] [place] as she sees  urology  
  
HPI  
  
Sees  urology  
Has Bl nephrostomy tubes but without drainage, No bags to drain, they are 
capped.  
  
Fatigue  
Purulent urine drainage  
No pain  
No fevers chills  
Has some nausea  
  
PMHX  
Kidney stones  
  
Vitals  
113/68  
86  
18  
95% ra  
Afeb  
  
Labs  
UA--grossly +  
Urine culture in process  
Blood cultures in process  
Neg lactate  
Bmp otherwise unremarkable  
Bun 26, Cr 3.78  
WBC 14.8  
Hgb 10 (9.4)  
Plts 419  
  
No other labs  
  
Imaging  
CT A/P---No hydronephrosis, no abscess noted. Drains correctly placed.  
  
Interventions  
CTX and then Meropenem  
IVF  
  
Plan  
Spoke with  Urology---there would be no acute intervention based on the above 
information.Mild CULLEN, no hydronephrosis, and has follow up scheduled with  
urology.  
At this time not accepted to medicine service as urology not acutely needed, 
which was request for transfer.  
  
Brayan Waters DO  
  
Electronically signed by Brayan Waters DO at 2024 11:00 PM EST  
  
[id number]- Miscellaneous Notes* Telephone Encounter - Brayan Waters DO - 2024 10:28 PM ESTFormatting of this note might be different from 
  the original.  
Transfer from [facility] as she sees  urology  
  
HPI  
  
Sees  urology  
Has Bl nephrostomy tubes but without drainage, No bags to drain, they are 
capped.  
  
Fatigue  
Purulent urine drainage  
No pain  
No fevers chills  
Has some nausea  
  
PMHX  
Kidney stones  
  
Vitals  
113/68  
86  
18  
95% ra  
Afeb  
  
Labs  
UA--grossly +  
Urine culture in process  
Blood cultures in process  
Neg lactate  
Bmp otherwise unremarkable  
Bun 26, Cr 3.78  
WBC 14.8  
Hgb 10 (9.4)  
Plts 419  
  
No other labs  
  
Imaging  
CT A/P---No hydronephrosis, no abscess noted. Drains correctly placed.  
  
Interventions  
CTX and then Meropenem  
IVF  
  
Plan  
Spoke with  Urology---there would be no acute intervention based on the above 
information.Mild CULLEN, no hydronephrosis, and has follow up scheduled with  
urology.  
At this time not accepted to medicine service as urology not acutely needed, 
which was request for transfer.  
  
Brayan Waters DO  
  
Electronically signed by Brayan Waters DO at 2024 11:00 PM EST  
  
documented in this [id number]- Telephone encounter Note* 
  Telephone Encounter - Alberto Wylie RN - 2024 3:36 PM ESTFormatting 
  of this note might be different from the original.  
Called patient back. Advised her Dr. Iniguez is recommending she go to the ER to
 get evaluated. Sheverbalized understanding.  
Electronically signed by Alberto Wylie RN at 2024 3:37 PM EST  
  
[id number]- Miscellaneous Notes* Telephone Encounter - Alberto Wylie RN - 2024 3:36 PM ESTFormatting of this note might be 
  different from the original.  
Called patient back. Advised her Dr. Iniguez is recommending she go to the ER to
 get evaluated. Sheverbalized understanding.  
Electronically signed by Alberto Wylie RN at 2024 3:37 PM EST  
  
* Telephone Encounter - Jesus Sin RN - 2024 11:26 AM ESTFormatting 
  of this note is different from the original.  
Situation: Pt calling with concern of green pus in urine, requesting abx  
Background: Pt seen by  Urology but lives in [place]. Advised pt there are 
standing orders for urine culture in pt's chart. Pt states she is unable to 
provide urine culture to distance from [place]. Pt denies any other sx besides
 poor appetite  
Assessment: na  
Recommendation: Per protocol, advised pt see provider within 24 hours. Advised 
to seek care close to her location if provider unable to advise her within that 
time period. Patient agrees with disposition and verbalizes understanding of 
recommendations.  
  
If able to advise pt further, please contact ASAP.  
  
Phone numbers  
Home Phone [phone]  
Mobile [phone]  
  
Reason for Disposition  
Bad or foul-smelling urine  
  
Answer Assessment - Initial Assessment Questions  
1. SYMPTOM:  What's the main symptom you're concerned about?  (e.g., frequency, 
incontinence)  
Green pus in urine, poor appetite  
2. ONSET:  When did this start?   
Saw green pus yesterday  
3. PAIN:  Is there any pain?  If Yes, ask:  How bad is it?  (Scale: 1-10; mild, 
moderate, severe)  
denies  
4. CAUSE:  What do you think is causing the symptoms?   
Nephrostomy; hx of UTI  
5. OTHER SYMPTOMS:  Do you have any other symptoms?  (e.g., blood in urine, 
fever, flank pain, painwith urination)  
denies  
6. PREGNANCY:  Is there any chance you are pregnant?   When was your last 
menstrual period?   
na  
  
Protocols used: Urinary Symptoms-A-AH  
  
Electronically signed by Jesus Sin RN at 2024 11:34 AM EST  
  
documented in this [id number]- Telephone encounter Note* 
  Telephone Encounter - Jesus Sin RN - 2024 11:26 AM ESTFormatting 
  of this note is different from the original.  
Situation: Pt calling with concern of green pus in urine, requesting abx  
Background: Pt seen by  Urology but lives in [place]. Advised pt there are 
standing orders for urine culture in pt's chart. Pt states she is unable to 
provide urine culture to distance from [place]. Pt denies any other sx besides
 poor appetite  
Assessment: na  
Recommendation: Per protocol, advised pt see provider within 24 hours. Advised 
to seek care close to her location if provider unable to advise her within that 
time period. Patient agrees with disposition and verbalizes understanding of 
recommendations.  
  
If able to advise pt further, please contact ASAP.  
  
Phone numbers  
Home Phone [phone]  
Mobile [phone]  
  
Reason for Disposition  
Bad or foul-smelling urine  
  
Answer Assessment - Initial Assessment Questions  
1. SYMPTOM:  What's the main symptom you're concerned about?  (e.g., frequency, 
incontinence)  
Green pus in urine, poor appetite  
2. ONSET:  When did this start?   
Saw green pus yesterday  
3. PAIN:  Is there any pain?  If Yes, ask:  How bad is it?  (Scale: 1-10; mild, 
moderate, severe)  
denies  
4. CAUSE:  What do you think is causing the symptoms?   
Nephrostomy; hx of UTI  
5. OTHER SYMPTOMS:  Do you have any other symptoms?  (e.g., blood in urine, 
fever, flank pain, painwith urination)  
denies  
6. PREGNANCY:  Is there any chance you are pregnant?   When was your last 
menstrual period?   
na  
  
Protocols used: Urinary Symptoms-A-AH  
  
Electronically signed by Jesus Sin RN at 2024 11:34 AM EST  
  
[id number]- Telephone encounter Note* Telephone Encounter - Alberto Wylie RN - 2024 10:39 AM ESTFormatting of this note might be 
  different from the original.  
Dr. Iniguez currently in surgery, He said to go ahead and send in script for 
linezolid 600 mg po bid start today 24 for 3 days only per Dr. Boucher 
recommendation.  
  
Spoke with patient and advised her on additional antibiotic script. Advised her 
to  today and get two doses in, should take through  surgery. 
She said she can go out today and pick upscript. Script sent to her preferred 
pharmacy.  
Electronically signed by Alberto Wylie RN at 2024 10:40 AM EST  
  
[id number]- Miscellaneous Notes* Telephone Encounter - Alberto Wylie RN - 2024 10:39 AM ESTFormatting of this note might be 
  different from the original.  
Dr. Iniguez currently in surgery, He said to go ahead and send in script for 
linezolid 600 mg po bid start today 24 for 3 days only per Dr. Boucher 
recommendation.  
  
Spoke with patient and advised her on additional antibiotic script. Advised her 
to  today and get two doses in, should take through  surgery. 
She said she can go out today and pick upscript. Script sent to her preferred 
pharmacy.  
Electronically signed by Alberto Wylie RN at 2024 10:40 AM EST  
  
* Telephone Encounter - Alberto Wylie RN - 2024 9:42 AM ESTFormatting 
  of this note might be different from the original.  
Impression/recommendation:  
53 year old female with hx of CKD, COPD (3 liters), depression, T2EM, HTN, Afib,
 KWAKU, bilateral nephrolithiasis  
Scheduled for ureteroscopy with laser lithotripsy on 24.  
Cultures with ESBL E coli and VRE from left nephrostomy tube  
  
Recommend:  
Agree with oral augmentin started 24 for 3-5 days  
Begin linezolid 600 mg po bid start today 24 for 3 days only. This interacts
 with duloxetine but the likelihood of adverse reaction with short course 
therapy is low. I think the benefit outweighsthe risk. I did not review for 
other drug interactions. Please verify and update medications she iscurrently 
taking.  
  
For surgical prophylaxis on day of procedure 24.  
IV ertapenem 500 mg started 15-60 minutes prior to the procedure - one time dose  
IV linezolid 600 mg started 30-60 minutes prior to the procedure - one time dose  
  
Alberto Rico MD  
Electronically signed by Alberto Wylie RN at 2024 10:40 AM EST  
  
documented in this [id number]- Telephone encounter Note* 
  Telephone Encounter - Alberto Wylie RN - 2024 9:42 AM ESTFormatting 
  of this note might be different from the original.  
Impression/recommendation:  
53 year old female with hx of CKD, COPD (3 liters), depression, T2EM, HTN, Afib,
 KWAKU, bilateral nephrolithiasis  
Scheduled for ureteroscopy with laser lithotripsy on 24.  
Cultures with ESBL E coli and VRE from left nephrostomy tube  
  
Recommend:  
Agree with oral augmentin started 24 for 3-5 days  
Begin linezolid 600 mg po bid start today 24 for 3 days only. This interacts
 with duloxetine but the likelihood of adverse reaction with short course 
therapy is low. I think the benefit outweighsthe risk. I did not review for 
other drug interactions. Please verify and update medications she iscurrently 
taking.  
  
For surgical prophylaxis on day of procedure 24.  
IV ertapenem 500 mg started 15-60 minutes prior to the procedure - one time dose  
IV linezolid 600 mg started 30-60 minutes prior to the procedure - one time dose  
  
Alberto Rico MD  
Electronically signed by Alberto Wylie RN at 2024 10:40 AM EST  
  
[id number]- History of Present illness Narrative* Alberto Rico MD - 2024 9:23 AM ESTFormatting of this note is different from the 
  original.  
Images from the original note were not included.  
E consult related to upcoming urologic procedure:  
  
53 year old female with hx of CKD, COPD (3 litesr), depression, T2EM, HTN, Afib,
 KWAKU admitted 23 from [facility] with bilateral staghorn calculi 
right greater than left. Bilateral nephrostomy tubes placed.  
23 exchange of bilateral nephrostomy catheters and placement of bilateral 
ureteral stents ( afocal stricture of distal left ureter was noted and 
traversed). ?right nephrostomy tube subsequently removed. 24 urine cultures 
obtained. 24 augmentin prescribed.  
  
Planned urology procedure: ureteroscopy with laser lithotripsy RIGHT  
Planned date of procedure: 24  
Symptoms: none documented  
  
Allergies  
Allergen Reactions  
Advil [Ibuprofen]  
Aleve [Naproxen]  
Asa [Aspirin]  
Benadryl [Diphenhydramine]  
  
Last e gfr: Creatinine clearance from [facility]: 17 ml/min based on 
creatinine of 3.56 on 2024 using IBW 59.3 kg (actual weight 114.8 kg 
ignored)  
Estimated GFR: 15 on 2024  
  
has a current medication list which includes the following prescription(s): 
amoxicillin-clavulanate, doxycycline, invokana, trulicity, fluticasone furoate, 
metformin, amitriptyline, vitamin d2, ammonium lactate, sotalol hcl af, 
potassium chloride sa, budesonide-formoterol, pravastatin, omeprazole, i
pratropium, apixaban, sodium chloride 0.9%, albuterol, fluticasone -salmeterol, 
duloxetine, and pantoprazole.  
  
Basic Metabolic Panel (Last 5 results in the past 3 years)  
  
Na K Cl CO2 Gap Glu BUN Cr Ca  
24 0802 138  
Comment: Note updated reference ranges.  
4.1  
Comment: Note updated reference ranges.  
Note updated reference ranges.  
109  
Comment: Note updated reference ranges.  
16  
Comment: Note updated reference ranges.  
17  
144  
42  
Comment: Note updated reference ranges.  
3.56  
Comment: Note updated reference ranges.  
9.1  
Comment: Note updated reference ranges.  
  
23 0053 142  
3.3  
111  
22  
12  
102  
43  
3.79  
8.0  
  
23 0230 140  
3.5  
110  
21  
13  
100  
44  
4.06  
7.9  
  
23 0357 140  
3.7  
109  
20  
15  
99  
46  
4.45  
7.8  
  
23 0354 140  
3.5  
107  
20  
17  
98  
44  
4.73  
7.7  
  
  
  
  
Last urine cultures:  
Susceptibility  
  
Escherichia coli (ESBL)  
  
Antibiotic Interpretation Value Comment  
Amoxicillin + Clavulanate Sensitive 4 LIZ  
Ampicillin Resistant >=32 LIZ  
Ampicillin + Sulbactam Sensitive 4 LIZ  
Cefazolin Resistant >=64 LIZ  
Cefepime No Interpretation 2 LIZ  
Ceftazidime Resistant  
Ceftriaxone Resistant >=64 LIZ  
Ciprofloxacin Resistant >=4 LIZ  
Ertapenem Sensitive <=0.5 LIZ  
Gentamicin Sensitive <=1 LIZ  
Imipenem/Cilastatin * Sensitive <=0.25 LIZ  
Levofloxacin * Resistant >=8 LIZ  
Nitrofurantoin Sensitive <=16 LIZ  
Piperacillin + Tazobactam Sensitive <=4 LIZ  
Tobramycin * Sensitive <=1 LIZ  
Trimethoprim + Sulfamethoxazole Resistant >=320 LIZ  
ESBL confirmation * Positive Positive LIZ  
  
Enterococcus faecium (VRE)  
  
Antibiotic Interpretation Value Comment  
Ampicillin Resistant >=32 LIZ  
Gentamicin High Level Synergy * Sensitive  
Streptomycin High Level Synergy * Sensitive  
Ciprofloxacin Resistant >=8 LIZ  
Levofloxacin * Resistant >=8 LIZ  
Linezolid Sensitive 2 LIZ  
Vancomycin Resistant >=32 LIZ  
Doxycycline * Resistant >=16 LIZ  
Tetracycline Resistant >=16 LIZ  
Nitrofurantoin Sensitive 32 LIZ  
* Suppressed Antibiotic  
  
Condensed View  
  
Both organisms >10K  
  
Impression/recommendation:  
53 year old female with hx of CKD, COPD (3 liters), depression, T2EM, HTN, Afib,
 KWAKU, bilateral nephrolithiasis  
Scheduled for ureteroscopy with laser lithotripsy on 24.  
Cultures with ESBL E coli and VRE from left nephrostomy tube  
  
Recommend:  
Agree with oral augmentin started 24 for 3-5 days  
Begin linezolid 600 mg po bid start today 24 for 3 days only. This interacts
 with duloxetine but the likelihood of adverse reaction with short course 
therapy is low. I think the benefit outweighsthe risk. I did not review for 
other drug interactions. Please verify and update medications she iscurrently 
taking.  
  
For surgical prophylaxis on day of procedure 24.  
IV ertapenem 500 mg started 15-60 minutes prior to the procedure - one time dose  
IV linezolid 600 mg started 30-60 minutes prior to the procedure - one time dose  
  
Alberto Rico MD  
Electronically signed by Alberto Rico MD at 2024 9:40 AM EST  
  
documented in this [id number]- Evaluation note* PSE Appt H&P 
  - Hortencia Cartagena - 2024 7:52 AM ESTFormatting of this note might be 
  different from the original.  
Patient was identified by name and date of birth. Hortencia Tripp  
Patient at risk for falls:Yes  
Falls Risk protocol implemented: Yes wheelchair in locked position when not in 
use for transport  
Performed EKG pt tolerated well, results given to Doctor for review  
  
Electronically signed by Hortencia Cartagena at 2024 9:36 PM EST  
  
[id number]- Miscellaneous Notes* PSE Appt H&P - Hortencia Cartagena - 
  2024 7:52 AM ESTFormatting of this note might be different from the 
  original.  
Patient was identified by name and date of birth. Hortencia Cartagena  
Patient at risk for falls:Yes  
Falls Risk protocol implemented: Yes wheelchair in locked position when not in 
use for transport  
Performed EKG pt tolerated well, results given to Doctor for review  
  
Electronically signed by Hortencia Cartagena at 2024 9:36 PM EST  
  
documented in this [id number]- Instructions* Patient 
  Instructions*   
  
Foster Stephenson APRN-CNP - 2024 7:51 AM EST  
  
Formatting of this note is different from the original.  
On the morning of your surgery please take only the following medications, with 
a small sip of water:  
doxycycline (VIBRA-TABS) 100 MG tablet  
Sotalol HCl  MG TABS  
pravastatin (PRAVACHOL) 40 MG tablet  
omeprazole (PRILOSEC) 20 MG capsule  
albuterol (PROVENTIL HFA) INHALATION HFA inhaler (VENTOLIN,PROAIR,PROVENTIL) 
90mcg-- as needed  
duloxetine (CYMBALTA) 60 MG capsule  
  
Do not take any Aspirin until after surgery.  
  
Do not take any Ibuprofen, Aleve, Advil, or Motrin or any other NSAIDS after 
.  
  
May take over the counter Acetaminophen (Tylenol) as needed for pain  
  
Please hold all Vitamin E, Omega 3, fish oil and herbal supplements for 1 week 
prior to surgery  
  
Please hold Eliquis 48 hours prior to surgery. Last dose on .  
  
Please hold Invokana 3 days prior to surgery. Last dose on .  
  
Please do not take Metformin on the morning of surgery.  
  
Please hold Trulicity one week before surgery.  
Electronically signed by Foster Stephenson APRN-CNP at 2024 8:09 AM EST  
  
  
  
documented in this [id number]- History of Present illness 
Narrative* Foster Stephenson, APRN-CNP - 2024 7:45 AM ESTFormatting of 
  this note is different from the original.  
Images from the original note were not included.  
Pre-Admission Testing (Pre-Surgical Evaluation) Consultation  
  
Cris Hearn [id number] 53 year old Female  
2024  
  
PAT Triage Risk Score Total Score: 10  
  
  
1 Patient has exhibited symptoms of Pulmonary Disease in the past.  
1 Patient has an elevated BMI recorded in the past 30 days  
2 Patient is receiving or has recently received anticoagulation therapy.  
1 Patient has history of arrhythmia.  
1 Patient has exhibited symptoms of Diabetes in the past.  
3 Patient's last Creatinine Clearance less than 35.  
1 Patient has history of hyperlipidemia.  
  
  
  
Cris Hearn is scheduled for (Right) Ureteroscopy with Laser Lithotripsy on 
2024.  
  
Preop diagnosis of: Pre-Op Diagnosis Codes:  
* Renal stones [N20.0]  
  
HISTORY OF PRESENT ILLNESS: Patient presents today with a history of renal 
stones. Patient is here for presurgical optimization and education prior to 
surgery.  
  
RECENT ILLNESS: Serious illness or hospitalization within the last six months.  
Yes  
2023-2023 at  for acute pyelonephritis  
HOSPITAL COURSE & OUTCOME:  
Cris Hearn is a 52 year old year old female who presented to [facility] on 32:06 AM for Acute pyelonephritis  
  
Patient was admitted to the hospital secondary to flank pain. She was found to 
have bilateral hydronephrosis and pyelonephritis. Urine culture did not grow 
anything she did receive 5 day antibiotic course while inpatient. She also 
received bilateral percutaneous nephrostomy tubes which helped alleviate the 
pressure. She was able to urinate after these were placed. Her renal function 
began to recover. Urology will follow in the outpatient for definitive 
management of her kidney stones.  
  
On the last day of hospital stay, patient was eating and drinking well and 
having regular bowel movements. Patient able to void on her own after Gonzalez 
catheter removed. The patient expressed appropriate understanding of and 
agreement with the discharge recommendations, medications, and plan.  
  
STOP BANG:  
STOP-BANG  
  
Row Name [id number]  
History of sleep apnea? Yes  
  
  
  
  
  
  
Compliant with therapy  
  
PSG: No testing available  
  
ALLERGIES:  
Allergies  
Allergen Reactions  
Advil [Ibuprofen]  
Aleve [Naproxen]  
Asa [Aspirin]  
Benadryl [Diphenhydramine]  
  
Patient Active Problem List:  
Obstructive nephropathy [N13.8]  
Atrial fibrillation, unspecified type (HCC) [I48.91]  
CULLEN (acute kidney injury) (HCC) [N17.9]  
Acute pyelonephritis [N10]  
Sepsis with acute renal failure without septic shock, due to unspecified 
organism, unspecified acute renal failure type (HCC) [A41.9, R65.20, N17.9]  
Recurrent major depressive disorder, in partial remission (HCC) [F33.41]  
Type 2 diabetes mellitus without complication, without long-term current use of 
insulin (HCC) [E11.9]  
Renal stones [N20.0]  
Chronic obstructive pulmonary disease (HCC) [J44.9]  
Disorder associated with type 2 diabetes mellitus (HCC) [E11.8]  
Essential hypertension [I10]  
Hidradenitis suppurativa [L73.2]  
Iron deficiency anemia [D50.9]  
Mixed hyperlipidemia [E78.2]  
Morbid obesity (HCC) [E66.01]  
Multiple sclerosis (HCC) [G35]  
Obstructive sleep apnea syndrome [G47.33]  
Recurrent major depression (HCC) [F33.9]  
Stage 4 chronic kidney disease (HCC) [N18.4]  
  
SOCIAL HISTORY:  
reports no history of drug use.  
Social History  
  
Tobacco Use  
Smoking status: Every Day  
Current packs/day: 1.00  
Average packs/day: 1 pack/day for 30.0 years (30.0 ttl pk-yrs)  
Types: Cigarettes  
Smokeless tobacco: Never  
Substance Use Topics  
Alcohol use: Not Currently  
Drug use: Never  
  
MEDICAL HISTORY:  
No past medical history on file.  
  
SURGICAL HISTORY:  
No past surgical history on file.  
  
ANESTHESIA REVIEW OF SYSTEMS:  
Eyes/ENT: Eye glasses  
Teeth: Negative  
Pulmonary: KWAKU, asthma, COPD-stable per patient, current smoker  
Cardio-vascular: HTN, HLD, Afib (managed by PCP)--on anticoagulation (OK to hold
 Eliquis prior to surgery per Dr. Lopez--PCP--see scanned media for medication 
instructions) and denies CP, SOB, RICK, Syncope or palpitations  
G.I./ Hepatic: vomiting and diarrhea over the past weekend which has resolved, 
s/p cholecystectomy  
Renal/: renal stones, s/p bilateral percutaneous nephrostomy tubes 2023, 
stage kidney disease (GFR 15), current UTI--final urine culture revealed >10,000
 CFU/ml Escherichia coli (ESBL) and >10,000 CFU/ml Enterococcus faecium 
(VRE)--Dr. Iniguez aware and infectious disease consult ordered  
Neurological: Negative  
Gynecological: PM, s/p , s/p tubal ligation  
Psychiatric: depression  
Musculoskeletal: umbilical hernia, intermittent neck pain  
Endocrine: DM2  
Hematologic: anemia, leukocytosis (current UTI)  
Constitutional: chills, diarrhea and vomiting over this past weekend which has 
resolved--no sick contacts and did not screen for COVID  
Skin: Intact and boils in perineum area  
  
PREVIOUS ANESTHETIC COMPLICATIONS: Yes-felt like she couldn't breath as 
anesthesia was being administered for one procedure. Patient was never told if 
she had any respiratory complications after thatsurgery.  
  
CURRENT MEDICATION LIST:  
Current Outpatient Medications  
Medication Sig Dispense Refill  
doxycycline (VIBRA-TABS) 100 MG tablet TAKE 1 TABLET BY MOUTH EVERY DAY FOR 90 
DAYS for 30  
canagliflozin (Invokana) 100 MG TABS tablet 1 tablet before the first meal of 
the day Orally Once aday for 30 day(s)  
dulaglutide (Trulicity) 0.75 MG/0.5ML SOPN injection pen Inject 0.75 mg under 
the skin once weekly.  
Fluticasone Furoate 50 MCG/ACT AEPB Inhale by mouth. (Patient not taking: 
Reported on 2024)  
metformin (GLUCOPHAGE) 500 MG tablet Take 500 mg by mouth 2 times daily (with 
meals).  
amitriptyline (ELAVIL) 50 MG tablet Take 50 mg by mouth at bedtime.  
vitamin D2 (ERGOCALCIFEROL) 1.25 MG (35909 UT) capsule Take 50,000 Units by 
mouth.  
ammonium lactate (LAC-HYDRIN) 12 % lotion Apply 12 g topically 2 times daily. 
Apply thin layer to affected area.  
Sotalol HCl  MG TABS Take by mouth.  
potassium chloride SA (K-DUR) 10 MEQ controlled release tablet Take 10 mEq by 
mouth daily.  
budesonide-formoterol (Symbicort) 160-4.5 MCG/ACT inhaler Inhale 2 Puffs by 
mouth 2 times daily. (Patient not taking: Reported on 2024)  
pravastatin (PRAVACHOL) 40 MG tablet Take 40 mg by mouth daily.  
omeprazole (PRILOSEC) 20 MG capsule Take 20 mg by mouth daily.  
ipratropium (ATROVENT) 0.02 % nebulizer solution Use 2.5 mL via nebulizer 4 
times daily.  
Apixaban (ELIQUIS) 2.5 MG tablet Take 5 mg by mouth 2 times daily.  
Sodium Chloride Flush (sodium chloride 0.9%) 0.9 % SOLN flush syringe Inject 10 
mL intravenously every 12 hours. 10 mL 0  
albuterol (PROVENTIL HFA) INHALATION HFA inhaler (VENTOLIN,PROAIR,PROVENTIL) 
90mcg Inhale 2 Puffs by mouth every 4 hours as needed for Shortness of Breath or
 Wheezing. 8.5 g 0  
fluticasone -salmeterol (Advair HFA) 230-21 MCG/ACT inhaler Inhale 2 Puffs by 
mouth 2 times daily. (Patient not taking: Reported on 2024) 12 Each 0  
duloxetine (CYMBALTA) 60 MG capsule Take 1 Capsule by mouth daily. 30 Capsule 3  
pantoprazole (PROTONIX) 40 MG tablet Take 1 Tablet by mouth daily. 30 Tablet 3  
  
No current facility-administered medications for this visit.  
  
HEIGHT: 5' 6  WEIGHT: Weight was 123.4 kg on 2023 BMI: 40.84  
VITAL SIGNS: BP 95/66   Pulse 88   Temp 97.2 F (36.2 C) (Temporal)   Resp 16   
Ht 1.676 m (5' 6 )  Wt 114.8 kg (253 lb)   SpO2 100%   BMI 40.84 kg/m  
  
PAIN ASSESSMENT: Severity: 0  
Location: N/A  
AIRWAY EXAM:  
Mallampati score: 3  
TMD: Adequate  
Neck Extension/ Flexion: Adequate  
Mouth Opening: Adequate  
Dentition: Intact  
Micrognathia/Overbite: No  
  
FUNCTIONAL CAPACITY: <4 mets and uses a walker  
  
PHYSICAL EXAM:  
Eyes: PERRL and EOM's intact  
ENT: Nares normal, Mucosa normal, Neck supple, and Carotids normal pulse without
 bruits  
Pulmonary: Chest clear to auscultation bilaterally  
Cardiovascular: RRR with S1S2 and No murmurs, gallops, or rubs  
Abdomen: Soft and non-tender and Bowel sounds normal  
Extremities: No gross or obvious abnormalities  
Neurologic: Awake, alert, oriented, No motor deficits, and Sensation grossly 
intact  
Psychiatric: alert and oriented to person, place and time, Appropriate 
mood/affect  
Skin: No gross or obvious abnormalities on visible skin  
  
  
Assessment and Plan:  
1) Pre-surgical evaluation  
2) Pre-Op Diagnosis Codes:  
* Renal stones [N20.0]  
  
LABS, TESTS, CONSULTS ORDERED:  
Orders & Meds Signed During This Encounter  
Complete Blood Count  
Basic Metabolic Panel  
doxycycline (VIBRA-TABS) 100 MG tablet  
canagliflozin (Invokana) 100 MG TABS tablet  
EKG 12-LEAD TRACING [83543]  
  
LABORATORY DATA:  
Lab Results  
Component Value Date  
HBA1C 6.7 (H) 2023  
  
CBC (last 3 years, up to 5 values) (Last 5 results in the past 3 years)  
  
WBC RBC Hgb Hct MCV RDW Plt  
24 0802 16.6  
3.83  
10.6  
33.0  
86  
16.5  
369  
  
23 1028 16.5  
3.87  
11.0  
34.0  
88  
16.8  
267  
  
23 0230 10.0  
3.47  
9.8  
30.5  
88  
15.6  
228  
  
23 0350 10.7  
3.51  
9.9  
31.0  
88  
15.6  
209  
  
23 0142 13.3  
3.50  
9.9  
30.9  
89  
15.8  
200  
  
  
  
  
Basic Metabolic Panel (Last 5 results in the past 3 years)  
  
Na K Cl CO2 Gap Glu BUN Cr Ca  
24 0802 138  
Comment: Note updated reference ranges.  
4.1  
Comment: Note updated reference ranges.  
Note updated reference ranges.  
109  
Comment: Note updated reference ranges.  
16  
Comment: Note updated reference ranges.  
17  
144  
42  
Comment: Note updated reference ranges.  
3.56  
Comment: Note updated reference ranges.  
9.1  
Comment: Note updated reference ranges.  
  
23 0053 142  
3.3  
111  
22  
12  
102  
43  
3.79  
8.0  
  
23 0230 140  
3.5  
110  
21  
13  
100  
44  
4.06  
7.9  
  
23 0357 140  
3.7  
109  
20  
15  
99  
46  
4.45  
7.8  
  
23 0354 140  
3.5  
107  
20  
17  
98  
44  
4.73  
7.7  
  
  
  
  
Results for orders placed or performed during the hospital encounter of 23
 (from the past 8760 hour(s))  
HEPATIC FUNCTION PANEL  
Collection Time: 23 2:44 AM  
Result Value Ref Range  
Albumin 2.8 (L) 3.4 - 5.1 g/dL  
Bilirubin, Direct 0.07 (L) 0.10 - 0.30 mg/dL  
Bilirubin, Total 0.4 0.1 - 1.5 mg/dL  
Alkaline Phosphatase 68 40 - 200 IU/L  
ALT (SGPT) 6 (L) 7 - 40 IU/L  
AST (SGOT) 10 7 - 40 IU/L  
Protein, Total 5.8 (L) 6.2 - 8.3 g/dL  
  
PT/PTT/INR (last 3 years, up to 5 values)  
  
PT aPTT INR  
23 1028 1.08  
  
23 0244 1.31  
  
  
  
  
Urine Culture (last 1 year)  
  
Urine culture  
23 0244 10,000 - 50,000 CFU/ml Multiple bacteria present suggesting 
contamination.  
  
  
  
  
  
TESTS REVIEWED:  
I personally reviewed and interpreting and findings were:  
  
CXRay: No Chest x-ray found  
  
EK2024  
Sinus rhythm with 1st degree A-V block with Fusion complexes  
Prolonged QT interval or tu fusion, consider myocardial disease, electrolyte 
imbalance, or drug effects  
Abnormal ECG  
When compared with ECG of 02-AUG-2023 02:39,  
No significant change was found  
Confirmed by WILTON TINAJERO ([id number]) on 2024 2:19:42 PM  
  
ECHO: 2022 at  (see scanned media in care everywhere for testing results)  
  
Stress test date: Last StressTest: none found going back to 2015  
  
Patient is medically optimized for surgery.  
  
This note will be forwarded to the referring provider. Patient should follow up 
with referring provider.  
  
Patient has been directed to discuss specific recovery questions with his/her 
surgeon/proceduralist.  
  
Attestation:  
I have spent 63 total minutes.  
  
Visit activities:  
- preparing to see the patient (e.g., review of tests)  
- obtaining and/or reviewing separately obtained history  
- performing a medically appropriate examination and/or evaluation  
- counseling and educating the patiecounseling and educating the 
patient/family/caregivernt/family/caregiver  
- counseling and educating the patient/family/caregiver  
- ordering medications, tests, or procedures  
- referring and communicating with other health care professionals (when not 
separately reported)  
- documenting clinical information in the electronic or other health record  
- independently interpreting results (not separately reported) and communicating
 results to the patient/family/caregiver  
- care coordination (not separately reported).  
  
Interviewer signature:  
SHAJI Mallory  
8:23 PM  
2024  
Electronically signed by Foster Stephenson APRN-CNP at 2024 9:36 PM EST  
  
documented in this [id number]- Telephone encounter Note* 
  Telephone Encounter - Rupali Crawley RN - 2023 1:17 PM ESTFormatting of 
  this note might be different from the original.  
Situation: pt states she missed an appointment that she needs to r/s  
  
Background: call placed to Urology and advised pt does not need to r/s  
  
Assessment: see triage  
  
Recommendation: pt advised she does have upcoming appointments that she needs to
 keep and pt advised she may need to wear an adult incontinence pad for the 
lengthy drive  
  
Answer Assessment - Initial Assessment Questions  
1. SYMPTOM:  What's the main symptom you're concerned about?  (e.g., frequency, 
incontinence)  
Pt states she has stents and has frequency  
  
2. ONSET:  When did the above start?   
Ongoing  
  
3. PAIN:  Is there any pain?  If Yes, ask:  How bad is it?  (Scale: 1-10; mild, 
moderate, severe)  
Denies  
  
4. CAUSE:  What do you think is causing the symptoms?   
Stents and nephrostomy tubes  
  
5. OTHER SYMPTOMS:  Do you have any other symptoms?  (e.g., blood in urine, 
fever, flank pain, painwith urination)  
Frequency  
  
Pt would like to know why she cannot have the stents taken out at the same time  
  
Protocols used: Urinary Symptoms-A-AH  
  
Electronically signed by Rupali Crawley RN at 2023 1:38 PM EST  
  
[id number]- Miscellaneous Notes* Telephone Encounter - Rupali Crawley 
  RN - 2023 1:17 PM ESTFormatting of this note might be different from the
   original.  
Situation: pt states she missed an appointment that she needs to r/s  
  
Background: call placed to Urology and advised pt does not need to r/s  
  
Assessment: see triage  
  
Recommendation: pt advised she does have upcoming appointments that she needs to
 keep and pt advised she may need to wear an adult incontinence pad for the 
lengthy drive  
  
Answer Assessment - Initial Assessment Questions  
1. SYMPTOM:  What's the main symptom you're concerned about?  (e.g., frequency, 
incontinence)  
Pt states she has stents and has frequency  
  
2. ONSET:  When did the above start?   
Ongoing  
  
3. PAIN:  Is there any pain?  If Yes, ask:  How bad is it?  (Scale: 1-10; mild, 
moderate, severe)  
Denies  
  
4. CAUSE:  What do you think is causing the symptoms?   
Stents and nephrostomy tubes  
  
5. OTHER SYMPTOMS:  Do you have any other symptoms?  (e.g., blood in urine, 
fever, flank pain, painwith urination)  
Frequency  
  
Pt would like to know why she cannot have the stents taken out at the same time  
  
Protocols used: Urinary Symptoms-A-AH  
  
Electronically signed by Rupali Crawley RN at 2023 1:38 PM EST  
  
documented in this [id number]- Telephone encounter Note* 
  Telephone Encounter - Alberto Wylie RN - 2023 11:50 AM ESTFormatting 
  of this note might be different from the original.  
Spoke with patient and advised her  said ok to remove right 
nephrostomy drain, but the left drain and the bilateral stents need to stay in. 
She verbalized understanding. She asked we message IR to call her to schedule.  
  
Electronically signed by Alberto Wylie RN at 2023 11:51 AM EST  
  
[id number]- Miscellaneous Notes* Telephone Encounter - Alberto Wylie RN - 2023 11:50 AM ESTFormatting of this note might be 
  different from the original.  
Spoke with patient and advised her  said ok to remove right 
nephrostomy drain, but the left drain and the bilateral stents need to stay in. 
She verbalized understanding. She asked we message IR to call her to schedule.  
  
Electronically signed by Alberto Wylie RN at 2023 11:51 AM EST  
  
* Addendum Note - Alberto Wylie RN - 2023 10:42 AM ESTAddended by: 
  ALBERTO WYLIE on: 2023 10:42 AM  
  
Modules accepted: Orders  
  
  
Electronically signed by Alberto Wylie RN at 2023 10:42 AM EST  
  
* Telephone Encounter - Alberto Wylie RN - 2023 2:04 PM ESTFormatting 
  of this note might be different from the original.  
Checked in on patient today. She has both her nephrostomy drains capped. No 
pain. Just having a lotof bladder pressure and urinary frequency from stents. 
Told her would update Dr. Iniguez.  
  
Electronically signed by Alberto Wylie RN at 2023 2:05 PM EST  
  
* Telephone Encounter - Alberto Wylie RN - 2023 9:16 AM ESTFormatting 
  of this note might be different from the original.  
Patient called in today, she states she has lots of questions. She had her 
bilateral nephrostomy tubes exchanged yesterday and bilateral stents placed. She
 said she voided yesterday when she got homearound 4:30 but then didn't void 
again until 3:30 this morning, asked if that's ok. Advised her this is ok she 
has bilateral nephrostomy drains in so she may not void as much. She also states
 she is having a lot of bladder pressure. Advised her this is normal with those 
stents in. She asked when the blood in her urine would go away. Advised her with
 the stents in she can have blood off and on in her urine. Now if she develops 
bladder pain, burning with urination, increased blood in her urine orclots, 
fever or chills, she should notify us.  
  
She said she was instructed by IR to cap her nephrostomy bags tomorrow but there
 is nothing to cap them with, they did give her caps though but there is no were
 to put them. Advised her she has to detach and unscrew her drainage bag from 
her tubing and screw the cap on at the end where the drainagebag would go. We 
also went over how to flush this way as well. Patient had leur locks on her last
 drainage tubes. Told her after she caps her drains if she develops any pain 
over the holiday weekend to uncap her tubes and put the drainage bags back on. I
 will follow up with her on Monday to see howshe is doing. She verbalized 
understanding.  
Electronically signed by Alberto Wylie RN at 2023 9:23 AM EST  
  
documented in this [id number]- Note* Addendum Note - Alberto Wylie RN - 2023 10:42 AM ESTAddended by: ALBERTO WYLIE on: 
  2023 10:42 AM  
  
Modules accepted: Orders  
  
  
Electronically signed by Alberto Wylie RN at 2023 10:42 AM EST  
  
[id number]- Note* Addendum Note - Alberto Wylie RN - 2023 
  10:42 AM ESTAddended by: ALBERTO WYLIE on: 2023 10:42 AM  
  
Modules accepted: Orders  
  
  
Electronically signed by Alberto Wylie RN at 2023 10:42 AM EST  
  
[id number]- Note* Addendum Note - Alberto Wylie RN - 2023 
  10:42 AM ESTAddended by: ALBERTO WYLIE on: 2023 10:42 AM  
  
Modules accepted: Orders  
  
  
Electronically signed by Alberto Wylie RN at 2023 10:42 AM EST  
  
[id number]- Miscellaneous Notes* Addendum Note - Alberto Wylie RN -
   2023 10:42 AM ESTAddended by: ALBERTO WYLIE on: 2023 10:42 AM  
  
Modules accepted: Orders  
  
  
Electronically signed by Alberto Wylie RN at 2023 10:42 AM EST  
  
* Telephone Encounter - Alberto Wylie RN - 2023 2:04 PM ESTFormatting 
  of this note might be different from the original.  
Checked in on patient today. She has both her nephrostomy drains capped. No 
pain. Just having a lotof bladder pressure and urinary frequency from stents. 
Told her would update Dr. Iniguez.  
  
Electronically signed by Alberto Wylie RN at 2023 2:05 PM EST  
  
* Telephone Encounter - Alberto Wylie RN - 2023 9:16 AM ESTFormatting 
  of this note might be different from the original.  
Patient called in today, she states she has lots of questions. She had her 
bilateral nephrostomy tubes exchanged yesterday and bilateral stents placed. She
 said she voided yesterday when she got homearound 4:30 but then didn't void 
again until 3:30 this morning, asked if that's ok. Advised her this is ok she 
has bilateral nephrostomy drains in so she may not void as much. She also states
 she is having a lot of bladder pressure. Advised her this is normal with those 
stents in. She asked when the blood in her urine would go away. Advised her with
 the stents in she can have blood off and on in her urine. Now if she develops 
bladder pain, burning with urination, increased blood in her urine orclots, 
fever or chills, she should notify us.  
  
She said she was instructed by IR to cap her nephrostomy bags tomorrow but there
 is nothing to cap them with, they did give her caps though but there is no were
 to put them. Advised her she has to detach and unscrew her drainage bag from 
her tubing and screw the cap on at the end where the drainagebag would go. We 
also went over how to flush this way as well. Patient had leur locks on her last
 drainage tubes. Told her after she caps her drains if she develops any pain 
over the holiday weekend to uncap her tubes and put the drainage bags back on. I
 will follow up with her on Monday to see howdanica is doing. She verbalized 
understanding.  
Electronically signed by Alberto Wylie RN at 2023 9:23 AM EST  
  
documented in this [id number]- Telephone encounter Note* 
  Telephone Encounter - Alberto Wylie RN - 2023 2:04 PM ESTFormatting 
  of this note might be different from the original.  
Checked in on patient today. She has both her nephrostomy drains capped. No 
pain. Just having a lotof bladder pressure and urinary frequency from stents. 
Told her would update Dr. Iniguez.  
  
Electronically signed by Alberto Wylie RN at 2023 2:05 PM EST  
  
[id number]- Miscellaneous Notes* Telephone Encounter - Alberto Wylie RN - 2023 2:04 PM ESTFormatting of this note might be 
  different from the original.  
Checked in on patient today. She has both her nephrostomy drains capped. No 
pain. Just having a lotof bladder pressure and urinary frequency from stents. 
Told her would update Dr. Iniguez.  
  
Electronically signed by Alberto Wylie RN at 2023 2:05 PM EST  
  
* Telephone Encounter - Alberto Wylie RN - 2023 9:16 AM ESTFormatting 
  of this note might be different from the original.  
Patient called in today, she states she has lots of questions. She had her 
bilateral nephrostomy tubes exchanged yesterday and bilateral stents placed. She
 said she voided yesterday when she got homearound 4:30 but then didn't void 
again until 3:30 this morning, asked if that's ok. Advised her this is ok she 
has bilateral nephrostomy drains in so she may not void as much. She also states
 she is having a lot of bladder pressure. Advised her this is normal with those 
stents in. She asked when the blood in her urine would go away. Advised her with
 the stents in she can have blood off and on in her urine. Now if she develops 
bladder pain, burning with urination, increased blood in her urine orclots, 
fever or chills, she should notify us.  
  
She said she was instructed by IR to cap her nephrostomy bags tomorrow but there
 is nothing to cap them with, they did give her caps though but there is no were
 to put them. Advised her she has to detach and unscrew her drainage bag from 
her tubing and screw the cap on at the end where the drainagebag would go. We 
also went over how to flush this way as well. Patient had leur locks on her last
 drainage tubes. Told her after she caps her drains if she develops any pain 
over the holiday weekend to uncap her tubes and put the drainage bags back on. I
 will follow up with her on Monday to see howshe is doing. She verbalized 
understanding.  
Electronically signed by Alberto Wylie RN at 2023 9:23 AM EST  
  
documented in this [id number]- Telephone encounter Note* 
  Telephone Encounter - Alberto Wylie RN - 2023 9:16 AM ESTFormatting 
  of this note might be different from the original.  
Patient called in today, she states she has lots of questions. She had her 
bilateral nephrostomy tubes exchanged yesterday and bilateral stents placed. She
 said she voided yesterday when she got homearound 4:30 but then didn't void 
again until 3:30 this morning, asked if that's ok. Advised her this is ok she 
has bilateral nephrostomy drains in so she may not void as much. She also states
 she is having a lot of bladder pressure. Advised her this is normal with those 
stents in. She asked when the blood in her urine would go away. Advised her with
 the stents in she can have blood off and on in her urine. Now if she develops 
bladder pain, burning with urination, increased blood in her urine orclots, 
fever or chills, she should notify us.  
  
She said she was instructed by IR to cap her nephrostomy bags tomorrow but there
 is nothing to cap them with, they did give her caps though but there is no were
 to put them. Advised her she has to detach and unscrew her drainage bag from 
her tubing and screw the cap on at the end where the drainagebag would go. We 
also went over how to flush this way as well. Patient had leur locks on her last
 drainage tubes. Told her after she caps her drains if she develops any pain 
over the holiday weekend to uncap her tubes and put the drainage bags back on. I
 will follow up with her on Monday to see howyvettee is doing. She verbalized 
understanding.  
Electronically signed by Alberto Wylie RN at 2023 9:23 AM EST  
  
[id number]- Miscellaneous Notes* Telephone Encounter - Alberto Wylie RN - 2023 9:16 AM ESTFormatting of this note might be 
  different from the original.  
Patient called in today, she states she has lots of questions. She had her 
bilateral nephrostomy tubes exchanged yesterday and bilateral stents placed. She
 said she voided yesterday when she got homearound 4:30 but then didn't void 
again until 3:30 this morning, asked if that's ok. Advised her this is ok she 
has bilateral nephrostomy drains in so she may not void as much. She also states
 she is having a lot of bladder pressure. Advised her this is normal with those 
stents in. She asked when the blood in her urine would go away. Advised her with
 the stents in she can have blood off and on in her urine. Now if she develops 
bladder pain, burning with urination, increased blood in her urine orclots, 
fever or chills, she should notify us.  
  
She said she was instructed by IR to cap her nephrostomy bags tomorrow but there
 is nothing to cap them with, they did give her caps though but there is no were
 to put them. Advised her she has to detach and unscrew her drainage bag from 
her tubing and screw the cap on at the end where the drainagebag would go. We 
also went over how to flush this way as well. Patient had leur locks on her last
 drainage tubes. Told her after she caps her drains if she develops any pain 
over the holiday weekend to uncap her tubes and put the drainage bags back on. I
 will follow up with her on Monday to see howshe is doing. She verbalized 
understanding.  
Electronically signed by Alberto Wylie RN at 2023 9:23 AM EST  
  
documented in this [id number]- Miscellaneous Notes* Telephone
   Encounter - Alberto Wylie RN - 2023 12:53 PM ESTFormatting of this 
  note might be different from the original.  
Spoke with patient advised her stat order has been placed to replace her non 
functioning right nephrostomy. Provided her with number to call to get this 
changed out. [phone] option 3, for IR.  
Electronically signed by Alberto Wylie RN at 2023 2:34 PM EST  
  
* Telephone Encounter - Alberto Wylie RN - 2023 2:32 PM ESTFormatting 
  of this note might be different from the original.  
Patient called in today. She wanted to make Dr. Iniguez aware that her right 
nephrostomy drain has not been draining urine for 2 weeks. She has not pain. She
 has been flushing it daily. Patient has been using flushes to flush directly 
into bag upon further investigation. Told her she was suppose sarah flushing into
 her back not into the bag. She attempted to do so while on the phone but was 
unable, its clogged. Told her I would let Dr. Iniguez know and will call her 
back once I hear back from him.  
Electronically signed by Alberto Wylie RN at 2023 2:35 PM EST  
  
documented in this [id number]- Telephone encounter Note* 
  Telephone Encounter - Alberto Wylie RN - 2023 12:53 PM ESTFormatting 
  of this note might be different from the original.  
Spoke with patient advised her stat order has been placed to replace her non 
functioning right nephrostomy. Provided her with number to call to get this 
changed out. [phone] option 3, for IR.  
Electronically signed by Alberto Wylie RN at 2023 2:34 PM EST  
  
[id number]- Telephone encounter Note* Telephone Encounter - Alberto Wylie RN - 2023 2:32 PM ESTFormatting of this note might be 
  different from the original.  
Patient called in today. She wanted to make Dr. Iniguez aware that her right 
nephrostomy drain has not been draining urine for 2 weeks. She has not pain. She
 has been flushing it daily. Patient has been using flushes to flush directly 
into bag upon further investigation. Told her she was suppose sarah flushing into
 her back not into the bag. She attempted to do so while on the phone but was 
unable, its clogged. Told her I would let Dr. Iniguez know and will call her 
back once I hear back from him.  
Electronically signed by Alberto Wylie RN at 2023 2:35 PM EST  
  
[id number]- Progress note  
  
  
  
  
                                        Author              Azael Aj  
[facility]  
2023 1:18pm  
   
                                        Note Date/Time      2023 1  
0:09am  
   
                                                    [facility]  
ENTER  
[address] [place] [address]  
  
Infect. Disease Progress Note  
Signed  
  
Patient: Cris Hearn MR#: [id number]  
[id number]  
: 1970 Acct:[id number]  
  
Age/Sex: 52 / F Adm Date: 10/17/2  
3  
Loc: 3T Room: [address] [place] [address] Type: ADM IN  
Attending Dr: Amanda Murillo MD  
  
Copies to: ~  
  
  
Date of Service:  
10/20/2023  
  
  
Subjective  
Interval history:  
Pt is awake and alert laying comfortable in the bed upon me walking into the 
room. No   
acute events overnight. Patient states she slept well last night and is 
tolerating   
meals well. She continues to feel well and has no new symptoms toreport at this 
time.  
  
Exam  
Physical Exam  
Vital Signs:  
  
  
  
  
Temp Pulse Resp BP Pulse Ox O2 Del Method O2 Flow Rate  
  
98.0 F 70 18 115/77 99 Nasal Cannula 3  
  
10/20/23 08:00 10/20/23 08:23 10/20/23 08:23 10/20/23 08:00 10/20/23 08:00 
10/20/23   
08:00 10/20/23 08:00  
  
  
  
Const  
General: cooperative, comfortable and no acute distress  
Nutritional Appearance: obese  
Orientation: alert, awake and oriented x3  
HEENT  
Head: normal to inspection  
Ears: hearing grossly normal bilaterally  
Nose: external nose normal  
Eyes  
General: appearance normal, both eyes and all related structures  
Neck  
Neck: normal visual inspection, trachea midline and supple  
Chest  
Chest palpation & inspection: normal inspection of the chest  
Resp  
Effort & Inspection: normal respiratory effort, able to speak in complete 
sentences   
and no respiratory distress  
Auscultation: clear to auscultation bilaterally, no rales, no rhonchi and no 
wheezes  
Cardio  
Rate: regular rate  
Rhythm: regular rhythm  
GI  
Inspection: non-distended  
Palpation: soft, no guarding and nontender  
  
Other:  
Bilateral nephrostomy tubes in place with no surrounding erythema around the   
insertion site  
Skin  
General: no rashes or lesions noted  
Neuro  
General: patient alert, patient awake and patient oriented x3  
Cognition: normal cognition  
Speech: speech normal  
Extrem  
General: no clubbing, cyanosis or edema  
  
Objective  
Labs  
CBC/BMP:  
CBC, BMP  
  
  
  
10/20/23 10/20/23  
  
07:16 07:16  
  
Corrected WBC 9.6  
  
Uncorrected WBC Count 9.6  
  
RBC 3.32 L  
  
Hgb 9.4 L  
  
Hct 28.6 L  
  
Plt Count 353  
  
Sodium 140  
  
Potassium 3.6  
  
Chloride 107  
  
Carbon Dioxide 28.5  
  
Anion Gap 8.1  
  
BUN 20  
  
Creatinine 2.77 H  
  
Calcium 8.8  
  
  
  
Labs:  
  
  
  
  
10/20/23  
  
07:16  
  
BUN 20  
  
Creatinine 2.77 H  
  
  
  
Microbiology  
Microbiology:  
Microbiology - Results from entire visit  
  
10/17/23 01:48 Other (See Comment) Aerobic Culture - Final  
Escherichia coli (ESBL)  
Corynebacterium striatum group  
10/17/23 01:48 Other (See Comment) Anaerobic Culture - Final  
No Anaerobes Isolated 3 Days  
10/17/23 01:48 Other (See Comment) Gram Stain - Final  
10/17/23 01:43 Blood - Left Antecubital Blood Culture - Final  
Escherichia coli (ESBL)  
10/17/23 01:43 Blood - Left Antecubital Bacterial ID (NA Multiplex Assay) - 
Final  
10/17/23 02:26 Urine - Clean-Voided Midstream Urine Culture - Final  
Escherichia coli (ESBL)  
Corynebacterium striatum group  
10/17/23 01:43 Blood - Right Antecubital Blood Culture - Final  
Escherichia coli (ESBL)  
  
  
  
Allergies and Medications  
Allergies and Active Meds  
Allergies  
  
aspirin Allergy (Verified 23 14:33)  
Hives  
diphenhydramine [From Benadryl] Allergy (Verified 23 14:33)  
Swelling  
ibuprofen [From Advil] Allergy (Verified 23 14:33)  
Hives  
naproxen [From Aleve] Allergy (Verified 23 14:33)  
Hives  
azithromycin Adverse Reaction (Verified 23 14:33)  
Flushing  
  
  
Active Medications  
  
Acetaminophen (Acetaminophen 325 Mg Tablet) 650 mg PO Q6HR PRN  
PRN Reason: Pain Scale 1 - 3 or fever  
Stop: 10/16/24 10:10  
Albuterol (Albuterol Neb 2.5 Mg/3 Ml Vial.Neb) 2.5 mg INHALATION Q3H PRN  
PRN Reason: Shortness Of Breath  
Stop: 10/16/24 15:52  
Amitriptyline HCl (Amitriptyline 50 Mg Tablet) 50 mg PO QPM [facility]  
Stop: 10/16/24 20:59  
Last Admin: 10/19/23 21:40 Dose: 50 mg  
  
Ammonium Lactate (Ammonium Lactate 12% Lot 226 Gm Bottle) 1 applic TOPICAL DAILY
PRN  
PRN Reason: Dry Skin  
Stop: 10/16/24 15:52  
Apixaban (Apixaban 5 Mg Tablet) 5 mg PO BID [facility]  
Stop: 10/16/24 20:59  
Last Admin: 10/20/23 08:04 Dose: 5 mg  
  
Budesonide/Formoterol Fumarate (Budesonide/Formoterol 160-4.5 Mcg 60 Puff/6 Gm   
Hfa.Aer.Ad) 2 puff INHALATION BID [facility]  
Stop: 10/16/24 20:59  
Last Admin: 10/20/23 08:17 Dose: 2 puff  
  
Cyanocobalamin (Cyanocobalamin 1,000 Mcg Tablet) 1,000 mcg PO QAM [facility]  
Stop: 10/19/24 08:59  
Last Admin: 10/20/23 08:04 Dose: 1,000 mcg  
  
Dextrose (Dextrose 50% In Water 25 Gm/50 Ml Syringe) 0 gm IV-PUSH PRN PRN  
PRN Reason: Hypoglycemia  
Stop: 10/16/24 16:16  
Duloxetine HCl (Duloxetine 60 Mg Capsule.) 60 mg PO QAM [facility]  
Stop: 10/17/24 08:59  
Last Admin: 10/20/23 08:04 Dose: 60 mg  
  
Fluticasone Propionate (Fluticasone Propionate Spray 120 Spray/16 Gm Bottle) 2 
spray   
INTRANASAL DAILY PRN  
PRN Reason: Allergy Symptoms  
Stop: 10/16/24 15:52  
Glucose (Dextrose 40% Gel 15 Gm Tube) 0 gm PO PRN PRN  
PRN Reason: Hypoglycemia  
Stop: 10/16/24 16:16  
Lactated Ringer's (Lactated Ringers) 1,000 mls @ 75 mls/hr IV .[id number] [facility]  
Stop: 10/16/24 10:29  
Last Admin: 10/20/23 06:39 Dose: Not Given  
  
Ertapenem (Invanz) 1 gm in 100 mls @ 200 mls/hr IV Q24H [facility]  
Last Admin: 10/19/23 15:20 Dose: 200 mls/hr  
  
Insulin Aspart (Insulin Aspart 300 Units/3 Ml Insuln.Pen) 0 units SUBCUT 
TID..[facility]; Protocol  
Stop: 10/16/24 16:59  
Last Admin: 10/20/23 07:58 Dose: Not Given  
  
Ipratropium Bromide (Ipratropium Bromide 0.5 Mg/2.5 Ml Vial.Neb) 0.5 mg 
INHALATION   
QID.RESP [facility]  
Stop: 10/17/24 07:59  
Last Admin: 10/20/23 08:17 Dose: 0.5 mg  
  
Morphine Sulfate (Morphine Sulfate 2 Mg/Ml Vial) 2 mg IV-PUSH Q4H PRN  
PRN Reason: Pain Scale 8 - 10  
Pantoprazole Sodium (Pantoprazole 40 Mg Tablet.) 40 mg PO DAILY [facility]  
Stop: 10/17/24 08:59  
Last Admin: 10/20/23 08:04 Dose: 40 mg  
  
Pravastatin Sodium (Pravastatin 40 Mg Tablet) 40 mg PO QPM [facility]  
Stop: 10/16/24 20:59  
Last Admin: 10/19/23 21:40 Dose: 40 mg  
  
Prochlorperazine Edisylate (Prochlorperazine Edisylate 10 Mg/2 Ml Vial) 5 mg IV-
PUSH   
Q4H PRN  
PRN Reason: Nausea And Vomiting  
Stop: 10/16/24 10:10  
Sodium Chloride (Sodium Chloride 0.9 % 10 Ml Syringe) 0 ml IV-PUSH PRN PRN  
PRN Reason: Flush  
Stop: 10/16/24 01:14  
Sodium Chloride (Sodium Chloride 0.9 % 10 Ml Syringe) 0 ml IV-PUSH PRN PRN  
PRN Reason: Flush  
Stop: 10/18/24 10:40  
Sotalol HCl (Sotalol 120 Mg Tablet) 120 mg PO QAM [facility]  
Stop: 10/18/24 08:59  
Last Admin: 10/19/23 08:25 Dose: 120 mg  
  
Vancomycin HCl (Vancomycin - Pharmacy Dosing 1 Each Miscell) 1 each IV ONCE PRN;
  
Protocol  
PRN Reason: ZZ.Pharmacy Consult  
  
  
  
A&P - Infectious Disease  
Assessment/Plan  
(1) Complicated UTI (urinary tract infection):  
Code(s):  
N39.0 - Urinary tract infection, site not specified  
Status: Acute  
(2) Bacteremia:  
Code(s):  
R78.81 - Bacteremia  
Status: Acute  
  
Plan  
Patient continues to feel well this morning and has no new symptoms to report. 
Her   
blood cultures x2 ultimately grew ESBL E. coli. Her urine culture is growing E. 
coli   
and corynebacterium striatum. Culture from the left nephrostomytube is also 
growing   
E. coli and moderate growth and corynebacterium striatum with heavy growth. Her   
leukocytosis has resolved at this point. Given the results of the cultures, I   
recommend continuing with ertapenem and vancomycin. Patient did get her PICC 
line.   
From my standpoint she can be discharged but shedoes voice concerns about the 
low   
urine output and her right-sided nephrostomy which looks okay at this time at 
the   
exit site.  
  
  
Documented By: Sadiq Oleary DO, RES 10/20/23 095  
5  
Signed By: <Electronically signed by DO CHRIS Oleary>  
10/20/23 1009  
<Electronically signed by MD Azael Aj>  
10/20/23 1318  
   
  
[facility]  
Work Phone: [phone]- Progress note  
  
  
  
  
                                        Author              Azael Aj  
[facility]  
2023 1:16pm  
   
                                        Note Date/Time      2023 8  
:45am  
   
                                                    [facility]  
ENTER  
[address] [place], [place] [id number]  
  
Infect. Disease Progress Note  
Signed  
  
Patient: Cris Hearn MR#: [id number]  
[id number]  
: 1970 Acct:[id number]  
  
Age/Sex: 52 / F Adm Date: 10/17/2  
3  
Loc: 3T Room: [address] [place] [address] Type: ADM IN  
Attending Dr: Amanda Murillo MD  
  
Copies to: ~  
  
  
Date of Service:  
10/19/2023  
  
  
Subjective  
Interval history:  
Pt is awake and alert laying comfortable in the bed upon me walking into the 
room. No   
acute events overnight. Patient states she slept well last night and is 
tolerating   
meals well. She states she feels back to her baseline level of health at this 
point   
and has no new symptoms to report.  
  
Exam  
Physical Exam  
Vital Signs:  
  
  
  
  
Temp Pulse Resp BP Pulse Ox O2 Del Method O2 Flow Rate  
  
98 F 77 16 117/79 99 Nasal Cannula 3  
  
10/19/23 08:00 10/19/23 08:19 10/19/23 08:19 10/19/23 08:00 10/19/23 08:20 
10/19/23   
08:20 10/19/23 08:20  
  
  
  
Const  
General: cooperative, comfortable and no acute distress  
Nutritional Appearance: obese  
Orientation: alert, awake and oriented x3  
HEENT  
Head: normal to inspection  
Ears: hearing grossly normal bilaterally  
Nose: external nose normal  
Eyes  
General: appearance normal, both eyes and all related structures  
Neck  
Neck: normal visual inspection, trachea midline and supple  
Chest  
Chest palpation & inspection: normal inspection of the chest  
Resp  
Effort & Inspection: normal respiratory effort, able to speak in complete 
sentences   
and no respiratory distress  
Auscultation: clear to auscultation bilaterally, no rales, no rhonchi and no 
wheezes  
Cardio  
Rate: regular rate  
Rhythm: regular rhythm  
GI  
Inspection: non-distended  
Palpation: soft, no guarding and nontender  
  
Other:  
Bilateral nephrostomy tubes in place with no surrounding erythema. The areas 
around   
the nephrostomy tubes are nontender to palpation  
Skin  
General: no rashes or lesions noted  
Neuro  
General: patient alert, patient awake and patient oriented x3  
Cognition: normal cognition  
Speech: speech normal  
Extrem  
General: no clubbing, cyanosis or edema  
  
Objective  
Labs  
CBC/BMP:  
CBC, BMP  
  
  
  
10/19/23 10/19/23  
  
05:00 05:00  
  
Corrected WBC 10.8  
  
Uncorrected WBC Count 10.8  
  
RBC 3.04 L  
  
Hgb 8.7 L  
  
Hct 26.3 L  
  
Plt Count 305  
  
Sodium 138  
  
Potassium 3.3 L  
  
Chloride 108 H  
  
Carbon Dioxide 24.5  
  
Anion Gap 8.8  
  
BUN 24  
  
Creatinine 2.85 H  
  
Calcium 8.3 L  
  
  
  
Labs:  
  
  
  
  
10/19/23  
  
05:00  
  
BUN 24  
  
Creatinine 2.85 H  
  
  
  
Microbiology  
Microbiology:  
  
  
  
  
  
10/17/23 01:43 Blood Culture - Final  
  
Blood - Right Antecubital Escherichia coli (ESBL)  
  
10/17/23 01:43 Blood Culture - Preliminary  
  
Blood - Left Antecubital Escherichia coli (ESBL)  
  
Bacterial ID (NA Multiplex Assay) - Final  
  
10/17/23 02:26 Urine Culture - Preliminary  
  
Urine - Clean-Voided Midstream Escherichia coli  
  
Corynebacterium striatum group  
  
10/17/23 01:48 Aerobic Culture - Preliminary  
  
Other (See Comment) Escherichia coli  
  
Corynebacterium striatum group  
  
Anaerobic Culture - Preliminary  
  
No Anaerobes Isolated 1 Day  
  
Gram Stain - Final  
  
  
  
  
Allergies and Medications  
Allergies and Active Meds  
Allergies  
  
aspirin Allergy (Verified 23 14:33)  
Hives  
diphenhydramine [From Benadryl] Allergy (Verified 23 14:33)  
Swelling  
ibuprofen [From Advil] Allergy (Verified 23 14:33)  
Hives  
naproxen [From Aleve] Allergy (Verified 23 14:33)  
Hives  
azithromycin Adverse Reaction (Verified 23 14:33)  
Flushing  
  
  
Active Medications  
  
Acetaminophen (Acetaminophen 325 Mg Tablet) 650 mg PO Q6HR PRN  
PRN Reason: Pain Scale 1 - 3 or fever  
Stop: 10/16/24 10:10  
Albuterol (Albuterol Neb 2.5 Mg/3 Ml Vial.Neb) 2.5 mg INHALATION Q3H PRN  
PRN Reason: Shortness Of Breath  
Stop: 10/16/24 15:52  
Amitriptyline HCl (Amitriptyline 50 Mg Tablet) 50 mg PO QPM [facility]  
Stop: 10/16/24 20:59  
Last Admin: 10/18/23 21:03 Dose: 50 mg  
  
Ammonium Lactate (Ammonium Lactate 12% Lot 226 Gm Bottle) 1 applic TOPICAL DAILY
PRN  
PRN Reason: Dry Skin  
Stop: 10/16/24 15:52  
Apixaban (Apixaban 5 Mg Tablet) 5 mg PO BID [facility]  
Stop: 10/16/24 20:59  
Last Admin: 10/19/23 08:26 Dose: 5 mg  
  
Budesonide/Formoterol Fumarate (Budesonide/Formoterol 160-4.5 Mcg 60 Puff/6 Gm   
Hfa.Aer.Ad) 2 puff INHALATION BID [facility]  
Stop: 10/16/24 20:59  
Last Admin: 10/19/23 08:19 Dose: 2 puff  
  
Dextrose (Dextrose 50% In Water 25 Gm/50 Ml Syringe) 0 gm IV-PUSH PRN PRN  
PRN Reason: Hypoglycemia  
Stop: 10/16/24 16:16  
Duloxetine HCl (Duloxetine 60 Mg Capsule.Dr) 60 mg PO QAM [facility]  
Stop: 10/17/24 08:59  
Last Admin: 10/19/23 08:26 Dose: 60 mg  
  
Fluticasone Propionate (Fluticasone Propionate Spray 120 Spray/16 Gm Bottle) 2 
spray   
INTRANASAL DAILY PRN  
PRN Reason: Allergy Symptoms  
Stop: 10/16/24 15:52  
Glucose (Dextrose 40% Gel 15 Gm Tube) 0 gm PO PRN PRN  
PRN Reason: Hypoglycemia  
Stop: 10/16/24 16:16  
Lactated Ringer's (Lactated Ringers) 1,000 mls @ 75 mls/hr IV .[id number] [facility]  
Stop: 10/16/24 10:29  
Last Admin: 10/19/23 06:04 Dose: 75 mls/hr  
  
Meropenem (Merrem) 1 gm in 100 mls @ 200 mls/hr IV Q12H [facility]  
Last Admin: 10/19/23 03:20 Dose: 200 mls/hr  
  
Insulin Aspart (Insulin Aspart 300 Units/3 Ml Insuln.Pen) 0 units SUBCUT 
TID.WM.HS   
[facility]; Protocol  
Stop: 10/16/24 16:59  
Last Admin: 10/19/23 08:26 Dose: Not Given  
  
Ipratropium Bromide (Ipratropium Bromide 0.5 Mg/2.5 Ml Vial.Neb) 0.5 mg 
INHALATION   
QID.RESP [facility]  
Stop: 10/17/24 07:59  
Last Admin: 10/19/23 08:19 Dose: 0.5 mg  
  
Morphine Sulfate (Morphine Sulfate 2 Mg/Ml Vial) 2 mg IV-PUSH Q4H PRN  
PRN Reason: Pain Scale 8 - 10  
Pantoprazole Sodium (Pantoprazole 40 Mg Tablet.Dr) 40 mg PO DAILY [facility]  
Stop: 10/17/24 08:59  
Last Admin: 10/19/23 08:26 Dose: 40 mg  
  
Pravastatin Sodium (Pravastatin 40 Mg Tablet) 40 mg PO QPM [facility]  
Stop: 10/16/24 20:59  
Last Admin: 10/18/23 21:03 Dose: 40 mg  
  
Prochlorperazine Edisylate (Prochlorperazine Edisylate 10 Mg/2 Ml Vial) 5 mg IV-
PUSH   
Q4H PRN  
PRN Reason: Nausea And Vomiting  
Stop: 10/16/24 10:10  
Sodium Chloride (Sodium Chloride 0.9 % 10 Ml Syringe) 0 ml IV-PUSH PRN PRN  
PRN Reason: Flush  
Stop: 10/16/24 01:14  
Sotalol HCl (Sotalol 120 Mg Tablet) 120 mg PO QAM [facility]  
Stop: 10/18/24 08:59  
Last Admin: 10/19/23 08:25 Dose: 120 mg  
  
  
  
  
A&P - Infectious Disease  
Assessment/Plan  
(1) Complicated UTI (urinary tract infection):  
Code(s):  
N39.0 - Urinary tract infection, site not specified  
Status: Acute  
(2) Bacteremia:  
Code(s):  
R78.81 - Bacteremia  
Status: Acute  
  
Plan  
Patient ultimately feeling well this morning and states she feels back to her   
baseline. Her blood cultures ultimately grew ESBL E. coli. Her urine culture is   
growing E. coli and corynebacterium. Culture from the left nephrostomy tube is 
also   
growing E. coli and moderate growth and corynebacterium with heavy growth. Her   
leukocytosis has improved to 10.8 today from 14 yesterday. Given the results of 
the   
cultures, would change meropenem to ertapenem and will also add back vancomycin 
given   
the corynebacterium noted.  
  
  
Documented By: Sadiq Oleary DO, RES 10/19/23 084  
4  
Signed By: <Electronically signed by DO CHRIS Oleary>  
10/20/23 0959  
<Electronically signed by MD Azael Aj>  
10/20/23 1315  
   
  
[facility]  
Work Phone: [phone]- Progress note  
  
  
  
  
                                        Author              Roselia Chowdhury  
[facility]  
2023 12:28pm  
   
                                        Note Date/Time      2023 1  
2:28pm  
   
                                                    [facility]  
ENTER  
[address] [place], [place] [id number]  
  
Nephrology Progress Note  
Signed  
  
Patient: Cris Hearn MR#: [id number]  
[id number]  
: 1970 Acct:[id number]  
  
Age/Sex: 52 / F Adm Date: 10/17/2  
3  
Loc:  Room: [address] [place] [address] Type: ADM IN  
Attending Dr: Amanda Murillo MD  
  
Copies to: ~  
  
  
Date of Service:  
10/20/2023  
  
  
Subjective  
Subjective  
Narrative:  
This is a 52-year-old female patient with past medical history of bilateral   
staghorn's kidney stone status post bilateral percutaneous nephrostomy tube 
placement   
in August of this year and follows with urology at [facility] Dr. Iniguez, TANISHA-fib, CKD 
stage   
IV baseline creatinine around 2.2 mg deciliter, chronic respiratory failure on 
oxygen   
at home, hyperlipidemia, hypertension, MS. patientpresented to the hospital for   
severe left-sided flank pain for several day alongwith nausea vomiting x2. 
Patient   
also reported milky urine in the left percutaneous tube. Lab work in the 
emergency   
room revealed sepsis with leukocytosis. Patient was given IV fluid and 
antibiotics   
Zosyn and vancomycin and was admitted for sepsis from pyelonephritis. Blood 
cultures   
growing E. coli. Urine culture is growing E. coli and Corynebacterium stratum.   
Currently on vancomycin and meropenem.  
Renal team is consulted for acute kidney injury. Patient presented with a 
creatinine   
3.6 mg/dL. Serum creatinine today 3.18 mg deciliter and BUN 28, potassium 3.6, 
sodium   
137 mmol/L. Hemoglobin this morning 9.2 mg/dL which is likely dilutional. 
Patient has   
been on Ringer lactate at 75 cc/h. Patient stated her pain in the left flank has
  
resolved. Patient had 300 cc urine outputin the left percutaneous tube bag and 
25 cc   
in the right side.  
I reviewed the patient's home medications. She is Invokana, sotalol,   
metformin,potassium chloride, Eliquis, Lasix and statin  
Patient denied IV contrast exposure. No NSAID use. No skin rash.  
  
Interval history:  
  
Patient was seen and examined in her room. Denied worsening breathing. No flank 
pain.   
No diarrhea. No nausea or vomiting. No chest pain  
Urine output has increased to 800 cc. Kidney function slightly better.  
  
Exam  
Physical Exam  
Vital Signs:  
  
  
  
  
Temp Pulse Resp BP Pulse Ox O2 Del Method O2 Flow Rate  
  
98.0 F 70 18 115/77 99 Nasal Cannula 3  
  
10/20/23 08:00 10/20/23 08:23 10/20/23 08:23 10/20/23 08:00 10/20/23 08:00 
10/20/23   
08:00 10/20/23 08:00  
  
  
  
Narrative:  
General: No acute distress  
Head :atraumatic normocephalic  
Eyes: PERRLA.  
Neck: no JVD no bruit.  
Heart: S1-S2. RRR  
Respiratory: Clear to auscultation. No wheezing. No crackles  
Abdomen: Soft, positive bowel sounds,no tenderness. Bilateral percutaneous   
nephrostomy tube. Left bag still have milky urine  
Neurology: Awake alert oriented x3. No focal deficits  
Extremity. No cyanosis. No edema  
Skin: No skin rash  
  
Objective  
Intake and Output  
I&O:  
Intake & Output  
  
  
  
10/17/23 10/18/23 10/19/23 10/20/23  
  
23:59 23:59 23:59 23:59  
  
Intake Total 4680 / 4680 3100 / 3100 2500 / 2500 300 / 300  
  
Output Total 455 / 455 625 / 625 750 / 750 325 / 325  
  
Balance 4225 / 4225 2475 / 2475 1750 / 1750 -25 / -25  
  
Weight 119.7 kg 120.5 kg 120.2 kg 120 kg  
  
  
  
Meds and Allergies  
Meds:  
Active Medications  
  
Acetaminophen (Acetaminophen 325 Mg Tablet) 650 mg PO Q6HR PRN  
PRN Reason: Pain Scale 1 - 3 or fever  
Stop: 10/16/24 10:10  
Albuterol (Albuterol Neb 2.5 Mg/3 Ml Vial.Neb) 2.5 mg INHALATION Q3H PRN  
PRN Reason: Shortness Of Breath  
Stop: 10/16/24 15:52  
Amitriptyline HCl (Amitriptyline 50 Mg Tablet) 50 mg PO QPM [facility]  
Stop: 10/16/24 20:59  
Last Admin: 10/19/23 21:40 Dose: 50 mg  
  
Ammonium Lactate (Ammonium Lactate 12% Lot 226 Gm Bottle) 1 applic TOPICAL DAILY
PRN  
PRN Reason: Dry Skin  
Stop: 10/16/24 15:52  
Apixaban (Apixaban 5 Mg Tablet) 5 mg PO BID [facility]  
Stop: 10/16/24 20:59  
Last Admin: 10/20/23 08:04 Dose: 5 mg  
  
Budesonide/Formoterol Fumarate (Budesonide/Formoterol 160-4.5 Mcg 60 Puff/6 Gm   
Hfa.Aer.Ad) 2 puff INHALATION BID [facility]  
Stop: 10/16/24 20:59  
Last Admin: 10/20/23 08:17 Dose: 2 puff  
  
Cyanocobalamin (Cyanocobalamin 1,000 Mcg Tablet) 1,000 mcg PO QAM [facility]  
Stop: 10/19/24 08:59  
Last Admin: 10/20/23 08:04 Dose: 1,000 mcg  
  
Dextrose (Dextrose 50% In Water 25 Gm/50 Ml Syringe) 0 gm IV-PUSH PRN PRN  
PRN Reason: Hypoglycemia  
Stop: 10/16/24 16:16  
Duloxetine HCl (Duloxetine 60 Mg Capsule.Dr) 60 mg PO [facility]  
Stop: 10/17/24 08:59  
Last Admin: 10/20/23 08:04 Dose: 60 mg  
  
Fluticasone Propionate (Fluticasone Propionate Spray 120 Spray/16 Gm Bottle) 2 
spray   
INTRANASAL DAILY PRN  
PRN Reason: Allergy Symptoms  
Stop: 10/16/24 15:52  
Glucose (Dextrose 40% Gel 15 Gm Tube) 0 gm PO PRN PRN  
PRN Reason: Hypoglycemia  
Stop: 10/16/24 16:16  
Lactated Ringer's (Lactated Ringers) 1,000 mls @ 75 mls/hr IV .[id number] [facility]  
Stop: 10/16/24 10:29  
Last Admin: 10/20/23 06:39 Dose: Not Given  
  
Ertapenem (Invanz) 1 gm in 100 mls @ 200 mls/hr IV Q24H [facility]  
Last Admin: 10/19/23 15:20 Dose: 200 mls/hr  
  
Insulin Aspart (Insulin Aspart 300 Units/3 Ml Insuln.Pen) 0 units SUBCUT 
TID.WM.HS   
[facility]; Protocol  
Stop: 10/16/24 16:59  
Last Admin: 10/20/23 12:18 Dose: 2 units  
  
Ipratropium Bromide (Ipratropium Bromide 0.5 Mg/2.5 Ml Vial.Neb) 0.5 mg 
INHALATION   
QID.RESP [facility]  
Stop: 10/17/24 07:59  
Last Admin: 10/20/23 08:17 Dose: 0.5 mg  
  
Morphine Sulfate (Morphine Sulfate 2 Mg/Ml Vial) 2 mg IV-PUSH Q4H PRN  
PRN Reason: Pain Scale 8 - 10  
Pantoprazole Sodium (Pantoprazole 40 Mg Tablet.) 40 mg PO DAILY [facility]  
Stop: 10/17/24 08:59  
Last Admin: 10/20/23 08:04 Dose: 40 mg  
  
Pravastatin Sodium (Pravastatin 40 Mg Tablet) 40 mg PO QPM [facility]  
Stop: 10/16/24 20:59  
Last Admin: 10/19/23 21:40 Dose: 40 mg  
  
Prochlorperazine Edisylate (Prochlorperazine Edisylate 10 Mg/2 Ml Vial) 5 mg IV-
PUSH   
Q4H PRN  
PRN Reason: Nausea And Vomiting  
Stop: 10/16/24 10:10  
Sodium Chloride (Sodium Chloride 0.9 % 10 Ml Syringe) 0 ml IV-PUSH PRN PRN  
PRN Reason: Flush  
Stop: 10/16/24 01:14  
Sodium Chloride (Sodium Chloride 0.9 % 10 Ml Syringe) 0 ml IV-PUSH PRN PRN  
PRN Reason: Flush  
Stop: 10/18/24 10:40  
Sotalol HCl (Sotalol 120 Mg Tablet) 120 mg PO QAM [facility]  
Stop: 10/18/24 08:59  
Last Admin: 10/19/23 08:25 Dose: 120 mg  
  
Vancomycin HCl (Vancomycin - Pharmacy Dosing 1 Each Miscell) 1 each IV ONCE PRN;
  
Protocol  
PRN Reason: ZZ.Pharmacy Consult  
  
Allergies  
  
aspirin Allergy (Verified 23 14:33)  
Hives  
diphenhydramine [From Benadryl] Allergy (Verified 23 14:33)  
Swelling  
ibuprofen [From Advil] Allergy (Verified 23 14:33)  
Hives  
naproxen [From Aleve] Allergy (Verified 23 14:33)  
Hives  
azithromycin Adverse Reaction (Verified 23 14:33)  
Flushing  
  
  
  
Results  
Labs  
  
10/20/23 07:16  
  
10/20/23 07:16  
  
Labs:  
  
  
  
  
10/20/23  
  
07:16  
  
BUN 20  
  
Creatinine 2.77 H  
  
  
  
Radiology Impressions  
Impressions - last 24 hours:  
**Any impression(s) listed above is documentation that was entered by the 
reading   
physician into a diagnostic report(s) for Cris Hearn. I have reviewed the   
report(s) and am incorporating any findings in the treatment plan of this 
patient   
where applicable.**  
  
  
  
A&P - Nephrology  
Assessment/Plan  
(1) Acute kidney injury superimposed on CKD:  
Assessment/Problem Details:  
Acute kidney injury is likely from left side pyelonephritis and ATN associated 
with   
sepsis. Serum creatinine 3.1 mg/dL today. Patient oliguric. 24-hour urine output
less   
than 500 cc from both percutaneous nephrostomy tube. CT abdomen pelvis showed no
  
obstruction but did show bilateral nephrolithiasis  
(2) Chronic kidney disease, stage IV (severe):  
Assessment/Problem Details:  
Patient has advanced CKD likely from diabetic nephropathy. Baseline creatinine 
around   
2.2 and GFR around 20 to 25 mL/min.  
(3) Complicated UTI (urinary tract infection):  
Assessment/Problem Details:  
Patient presented with severe left flank pain. Blood and urine culture are 
growing E.   
coli. Patient currently on vancomycin and meropenem. ID is following  
(4) (HFpEF) heart failure with preserved ejection fraction:  
Assessment/Problem Details:  
Patient seems compensated from cardiac standpoint. Patient on Lasix 20 mg p.o. 
daily   
at home  
(5) Anemia:  
Assessment/Problem Details:  
Hemoglobin dropped today to 9.2 g/dL. Patient on Eliquis for history of A-fib. 
Denied   
GI bleed or any active bleeding  
  
Plan  
- Serum creatinine slightly better at 2.7 mg deciliter urine output has 
increased .   
There is no indication for renal placement therapy. No diane uremic 
manifestation, no   
metabolic acidosis no hyperkalemia.  
-Continue hold home home diuretics  
-Continue accurate urine output documentation  
-Continue UTI and bacteremia treatment as directed by ID service. Patient is   
currently on ertapenem. Will defer dosing antibiotics to inpatient pharmacy 
service  
-Avoid nephrotoxic's  
-Patient has iron deficiency and vitamin B12 deficiency. Patient is currently on
  
vitamin B12 supplement I will hold starting IV iron due to bacteremia . Continue
to   
monitor H&H and transfuse as needed  
-Check CBC and renal function panel in a.m.  
  
Renal team will continue to follow. Call if any question or concern.  
  
  
Documented By: Roselia Chwodhury MD 10/20/23 2359  
Signed By: <Electronically signed by Roselia Chowdhury MD>  
10/20/23 9056  
   
  
[facility]  
Work Phone: [phone]- Progress note  
  
  
  
  
                                        Author              Amanda Murillo  
[facility]  
2023 2:52pm  
   
                                        Note Date/Time      2023 2  
:51pm  
   
                                                    [facility]  
ENTER  
[address] [place] [address]  
  
Hospitalist Progress Note  
Signed  
  
Patient: Cris Hearn MR#: [id number]  
[id number]  
: 1970 Acct:[id number]  
  
Age/Sex: 52 / F Adm Date: 10/17/2  
3  
Loc:  Room: [address] [place] [address] Type: ADM IN  
Attending Dr: Amanda Murillo MD  
  
Copies to: ~  
  
  
Date of Service:  
10/19/2023  
  
  
Subjective  
Subjective  
Narrative:  
Patient was seen and evaluated at bedside this morning. She remained afebrile, 
blood   
pressure stable. Patient states feeling better with current management, denies 
any   
nausea, vomiting, diarrhea. Reports improvement in her pain in her flanks.  
  
Exam  
Physical Exam  
Vital Signs:  
  
  
  
  
Temp Pulse Resp BP Pulse Ox O2 Del Method O2 Flow Rate  
  
70 F L 73 20 104/70 99 Nasal Cannula 3  
  
10/19/23 11:31 10/19/23 12:25 10/19/23 12:25 10/19/23 11:31 10/19/23 11:31 
10/19/23   
11:31 10/19/23 11:31  
  
  
  
Narrative:  
Const  
General: cooperative, comfortable appearing, morbidly obese  
  
HEENT  
Normal oropharyngeal mucosa without any ulcers or exudates  
  
Eyes: Conjunctiva normal  
  
Pulmonary  
Auscultation: diminished breath sounds , no crackles, no wheezes  
  
Cardiovascular  
Rate: normal rate  
Rhythm: regular rhythm  
Heart Sounds: S1 normal, S2 normal and no murmurs  
  
GI  
Inspection: non-distended  
Palpation: soft, not firm and nontender. No rigidity or rebound.  
No CVA tenderness on left flank  
Bilateral nephrostomy tubes noted, clear urine drainage noted  
  
Neuro  
General: alert, awake and oriented x3. No obvious new focal deficit  
  
Musculoskeletal: normal range of motion  
  
Extrem  
General: no cyanosis, no pedal edema  
  
Psych  
Appearance: appropriate affect. Grossly normal  
  
  
Objective  
Lab Results  
  
10/19/23 05:00  
  
10/19/23 05:00  
  
Microbiology Results  
Microbiology  
  
10/17/23 01:48 Other (See Comment) Aerobic Culture - Preliminary  
Escherichia coli  
Corynebacterium striatum group  
10/17/23 01:48 Other (See Comment) Anaerobic Culture - Preliminary  
No Anaerobes Isolated 2 Days  
10/17/23 01:48 Other (See Comment) Gram Stain - Final  
10/17/23 01:43 Blood - Right Antecubital Blood Culture - Final  
Escherichia coli (ESBL)  
10/17/23 01:43 Blood - Left Antecubital Blood Culture - Preliminary  
Escherichia coli (ESBL)  
10/17/23 01:43 Blood - Left Antecubital Bacterial ID (NA Multiplex Assay) - 
Final  
10/17/23 02:26 Urine - Clean-Voided Midstream Urine Culture - Preliminary  
Escherichia coli  
Corynebacterium striatum group  
  
  
  
Meds  
Allergies and Active Meds  
Allergies  
  
aspirin Allergy (Verified 23 14:33)  
Hives  
diphenhydramine [From Benadryl] Allergy (Verified 23 14:33)  
Swelling  
ibuprofen [From Advil] Allergy (Verified 23 14:33)  
Hives  
naproxen [From Aleve] Allergy (Verified 23 14:33)  
Hives  
azithromycin Adverse Reaction (Verified 23 14:33)  
Flushing  
  
  
Active Meds:  
Active Medications  
  
  
  
Generic Name Dose Route Start Last Admin  
  
Trade Name Freq PRN Reason Stop Dose Admin  
  
Acetaminophen 650 mg 10/17/23 10:11  
  
Acetaminophen 325 Mg Tablet PO 10/16/24 10:10  
  
Q6HR PRN  
  
Pain Scale 1 - 3 or fever  
  
Albuterol 2.5 mg 10/17/23 15:53  
  
Albuterol Neb 2.5 Mg/3 Ml Vial.Neb INHALATION 10/16/24 15:52  
  
Q3H PRN  
  
Shortness Of Breath  
  
Amitriptyline HCl 50 mg 10/17/23 21:00 10/18/23 21:03  
  
Amitriptyline 50 Mg Tablet PO 10/16/24 20:59 50 mg  
  
QPM [facility] Administration  
  
Ammonium Lactate 1 applic 10/17/23 15:53  
  
Ammonium Lactate 12% Lot 226 Gm Bottle TOPICAL 10/16/24 15:52  
  
DAILY PRN  
  
Dry Skin  
  
Apixaban 5 mg 10/17/23 21:00 10/19/23 08:26  
  
Apixaban 5 Mg Tablet PO 10/16/24 20:59 5 mg  
  
BID [facility] Administration  
  
Budesonide/Formoterol Fumarate 2 puff 10/17/23 21:00 10/19/23 08:19  
  
Budesonide/Formoterol 160-4.5 Mcg 60 Puff/6 Gm Hfa.Aer.Ad INHALATION 10/16/24 
20:59 2   
puff  
  
BID [facility] Administration  
  
Cyanocobalamin 1,000 mcg 10/20/23 09:00  
  
Cyanocobalamin 1,000 Mcg Tablet PO 10/19/24 08:59  
  
QAM [facility]  
  
Dextrose 0 gm 10/17/23 16:17  
  
Dextrose 50% In Water 25 Gm/50 Ml Syringe IV-PUSH 10/16/24 16:16  
  
PRN PRN  
  
Hypoglycemia  
  
Duloxetine HCl 60 mg 10/18/23 09:00 10/19/23 08:26  
  
Duloxetine 60 Mg Capsule.Dr PO 10/17/24 08:59 60 mg  
  
QAM [facility] Administration  
  
Fluticasone Propionate 2 spray 10/17/23 15:53  
  
Fluticasone Propionate Spray 120 Spray/16 Gm Bottle INTRANASAL 10/16/24 15:52  
  
DAILY PRN  
  
Allergy Symptoms  
  
Glucose 0 gm 10/17/23 16:17  
  
Dextrose 40% Gel 15 Gm Tube PO 10/16/24 16:16  
  
PRN PRN  
  
Hypoglycemia  
  
Lactated Ringer's 1,000 mls @ 75 mls/hr 10/17/23 10:30 10/19/23 06:04  
  
Lactated Ringers IV 10/16/24 10:29 75 mls/hr  
  
.[id number] [facility] Administration  
  
Ertapenem 1 gm in 100 mls @ 200 mls/hr 10/19/23 15:30  
  
Invanz IV  
  
Q24H [facility]  
  
Insulin Aspart 0 units 10/17/23 17:00 10/19/23 12:37  
  
Insulin Aspart 300 Units/3 Ml Insuln.Pen SUBCUT 10/16/24 16:59 2 units  
  
TID.WM.HS [facility] Administration  
  
Protocol  
  
Ipratropium Bromide 0.5 mg 10/18/23 08:00 10/19/23 12:18  
  
Ipratropium Bromide 0.5 Mg/2.5 Ml Vial.Neb INHALATION 10/17/24 07:59 0.5 mg  
  
QID.RESP [facility] Administration  
  
Morphine Sulfate 2 mg 10/17/23 10:11  
  
Morphine Sulfate 2 Mg/Ml Vial IV-PUSH  
  
Q4H PRN  
  
Pain Scale 8 - 10  
  
Pantoprazole Sodium 40 mg 10/18/23 09:00 10/19/23 08:26  
  
Pantoprazole 40 Mg Tablet.Dr PO 10/17/24 08:59 40 mg  
  
DAILY [facility] Administration  
  
Pravastatin Sodium 40 mg 10/17/23 21:00 10/18/23 21:03  
  
Pravastatin 40 Mg Tablet PO 10/16/24 20:59 40 mg  
  
QPM [facility] Administration  
  
Prochlorperazine Edisylate 5 mg 10/17/23 10:11  
  
Prochlorperazine Edisylate 10 Mg/2 Ml Vial IV-PUSH 10/16/24 10:10  
  
Q4H PRN  
  
Nausea And Vomiting  
  
Sodium Chloride 0 ml 10/17/23 01:15  
  
Sodium Chloride 0.9 % 10 Ml Syringe IV-PUSH 10/16/24 01:14  
  
PRN PRN  
  
Flush  
  
Sodium Chloride 0 ml 10/19/23 10:41  
  
Sodium Chloride 0.9 % 10 Ml Syringe IV-PUSH 10/18/24 10:40  
  
PRN PRN  
  
Flush  
  
Sotalol HCl 120 mg 10/19/23 09:00 10/19/23 08:25  
  
Sotalol 120 Mg Tablet PO 10/18/24 08:59 120 mg  
  
QAM [facility] Administration  
  
Vancomycin HCl 1 each 10/19/23 10:41  
  
Vancomycin - Pharmacy Dosing 1 Each Miscell IV  
  
ONCE PRN  
  
ZZ.Pharmacy Consult  
  
Protocol  
  
  
  
  
A&P - Hospitalist  
Assessment/Plan  
(1) Severe sepsis:  
(2) Acute pyelonephritis:  
(3) Bacteremia:  
  
Plan  
Severe sepsis secondary to acute pyelonephritis  
E.coli bacteremia  
Bilateral Staghorn renal calculi s/p b/l nephrostomy tubes in 2023 at [facility]  
Hx prior nephrolithiases s/p lithotripsy  
CULLEN on CKD stage 3  
Mild hyponatremia- resolved  
-Remained afebrile, has leukocytosis WBC 19.2 downtrending to 14.0. lactic acid 
WNL.   
Denies nausea/vomiting today, improvement in her flank pain.  
-Blood cultures obtained. So far showing E.coli. Following final culture.  
-Urine culture growing gram-negative bacilli, and Corynebacterium  
-gentle IV hydration  
-Antibiotics with Meropenem. Vancomycin re-initiation per ID.  
-ID input appreciated.  
-Nephrology input appreciated.  
  
Chronic Conditions  
1. Advanced COPD on home O2, KWAKU, Tobacco use- wears CPAP at home, declined to 
wear   
CPAP here, wears 3LNC, prn Albuterol, Budesonide/ formoterol, Fluticasone nasal,
  
Ipratropium,  
2. Afib on chronic anticoagulation, HLD- Pravastatin, Eliquis for AC. Restart 
Sotalol   
after being verified.  
3. T2DM- SSI coverage and fingerstick. Invokana, Metformin and Trulicity on hold  
4. Depression- Duloxetine, Amitriptyline  
5. GERD, hx esophageal stricture s/p dilation- pantoprazole  
6. MS- not on any chronic meds and does not follow with neurology  
  
  
  
Diet: Renal diet  
DVT ppx:Eliquis  
GI ppx: PPI  
Code status: Full  
Disposition: inpatient status  
  
  
Discussed with patient at bedside. All questions answered.  
  
  
Amanda Can MD  
Internal Medicine  
Hospitalist Attending Physician  
  
  
Documented By: Amanda Murillo MD 10/19/23 14  
49  
Signed By: <Electronically signed by Amanda Murillo MD>  
10/19/23 1451  
   
  
[facility]  
Work Phone: [phone]- Progress note  
  
  
  
  
                                        Author              Roselia Chowdhury  
[facility]  
2023 2:04pm  
   
                                        Note Date/Time      2023 2  
:04pm  
   
                                                    [facility]  
ENTER  
[address] [place] [address]  
  
Nephrology Progress Note  
Signed  
  
Patient: Cris Hearn MR#: [id number]  
[id number]  
: 1970 Acct:[id number]  
  
Age/Sex: 52 / F Adm Date: 10/17/2  
3  
Loc:  Room: [address] [place] [address] Type: ADM IN  
Attending Dr: Amanda Murillo MD  
  
Copies to: ~  
  
  
Date of Service:  
10/19/2023  
  
  
Subjective  
Subjective  
Narrative:  
This is a 52-year-old female patient with past medical history of bilateral   
staghorn's kidney stone status post bilateral percutaneous nephrostomy tube 
placement   
in August of this year and follows with urology at [facility]-fib, CKD 
stage   
IV baseline creatinine around 2.2 mg deciliter, chronic respiratory failure on 
oxygen   
at home, hyperlipidemia, hypertension, MS. patientpresented to the hospital for   
severe left-sided flank pain for several day alongwith nausea vomiting x2. 
Patient   
also reported milky urine in the left percutaneous tube. Lab work in the 
emergency   
room revealed sepsis with leukocytosis. Patient was given IV fluid and 
antibiotics   
Zosyn and vancomycin and was admitted for sepsis from pyelonephritis. Blood 
cultures   
growing E. coli. Urine culture is growing E. coli and Corynebacterium stratum.   
Currently on vancomycin and meropenem.  
Renal team is consulted for acute kidney injury. Patient presented with a 
creatinine   
3.6 mg/dL. Serum creatinine today 3.18 mg deciliter and BUN 28, potassium 3.6, 
sodium   
137 mmol/L. Hemoglobin this morning 9.2 mg/dL which is likely dilutional. 
Patient has   
been on Ringer lactate at 75 cc/h. Patient stated her pain in the left flank has
  
resolved. Patient had 300 cc urine outputin the left percutaneous tube bag and 
25 cc   
in the right side.  
I reviewed the patient's home medications. She is Invokana, sotalol,   
metformin,potassium chloride, Eliquis, Lasix and statin  
Patient denied IV contrast exposure. No NSAID use. No skin rash.  
  
Interval history:  
  
Patient was seen and examined in her room. Denied worsening breathing. No flank 
pain.   
No diarrhea. No nausea or vomiting. No chest pain  
Urine output has increased to 700 cc. Kidney function slightly better.  
  
Exam  
Physical Exam  
Vital Signs:  
  
  
  
  
Temp Pulse Resp BP Pulse Ox O2 Del Method O2 Flow Rate  
  
70 F L 73 20 104/70 99 Nasal Cannula 3  
  
10/19/23 11:31 10/19/23 12:25 10/19/23 12:25 10/19/23 11:31 10/19/23 11:31 
10/19/23   
11:31 10/19/23 11:31  
  
  
  
Narrative:  
General: No acute distress  
Head :atraumatic normocephalic  
Eyes: PERRLA.  
Neck: no JVD no bruit.  
Heart: S1-S2. RRR  
Respiratory: Clear to auscultation. No wheezing. No crackles  
Abdomen: Soft, positive bowel sounds,no tenderness. Bilateral percutaneous   
nephrostomy tube. Left bag still have milky urine  
Neurology: Awake alert oriented x3. No focal deficits  
Extremity. No cyanosis. No edema  
Skin: No skin rash  
  
Objective  
Intake and Output  
I&O:  
Intake & Output  
  
  
  
10/16/23 10/17/23 10/18/23 10/19/23  
  
23:59 23:59 23:59 23:59  
  
Intake Total 4680 / 4680 3100 / 3100 2000  
  
Output Total 455 / 455 625 / 625 500 / 500  
  
Balance 4225 / 4225 2475 / 2475 1500 / 1500  
  
Weight 119.7 kg 120.5 kg 120.2 kg  
  
  
  
Meds and Allergies  
Meds:  
Active Medications  
  
Acetaminophen (Acetaminophen 325 Mg Tablet) 650 mg PO Q6HR PRN  
PRN Reason: Pain Scale 1 - 3 or fever  
Stop: 10/16/24 10:10  
Albuterol (Albuterol Neb 2.5 Mg/3 Ml Vial.Neb) 2.5 mg INHALATION Q3H PRN  
PRN Reason: Shortness Of Breath  
Stop: 10/16/24 15:52  
Amitriptyline HCl (Amitriptyline 50 Mg Tablet) 50 mg PO QPM [facility]  
Stop: 10/16/24 20:59  
Last Admin: 10/18/23 21:03 Dose: 50 mg  
  
Ammonium Lactate (Ammonium Lactate 12% Lot 226 Gm Bottle) 1 applic TOPICAL DAILY
PRN  
PRN Reason: Dry Skin  
Stop: 10/16/24 15:52  
Apixaban (Apixaban 5 Mg Tablet) 5 mg PO BID [facility]  
Stop: 10/16/24 20:59  
Last Admin: 10/19/23 08:26 Dose: 5 mg  
  
Budesonide/Formoterol Fumarate (Budesonide/Formoterol 160-4.5 Mcg 60 Puff/6 Gm   
Hfa.Aer.Ad) 2 puff INHALATION BID [facility]  
Stop: 10/16/24 20:59  
Last Admin: 10/19/23 08:19 Dose: 2 puff  
  
Dextrose (Dextrose 50% In Water 25 Gm/50 Ml Syringe) 0 gm IV-PUSH PRN PRN  
PRN Reason: Hypoglycemia  
Stop: 10/16/24 16:16  
Duloxetine HCl (Duloxetine 60 Mg Capsule.Dr) 60 mg PO QAM [facility]  
Stop: 10/17/24 08:59  
Last Admin: 10/19/23 08:26 Dose: 60 mg  
  
Fluticasone Propionate (Fluticasone Propionate Spray 120 Spray/16 Gm Bottle) 2 
spray   
INTRANASAL DAILY PRN  
PRN Reason: Allergy Symptoms  
Stop: 10/16/24 15:52  
Glucose (Dextrose 40% Gel 15 Gm Tube) 0 gm PO PRN PRN  
PRN Reason: Hypoglycemia  
Stop: 10/16/24 16:16  
Lactated Ringer's (Lactated Ringers) 1,000 mls @ 75 mls/hr IV .[id number] [facility]  
Stop: 10/16/24 10:29  
Last Admin: 10/19/23 06:04 Dose: 75 mls/hr  
  
Ertapenem (Invanz) 1 gm in 100 mls @ 200 mls/hr IV Q24H [facility]  
Insulin Aspart (Insulin Aspart 300 Units/3 Ml Insuln.Pen) 0 units SUBCUT 
TID.WM.HS   
[facility]; Protocol  
Stop: 10/16/24 16:59  
Last Admin: 10/19/23 12:37 Dose: 2 units  
  
Ipratropium Bromide (Ipratropium Bromide 0.5 Mg/2.5 Ml Vial.Neb) 0.5 mg 
INHALATION   
QID.RESP [facility]  
Stop: 10/17/24 07:59  
Last Admin: 10/19/23 12:18 Dose: 0.5 mg  
  
Morphine Sulfate (Morphine Sulfate 2 Mg/Ml Vial) 2 mg IV-PUSH Q4H PRN  
PRN Reason: Pain Scale 8 - 10  
Pantoprazole Sodium (Pantoprazole 40 Mg Tablet.) 40 mg PO DAILY [facility]  
Stop: 10/17/24 08:59  
Last Admin: 10/19/23 08:26 Dose: 40 mg  
  
Pravastatin Sodium (Pravastatin 40 Mg Tablet) 40 mg PO QPM [facility]  
Stop: 10/16/24 20:59  
Last Admin: 10/18/23 21:03 Dose: 40 mg  
  
Prochlorperazine Edisylate (Prochlorperazine Edisylate 10 Mg/2 Ml Vial) 5 mg IV-
PUSH   
Q4H PRN  
PRN Reason: Nausea And Vomiting  
Stop: 10/16/24 10:10  
Sodium Chloride (Sodium Chloride 0.9 % 10 Ml Syringe) 0 ml IV-PUSH PRN PRN  
PRN Reason: Flush  
Stop: 10/16/24 01:14  
Sodium Chloride (Sodium Chloride 0.9 % 10 Ml Syringe) 0 ml IV-PUSH PRN PRN  
PRN Reason: Flush  
Stop: 10/18/24 10:40  
Sotalol HCl (Sotalol 120 Mg Tablet) 120 mg PO QAM [facility]  
Stop: 10/18/24 08:59  
Last Admin: 10/19/23 08:25 Dose: 120 mg  
  
Vancomycin HCl (Vancomycin - Pharmacy Dosing 1 Each Miscell) 1 each IV ONCE PRN;
  
Protocol  
PRN Reason: ZZ.Pharmacy Consult  
  
Allergies  
  
aspirin Allergy (Verified 23 14:33)  
Hives  
diphenhydramine [From Benadryl] Allergy (Verified 23 14:33)  
Swelling  
ibuprofen [From Advil] Allergy (Verified 23 14:33)  
Hives  
naproxen [From Aleve] Allergy (Verified 23 14:33)  
Hives  
azithromycin Adverse Reaction (Verified 23 14:33)  
Flushing  
  
  
  
Results  
Labs  
  
10/19/23 05:00  
  
10/19/23 05:00  
  
Labs:  
  
  
  
  
10/19/23  
  
05:00  
  
BUN 24  
  
Creatinine 2.85 H  
  
Iron Saturation 10.5 L  
  
Ferritin 138.6  
  
  
  
Radiology Impressions  
Impressions - last 24 hours:  
**Any impression(s) listed above is documentation that was entered by the 
reading   
physician into a diagnostic report(s) for Cris Hearn. I have reviewed the   
report(s) and am incorporating any findings in the treatment plan of this 
patient   
where applicable.**  
  
  
  
A&P - Nephrology  
Assessment/Plan  
(1) Acute kidney injury superimposed on CKD:  
Assessment/Problem Details:  
Acute kidney injury is likely from left side pyelonephritis and ATN associated 
with   
sepsis. Serum creatinine 3.1 mg/dL today. Patient oliguric. 24-hour urine output
less   
than 500 cc from both percutaneous nephrostomy tube. CT abdomen pelvis showed no
  
obstruction but did show bilateral nephrolithiasis  
(2) Chronic kidney disease, stage IV (severe):  
Assessment/Problem Details:  
Patient has advanced CKD likely from diabetic nephropathy. Baseline creatinine 
around   
2.2 and GFR around 20 to 25 mL/min.  
(3) Complicated UTI (urinary tract infection):  
Assessment/Problem Details:  
Patient presented with severe left flank pain. Blood and urine culture are 
growing E.   
coli. Patient currently on vancomycin and meropenem. ID is following  
(4) (HFpEF) heart failure with preserved ejection fraction:  
Assessment/Problem Details:  
Patient seems compensated from cardiac standpoint. Patient on Lasix 20 mg p.o. 
daily   
at home  
(5) Anemia:  
Assessment/Problem Details:  
Hemoglobin dropped today to 9.2 g/dL. Patient on Eliquis for history of A-fib. 
Denied   
GI bleed or any active bleeding  
  
Plan  
- Serum creatinine slightly better at 2.8 mg deciliter urine output has 
increased   
mg/dL . There is no indication for renal placement therapy. No frankuremic   
manifestation, no metabolic acidosis no hyperkalemia.  
-I will stop IV fluid since the patient is now eating  
-Continue hold home home diuretics  
-Continue accurate urine output documentation  
-Continue UTI and bacteremia treatment as directed by ID service. Patient is   
currently on both vancomycin and meropenem. Will defer dosing antibiotics to   
inpatient pharmacy service  
-Avoid nephrotoxic's like IV contrast  
-Patient has iron deficiency and vitamin B12 deficiency. I will start the 
patient on   
oral vitamin B12 supplement. I will hold starting IV iron due to bacteremia .   
Continue to monitor H&H and transfuse as needed  
-Check CBC and renal function panel in a.m.  
  
Renal team will continue to follow. Call if any question or concern.  
  
  
Documented By: Roselia Chowdhury MD 10/19/23 1401  
Signed By: <Electronically signed by Roselia Chowdhury MD>  
10/19/23 1401  
   
  
[facility]  
Work Phone: [phone]- Consult note  
  
  
  
  
                                        Author              Roselia Chowdhury  
[facility]  
2023 2:40pm  
   
                                        Note Date/Time      2023 2  
:33pm  
   
                                                    [facility]  
ENTER  
[address] [place] [address]  
  
Nephrology Consult Note  
Signed  
  
Patient: Cris Hearn MR#: [id number]  
[id number]  
: 1970 Acct:[id number]  
  
Age/Sex: 52 / F Adm Date: 10/17/2  
3  
Loc:  Room: [address] [place] [address] Type: ADM IN  
Attending Dr: Amanda Murillo MD  
  
Copies to: Roselia Chowdhury MD  
[facility]  
Amanda Murillo MD~  
  
  
Providers  
Consult Date:  
10/18/23  
  
Requesting Provider:  
Amanda Murillo MD  
  
Primary Care Provider:  
[facility]  
Reason for Consult: Acute kidney injury on chronic kidney disease  
History of Present Illness:  
This is a 52-year-old female patient with past medical history of bilateral   
staghorn's kidney stone status post bilateral percutaneous nephrostomy tube 
placement   
in August of this year and follows with urology at [facility] TANISHA Nielsen-fib, CKD 
stage   
IV baseline creatinine around 2.2 mg deciliter, chronic respiratory failure on 
oxygen   
at home, hyperlipidemia, hypertension, MS. patientpresented to the hospital for   
severe left-sided flank pain for several day alongwith nausea vomiting x2. 
Patient   
also reported milky urine in the left percutaneous tube. Lab work in the 
emergency   
room revealed sepsis with leukocytosis. Patient was given IV fluid and 
antibiotics   
Zosyn and vancomycin and was admitted for sepsis from pyelonephritis. Blood 
cultures   
growing E. coli. Urine culture is growing E. coli and Corynebacterium stratum.   
Currently on vancomycin and meropenem.  
Renal team is consulted for acute kidney injury. Patient presented with a 
creatinine   
3.6 mg/dL. Serum creatinine today 3.18 mg deciliter and BUN 28, potassium 3.6, 
sodium   
137 mmol/L. Hemoglobin this morning 9.2 mg/dL which is likely dilutional. 
Patient has   
been on Ringer lactate at 75 cc/h. Patient stated her pain in the left flank has
  
resolved. Patient had 300 cc urine outputin the left percutaneous tube bag and 
25 cc   
in the right side.  
I reviewed the patient's home medications. She is Invokana, sotalol,   
metformin,potassium chloride, Eliquis, Lasix and statin  
Patient denied IV contrast exposure. No NSAID use. No skin rash  
  
Review of Systems  
Review of Systems  
Review of systems:  
12 system review is negative today except generalized weak  
  
PMFSH  
Medical History (Reviewed 10/17/23 @ 15:42 by Amanda Murillo MD)  
  
Afib  
C. difficile diarrhea  
Chronic anticoagulation  
Chronic renal disease, stage III  
Chronic respiratory failure with hypoxia  
COPD (chronic obstructive pulmonary disease)  
3L NC CONTINUOUS  
COVID-19  
Depression  
Diabetes  
Esophageal dilatation  
GERD (gastroesophageal reflux disease)  
Hyperlipemia  
Hypertension  
Multiple sclerosis  
Obesity  
KWAKU (obstructive sleep apnea)  
patient does not wear home machine, tried it, did not like it, has not worn it 
since.  
Oxygen dependent  
Smoking  
Vitamin D deficiency  
  
Surgical History (Reviewed 10/17/23 @ 15:42 by Amanda Murillo MD)  
  
H/O tubal ligation  
Hx of lithotripsy  
x3  
S/P   
S/P cholecystectomy  
Status post incision and drainage  
Umbilical hernia  
with removal  
  
Family History (Reviewed 10/17/23 @ 15:42 by Amanda Murillo MD)  
Mother COPD (chronic obstructive pulmonary disease)  
Father No problems noted.  
  
Brother Cancer  
Father Cancer  
  
Social History  
Smoking Status: Current every day smoker  
Tobacco Type: cigarettes  
Substance Use Type: None  
Social History Comments: ex- significant other and younger son  
  
Meds  
Medications & Allergies  
Allergies  
  
aspirin Allergy (Verified 23 14:33)  
Hives  
diphenhydramine [From Benadryl] Allergy (Verified 23 14:33)  
Swelling  
ibuprofen [From Advil] Allergy (Verified 23 14:33)  
Hives  
naproxen [From Aleve] Allergy (Verified 23 14:33)  
Hives  
azithromycin Adverse Reaction (Verified 23 14:33)  
Flushing  
  
  
Home Medications  
  
albuterol sulfate 2.5 mg/3 mL (0.083 %) solution for nebulization 2.5 mg (3 mL)   
inhalation Q3H PRN Shortness Of Breath #15 mL 10/07/21 [Rx Confirmed 10/17/23]  
albuterol sulfate 90 mcg/actuation aerosol inhaler (Ventolin HFA) 2 puff 
inhalation   
Q6H PRN Shortness Of Breath #2 grams 10/07/21 [Rx Confirmed 10/17/23]  
amitriptyline 50 mg tablet 50 mg PO QPM #30 tabs 10/07/21 [Rx Confirmed 
10/17/23]  
canagliflozin 100 mg tablet (Invokana) 100 mg PO QAM #30 tabs 10/07/21 [Rx 
Confirmed   
10/17/23]  
duloxetine 60 mg capsule,delayed release 60 mg PO QAM #30 caps 10/07/21 [Rx 
Confirmed   
10/17/23]  
omeprazole 20 mg capsule,delayed release 20 mg PO BID #60 caps 10/07/21 [Rx 
Confirmed   
23]  
pravastatin 40 mg tablet 40 mg PO QPM #30 tabs 10/07/21 [Rx Confirmed 10/17/23]  
sotalol 120 mg tablet (Sotalol AF) 120 mg PO QAM #30 tabs 10/07/21 [Rx Confirmed
   
10/17/23]  
metformin 500 mg tablet,extended release 24 hr 500 mg PO DAILY 22 [History
   
Confirmed 10/17/23]  
budesonide-formoterol  mcg-4.5 mcg/actuation aerosol inhaler (Symbicort) 
2   
puff inhalation BID 22 [History Confirmed 10/17/23]  
potassium chloride 10 mEq capsule,extended release 10 meq PO DAILY #30 caps 
22   
[Rx Confirmed 10/17/23]  
dulaglutide 0.75 mg/0.5 mL subcutaneous pen injector (Trulicity) 0.7 mg subcut 
QWEEK   
23 [History Confirmed 10/17/23]  
ergocalciferol (vitamin D2) 1,250 mcg (50,000 unit) capsule (Vitamin D2) 50,000 
unit   
PO QWEEK 23 [History Confirmed 10/17/23]  
fluticasone propionate 50 mcg/actuation nasal spray,suspension 2 spray 
intranasal   
DAILY PRN Allergy Symptoms 23 [History Confirmed 10/17/23]  
multivitamin 1 tab PO DAILY 23 [History Confirmed 10/17/23]  
tiotropium bromide 2.5 mcg/actuation mist for inhalation (Spiriva Respimat) 2 
puff   
inhalation DAILY 23 [History Confirmed 10/17/23]  
apixaban 5 mg tablet (Eliquis) 5 mg PO BID 23 [History Confirmed 10/17/23]  
furosemide 20 mg tablet 20 mg PO DAILY 23 [History Confirmed 10/17/23]  
ammonium lactate 12 % lotion 1 applic topical DAILY PRN Dry Skin 10/17/23 
[History   
Confirmed 10/17/23]  
  
  
Active Medications:  
Active Medications  
  
Acetaminophen (Acetaminophen 325 Mg Tablet) 650 mg PO Q6HR PRN  
PRN Reason: Pain Scale 1 - 3 or fever  
Stop: 10/16/24 10:10  
Albuterol (Albuterol Neb 2.5 Mg/3 Ml Vial.Neb) 2.5 mg INHALATION Q3H PRN  
PRN Reason: Shortness Of Breath  
Stop: 10/16/24 15:52  
Amitriptyline HCl (Amitriptyline 50 Mg Tablet) 50 mg PO QPM [facility]  
Stop: 10/16/24 20:59  
Last Admin: 10/17/23 22:22 Dose: 50 mg  
  
Ammonium Lactate (Ammonium Lactate 12% Lot 226 Gm Bottle) 1 applic TOPICAL DAILY
PRN  
PRN Reason: Dry Skin  
Stop: 10/16/24 15:52  
Apixaban (Apixaban 5 Mg Tablet) 5 mg PO BID [facility]  
Stop: 10/16/24 20:59  
Last Admin: 10/18/23 08:41 Dose: 5 mg  
  
Budesonide/Formoterol Fumarate (Budesonide/Formoterol 160-4.5 Mcg 60 Puff/6 Gm   
Hfa.Aer.Ad) 2 puff INHALATION BID [facility]  
Stop: 10/16/24 20:59  
Last Admin: 10/18/23 08:24 Dose: 2 puff  
  
Dextrose (Dextrose 50% In Water 25 Gm/50 Ml Syringe) 0 gm IV-PUSH PRN PRN  
PRN Reason: Hypoglycemia  
Stop: 10/16/24 16:16  
Duloxetine HCl (Duloxetine 60 Mg Capsule.) 60 mg PO QAM [facility]  
Stop: 10/17/24 08:59  
Last Admin: 10/18/23 08:41 Dose: 60 mg  
  
Fluticasone Propionate (Fluticasone Propionate Spray 120 Spray/16 Gm Bottle) 2 
spray   
INTRANASAL DAILY PRN  
PRN Reason: Allergy Symptoms  
Stop: 10/16/24 15:52  
Glucose (Dextrose 40% Gel 15 Gm Tube) 0 gm PO PRN PRN  
PRN Reason: Hypoglycemia  
Stop: 10/16/24 16:16  
Lactated Ringer's (Lactated Ringers) 1,000 mls @ 75 mls/hr IV .[id number] [facility]  
Stop: 10/16/24 10:29  
Last Admin: 10/18/23 00:49 Dose: 75 mls/hr  
  
Meropenem (Merrem) 1 gm in 100 mls @ 200 mls/hr IV Q12H [facility]  
Last Admin: 10/18/23 13:45 Dose: 200 mls/hr  
  
Insulin Aspart (Insulin Aspart 300 Units/3 Ml Insuln.Pen) 0 units SUBCUT 
TID.WM.HS   
[facility]; Protocol  
Stop: 10/16/24 16:59  
Last Admin: 10/18/23 13:45 Dose: 2 units  
  
Ipratropium Bromide (Ipratropium Bromide 0.5 Mg/2.5 Ml Vial.Neb) 0.5 mg 
INHALATION   
QID.RESP [facility]  
Stop: 10/17/24 07:59  
Last Admin: 10/18/23 11:40 Dose: 0.5 mg  
  
Morphine Sulfate (Morphine Sulfate 2 Mg/Ml Vial) 2 mg IV-PUSH Q4H PRN  
PRN Reason: Pain Scale 8 - 10  
Pantoprazole Sodium (Pantoprazole 40 Mg Tablet.) 40 mg PO DAILY [facility]  
Stop: 10/17/24 08:59  
Last Admin: 10/18/23 08:41 Dose: 40 mg  
  
Pravastatin Sodium (Pravastatin 40 Mg Tablet) 40 mg PO QPM [facility]  
Stop: 10/16/24 20:59  
Last Admin: 10/17/23 22:22 Dose: 40 mg  
  
Prochlorperazine Edisylate (Prochlorperazine Edisylate 10 Mg/2 Ml Vial) 5 mg IV-
PUSH   
Q4H PRN  
PRN Reason: Nausea And Vomiting  
Stop: 10/16/24 10:10  
Sodium Chloride (Sodium Chloride 0.9 % 10 Ml Syringe) 0 ml IV-PUSH PRN PRN  
PRN Reason: Flush  
Stop: 10/16/24 01:14  
Sotalol HCl (Sotalol 120 Mg Tablet) 120 mg PO QAM [facility]  
Stop: 10/18/24 08:59  
  
  
  
Exam  
Physical Exam  
Vital Signs:  
  
  
  
  
Temp Pulse Resp BP Pulse Ox O2 Del Method O2 Flow Rate  
  
98.0 F 85 18 108/75 99 Room Air 3  
  
10/18/23 08:00 10/18/23 11:40 10/18/23 11:40 10/18/23 11:37 10/18/23 11:37 
10/18/23   
11:37 10/18/23 08:25  
  
  
  
Narrative:  
General: No acute distress  
Head :atraumatic normocephalic  
Eyes: PERRLA.  
Neck: no JVD no bruit.  
Heart: S1-S2. RRR  
Respiratory: Clear to auscultation. No wheezing. No crackles  
Abdomen: Soft, positive bowel sounds,no tenderness. Bilateral percutaneous   
nephrostomy tube. Left bag still have milky urine  
Neurology: Awake alert oriented x3. No focal deficits  
Extremity. No cyanosis. No edema  
Skin: No skin rash  
  
Results  
Labs  
  
10/18/23 04:26  
  
10/18/23 04:26  
  
Labs:  
  
  
  
  
10/18/23  
  
04:26  
  
BUN 28 H  
  
Creatinine 3.18 H  
  
Albumin 2.9 L  
  
  
  
Radiology Impressions  
Impressions - last 24 hours:  
**Any impression(s) listed above is documentation that was entered by the 
reading   
physician into a diagnostic report(s) for Cris Hearn. I have reviewed the   
report(s) and am incorporating any findings in the treatment plan of this 
patient   
where applicable.**  
  
  
  
A&P - Nephrology  
Assessment/Plan  
(1) Acute kidney injury superimposed on CKD:  
Assessment/Problem Details:  
Acute kidney injury is likely from left side pyelonephritis and ATN associated 
with   
sepsis. Serum creatinine 3.1 mg/dL today. Patient oliguric. 24-hour urine output
less   
than 500 cc from both percutaneous nephrostomy tube. CT abdomen pelvis showed no
  
obstruction but did show bilateral nephrolithiasis  
(2) Chronic kidney disease, stage IV (severe):  
Assessment/Problem Details:  
Patient has advanced CKD likely from diabetic nephropathy. Baseline creatinine 
around   
2.2 and GFR around 20 to 25 mL/min.  
(3) Complicated UTI (urinary tract infection):  
Assessment/Problem Details:  
Patient presented with severe left flank pain. Blood and urine culture are 
growing E.   
coli. Patient currently on vancomycin and meropenem. ID is following  
(4) (HFpEF) heart failure with preserved ejection fraction:  
Assessment/Problem Details:  
Patient seems compensated from cardiac standpoint. Patient on Lasix 20 mg p.o. 
daily   
at home  
(5) Anemia:  
Assessment/Problem Details:  
Hemoglobin dropped today to 9.2 g/dL. Patient on Eliquis for history of A-fib. 
Denied   
GI bleed or any active bleeding  
  
Plan  
- Serum creatinine today 3.1 mg/dL and BUN 28. Patient slightly oliguric. There 
is no   
indication for renal placement therapy. No diane uremic manifestation, no 
metabolic   
acidosis no hyperkalemia.  
-I will continue IV fluid but currently at Ringer lactate 75 cc/h  
-Continue hold home Lasix  
-Continue accurate urine output documentation  
-Continue UTI and bacteremia treatment as directed by ID service. Patient is   
currently on both vancomycin and meropenem. Will defer dosing antibiotics to   
inpatient pharmacy service  
-Avoid nephrotoxic's like IV contrast  
-Check iron studies study along with folate and vitamin B12. Continue to monitor
H&H   
and transfuse as needed  
-Check CBC and renal function panel in a.m.  
  
Thank you for allow me to participate in Mrs. Hearn's care. Renal team will   
continue to follow. Call if any question or concern.  
  
  
Documented By: Roselia Chowdhury MD 10/18/23 1426  
Signed By: <Electronically signed by Roselia Chowdhury MD>  
10/18/23 8999  
   
  
[facility]  
Work Phone: [phone]- Progress note  
  
  
  
  
                                        Author              Amanda Murillo  
[facility]  
2023 11:07am  
   
                                        Note Date/Time      2023 1  
1:04am  
   
                                                    [facility]  
ENTER  
[address] [place] [address]  
  
Hospitalist Progress Note  
Signed  
  
Patient: Cris Hearn MR#: [id number]  
[id number]  
: 1970 Acct:[id number]  
  
Age/Sex: 52 / F Adm Date: 10/17/2  
3  
Loc: 4 Room: [address] [place] [address] Type: ADM IN  
Attending Dr: Amanda Murillo MD  
  
Copies to: ~  
  
  
Date of Service:  
10/18/2023  
  
  
Subjective  
Subjective  
Narrative:  
Patient was seen and evaluated at bedside this morning. She remained afebrile, 
blood   
pressure borderline this morning but somewhat stable. Patient states feeling 
slightly   
better with current management, denies any nausea, vomiting, diarrhea. Reports   
improvement in her pain in her flanks.  
  
Exam  
Physical Exam  
Vital Signs:  
  
  
  
  
Temp Pulse Resp BP Pulse Ox O2 Del Method O2 Flow Rate  
  
98.0 F 83 18 102/69 100 Nasal Cannula 3  
  
10/18/23 08:00 10/18/23 08:26 10/18/23 08:26 10/18/23 08:00 10/18/23 08:25 
10/18/23   
08:25 10/18/23 08:25  
  
  
  
Narrative:  
Const  
General: cooperative, comfortable appearing, morbidly obese  
  
HEENT  
Normal oropharyngeal mucosa without any ulcers or exudates  
  
Eyes: Conjunctiva normal  
  
Pulmonary  
Auscultation: diminished breath sounds , no crackles, no wheezes  
  
Cardiovascular  
Rate: normal rate  
Rhythm: regular rhythm  
Heart Sounds: S1 normal, S2 normal and no murmurs  
  
GI  
Inspection: non-distended  
Palpation: soft, not firm and nontender. No rigidity or rebound.  
No CVA tenderness on left flank  
Bilateral nephrostomy tubes noted, dark cloudy drainage noted  
  
Neuro  
General: alert, awake and oriented x3. No obvious new focal deficit  
  
Musculoskeletal: normal range of motion  
  
Extrem  
General: no cyanosis, no pedal edema  
  
Psych  
Appearance: appropriate affect. Grossly normal  
  
  
Objective  
Lab Results  
  
10/18/23 04:26  
  
10/18/23 04:26  
  
Microbiology Results  
Microbiology  
  
10/17/23 02:26 Urine - Clean-Voided Midstream Urine Culture - Preliminary  
Gram Negative Bacilli  
Possible Staphylococcus sp.  
10/17/23 01:43 Blood - Right Antecubital Blood Culture - Preliminary  
Escherichia coli  
10/17/23 01:43 Blood - Left Antecubital Blood Culture - Preliminary  
Escherichia coli  
10/17/23 01:43 Blood - Left Antecubital Bacterial ID (NA Multiplex Assay) - 
Final  
10/17/23 01:48 Other (See Comment) Gram Stain - Final  
  
  
  
Meds  
Allergies and Active Meds  
Allergies  
  
aspirin Allergy (Verified 23 14:33)  
Hives  
diphenhydramine [From Benadryl] Allergy (Verified 23 14:33)  
Swelling  
ibuprofen [From Advil] Allergy (Verified 23 14:33)  
Hives  
naproxen [From Aleve] Allergy (Verified 23 14:33)  
Hives  
azithromycin Adverse Reaction (Verified 23 14:33)  
Flushing  
  
  
Active Meds:  
Active Medications  
  
  
  
Generic Name Dose Route Start Last Admin  
  
Trade Name Freq PRN Reason Stop Dose Admin  
  
Acetaminophen 650 mg 10/17/23 10:11  
  
Acetaminophen 325 Mg Tablet PO 10/16/24 10:10  
  
Q6HR PRN  
  
Pain Scale 1 - 3 or fever  
  
Albuterol 2.5 mg 10/17/23 15:53  
  
Albuterol Neb 2.5 Mg/3 Ml Vial.Neb INHALATION 10/16/24 15:52  
  
Q3H PRN  
  
Shortness Of Breath  
  
Amitriptyline HCl 50 mg 10/17/23 21:00 10/17/23 22:22  
  
Amitriptyline 50 Mg Tablet PO 10/16/24 20:59 50 mg  
  
QPM [facility] Administration  
  
Ammonium Lactate 1 applic 10/17/23 15:53  
  
Ammonium Lactate 12% Lot 226 Gm Bottle TOPICAL 10/16/24 15:52  
  
DAILY PRN  
  
Dry Skin  
  
Apixaban 5 mg 10/17/23 21:00 10/18/23 08:41  
  
Apixaban 5 Mg Tablet PO 10/16/24 20:59 5 mg  
  
BID [facility] Administration  
  
Budesonide/Formoterol Fumarate 2 puff 10/17/23 21:00 10/18/23 08:24  
  
Budesonide/Formoterol 160-4.5 Mcg 60 Puff/6 Gm Hfa.Aer.Ad INHALATION 10/16/24 
20:59 2   
puff  
  
BID [facility] Administration  
  
Dextrose 0 gm 10/17/23 16:17  
  
Dextrose 50% In Water 25 Gm/50 Ml Syringe IV-PUSH 10/16/24 16:16  
  
PRN PRN  
  
Hypoglycemia  
  
Duloxetine HCl 60 mg 10/18/23 09:00 10/18/23 08:41  
  
Duloxetine 60 Mg Capsule.Dr PO 10/17/24 08:59 60 mg  
  
QAM [facility] Administration  
  
Fluticasone Propionate 2 spray 10/17/23 15:53  
  
Fluticasone Propionate Spray 120 Spray/16 Gm Bottle INTRANASAL 10/16/24 15:52  
  
DAILY PRN  
  
Allergy Symptoms  
  
Glucose 0 gm 10/17/23 16:17  
  
Dextrose 40% Gel 15 Gm Tube PO 10/16/24 16:16  
  
PRN PRN  
  
Hypoglycemia  
  
Lactated Ringer's 1,000 mls @ 75 mls/hr 10/17/23 10:30 10/18/23 00:49  
  
Lactated Ringers IV 10/16/24 10:29 75 mls/hr  
  
[id number] [facility] Administration  
  
Meropenem 1 gm in 100 mls @ 200 mls/hr 10/17/23 11:30 10/18/23 00:48  
  
Merrem  mls/hr  
  
Q12H [facility] Administration  
  
Insulin Aspart 0 units 10/17/23 17:00 10/18/23 08:41  
  
Insulin Aspart 300 Units/3 Ml Insuln.Pen SUBCUT 10/16/24 16:59 2 units  
  
TID.WM.HS [facility] Administration  
  
Protocol  
  
Ipratropium Bromide 0.5 mg 10/18/23 08:00 10/18/23 08:24  
  
Ipratropium Bromide 0.5 Mg/2.5 Ml Vial.Neb INHALATION 10/17/24 07:59 0.5 mg  
  
QID.RESP [facility] Administration  
  
Morphine Sulfate 2 mg 10/17/23 10:11  
  
Morphine Sulfate 2 Mg/Ml Vial IV-PUSH  
  
Q4H PRN  
  
Pain Scale 8 - 10  
  
Pantoprazole Sodium 40 mg 10/18/23 09:00 10/18/23 08:41  
  
Pantoprazole 40 Mg Tablet.Dr PO 10/17/24 08:59 40 mg  
  
DAILY [facility] Administration  
  
Pravastatin Sodium 40 mg 10/17/23 21:00 10/17/23 22:22  
  
Pravastatin 40 Mg Tablet PO 10/16/24 20:59 40 mg  
  
QPM [facility] Administration  
  
Prochlorperazine Edisylate 5 mg 10/17/23 10:11  
  
Prochlorperazine Edisylate 10 Mg/2 Ml Vial IV-PUSH 10/16/24 10:10  
  
Q4H PRN  
  
Nausea And Vomiting  
  
Sodium Chloride 0 ml 10/17/23 01:15  
  
Sodium Chloride 0.9 % 10 Ml Syringe IV-PUSH 10/16/24 01:14  
  
PRN PRN  
  
Flush  
  
  
  
  
A&P - Hospitalist  
Assessment/Plan  
(1) Severe sepsis:  
(2) Acute pyelonephritis:  
(3) Bacteremia:  
  
Plan  
Severe sepsis secondary to acute pyelonephritis  
E.coli bacteremia  
Bilateral Staghorn renal calculi s/p b/l nephrostomy tubes in 2023 at F  
Hx prior nephrolithiases s/p lithotripsy  
CULLEN on CKD stage 3  
Mild hyponatremia- resolved  
-Remained afebrile, has leukocytosis WBC 19.2 downtrending to 14.0. lactic acid 
WNL.   
Denies nausea/vomiting today, improvement in her flank pain.  
-Blood cultures obtained. So far showing E.coli. Following final culture.  
-Urine culture growing gram-negative bacilli, possible Staphylococcus species 
15K  
-Continue gentle IV hydration  
-Antibiotics with Meropenem. vancomycin discontinued per ID since culture 
showing   
gram negative.  
-ID input appreciated.  
-Will consult nephrology for CULLEN on CKD for input and fluid/diuretics 
management.  
  
Chronic Conditions  
1. Advanced COPD on home O2, KWAKU, Tobacco use- wears CPAP at home, declined to 
wear   
CPAP here, wears 3LNC, prn Albuterol, Budesonide/ formoterol, Fluticasone nasal,
  
Ipratropium,  
2. Afib on chronic anticoagulation, HLD- Pravastatin, Eliquis for AC. Restart 
Sotalol   
after being verified.  
3. T2DM- SSI coverage and fingerstick. Invokana, Metformin and Trulicity on hold  
4. Depression- Duloxetine, Amitriptyline  
5. GERD, hx esophageal stricture s/p dilation- pantoprazole  
6. MS- not on any chronic meds and does not follow with neurology  
  
  
  
Diet: Renal diet  
DVT ppx:Eliquis  
GI ppx: PPI  
Code status: Full  
Disposition: inpatient status  
  
  
Discussed with patient at bedside. All questions answered.  
  
  
Amanda Can MD  
Internal Medicine  
Hospitalist Attending Physician  
  
  
Documented By: Amanda Murillo MD 10/18/23 10  
59  
Signed By: <Electronically signed by Amanda Murillo MD>  
10/18/23 8545  
   
  
[facility]  
Work Phone: [phone]- Consult note  
  
  
  
  
                                        Author              Azael Aj  
[facility]  
2023 9:29am  
   
                                        Note Date/Time      2023 9  
:27am  
   
                                                    [facility]  
ENTER  
[address] [place] [address]  
  
Infect. Disease Consult Note  
Signed  
  
Patient: Cris Hearn MR#: [id number]  
[id number]  
: 1970 Acct:[id number]  
  
Age/Sex: 52 / F Adm Date: 10/17/2  
3  
Loc: 4 Room: [address] [place] [address] Type: ADM IN  
Attending Dr: Amanda Murillo MD  
  
Copies to: [facility]  
MD Amanda Vasquez MD~  
  
  
HPI  
Data of Consult  
Consult date:  
10/18/23  
  
Requesting Physician:  
Amanda Murillo MD  
  
Primary Care Provider:  
[facility]  
  
Consult Narrative  
History of present illness:  
Patient is a 52-year-old female with medical history of A-fib, COPD, T2DM, HLD,   
bilateral staghorn kidney stones s/p bilateral nephrostomy tube placement 
recently in   
August done at [facility]. The tubes have been functioning well since she had placement
up   
until this recent presentation. She presented to the ER due toleft-sided flank 
pain   
over the past couple of days associated with nausea and vomiting over the past 2
  
days. Patient denies any fever, chills, diarrhea, constipation, chest pain. 
Patient   
noted also some purulent  milky  discharge from her left nephrostomy tube. 
Stated   
that her dressings have been soaked for the past few days. She lives with her 
son and   
his fianc?e at home who takes care of her drainage and change his dressings. On   
presentation she had severe pain almost 8 out of 10 with left CVA tenderness. In
the   
ER, sepsis protocol was initiated, patient received IV fluids, blood cultures   
collected. Also culture collected from the drainage around nephrostomy tube. 
Patient   
was given Vanco and Zosyn in the ER. Noted to have leukocytosis with WBC 19.2, 
BMP   
showing elevated creatinine from her baseline. Today blood cultures have E. coli
  
growing. Patient is feeling better today. She is currently on meropenem. Both   
nephrostomy tubes are functioning and draining urine at this time.  
CC: Amanda Murillo MD  
  
  
[facility]  
Medical History (Reviewed 10/17/23 @ 15:42 by Amanda Murillo MD)  
  
Afib  
C. difficile diarrhea  
Chronic anticoagulation  
Chronic renal disease, stage III  
Chronic respiratory failure with hypoxia  
COPD (chronic obstructive pulmonary disease)  
3L NC CONTINUOUS  
COVID-19  
Depression  
Diabetes  
Esophageal dilatation  
GERD (gastroesophageal reflux disease)  
Hyperlipemia  
Hypertension  
Multiple sclerosis  
Obesity  
KWAKU (obstructive sleep apnea)  
patient does not wear home machine, tried it, did not like it, has not worn it 
since.  
Oxygen dependent  
Smoking  
Vitamin D deficiency  
  
Surgical History (Reviewed 10/17/23 @ 15:42 by Amanda Murillo MD)  
  
H/O tubal ligation  
Hx of lithotripsy  
x3  
S/P   
S/P cholecystectomy  
Status post incision and drainage  
Umbilical hernia  
with removal  
  
Family History (Reviewed 10/17/23 @ 15:42 by Amanda Murillo MD)  
Mother COPD (chronic obstructive pulmonary disease)  
Father No problems noted.  
  
Brother Cancer  
Father Cancer  
  
Social History  
Smoking Status: Current every day smoker  
Tobacco Type: cigarettes  
Substance Use Type: None  
Social History Comments: ex- significant other and younger son  
  
Allergies and Medications  
Allergies and Active Meds  
Allergies  
  
aspirin Allergy (Verified 23 14:33)  
Hives  
diphenhydramine [From Benadryl] Allergy (Verified 23 14:33)  
Swelling  
ibuprofen [From Advil] Allergy (Verified 23 14:33)  
Hives  
naproxen [From Aleve] Allergy (Verified 23 14:33)  
Hives  
azithromycin Adverse Reaction (Verified 23 14:33)  
Flushing  
  
  
Active Medications  
  
Acetaminophen (Acetaminophen 325 Mg Tablet) 650 mg PO Q6HR PRN  
PRN Reason: Pain Scale 1 - 3 or fever  
Stop: 10/16/24 10:10  
Albuterol (Albuterol Neb 2.5 Mg/3 Ml Vial.Neb) 2.5 mg INHALATION Q3H PRN  
PRN Reason: Shortness Of Breath  
Stop: 10/16/24 15:52  
Amitriptyline HCl (Amitriptyline 50 Mg Tablet) 50 mg PO QPM [facility]  
Stop: 10/16/24 20:59  
Last Admin: 10/17/23 22:22 Dose: 50 mg  
  
Ammonium Lactate (Ammonium Lactate 12% Lot 226 Gm Bottle) 1 applic TOPICAL DAILY
PRN  
PRN Reason: Dry Skin  
Stop: 10/16/24 15:52  
Apixaban (Apixaban 5 Mg Tablet) 5 mg PO BID [facility]  
Stop: 10/16/24 20:59  
Last Admin: 10/18/23 08:41 Dose: 5 mg  
  
Budesonide/Formoterol Fumarate (Budesonide/Formoterol 160-4.5 Mcg 60 Puff/6 Gm   
Hfa.Aer.Ad) 2 puff INHALATION BID [facility]  
Stop: 10/16/24 20:59  
Last Admin: 10/18/23 08:24 Dose: 2 puff  
  
Dextrose (Dextrose 50% In Water 25 Gm/50 Ml Syringe) 0 gm IV-PUSH PRN PRN  
PRN Reason: Hypoglycemia  
Stop: 10/16/24 16:16  
Duloxetine HCl (Duloxetine 60 Mg Capsule.) 60 mg PO QAM [facility]  
Stop: 10/17/24 08:59  
Last Admin: 10/18/23 08:41 Dose: 60 mg  
  
Fluticasone Propionate (Fluticasone Propionate Spray 120 Spray/16 Gm Bottle) 2 
spray   
INTRANASAL DAILY PRN  
PRN Reason: Allergy Symptoms  
Stop: 10/16/24 15:52  
Glucose (Dextrose 40% Gel 15 Gm Tube) 0 gm PO PRN PRN  
PRN Reason: Hypoglycemia  
Stop: 10/16/24 16:16  
Lactated Ringer's (Lactated Ringers) 1,000 mls @ 75 mls/hr IV .[id number] [facility]  
Stop: 10/16/24 10:29  
Last Admin: 10/18/23 00:49 Dose: 75 mls/hr  
  
Meropenem (Merrem) 1 gm in 100 mls @ 200 mls/hr IV Q12H [facility]  
Last Admin: 10/18/23 00:48 Dose: 200 mls/hr  
  
Insulin Aspart (Insulin Aspart 300 Units/3 Ml Insuln.Pen) 0 units SUBCUT 
TID.WM.HS   
[facility]; Protocol  
Stop: 10/16/24 16:59  
Last Admin: 10/18/23 08:41 Dose: 2 units  
  
Ipratropium Bromide (Ipratropium Bromide 0.5 Mg/2.5 Ml Vial.Neb) 0.5 mg 
INHALATION   
QID.RESP [facility]  
Stop: 10/17/24 07:59  
Last Admin: 10/18/23 08:24 Dose: 0.5 mg  
  
Morphine Sulfate (Morphine Sulfate 2 Mg/Ml Vial) 2 mg IV-PUSH Q4H PRN  
PRN Reason: Pain Scale 8 - 10  
Pantoprazole Sodium (Pantoprazole 40 Mg Tablet.) 40 mg PO DAILY [facility]  
Stop: 10/17/24 08:59  
Last Admin: 10/18/23 08:41 Dose: 40 mg  
  
Pravastatin Sodium (Pravastatin 40 Mg Tablet) 40 mg PO QPM [facility]  
Stop: 10/16/24 20:59  
Last Admin: 10/17/23 22:22 Dose: 40 mg  
  
Prochlorperazine Edisylate (Prochlorperazine Edisylate 10 Mg/2 Ml Vial) 5 mg IV-
PUSH   
Q4H PRN  
PRN Reason: Nausea And Vomiting  
Stop: 10/16/24 10:10  
Sodium Chloride (Sodium Chloride 0.9 % 10 Ml Syringe) 0 ml IV-PUSH PRN PRN  
PRN Reason: Flush  
Stop: 10/16/24 01:14  
Vancomycin HCl (Vancomycin - Pharmacy Dosing 1 Each Miscell) 1 each IV ONCE PRN;
  
Protocol  
PRN Reason: ZZ.Pharmacy Consult  
  
  
  
Exam  
Physical Exam  
Vital Signs:  
  
  
Vital Signs  
  
  
  
Temp Pulse Pulse Resp BP BP Pulse Ox  
  
10/18/23 08:25 100  
  
10/18/23 08:26 83 18  
  
10/18/23 04:00 98.2 F 81 17 90/59 L 98  
  
10/18/23 00:00 86 17 93/51 L 96  
  
10/18/23 00:23  
  
10/17/23 20:00  
  
10/17/23 20:00 98.2 F 83 18 100/55 L 100  
  
10/17/23 15:12 98.4 F 85 18 111/74 99  
  
10/17/23 12:00  
  
10/17/23 11:47 98.3 F 85 18 121/75 98  
  
10/17/23 11:09  
  
10/17/23 10:33 97.9 F 88 18 106/71 99  
  
10/17/23 10:15 87 18 132/74 98  
  
  
Const  
General: cooperative, comfortable and cushingoid  
Orientation: oriented x3  
HEENT  
Head: normal to inspection  
Nose: external nose normal  
Mouth: oral mucosae normal  
Eyes  
General: appearance normal, both eyes and all related structures  
Neck  
Neck: normal visual inspection  
Chest  
Chest palpation & inspection: normal inspection of the chest  
Resp  
Effort & Inspection: normal respiratory effort  
Cardio  
Palpation: normal PMI  
Rate: regular rate  
Rhythm: regular rhythm  
GI  
Inspection: normal to inspection  
Palpation: soft  
  
General: No bimanual renal exam normal bilaterally (Bilateral nephrostomy tubes   
draining urine but does appear cloudy)  
Skin  
General: no rashes or lesions noted  
Neuro  
General: patient oriented x3  
Extrem  
General: normal to inspection  
  
Results  
Labs  
  
10/18/23 04:26  
  
10/18/23 04:26  
  
Labs:  
  
  
10/18/23 04:26: Corrected WBC 14.0 H, Uncorrected WBC Count 14.0 H  
10/18/23 04:26: BUN 28 H, Creatinine 3.18 H  
  
  
Microbiology Results  
Microbiology Narrative:  
  
  
  
  
  
10/17/23 01:43 Blood Culture - Preliminary  
  
Blood - Right Antecubital Escherichia coli  
  
10/17/23 01:43 Blood Culture - Preliminary  
  
Blood - Left Antecubital Escherichia coli  
  
Bacterial ID (NA Multiplex Assay) - Final  
  
10/17/23 01:48 Aerobic Culture - Pending  
  
Other (See Comment) Anaerobic Culture - Pending  
  
Gram Stain - Final  
  
10/17/23 02:26 Urine Culture - Pending  
  
Urine - Clean-Voided Midstream  
  
  
Urine culture pending. Left nephrostomy tube culture pending.  
  
A&P - Infectious Disease  
(1) Complicated UTI (urinary tract infection):  
Status: Acute  
(2) Bacteremia:  
Status: Acute  
  
Plan  
Patient had a CT scan done that shows the nephrostomy tubes are in appropriate   
position. They are now currently both draining. She states the drainage had been
  
sluggish for a while. E. coli growing from the blood culture. Awaiting urine 
culture.   
It appears to urine cultures have been done perhaps 1 normally and 1 from the 
left   
nephrostomy tube. Not sure why the right side was not collected?. Nonetheless 
she is   
on vancomycin and meropenem. Given blood cultures feel we can stop the 
vancomycin.   
Maintain meropenem and await susceptibilities.  
  
  
Documented By: Azael Aj MD 10/18/23 0923  
Signed By: <Electronically signed by MD Azael Aj>  
10/18/23 0929  
   
  
[facility]  
Work Phone: [phone]- History and physical note  
  
  
  
  
                                        Author              Amanda Murillo  
[facility]  
2023 3:58pm  
   
                                        Note Date/Time      2023 3  
:35pm  
   
                                                    [facility]  
ENTER  
[address] [place] [address]  
  
Hospitalist H&P  
Signed  
  
Patient: Cris Hearn MR#: [id number]  
[id number]  
: 1970 Acct:[id number]  
  
Age/Sex: 52 / F Adm Date: 10/17/2  
3  
Loc:  Room: [address] [place] [address] Type: ADM IN  
Attending Dr: Amanda Murillo MD  
  
Copies to: Apollo Lopez DO, RES  
Amanda Murillo MD~  
  
  
HPI  
DATE OF EXAMINATION: 10/17/23  
CHIEF COMPLAINT: Flank pain  
HISTORY OF PRESENT ILLNESS:  
Patient is a 52-year-old female with medical history of A-fib, COPD, T2DM, HLD,   
bilateral staghorn kidney stones s/p bilateral nephrostomy tube placement 
recently in   
August done at [facility], She follows with Dr. Iniguez there for this, presented to 
the   
emergency due to left-sided flank pain over the past couple of days associated 
with   
nausea and vomiting over the past 2 days. Patient denies any fever, chills, 
diarrhea,   
constipation, chest pain. Patient noted also some purulent  milky  discharge 
from her   
left nephrostomy tube. Stated that her dressings have been soaked for the past 
few   
days. She lives with her son and his fianc?e at home who takes care of her 
drainage   
and change his dressings. Onpresentation she had severe pain almost 8 out of 10 
with   
left CVA tenderness. In the ER, sepsis protocol was initiated, patient received 
IV   
fluids, blood cultures collected. Also culture collected from the drainage 
around   
nephrostomytube. Patient was given Vanco and Zosyn in the ER. Given Dilaudid for
pain   
andZofran for nausea. Noted to have leukocytosis with WBC 19.2, BMP showing 
elevated   
creatinine from her baseline. Lactic acid within normal limits. Urinalysis 
suggestive   
of infection. ER staff communicated with [facility] Dr. Reinoso who feels the
  
patient can be taken care of here in our facility with IV antibiotics and   
conservative management. Patient was admitted here for further evaluation and   
management.  
  
Review of Systems  
Review of Systems  
Review of systems:  
10 systems are reviewed and are negative except as mentioned elsewhere in the   
documentation  
  
[facility]  
Medical History (Reviewed 10/17/23 @ 15:42 by Amanda Murillo MD)  
  
Afib  
C. difficile diarrhea  
Chronic anticoagulation  
Chronic renal disease, stage III  
Chronic respiratory failure with hypoxia  
COPD (chronic obstructive pulmonary disease)  
3L NC CONTINUOUS  
COVID-19  
Depression  
Diabetes  
Esophageal dilatation  
GERD (gastroesophageal reflux disease)  
Hyperlipemia  
Hypertension  
Multiple sclerosis  
Obesity  
KWAKU (obstructive sleep apnea)  
patient does not wear home machine, tried it, did not like it, has not worn it 
since.  
Oxygen dependent  
Smoking  
Vitamin D deficiency  
  
Surgical History (Reviewed 10/17/23 @ 15:42 by Amanda Murillo MD)  
  
H/O tubal ligation  
Hx of lithotripsy  
x3  
S/P   
S/P cholecystectomy  
Status post incision and drainage  
Umbilical hernia  
with removal  
  
Family History (Reviewed 10/17/23 @ 15:42 by Amanda Murillo MD)  
Mother COPD (chronic obstructive pulmonary disease)  
Father No problems noted.  
  
Brother Cancer  
Father Cancer  
  
Social History  
Smoking Status: Current every day smoker  
Tobacco Type: cigarettes  
Substance Use Type: None  
Social History Comments: ex- significant other and younger son  
  
Meds  
Medications and Allergies  
Allergies  
  
aspirin Allergy (Verified 23 14:33)  
Hives  
diphenhydramine [From Benadryl] Allergy (Verified 23 14:33)  
Swelling  
ibuprofen [From Advil] Allergy (Verified 23 14:33)  
Hives  
naproxen [From Aleve] Allergy (Verified 23 14:33)  
Hives  
azithromycin Adverse Reaction (Verified 23 14:33)  
Flushing  
  
  
Home Medications  
  
albuterol sulfate 2.5 mg/3 mL (0.083 %) solution for nebulization 2.5 mg (3 mL)   
inhalation Q3H PRN Shortness Of Breath #15 mL 10/07/21 [Rx Confirmed 10/17/23]  
albuterol sulfate 90 mcg/actuation aerosol inhaler (Ventolin HFA) 2 puff 
inhalation   
Q6H PRN Shortness Of Breath #2 grams 10/07/21 [Rx Confirmed 10/17/23]  
amitriptyline 50 mg tablet 50 mg PO QPM #30 tabs 10/07/21 [Rx Confirmed 
10/17/23]  
canagliflozin 100 mg tablet (Invokana) 100 mg PO QAM #30 tabs 10/07/21 [Rx 
Confirmed   
10/17/23]  
duloxetine 60 mg capsule,delayed release 60 mg PO QAM #30 caps 10/07/21 [Rx 
Confirmed   
10/17/23]  
omeprazole 20 mg capsule,delayed release 20 mg PO BID #60 caps 10/07/21 [Rx 
Confirmed   
23]  
pravastatin 40 mg tablet 40 mg PO QPM #30 tabs 10/07/21 [Rx Confirmed 10/17/23]  
sotalol 120 mg tablet (Sotalol AF) 120 mg PO QAM #30 tabs 10/07/21 [Rx Confirmed
   
10/17/23]  
metformin 500 mg tablet,extended release 24 hr 500 mg PO DAILY 22 [History
   
Confirmed 10/17/23]  
budesonide-formoterol  mcg-4.5 mcg/actuation aerosol inhaler (Symbicort) 
2   
puff inhalation BID 22 [History Confirmed 10/17/23]  
potassium chloride 10 mEq capsule,extended release 10 meq PO DAILY #30 caps 
22   
[Rx Confirmed 10/17/23]  
dulaglutide 0.75 mg/0.5 mL subcutaneous pen injector (Trulicity) 0.7 mg subcut 
QWEEK   
23 [History Confirmed 10/17/23]  
ergocalciferol (vitamin D2) 1,250 mcg (50,000 unit) capsule (Vitamin D2) 50,000 
unit   
PO QWEEK 23 [History Confirmed 10/17/23]  
fluticasone propionate 50 mcg/actuation nasal spray,suspension 2 spray 
intranasal   
DAILY PRN Allergy Symptoms 23 [History Confirmed 10/17/23]  
multivitamin 1 tab PO DAILY 23 [History Confirmed 10/17/23]  
tiotropium bromide 2.5 mcg/actuation mist for inhalation (Spiriva Respimat) 2 
puff   
inhalation DAILY 23 [History Confirmed 10/17/23]  
apixaban 5 mg tablet (Eliquis) 5 mg PO BID 23 [History Confirmed 10/17/23]  
furosemide 20 mg tablet 20 mg PO DAILY 23 [History Confirmed 10/17/23]  
ammonium lactate 12 % lotion 1 applic topical DAILY PRN Dry Skin 10/17/23 
[History   
Confirmed 10/17/23]  
  
  
  
Exam  
Physical Exam  
Vital Signs:  
  
  
  
  
Temp Pulse Resp BP Pulse Ox O2 Del Method O2 Flow Rate  
  
98.4 F 85 18 111/74 99 Nasal Cannula 3  
  
10/17/23 15:12 10/17/23 15:12 10/17/23 15:12 10/17/23 15:12 10/17/23 15:12 
10/17/23   
15:12 10/17/23 15:12  
  
  
  
Narrative:  
Const  
General: cooperative, ill appearing, morbidly obese  
  
HEENT  
Normal oropharyngeal mucosa without any ulcers or exudates  
  
Eyes: Conjunctiva normal  
  
Pulmonary  
Auscultation: diminished breath sounds , no crackles, no wheezes  
  
Cardiovascular  
Rate: normal rate  
Rhythm: regular rhythm  
Heart Sounds: S1 normal, S2 normal and no murmurs  
  
GI  
Inspection: non-distended  
Palpation: soft, not firm and nontender. No rigidity or rebound.  
Mild CVA tenderness on left flank  
Bilateral nephrostomy tubes noted, dark drainage noted on left tube  
  
Neuro  
General: alert, awake and oriented x3. No obvious new focal deficit  
  
Musculoskeletal: normal range of motion  
  
Extrem  
General: no cyanosis, no pedal edema  
  
Psych  
Appearance: appropriate affect. Grossly normal  
  
  
Results  
Lab Results  
Labs:  
Laboratory Last Values  
  
  
  
Corrected WBC 19.2 X10E3/uL (3.8-11.6) H 10/17/23 01:43  
  
Uncorrected WBC Count 19.2 x10E3/uL (3.8-11.6) H 10/17/23 01:43  
  
RBC 3.65 X10E6/uL (3.60-5.00) 10/17/23 01:43  
  
Hgb 10.2 g/dL (11.8-15.4) L 10/17/23 01:43  
  
Hct 31.2 % (34.0-46.4) L 10/17/23 01:43  
  
MCV 85.7 fl () 10/17/23 01:43  
  
MCH 27.9 pg (24.7-34.3) 10/17/23 01:43  
  
MCHC 32.6 g/dL (32.0-35.0) 10/17/23 01:43  
  
RDW 14.6 % (11.9-15.3) 10/17/23 01:43  
  
Plt Count 332 x10E3/uL (150-450) 10/17/23 01:43  
  
MPV 8.9 fl (6.3-10.7) 10/17/23 01:43  
  
Neut % (Auto) 71.4 % (.) 10/17/23 01:43  
  
Lymph % (Auto) 18.5 % (.) 10/17/23 01:43  
  
Mono % (Auto) 6.1 % (.) 10/17/23 01:43  
  
Eos % (Auto) 3.6 % (.) 10/17/23 01:43  
  
Baso % (Auto) 0.4 % (.) 10/17/23 01:43  
  
Nucleat RBC Rel Count 0.0 /100 WBC (0-0.5) 10/17/23 01:43  
  
Neut # (Auto) 13.7 x10E3/uL (1.8-7.7) H 10/17/23 01:43  
  
Lymph # (Auto) 3.5 x10E3/uL (1.00-4.8) 10/17/23 01:43  
  
Mono # (Auto) 1.2 x10E3/uL (0.0-0.8) H 10/17/23 01:43  
  
Eos # (Auto) 0.7 x10E3/uL (0.0-0.45) H 10/17/23 01:43  
  
Baso # (Auto) 0.1 x10E3/uL (0.0-0.2) 10/17/23 01:43  
  
Monocyte Dist Width 27.49 % (0.00-20.00) H 10/17/23 01:43  
  
PT 14.7 Seconds (9.0-12.9) H 10/17/23 01:43  
  
INR 1.2 10/17/23 01:43  
  
APTT 33.3 Seconds (25.1-36.5) 10/17/23 01:43  
  
PHA Creatinine Clear 28.23 10/17/23 01:43  
  
Sodium 133 mmol/L (136-145) L 10/17/23 01:43  
  
Potassium 3.5 mmol/L (3.5-5.1) 10/17/23 01:43  
  
Chloride 102 mmol/L () 10/17/23 01:43  
  
Carbon Dioxide 20.3 mmol/L (21.0-31.0) L 10/17/23 01:43  
  
Anion Gap 14.2 mEq/L (6.0-15.0) 10/17/23 01:43  
  
BUN 28 mg/dL (7-25) H 10/17/23 01:43  
  
Creatinine 3.06 mg/dL (0.60-1.20) H 10/17/23 01:43  
  
Est GFR (CKD-EPI) 17.602 mL/Min 10/17/23 01:43  
  
Glucose 160 mg/dL () H 10/17/23 01:43  
  
Lactic Acid 0.6 mmol/L (0.5-2.2) 10/17/23 01:43  
  
Calcium 8.8 mg/dL (8.6-10.3) 10/17/23 01:43  
  
Total Bilirubin 0.3 mg/dl (0.3-1.0) 10/17/23 01:43  
  
Urine Color Yellow (Yellow) 10/17/23 02:26  
  
Urine Appearance Turbid (Clear) A 10/17/23 02:26  
  
Urine pH 5.5 (5.0-9.0) 10/17/23 02:26  
  
Ur Specific Gravity 1.013 (1.001-1.030) 10/17/23 02:26  
  
Urine Protein 100 mg/dL (Negative) H 10/17/23 02:26  
  
Urine Glucose (UA) 500 mg/dL (Normal) H 10/17/23 02:26  
  
Urine Ketones Negative (Negative) 10/17/23 02:26  
  
Urine Occult Blood 3+ (Negative) H 10/17/23 02:26  
  
Urine Nitrite Positive (Negative) H 10/17/23 02:26  
  
Urine Bilirubin Negative (Negative) 10/17/23 02:26  
  
Urine Urobilinogen Normal mg/dL (Normal) 10/17/23 02:26  
  
Ur Leukocyte Esterase 4+ (Negative) H 10/17/23 02:26  
  
Urine RBC 20-49 /HPF (0-4) H 10/17/23 02:26  
  
Urine WBC Innumerable /HPF (0-4) H 10/17/23 02:26  
  
Ur Squamous Epith Cells 10-19 /HPF (0-2) H 10/17/23 02:26  
  
Urine Bacteria 1+ (None Seen) H 10/17/23 02:26  
  
Hyaline Casts 0-8 /LPF (0-8) 10/17/23 02:26  
  
Urine Yeast None seen /HPF (None Seen) 10/17/23 02:26  
  
  
  
Microbiology Results  
Micro:  
Microbiology - Results from entire visit  
  
10/17/23 01:43 Blood - Left Antecubital Blood Culture - Preliminary  
10/17/23 01:43 Blood - Right Antecubital Blood Culture - Preliminary  
  
  
  
Assessment & Plan  
Assessment/Plan  
(1) Severe sepsis:  
(2) Acute pyelonephritis:  
(3) Bacteremia:  
  
Plan  
Severe sepsis secondary to acute pyelonephritis  
Gram-negative bacteremia  
Bilateral Staghorn renal calculi s/p b/l nephrostomy tubes in 2023 at [facility]  
Hx prior nephrolithiases s/p lithotripsy  
CULLEN on CKD stage 3  
Mild hyponatremia  
-Afebrile here, has leukocytosis WBC 19.2. lactic acid WNL. Had nausea/vomiting 
at   
home  
-Blood cultures obtained. Prelim report showing gram-negative bacilli  
-Urine culture pending  
-Continue gentle IV hydration  
-Started on broad-spectrum antibiotics with vancomycin and meropenem. Will   
de-escalate according to cultures  
-ID consult  
-Attempted to be transferred to [facility] however, ER staff spoke with Dr. Reinoso at [facility] who felt that patient can be taken care of at our facility  
-Will assess response to current management and consider initiating transfer to 
[facility]   
if worsening in clinical status  
  
Chronic Conditions  
1. Advanced COPD on home O2, [place], Tobacco use- does not wear home CPAP- did 
notlike   
it, wears 3LNC, prn Albuterol, Budesonide/ formoterol, Fluticasone nasal,   
Ipratropium,  
2. Afib on chronic anticoagulation, HLD- Pravastatin, Eliquis for AC.  
3. T2DM- SSI coverage and fingerstick. Invokana, Metformin and Trulicity on hold  
4. Depression- Duloxetine, Amitriptyline  
5. GERD, hx esophageal stricture s/p dilation- pantoprazole  
6. MS- not on any chronic meds and does not follow with neurology  
  
  
Home medications are not verified at this time. Please review once verified and   
restart as appropriate  
  
Diet: Renal diet  
DVT ppx:Eliquis  
GI ppx: PPI  
Code status: Full  
Disposition: inpatient status  
  
  
Discussed with patient at bedside. All questions answered. In agreement with 
theabove   
plan  
  
  
Amanda Can MD  
Internal Medicine  
Hospitalist Attending Physician  
IP vs OBS Justification  
Based on differential dx, clinical care plan, and risk of adverse events, if   
untreated, in my clinical judgement this patient requires an acute care setting 
as:   
INPATIENT because of an expectation of an over 2 midnight stay.  
Estimated length of stay (# of days): 4  
  
Quality Measures  
SEPSIS  
Tissue perfusion reassessment (select if applicable): Tissue perfusion 
reassessed   
after bolus given  
**FOCUSED EXAM**  
Capillary refill: Capillary refill < 3 seconds  
Peripheral pulses: Pulses present bilaterally  
Skin color/condition: Skin normal for ethnicity and Warm and pink  
  
  
Documented By: Amanda Murillo MD 10/17/23 15  
35  
Signed By: <Electronically signed by Amanda Murillo MD>  
10/17/23 1558  
   
  
[facility]  
Work Phone: [phone]- Evaluation note*   
  
                      Encounter Date Diagnosis  Assessment Notes Treatment Notes  
 Treatment   
Clinical Notes  
   
                                        04 Oct, 2023        Chronic respiratory   
failure (ICD-10 -   
J96.10)                                                       
   
                                        04 Oct, 2023        COPD (chronic   
obstructive pulmonary   
disease) (ICD-10 -   
J44.9)                                                        
   
                                        04 Oct, 2023        Obesity   
hypoventilation   
syndrome (ICD-10 -   
E66.2)                                                        
  
  
[employer]  
Other Phone: [phone]- History of Present illness Narrative* 
  MARYCRUZ Iniguez MD - 10/02/2023 3:46 PM EDTFormatting of this note might 
  be different from the original.  
I saw and evaluated the patient. I personally obtained the key and critical 
portions of the historyand physical exam. I reviewed the resident's 
documentation and discussed the patient with the resident. I agree with the 
resident's medical decision making as documented  
  
Key findings as follows: dictated  
  
MARYCRUZ Iniguez MD  
Electronically signed by MARYCRUZ Iniguez MD at 10/02/2023 3:46 PM EDT  
  
* Apollo Lu MD - 10/02/2023 12:58 PM EDTFormatting of this note is 
  different from the original.  
UROLOGY OUTPATIENT CLINIC NOTE  
  
Chief Complaint:  
Bilateral renal stones  
  
History of Present Illness:  
Cris Hearn is a 52 year old Declined To Answer female is an established 
urology patient presenting in follow up.  
  
She is followed for staghorn renal calculi s/p bilateral PCNT placement on 
23. She is scheduledfor right ureteroscopy with laser lithotripsy on 10/24.  
  
She reports today that she still has moderate discomfort from the PCNTs and that
they are intermittently bloody. She says the bags each fill about 3-4 times 
daily and they fill at about the same rate.  
  
Denies n/v, f/c, chest pain, SOB, palpitations, hematuria per urethra, dysuria, 
sensation of incomplete emptying, urinary frequency, urgency, or hesitancy.  
  
Past Medical History:  
No past medical history on file.  
  
Past Surgical History:  
There is no previous surgical history on file.  
  
Family History:  
No family history on file.  
  
Social History:  
Social History  
  
Socioeconomic History  
Marital status: Unknown  
  
Social Determinants of Health  
  
Food Insecurity: No Food Insecurity (2023)  
Hunger Vital Sign  
Worried About Running Out of Food in the Last Year: Never true  
Ran Out of Food in the Last Year: Never true  
Transportation Needs: No Transportation Needs (2023)  
PRAPARE - Transportation  
Lack of Transportation (Medical): No  
Lack of Transportation (Non-Medical): No  
Intimate Partner Violence: Not At Risk (2023)  
Humiliation, Afraid, Rape, and Kick questionnaire  
Fear of Current or Ex-Partner: No  
Emotionally Abused: No  
Physically Abused: No  
Sexually Abused: No  
Housing Stability: Unknown (2023)  
Housing Stability Vital Sign  
Unable to Pay for Housing in the Last Year: No  
Unstable Housing in the Last Year: No  
  
  
Home Medications:  
Current Outpatient Medications on File Prior to Visit  
Medication Sig Dispense Refill  
Sodium Chloride Flush (sodium chloride 0.9%) 0.9 % SOLN flush syringe Inject 10 
mL intravenously every 12 hours. 10 mL 0  
albuterol (PROVENTIL HFA) INHALATION HFA inhaler (VENTOLIN,PROAIR,PROVENTIL) 
90mcg Inhale 2 Puffs by mouth every 4 hours as needed for Shortness of Breath or
Wheezing. 8.5 g 0  
fluticasone -salmeterol (Advair HFA) 230-21 MCG/ACT inhaler Inhale 2 Puffs by 
mouth 2 times daily. 12 Each 0  
duloxetine (CYMBALTA) 60 MG capsule Take 1 Capsule by mouth daily. 30 Capsule 3  
pantoprazole (PROTONIX) 40 MG tablet Take 1 Tablet by mouth daily. 30 Tablet 3  
  
No current facility-administered medications on file prior to visit.  
  
Allergies:  
Not on File  
  
Review of Systems:  
12 point review of systems is negative except as indicated in HPI  
  
Physical Exam:  
GEN: NAD, A&Ox3  
HEENT: EOMI  
CV: RR  
PULM: Non-labored breathing on RA  
ABD: Soft, NT, ND  
: Bilateral PCNTs in place with clear rebecca urine in bags.  
EXTR: ROMERO  
  
Labs:  
  
  
WBC/Diff  
  
None  
  
  
  
  
Basic Metabolic Panel  
  
None  
  
  
  
  
Hepatic/Biliary/Pancreas  
  
None  
  
  
  
  
Fingerstick Glucose (last 72 hours)  
  
None  
  
  
  
Cardiac  
  
None  
  
  
  
  
  
Cultures:  
Blood Culture  
  
None  
  
  
  
  
Pyogen Culture  
  
None  
  
  
  
  
Urine Culture  
  
None  
  
  
  
  
  
Imagin/2/23 CT: bilateral staghorn calculi, atrophic left kidney  
  
?  
Assessment and Plan:  
Cris Hearn is a 52 year old Declined To Answer female w/ Hx bilateral 
staghorn calculi s/p bilateral PCNT placement on 23. She is scheduled to 
undergo right URSLL on 10/24.  
  
Discussed with patient that she will likely need a staged treatment plan with 
multiple rounds of ureteroscopy laser lithotripsy. She is a poor surgical 
candidate for PCNL. Based on prior CT with [place] units of staghorn at 
roughly 300 units, will obtain dual-energy CT to attempt to differentiate 
whether it is a uric acid stone that may be amenable to urine alkalinization as 
opposed to surgical intervention  
  
Plan:  
- Dual energy CT today  
- Telehealth follow-up Wednesday to determine next steps in treatment plan  
  
Apollo Lu MD  
Urology PGY-2  
Pager: [phone]  
  
Electronically signed by Apollo Lu MD at 10/02/2023 3:46 PM EDT  
  
documented in this [id number]- Telephone encounter Note* 
  Telephone Encounter - Alberto Wylie RN - 2023 1:56 PM EDTFormatting 
  of this note might be different from the original.  
Spoke with Dr. Iniguez, he states patient kidney function poor, should only take
Cipro once every 24 hours. Needs to be seen in office to be evaluated.  
  
Returned patients call. Advised her only to take Cipro once daily as she has 
been. Offered visit for Oct. 4, she states she has a pulmonary visit that day 
and cannot make. Oct. 2 visit offered, she said she has to see if she can get a 
ride. Will call us back and reschedule if cannot make it in thatday.  
Electronically signed by Alberto Wylie RN at 2023 1:58 PM EDT  
  
[id number]- Miscellaneous Notes* Telephone Encounter - Alberto Wylie RN - 2023 1:56 PM EDTFormatting of this note might be 
  different from the original.  
Spoke with Dr. Iniguez, he states patient kidney function poor, should only take
Cipro once every 24 hours. Needs to be seen in office to be evaluated.  
  
Returned patients call. Advised her only to take Cipro once daily as she has 
been. Offered visit for Oct. 4, she states she has a pulmonary visit that day 
and cannot make. Oct.  visit offered, she said she has to see if she can get a 
ride. Will call us back and reschedule if cannot make it in thatday.  
Electronically signed by Alberto Wylie RN at 2023 1:58 PM EDT  
  
* Telephone Encounter - Alberto Wylie RN - 2023 1:23 PM EDTFormatting 
  of this note might be different from the original.  
Patient called in she states she has blood in both her nephrostomy bags. She was
seen in [facility]  or Monday this week, she cannot remember, and she 
states they tested her urine and gave her Cipro 500mg bid for 5 days.She denies 
fever or chills. She has only been taking one pill a day. Asshe has been found 
to have 6 pills left still. Advised her she is suppose to be taking one in the 
morning when she wakes up and one before bed. She said she will start doing that
now. Blood in the bags is fruit punch in color and transparent.  
Electronically signed by Alberto Wylie RN at 2023 1:26 PM EDT  
  
documented in this [id number]- Telephone encounter Note* 
  Telephone Encounter - Alberto Wylie RN - 2023 1:23 PM EDTFormatting 
  of this note might be different from the original.  
Patient called in she states she has blood in both her nephrostomy bags. She was
seen in [facility]  or Monday this week, she cannot remember, and she 
states they tested her urine and gave her Cipro 500mg bid for 5 days.She denies 
fever or chills. She has only been taking one pill a day. Asshe has been found 
to have 6 pills left still. Advised her she is suppose to be taking one in the 
morning when she wakes up and one before bed. She said she will start doing that
now. Blood in the bags is fruit punch in color and transparent.  
Electronically signed by Alberto Wylie RN at 2023 1:26 PM EDT  
  
[id number]- History of Present illness Narrative* Roxann Pate RN 
  - 2023 2:11 PM EDTFormatting of this note might be different from the 
  original.  
Pt called with concerns following is the message sent to Dr. Geetha Izaguirre! 
This patient called your office and somehow to call got routed to me (inpatient 
radiology holding/IR). She is c/o yellow drainage around the incision site of 
her left nephrostomy tube at the last 2 bandage changes (approx 3 days). She 
denies pus in the tube, pain, fever, swelling, or redness. She was supposed to 
have an appt with you right now but could not make it and looks to be 
rescheduled with you for 10/11. Pt wants to know if she can wait until then to 
see you or if she should come in sooner?   
Dr. Iniguez:  maybe she should go to the ER   
Information relayed to patient, educated on s/s of infection. Pt stated she will
keep an eye on it and go to the ED if pus worsens or any other symptoms emerge.  
  
Electronically signed by Roxann Pate RN at 2023 2:17 PM EDT  
  
documented in this [id number]- Telephone encounter Note* 
  Telephone Encounter - Angelika Asher RN - 2023 3:58 PM EDTFormatting of 
  this note might be different from the original.  
Pt called in requesting alcohol pads and medical tape. New DME order pended for 
signing.  
Electronically signed by Angelika Asher RN at 2023 4:04 PM EDT  
  
[id number]- Miscellaneous Notes* Telephone Encounter - Angelika Asher RN - 2023 3:58 PM EDTFormatting of this note might be different from the
  original.  
Pt called in requesting alcohol pads and medical tape. New DME order pended for 
signing.  
Electronically signed by Angelika Asher RN at 2023 4:04 PM EDT  
  
documented in this [id number]- History of Present illness 
Narrative* MARYCRUZ Iniguez MD - 2023 5:56 PM EDTFormatting of this 
  note might be different from the original.  
I saw and evaluated the patient. I personally obtained the key and critical 
portions of the historyand physical exam. I reviewed the resident's 
documentation and discussed the patient with the resident. I agree with the 
resident's medical decision making as documented  
  
Key findings as follows: arron Iniguez MD  
Electronically signed by MARYCRUZ Iniguez MD at 2023 5:57 PM EDT  
  
* Ted Gentile MD - 2023 3:44 PM EDTFormatting of this note is 
  different from the original.  
Images from the original note were not included.  
Urology Outpatient Clinic Note  
  
Chief Complaint: Bilateral renal stones  
  
HPI: Patient with hx of a fib (on eliquis) DM, COPD on home O2 and obesity. Was 
transferred to [facility] for bilateral stones and CULLEN. Got bilateral PCNTs. Here to 
discuss management of stones.  
  
Past Medical History  
No past medical history on file.  
  
Past Surgical History  
No past surgical history on file.  
  
Medications  
Current Outpatient Medications on File Prior to Visit  
Medication Sig Dispense Refill  
Sodium Chloride Flush (sodium chloride 0.9%) 0.9 % SOLN flush syringe Inject 10 
mL intravenously every 12 hours. 10 mL 0  
albuterol (PROVENTIL HFA) INHALATION HFA inhaler (VENTOLIN,PROAIR,PROVENTIL) 
90mcg Inhale 2 Puffs by mouth every 4 hours as needed for Shortness of Breath or
Wheezing. 8.5 g 0  
fluticasone -salmeterol (Advair HFA) 230-21 MCG/ACT inhaler Inhale 2 Puffs by 
mouth 2 times daily. 12 Each 0  
duloxetine (CYMBALTA) 60 MG capsule Take 1 Capsule by mouth daily. 30 Capsule 3  
pantoprazole (PROTONIX) 40 MG tablet Take 1 Tablet by mouth daily. 30 Tablet 3  
  
No current facility-administered medications on file prior to visit.  
  
Allergies  
Patient has no allergy information on record.  
  
Family History  
family history is not on file.  
  
Social History  
  
Review of Systems:  
General - per HPI  
Skin - has not observed any rashes or skin changes  
Cardiac - denies chest pain  
Pulmonary - Denies shortness of breath, difficulty breathing  
Gastrointestinal - denies abdominal pain or constipation  
 - HPI  
MSK - denies joint pain or difficulty ambulating  
Endocrine - negative  
Hematologic - negative  
Psychiatric - negative  
  
PHYSICAL EXAMINATION  
  
General - No acute distress  
Neuro - Alert, oriented, conversant  
Cardiac - irregular rate  
Pulm - Non-labored breathing  
Abdomen - obese  
Ext - in wheel chair  
Skin - without jaunice  
Psych - appropriate tone, affect  
 - bilateral PCNTs  
  
LABS  
  
CBC (Last 5 results in the past 365 days)  
  
WBC RBC Hgb Hct MCV RDW Plt  
23 0230 10.0  
3.47  
9.8  
30.5  
88  
15.6  
228  
  
23 0350 10.7  
3.51  
9.9  
31.0  
88  
15.6  
209  
  
23 0142 13.3  
3.50  
9.9  
30.9  
89  
15.8  
200  
  
23 0158 17.7  
3.60  
10.2  
32.1  
89  
15.8  
207  
  
23 0244 18.1  
3.95  
11.0  
35.0  
89  
15.7  
240  
  
  
  
  
Basic Metabolic Panel (Last 5 results in the past 365 days)  
  
Na K Cl CO2 Gap Glu BUN Cr Ca Mg PO4  
23 0053 142  
3.3  
111  
22  
12  
102  
43  
3.79  
8.0  
  
23 0230 140  
3.5  
110  
21  
13  
100  
44  
4.06  
7.9  
  
23 0357 1.5  
  
23 0357 140  
3.7  
109  
20  
15  
99  
46  
4.45  
7.8  
  
23 2033 1.6  
  
23 0354 140  
3.5  
107  
20  
17  
98  
44  
4.73  
7.7  
  
  
  
  
Urine Culture  
  
None  
  
  
  
RADIOLOGY  
CT with large stone burden bilaterally. Left kidney is smaller.  
  
ASSESSMENT: Significant bilateral stone burden. Multiple morbidities making PCNL
very high risk. Will plan to clear right kidney first with staged ureteroscopy.  
  
PLAN  
-Plan for staged ureteroscopy  
-Will ask IR to place antegrade stent and maintain right PCNT so we are able to 
vent kidney during URS  
-Will return to clinic day of PSE for cultures from urine and PCNT  
-DME supplies for bilateral PCNTS  
-Follow up with nephrology for low GFR  
-CT stone 2 weeks after 1st ureteroscopy to reassess stones  
  
Seen and discussed with attending physician  
  
Eladio Gentile MD PGY5  
Urology [phone]  
  
  
Electronically signed by Ted Gentile MD at 2023 4:35 PM EDT  
  
* MARYCRUZ Iniguez MD - 2023 1:38 AM EDTFormatting of this note might 
  be different from the original.  
Name: CRIS HEARN  
MR#: [id number]  
ENC#: [id number]  
Date of Visit: 2023  
  
CHIEF COMPLAINT: Kidney stones.  
  
HISTORY OF PRESENT ILLNESS: This is a 52-year-old female who has recurrent 
urinary tract infections, recent episode of urosepsis, found to have a large 
what appears to be a struvite stone in the right kidney, staghorn type, small 
shrunken left kidney. The patient is status post multiple shockwaves to the left
kidney at an outside facility. The patient has bilateral percutaneous tubes in 
place. She is at high risk with multiple significant comorbidities, would need 
ureteroscopy, multiple procedures. I do not believe she is a good candidate for 
percutaneous procedure. She is on home oxygen as well as having other 
significant disease. She takes blood thinners for atrial fib. X-rays were 
reviewed with her. Treatment choices were reviewed. She has elected to go with 
multiple ureteroscopies. Wewill proceed with the stent being placed in the right
kidney. We will maintain the nephrostomy tubes bilaterally and we will schedule 
in the future. The patient was seen with the residents. I agree with the 
physical findings and plan as written.  
  
MedQ/JPS/DD: 2023 17:25:02 DT: 2023 21:36:55 JOB: [id number] 
DictJob#: [id number]  
Electronically signed by MARYCRUZ Iniguez MD at 2023 6:56 AM EDT  
  
documented in this [id number]- History of Present illness 
Narrative* Ted Gentile MD - 2023 3:44 PM EDTFormatting of this note 
  is different from the original.  
Images from the original note were not included.  
Urology Outpatient Clinic Note  
  
Chief Complaint: Bilateral renal stones  
  
HPI: Patient with hx of a fib (on eliquis) DM, COPD on home O2 and obesity. Was 
transferred to [facility] for bilateral stones and CULLEN. Got bilateral PCNTs. Here to 
discuss management of stones.  
  
Past Medical History  
No past medical history on file.  
  
Past Surgical History  
No past surgical history on file.  
  
Medications  
Current Outpatient Medications on File Prior to Visit  
Medication Sig Dispense Refill  
Sodium Chloride Flush (sodium chloride 0.9%) 0.9 % SOLN flush syringe Inject 10 
mL intravenously every 12 hours. 10 mL 0  
albuterol (PROVENTIL HFA) INHALATION HFA inhaler (VENTOLIN,PROAIR,PROVENTIL) 
90mcg Inhale 2 Puffs by mouth every 4 hours as needed for Shortness of Breath or
Wheezing. 8.5 g 0  
fluticasone -salmeterol (Advair HFA) 230-21 MCG/ACT inhaler Inhale 2 Puffs by 
mouth 2 times daily. 12 Each 0  
duloxetine (CYMBALTA) 60 MG capsule Take 1 Capsule by mouth daily. 30 Capsule 3  
pantoprazole (PROTONIX) 40 MG tablet Take 1 Tablet by mouth daily. 30 Tablet 3  
  
No current facility-administered medications on file prior to visit.  
  
Allergies  
Patient has no allergy information on record.  
  
Family History  
family history is not on file.  
  
Social History  
  
Review of Systems:  
General - per HPI  
Skin - has not observed any rashes or skin changes  
Cardiac - denies chest pain  
Pulmonary - Denies shortness of breath, difficulty breathing  
Gastrointestinal - denies abdominal pain or constipation  
 - HPI  
MSK - denies joint pain or difficulty ambulating  
Endocrine - negative  
Hematologic - negative  
Psychiatric - negative  
  
PHYSICAL EXAMINATION  
  
General - No acute distress  
Neuro - Alert, oriented, conversant  
Cardiac - irregular rate  
Pulm - Non-labored breathing  
Abdomen - obese  
Ext - in wheel chair  
Skin - without jaunice  
Psych - appropriate tone, affect  
 - bilateral PCNTs  
  
LABS  
  
CBC (Last 5 results in the past 365 days)  
  
WBC RBC Hgb Hct MCV RDW Plt  
23 0230 10.0  
3.47  
9.8  
30.5  
88  
15.6  
228  
  
23 0350 10.7  
3.51  
9.9  
31.0  
88  
15.6  
209  
  
23 0142 13.3  
3.50  
9.9  
30.9  
89  
15.8  
200  
  
23 0158 17.7  
3.60  
10.2  
32.1  
89  
15.8  
207  
  
23 0244 18.1  
3.95  
11.0  
35.0  
89  
15.7  
240  
  
  
  
  
Basic Metabolic Panel (Last 5 results in the past 365 days)  
  
Na K Cl CO2 Gap Glu BUN Cr Ca Mg PO4  
23 0053 142  
3.3  
111  
22  
12  
102  
43  
3.79  
8.0  
  
23 0230 140  
3.5  
110  
21  
13  
100  
44  
4.06  
7.9  
  
23 0357 1.5  
  
23 0357 140  
3.7  
109  
20  
15  
99  
46  
4.45  
7.8  
  
23 1.6  
  
23 0354 140  
3.5  
107  
20  
17  
98  
44  
4.73  
7.7  
  
  
  
  
Urine Culture  
  
None  
  
  
  
RADIOLOGY  
CT with large stone burden bilaterally. Left kidney is smaller.  
  
ASSESSMENT: Significant bilateral stone burden. Multiple morbidities making PCNL
very high risk. Will plan to clear right kidney first with staged ureteroscopy.  
  
PLAN  
-Plan for staged ureteroscopy  
-Will ask IR to place antegrade stent and maintain right PCNT so we are able to 
vent kidney during URS  
-Will return to clinic day of PSE for cultures from urine and PCNT  
-DME supplies for bilateral PCNTS  
-Follow up with nephrology for low GFR  
-CT stone 2 weeks after 1st ureteroscopy to reassess stones  
  
Seen and discussed with attending physician  
  
Eladio Gentile MD PGY5  
Urology [phone]  
  
  
Electronically signed by Ted Gentile MD at 2023 4:35 PM EDT  
  
documented in this [id number]- Telephone encounter Note* 
  Telephone Encounter - Rossy Lassiter RN - 2023 12:31 PM EDTFormatting
  of this note might be different from the original.  
What is the need:  
  
Situation: Pt calling to check on what she needs to do for follow-up at 
[facility]. Also asking about DME supplies as she is out.  
  
Background: Recent hospitalization for kidney stones.  
  
Assessment: Had more DME ordered by hospital doctors on 2023. Sent to [facility].  
  
Did not mention any concern for any symptoms today.  
  
Recommendation: Pt to call [facility] about delivery of supplies. Also reviewed need 
to find Nephrologist and Urologist in [place] if she did not want to follow up 
with [facility]. She will not cancel the appointment with our Urologists here as she 
is unsure if she CAN find one close to her. Aware that she may have to come to 
[facility] on Wednesday. Will let us know.  
  
Reviewed discharge instructions with her.  
  
Also sent her a code to set up her [employer] if she would like to access her 
information that way as she reports she cannot find the first few pages of her 
discharge instructions.  
  
Rossy Lassiter RN  
  
Electronically signed by Rossy Lassiter RN at 2023 12:35 PM EDT  
  
[id number]- Miscellaneous Notes* Telephone Encounter - Rossy Lassiter RN - 2023 12:31 PM EDTFormatting of this note might be 
  different from the original.  
What is the need:  
  
Situation: Pt calling to check on what she needs to do for follow-up at 
[facility]. Also asking about DME supplies as she is out.  
  
Background: Recent hospitalization for kidney stones.  
  
Assessment: Had more DME ordered by hospital doctors on 2023. Sent to [facility].  
  
Did not mention any concern for any symptoms today.  
  
Recommendation: Pt to call [facility] about delivery of supplies. Also reviewed need 
to find Nephrologist and Urologist in [place] if she did not want to follow up 
with [facility]. She will not cancel the appointment with our Urologists here as she 
is unsure if she CAN find one close to her. Aware that she may have to come to 
[facility] on Wednesday. Will let us know.  
  
Reviewed discharge instructions with her.  
  
Also sent her a code to set up her mychart if she would like to access her 
information that way as she reports she cannot find the first few pages of her 
discharge instructions.  
  
Rossy Lassiter RN  
  
Electronically signed by Rossy Lassiter RN at 2023 12:35 PM EDT  
  
documented in this [id number]- History of Present illness 
Narrative* Eyad Trujillo DO - 2023 10:26 AM EDTFormatting of this note
  might be different from the original.  
Dme for nephrostomy supplies  
Electronically signed by Eyad Trujillo DO at 2023 10:32 AM EDT  
  
documented in this [id number]- Telephone encounter Note* 
  Telephone Encounter - Bonnie Alvarez RN - 2023 9:07 AM EDTFormatting 
  of this note might be different from the original.  
Situation: Pt is calling with some follow up questions after having surgery  
Background: Pt had bilateral nephrostomy tubes placed on 23. Pt states she 
was sent home with some NS flushes but she is out of those. She did not receive 
any gloves, dressing change supplies or tape so that she can care for the 
nephrostomy tubes. She also is asking if a nurse can come to her home to change 
the dressing. The plan was for her son to change the dressings but he does not 
feel comfortable with it.  
Assessment: No DME orders were placed for supplies  
Recommendation: Encounter routed to provider and nurse pool for assistance. 
Please place DME order for supplies needed to care for nephrostomy tubes and 
dressing change supplies. Also, advise patienton if home care can be provided 
for dressing changes.  
Please call patient at [phone] to advise.  
Thank you.  
  
Bonnie Alvarez RN  
  
Electronically signed by Bonnie Alvarez RN at 2023 9:13 AM EDT  
  
[id number]- Miscellaneous Notes* Telephone Encounter - Bonnie Alvarez RN - 2023 9:07 AM EDTFormatting of this note might be different 
  from the original.  
Situation: Pt is calling with some follow up questions after having surgery  
Background: Pt had bilateral nephrostomy tubes placed on 23. Pt states she 
was sent home with some NS flushes but she is out of those. She did not receive 
any gloves, dressing change supplies or tape so that she can care for the 
nephrostomy tubes. She also is asking if a nurse can come to her home to change 
the dressing. The plan was for her son to change the dressings but he does not 
feel comfortable with it.  
Assessment: No DME orders were placed for supplies  
Recommendation: Encounter routed to provider and nurse pool for assistance. 
Please place DME order for supplies needed to care for nephrostomy tubes and 
dressing change supplies. Also, advise patienton if home care can be provided 
for dressing changes.  
Please call patient at [phone] to advise.  
Thank you.  
  
Bonnie Alvarez RN  
  
Electronically signed by Bonnie Alvarez RN at 2023 9:13 AM EDT  
  
documented in this [id number]- Telephone encounter Note* 
  Telephone Encounter - Linda Nuñez RN - 2023 7:54 AM EDT
  Formatting of this note might be different from the original.  
Nephrology referral received. Chart reviewed.  
  
Reason for Referral: Other obstructive nephropathy  
  
Patient was seen by Nephrology while IP. Patient only needs a follow up 
appointment scheduled. Chart forwarded to PSS to assist in scheduling and RN 
forwards chart to Nephrology Provider for further review and next steps.  
  
Pt has a No Show rate >26% : No  
Pt has a hx of No Show in Nephrology : No  
  
Patient matches IP criteria.  
  
Chart forwarded to Fellow and PSS and Please have pt call [phone] #4 to 
schedule  
  
  
Electronically signed by Linda Nuñez RN at 2023 7:58 AM EDT  
  
[id number]- Miscellaneous Notes* Telephone Encounter - Linda Nuñez RN - 2023 7:54 AM EDTFormatting of this note might be 
  different from the original.  
Nephrology referral received. Chart reviewed.  
  
Reason for Referral: Other obstructive nephropathy  
  
Patient was seen by Nephrology while IP. Patient only needs a follow up 
appointment scheduled. Chart forwarded to PSS to assist in scheduling and RN 
forwards chart to Nephrology Provider for further review and next steps.  
  
Pt has a No Show rate >26% : No  
Pt has a hx of No Show in Nephrology : No  
  
Patient matches IP criteria.  
  
Chart forwarded to Fellow and PSS and Please have pt call [phone] #[phone] to 
schedule  
  
  
Electronically signed by Linda Nuñez RN at 2023 7:58 AM EDT  
  
documented in this [id number]- History of Present illness 
Narrative* Jana Patton - 2023 8:33 PM EDTFormatting of this 
  note is different from the original.  
  
23  
Discharge Note  
Discharge Time   
Discharged to: Home  
Mode of Transport [place]  
Patient Follow-up and Care Perscriptions E-scribed to pharmacy;Follow-up 
recommended;Follow-up scheduled  
Patient Instructions Verbal & written discharge instructions given & 
reviewed;Diet & activity;Symptom worsening  
Verbalized Understanding Patient  
  
IV line removed, site no redness, no swelling, no drainage noted.  
Pt aware of follow-up appts.  
Supplies for nephrostomy care provided ( NS flushes and dressing kit).  
Pt received instructions on nephrostomy care and flushes.  
No acute distress noted.  
Pt was transported home via [facility].  
Belongings sent with patient.  
  
Electronically signed by Jana Patton at 2023 8:37 PM EDT  
  
* Carrell, Stephanie, RN - 2023 4:27 PM EDTFormatting of this note might 
  be different from the original.  
CASE MANAGEMENT  
  
CM aware patient does not have transportation home. Per floor RN patient needs 
[employer] van home with O2.CM placed request with [facility]. Awaiting 
confirmation.  
  
Stephanie Carrell RN BSN  
Inpatient   
Office [phone]  
Available -Wednesday 8AM-6PM  
  
CM aware [facility] soonest transport time is 10pm. CM met with patient and 
patient is not thrilled but will accept that time. CM mentioned they sometimes 
run late and it could be later. Patient voiced understanding. [facility] 
updated.  
  
Electronically signed by Carrell, Stephanie, RN at 2023 5:04 PM EDT  
  
* Sha Cerda - 2023 12:36 PM EDTFormatting of this note might be 
  different from the original.  
[facility] Spiritual Care Services  
  
Services provided for: Patient  
  
Services initiated by: Staff   
  
Reason for services: Initial visit  
  
Assessment/Narrative:  
  
Mormonism/Spirituality: Finds Strength in Lauren, Role in End-of-Life, and 
Personal Spirituality  
  
Spiritual Concerns: Coping, Powerlessness, Meaninglessness, and Frustration  
  
Coping: Optimistic regarding future, Peacefulness, and Hopefulness  
  
Family: Patient reports supportive relationships with family/friends  
  
Interventions: Empathic, intentional listening, Validate concerns, and Rapport 
building  
  
Outcome: Feelings validated, Expressed appreciation, and Patient aware of 
 availability  
  
Plan of Care: On-going Visits  
  
SHA CERDA  
Pager: [phone]  
Extension: [phone]  
  
  
Electronically signed by Sha Cerda at 2023 12:37 PM EDT  
  
* Ayesha Bullock MD - 2023 3:54 PM EDTFormatting of this note is 
  different from the original.  
Images from the original note were not included.  
Nephrology Follow-up Note  
Cris Hearn 52 year old 277 lbs  
MRN/Room: [id number]/8-702/2  
  
Subjective:  
Stable  
  
Objective:  
  
Meds:  
cefepime 1,000 mg Q12H Antibiotic  
budesonide-formoterol 2 Puff BID RT  
esomeprazole 40 mg Daily 30 min before breakfast  
duloxetine 60 mg Daily  
insulin lispro 2-12 Units 3x Daily AC  
nystatin 2x Daily  
  
lactated ringers 100 mL/hr at 23  
  
vancomycin dosing pharmacy consult As Directed  
oxyCODONE 5 mg Q4H PRN  
albuterol 2 Puff Q4H PRN  
dextrose iv for hypoglycemia orderable 125 mL PRN  
Or  
glucagon 1 mg PRN  
Or  
dextrose 15 g of glucose PRN  
Or  
dextrose 30 g of glucose PRN  
  
Vital sign ranges over the past 24 hours (retrieved 2023 at 3:54 PM):  
Tmax (24 hours): 98.8 F (37.1 C)  
  
Pulse Av.2 Min: 77 Max: 88  
Systolic (24hrs), Av , Min:114 , Max:152  
  
Diastolic (24hrs), Av, Min:60, Max:78  
  
MAP (mmHg) Av.8 mmHg Min: 82 mmHg Max: 98 mmHg  
Resp Av Min: 18 Max: 18  
SpO2 Av.7 % Min: 94 % Max: 99 %  
  
Patient Vitals for the past 24 hrs:  
BP Temp Temp src Pulse Resp SpO2 O2 Device O2 Flow Rate (l/min)  
23 1430 152/77 98.1 F (36.7 C) Oral 87 18 97 % Nasal cannula --  
23 1009 -- -- -- 80 18 96 % Nasal cannula 3  
23 0719 132/78 97.9 F (36.6 C) Oral 77 18 94 % Nasal cannula 3  
23 0216 143/67 97.7 F (36.5 C) Oral 82 18 98 % Nasal cannula 3  
23 2249 114/77 98.1 F (36.7 C) Oral 85 18 96 % Nasal cannula 3  
23 1936 131/60 98.8 F (37.1 C) Oral 88 18 99 % Nasal cannula 3  
  
Intake/Output Summary (Last 24 hours) at 2023 1554  
Last data filed at 2023 1429  
Gross per 24 hour  
Intake 2576.67 ml  
Output 3850 ml  
Net -1273.33 ml  
  
General appearance: no distress  
Eyes: non-icteric  
Skin: no apparent rash  
Heart: S1 S2 regular  
Lungs: CTA bilat No wheezing/crackles  
Abdomen: soft, nt/nd  
Extremities: No edema bilat  
Gonzalez  
Neuro: No FND,asterixis  
  
Bilateral nephrostomy tubes  
  
LABS:  
  
CBC/PT/INR  
  
WBC RBC Hgb Hct MCV RDW Plt PT aPTT INR  
23 0230 10.0  
3.47  
9.8  
30.5  
88  
15.6  
228  
  
23 0350 10.7  
3.51  
9.9  
31.0  
88  
15.6  
209  
  
23 0142 13.3  
3.50  
9.9  
30.9  
89  
15.8  
200  
  
  
  
  
WBC/Diff  
  
Neutro% Segs% Bands% Lymphs% Monos% Eos% Basos%  
23 66.4  
18.3  
8.7  
6.2  
0.4  
  
23 0350 73.4  
13.3  
7.4  
5.4  
0.4  
  
23 0142 79.9  
10.3  
6.4  
3.2  
0.3  
  
  
  
  
Basic Metabolic Panel  
  
Na K Cl CO2 Gap Glu BUN Cr Ca Mg PO4  
23 140  
3.5  
110  
21  
13  
100  
44  
4.06  
7.9  
  
237 1.5  
  
23 140  
3.7  
109  
20  
15  
99  
46  
4.45  
7.8  
  
233 1.6  
  
234 140  
3.5  
107  
20  
17  
98  
44  
4.73  
7.7  
  
  
  
  
Cultures:  
Urine Culture  
  
Urine culture  
23 0244 10,000 - 50,000 CFU/ml Multiple bacteria present suggesting 
contamination.  
  
  
  
  
ASSESSMENT:  
CULLEN on CKDIIIb, nonoliguric  
- obstructive CULLEN from bilateral stones and bilateral hydronephrosis, s/p PCNT 
/2, renal function stable  
- vanco random level of 8  
- urine sodium 60, consistent with ATN  
- PCNT draining equally, no cortical thinning noted on US  
- electrolytes WNL  
- euvolemic  
  
  
RECOMMENDATIONS:  
-Ok for discharge with Urology and nephrology follow up. Low salt diet and 
increased fluid intake advised  
  
Ayesha Viera MD  
Nephrology Fellow PGY 4  
Daytime / Weekend Renal Pager [phone]  
After 7 pm Emergencies [phone] Pager [phone]  
Electronically signed by Amari Castillo MD at 2023 8:37 AM EDT  
  
Associated attestation - Amari Castillo MD - 2023 8:37 AM EDT  
Formatting of this note might be different from the original.  
I saw and examined the patient on 2023 with the renal fellow, Dr. Bullock. I
agree to his assessment and plan mentioned in his notes below.  
  
  
* Melyssa Lane [facility] - 2023 10:51 AM EDTFormatting of this note is 
  different from the original.  
Pharmacokinetic Dosing Service - VANCOMYCIN  
  
Name: Cris Hearn Age:52 year old Gender: female  
MRN: [id number] Ht: 5' 6  Wt: 125.6 kg  
  
Indication: Urinary Tract  
Desired Ranges: 10-15  
Day of therapy: 5  
  
Assessment and Recommendations:  
The vancomycin level resulted as 13.2 mcg/mL on  @ 1010 24 hrs after the last
dose. The dose will be ordered as 500 mg once to start on  @ 1200. The next 
vancomycin level is due 24 hrs after the last dose. Pharmacy will post notes for
trough levels upon return and for dose changes.  
  
The medication regimen has been updated per consult agreement procedures.  
  
Current Dose  
Active Vancomycin Orders (From admission, onward)  
Start Stop  
23 0641 vancomycin dosing pharmacy consult Other, As Directed  
  
Question: Suspected Source/Reason for Therapy Answer: Urinary Tract  
--  
  
  
  
Lab Values:  
Creatinine  
Date Value Ref Range Status  
2023 4.06 (H) 0.50 - 1.10 mg/dL Final  
2023 4.45 (H) 0.50 - 1.10 mg/dL Final  
2023 4.73 (H) 0.50 - 1.10 mg/dL Final  
  
Lab Results  
Component Value Date/Time  
VANCTR 13.2 2023 10:10 AM  
VANCTR 19.3 2023 08:33 PM  
  
Lab Results  
Component Value Date/Time  
VANCR 8.3 2023 03:23 PM  
  
Serum creatinine: 4.06 mg/dL (H) 23 0230  
Estimated creatinine clearance: 21.71 mL/min (A)  
Culture(s): NGTD  
  
Melyssa Lane [facility] - Department of Pharmacy Services  
Phone: [phone]  
Electronically signed by Melyssa Lane [facility] at 2023 10:51 AM EDT  
  
* Ramo Dexter MD - 2023 8:09 AM EDTFormatting of this note is different
  from the original.  
Images from the original note were not included.  
GENERAL MEDICAL FLOOR  
DAILY PROGRESS NOTE  
  
Cris Hearn  
[id number]  
AC8-702/2  
2023  
Length of stay: 4 day(s)  
  
RECENT EVENTS:  
  
naeon  
  
SUBJECTIVE:  
  
Cris Hearn was evaluated at bedside. Continues to improve. No complaints 
today.  
  
OBJECTIVE:  
  
/78 (BP Location: right arm)   Pulse 77   Temp 97.9 F (36.6 C) (Oral)   
Resp 18   Ht 5' 6  (1.676 m)   Wt 277 lb (125.6 kg)   SpO2 94%   BMI 44.71 kg/m  
  
Intake/Output Summary (Last 24 hours) at 2023 0810  
Last data filed at 2023 0718  
Gross per 24 hour  
Intake 2786.67 ml  
Output 4350 ml  
Net -1563.33 ml  
  
Physical Exam:  
Gen: Declined To Answer female in NAD. Obese.  
HEENT: Normocephalic, atraumatic. EOMI. Oral and mucus membranes pink and moist  
Neck: Supple. No lymphadenopathy  
CV: Regular rate and rhythm. No murmurs. No S3 or S4 noted. No JVD present,  
Lungs: Clear to auscultation bilaterally. No wheezes, rales, or rhonchi. Good 
air movement.  
Abdomen: Soft. Non-tender. Non-distended. No organomegaly. Normoactive bowel 
sounds.  
: Bilateral nephrostomy tubes.  
Extremities: No clubbing, cyanosis, or edema. 2+ peripheral pulses.  
Neuro: A&Ox3. No focal deficits appreciated.  
Skin: Warm and well perfused.  
  
CURRENT MEDICATIONS:  
Current Facility-Administered Medications  
Medication Dose Route Frequency Last Rate Last Admin  
vancomycin lab draw Other One Time Dose  
cefepime (MAXIPIME) iv piggyback 1000 mg 1,000 mg Intravenous Q12H Antibiotic 
100 mL/hr at 159 1,000 mg at 23  
vancomycin dosing pharmacy consult Other As Directed  
oxyCODONE immediate release tablet 5 mg Oral Q4H PRN 5 mg at 23  
albuterol (PROVENTIL HFA) 108 (90 Base) MCG/ACT HFA inhaler 2 Puff Inhalation 
Q4H PRN  
budesonide-formoterol (SYMBICORT) 80-4.5 MCG/ACT inhaler 2 Puff Inhalation BID 
RT 2 Puff at 23 0850  
esomeprazole (NEXIUM) capsule 40 mg Oral Daily 30 min before breakfast 40 mg at 
23 0918  
duloxetine (CYMBALTA) capsule 60 mg Oral Daily 60 mg at 23 0918  
dextrose 10 % iv infusion 125 mL Intravenous PRN  
Or  
glucagon (GLUCAGEN) 1 MG/ML injection kit 1 mg Subcutaneous PRN  
Or  
dextrose (GLUTOSE) 40 % oral gel 15 g of glucose Buccal PRN  
Or  
dextrose (GLUTOSE) 40 % oral gel 30 g of glucose Buccal PRN  
insulin lispro (HumaLOG) 100 UNIT/ML injection 2-12 Units Subcutaneous 3x Daily 
AC 2 Units at 23 1733  
lactated ringers iv infusion Intravenous Continuous 100 mL/hr at 23 2158 
New Bag at 23  
nystatin (MYCOSTATIN) 100,000 unit/g powder Topical 2x Daily Given at 23  
  
LAB DATA:  
  
Basic Metabolic Panel  
  
Na K Cl CO2 Gap Glu BUN Cr Ca Mg PO4  
23 0230 140  
3.5  
110  
21  
13  
100  
44  
4.06  
7.9  
  
237 1.5  
  
23 140  
3.7  
109  
20  
15  
99  
46  
4.45  
7.8  
  
233 1.6  
  
23 140  
3.5  
107  
20  
17  
98  
44  
4.73  
7.7  
  
  
  
  
CBC/PT/INR  
  
WBC RBC Hgb Hct MCV RDW Plt PT aPTT INR  
23 0230 10.0  
3.47  
9.8  
30.5  
88  
15.6  
228  
  
23 0350 10.7  
3.51  
9.9  
31.0  
88  
15.6  
209  
  
23 0142 13.3  
3.50  
9.9  
30.9  
89  
15.8  
200  
  
  
  
  
WBC/Diff  
  
Neutro% Segs% Bands% Lymphs% Monos% Eos% Basos%  
23 0230 66.4  
18.3  
8.7  
6.2  
0.4  
  
23 0350 73.4  
13.3  
7.4  
5.4  
0.4  
  
23 0142 79.9  
10.3  
6.4  
3.2  
0.3  
  
  
  
  
IMAGING/OTHER:  
  
US Kidneys  
* Status post bilateral nephrostomy earlier today. Bilateral nephrostomy tubes 
are partially visualized.  
* Bilateral extensive nephrolithiasis better seen on prior CT scan.  
* No obvious hydronephrosis on either side within the limitations described 
above.  
  
ASSESSMENT AND PLAN:  
  
SUMMARY:  
  
Cris Hearn is a 52 yr old with a history of CKDIII, COPD (3L), Depression, 
DM, HLD, HTN, AFIB (Eliquis/sotalol), KWAKU (not on CPAP, did not tolerate) 
presenting for flank pain. Found to have bilateral obstructing hydronephrosis.  
  
PROBLEM LIST:  
  
#BL obstructing nephrolithiasis with bl hydro R>L  
#Sepsis secondary to UTI and Pyelo  
#Acute renal failure with oliguria secondary to ATN  
--Patient feeling better  
--Now patient with bilateral nephrostomy tube  
--leukocytosis improving  
--FeNa 5.6%  
--Urology following  
--IVF  
--Cr improving  
--Nephro consult-> Likely ATN, avoid hypotension and nephrotoxins  
--Maintain euvolemia  
--Daily bmp and mag  
--Nephro signing off, patient can be dc with nephro and urology fu  
--Strict I/O's-> Monitoring for post obstructive autodiuresis  
--Continue Vanc/Cefepime  
--Will need PCNL at some point  
  
  
#COPD  
--prn albuterol  
--Resp   
--Budesinide-formoterol  
  
#Afib  
-EKG tomorrow  
--sotalol  
--Eliquis--Will continue to hold until improvement in renal function  
  
#GERD  
--ppi  
  
#Depression  
--duloxetine  
  
#HLD  
--statin  
  
#[id number]  
--trulicity and Invokana---holding  
--SSI and adjust as needed  
  
AM Labs ordered: Yes  
Reason for continued need for inpatient care: CULLEN  
  
Prophylaxis: SCDs  
Code Status: Full Code  
Dispo: Home  
  
Ramo Conteh MD  
  
  
Electronically signed by Ramo Dexter MD at 2023 10:45 AM EDT  
  
* Ayesha Bullock MD - 2023 6:15 PM EDTFormatting of this note is 
  different from the original.  
Images from the original note were not included.  
Nephrology Follow-up Note  
Cris Hearn 52 year old 277 lbs  
MRN/Room: [id number]/AC8-702/2  
  
Subjective:  
Doing ok today  
Asking about long-term plan  
Urine output 2250 cc yesterday  
  
Objective:  
  
Meds:  
magnesium sulfate 1,000 mg One Time Dose  
Followed by  
magnesium sulfate 2,000 mg One Time Dose  
[START ON 2023] vancomycin One Time Dose  
cefepime 1,000 mg Q12H Antibiotic  
budesonide-formoterol 2 Puff BID RT  
esomeprazole 40 mg Daily 30 min before breakfast  
duloxetine 60 mg Daily  
insulin lispro 2-12 Units 3x Daily AC  
nystatin 2x Daily  
  
lactated ringers 100 mL/hr at 23 0938  
  
vancomycin dosing pharmacy consult As Directed  
oxyCODONE 5 mg Q4H PRN  
albuterol 2 Puff Q4H PRN  
dextrose iv for hypoglycemia orderable 125 mL PRN  
Or  
glucagon 1 mg PRN  
Or  
dextrose 15 g of glucose PRN  
Or  
dextrose 30 g of glucose PRN  
  
Vital sign ranges over the past 24 hours (retrieved 2023 at 6:16 PM):  
Tmax (24 hours): 98.6 F (37 C)  
  
Pulse Av.5 Min: 75 Max: 86  
Systolic (24hrs), Av , Min:111 , Max:124  
  
Diastolic (24hrs), Av, Min:68, Max:68  
  
MAP (mmHg) Av.7 mmHg Min: 81 mmHg Max: 84 mmHg  
Resp Av Min: 18 Max: 18  
SpO2 Av.7 % Min: 91 % Max: 100 %  
  
Patient Vitals for the past 24 hrs:  
BP Temp Temp src Pulse Resp SpO2 O2 Device O2 Flow Rate (l/min)  
23 1400 111/68 97.5 F (36.4 C) Oral 86 18 91 % Nasal cannula 3  
23 1200 -- -- -- -- -- -- Nasal cannula 3  
23 1000 -- -- -- -- -- -- Nasal cannula 3  
23 0852 -- -- -- 76 18 -- -- --  
23 0850 -- -- -- 76 18 -- Nasal cannula 3L  
23 0800 -- -- -- -- -- -- Nasal cannula 3  
23 0638 124/68 98.6 F (37 C) Oral 75 18 99 % Nasal cannula 3  
23 2237 118/68 98.2 F (36.8 C) Oral 78 18 100 % Nasal cannula 3  
23 1900 -- -- -- 80 18 -- Nasal cannula 3  
  
Intake/Output Summary (Last 24 hours) at 2023 1816  
Last data filed at 2023 1732  
Gross per 24 hour  
Intake 1560 ml  
Output 3075 ml  
Net -1515 ml  
  
General appearance: no distress  
Eyes: non-icteric  
Skin: no apparent rash  
Heart: S1 S2 regular  
Lungs: CTA bilat No wheezing/crackles  
Abdomen: soft, nt/nd  
Extremities: No edema bilat  
Gonzalez  
Neuro: No FND,asterixis  
  
Bilateral nephrostomy tubes  
  
LABS:  
  
CBC/PT/INR  
  
WBC RBC Hgb Hct MCV RDW Plt PT aPTT INR  
23 10.7  
3.51  
9.9  
31.0  
88  
15.6  
209  
  
23 014 13.3  
3.50  
9.9  
30.9  
89  
15.8  
200  
  
23 0158 17.7  
3.60  
10.2  
32.1  
89  
15.8  
207  
  
  
  
  
WBC/Diff  
  
Neutro% Segs% Bands% Lymphs% Monos% Eos% Basos%  
23 73.4  
13.3  
7.4  
5.4  
0.4  
  
23 79.9  
10.3  
6.4  
3.2  
0.3  
  
23 84.4  
9.2  
5.1  
0.9  
0.4  
  
  
  
  
Basic Metabolic Panel  
  
Na K Cl CO2 Gap Glu BUN Cr Ca Mg PO4  
23 1.5  
  
08/05/23 0357 140  
3.7  
109  
20  
15  
99  
46  
4.45  
7.8  
  
23 0354 140  
3.5  
107  
20  
17  
98  
44  
4.73  
7.7  
  
23 0158 137  
4.0  
107  
20  
14  
128  
41  
4.75  
7.7  
  
  
  
  
Cultures:  
Urine Culture  
  
Urine culture  
23 0244 10,000 - 50,000 CFU/ml Multiple bacteria present suggesting 
contamination.  
  
  
  
  
ASSESSMENT:  
CULLEN on CKDIIIb, nonoliguric  
- obstructive CULLEN from bilateral stones and bilateral hydronephrosis, s/p PCNT 
, renal function stable  
- vanco random level of 8  
- urine sodium 60, consistent with ATN  
- PCNT draining equally, no cortical thinning noted on US  
- electrolytes WNL  
- euvolemic  
  
  
RECOMMENDATIONS:  
- From renal pov, ok to be discharged with close Urology follow up for 
nephrolithiasis  
- maintain euvolemia  
- monitor BMP daily  
- strict Is/Os  
- Avoid hypotension, NSAID, Nephrotoxins, Contrast if possible.  
- renally dose abx  
  
  
Ayesha Viera MD  
Nephrology Fellow PGY 4  
Daytime / Weekend Renal Pager [phone]  
After 7 pm Emergencies [phone] Pager [phone]  
Electronically signed by Amari Castillo MD at 2023 8:42 AM EDT  
  
Associated attestation - Amari Castillo MD - 2023 8:42 AM EDT  
Formatting of this note might be different from the original.  
I saw and examined the patient on 2023 with the renal fellow, Dr. Bullock. I
agree to his assessment and plan mentioned in his notes below.  
  
  
* Ramo Dexter MD - 2023 8:03 AM EDTFormatting of this note is different
  from the original.  
Images from the original note were not included.  
GENERAL MEDICAL FLOOR  
DAILY PROGRESS NOTE  
  
Cris Hearn  
[id number]  
AC8-702/2  
2023  
Length of stay: 3 day(s)  
  
RECENT EVENTS:  
  
naeon  
  
SUBJECTIVE:  
  
Cris Hearn was evaluated at bedside. No complaints today.  
  
OBJECTIVE:  
  
/68 (BP Location: right arm)   Pulse 75   Temp 98.6 F (37 C) (Oral)   Resp
18   Ht 5' 6  (1.676 m)   Wt 277 lb (125.6 kg)   SpO2 99%   BMI 44.71 kg/m  
  
Intake/Output Summary (Last 24 hours) at 2023 0803  
Last data filed at 2023 0531  
Gross per 24 hour  
Intake 640 ml  
Output 2250 ml  
Net -1610 ml  
  
Physical Exam:  
Gen: Unavailable female in NAD. Obese.  
HEENT: Normocephalic, atraumatic. EOMI. Oral and mucus membranes pink and moist  
Neck: Supple. No lymphadenopathy  
CV: Regular rate and rhythm. No murmurs. No S3 or S4 noted. No JVD present,  
Lungs: Clear to auscultation bilaterally. No wheezes, rales, or rhonchi. Good 
air movement.  
Abdomen: Soft. Non-tender. Non-distended. No organomegaly. Normoactive bowel 
sounds.  
: Bilateral nephrostomy tubes.  
Extremities: No clubbing, cyanosis, or edema. 2+ peripheral pulses.  
Neuro: A&Ox3. No focal deficits appreciated.  
Skin: Warm and well perfused.  
  
CURRENT MEDICATIONS:  
Current Facility-Administered Medications  
Medication Dose Route Frequency Last Rate Last Admin  
[START ON 2023] vancomycin lab draw Other One Time Dose  
cefepime (MAXIPIME) iv piggyback 1000 mg 1,000 mg Intravenous Q12H Antibiotic 
100 mL/hr at  1,000 mg at 23  
vancomycin dosing pharmacy consult Other As Directed  
oxyCODONE immediate release tablet 5 mg Oral Q4H PRN 5 mg at 23  
albuterol (PROVENTIL HFA) 108 (90 Base) MCG/ACT HFA inhaler 2 Puff Inhalation 
Q4H PRN  
budesonide-formoterol (SYMBICORT) 80-4.5 MCG/ACT inhaler 2 Puff Inhalation BID 
RT 2 Puff at 23  
esomeprazole (NEXIUM) capsule 40 mg Oral Daily 30 min before breakfast 40 mg at 
23 08  
duloxetine (CYMBALTA) capsule 60 mg Oral Daily 60 mg at 23 0833  
dextrose 10 % iv infusion 125 mL Intravenous PRN  
Or  
glucagon (GLUCAGEN) 1 MG/ML injection kit 1 mg Subcutaneous PRN  
Or  
dextrose (GLUTOSE) 40 % oral gel 15 g of glucose Buccal PRN  
Or  
dextrose (GLUTOSE) 40 % oral gel 30 g of glucose Buccal PRN  
insulin lispro (HumaLOG) 100 UNIT/ML injection 2-12 Units Subcutaneous 3x Daily 
AC 2 Units at 23 1733  
lactated ringers iv infusion Intravenous Continuous 100 mL/hr at 23 0938 
New Bag at 23 0938  
nystatin (MYCOSTATIN) 100,000 unit/g powder Topical 2x Daily Given at 23 
2144  
  
LAB DATA:  
  
Basic Metabolic Panel  
  
Na K Cl CO2 Gap Glu BUN Cr Ca Mg PO4  
23 140  
3.7  
109  
20  
15  
99  
46  
4.45  
7.8  
  
23 140  
3.5  
107  
20  
17  
98  
44  
4.73  
7.7  
  
23 137  
4.0  
107  
20  
14  
128  
41  
4.75  
7.7  
  
  
  
  
CBC/PT/INR  
  
WBC RBC Hgb Hct MCV RDW Plt PT aPTT INR  
23 10.7  
3.51  
9.9  
31.0  
88  
15.6  
209  
  
23 0142 13.3  
3.50  
9.9  
30.9  
89  
15.8  
200  
  
23 0158 17.7  
3.60  
10.2  
32.1  
89  
15.8  
207  
  
  
  
  
WBC/Diff  
  
Neutro% Segs% Bands% Lymphs% Monos% Eos% Basos%  
23 0350 73.4  
13.3  
7.4  
5.4  
0.4  
  
23 79.9  
10.3  
6.4  
3.2  
0.3  
  
238 84.4  
9.2  
5.1  
0.9  
0.4  
  
  
  
  
IMAGING/OTHER:  
  
US Kidneys  
* Status post bilateral nephrostomy earlier today. Bilateral nephrostomy tubes 
are partially visualized.  
* Bilateral extensive nephrolithiasis better seen on prior CT scan.  
* No obvious hydronephrosis on either side within the limitations described 
above.  
  
ASSESSMENT AND PLAN:  
  
SUMMARY:  
  
Cris Hearn is a 52 yr old with a history of CKDIII, COPD (3L), Depression, 
DM, HLD, HTN, AFIB (Eliquis/sotalol), KWAKU (not on CPAP, did not tolerate) 
presenting for flank pain. Found to have bilateral obstructing hydronephrosis.  
  
PROBLEM LIST:  
  
#BL obstructing nephrolithiasis with bl hydro R>L  
#Sepsis secondary to UTI and Pyelo  
#Acute renal failure with oliguria secondary to ATN  
--Patient feeling better  
--Now patient with bilateral nephrostomy tube  
--leukocytosis improving  
--FeNa 5.6%  
--Urology following  
--IVF  
--Cr stable, no electrolyte disturbances  
--Nephro consult-> Likely ATN, avoid hypotension and nephrotoxins  
--Maintain euvolemia  
--Daily bmp and mag  
--Strict I/O's-> Monitoring for post obstructive autodiuresis  
--Continue Vanc/Cefepime  
--Will need PCNL at some point  
  
  
#COPD  
--prn albuterol  
--Resp   
--Budesinide-formoterol  
  
#Afib  
-EKG tomorrow  
--sotalol  
--Eliquis--Will continue to hold until improvement in renal function  
  
#GERD  
--ppi  
  
#Depression  
--duloxetine  
  
#HLD  
--statin  
  
#[id number]  
--trulicity and Invokana---holding  
--SSI and adjust as needed  
  
AM Labs ordered: Yes  
Reason for continued need for inpatient care: CULLEN  
  
Prophylaxis: SCDs  
Code Status: Full Code  
Dispo: Home  
  
Ramo Conteh MD  
  
  
Electronically signed by Ramo Dexter MD at 2023 11:50 AM EDT  
  
* Sha Peacock, [facility] - 2023 10:09 PM EDTFormatting of this note is 
  different from the original.  
Pharmacokinetic Dosing Service - VANCOMYCIN  
  
Name: Cris Hearn Age:52 year old Gender: female  
MRN: [id number] Ht: 5' 6  Wt: 125.6 kg  
  
Indication: Sepsis and Urinary Tract  
Desired Ranges: 15-20  
Day of therapy: 03  
  
Assessment and Recommendations:  
23 ROBERTDEL- Day 03: Sepsis and Urinary Tract; Renal function: CKD; 
Current Level: 19.3. Vancomycin adjustment: 500 mg iv x1. Next level Due: 24 
hours after last dose, Level not ordered  
  
The vancomycin level resulted as 19.3 mcg/mL on 23 @ , 24 hrs after 
the last dose . A onetime maintenance dose of vancomycin 500 mg iv once is 
recommended and ordered. The next vancomycin level is due 24 hrs after the last 
dose. Pharmacy will post notes for trough levels upon return and for dose 
changes.  
  
The medication regimen has been updated per consult agreement procedures.  
  
Current Dose  
Active Vancomycin Orders (From admission, onward)  
Start Stop  
23 vancomycin (VANCOCIN) 500  mg in dextrose 5 % 100 mL  
OFFSITE  mg, Intravenous, ONCE  
Question Answer Comment  
Suspected Source/Reason for Therapy Sepsis  
Suspected Source/Reason for Therapy Urinary Tract  
  
23 1029  
23 0641 vancomycin dosing pharmacy consult Other, As Directed  
  
Question: Suspected Source/Reason for Therapy Answer: Urinary Tract  
--  
  
  
  
Lab Values:  
Creatinine  
Date Value Ref Range Status  
2023 4.73 (H) 0.50 - 1.10 mg/dL Final  
2023 4.75 (H) 0.50 - 1.10 mg/dL Final  
2023 4.36 (H) 0.50 - 1.10 mg/dL Final  
  
Lab Results  
Component Value Date/Time  
VANCTR 19.3 2023 08:33 PM  
  
Lab Results  
Component Value Date/Time  
VANCR 8.3 2023 03:23 PM  
  
Serum creatinine: 4.73 mg/dL (H) 23 0354  
Estimated creatinine clearance: 18.63 mL/min (A)  
Culture(s): N/A  
  
SHA PEACOCK RPh - Department of Pharmacy Services  
Phone: [phone]  
Electronically signed by Sha Peacock RPh at 2023 10:09 PM EDT  
  
* Ramo Dexter MD - 2023 8:51 AM EDTFormatting of this note is different
  from the original.  
Images from the original note were not included.  
GENERAL MEDICAL FLOOR  
DAILY PROGRESS NOTE  
  
Cris Hearn  
[id number]  
AC8-702/2  
2023  
Length of stay: 2 day(s)  
  
RECENT EVENTS:  
  
naeon  
  
SUBJECTIVE:  
  
Cris Hearn was evaluated at bedside. She continues to feel well. Will work 
with PT/OT today  
  
OBJECTIVE:  
  
/70 (BP Location: right arm)   Pulse 83   Temp 98.4 F (36.9 C) (Oral)   
Resp 18   Ht 5' 6  (1.676 m)   Wt 277 lb (125.6 kg)   SpO2 93%   BMI 44.71 kg/m  
  
Intake/Output Summary (Last 24 hours) at 2023 0851  
Last data filed at 2023 0840  
Gross per 24 hour  
Intake 1931.66 ml  
Output 2275 ml  
Net -343.34 ml  
  
Physical Exam:  
Gen: Unavailable female in NAD. Obese.  
HEENT: Normocephalic, atraumatic. EOMI. Oral and mucus membranes pink and moist  
Neck: Supple. No lymphadenopathy  
CV: Regular rate and rhythm. No murmurs. No S3 or S4 noted. No JVD present,  
Lungs: Clear to auscultation bilaterally. No wheezes, rales, or rhonchi. Good 
air movement.  
Abdomen: Soft. Non-tender. Non-distended. No organomegaly. Normoactive bowel 
sounds.  
: Bilateral nephrostomy tubes.  
Extremities: No clubbing, cyanosis, or edema. 2+ peripheral pulses.  
Neuro: A&Ox3. No focal deficits appreciated.  
Skin: Warm and well perfused.  
  
CURRENT MEDICATIONS:  
Current Facility-Administered Medications  
Medication Dose Route Frequency Last Rate Last Admin  
vancomycin lab draw Other One Time Dose  
cefepime (MAXIPIME) iv piggyback 1000 mg 1,000 mg Intravenous Q12H Antibiotic 
100 mL/hr at 200 1,000 mg at 23 2200  
vancomycin dosing pharmacy consult Other As Directed  
oxyCODONE immediate release tablet 5 mg Oral Q4H PRN 5 mg at 23  
albuterol (PROVENTIL HFA) 108 (90 Base) MCG/ACT HFA inhaler 2 Puff Inhalation 
Q4H PRN  
budesonide-formoterol (SYMBICORT) 80-4.5 MCG/ACT inhaler 2 Puff Inhalation BID 
RT 2 Puff at 23  
esomeprazole (NEXIUM) capsule 40 mg Oral Daily 30 min before breakfast 40 mg at 
23  
duloxetine (CYMBALTA) capsule 60 mg Oral Daily 60 mg at 2333  
dextrose 10 % iv infusion 125 mL Intravenous PRN  
Or  
glucagon (GLUCAGEN) 1 MG/ML injection kit 1 mg Subcutaneous PRN  
Or  
dextrose (GLUTOSE) 40 % oral gel 15 g of glucose Buccal PRN  
Or  
dextrose (GLUTOSE) 40 % oral gel 30 g of glucose Buccal PRN  
insulin lispro (HumaLOG) 100 UNIT/ML injection 2-12 Units Subcutaneous 3x Daily 
AC 2 Units at 23 1733  
lactated ringers iv infusion Intravenous Continuous 100 mL/hr at 23 1024 
New Bag at 23 1024  
nystatin (MYCOSTATIN) 100,000 unit/g powder Topical 2x Daily Given at 23 
0833  
  
LAB DATA:  
  
Basic Metabolic Panel  
  
Na K Cl CO2 Gap Glu BUN Cr Ca Mg PO4  
23 0354 140  
3.5  
107  
20  
17  
98  
44  
4.73  
7.7  
  
23 0158 137  
4.0  
107  
20  
14  
128  
41  
4.75  
7.7  
  
23 0244 1.1  
  
23 0244 140  
4.3  
107  
22  
15  
133  
40  
4.36  
8.3  
  
  
  
  
CBC/PT/INR  
  
WBC RBC Hgb Hct MCV RDW Plt PT aPTT INR  
23 0142 13.3  
3.50  
9.9  
30.9  
89  
15.8  
200  
  
23 0158 17.7  
3.60  
10.2  
32.1  
89  
15.8  
207  
  
23 0244 1.31  
  
23 0244 18.1  
3.95  
11.0  
35.0  
89  
15.7  
240  
  
  
  
  
WBC/Diff  
  
Neutro% Segs% Bands% Lymphs% Monos% Eos% Basos%  
23 0142 79.9  
10.3  
6.4  
3.2  
0.3  
  
23 0158 84.4  
9.2  
5.1  
0.9  
0.4  
  
23 0244 80.8  
11.8  
4.7  
2.0  
0.7  
  
  
  
  
IMAGING/OTHER:  
  
US Kidneys  
* Status post bilateral nephrostomy earlier today. Bilateral nephrostomy tubes 
are partially visualized.  
* Bilateral extensive nephrolithiasis better seen on prior CT scan.  
* No obvious hydronephrosis on either side within the limitations described 
above.  
  
ASSESSMENT AND PLAN:  
  
SUMMARY:  
  
Cris Hearn is a 52 yr old with a history of CKDIII, COPD (3L), Depression, 
DM, HLD, HTN, AFIB (Eliquis/sotalol), KWAKU (not on CPAP, did not tolerate) 
presenting for flank pain. Found to have bilateral obstructing hydronephrosis.  
  
PROBLEM LIST:  
  
#BL obstructing nephrolithiasis with bl hydro R>L  
#Sepsis secondary to UTI and Pyelo  
#Acute renal failure with oliguria  
--Patient feeling better  
--Now patient with bilateral nephrostomy tube  
--leukocytosis improving  
--FeNa 5.6% indicating post obstructive  
--Urology following  
--IVF  
--Cr stable, no electrolyte disturbances  
--Nephro consult  
--Strict I/O's-> Monitoring for post obstructive autodiuresis  
--Continue Vanc/Cefepime  
--Will need PCNL at some point  
  
  
#COPD  
--prn albuterol  
--Resp   
--Budesinide-formoterol  
  
#Afib  
-EKG tomorrow  
--sotalol  
--Eliquis--Will continue to hold until improvement in renal function  
  
#GERD  
--ppi  
  
#Depression  
--duloxetine  
  
#HLD  
--statin  
  
#[id number]  
--trulicity and Invokana---holding  
--SSI and adjust as needed  
  
AM Labs ordered: Yes  
Reason for continued need for inpatient care: CULLEN  
  
Prophylaxis: SCDs  
Code Status: Full Code  
Dispo: Home  
  
Ramo Conteh MD  
  
  
Electronically signed by Ramo Dexter MD at 2023 2:57 PM EDT  
  
* Marci Silva MD - 2023 7:16 AM EDTFormatting of this note is different 
  from the original.  
Images from the original note were not included.  
  
Department of Urology  
Progress Note  
  
Patient: Cris Hearn  
Age/Sex: 52 year old, female  
MRN: [id number]  
Admit Date: 2023  
Code Status: Full Code  
Length of Stay: Length of stay: 2 day(s)  
  
Interval History/Overnight Events:  
NAEON  
Bilateral PCNT in place, good OP  
Cr 4.73 from 4.75  
  
Objective  
In: 4610 (36.7 mL/kg) [I.V.:4570 (1.5 mL/kg/hr)]  
Out: 2275 (18.1 mL/kg) [Urine:2125 (0.7 mL/kg/hr)]  
Net: 2335  
Weight: 125.6 kg  
  
CBC/PT/INR  
  
WBC RBC Hgb Hct MCV RDW Plt PT aPTT INR  
23 0142 13.3  
3.50  
9.9  
30.9  
89  
15.8  
200  
  
23 0158 17.7  
3.60  
10.2  
32.1  
89  
15.8  
207  
  
23 0244 1.31  
  
23 0244 18.1  
3.95  
11.0  
35.0  
89  
15.7  
240  
  
  
  
  
Basic Metabolic Panel  
  
Na K Cl CO2 Gap Glu BUN Cr Ca Mg PO4  
23 0354 140  
3.5  
107  
20  
17  
98  
44  
4.73  
7.7  
  
23 0158 137  
4.0  
107  
20  
14  
128  
41  
4.75  
7.7  
  
23 0244 1.1  
  
23 0244 140  
4.3  
107  
22  
15  
133  
40  
4.36  
8.3  
  
  
  
  
Medications:  
Current Facility-Administered Medications  
Medication Dose Route Frequency Last Rate Last Admin  
cefepime (MAXIPIME) iv piggyback 1000 mg 1,000 mg Intravenous Q12H Antibiotic 
100 mL/hr at 200 1,000 mg at 23 2200  
vancomycin dosing pharmacy consult Other As Directed  
oxyCODONE immediate release tablet 5 mg Oral Q4H PRN 5 mg at 23 194  
albuterol (PROVENTIL HFA) 108 (90 Base) MCG/ACT HFA inhaler 2 Puff Inhalation 
Q4H PRN  
budesonide-formoterol (SYMBICORT) 80-4.5 MCG/ACT inhaler 2 Puff Inhalation BID 
RT 2 Puff at 23  
esomeprazole (NEXIUM) capsule 40 mg Oral Daily 30 min before breakfast 40 mg at 
23 0912  
duloxetine (CYMBALTA) capsule 60 mg Oral Daily 60 mg at 23 0912  
dextrose 10 % iv infusion 125 mL Intravenous PRN  
Or  
glucagon (GLUCAGEN) 1 MG/ML injection kit 1 mg Subcutaneous PRN  
Or  
dextrose (GLUTOSE) 40 % oral gel 15 g of glucose Buccal PRN  
Or  
dextrose (GLUTOSE) 40 % oral gel 30 g of glucose Buccal PRN  
insulin lispro (HumaLOG) 100 UNIT/ML injection 2-12 Units Subcutaneous 3x Daily 
AC 2 Units at 23 1733  
lactated ringers iv infusion Intravenous Continuous 100 mL/hr at 23 1024 
New Bag at 23 1024  
nystatin (MYCOSTATIN) 100,000 unit/g powder Topical 2x Daily Given at 08/03/23 
2114  
  
Physical Exam  
Gen - No acute distress  
Neuro - Alert, oriented, conversant  
CV - Regular rate  
Pulm - Symmetric chest rise, non-labored breathing  
Abd - Soft, non-tender, non-distended  
Ext - Warm, well perfused  
Skin - without jaunice  
Psych - appropriate tone, affect  
 - bilateral PCNTs in place draining clear, yellow urine. Gonzalez in place 
draining clear, yellow urine.  
  
Imaging  
CT [facility]  
IMPRESSION: Bilateral staghorn calculi greater on the RIGHT. RIGHT perinephric 
edema and stranding  
with moderate hydronephrosis. Mild RIGHT renal hilar/retroperitoneal 
lymphadenopathy. May consider  
inflammation secondary to staghorn calculi or infection. LEFT UVJ calculus 
measuring up to 7 mm.  
Mild LEFT hydronephrosis.  
  
Assessment  
Cris Hearn is a 52 year old female with h/o CKDIII, COPD (3L baseline), 
depression, DM, HLD, HTN, Afib (Eliquis), KWAKU who presents as transfer from 
[facility] for bilateral stones. CT: Large staghorn right with hydro, large renal
stones on L, proximal ureteral stone. Hydro R>L, evidence of infection on R.  
  
8/3: Cr increasing since admission. Not likely to be post renal CULLEN d/t well 
draining PCNTs.  
  
: Cr remains elevated although still having good output. FeNa 5.6%: postrenal
etiology with sufficient UOP. PCNTs flushed this am with no signs of 
obstruction. Gonzalez placed yesterday.  
  
Recommendations:  
- Bilateral PCNTs, gonzalez in place to ensure optimal drainage  
- Do not flush bilateral PCNTs  
- Broad spectrum Abx, follow up on Ucx results, sensitivities  
- PCNL in the future for stone treatment  
- Continue to trend Cr and UOP  
  
Marci Silva MD PGY1  
Urology   [phone]  
Electronically signed by Marci Silva MD at 2023 7:27 AM EDT  
  
* Richard Bravo, PharmD - 2023 5:39 PM EDTFormatting of this note is 
  different from the original.  
Pharmacokinetic Dosing Service - VANCOMYCIN  
  
Name: Cris Hearn Age:52 year old Gender: female  
MRN: [id number] Ht: 5' 6  Wt: 125.6 kg  
  
Indication: Sepsis and Urinary Tract  
Desired Ranges: 15-20  
Day of therapy: 2  
  
Assessment and Recommendations:  
23 IGRAY- Day2: Sepsis and Urinary Tract; Renal function: CKD; Current 
level:8.3 mcg/mL. No change recommended. Next level Due:24h after last dose. 
Level not ordered  
  
The vancomycin level resulted as 8.3 mcg/mL on 23 @ 15:23 24 hrs after the
last dose (~27 hours after the previous dose).  
Vancomycin 1,500 mg IV once will be ordered.  
The next vancomycin level is due 24 hrs after the last dose. Pharmacy will post 
notes for trough levels upon return and for dose changes.  
  
The medication regimen has been updated per consult agreement procedures.  
  
Current Dose  
Active Vancomycin Orders (From admission, onward)  
Start Stop  
23 0641 vancomycin dosing pharmacy consult Other, As Directed  
  
Question: Suspected Source/Reason for Therapy Answer: Urinary Tract  
--  
  
  
  
Lab Values:  
Creatinine  
Date Value Ref Range Status  
2023 4.75 (H) 0.50 - 1.10 mg/dL Final  
2023 4.36 (H) 0.50 - 1.10 mg/dL Final  
  
No results found for: VANCTR  
Lab Results  
Component Value Date/Time  
VANCR 8.3 2023 03:23 PM  
  
Serum creatinine: 4.75 mg/dL (H) 23 0158  
Estimated creatinine clearance: 18.55 mL/min (A)  
Culture(s): PENDING  
  
Richard Bravo PharmD - Department of Pharmacy Services  
Phone: [phone]  
  
Electronically signed by Richard Bravo PharmD at 2023 5:39 PM EDT  
  
* Marci Silva MD - 2023 9:46 AM EDTFormatting of this note is different 
  from the original.  
Images from the original note were not included.  
  
Department of Urology  
Progress Note  
  
Patient: Cris Hearn  
Age/Sex: 52 year old, female  
MRN: [id number]  
Admit Date: 2023  
Code Status: Full Code  
Length of Stay: Length of stay: 1 day(s)  
  
Interval History/Overnight Events:  
NAEON  
Bilateral PCNT in place, good OP  
Cr uptrending  
  
Objective  
In: - (0 mL/kg)  
Out:  (15.8 mL/kg) [Urine: (0.7 mL/kg/hr)]  
Net: -  
Weight: 125.6 kg  
  
CBC/PT/INR  
  
WBC RBC Hgb Hct MCV RDW Plt PT aPTT INR  
23 17.7  
3.60  
10.2  
32.1  
89  
15.8  
207  
  
234 1.31  
  
23 18.1  
3.95  
11.0  
35.0  
89  
15.7  
240  
  
  
  
  
Basic Metabolic Panel  
  
Na K Cl CO2 Gap Glu BUN Cr Ca Mg PO4  
23 137  
4.0  
107  
20  
14  
128  
41  
4.75  
7.7  
  
23 1.1  
  
23 140  
4.3  
107  
22  
15  
133  
40  
4.36  
8.3  
  
  
  
  
Medications:  
Current Facility-Administered Medications  
Medication Dose Route Frequency Last Rate Last Admin  
vancomycin lab draw Other One Time Dose  
meperidine (DEMEROL) 50 MG/ML injection 50 mg Intravenous Push Q2H PRN  
cefepime (MAXIPIME) iv piggyback 1000 mg 1,000 mg Intravenous Q12H Antibiotic 
100 mL/hr at 2 1,000 mg at 23 09  
vancomycin dosing pharmacy consult Other As Directed  
oxyCODONE immediate release tablet 5 mg Oral Q4H PRN 5 mg at 23 0207  
albuterol (PROVENTIL HFA) 108 (90 Base) MCG/ACT HFA inhaler 2 Puff Inhalation 
Q4H PRN  
budesonide-formoterol (SYMBICORT) 80-4.5 MCG/ACT inhaler 2 Puff Inhalation BID 
RT 2 Puff at 23 0847  
esomeprazole (NEXIUM) capsule 40 mg Oral Daily 30 min before breakfast 40 mg at 
23 0912  
duloxetine (CYMBALTA) capsule 60 mg Oral Daily 60 mg at 23 0912  
dextrose 10 % iv infusion 125 mL Intravenous PRN  
Or  
glucagon (GLUCAGEN) 1 MG/ML injection kit 1 mg Subcutaneous PRN  
Or  
dextrose (GLUTOSE) 40 % oral gel 15 g of glucose Buccal PRN  
Or  
dextrose (GLUTOSE) 40 % oral gel 30 g of glucose Buccal PRN  
insulin lispro (HumaLOG) 100 UNIT/ML injection 2-12 Units Subcutaneous 3x Daily 
AC 2 Units at 23 1733  
lactated ringers iv infusion Intravenous Continuous 100 mL/hr at 23 0320 
New Bag at 23 0320  
nystatin (MYCOSTATIN) 100,000 unit/g powder Topical 2x Daily Given at 23 
0913  
  
Physical Exam  
Gen - No acute distress  
Neuro - Alert, oriented, conversant  
CV - Regular rate  
Pulm - Symmetric chest rise, non-labored breathing  
Abd - Soft, non-tender, non-distended  
Ext - Warm, well perfused  
Skin - without jaunice  
Psych - appropriate tone, affect  
 - bilateral PCNTs in place draining clear, yellow urine. No kinks in perc 
tubing appreciated.  
  
Imaging  
CT [facility]  
IMPRESSION: Bilateral staghorn calculi greater on the RIGHT. RIGHT perinephric 
edema and stranding  
with moderate hydronephrosis. Mild RIGHT renal hilar/retroperitoneal 
lymphadenopathy. May consider  
inflammation secondary to staghorn calculi or infection. LEFT UVJ calculus 
measuring up to 7 mm.  
Mild LEFT hydronephrosis.  
  
Assessment  
Cris Hearn is a 52 year old female with h/o CKDIII, COPD (3L baseline), 
depression, DM, HLD, HTN, Afib (Eliquis), KWAKU who presents as transfer from 
[facility] for bilateral stones. CT: Large staghorn right with hydro, large renal
stones on L, proximal ureteral stone. Hydro R>L, evidence of infection on R.  
  
8/3: Cr increasing since admission. Not likely to be post renal CULLEN d/t well 
draining PCNTs.  
  
Recommendations:  
- Assess for signs of intrinsic vs pre-renal CULLEN  
- Bilateral PCNTs in place, draining well with no kinks in tubing  
- Do not flush bilateral PCNTs  
- Broad spectrum Abx, follow up on Ucx results, sensitivities  
- PCNL in the future for stone treatment  
- continue to trend Cr and UOP  
  
Marci Silva MD PGY1  
Urology   [phone]  
Electronically signed by MARYCRUZ Iniguez MD at 2023 10:23 AM EDT  
  
* Ramo Dexter MD - 2023 8:34 AM EDTFormatting of this note is different
  from the original.  
Images from the original note were not included.  
GENERAL MEDICAL FLOOR  
DAILY PROGRESS NOTE  
  
Cris Hearn  
[id number]  
AC8-702/2  
2023  
Length of stay: 1 day(s)  
  
RECENT EVENTS:  
  
naeon  
  
SUBJECTIVE:  
  
Cris Hearn was evaluated at bedside. She feels well. Pain is much better. 
Endorses good appetite. No other complaints.  
  
OBJECTIVE:  
  
BP 99/55 (BP Location: left arm)   Pulse 86   Temp 97.9 F (36.6 C) (Oral)   Resp
18   Ht 5' 6  (1.676 m)   Wt 277 lb (125.6 kg)   SpO2 98%   BMI 44.71 kg/m  
  
Intake/Output Summary (Last 24 hours) at 8/3/2023 0835  
Last data filed at 8/3/2023 0613  
Gross per 24 hour  
Intake --  
Output 1990 ml  
Net -1990 ml  
  
Physical Exam:  
Gen: Unavailable female in NAD. Obese.  
HEENT: Normocephalic, atraumatic. EOMI. Oral and mucus membranes pink and moist  
Neck: Supple. No lymphadenopathy  
CV: Regular rate and rhythm. No murmurs. No S3 or S4 noted. No JVD present,  
Lungs: Clear to auscultation bilaterally. No wheezes, rales, or rhonchi. Good 
air movement.  
Abdomen: Soft. Non-tender. Non-distended. No organomegaly. Normoactive bowel 
sounds.  
: Bilateral nephrostomy tubes.  
Extremities: No clubbing, cyanosis, or edema. 2+ peripheral pulses.  
Neuro: A&Ox3. No focal deficits appreciated.  
Skin: Warm and well perfused.  
  
CURRENT MEDICATIONS:  
Current Facility-Administered Medications  
Medication Dose Route Frequency Last Rate Last Admin  
sodium chloride 0.9 % iv bolus 1,000 mL Intravenous Fluid Bolus  
vancomycin lab draw Other One Time Dose  
meperidine (DEMEROL) 50 MG/ML injection 50 mg Intravenous Push Q2H PRN  
cefepime (MAXIPIME) iv piggyback 1000 mg 1,000 mg Intravenous Q12H Antibiotic 
100 mL/hr at 115 1,000 mg at 235  
vancomycin dosing pharmacy consult Other As Directed  
oxyCODONE immediate release tablet 5 mg Oral Q4H PRN 5 mg at 23 0207  
albuterol (PROVENTIL HFA) 108 (90 Base) MCG/ACT HFA inhaler 2 Puff Inhalation 
Q4H PRN  
budesonide-formoterol (SYMBICORT) 80-4.5 MCG/ACT inhaler 2 Puff Inhalation BID 
RT  
esomeprazole (NEXIUM) capsule 40 mg Oral Daily 30 min before breakfast 40 mg at 
23 1012  
duloxetine (CYMBALTA) capsule 60 mg Oral Daily 60 mg at 23 1012  
dextrose 10 % iv infusion 125 mL Intravenous PRN  
Or  
glucagon (GLUCAGEN) 1 MG/ML injection kit 1 mg Subcutaneous PRN  
Or  
dextrose (GLUTOSE) 40 % oral gel 15 g of glucose Buccal PRN  
Or  
dextrose (GLUTOSE) 40 % oral gel 30 g of glucose Buccal PRN  
insulin lispro (HumaLOG) 100 UNIT/ML injection 2-12 Units Subcutaneous 3x Daily 
AC 2 Units at 23 1733  
lactated ringers iv infusion Intravenous Continuous 100 mL/hr at 23 0320 
New Bag at 23 0320  
nystatin (MYCOSTATIN) 100,000 unit/g powder Topical 2x Daily Given at 23  
  
LAB DATA:  
  
Basic Metabolic Panel  
  
Na K Cl CO2 Gap Glu BUN Cr Ca Mg PO4  
23 137  
4.0  
107  
20  
14  
128  
41  
4.75  
7.7  
  
23 0244 1.1  
  
23 0244 140  
4.3  
107  
22  
15  
133  
40  
4.36  
8.3  
  
  
  
  
CBC/PT/INR  
  
WBC RBC Hgb Hct MCV RDW Plt PT aPTT INR  
23 17.7  
3.60  
10.2  
32.1  
89  
15.8  
207  
  
23 0244 1.31  
  
234 18.1  
3.95  
11.0  
35.0  
89  
15.7  
240  
  
  
  
  
WBC/Diff  
  
Neutro% Segs% Bands% Lymphs% Monos% Eos% Basos%  
23 84.4  
9.2  
5.1  
0.9  
0.4  
  
234 80.8  
11.8  
4.7  
2.0  
0.7  
  
  
  
  
IMAGING/OTHER:  
  
US Kidneys  
* Status post bilateral nephrostomy earlier today. Bilateral nephrostomy tubes 
are partially visualized.  
* Bilateral extensive nephrolithiasis better seen on prior CT scan.  
* No obvious hydronephrosis on either side within the limitations described 
above.  
  
ASSESSMENT AND PLAN:  
  
SUMMARY:  
  
Cris Hearn is a 52 yr old with a history of CKDIII, COPD (3L), Depression, 
DM, HLD, HTN, AFIB (Eliquis/sotalol), KWAKU (not on CPAP, did not tolerate) 
presenting for flank pain. Found to have bilateral obstructing hydronephrosis.  
  
PROBLEM LIST:  
  
#BL obstructing nephrolithiasis with bl hydro R>L  
#Sepsis secondary to UTI and Pyelo  
#Acute renal failure with oliguria  
--Patient feeling better  
--Now patient with bilateral nephrostomy tube  
--leukocytosis mildly improving  
--Urology following  
--IVF  
--Cr uptrending, if no improvement tomorrow ill consult nephrology  
--Strict I/O's-> Monitoring for postobstructive autodiuresis  
--Continue Vanc/Cefepime  
--Will need PCNL at some point  
  
  
#COPD  
--prn albuterol  
--Resp   
--Budesinide-formoterol  
  
#Afib  
--sotalol  
--Eliquis--Will restart tomorrow if no drop in hgb  
  
#GERD  
--ppi  
  
#Depression  
--duloxetine  
  
#HLD  
--statin  
  
#[id number]  
--trulicity and Invokana---holding  
--SSI and adjust as needed  
  
AM Labs ordered: Yes  
Reason for continued need for inpatient care: CULLEN  
  
Prophylaxis: SCDs  
Code Status: Full Code  
Dispo: Home  
  
Ramo Conteh MD  
  
  
Electronically signed by Ramo Dexter MD at 2023 2:23 PM EDT  
  
* Jennifer Bray LISW - 2023 11:30 AM EDTFormatting of this note is 
  different from the original.  
  
23 1100  
Assessment and Discharge Planning Evaluation  
READMISSION LESS THAN 30 DAYS No  
READMISSION RISK SCORE IS Rising Risk  
INTERVIEWED Patient  
COGNITIVE STATUS Oriented to person, place, time and location  
LIVING SITUATION Home - Own  
PCP VERIFIED No  
ADMISSION INSURANCE Medicaid  
HOME HEALTH CARE PRIOR TO ADMISSION No  
TENTATIVE DISCHARGE PLAN Home - Own  
READMISSION RISK SCORE SHOULD BE Remain Unchanged  
REASON READMISSION SCORE NEEDS ADJUSTED na  
SDOH Completed? Yes  
SDOH Risks Addressed - Do Not complete until all required fields are completed 
Yes  
Discharge Plan and Interventions  
DISCHARGE PLAN Home - Own  
PATIENT GOALS RELATED TO DISCHARGE return home  
HOW WILL PATIENT MEET THEIR GOALS follow poc  
PATIENT TREATMENT PREFERENCES home  
DISCHARGE CARE INTERVENTIONS None Required  
FINAL DISCHARGE PLANNING SUMMARY home  
Discharge Milestones: Discussed Help Patient Needs  
DISCUSSSED WHAT HELP PATIENT WOULD NEED Yes  
  
Assessment complete. Do not anticipate needs at time of dc  
  
SW to remain available should needs arise  
  
SONY Esparza,W  
[phone]  
  
Electronically signed by Jennifer Bray LISW at 2023 11:31 AM EDT  
  
* Cris Vásquez [facility] - 2023 6:59 AM EDTFormatting of this note is different
  from the original.  
Pharmacy Renal Dosing Service  
  
Name: Cris Hearn Age: 52 year old Gender: female  
MRN: [id number] Ht: 5' 6  Wt: 125.6 kg  
  
Patient is currently prescribed the following renally monitored Medication(s):  
Renally Adjusted Meds (720h ago, onward)  
  
None  
  
  
  
Relevant objective data reviewed:  
  
Serum creatinine: 4.36 mg/dL (H) 23 0244  
Estimated creatinine clearance: 20.21 mL/min (A)  
  
Assessment and Recommendation:  
  
The medication required adjustment for renal function at the time of initial 
order verification. The original medication order cefepime 2gm q8 has been 
updated to cefepime 1gm q12. Medication therapyhas been updated per consult 
agreement.  
  
Cris Vásquez [facility]  
Department of Pharmacy Services  
[phone]  
  
Electronically signed by Cris Vásquez [facility] at 2023 6:59 AM EDT  
  
* Cris Vásquez [facility] - 2023 6:56 AM EDTFormatting of this note is different
  from the original.  
Pharmacokinetic Dosing Service - VANCOMYCIN  
Initial Dosing Note  
  
Name: Cris Hearn Age:52 year old Gender: female  
MRN: [id number] Ht: 5' 6  Wt: 125.6 kg  
  
Indication: Urinary Tract  
Desired Ranges: 10-15  
Day of therapy: 1  
  
Assessment and Recommendations:  
  
No loading dose of vancomycin is recommended. Maintenance dosing of vancomycin 
1500 mg once is recommended and has been ordered. A vancomycin level is due 24 
hrs after the last dose. Pharmacy will post notes for trough levels upon return 
and for dose changes.  
  
The medication regimen has been updated per consult agreement procedures.  
  
Current Dose  
Active Vancomycin Orders (From admission, onward)  
None  
  
Lab Values:  
Creatinine  
Date Value Ref Range Status  
2023 4.36 (H) 0.50 - 1.10 mg/dL Final  
  
No results found for: VANCTRNo results found for: VANCR  
Serum creatinine: 4.36 mg/dL (H) 23 0244  
Estimated creatinine clearance: 20.21 mL/min (A)  
Culture(s): N/A  
  
Cris Vásquez [facility] - Department of Pharmacy Services  
Phone: [phone]  
Electronically signed by Cris Vásquez RPh at 2023 6:56 AM EDT  
  
* Jeaneth Cooper RN - 2023 2:05 AM EDTFormatting of this note is different
  from the original.  
  
23 0203  
Admit Note (Completed by Receiving Unit)  
Arrival Time: 010  
Arrived to:  [facility]  
Arrived from: Other (comment)  
([facility])  
Mode of Transport Cart  
Transported by: EMT-P  
Transported with: Oxygen  
SBAR Report Received From: CHARLENE Ventura  
  
  
Electronically signed by Jeaneth Cooper RN at 2023 2:05 AM EDT  
  
documented in this [id number]- Hospital Discharge 
instructions* Discharge Instructions*   
  
Eyad Trujillo DO - 2023 4:21 PM EDT  
  
Formatting of this note might be different from the original.  
Images from the original note were not included.  
Take-Home Instructions  
for Cris Garcian:  
2023  
  
Special instructions: Please continue flushing the nephrostomy tubes, twice 
daily with 10cc of normal saline. You will need to follow with urology for 
definitive treatment of your kidney stones.  
  
It was a pleasure caring for your medical needs at [facility] 
throughout your stay. Your healthcare and well being is our top priority, 
however it is a team effort between both your physicians and you as the patient.
Please take time to read over your take home instructions and follow up 
appointment recommendations to ensure you have the healthiest life possible 
going forward.  
  
Primary Care Physician:  
No prior visit found with PCP (RUDDY ELDER)  
No future appointment with PCP (RUDDY ELDER)  
  
Please be sure to make an appointment with your primary care physician. If you 
do not have one please go to this website and choose one that is near where you 
live: [email or web address]
[email or web address]  
  
Important Things to Remember:  
Please bring a current list of your medications to any future outpatient medical
appointments or hospitalizations.  
  
Reasons to call your primary care physician before your next routine follow up 
visit:  
  
Temperature greater than 100.4 F  
Persistent nausea and vomiting  
Severe uncontrolled pain  
Redness, tenderness, or signs of infection (pain, swelling, redness, odor or 
green/yellow dischargearound skin)  
Difficulty breathing, headache, or visual disturbances  
Hives  
Persistent dizziness or light-headedness  
Extreme fatigue  
Serious questions or concerns you may have after hospital discharge  
  
HEALTH MAINTENANCE RECOMMENDATIONS  
  
Diet & Exercise  
  
The  Mediterranean Diet  is well studied and recommended for its heart healthy 
benefits. It focuseson eating fruits, vegetables, beans/nuts, lean dairy, lean 
protein such as fish/chicken, and using olive oil instead of butter. It's 
recommend to limit carbohydrates such as breads/pastas, juice, desserts, fried 
foods, and  fast food .  
For more information and a helpful handout, please visit 
[email or web address]
[email or web address]  
?la=en  
  
The American Heart Association recommends exercising for 30 minutes a day 5 
times a week. Walking, jogging, swimming, or biking mixed with light-to-moderate
weight lifting are great for your health. Do not exert yourself more than you 
feel you can handle. If you begin to have intense shortness of breath or chest 
pain, stop that exercise. Joining a local gym is a great way to ensure you 
consistently exercise  
  
Quitting Smoking  
  
If you smoke, please continue to reduce the number of cigarettes you smoke per 
day until you quit completely. Smoking reduces your life span and causes 
shortness of breath, COPD, lung cancer and various other cancers including 
leukemia (AML), mouth/throat, esophagus, stomach, pancreas, liver, kidney, 
bladder, cervix, colon, and rectal cancers. Various medications exist to help 
you quit smoking which your PCP can prescribe. The [facility] has
a phone line resource at 3-460-QUIT NOW (1-111.130.5902) and an online resource 
at https://[place].quitlogix.org/ to assist you if you currently smoke.  
  
Thank you,  
  
Dr. Eyad Trujillo DO  
  
  
  
Electronically signed by Eyad Trujillo DO at 2023 4:21 PM EDT  
  
  
  
* Attachments  
  
The following attachments cannot be sent through Care Everywhere.  
  
* Acute Kidney Injury Discharge Instructions (English)  
* How to Care for Your Nephrostomy Tube (English)  
* Atrial Fibrillation Discharge Instructions (English)  
  
documented in this [id number]- Plan of care note* Care Plan 
  Note - Ted Gentile MD - 2023 8:39 AM EDTFormatting of this note 
  might be different from the original.  
Cris Hearn is a 52 year old female with h/o CKDIII, COPD (3L baseline), 
depression, DM, HLD, HTN, Afib (Eliquis), KWAKU who presents as transfer from 
[facility] for bilateral stones. CT: Large staghorn right with hydro, large renal
stones on L, proximal ureteral stone. S/p bilateral PCNTs. Cr coming down over 
the weekend.  
  
  
Recommendations:  
- Maintain bilateral PCNTs to drainage on discharged  
- Follow up outpatient for planning of definitive stone treatment and PCNTs. 
Message sent to  to schedule  
- Urology to sign off at this time  
  
Eladio Gentile MD PGY5  
Urology [phone]  
Electronically signed by Ted Gentile MD at 2023 8:44 AM EDT  
  
[facility]  
Work Phone: [phone]- Miscellaneous Notes* Care Plan Note - 
  Ted Gentile MD - 2023 8:39 AM EDTFormatting of this note might be 
  different from the original.  
Cris Hearn is a 52 year old female with h/o CKDIII, COPD (3L baseline), 
depression, DM, HLD, HTN, Afib (Eliquis), KWAKU who presents as transfer from 
[facility] for bilateral stones. CT: Large staghorn right with hydro, large renal
stones on L, proximal ureteral stone. S/p bilateral PCNTs. Cr coming down over 
the weekend.  
  
  
Recommendations:  
- Maintain bilateral PCNTs to drainage on discharged  
- Follow up outpatient for planning of definitive stone treatment and PCNTs. 
Message sent to  to schedule  
- Urology to sign off at this time  
  
Eladio Gentile MD PGY5  
Urology [phone]  
Electronically signed by Ted Gentile MD at 2023 8:44 AM EDT  
  
* Care Plan Note - Leigh Tomas RN - 2023 2:08 AM EDTFormatting of this 
  note might be different from the original.  
  
Problem: Routine Care:  
Goal: Patient care will be managed and maintained throughout hospital stay per 
unit specific routine care procedure  
Outcome: Progressing  
  
Problem: Infection:  
Goal: Will be free of signs and symptoms of infection  
Outcome: Progressing  
  
Problem: Altered Elimination:  
Goal: Establishment of normal bowel function will be achieved and maintained  
Outcome: Progressing  
  
Problem: Acute Pain:  
Goal: Ability to identify pain intensity on a pain scale and rate it 
consistently will be achieved and maintained  
Outcome: Progressing  
  
Problem: VTE Prophylaxis:  
Goal: Will be free of DVT  
Outcome: Progressing  
  
Problem: Safety:  
Goal: Patient will remain free of falls during hospital stay  
Outcome: Progressing  
Note: Q2 hourly safety rounds maintain  
Goal: Free from injury during hospitalization  
Outcome: Progressing  
  
Problem: Discharge Planning:  
Goal: Discharge needs of the adult patient will be met  
Outcome: Progressing  
  
Electronically signed by Leigh Tomas RN at 2023 2:08 AM EDT  
  
* Care Plan Note - Lili Flynn - 2023 4:16 AM EDTFormatting of 
  this note might be different from the original.  
  
Problem: Routine Care:  
Goal: Patient care will be managed and maintained throughout hospital stay per 
unit specific routine care procedure  
Outcome: Progressing  
Note: Patient's care was managed and maintained per medicine specific routine 
care procedures.  
  
Problem: Infection:  
Goal: Will be free of signs and symptoms of infection  
Outcome: Progressing  
  
Problem: Altered Elimination:  
Goal: Establishment of normal bowel function will be achieved and maintained  
Outcome: Progressing  
  
Problem: Acute Pain:  
Goal: Ability to identify pain intensity on a pain scale and rate it 
consistently will be achieved and maintained  
Outcome: Progressing  
Note: Patient denied pain.  
  
Problem: VTE Prophylaxis:  
Goal: Will be free of DVT  
Outcome: Progressing  
Note: Patient free of DVT.  
  
Problem: Safety:  
Goal: Patient will remain free of falls during hospital stay  
Outcome: Progressing  
Goal: Free from injury during hospitalization  
Outcome: Progressing  
  
Problem: Discharge Planning:  
Goal: Discharge needs of the adult patient will be met  
Outcome: Progressing  
  
Electronically signed by Lili Flynn at 2023 4:17 AM EDT  
  
* Care Plan Note - Karen Parrish RN - 2023 8:12 AM EDTFormatting of this 
  note might be different from the original.  
  
Problem: Routine Care:  
Goal: Patient care will be managed and maintained throughout hospital stay per 
unit specific routine care procedure  
Outcome: Progressing  
  
Problem: Infection:  
Goal: Will be free of signs and symptoms of infection  
Outcome: Progressing  
  
Problem: Altered Elimination:  
Goal: Establishment of normal bowel function will be achieved and maintained  
Outcome: Progressing  
  
Problem: Acute Pain:  
Goal: Ability to identify pain intensity on a pain scale and rate it 
consistently will be achieved and maintained  
Outcome: Progressing  
  
Problem: VTE Prophylaxis:  
Goal: Will be free of DVT  
Outcome: Progressing  
  
Problem: Safety:  
Goal: Patient will remain free of falls during hospital stay  
Outcome: Progressing  
Goal: Free from injury during hospitalization  
Outcome: Progressing  
  
Problem: Discharge Planning:  
Goal: Discharge needs of the adult patient will be met  
Outcome: Progressing  
  
Electronically signed by Karen Parrish RN at 2023 8:12 AM EDT  
  
* Care Plan Note - Jeaneth Cooepr RN - 2023 12:14 AM EDTFormatting of this
  note might be different from the original.  
  
Problem: Routine Care:  
Goal: Patient care will be managed and maintained throughout hospital stay per 
unit specific routine care procedure  
Outcome: Progressing  
  
Problem: Infection:  
Goal: Will be free of signs and symptoms of infection  
Outcome: Progressing  
  
Problem: Altered Elimination:  
Goal: Establishment of normal bowel function will be achieved and maintained  
Outcome: Progressing  
  
Problem: Acute Pain:  
Goal: Ability to identify pain intensity on a pain scale and rate it 
consistently will be achieved and maintained  
Outcome: Progressing  
  
Problem: VTE Prophylaxis:  
Goal: Will be free of DVT  
Outcome: Progressing  
  
Problem: Safety:  
Goal: Patient will remain free of falls during hospital stay  
Outcome: Progressing  
Goal: Free from injury during hospitalization  
Outcome: Progressing  
  
Problem: Discharge Planning:  
Goal: Discharge needs of the adult patient will be met  
Outcome: Progressing  
  
Electronically signed by Jeaneth Cooper RN at 2023 12:14 AM EDT  
  
* Care Plan Note - Marci Silva MD - 2023 10:22 AM EDTFormatting of this 
  note might be different from the original.  
Cris Hearn is a 52 year old female with h/o CKDIII, COPD (3L baseline), 
depression, DM, HLD, HTN, Afib (Eliquis), KWAKU who presents as transfer from 
[facility] for bilateral stones.CT:Large staghornright with hydro, large renal 
stones on L, proximal ureteral stone. Hydro R>L, evidence of infection on R.  
  
: Bilateral PCNTs placed in am  
  
Plan  
- Monitor for postobstructive diuresis (> 3L output per day or >200 ml per hour 
for 2 consecutive hrs): strict I/Os, free access to water, replace 1/2 of UOP 
with 1/2 normal saline, BID BMP to monitor electrolytes  
- Broad spectrum Abx, follow on cultures, sensitivities  
- PCNL in the future for stone treatment  
- trend Cr and UOP  
-Urology to follow  
  
Marci Silva MD PGY1  
Urology   [phone]  
  
Electronically signed by Marci Silva MD at 2023 10:27 AM EDT  
  
* Post-Procedure Note - Azael Roy MD - 2023 7:22 AM EDTFormatting 
  of this note is different from the original.  
POST-PROCEDURE NOTE  
  
Procedure: bilateral nephrostomy tube placement  
Pre-operative Diagnosis: UTI in the setting of bilateral obstructing renal 
calculi  
Post-operative Diagnosis: same  
  
Attending: Nithya Perla MD  
Assistant: Azael Roy MD  
  
A TIME OUT was performed prior to the procedure using active communication to 
verify correct patient, procedure, and site: Yes  
  
Intraoperative Medications:  
Medications  
Medication Event Details Admin User Admin Time  
midazolam (VERSED) 2 MG/2ML injection Medication Given Dose: 1 mg; Route: 
Intravenous Push Kristine Fam RN 2023 6:23 AM  
fentaNYL (SUBLIMAZE) 100 MCG/2ML injection Medication Given Dose: 50 mcg; Route:
Intravenous Push Kristine Fam RN 2023 6:23 AM  
fentaNYL (SUBLIMAZE) 100 MCG/2ML injection Medication Given Dose: 50 mcg; Route:
Intravenous Push Kristine Fam RN 2023 6:45 AM  
  
Complications: None  
  
Specimens: N/A  
  
Estimated Blood Loss: less than 10 cc  
  
Post Procedure Pain Ratin/10  
  
Findings: Technically successful bilateral nephrostomy tube placement  
  
Plan: Hold in radiology observation for one hour before returning to floor  
  
Please see procedure dictation in EPIC/PACS for full procedural details.  
  
Dr. Perla was present for the critical portion of the procedure.  
  
Azael Roy MD  
Radiology  
  
Electronically signed by Nithya Perla MD at 2023 7:29 PM EDT  
  
* Pre-Procedure Note - Azael Roy MD - 2023 6:05 AM EDTFormatting of
  this note is different from the original.  
Pre-Procedure Note  
  
HISTORY:  
Procedure: bilateral nephro tubes with possible moderate sedation  
Indication: bilateral obstructing calculi with leukocytosis  
  
No past medical history on file.  
Diabetes: yes  
Sleep Apnea: yes  
Excessive Obesity: yes  
Hepatic Disease: no  
Renal Disease: yes  
Substance Abuse History:  
  
History  
Drug Use Not on file  
  
Current Facility-Administered Medications  
Medication Dose Route Frequency Last Rate Last Admin  
magnesium sulfate 4 GM/100ML in 100 mL ivpb 4,000 mg Intravenous One Time Dose 
25 mL/hr at  4,000 mg at 238  
oxyCODONE immediate release tablet 5 mg Oral Q4H PRN  
albuterol (PROVENTIL HFA) 108 (90 Base) MCG/ACT HFA inhaler 2 Puff Inhalation 
Q4H PRN  
budesonide-formoterol (SYMBICORT) 80-4.5 MCG/ACT inhaler 2 Puff Inhalation BID 
RT  
esomeprazole (NEXIUM) capsule 40 mg Oral Daily 30 min before breakfast  
duloxetine (CYMBALTA) capsule 60 mg Oral Daily  
dextrose 10 % iv infusion 125 mL Intravenous PRN  
Or  
glucagon (GLUCAGEN) 1 MG/ML injection kit 1 mg Subcutaneous PRN  
Or  
dextrose (GLUTOSE) 40 % oral gel 15 g of glucose Buccal PRN  
Or  
dextrose (GLUTOSE) 40 % oral gel 30 g of glucose Buccal PRN  
insulin lispro (HumaLOG) 100 UNIT/ML injection 2-12 Units Subcutaneous 3x Daily 
AC  
lactated ringers iv infusion Intravenous Continuous 100 mL/hr at 23 0320 
New Bag at 23 0320  
nystatin (MYCOSTATIN) 100,000 unit/g powder Topical 2x Daily Given at 23 
0319  
  
Allergies: Patient has no allergy information on record.  
  
PHYSICAL EXAM:  
Blood pressure 110/62, pulse 87, temperature 97.9 F (36.6 C), temperature source
Oral, resp. rate 18, height 5' 6  (1.676 m), weight 277 lb (125.6 kg), SpO2 99 
%.  
Lungs: Clear to auscultation bilaterally  
Heart: Regular rate and rhythm  
Pertinent Findings:  
  
Labs Reviewed? Yes  
Recent Labs:  
Result for specified components in the past 45 days  
Component Date/Time Result Units  
Hemoglobin 2023 6:44 AM 11.0 g/dL  
Hematocrit 2023 6:44 AM 35.0 %  
Platelet 2023 6:44  K/uL  
PTT --- not found  
Protime 2023 6:44 AM 14.7 sec  
INR 2023 6:44 AM 1.31  
FXAUN --- not found  
ANTIFXALMWHE --- not found  
Creatinine 2023 6:44 AM 4.36 mg/dL  
  
Planned Sedation: Moderate  
Planned Sedation Medications: VERSED (midazolam) and fentanyl  
ASA Classification: Class II: Individual with one system well controlled 
disease. Disease does not affect daily living.  
Mallampati Airway Assessment: Class II Faucial pillars, soft palate visible  
Patient or family history of adverse reactions involving sedation/anesthesia: No
patient or family history of adverse reaction  
  
PRE-PROCEDURE VERIFICATION:  
  
Site of Procedure: bilateral  
Site Marked pre-procedure: N/A  
  
Pre-Procedure Pain Ratin/10  
  
Advanced Directives (Living will, health care power of ): none  
Patient Recent Code Status: Full Code  
Code Status For This Procedure: Full Code  
  
  
Azael Roy MD  
Radiology  
Electronically signed by Nithya Perla MD at 2023 7:29 PM EDT  
  
* Care Plan Note - Jeaneth Cooper RN - 2023 3:59 AM EDTFormatting of this 
  note might be different from the original.  
  
Problem: Routine Care:  
Goal: Patient care will be managed and maintained throughout hospital stay per 
unit specific routine care procedure  
Outcome: Progressing  
  
Problem: Infection:  
Goal: Will be free of signs and symptoms of infection  
Outcome: Progressing  
  
Problem: Altered Elimination:  
Goal: Establishment of normal bowel function will be achieved and maintained  
Outcome: Progressing  
  
Problem: Acute Pain:  
Goal: Ability to identify pain intensity on a pain scale and rate it 
consistently will be achieved and maintained  
Outcome: Progressing  
  
Problem: VTE Prophylaxis:  
Goal: Will be free of DVT  
Outcome: Progressing  
  
Problem: Safety:  
Goal: Patient will remain free of falls during hospital stay  
Outcome: Progressing  
Goal: Free from injury during hospitalization  
Outcome: Progressing  
  
Problem: Discharge Planning:  
Goal: Discharge needs of the adult patient will be met  
Outcome: Progressing  
  
Electronically signed by Jeaneth Cooper RN at 2023 3:59 AM EDT  
  
* AdmissionCare - Brayan Waters DO - 2023 1:19 AM EDTFormatting of this 
  note might be different from the original.  
AdmissionCare  
  
Guideline: Renal Colic / Kidney Stones - INPT, Inpatient  
  
Based on the indications selected for the patient, the bed status of Admit to 
Inpatient was determined to be MET  
  
The following indications were selected as present at the time of evaluation of 
the patient:  
  
Acute kidney injury (stage 2), as indicated by 1 or more of the following:  
-  
- Rise in creatinine to 2 times its baseline value or higher  
- Urinary tract infection  
  
AdmissionCare documentation entered by: Brayan Waters  
  
[facility], 27th edition, Copyright  [facility] All Rights Reserved. 
[id number]:19:46-04:00  
Electronically signed by Brayan Waters DO at 2023 1:19 AM EDT  
  
documented in this [id number]- Consult note* Mae Chandra - 
  2023 8:31 AM EDTFormatting of this note is different from the original.  
Images from the original note were not included.  
Dietitian vs DietaryTech: Dietary TechDiet Technician Nutrition Screening  
Reason for visit: LOS 5 or more days  
  
Assessment  
Admitting Diagnosis: Complex nephrolithiasis with bilateral hydronephrosis  
High risk nutrition diagnosis: No - no points  
Past Medical History: No past medical history on file.  
Food Allergies: none  
Nutrition related Medications: none  
Labs:  
LFT's (last 3 years, up to 5 values)  
  
T Prot Albumin D Bili T Bili Alk Phos ALT AST  
23 0244 5.8  
2.8  
0.07  
0.4  
68  
6  
10  
  
  
  
  
Albumin: n/a - no points  
  
Skin Integrity: No pressure ulcers at this time - no points  
Fluid Accumulation: +1 - +2 Pitting edema - 2 points  
  
Diet Order: Regular; Carbohydrates 45 Grams/Meal  
  
% PO Intake: 100%  
Intake Difficulties: None - 0 points  
  
5' 6   
277 lbs  
UBW:  
BODY MASS INDEX 2023  
Kg 125.646 kg 125.646 kg  
Lbs 277 lb 277 lb  
BMI 44.73 kg/m2 44.73 kg/m2  
  
BMI: 44.71  
BMI Screening value: 21 or greater - 0 points  
  
Education: No nutrition education indicated at this time.  
  
Comments: Pt tolerating diet  
  
Number of Points: 2  
  
Nutritional Plan of Care: Less than or equal to 6 points: At this time, patient 
is at low nutritionrisk. DTR to provide routine follow up.  
  
Will continue to follow,  
Aly Albertetic Technician  
Pager#[phone]  
  
  
Electronically signed by Mae Chandra at 2023 12:07 PM EDT  
  
[id number]- Consult note* Mae Chandra - 2023 8:31 AM EDT
  Formatting of this note is different from the original.  
Images from the original note were not included.  
Dietitian vs DietaryTech: Dietary TechDiet Technician Nutrition Screening  
Reason for visit: LOS 5 or more days  
  
Assessment  
Admitting Diagnosis: Complex nephrolithiasis with bilateral hydronephrosis  
High risk nutrition diagnosis: No - no points  
Past Medical History: No past medical history on file.  
Food Allergies: none  
Nutrition related Medications: none  
Labs:  
LFT's (last 3 years, up to 5 values)  
  
T Prot Albumin D Bili T Bili Alk Phos ALT AST  
23 0244 5.8  
2.8  
0.07  
0.4  
68  
6  
10  
  
  
  
  
Albumin: n/a - no points  
  
Skin Integrity: No pressure ulcers at this time - no points  
Fluid Accumulation: +1 - +2 Pitting edema - 2 points  
  
Diet Order: Regular; Carbohydrates 45 Grams/Meal  
  
% PO Intake: 100%  
Intake Difficulties: None - 0 points  
  
5' 6   
277 lbs  
UBW:  
BODY MASS INDEX 2023  
Kg 125.646 kg 125.646 kg  
Lbs 277 lb 277 lb  
BMI 44.73 kg/m2 44.73 kg/m2  
  
BMI: 44.71  
BMI Screening value: 21 or greater - 0 points  
  
Education: No nutrition education indicated at this time.  
  
Comments: Pt tolerating diet  
  
Number of Points: 2  
  
Nutritional Plan of Care: Less than or equal to 6 points: At this time, patient 
is at low nutritionrisk. DTR to provide routine follow up.  
  
Will continue to follow,  
Aly Albertetic Technician  
Pager#[phone]  
  
  
Electronically signed by Mae Chandra at 2023 12:07 PM EDT  
  
* Yann Roberts MD - 2023 9:12 PM EDTAssociated Order(s): IP 
  NEPHROLOGY CONSULT  
Formatting of this note is different from the original.  
Images from the original note were not included.  
NEPHROLOGY NEW CONSULT NOTE  
Cris Hearn 52 year old 277 lbs  
MRN/Room: [id number]/[facility]-702/2  
  
Reason for consult: CULLEN  
Requesting physician: Ramo Dexter  
  
HPI:  
Cris Hearn is a 52 year old female with a PMH of reported CKD IIIb 
(creatinine of 1.8 2023), COPD, DM, depression, presented for bilateral 
obstructing hydronephrosis from [facility]. Nephrology consulted for CULLEN on CKD.  
  
Patient baseline creatinine 1.8 as of 2023. Presented to [facility]  and 
found to have bilateral hydronephrosis with bilateral staghorn calculi. 
Creatinine 2.8 on presentation. Was transferred to [facility] and underwent bilateral
nephrostomy placement . Has been on IV fluids though creatinine has increased
to 4.7. No hypotension but had some soft Bps to mid 90s.  
  
In The ER: /68 (BP Location: right arm)   Pulse 80   Temp 98.7 F (37.1 C) 
(Oral)   Resp 18   Ht 1.676 m (5' 6 )   Wt 125.6 kg (277 lb)   SpO2 90%   BMI 
44.71 kg/m  
  
No past medical history on file.  
No past surgical history on file.  
No family history on file.  
Social History  
  
Socioeconomic History  
Marital status: Unknown  
  
Social Determinants of Health  
  
Food Insecurity: No Food Insecurity (2023)  
Hunger Vital Sign  
Worried About Running Out of Food in the Last Year: Never true  
Ran Out of Food in the Last Year: Never true  
Transportation Needs: No Transportation Needs (2023)  
PRAPARE - Transportation  
Lack of Transportation (Medical): No  
Lack of Transportation (Non-Medical): No  
Intimate Partner Violence: Not At Risk (2023)  
Humiliation, Afraid, Rape, and Kick questionnaire  
Fear of Current or Ex-Partner: No  
Emotionally Abused: No  
Physically Abused: No  
Sexually Abused: No  
  
Not on File  
  
None  
  
  
Meds:  
cefepime 1,000 mg Q12H Antibiotic  
budesonide-formoterol 2 Puff BID RT  
esomeprazole 40 mg Daily 30 min before breakfast  
duloxetine 60 mg Daily  
insulin lispro 2-12 Units 3x Daily AC  
nystatin 2x Daily  
  
lactated ringers 100 mL/hr at 23 0938  
  
vancomycin dosing pharmacy consult As Directed  
oxyCODONE 5 mg Q4H PRN  
albuterol 2 Puff Q4H PRN  
dextrose iv for hypoglycemia orderable 125 mL PRN  
Or  
glucagon 1 mg PRN  
Or  
dextrose 15 g of glucose PRN  
Or  
dextrose 30 g of glucose PRN  
  
Vital sign ranges over the past 24 hours (retrieved 2023 at 9:12 PM):  
Tmax (24 hours): 98.7 F (37.1 C)  
  
Pulse Av.6 Min: 80 Max: 85  
Systolic (24hrs), Av , Min:103 , Max:116  
  
Diastolic (24hrs), Av, Min:65, Max:70  
  
MAP (mmHg) Av.7 mmHg Min: 77 mmHg Max: 82 mmHg  
Resp Av Min: 18 Max: 18  
SpO2 Av.5 % Min: 90 % Max: 99 %  
  
Patient Vitals for the past 24 hrs:  
BP Temp Temp src Pulse Resp SpO2 O2 Device O2 Flow Rate (l/min)  
23 1900 -- -- -- 80 18 -- Nasal cannula 3  
23 1400 103/68 98.7 F (37.1 C) Oral 81 18 90 % Nasal cannula 3  
08/04/23 1030 -- -- -- -- -- -- Nasal cannula 3  
08/04/23 0851 -- -- -- 84 18 96 % Nasal cannula 3  
08/04/23 0800 -- -- -- -- -- -- Nasal cannula 3  
08/04/23 0602 104/70 98.4 F (36.9 C) Oral 83 18 93 % Nasal cannula 3  
08/03/23 2200 116/65 98.6 F (37 C) Oral 85 18 99 % Room air --  
  
In: 660 (5.3 mL/kg) [P.O.:600]  
Out: 2150 (17.1 mL/kg) [Urine:2000 (0.7 mL/kg/hr)]  
Net: -1490  
Weight: 125.6 kg  
Change in Weight:  
Current value is 277.0 lb (125.645 kg) on 2023 at 0121  
0.0 lb (0.000 kg) from 277.0 lb (125.645 kg) on 2023 at 0117 (previous 
value)  
0.0 lb (0.000 kg) from 277.0 lb (125.645 kg) on 2023 at 0117 (first value 
for this admission)  
  
  
General appearance: AAOx3. No distress  
Eyes: non-icteric  
Skin: no apparent rash  
Heart: RRR  
Lungs: CTA bilat.  
Abdomen: soft, nt/nd  
Extremities: no edema bilat  
: Bilateral nephrostomy with yellow urine  
Neuro: No FND  
ACCESS: None  
  
Blood Labs:  
Arterial Blood Gases  
  
None  
  
  
  
CBC/PT/INR  
  
WBC RBC Hgb Hct MCV RDW Plt PT aPTT INR  
23 13.3  
3.50  
9.9  
30.9  
89  
15.8  
200  
  
23 0158 17.7  
3.60  
10.2  
32.1  
89  
15.8  
207  
  
23 0244 1.31  
  
23 0244 18.1  
3.95  
11.0  
35.0  
89  
15.7  
240  
  
  
  
  
WBC/Diff  
  
Neutro% Segs% Bands% Lymphs% Monos% Eos% Basos%  
23 014 79.9  
10.3  
6.4  
3.2  
0.3  
  
23 0158 84.4  
9.2  
5.1  
0.9  
0.4  
  
23 80.8  
11.8  
4.7  
2.0  
0.7  
  
  
  
  
Basic Metabolic Panel  
  
Na K Cl CO2 Gap Glu BUN Cr Ca Mg PO4  
23 0354 140  
3.5  
107  
20  
17  
98  
44  
4.73  
7.7  
  
23 0158 137  
4.0  
107  
20  
14  
128  
41  
4.75  
7.7  
  
23 0244 1.1  
  
23 024 140  
4.3  
107  
22  
15  
133  
40  
4.36  
8.3  
  
  
  
  
Cultures:  
Urine Culture  
  
Urine culture  
23 10,000 - 50,000 CFU/ml Multiple bacteria present suggesting 
contamination.  
  
  
  
  
ASSESSMENT:  
CULLEN on CKDIIIb, nonoliguric  
- obstructive CULLEN from bilateral stones and bilateral hydronephrosis, s/p PCNT 
, renal function stable  
- vanco random level of 8  
- urine sodium 60, consistent with ATN  
- PCNT draining equally, no cortical thinning noted on US  
- electrolytes WNL  
- euvolemic  
  
RECOMMENDATIONS:  
- maintain euvolemia  
- recheck magnesium  
- monitor BMP daily  
- strict Is/Os  
- Avoid hypotension, NSAID, Nephrotoxins, Contrast if possible.  
- renally dose abx  
  
Yann Roberts MD  
Nephrology Fellow  
Daytime / Weekend Renal Pager [phone]  
After 7 pm Emergencies [phone] Pager [phone]  
  
  
Electronically signed by Amari Castillo MD at 2023 8:35 AM EDT  
  
Associated attestation - Amari Castillo MD - 2023 8:35 AM EDT  
Formatting of this note might be different from the original.  
I saw and examined the patient on 2023 with the renal fellow, . I
agree to his assessment and plan mentioned in the notes below.  
  
  
* Tila Pablo, PT - 2023 11:56 AM EDTAssociated Order(s): IP 
  PHYSICAL THERAPY SERVICE REQUEST  
Formatting of this note is different from the original.  
PHYSICAL THERAPY ACUTE EVALUATION  
  
Referral received, chart reviewed.  
Patient seen from 11:05 am to 11:17 am on 8E unit for 12 minutes.  
  
Admit date/time: 2023 2:06 AM  
  
Reason for Admit:  
53 yo female admitted with diarrhea, nausea, and bilateral flank pain  
  
Diagnosis:  
Bilateral obstructing nephrolithiasis with bilateral hydro R > L  
Sepsis 2/2 UTI and pyelo  
Acute renal failure with oliguria  
  
Precautions:  
Mod falls risk  
Full code  
Regular carb 45 g/meal diet  
Activity as tolerated  
  
Procedures this admit:  
: bilateral nephrostomy tube placement  
  
Past Medical and Surgical History:  
No past medical history on file.  
No past surgical history on file.  
  
Identification was verified by patient verbalizing his/her name and date of 
birth.  
  
Risks and Benefits of physical therapy: Patient informed of risks and benefits 
of treatment  
  
SUBJECTIVE:  
Patient Subjective:  
 Im okay   
  
Patient Identified Goal(s):  
To go home  
  
PTA Status:  
- living at home with son and son's girlfriend  
- mod I with Rolator. Only amb short distance.  
- assistance for IADL and ADL  
- no falls  
- 3 L NC at home  
  
Home:  
0 steps to enter  
0 steps to bedroom/bathroom  
Assistance available:  
Family   
Equipment available: grab bars, shower chair, tub bench, rolator  
  
OBJECTIVE:  
Appearance:  
Supine in bed  
Hospital gown  
Bilateral IR drains  
IV  
NC O2 3L  
Gonzalez  
  
Behavior:  
Awake  
Alert  
Cooperative  
Oriented x 3  
Follows simple step commands consistently  
  
Pain: Site/Location: abdomen, back, head, neck; Pain Scale:  1 /10  
Pain Relief Interventions Implemented: Positioning, Relaxation Training, and RN 
aware and reports patient received medication according to time schedule  
  
Passive ROM:  
Bilateral LE WFL  
  
Strength/Active ROM:  
Unable to formally assess  
  
Mobility:  
  
Assistance level N/A Dep Max Mod Min CGA CS Sup DS Mod I I Comments  
Supine to sit X Increased time  
  
Sit to/from stand X EOB to Rolator  
- at baseline requires assist to stand  
- increased time  
  
Ambulation X 20 feet with rolator  
- slow speed, short strides, forward flexed  
- at baseline patient reports this is the distance she ambulates  
  
Seated Balance X Static:  
EOB  
  
Dynamic:  
NT  
  
Standing Balance X  
  
X Static:  
Mod I with Rolator  
  
Dynamic:  
Mod I with Rolator  
  
  
Endurance:  
Impaired; close to baseline  
  
Patient Education:  
Instructed Patient in roles, goals, treatment plan: demonstrated good verbal 
understanding.  
Patient reports she feels she is at her baseline level and does not feel she 
needs further therapies.  
  
Patient up in chair with call light in reach.  
  
DME: With Patients permission ordered no equipment via Epic Order.  
If any questions contact [facility] DME Provider at [phone].  
  
6 Clicks Basic Mobility PT 23  
  
Difficulty turning over in bed 3  
Difficulty sitting down and standing up from a chair with arms 3  
Difficulty moving from lying on back to sitting on the side of the bed 3  
Help from another person moving to and from bed to a chair 3  
Help from another person to walk in hospital room 3  
Help from another person climbing 3-5 steps with a railing 1  
PT 6 Clicks Score 16  
  
6 Click Score Guidelines:  
1 - Total = Requires total assistance, or cannot do at all.  
2 - A lot = Requires a lot of help (maximun to moderate assistance) Can use 
assistive devices.  
3 - A little = Requires a little help (supervision, minimal assistance) Can use 
assistive devices.  
4 - None = Does not require any help and does the activity independently. Can 
use assistive devices.  
  
Progressive Mobility Protocol Score: Level 4  
  
ASSESSMENT:  
Cris Hearn is a 52 year old female admitted with diarrhea, nausea, and 
bilateral flank pain and diagnosed with bilateral obstructing nephrolithiasis, 
sepsis, UTI, acute renal failure. At time of evaluation patient was able to 
mobilize and ambulate close to her baseline level. Patient reports shedoes not 
feel she needs further therapies. Therefore, patient is functionally appropriate
for discharge home once medically cleared. No further Physical Therapy services 
recommended at this time.  
  
PLAN OF CARE:  
Discharge acute PT  
  
The evaluation findings and treatment plan were discussed with the patient. The 
patient indicated understanding and agreement with the plan.  
  
Tila Pablo, PT, DPT  
  
NA = Not Assessed, I = Independent, MI = Modified Independent, Sup = Supervised,
Set up = Physical Assistance for Set-up Only, Min = Minimal Assistance, Mod = 
Moderate  
Assistance, Max = Max assistance; Dep = Dependent; AROM = Active Range of 
Motion; PROM = Passive Range of Motion; MMT = Manual Muscle Test; LE = Lower 
Extremity  
  
Electronically signed by Tila Pablo, PT at 2023 2:55 PM EDT  
  
* Elly Dyer, OT - 2023 11:41 AM EDTAssociated Order(s): IP 
  OCCUPATIONAL THERAPY SERVICE REQUEST  
Formatting of this note is different from the original.  
OCCUPATIONAL THERAPY INITIAL EVALUATION  
  
Patient seen from 1103 to 1117 on GC8E unit for 14 minutes.  
  
Reason for Admit: transfer from OSH for diarrhea, nausea, b/l flank pain  
  
Diagnosis:  
Bilateral obstructing nephrolithiasis with bl hydro R>L  
Sepsis secondary to UTI and Pyelo  
Acute renal failure with oliguria  
  
Precautions/Activity Order: moderate falls, full code, carbohydrates 45 
grams/meal, activity as tolerated  
  
Procedures this admit:  
23: bilateral nephrostomy tube placement - Dr. Perla  
  
Past Medical and Surgical History:  
PMH: No past medical history on file.  
  
PSH: No past surgical history on file.  
  
SUBJECTIVE:  
Patient Subjective:  I've been better   
Patient Identified Goal(s): to return home  
  
Home Living Situation  
Prior Functional Status: Pt reports completing UB ADLs independently, son 
assists with LB ADLs, mobilizes with rollator, no falls last 6 months, on 
disability.  
Assistance Available at Home: lives with son (can provide 24/ assist) and son's
GF (works)  
  
Patient lives in a 1 story home ramp to enter.  
Full Bathroom on 1 level  
Bedroom on 1 level.  
Equipment available at home: rollator, grab bar tub, shower chair and shower 
bench, 3L O2 NC baseline  
  
OBJECTIVE:  
Patient Identification: patient's id band and date of birth.  
Risks and benefits of occupational therapy: Patient informed of risks and 
benefits of treatment  
Appearance: supine in bed on arrival, hospital gown, b/l neph tubes, gonzalez, PIV,
unkempt, 3L O2 NC  
Alertness: Awake  
Affect: flat  
Cooperation/Behavior: Appropriate dialogue with therapist, cooperative  
Communication: WFL  
  
Pain:  
Pain ratin/10, Location: neck/back ache, b/l neph tube sites  
Pain Relief Interventions Implemented: RN aware and reports patient received 
medication according to time schedule  
  
Self Care:  
Assistance Level Dep Max Mod Min CG CS DS MI I Set-Up Comment  
Feeding x  
Grooming/Hygiene x x Anticipate MI at seated level after setup  
Bathing:UB x Anticipate based on observed UE function  
Bathing:LB x Anticipate, pt receives assist at baseline  
Dressing:UB x Anticipate based on observed UE function  
Dressing: LB x Anticipate, pt receives assist at baseline  
Toileting x Anticipate for clothing management  
  
  
  
Transfers/Bed Mobility:  
Assistance Level Dep Max Mod Min CG CS DS MI I Set-Up Comment  
Toilet Transfers x Anticipate based on bed transfer  
Bed Transfers x x Sit to stand from EOB to rollator with min A to boost up from 
bed (pt reports sonassists at baseline).  
Short distance mobility within room with rollator, slow but steady pace, no LOB 
or SOB noted, MI.  
Stand to sit in chair with good hand placement, MI.  
Bed Mobility x Supine to sit EOB with HOB elevated  
  
  
Patient seated in chair end of session with call light and phone within reach 
and all current needsmet. Instructed pt to use call light for nursing assist for
return to bed or for mobility needs 2/2lines; pt voiced understanding.  
  
Endurance for Self Care: Good, on 3L O2 NC baseline  
  
Static Sitting Balance: WFL  
Dynamic Sitting Balance: N/T  
  
UE Motor: AROM appears grossly WFL based on observed UE function. MMT not 
formally assessed but appears at least 3/5 throughout  
  
Vision/Perception: WFL, glasses at baseline  
  
Cognition:  
Orientation: Oriented to person, place, and date  
  
Follows Commands: WFL  
  
Attention: WNL  
  
Memory: WFL  
  
Problem Solving: WFL  
  
Safety/Judgement: performs functional transfer safely without cues  
  
Sequencing: Able to sequence simple ADLs without cues.  
  
Other Specialized Tests: None  
  
Patient/Family Education: Instructed patient in roles of therapy, OOBTC for 1 
hour and for all meals, NOT to mobilize without staff 2/2 lines  
  
Patient up in chair with call light in reach.  
  
DME: With Patients permission ordered no equipment via Epic Order.  
[facility] at [phone].  
  
6 Clicks Daily Activity OT 23  
Help from another person Eating meals 4  
Help from another person taking care of personal grooming 4  
Help from another person bathing 3  
Help from another person putting on and taking off regular upper body clothing 4  
Help from another person putting on and taking off regular lower body clothing 3  
Help from another person toileting 3  
OT 6 Clicks Score 21  
  
6 Click Score Guidelines:  
1 - Unable = Total/Dependent Assist  
2 - A lot = Max/Moderate Assist  
3 - A little = Minimum/Contact Guard Assist/Supervision  
4 - Non = Modified Lamar/Independent  
  
ASSESSMENT:  
Pt reports she is functioning at baseline level for ADLs and functional mobility
and denies need for further OT services while in house. Patient is functionally 
appropriate for discharge home once medically cleared. No further Occupational 
Therapy Services reccommended at this time.  
  
PLAN:  
Cris Hearn will be d/c acute OT services  
  
able to discuss the evaluation findings and treatment plan with the 
patient/family.  
The patient/family did participate in the development of plan and goals.  
  
Elly Dyer OTR/L  
  
NA = Not Assessed, I = Independent, MI = Modified Independent, Sup = Supervised,
Set up = Physical Assistance for Set-up Only, Min = Minimal Assistance, Mod = 
Moderate  
Assistance, Max = Max assistance; Dep = Dependent; AROM = Active Range of 
Motion;PROM=Passive Rangeof Motion; MMT = Manual Muscle Test; UB = Upper Body; 
LB = Lower Body  
  
Electronically signed by Elly Dyer OT at 2023 1:52 PM EDT  
  
* Alexei Rivas MD - 2023 4:39 AM EDTAssociated Order(s): IP SURGERY 
  UROLOGY CONSULT  
Formatting of this note is different from the original.  
Images from the original note were not included.  
[facility]  
Department of Urology  
Consultation Note  
  
Cris Hearn  
[id number]  
1970  
  
Referring Physician: Ramo Dexter MD  
Consulting Service: Urology  
Reason: bilateral stones  
  
HPI:  
Cris Hearn is a 52 year old female with a h/o CKDIII, COPD (3L baseline), 
depression, DM, HLD, HTN, Afib (Eliquis), KWAKU who presents as transfer from 
[facility] with diarrhea, nausea, bilateral flank pian. Found to have bilateral 
obstructing stones.  
  
Pmh/psh: as above  
Meds: Eliquis  
All: NSAIDS, benadryl  
  
ROS:  
A 12 point review of systems was performed and negative except as stated above.  
  
PMH:  
No past medical history on file.  
  
PSH:  
There is no previous surgical history on file.  
  
Medications:  
Scheduled  
magnesium sulfate 4,000 mg One Time Dose  
budesonide-formoterol 2 Puff BID RT  
esomeprazole 40 mg Daily 30 min before breakfast  
duloxetine 60 mg Daily  
insulin lispro 2-12 Units 3x Daily AC  
nystatin 2x Daily  
  
PRN  
oxyCODONE 5 mg Q4H PRN  
albuterol 2 Puff Q4H PRN  
dextrose iv for hypoglycemia orderable 125 mL PRN  
Or  
glucagon 1 mg PRN  
Or  
dextrose 15 g of glucose PRN  
Or  
dextrose 30 g of glucose PRN  
  
IV  
lactated ringers 100 mL/hr at 23 0320  
  
Allergies:  
Not on File  
  
Family History:  
No family history on file.  
  
Social History:  
  
History  
Alcohol use Not on file  
  
History  
Drug Use Not on file  
  
Objective  
Vitals:  
Tmax (24 hours): 98 F (36.7 C)  
  
Pulse Av Min: 77 Max: 87  
Systolic (24hrs), Av , Min:110 , Max:112  
  
Diastolic (24hrs), Av, Min:62, Max:75  
  
SpO2 Av % Min: 97 % Max: 99 %  
Resp Av Min: 18 Max: 18  
  
No intake/output data recorded.  
  
PE:  
/62 (BP Location: right arm)   Pulse 87   Temp 97.9 F (36.6 C) (Oral)   
Resp 18   Ht 5' 6  (1.676 m)   Wt 277 lb (125.6 kg)   SpO2 99%   BMI 44.71 kg/m  
Gen: NAD, awake and alert  
HEENT: sclera nonicteric  
Cardio: regular rate  
Pulm: nonlabored breathing on 3L 02, symmetrical chest rise  
Abd: soft, nontender, nondistended  
: gonzalez in place yellow urine, L>R CVAT  
Ext: warm and well perfused  
Neuro: alert and oriented  
Psych: appropriate mood and affect  
  
Labs:  
CBC/PT/INR  
  
WBC RBC Hgb Hct MCV RDW Plt PT aPTT INR  
23 0244 1.31  
  
23 0244 18.1  
3.95  
11.0  
35.0  
89  
15.7  
240  
  
  
  
  
Basic Metabolic Panel  
  
Na K Cl CO2 Gap Glu BUN Cr Ca Mg PO4  
23 024 1.1  
  
23 024 140  
4.3  
107  
22  
15  
133  
40  
4.36  
8.3  
  
  
  
  
Hepatic/Biliary/Pancreas  
  
T Prot Albumin D Bili T Bili Alk Phos ALT AST Amylase Lipase  
23 5.8  
2.8  
0.07  
0.4  
68  
6  
10  
  
  
  
  
Arterial Blood Gases  
  
None  
  
  
  
Laboratory Values and Test Results  
Results for orders placed or performed during the hospital encounter of 23  
BASIC METABOLIC PANEL  
Result Value Ref Range  
Glucose 133 (H) 68 - 110 mg/dL  
Sodium 140 135 - 148 mmol/L  
Potassium 4.3 3.3 - 5.3 mmol/L  
Carbon Dioxide 22 21 - 30 mmol/L  
Chloride 107 97 - 111 mmol/L  
Blood Urea Nitrogen 40 (H) 8 - 22 mg/dL  
Creatinine 4.36 (H) 0.50 - 1.10 mg/dL  
Calcium 8.3 (L) 8.4 - 10.4 mg/dL  
Anion Gap 15 10 - 20  
Estimated GFR (CKD-EPI) 12 (L) >=60 mL/min/1.73sqm  
HEPATIC FUNCTION PANEL  
Result Value Ref Range  
Albumin 2.8 (L) 3.4 - 5.1 g/dL  
Bilirubin, Direct 0.07 (L) 0.10 - 0.30 mg/dL  
Bilirubin, Total 0.4 0.1 - 1.5 mg/dL  
Alkaline Phosphatase 68 40 - 200 IU/L  
ALT (SGPT) 6 (L) 7 - 40 IU/L  
AST (SGOT) 10 7 - 40 IU/L  
Protein, Total 5.8 (L) 6.2 - 8.3 g/dL  
MAGNESIUM  
Result Value Ref Range  
Magnesium 1.1 (L) 1.6 - 2.8 mg/dL  
PROTHROMBIN TIME AND INR  
Result Value Ref Range  
Protime 14.7 (H) 9.7 - 12.9 sec  
INR 1.31 (H) 0.90 - 1.10  
CBC WITH DIFFERENTIAL  
Result Value Ref Range  
WBC 18.1 (H) 4.5 - 11.5 K/uL  
RBC 3.95 (L) 4.00 - 5.20 M/uL  
Hemoglobin 11.0 (L) 12.0 - 15.0 g/dL  
Hematocrit 35.0 (L) 36.0 - 46.0 %  
MCV 89 80 - 100 fL  
MCH 28.0 26.0 - 34.0 pg  
MCHC 31.5 (L) 32.0 - 35.9 g/dL  
Platelet 240 150 - 400 K/uL  
RDW-CV 15.7 (H) 11.5 - 14.5 %  
MPV 8.5 7.5 - 11.2 fL  
Neutrophils 80.8 (H) 31.0 - 76.0 %  
Neutrophil # 14.66 (H) 1.50 - 8.00 K/uL  
Lymphocytes 11.8 (L) 24.0 - 44.0 %  
Lymphocytes # 2.14 1.00 - 4.80 K/uL  
Monocytes 4.7 2.0 - 11.0 %  
Monocyte # 0.86 0.20 - 1.00 K/uL  
Eosinophil 2.0 0.1 - 4.0 %  
Eosinophil # 0.36 0.00 - 0.70 K/uL  
Basophils 0.7 <=1.9 %  
Basophil # 0.12 0.00 - 0.20 K/uL  
MDW  
URINALYSIS WITH REFLEX CULTURE PERFORMABLE  
Specimen: Indwelling urethral catheter (Gonzalez); Urine  
Result Value Ref Range  
Color Yellow Colorless  
Appearance Extra Turbid Clear  
pH 6.0 5.0 - 8.0  
Spec Gravity 1.010 <=1.030  
Protein 70 (A) Negative mg/dL  
Blood Moderate (A) Negative  
Bilirubin Negative Negative  
Urobilinogen Negative Negative mg/dL  
Ketones Negative Negative mg/dL  
Leuk. Esterase Positive (A) Negative  
Nitrite Negative Negative  
Glucose 100 (A) Negative mg/dL  
WBC >100 (A) 0 - 2 /HPF  
RBC 11-30 (A) 0 - 2 /HPF  
Bacteria Moderate /HPF  
Mucous Threads Present  
Squamous Epithelial 3-5 0 - 10 /HPF  
  
Imaging:  
[facility]  
IMPRESSION: Bilateral staghorn calculi greater on the RIGHT. RIGHT perinephric 
edema and stranding  
with moderate hydronephrosis. Mild RIGHT renal hilar/retroperitoneal 
lymphadenopathy. May consider  
inflammation secondary to staghorn calculi or infection. LEFT UVJ calculus 
measuring up to 7 mm.  
Mild LEFT hydronephrosis.  
  
Assessment:  
Cris Hearn is a 52 year old female with h/o CKDIII, COPD (3L baseline), 
depression, DM, HLD, HTN, Afib (Eliquis), KWAKU who presents as transfer from 
[facility] for bilateral stones.  
  
Afebrile at [facility]. Normotensive, non tachy. 3L NC (baseline) 400 cc UOP  
WBC 18  
Cr 4.36 (2.58, baseline 1.5)  
INR 1.3  
UA at [facility] +Nitrite  
Obese and on Eliquis  
  
CT personally reviewed. Large staghorn right with hydro, large renal stones on 
L, proximal ureteralstone. Hydro R>L, evidence of infection on R  
  
Plan  
- NPO  
- Broad spectrum Abx  
- Agree with emergent bilateral PCNT  
- will need PCNL in the future for stone treatment  
- trend Cr and UOP  
  
Alexei Rivas MD  
  
  
Electronically signed by MARYCRUZ Iniguez MD at 2023 9:22 AM EDT  
  
documented in this [id number]- Plan of care note* Care Plan 
  Note - Leigh Tomas RN - 2023 2:08 AM EDTFormatting of this note might 
  be different from the original.  
  
Problem: Routine Care:  
Goal: Patient care will be managed and maintained throughout hospital stay per 
unit specific routine care procedure  
Outcome: Progressing  
  
Problem: Infection:  
Goal: Will be free of signs and symptoms of infection  
Outcome: Progressing  
  
Problem: Altered Elimination:  
Goal: Establishment of normal bowel function will be achieved and maintained  
Outcome: Progressing  
  
Problem: Acute Pain:  
Goal: Ability to identify pain intensity on a pain scale and rate it 
consistently will be achieved and maintained  
Outcome: Progressing  
  
Problem: VTE Prophylaxis:  
Goal: Will be free of DVT  
Outcome: Progressing  
  
Problem: Safety:  
Goal: Patient will remain free of falls during hospital stay  
Outcome: Progressing  
Note: Q2 hourly safety rounds maintain  
Goal: Free from injury during hospitalization  
Outcome: Progressing  
  
Problem: Discharge Planning:  
Goal: Discharge needs of the adult patient will be met  
Outcome: Progressing  
  
Electronically signed by Leigh Tomas RN at 2023 2:08 AM EDT  
  
[id number]- Plan of care note* Care Plan Note - Lili Flynn -
  2023 4:16 AM EDTFormatting of this note might be different from the 
  original.  
  
Problem: Routine Care:  
Goal: Patient care will be managed and maintained throughout hospital stay per 
unit specific routine care procedure  
Outcome: Progressing  
Note: Patient's care was managed and maintained per medicine specific routine 
care procedures.  
  
Problem: Infection:  
Goal: Will be free of signs and symptoms of infection  
Outcome: Progressing  
  
Problem: Altered Elimination:  
Goal: Establishment of normal bowel function will be achieved and maintained  
Outcome: Progressing  
  
Problem: Acute Pain:  
Goal: Ability to identify pain intensity on a pain scale and rate it 
consistently will be achieved and maintained  
Outcome: Progressing  
Note: Patient denied pain.  
  
Problem: VTE Prophylaxis:  
Goal: Will be free of DVT  
Outcome: Progressing  
Note: Patient free of DVT.  
  
Problem: Safety:  
Goal: Patient will remain free of falls during hospital stay  
Outcome: Progressing  
Goal: Free from injury during hospitalization  
Outcome: Progressing  
  
Problem: Discharge Planning:  
Goal: Discharge needs of the adult patient will be met  
Outcome: Progressing  
  
Electronically signed by Lili Flynn at 2023 4:17 AM EDT  
  
[id number]- Consult note* Yann Roberts MD - 2023 9:12 
  PM EDTAssociated Order(s): IP NEPHROLOGY CONSULT  
Formatting of this note is different from the original.  
Images from the original note were not included.  
NEPHROLOGY NEW CONSULT NOTE  
Cris Hearn 52 year old 277 lbs  
MRN/Room: [id number]/AC8-702/2  
  
Reason for consult: CULLEN  
Requesting physician: Ramo Dexter  
  
HPI:  
Cris Hearn is a 52 year old female with a PMH of reported CKD IIIb 
(creatinine of 1.8 2023), COPD, DM, depression, presented for bilateral 
obstructing hydronephrosis from [facility]. Nephrology consulted for CULLEN on CKD.  
  
Patient baseline creatinine 1.8 as of 2023. Presented to [facility]  and 
found to have bilateral hydronephrosis with bilateral staghorn calculi. 
Creatinine 2.8 on presentation. Was transferred to [facility] and underwent bilateral
nephrostomy placement . Has been on IV fluids though creatinine has increased
to 4.7. No hypotension but had some soft Bps to mid 90s.  
  
In The ER: /68 (BP Location: right arm)   Pulse 80   Temp 98.7 F (37.1 C) 
(Oral)   Resp 18   Ht 1.676 m (5' 6 )   Wt 125.6 kg (277 lb)   SpO2 90%   BMI 
44.71 kg/m  
  
No past medical history on file.  
No past surgical history on file.  
No family history on file.  
Social History  
  
Socioeconomic History  
Marital status: Unknown  
  
Social Determinants of Health  
  
Food Insecurity: No Food Insecurity (2023)  
Hunger Vital Sign  
Worried About Running Out of Food in the Last Year: Never true  
Ran Out of Food in the Last Year: Never true  
Transportation Needs: No Transportation Needs (2023)  
PRAPARE - Transportation  
Lack of Transportation (Medical): No  
Lack of Transportation (Non-Medical): No  
Intimate Partner Violence: Not At Risk (2023)  
Humiliation, Afraid, Rape, and Kick questionnaire  
Fear of Current or Ex-Partner: No  
Emotionally Abused: No  
Physically Abused: No  
Sexually Abused: No  
  
Not on File  
  
None  
  
  
Meds:  
cefepime 1,000 mg Q12H Antibiotic  
budesonide-formoterol 2 Puff BID RT  
esomeprazole 40 mg Daily 30 min before breakfast  
duloxetine 60 mg Daily  
insulin lispro 2-12 Units 3x Daily AC  
nystatin 2x Daily  
  
lactated ringers 100 mL/hr at 23 0938  
  
vancomycin dosing pharmacy consult As Directed  
oxyCODONE 5 mg Q4H PRN  
albuterol 2 Puff Q4H PRN  
dextrose iv for hypoglycemia orderable 125 mL PRN  
Or  
glucagon 1 mg PRN  
Or  
dextrose 15 g of glucose PRN  
Or  
dextrose 30 g of glucose PRN  
  
Vital sign ranges over the past 24 hours (retrieved 2023 at 9:12 PM):  
Tmax (24 hours): 98.7 F (37.1 C)  
  
Pulse Av.6 Min: 80 Max: 85  
Systolic (24hrs), Av , Min:103 , Max:116  
  
Diastolic (24hrs), Av, Min:65, Max:70  
  
MAP (mmHg) Av.7 mmHg Min: 77 mmHg Max: 82 mmHg  
Resp Av Min: 18 Max: 18  
SpO2 Av.5 % Min: 90 % Max: 99 %  
  
Patient Vitals for the past 24 hrs:  
BP Temp Temp src Pulse Resp SpO2 O2 Device O2 Flow Rate (l/min)  
23 1900 -- -- -- 80 18 -- Nasal cannula 3  
23 1400 103/68 98.7 F (37.1 C) Oral 81 18 90 % Nasal cannula 3  
23 1030 -- -- -- -- -- -- Nasal cannula 3  
23 0851 -- -- -- 84 18 96 % Nasal cannula 3  
23 0800 -- -- -- -- -- -- Nasal cannula 3  
23 0602 104/70 98.4 F (36.9 C) Oral 83 18 93 % Nasal cannula 3  
23 2200 116/65 98.6 F (37 C) Oral 85 18 99 % Room air --  
  
In: 660 (5.3 mL/kg) [P.O.:600]  
Out: 2150 (17.1 mL/kg) [Urine:2000 (0.7 mL/kg/hr)]  
Net: -1490  
Weight: 125.6 kg  
Change in Weight:  
Current value is 277.0 lb (125.645 kg) on 2023 at 0121  
0.0 lb (0.000 kg) from 277.0 lb (125.645 kg) on 2023 at 0117 (previous 
value)  
0.0 lb (0.000 kg) from 277.0 lb (125.645 kg) on 2023 at 0117 (first value 
for this admission)  
  
  
General appearance: AAOx3. No distress  
Eyes: non-icteric  
Skin: no apparent rash  
Heart: RRR  
Lungs: CTA bilat.  
Abdomen: soft, nt/nd  
Extremities: no edema bilat  
: Bilateral nephrostomy with yellow urine  
Neuro: No FND  
ACCESS: None  
  
Blood Labs:  
Arterial Blood Gases  
  
None  
  
  
  
CBC/PT/INR  
  
WBC RBC Hgb Hct MCV RDW Plt PT aPTT INR  
23 0142 13.3  
3.50  
9.9  
30.9  
89  
15.8  
200  
  
23 0158 17.7  
3.60  
10.2  
32.1  
89  
15.8  
207  
  
23 0244 1.31  
  
23 0244 18.1  
3.95  
11.0  
35.0  
89  
15.7  
240  
  
  
  
  
WBC/Diff  
  
Neutro% Segs% Bands% Lymphs% Monos% Eos% Basos%  
23 0142 79.9  
10.3  
6.4  
3.2  
0.3  
  
23 0158 84.4  
9.2  
5.1  
0.9  
0.4  
  
23 024 80.8  
11.8  
4.7  
2.0  
0.7  
  
  
  
  
Basic Metabolic Panel  
  
Na K Cl CO2 Gap Glu BUN Cr Ca Mg PO4  
23 0354 140  
3.5  
107  
20  
17  
98  
44  
4.73  
7.7  
  
23 0158 137  
4.0  
107  
20  
14  
128  
41  
4.75  
7.7  
  
23 0244 1.1  
  
23 024 140  
4.3  
107  
22  
15  
133  
40  
4.36  
8.3  
  
  
  
  
Cultures:  
Urine Culture  
  
Urine culture  
23 10,000 - 50,000 CFU/ml Multiple bacteria present suggesting 
contamination.  
  
  
  
  
ASSESSMENT:  
CULLEN on CKDIIIb, nonoliguric  
- obstructive CULLEN from bilateral stones and bilateral hydronephrosis, s/p PCNT 
/, renal function stable  
- vanco random level of 8  
- urine sodium 60, consistent with ATN  
- PCNT draining equally, no cortical thinning noted on US  
- electrolytes WNL  
- euvolemic  
  
RECOMMENDATIONS:  
- maintain euvolemia  
- recheck magnesium  
- monitor BMP daily  
- strict Is/Os  
- Avoid hypotension, NSAID, Nephrotoxins, Contrast if possible.  
- renally dose abx  
  
Yann Roberts MD  
Nephrology Fellow  
Daytime / Weekend Renal Pager [phone]  
After 7 pm Emergencies [phone] Pager [phone]  
  
  
Electronically signed by Amari Castillo MD at 2023 8:35 AM EDT  
  
Associated attestation - Amari Castillo MD - 2023 8:35 AM EDT  
Formatting of this note might be different from the original.  
I saw and examined the patient on 2023 with the renal fellow, . I
agree to his assessment and plan mentioned in the notes below.  
  
  
[facility]  
Work Phone: [phone]- Consult note* Tila Pablo, PT - 
  2023 11:56 AM EDTAssociated Order(s): IP PHYSICAL THERAPY SERVICE 
  REQUEST  
Formatting of this note is different from the original.  
PHYSICAL THERAPY ACUTE EVALUATION  
  
Referral received, chart reviewed.  
Patient seen from 11:05 am to 11:17 am on 8E unit for 12 minutes.  
  
Admit date/time: 2023 2:06 AM  
  
Reason for Admit:  
53 yo female admitted with diarrhea, nausea, and bilateral flank pain  
  
Diagnosis:  
Bilateral obstructing nephrolithiasis with bilateral hydro R > L  
Sepsis 2/ UTI and pyelo  
Acute renal failure with oliguria  
  
Precautions:  
Mod falls risk  
Full code  
Regular carb 45 g/meal diet  
Activity as tolerated  
  
Procedures this admit:  
: bilateral nephrostomy tube placement  
  
Past Medical and Surgical History:  
No past medical history on file.  
No past surgical history on file.  
  
Identification was verified by patient verbalizing his/her name and date of 
birth.  
  
Risks and Benefits of physical therapy: Patient informed of risks and benefits 
of treatment  
  
SUBJECTIVE:  
Patient Subjective:  
 Im okay   
  
Patient Identified Goal(s):  
To go home  
  
PTA Status:  
- living at home with son and son's girlfriend  
- mod I with Rolator. Only amb short distance.  
- assistance for IADL and ADL  
- no falls  
- 3 L NC at home  
  
Home:  
0 steps to enter  
0 steps to bedroom/bathroom  
Assistance available:  
Family   
Equipment available: grab bars, shower chair, tub bench, rolator  
  
OBJECTIVE:  
Appearance:  
Supine in bed  
Hospital gown  
Bilateral IR drains  
IV  
NC O2 3L  
Gonzalez  
  
Behavior:  
Awake  
Alert  
Cooperative  
Oriented x 3  
Follows simple step commands consistently  
  
Pain: Site/Location: abdomen, back, head, neck; Pain Scale:  1 /10  
Pain Relief Interventions Implemented: Positioning, Relaxation Training, and RN 
aware and reports patient received medication according to time schedule  
  
Passive ROM:  
Bilateral LE WFL  
  
Strength/Active ROM:  
Unable to formally assess  
  
Mobility:  
  
Assistance level N/A Dep Max Mod Min CGA CS Sup DS Mod I I Comments  
Supine to sit X Increased time  
  
Sit to/from stand X EOB to Rolator  
- at baseline requires assist to stand  
- increased time  
  
Ambulation X 20 feet with rolator  
- slow speed, short strides, forward flexed  
- at baseline patient reports this is the distance she ambulates  
  
Seated Balance X Static:  
EOB  
  
Dynamic:  
NT  
  
Standing Balance X  
  
X Static:  
Mod I with Rolator  
  
Dynamic:  
Mod I with Rolator  
  
  
Endurance:  
Impaired; close to baseline  
  
Patient Education:  
Instructed Patient in roles, goals, treatment plan: demonstrated good verbal 
understanding.  
Patient reports she feels she is at her baseline level and does not feel she 
needs further therapies.  
  
Patient up in chair with call light in reach.  
  
DME: With Patients permission ordered no equipment via Epic Order.  
If any questions contact [facility] DME Provider at [phone].  
  
6 Clicks Basic Mobility PT 23  
  
Difficulty turning over in bed 3  
Difficulty sitting down and standing up from a chair with arms 3  
Difficulty moving from lying on back to sitting on the side of the bed 3  
Help from another person moving to and from bed to a chair 3  
Help from another person to walk in hospital room 3  
Help from another person climbing 3-5 steps with a railing 1  
PT 6 Clicks Score 16  
  
6 Click Score Guidelines:  
1 - Total = Requires total assistance, or cannot do at all.  
2 - A lot = Requires a lot of help (maximun to moderate assistance) Can use 
assistive devices.  
3 - A little = Requires a little help (supervision, minimal assistance) Can use 
assistive devices.  
4 - None = Does not require any help and does the activity independently. Can 
use assistive devices.  
  
Progressive Mobility Protocol Score: Level 4  
  
ASSESSMENT:  
Cris Hearn is a 52 year old female admitted with diarrhea, nausea, and 
bilateral flank pain and diagnosed with bilateral obstructing nephrolithiasis, 
sepsis, UTI, acute renal failure. At time of evaluation patient was able to 
mobilize and ambulate close to her baseline level. Patient reports shedoes not 
feel she needs further therapies. Therefore, patient is functionally appropriate
for discharge home once medically cleared. No further Physical Therapy services 
recommended at this time.  
  
PLAN OF CARE:  
Discharge acute PT  
  
The evaluation findings and treatment plan were discussed with the patient. The 
patient indicated understanding and agreement with the plan.  
  
Tila Pablo, PT, DPT  
  
NA = Not Assessed, I = Independent, MI = Modified Independent, Sup = Supervised,
Set up = Physical Assistance for Set-up Only, Min = Minimal Assistance, Mod = 
Moderate  
Assistance, Max = Max assistance; Dep = Dependent; AROM = Active Range of 
Motion; PROM = Passive Range of Motion; MMT = Manual Muscle Test; LE = Lower 
Extremity  
  
Electronically signed by Tila Pablo PT at 2023 2:55 PM EDT  
  
[id number]- Consult note* Elly Dyer, OT - 2023 11:41 AM EDT
  Associated Order(s): IP OCCUPATIONAL THERAPY SERVICE REQUEST  
Formatting of this note is different from the original.  
OCCUPATIONAL THERAPY INITIAL EVALUATION  
  
Patient seen from 1103 to 1117 on GC8E unit for 14 minutes.  
  
Reason for Admit: transfer from OSH for diarrhea, nausea, b/l flank pain  
  
Diagnosis:  
Bilateral obstructing nephrolithiasis with bl hydro R>L  
Sepsis secondary to UTI and Pyelo  
Acute renal failure with oliguria  
  
Precautions/Activity Order: moderate falls, full code, carbohydrates 45 
grams/meal, activity as tolerated  
  
Procedures this admit:  
23: bilateral nephrostomy tube placement - Dr. Perla  
  
Past Medical and Surgical History:  
PMH: No past medical history on file.  
  
PSH: No past surgical history on file.  
  
SUBJECTIVE:  
Patient Subjective:  I've been better   
Patient Identified Goal(s): to return home  
  
Home Living Situation  
Prior Functional Status: Pt reports completing UB ADLs independently, son 
assists with LB ADLs, mobilizes with rollator, no falls last 6 months, on 
disability.  
Assistance Available at Home: lives with son (can provide 24/7 assist) and son's
GF (works)  
  
Patient lives in a 1 story home ramp to enter.  
Full Bathroom on 1 level  
Bedroom on 1 level.  
Equipment available at home: rollator, grab bar tub, shower chair and shower 
bench, 3L O2 NC baseline  
  
OBJECTIVE:  
Patient Identification: patient's id band and date of birth.  
Risks and benefits of occupational therapy: Patient informed of risks and 
benefits of treatment  
Appearance: supine in bed on arrival, hospital gown, b/l neph tubes, gonzalez, PIV,
unkempt, 3L O2 NC  
Alertness: Awake  
Affect: flat  
Cooperation/Behavior: Appropriate dialogue with therapist, cooperative  
Communication: WFL  
  
Pain:  
Pain ratin/10, Location: neck/back ache, b/l neph tube sites  
Pain Relief Interventions Implemented: RN aware and reports patient received 
medication according to time schedule  
  
Self Care:  
Assistance Level Dep Max Mod Min CG CS DS MI I Set-Up Comment  
Feeding x  
Grooming/Hygiene x x Anticipate MI at seated level after setup  
Bathing:UB x Anticipate based on observed UE function  
Bathing:LB x Anticipate, pt receives assist at baseline  
Dressing:UB x Anticipate based on observed UE function  
Dressing: LB x Anticipate, pt receives assist at baseline  
Toileting x Anticipate for clothing management  
  
  
  
Transfers/Bed Mobility:  
Assistance Level Dep Max Mod Min CG CS DS MI I Set-Up Comment  
Toilet Transfers x Anticipate based on bed transfer  
Bed Transfers x x Sit to stand from EOB to rollator with min A to boost up from 
bed (pt reports sonassists at baseline).  
Short distance mobility within room with rollator, slow but steady pace, no LOB 
or SOB noted, MI.  
Stand to sit in chair with good hand placement, MI.  
Bed Mobility x Supine to sit EOB with HOB elevated  
  
  
Patient seated in chair end of session with call light and phone within reach 
and all current needsmet. Instructed pt to use call light for nursing assist for
return to bed or for mobility needs 2/2lines; pt voiced understanding.  
  
Endurance for Self Care: Good, on 3L O2 NC baseline  
  
Static Sitting Balance: WFL  
Dynamic Sitting Balance: N/T  
  
UE Motor: AROM appears grossly WFL based on observed UE function. MMT not 
formally assessed but appears at least 3/5 throughout  
  
Vision/Perception: WFL, glasses at baseline  
  
Cognition:  
Orientation: Oriented to person, place, and date  
  
Follows Commands: WFL  
  
Attention: WNL  
  
Memory: WFL  
  
Problem Solving: WFL  
  
Safety/Judgement: performs functional transfer safely without cues  
  
Sequencing: Able to sequence simple ADLs without cues.  
  
Other Specialized Tests: None  
  
Patient/Family Education: Instructed patient in roles of therapy, OOBTC for 1 
hour and for all meals, NOT to mobilize without staff 2/2 lines  
  
Patient up in chair with call light in reach.  
  
DME: With Patients permission ordered no equipment via Epic Order.  
If any questions contact [facility] DME Provider at [phone].  
  
6 Clicks Daily Activity OT 23  
Help from another person Eating meals 4  
Help from another person taking care of personal grooming 4  
Help from another person bathing 3  
Help from another person putting on and taking off regular upper body clothing 4  
Help from another person putting on and taking off regular lower body clothing 3  
Help from another person toileting 3  
OT 6 Clicks Score 21  
  
6 Click Score Guidelines:  
1 - Unable = Total/Dependent Assist  
2 - A lot = Max/Moderate Assist  
3 - A little = Minimum/Contact Guard Assist/Supervision  
4 - Non = Modified Lamar/Independent  
  
ASSESSMENT:  
Pt reports she is functioning at baseline level for ADLs and functional mobility
and denies need for further OT services while in house. Patient is functionally 
appropriate for discharge home once medically cleared. No further Occupational 
Therapy Services reccommended at this time.  
  
PLAN:  
Cris Hearn will be d/c acute OT services  
  
able to discuss the evaluation findings and treatment plan with the 
patient/family.  
The patient/family did participate in the development of plan and goals.  
  
Elly Dyer OTR/L  
  
NA = Not Assessed, I = Independent, MI = Modified Independent, Sup = Supervised,
Set up = Physical Assistance for Set-up Only, Min = Minimal Assistance, Mod = 
Moderate  
Assistance, Max = Max assistance; Dep = Dependent; AROM = Active Range of 
Motion;PROM=Passive Rangeof Motion; MMT = Manual Muscle Test; UB = Upper Body; 
LB = Lower Body  
  
Electronically signed by Elly Dyer OT at 2023 1:52 PM EDT  
  
[id number]- Plan of care note* Care Plan Note - Karen Parrish RN - 
  2023 8:12 AM EDTFormatting of this note might be different from the 
  original.  
  
Problem: Routine Care:  
Goal: Patient care will be managed and maintained throughout hospital stay per 
unit specific routine care procedure  
Outcome: Progressing  
  
Problem: Infection:  
Goal: Will be free of signs and symptoms of infection  
Outcome: Progressing  
  
Problem: Altered Elimination:  
Goal: Establishment of normal bowel function will be achieved and maintained  
Outcome: Progressing  
  
Problem: Acute Pain:  
Goal: Ability to identify pain intensity on a pain scale and rate it 
consistently will be achieved and maintained  
Outcome: Progressing  
  
Problem: VTE Prophylaxis:  
Goal: Will be free of DVT  
Outcome: Progressing  
  
Problem: Safety:  
Goal: Patient will remain free of falls during hospital stay  
Outcome: Progressing  
Goal: Free from injury during hospitalization  
Outcome: Progressing  
  
Problem: Discharge Planning:  
Goal: Discharge needs of the adult patient will be met  
Outcome: Progressing  
  
Electronically signed by Karen Parrish RN at 2023 8:12 AM EDT  
  
[id number]- Plan of care note* Care Plan Note - Jeaneth Cooper RN - 
  2023 12:14 AM EDTFormatting of this note might be different from the 
  original.  
  
Problem: Routine Care:  
Goal: Patient care will be managed and maintained throughout hospital stay per 
unit specific routine care procedure  
Outcome: Progressing  
  
Problem: Infection:  
Goal: Will be free of signs and symptoms of infection  
Outcome: Progressing  
  
Problem: Altered Elimination:  
Goal: Establishment of normal bowel function will be achieved and maintained  
Outcome: Progressing  
  
Problem: Acute Pain:  
Goal: Ability to identify pain intensity on a pain scale and rate it 
consistently will be achieved and maintained  
Outcome: Progressing  
  
Problem: VTE Prophylaxis:  
Goal: Will be free of DVT  
Outcome: Progressing  
  
Problem: Safety:  
Goal: Patient will remain free of falls during hospital stay  
Outcome: Progressing  
Goal: Free from injury during hospitalization  
Outcome: Progressing  
  
Problem: Discharge Planning:  
Goal: Discharge needs of the adult patient will be met  
Outcome: Progressing  
  
Electronically signed by Jeaneth Cooper RN at 2023 12:14 AM EDT  
  
[id number]- Plan of care note* Care Plan Note - Marci Silva MD - 
  2023 10:22 AM EDTFormatting of this note might be different from the 
  original.  
Cris Hearn is a 52 year old female with h/o CKDIII, COPD (3L baseline), 
depression, DM, HLD, HTN, Afib (Eliquis), KWAKU who presents as transfer from 
[facility] for bilateral stones.CT:Large staghornright with hydro, large renal 
stones on L, proximal ureteral stone. Hydro R>L, evidence of infection on R.  
  
8/2: Bilateral PCNTs placed in am  
  
Plan  
- Monitor for postobstructive diuresis (> 3L output per day or >200 ml per hour 
for 2 consecutive hrs): strict I/Os, free access to water, replace 1/2 of UOP 
with 1/2 normal saline, BID BMP to monitor electrolytes  
- Broad spectrum Abx, follow on cultures, sensitivities  
- PCNL in the future for stone treatment  
- trend Cr and UOP  
-Urology to follow  
  
Marci Silva MD PGY1  
Urology   [phone]  
  
Electronically signed by Marci Silva MD at 2023 10:27 AM [place] [id number]- Surgery Postoperative evaluation and management note* 
  Post-Procedure Note - Azael Roy MD - 2023 7:22 AM EDTFormatting 
  of this note is different from the original.  
POST-PROCEDURE NOTE  
  
Procedure: bilateral nephrostomy tube placement  
Pre-operative Diagnosis: UTI in the setting of bilateral obstructing renal 
calculi  
Post-operative Diagnosis: same  
  
Attending: Nithya Perla MD  
Assistant: Azael Roy MD  
  
A TIME OUT was performed prior to the procedure using active communication to 
verify correct patient, procedure, and site: Yes  
  
Intraoperative Medications:  
Medications  
Medication Event Details Admin User Admin Time  
midazolam (VERSED) 2 MG/2ML injection Medication Given Dose: 1 mg; Route: 
Intravenous Push Kristine Fam RN 2023 6:23 AM  
fentaNYL (SUBLIMAZE) 100 MCG/2ML injection Medication Given Dose: 50 mcg; Route:
Intravenous Push Kristine Fam RN 2023 6:23 AM  
fentaNYL (SUBLIMAZE) 100 MCG/2ML injection Medication Given Dose: 50 mcg; Route:
Intravenous Push Kristine Fam RN 2023 6:45 AM  
  
Complications: None  
  
Specimens: N/A  
  
Estimated Blood Loss: less than 10 cc  
  
Post Procedure Pain Ratin/10  
  
Findings: Technically successful bilateral nephrostomy tube placement  
  
Plan: Hold in radiology observation for one hour before returning to floor  
  
Please see procedure dictation in EPIC/PACS for full procedural details.  
  
Dr. Perla was present for the critical portion of the procedure.  
  
Azael Roy MD  
Radiology  
  
Electronically signed by Nithya Perla MD at 2023 7:29 PM EDT  
  
[id number]- Surgery Preoperative evaluation and management note* Pre-
  Procedure Note - Azael Roy MD - 2023 6:05 AM EDTFormatting of 
  this note is different from the original.  
Pre-Procedure Note  
  
HISTORY:  
Procedure: bilateral nephro tubes with possible moderate sedation  
Indication: bilateral obstructing calculi with leukocytosis  
  
No past medical history on file.  
Diabetes: yes  
Sleep Apnea: yes  
Excessive Obesity: yes  
Hepatic Disease: no  
Renal Disease: yes  
Substance Abuse History:  
  
History  
Drug Use Not on file  
  
Current Facility-Administered Medications  
Medication Dose Route Frequency Last Rate Last Admin  
magnesium sulfate 4 GM/100ML in 100 mL ivpb 4,000 mg Intravenous One Time Dose 
25 mL/hr at 458 4,000 mg at 23 0458  
oxyCODONE immediate release tablet 5 mg Oral Q4H PRN  
albuterol (PROVENTIL HFA) 108 (90 Base) MCG/ACT HFA inhaler 2 Puff Inhalation 
Q4H PRN  
budesonide-formoterol (SYMBICORT) 80-4.5 MCG/ACT inhaler 2 Puff Inhalation BID 
RT  
esomeprazole (NEXIUM) capsule 40 mg Oral Daily 30 min before breakfast  
duloxetine (CYMBALTA) capsule 60 mg Oral Daily  
dextrose 10 % iv infusion 125 mL Intravenous PRN  
Or  
glucagon (GLUCAGEN) 1 MG/ML injection kit 1 mg Subcutaneous PRN  
Or  
dextrose (GLUTOSE) 40 % oral gel 15 g of glucose Buccal PRN  
Or  
dextrose (GLUTOSE) 40 % oral gel 30 g of glucose Buccal PRN  
insulin lispro (HumaLOG) 100 UNIT/ML injection 2-12 Units Subcutaneous 3x Daily 
AC  
lactated ringers iv infusion Intravenous Continuous 100 mL/hr at 23 0320 
New Bag at 23 0320  
nystatin (MYCOSTATIN) 100,000 unit/g powder Topical 2x Daily Given at 23 
0319  
  
Allergies: Patient has no allergy information on record.  
  
PHYSICAL EXAM:  
Blood pressure 110/62, pulse 87, temperature 97.9 F (36.6 C), temperature source
Oral, resp. rate 18, height 5' 6  (1.676 m), weight 277 lb (125.6 kg), SpO2 99 
%.  
Lungs: Clear to auscultation bilaterally  
Heart: Regular rate and rhythm  
Pertinent Findings:  
  
Labs Reviewed? Yes  
Recent Labs:  
Result for specified components in the past 45 days  
Component Date/Time Result Units  
Hemoglobin 2023 6:44 AM 11.0 g/dL  
Hematocrit 2023 6:44 AM 35.0 %  
Platelet 2023 6:44  K/uL  
PTT --- not found  
Protime 2023 6:44 AM 14.7 sec  
INR 2023 6:44 AM 1.31  
FXAUN --- not found  
ANTIFXALMWHE --- not found  
Creatinine 2023 6:44 AM 4.36 mg/dL  
  
Planned Sedation: Moderate  
Planned Sedation Medications: VERSED (midazolam) and fentanyl  
ASA Classification: Class II: Individual with one system well controlled 
disease. Disease does not affect daily living.  
Mallampati Airway Assessment: Class II Faucial pillars, soft palate visible  
Patient or family history of adverse reactions involving sedation/anesthesia: No
patient or family history of adverse reaction  
  
PRE-PROCEDURE VERIFICATION:  
  
Site of Procedure: bilateral  
Site Marked pre-procedure: N/A  
  
Pre-Procedure Pain Ratin/10  
  
Advanced Directives (Living will, health care power of ): none  
Patient Recent Code Status: Full Code  
Code Status For This Procedure: Full Code  
  
  
Azael Roy MD  
Radiology  
Electronically signed by Nithya Perla MD at 2023 7:29 PM EDT  
  
[id number]- Consult note* Alexei Rivas MD - 2023 4:39 AM EDT
  Associated Order(s): IP SURGERY UROLOGY CONSULT  
Formatting of this note is different from the original.  
Images from the original note were not included.  
[facility]  
Department of Urology  
Consultation Note  
  
Cris Hearn  
[id number]  
1970  
  
Referring Physician: Ramo Dexter MD  
Consulting Service: Urology  
Reason: bilateral stones  
  
HPI:  
Cris Hearn is a 52 year old female with a h/o CKDIII, COPD (3L baseline), 
depression, DM, HLD, HTN, Afib (Eliquis), KWAKU who presents as transfer from 
[facility] with diarrhea, nausea, bilateral flank pian. Found to have bilateral 
obstructing stones.  
  
Pmh/psh: as above  
Meds: Eliquis  
All: NSAIDS, benadryl  
  
ROS:  
A 12 point review of systems was performed and negative except as stated above.  
  
PMH:  
No past medical history on file.  
  
PSH:  
There is no previous surgical history on file.  
  
Medications:  
Scheduled  
magnesium sulfate 4,000 mg One Time Dose  
budesonide-formoterol 2 Puff BID RT  
esomeprazole 40 mg Daily 30 min before breakfast  
duloxetine 60 mg Daily  
insulin lispro 2-12 Units 3x Daily AC  
nystatin 2x Daily  
  
PRN  
oxyCODONE 5 mg Q4H PRN  
albuterol 2 Puff Q4H PRN  
dextrose iv for hypoglycemia orderable 125 mL PRN  
Or  
glucagon 1 mg PRN  
Or  
dextrose 15 g of glucose PRN  
Or  
dextrose 30 g of glucose PRN  
  
IV  
lactated ringers 100 mL/hr at 23 0320  
  
Allergies:  
Not on File  
  
Family History:  
No family history on file.  
  
Social History:  
  
History  
Alcohol use Not on file  
  
History  
Drug Use Not on file  
  
Objective  
Vitals:  
Tmax (24 hours): 98 F (36.7 C)  
  
Pulse Av Min: 77 Max: 87  
Systolic (24hrs), Av , Min:110 , Max:112  
  
Diastolic (24hrs), Av, Min:62, Max:75  
  
SpO2 Av % Min: 97 % Max: 99 %  
Resp Av Min: 18 Max: 18  
  
No intake/output data recorded.  
  
PE:  
/62 (BP Location: right arm)   Pulse 87   Temp 97.9 F (36.6 C) (Oral)   
Resp 18   Ht 5' 6  (1.676 m)   Wt 277 lb (125.6 kg)   SpO2 99%   BMI 44.71 kg/m  
Gen: NAD, awake and alert  
HEENT: sclera nonicteric  
Cardio: regular rate  
Pulm: nonlabored breathing on 3L 02, symmetrical chest rise  
Abd: soft, nontender, nondistended  
: gonzalez in place yellow urine, L>R CVAT  
Ext: warm and well perfused  
Neuro: alert and oriented  
Psych: appropriate mood and affect  
  
Labs:  
CBC/PT/INR  
  
WBC RBC Hgb Hct MCV RDW Plt PT aPTT INR  
23 0244 1.31  
  
23 0244 18.1  
3.95  
11.0  
35.0  
89  
15.7  
240  
  
  
  
  
Basic Metabolic Panel  
  
Na K Cl CO2 Gap Glu BUN Cr Ca Mg PO4  
23 0244 1.1  
  
23 0244 140  
4.3  
107  
22  
15  
133  
40  
4.36  
8.3  
  
  
  
  
Hepatic/Biliary/Pancreas  
  
T Prot Albumin D Bili T Bili Alk Phos ALT AST Amylase Lipase  
234 5.8  
2.8  
0.07  
0.4  
68  
6  
10  
  
  
  
  
Arterial Blood Gases  
  
None  
  
  
  
Laboratory Values and Test Results  
Results for orders placed or performed during the hospital encounter of 23  
BASIC METABOLIC PANEL  
Result Value Ref Range  
Glucose 133 (H) 68 - 110 mg/dL  
Sodium 140 135 - 148 mmol/L  
Potassium 4.3 3.3 - 5.3 mmol/L  
Carbon Dioxide 22 21 - 30 mmol/L  
Chloride 107 97 - 111 mmol/L  
Blood Urea Nitrogen 40 (H) 8 - 22 mg/dL  
Creatinine 4.36 (H) 0.50 - 1.10 mg/dL  
Calcium 8.3 (L) 8.4 - 10.4 mg/dL  
Anion Gap 15 10 - 20  
Estimated GFR (CKD-EPI) 12 (L) >=60 mL/min/1.73sqm  
HEPATIC FUNCTION PANEL  
Result Value Ref Range  
Albumin 2.8 (L) 3.4 - 5.1 g/dL  
Bilirubin, Direct 0.07 (L) 0.10 - 0.30 mg/dL  
Bilirubin, Total 0.4 0.1 - 1.5 mg/dL  
Alkaline Phosphatase 68 40 - 200 IU/L  
ALT (SGPT) 6 (L) 7 - 40 IU/L  
AST (SGOT) 10 7 - 40 IU/L  
Protein, Total 5.8 (L) 6.2 - 8.3 g/dL  
MAGNESIUM  
Result Value Ref Range  
Magnesium 1.1 (L) 1.6 - 2.8 mg/dL  
PROTHROMBIN TIME AND INR  
Result Value Ref Range  
Protime 14.7 (H) 9.7 - 12.9 sec  
INR 1.31 (H) 0.90 - 1.10  
CBC WITH DIFFERENTIAL  
Result Value Ref Range  
WBC 18.1 (H) 4.5 - 11.5 K/uL  
RBC 3.95 (L) 4.00 - 5.20 M/uL  
Hemoglobin 11.0 (L) 12.0 - 15.0 g/dL  
Hematocrit 35.0 (L) 36.0 - 46.0 %  
MCV 89 80 - 100 fL  
MCH 28.0 26.0 - 34.0 pg  
MCHC 31.5 (L) 32.0 - 35.9 g/dL  
Platelet 240 150 - 400 K/uL  
RDW-CV 15.7 (H) 11.5 - 14.5 %  
MPV 8.5 7.5 - 11.2 fL  
Neutrophils 80.8 (H) 31.0 - 76.0 %  
Neutrophil # 14.66 (H) 1.50 - 8.00 K/uL  
Lymphocytes 11.8 (L) 24.0 - 44.0 %  
Lymphocytes # 2.14 1.00 - 4.80 K/uL  
Monocytes 4.7 2.0 - 11.0 %  
Monocyte # 0.86 0.20 - 1.00 K/uL  
Eosinophil 2.0 0.1 - 4.0 %  
Eosinophil # 0.36 0.00 - 0.70 K/uL  
Basophils 0.7 <=1.9 %  
Basophil # 0.12 0.00 - 0.20 K/uL  
MDW  
URINALYSIS WITH REFLEX CULTURE PERFORMABLE  
Specimen: Indwelling urethral catheter (Gonzalez); Urine  
Result Value Ref Range  
Color Yellow Colorless  
Appearance Extra Turbid Clear  
pH 6.0 5.0 - 8.0  
Spec Gravity 1.010 <=1.030  
Protein 70 (A) Negative mg/dL  
Blood Moderate (A) Negative  
Bilirubin Negative Negative  
Urobilinogen Negative Negative mg/dL  
Ketones Negative Negative mg/dL  
Leuk. Esterase Positive (A) Negative  
Nitrite Negative Negative  
Glucose 100 (A) Negative mg/dL  
WBC >100 (A) 0 - 2 /HPF  
RBC 11-30 (A) 0 - 2 /HPF  
Bacteria Moderate /HPF  
Mucous Threads Present  
Squamous Epithelial 3-5 0 - 10 /HPF  
  
Imaging:  
CT [facility]  
IMPRESSION: Bilateral staghorn calculi greater on the RIGHT. RIGHT perinephric 
edema and stranding  
with moderate hydronephrosis. Mild RIGHT renal hilar/retroperitoneal 
lymphadenopathy. May consider  
inflammation secondary to staghorn calculi or infection. LEFT UVJ calculus 
measuring up to 7 mm.  
Mild LEFT hydronephrosis.  
  
Assessment:  
Cris Hearn is a 52 year old female with h/o CKDIII, COPD (3L baseline), 
depression, DM, HLD, HTN, Afib (Eliquis), KWAKU who presents as transfer from 
[facility] for bilateral stones.  
  
Afebrile at [facility]. Normotensive, non tachy. 3L NC (baseline) 400 cc UOP  
WBC 18  
Cr 4.36 (2.58, baseline 1.5)  
INR 1.3  
UA at [facility] +Nitrite  
Obese and on Eliquis  
  
CT personally reviewed. Large staghorn right with hydro, large renal stones on 
L, proximal ureteralstone. Hydro R>L, evidence of infection on R  
  
Plan  
- NPO  
- Broad spectrum Abx  
- Agree with emergent bilateral PCNT  
- will need PCNL in the future for stone treatment  
- trend Cr and UOP  
  
Alexei Rivas MD  
  
  
Electronically signed by MARYCRUZ Iniguez MD at 2023 9:22 AM EDT  
  
[facility]  
Work Phone: [phone]- Plan of care note* Care Plan Note - Jeaneth Cooper RN - 2023 3:59 AM EDTFormatting of this note might be different
  from the original.  
  
Problem: Routine Care:  
Goal: Patient care will be managed and maintained throughout hospital stay per 
unit specific routine care procedure  
Outcome: Progressing  
  
Problem: Infection:  
Goal: Will be free of signs and symptoms of infection  
Outcome: Progressing  
  
Problem: Altered Elimination:  
Goal: Establishment of normal bowel function will be achieved and maintained  
Outcome: Progressing  
  
Problem: Acute Pain:  
Goal: Ability to identify pain intensity on a pain scale and rate it 
consistently will be achieved and maintained  
Outcome: Progressing  
  
Problem: VTE Prophylaxis:  
Goal: Will be free of DVT  
Outcome: Progressing  
  
Problem: Safety:  
Goal: Patient will remain free of falls during hospital stay  
Outcome: Progressing  
Goal: Free from injury during hospitalization  
Outcome: Progressing  
  
Problem: Discharge Planning:  
Goal: Discharge needs of the adult patient will be met  
Outcome: Progressing  
  
Electronically signed by Jeaneth Cooper RN at 2023 3:59 AM EDT  
  
[id number]- History and physical note* Brayan Waters DO - 2023 
  1:57 AM EDTFormatting of this note is different from the original.  
Images from the original note were not included.  
  
Internal Medicine  
History and Physical Examination  
Cris Hearn 52 year old 277 lbs  
MRN/Room: [id number]/[id number]  
Admit Date: (Not on file)  
: 1970  
  
CC: BL flank pain, nausea and emesis  
  
HPI:  
  
52 yr old with a history of CKDIII, COPD (3L), Depression, DM, HLD, HTN, AFIB 
(Eliquis/sotalol), KWAKU (not on CPAP, did not tolerate).  
  
Presenting originally to [facility] with diarrhea, nausea, and bl flank pain. 
Found to have 2 obstructing stones (see report below) with evidence of 
UTI/Pyelo, and CULLEN on CKD.  
  
Transferred to  for urology/IR  
  
On my eval she appears non toxic. Endorses pain and some nausea but is otherwise
doing ok.  
  
Diarrhea has been chronic >5 months. She denies current fever or chills. No 
recent emesis.  
  
Has gonzalez in with about 400 ml since around 8pm (now 2am). She has had stones in
past and has had stents, no PCNT before.  
  
Labs from [facility]  
WBC 19  
Hgb 12  
  
Na 138, K 3/6, cl 104, co2 26, BUN 33, Cr 2.58 (baseline 1.5),  
UA  
Nitrite+, LE+ WBC innumerable, Bacteria many  
  
CT A/P w/o  
BL staghorn calculi R>L  
R perinephric standing with mod hydro with mild LAD  
L calculi 7mm with mild L hydro  
  
No past medical history on file.  
No past surgical history on file.  
No family history on file.  
Social History  
  
Socioeconomic History  
Marital status: Unknown  
  
Current Facility-Administered Medications  
Medication Dose Route Frequency Last Rate Last Admin  
nystatin (MYCOSTATIN) 100,000 unit/g powder Topical 2x Daily  
heparin (porcine) 5000 UNIT/0.5ML injection 5,000 Units Subcutaneous 2x Daily  
  
No current outpatient medications on file.  
  
Not on File  
  
@Review of Systems  
Constitutional: Positive for malaise/fatigue. Negative for chills and fever.  
HENT: Negative.  
Eyes: Negative.  
Respiratory: Negative.  
Cardiovascular: Negative.  
Gastrointestinal: Positive for diarrhea.  
Genitourinary: Positive for flank pain and hematuria.  
Skin: Negative.  
Neurological: Negative.  
Endo/Heme/Allergies: Negative.  
Psychiatric/Behavioral: Negative.  
@  
  
Objective:  
No data found.  
  
No intake or output data in the 24 hours ending 23 0157  
  
Physical Exam:  
Gen: Awake, NAD  
HEENT: PERRLA,  
Chest/CV: RRR, no MRG  
Pulm: CTAB  
Abd/Pelvis: Nondistended; BL flank pain  
Ext: Warm, no edema.  
Skin: No rashes. Erythema in skin folds.  
Neuro: Alert, responsive to commands, AAO X 3.  
Psych: Normal affect  
  
Labs/Data:  
Basic Metabolic Panel  
  
Na K Cl CO2 Gap Glu BUN Cr Ca Mg PO4  
23 0244 1.1  
  
23 0244 140  
4.3  
107  
22  
15  
133  
40  
4.36  
8.3  
  
  
  
  
WBC/Diff  
  
None  
  
  
  
Fingerstick Glucose (last 72 hours)  
  
None  
  
  
  
Hepatic/Biliary/Pancreas  
  
None  
  
  
  
No results found for: INR  
  
Cardiac  
  
None  
  
  
  
Blood Culture  
  
None  
  
  
  
Sputum Culture  
  
None  
  
  
  
Pyogen Culture  
  
None  
  
  
  
Imaging:  
CT report above in HPI  
  
Consults:  
Urology/IR  
  
Assessment/Plan:  
  
#BL obstructing nephrolithiasis with bl hydro R>L  
#Sepsis secondary to UTI and Pyelo  
#Acute renal failure with oliguria  
--Gonzalez in place with about 400ml for past 6 hours  
--leukocytosis and UA c/w infection  
--repeat UA send for culture  
--IVF  
--Renal US to assess hydro  
--Repeat BMP with worsening Cr up to 4.3 from 2.5  
--WBC 18  
--Urology recommended broad spec ABX for now-->Vanc/cefepime ordered--deescalate
as able with cultures  
--given the above I spoke with urology and IR, IR to come in to place emergent 
bl PCNTs  
  
#COPD  
--prn albuterol  
--Resp   
--Budesinide-formoterol  
  
#Afib  
--sotalol  
--Eliquis--Holding for PCNT placement  
  
#GERD  
--ppi  
  
#Depression  
--duloxetine  
  
#HLD  
--statin  
  
#[id number]  
--trulicity and Invokana---holding  
--SSI and adjust as needed  
  
Prophylaxis: Holding eliquis for PCNT placement  
Code Status: full  
Dispo: christine Waters D.O.  
Division of Hospital Medicine, [facility]  
  
>75 minutes spent by me in preparing this patient's admission and in counseling 
the patient and/or family on the nature of the illness requiring hospital care.  
  
Critical Care Provider Statement  
  
Critical care time was provided for 60 minutes by the attending physician. The 
time involved in theperformance of this care was exclusive of separately 
billable procedures, teaching time and treating other patients.  
Critical care was necessary because of an illness or injury that acutely 
impaired one or more vitalorgans systems such that there was a high probability 
of imminent or life threatening deteriorationin the patient s condition. The 
critical illness/injury acutely impaired the following organ systems: Renal, 
Metabolic  
Critical care was time was spent personally by the attending physician on the 
following activities:examination of patient, ordering and/or performing 
treatments and / or interventions, re-evaluations of patient condition / 
response to treatment, ordering and review of laboratory studies, ordering and 
review of radiographic studies, ventilator management and blood gas 
interpretations  
  
  
Electronically signed by Brayan Waters DO at 2023 6:47 AM EDT  
  
[id number]- History and physical note* Brayan Waters DO - 2023 
  1:57 AM EDTFormatting of this note is different from the original.  
Images from the original note were not included.  
  
Internal Medicine  
History and Physical Examination  
Cris Hearn 52 year old 277 lbs  
MRN/Room: [id number]/[id number]/2  
Admit Date: (Not on file)  
: 1970  
  
CC: BL flank pain, nausea and emesis  
  
HPI:  
  
52 yr old with a history of CKDIII, COPD (3L), Depression, DM, HLD, HTN, AFIB 
(Eliquis/sotalol), KWAKU (not on CPAP, did not tolerate).  
  
Presenting originally to [facility] with diarrhea, nausea, and bl flank pain. 
Found to have 2 obstructing stones (see report below) with evidence of 
UTI/Pyelo, and CULLEN on CKD.  
  
Transferred to  for urology/IR  
  
On my eval she appears non toxic. Endorses pain and some nausea but is otherwise
doing ok.  
  
Diarrhea has been chronic >5 months. She denies current fever or chills. No 
recent emesis.  
  
Has gonzalez in with about 400 ml since around 8pm (now 2am). She has had stones in
past and has had stents, no PCNT before.  
  
Labs from [facility]  
WBC 19  
Hgb 12  
  
Na 138, K 3/6, cl 104, co2 26, BUN 33, Cr 2.58 (baseline 1.5),  
UA  
Nitrite+, LE+ WBC innumerable, Bacteria many  
  
CT A/P w/o  
BL staghorn calculi R>L  
R perinephric standing with mod hydro with mild LAD  
L calculi 7mm with mild L hydro  
  
No past medical history on file.  
No past surgical history on file.  
No family history on file.  
Social History  
  
Socioeconomic History  
Marital status: Unknown  
  
Current Facility-Administered Medications  
Medication Dose Route Frequency Last Rate Last Admin  
nystatin (MYCOSTATIN) 100,000 unit/g powder Topical 2x Daily  
heparin (porcine) 5000 UNIT/0.5ML injection 5,000 Units Subcutaneous 2x Daily  
  
No current outpatient medications on file.  
  
Not on File  
  
@Review of Systems  
Constitutional: Positive for malaise/fatigue. Negative for chills and fever.  
HENT: Negative.  
Eyes: Negative.  
Respiratory: Negative.  
Cardiovascular: Negative.  
Gastrointestinal: Positive for diarrhea.  
Genitourinary: Positive for flank pain and hematuria.  
Skin: Negative.  
Neurological: Negative.  
Endo/Heme/Allergies: Negative.  
Psychiatric/Behavioral: Negative.  
@  
  
Objective:  
No data found.  
  
No intake or output data in the 24 hours ending 23 0157  
  
Physical Exam:  
Gen: Awake, NAD  
HEENT: PERRLA,  
Chest/CV: RRR, no MRG  
Pulm: CTAB  
Abd/Pelvis: Nondistended; BL flank pain  
Ext: Warm, no edema.  
Skin: No rashes. Erythema in skin folds.  
Neuro: Alert, responsive to commands, AAO X 3.  
Psych: Normal affect  
  
Labs/Data:  
Basic Metabolic Panel  
  
Na K Cl CO2 Gap Glu BUN Cr Ca Mg PO4  
23 0244 1.1  
  
23 0244 140  
4.3  
107  
22  
15  
133  
40  
4.36  
8.3  
  
  
  
  
WBC/Diff  
  
None  
  
  
  
Fingerstick Glucose (last 72 hours)  
  
None  
  
  
  
Hepatic/Biliary/Pancreas  
  
None  
  
  
  
No results found for: INR  
  
Cardiac  
  
None  
  
  
  
Blood Culture  
  
None  
  
  
  
Sputum Culture  
  
None  
  
  
  
Pyogen Culture  
  
None  
  
  
  
Imaging:  
CT report above in HPI  
  
Consults:  
Urology/IR  
  
Assessment/Plan:  
  
#BL obstructing nephrolithiasis with bl hydro R>L  
#Sepsis secondary to UTI and Pyelo  
#Acute renal failure with oliguria  
--Gonzalez in place with about 400ml for past 6 hours  
--leukocytosis and UA c/w infection  
--repeat UA send for culture  
--IVF  
--Renal US to assess hydro  
--Repeat BMP with worsening Cr up to 4.3 from 2.5  
--WBC 18  
--Urology recommended broad spec ABX for now-->Vanc/cefepime ordered--deescalate
as able with cultures  
--given the above I spoke with urology and IR, IR to come in to place emergent 
bl PCNTs  
  
#COPD  
--prn albuterol  
--Resp   
--Budesinide-formoterol  
  
#Afib  
--sotalol  
--Eliquis--Holding for PCNT placement  
  
#GERD  
--ppi  
  
#Depression  
--duloxetine  
  
#HLD  
--statin  
  
#[id number]  
--trulicity and Invokana---holding  
--SSI and adjust as needed  
  
Prophylaxis: Holding eliquis for PCNT placement  
Code Status: full  
Dispo: christine Waters D.O.  
Division of Hospital Medicine, [facility]  
  
>75 minutes spent by me in preparing this patient's admission and in counseling 
the patient and/or family on the nature of the illness requiring hospital care.  
  
Critical Care Provider Statement  
  
Critical care time was provided for 60 minutes by the attending physician. The 
time involved in theperformance of this care was exclusive of separately 
billable procedures, teaching time and treating other patients.  
Critical care was necessary because of an illness or injury that acutely 
impaired one or more vitalorgans systems such that there was a high probability 
of imminent or life threatening deteriorationin the patient s condition. The 
critical illness/injury acutely impaired the following organ systems: Renal, 
Metabolic  
Critical care was time was spent personally by the attending physician on the 
following activities:examination of patient, ordering and/or performing 
treatments and / or interventions, re-evaluations of patient condition / 
response to treatment, ordering and review of laboratory studies, ordering and 
review of radiographic studies, ventilator management and blood gas 
interpretations  
  
  
Electronically signed by Brayan Waters DO at 2023 6:47 AM EDT  
  
documented in this [id number]- Note* AdmissionCare - Brayan Waters DO - 2023 1:19 AM EDTFormatting of this note might be different 
  from the original.  
AdmissionCare  
  
Guideline: Renal Colic / Kidney Stones - INPT, Inpatient  
  
Based on the indications selected for the patient, the bed status of Admit to 
Inpatient was determined to be MET  
  
The following indications were selected as present at the time of evaluation of 
the patient:  
  
Acute kidney injury (stage 2), as indicated by 1 or more of the following:  
-  
- Rise in creatinine to 2 times its baseline value or higher  
- Urinary tract infection  
  
AdmissionCare documentation entered by: Brayan Waters  
  
[facility], 27th edition, Copyright  [facility] [employer] All Rights Reserved. 
[id number]:19:46-04:00  
Electronically signed by Brayan Waters DO at 2023 1:19 AM EDT  
  
[id number]- History and physical note  
  
  
  
  
                                        Author              Sulaiman Shine  
[facility]  
2023 10:03pm  
   
                                        Note Date/Time      2023 3:1  
7am  
   
                                                    [facility]  
ENTER  
[address] [place] [address]  
  
Hospitalist H&P  
Signed  
  
Patient: Cris Hearn MR#: [id number]  
[id number]  
: 1970 Acct:[id number]  
  
Age/Sex: 52 / F Adm Date: 08/01/2  
3  
Loc: 3T Room: 5C8187-8 Type: ADM IN  
Attending Dr: Brisa Acevedo MD  
  
Copies to: Apollo Lopez DO, RES  
MD Landy Peña, ALIZA Shine DO~  
  
  
HPI  
DATE OF EXAMINATION: 23  
CHIEF COMPLAINT: N/V for a couple weeks, Diarrhea for 4-5 months  
HISTORY OF PRESENT ILLNESS:  
Ms. Hearn is a 52-year-old female with a PMH of A-fib, COPD, T2DM, HLD that   
presents to the emergency room today for complaints of nausea and vomiting for   
acouple of weeks and diarrhea for 4 to 5 months. Patient seen and evaluated in 
the   
emergency room, resting on the cart quietly. She states that she has had watery   
diarrhea for the last 5 months and then started with nausea and vomiting last 
couple   
of weeks. She reports stomach cramps, dizziness and lightheadedness, back pain. 
She   
denies fever and chills, chest pain, shortness of breath. She denies any 
dysuria,   
hematuria. She denies any falls or LOC withher dizziness and lightheadedness. 
She   
reports that she has had kidney stones in the past, she does not think she has   
chronic kidney disease?looking back to her creatinines she has been elevated 
since   
2019. She reports smoking a pack a day for last 30 years, she denies alcohol use
  
denies illicit drug use.  
  
EKG in the ER shows normal sinus rhythm with a first-degree block. CT of the 
abdomen   
pelvis shows bilateral renal staghorn calculi greater on the right, right 
perinephric   
edema and stranding with moderate hydronephrosis. Mild right renal   
hilar/retroperitoneal lymphadenopathy. Left ureteropelvic junction calculus up 
to 0.7   
x 0.4 cm with mild left hydronephrosis. CBC with a white blood cell count of 
19.2,   
CMP with a BUN of 33, creatinine 2.58, glucose 125, otherwise unremarkable. 
Lipase   
114. UA with turbid, yellow urine, 100 protein,250 glucose, 3+ occult blood, 
positive   
for nitrites, 4+ leukocyte esterase, innumerable white blood cells, 1+ bacteria,
  
culture is pending. Patient was medicated with 1 L bolus of saline, Phenergan,   
ceftriaxone. Urology was consulted per ER physician and they recommended 
transfer to   
tertiary care because because the patient will likely need nephrostomy on the 
right.   
Patient was accepted at [facility] per Dr. Bearden, urology at [facility], 
awaiting   
on the bed. She will be admitted as inpatient to the [facility] telemetry floor 
underthe   
care of the hospitalist team while waiting for bed at [facility].  
  
Review of Systems  
Review of Systems  
Review of systems:  
A 10 point review of systems was obtained, negative unless noted in the HPI or 
below.  
  
[facility]  
Attestation Statement: The following information was validated with the patient.  
Medical History (Updated 23 @ 00:54 by Jordan De Leon Jr, MD)  
  
Cholecystectomy planned  
Chronic renal disease, stage III  
Chronic respiratory failure  
COPD (chronic obstructive pulmonary disease)  
3L NC CONTINUOUS  
Depression  
Diabetes  
Hypercapnic respiratory failure, chronic  
Hyperlipemia  
Hypertension  
Irregular heart beat  
Multiple sclerosis  
Obesity  
KWAKU (obstructive sleep apnea)  
patient does not wear home machine, tried it, did not like it, has not worn it 
since.  
Umbilical hernia  
with removal  
  
Surgical History (Reviewed 23 @ 19:11 by Milana Langston RN)  
  
H/O tubal ligation  
Hx of lithotripsy  
x3  
S/P   
S/P cholecystectomy  
Status post incision and drainage  
  
Family History (Reviewed 22 @ 14:23 by Brisa Acevedo MD)  
Mother COPD (chronic obstructive pulmonary disease)  
Father No problems noted.  
  
Brother Cancer  
Father Cancer  
  
Social History  
Smoking Status: Current every day smoker  
Tobacco Type: cigarettes  
Substance Use Type: None  
Social History Comments: ex- significant other and younger son  
  
Meds  
Medications and Allergies  
Allergies  
  
aspirin Allergy (Verified 23 19:11)  
Hives  
diphenhydramine [From Benadryl] Allergy (Verified 23 19:11)  
Swelling  
ibuprofen [From Advil] Allergy (Verified 23 19:11)  
Hives  
naproxen [From Aleve] Allergy (Verified 23 19:11)  
Hives  
azithromycin Adverse Reaction (Verified 23 19:11)  
Flushing  
  
  
Home Medications  
  
albuterol sulfate 2.5 mg/3 mL (0.083 %) solution for nebulization 2.5 mg (3 mL)   
inhalation Q3H PRN Shortness Of Breath #15 mL 10/07/21 [Rx Confirmed 23]  
albuterol sulfate 90 mcg/actuation aerosol inhaler (Ventolin HFA) 2 puff 
inhalation   
Q6H PRN Shortness Of Breath #2 grams 10/07/21 [Rx Confirmed 23]  
amitriptyline 50 mg tablet 50 mg PO QPM #30 tabs 10/07/21 [Rx Confirmed 
23]  
apixaban 5 mg tablet (Eliquis) 5 mg PO BID #30 tabs 10/07/21 [Rx Confirmed 
23]  
canagliflozin 100 mg tablet (Invokana) 100 mg PO QAM #30 tabs 10/07/21 [Rx 
Confirmed   
23]  
duloxetine 60 mg capsule,delayed release 60 mg PO QAM #30 caps 10/07/21 [Rx 
Confirmed   
23]  
omeprazole 20 mg capsule,delayed release 20 mg PO BID #60 caps 10/07/21 [Rx 
Confirmed   
23]  
pravastatin 40 mg tablet 40 mg PO QPM #30 tabs 10/07/21 [Rx Confirmed 23]  
sotalol 120 mg tablet (Sotalol AF) 120 mg PO QAM #30 tabs 10/07/21 [Rx Confirmed
   
23]  
metformin 500 mg tablet,extended release 24 hr 500 mg PO DAILY 22 [History
   
Confirmed 23]  
budesonide-formoterol  mcg-4.5 mcg/actuation aerosol inhaler (Symbicort) 
2   
puff inhalation BID 22 [History Confirmed 23]  
potassium chloride 10 mEq capsule,extended release 10 meq PO DAILY #30 caps 
22   
[Rx Confirmed 23]  
doxycycline hyclate 100 mg tablet 100 mg PO DAILY 23 [History Confirmed   
23]  
dulaglutide 0.75 mg/0.5 mL subcutaneous pen injector (Trulicity) 0.7 mg subcut 
QWEEK   
23 [History Confirmed 23]  
ammonium lactate 12 % lotion 1 applic topical DAILY 23 [History Confirmed   
23]  
ergocalciferol (vitamin D2) 1,250 mcg (50,000 unit) capsule (Vitamin D2) 50,000 
unit   
PO QWEEK 23 [History Confirmed 23]  
fluticasone propionate 50 mcg/actuation nasal spray,suspension 2 spray 
intranasal   
DAILY PRN Allergy Symptoms 23 [History Confirmed 23]  
multivitamin 1 tab PO DAILY 23 [History Confirmed 23]  
tiotropium bromide 2.5 mcg/actuation mist for inhalation (Spiriva Respimat) 2 
puff   
inhalation DAILY 23 [History Confirmed 23]  
  
  
  
Exam  
Physical Exam  
Vital Signs:  
  
  
  
  
Temp Pulse Resp BP Pulse Ox O2 Del Method O2 Flow Rate  
  
98.3 F 77 18 143/72 H 100 Nasal Cannula 3  
  
23 02:30 23 02:30 23 02:30 23 02:30 23 02:30 
23   
02:30 23 02:30  
  
  
  
Narrative:  
  
CONST- Appears well -developed and well nourished. Morbidly obese  
HEAD - Normocephalic and atraumatic  
EENT-Sclera nonicteric, conjunctive are non-erythemic, moist oral mucosa, 
pharynx   
clear  
NECK-Supple, no cervical lymphadenopathy  
CARDIAC-normal rate, regular rhythm, S1 & S2.  
PULM-diminished, rhonchi to left posterior mid to base, 3 L nasal cannula, no   
accessory muscle use, moist nonproductive cough noted  
ABD - Soft. Bowel sounds are normal. No distention. tenderness on palpation, 
flank   
pain on palpation  
EXTREM-no edema BLE calves, nontender  
SKIN- W/D good turgor  
MS- MAEX4 spontaneously with equal with equal strength  
NEURO- A&Ox3 speech clear and tongue midline, equal facial symmetry, no focal 
motor   
deficits  
PSYCH-Mood, affect, and behavior appropriate  
  
Results  
Lab Results  
Labs:  
Laboratory Last Values  
  
  
  
Corrected WBC 19.2 X10E3/uL (3.8-11.6) H 23 20:15  
  
Uncorrected WBC Count 19.2 x10E3/uL (3.8-11.6) H 23 20:15  
  
RBC 4.39 X10E6/uL (3.60-5.00) 23 20:15  
  
Hgb 12.2 g/dL (11.8-15.4) 23 20:15  
  
Hct 37.9 % (34.0-46.4) 23 20:15  
  
MCV 86.3 fl () 23 20:15  
  
MCH 27.9 pg (24.7-34.3) 23 20:15  
  
MCHC 32.3 g/dL (32.0-35.0) 23 20:15  
  
RDW 15.2 % (11.9-15.3) 23 20:15  
  
Plt Count 285 x10E3/uL (150-450) 23 20:15  
  
MPV 8.4 fl (6.3-10.7) 23 20:15  
  
Neut % (Auto) 71.4 % (.) 23 20:15  
  
Lymph % (Auto) 16.7 % (.) 23 20:15  
  
Mono % (Auto) 5.2 % (.) 23 20:15  
  
Eos % (Auto) 5.5 % (.) 23 20:15  
  
Baso % (Auto) 1.2 % (.) 23 20:15  
  
Nucleat RBC Rel Count 0.0 /100 WBC (0-0.5) 23 20:15  
  
Neut # (Auto) 13.7 x10E3/uL (1.8-7.7) H 23 20:15  
  
Lymph # (Auto) 3.2 x10E3/uL (1.00-4.8) 23 20:15  
  
Mono # (Auto) 1.0 x10E3/uL (0.0-0.8) H 23 20:15  
  
Eos # (Auto) 1.0 x10E3/uL (0.0-0.45) H 23 20:15  
  
Baso # (Auto) 0.2 x10E3/uL (0.0-0.2) 23 20:15  
  
Monocyte Dist Width 20.01 % (0.00-20.00) H 23 20:15  
  
PHA Creatinine Clear 34.07 23 20:15  
  
Sodium 138 mmol/L (136-145) 23 20:15  
  
Potassium 3.6 mmol/L (3.5-5.1) 23 20:15  
  
Chloride 104 mmol/L () 23 20:15  
  
Carbon Dioxide 26.8 mmol/L (21.0-31.0) 23 20:15  
  
Anion Gap 10.8 mEq/L (6.0-15.0) 23 20:15  
  
BUN 33 mg/dL (7-25) H 23 20:15  
  
Creatinine 2.58 mg/dL (0.60-1.20) H 23 20:15  
  
Est GFR (CKD-EPI) 21.601 mL/Min 23 20:15  
  
Glucose 125 mg/dL () H 23 20:15  
  
Calcium 8.8 mg/dL (8.6-10.3) 23 20:15  
  
Total Bilirubin 0.3 mg/dl (0.3-1.0) 23 20:15  
  
Direct Bilirubin 0.10 mg/dL (0.03-0.18) 23 20:15  
  
Indirect Bilirubin 0.2 mg/dL 23 20:15  
  
AST 10 U/L (13-39) L 23 20:15  
  
ALT 7 U/L (7-52) 23 20:15  
  
Alkaline Phosphatase 62 U/L () 23 20:15  
  
Total Protein 7.2 gm/dL (6.4-8.9) 23 20:15  
  
Albumin 3.3 gm/dL (3.5-5.7) L 23 20:15  
  
Globulin 3.9 gm/dL 23 20:15  
  
Albumin/Globulin Ratio 0.8 23 20:15  
  
Lipase 114.0 U/L (11.0-82.0) H 23 20:15  
  
Urine Color Yellow (Yellow) 23 22:42  
  
Urine Appearance Turbid (Clear) A 23 22:42  
  
Urine pH 5.5 (5.0-9.0) 23 22:42  
  
Ur Specific Gravity 1.015 (1.001-1.030) 23 22:42  
  
Urine Protein 100 mg/dL (Negative) H 23 22:42  
  
Urine Glucose (UA) 250 mg/dL (Normal) H 23 22:42  
  
Urine Ketones Negative (Negative) 23 22:42  
  
Urine Occult Blood 3+ (Negative) H 23 22:42  
  
Urine Nitrite Positive (Negative) H 23 22:42  
  
Urine Bilirubin Negative (Negative) 23 22:42  
  
Urine Urobilinogen Normal mg/dL (Normal) 23 22:42  
  
Ur Leukocyte Esterase 4+ (Negative) H 23 22:42  
  
Urine RBC 3-4 /HPF (0-4) 23 22:42  
  
Urine WBC Innumerable /HPF (0-4) H 23 22:42  
  
Ur Squamous Epith Cells 0-1 /HPF (0-2) 23 22:42  
  
Other Crystals None seen /HPF 23 22:42  
  
Urine Bacteria 1+ (None Seen) H 23 22:42  
  
Hyaline Casts 0-8 /LPF (0-8) 23 22:42  
  
Urine Yeast None seen /HPF (None Seen) 23 22:42  
  
  
  
  
Assessment & Plan  
Assessment/Plan  
(1) Bilateral hydronephrosis:  
(2) Struvite kidney stones:  
(3) UTI (urinary tract infection):  
(4) Acute kidney injury superimposed on CKD:  
(5) Diarrhea:  
(6) Diabetes mellitus:  
(7) Afib:  
(8) COPD (chronic obstructive pulmonary disease):  
(9) Depression:  
  
Plan  
Bilateral hydronephrosis, greater on the right  
Complicated nephrolithiasis  
UTI  
CULLEN superimposed on CKD stage IV?GFR has ranged from 20-35 from 9734-3131, 
current   
GFR 21, last checked in 2023 was 31  
? Gentle IV hydration?normal saline at 50cc/hr  
? Continue ceftriaxone  
? No urology consult at this time, waiting for bed at [facility]  
  
Diarrhea?patient states watery diarrhea for the last 5 months  
? Obtain sample for C. difficile if patient continues to have watery diarrhea  
  
Chronic conditions  
T2DM?fingersticks every 6 hours, SSI C, hold oral antidiabetics  
A-fib?currently sinus rhythm with first-degree block, EKG in am, QTc on 
admission   
504, watch QTc for Sotalol  
COPD?patient wears oxygen at 3 L nasal cannula at all times at home, resume   
homemeds?Spiriva, Symbicort, albuterol inhaler as needed, albuterol nebulized as
  
needed  
Depression?resume home meds?duloxetine  
  
DVT PPx-SCDs  
Diet order-NPO ice chips and meds  
CODE STATUS-full code as discussed with patient  
  
  
IP vs OBS Justification  
Based on differential dx, clinical care plan, and risk of adverse events, if   
untreated, in my clinical judgement this patient requires an acute care setting 
as:   
INPATIENT because of an expectation of an over 2 midnight stay.  
Estimated length of stay (# of days): 3  
  
  
  
  
Attending note  
I have personally seen and examined patient. I discussed case with NPP and agree
with   
plan as noted above. In short patient came to the emergency department because 
of   
diarrhea. Emergency department work-up did reveal patientto have complicated   
nephrolithiasis. Local urology was consulted from the emergency department who   
recommended transfer to a tertiary care for possible advanced surgical 
techniques.   
Patient has been started on Rocephin. Patient has been accepted to the [facility]. We will transfer there once patient has a bed.  
Documented By: ALIZA Luna 23  
Signed By: <Electronically signed by ALIZA Moss>  
23 042  
<Electronically signed by Sulaiman Shine DO>  
23 2203  
   
  
[facility]  
Work Phone: [phone]- Telephone encounter Note* Telephone 
  Encounter - Ramo Dexter MD - 2023 2:35 PM EDTFormatting of this note 
  might be different from the original.  
51 y/o with obstructing stones, bilateral hydronephrosis. CULLEN on CKD. Has hx of 
kidney stones. Alsocomplicated UTI. They spoke to urology who recommended 
outpatient follow up but provider at [facility] was not comfortable with dc. 
Patient will come for medical admission with urology consult.  
  
Ramo Conteh MD  
  
Electronically signed by Ramo Dexter MD at 2023 2:39 PM EDT  
  
[id number]- Miscellaneous Notes* Telephone Encounter - Ramo Dexter MD - 2023 2:35 PM EDTFormatting of this note might be different from the
  original.  
51 y/o with obstructing stones, bilateral hydronephrosis. CULLEN on CKD. Has hx of 
kidney stones. Alsocomplicated UTI. They spoke to urology who recommended 
outpatient follow up but provider at [facility] was not comfortable with dc. 
Patient will come for medical admission with urology consult.  
  
Ramo Conteh MD  
  
Electronically signed by Ramo Dexter MD at 2023 2:39 PM EDT  
  
documented in this [id number]- Progress note  
  
  
  
  
                                        Author              Brisa Acevedo  
[facility]  
2023 10:20am  
   
                                        Note Date/Time      2023 11:  
12am  
   
                                                    [facility]  
ENTER  
[address] [place] [address]  
  
Hospitalist Progress Note  
Signed  
  
Patient: Cris Hearn MR#: [id number]  
[id number]  
: 1970 Acct:[id number]  
  
Age/Sex: 52 / F Adm Date:   
3  
Loc: 3T Room: [address] [place] [address] Type: DIS IN  
Attending Dr: Brisa Acevedo MD  
  
Copies to: ~  
  
  
Date of Service:  
2023  
  
  
Subjective  
Subjective  
Narrative:  
Patient seen and examined. She is sleepy on exam. Awakens and interactive but 
closes   
her eyes. Maintained on chronic O2 at her baseline 3L. Offers no respiratory   
complaints. Has been on Doxy for left breast wound, recurrent skin infections. 
Denies   
abdominal pain currently, states was bilateral lower anterior abdomen and right   
flank. Hx of kidney stones. Reports ongoing watery diarrhea, hx of cdiff, had 
another   
episode this morning, has daily variable number of stools. No cramping. Taking 
ice   
chips and had watery emesis this AM per her report. Denies hematuria, did report
  
sensation of difficulty urinating prior to admit, urinating ok this AM. No 
chills.  
  
Exam  
Physical Exam  
Vital Signs:  
  
  
  
  
Temp Pulse Resp BP Pulse Ox O2 Del Method O2 Flow Rate  
  
98.0 F 77 18 112/75 97 Nasal Cannula 2  
  
23 08:00 23 08:00 23 08:00 23 08:00 23 08:00 
23   
08:00 23 08:00  
  
  
  
Narrative:  
CONST- drowsy but arouses and follows commands for exam and answers questions, 
in   
bed, no distress at rest  
CARD- RRR no abnormal heart tones  
PULM- shallow, dimin without wheeze or rhonchi, O2 3LNC (chronic)  
ABD- S/NT, NABS, morbid obesity  
EXTREM- no edema BLE, calves nontender  
  
Objective  
Lab Results  
  
23 20:15  
  
23 20:15  
  
  
Meds  
Allergies and Active Meds  
Allergies  
  
aspirin Allergy (Verified 23 19:11)  
Hives  
diphenhydramine [From Benadryl] Allergy (Verified 23 19:11)  
Swelling  
ibuprofen [From Advil] Allergy (Verified 23 19:11)  
Hives  
naproxen [From Aleve] Allergy (Verified 23 19:11)  
Hives  
azithromycin Adverse Reaction (Verified 23 19:11)  
Flushing  
  
  
Active Meds:  
Active Medications  
  
  
  
Generic Name Dose Route Start Last Admin  
  
Trade Name Freq PRN Reason Stop Dose Admin  
  
Acetaminophen 1,000 mg 23 03:09  
  
Acetaminophen 500 Mg Tablet PO 24 03:08  
  
Q6HR PRN  
  
Pain Scale 1 - 3 or fever  
  
Albuterol 2.5 mg 23 03:15  
  
Albuterol Neb 2.5 Mg/3 Ml Vial.Neb INHALATION 24 03:14  
  
Q3H PRN  
  
Shortness Of Breath  
  
Albuterol 2 puff 23 03:15  
  
Albuterol Hfa 60 Puff/8 Gram Inhaler INHALATION 24 03:14  
  
Q6H PRN  
  
Shortness Of Breath  
  
Amitriptyline HCl 50 mg 23 21:00  
  
Amitriptyline 50 Mg Tablet PO 24 20:59  
  
QPM HAMILTON  
  
Ammonium Lactate 1 applic 23 09:00 23 08:12  
  
Ammonium Lactate 12% Lot 226 Gm Bottle TOPICAL 24 08:59 1 applic  
  
BID HAMILTON Administration  
  
Budesonide/Formoterol Fumarate 2 puff 23 09:00 23 08:27  
  
Budesonide/Formoterol 160-4.5 Mcg 60 Puff/6 Gm Hfa.Aer.Ad INHALATION 24 
08:59 2   
puff  
  
BID HAMILTON Administration  
  
Dextrose 0 gm 23 03:14  
  
Dextrose 50% In Water 25 Gm/50 Ml Syringe IV-PUSH 24 03:13  
  
PRN PRN  
  
Hypoglycemia  
  
Doxycycline Hyclate 100 mg 08/01/23 09:00 23 08:12  
  
Doxycycline Hyclate 100 Mg Tablet  mg  
  
DAILY HAMILTON Administration  
  
Duloxetine HCl 60 mg 23 09:00 23 08:12  
  
Duloxetine 60 Mg Capsule.Dr KENNEY 24 08:59 60 mg  
  
QAM HAMILTON Administration  
  
Fluticasone Propionate 2 spray 23 04:20  
  
Fluticasone Propionate Spray 120 Spray/16 Gm Bottle INTRANASAL 24 04:19  
  
DAILY PRN  
  
Allergy Symptoms  
  
Glucose 0 gm 23 03:14  
  
Dextrose 40% Gel 15 Gm Tube PO 24 03:13  
  
PRN PRN  
  
Hypoglycemia  
  
Hydromorphone HCl 0.5 mg 23 03:09  
  
Hydromorphone 0.5 Mg/0.5 Ml Syringe IV-PUSH  
  
Q4H PRN  
  
Pain Scale 8 - 10  
  
Sodium Chloride 1,000 mls @ 50 mls/hr 23 03:15 23 03:41  
  
0.9% Sodium Chloride 1,000 Ml IV 24 03:14 50 mls/hr  
  
.Q20H HAMILTON Administration  
  
Ceftriaxone Sodium 1 gm in 50 mls @ 100 mls/hr 23 01:00  
  
Rocephin IV  
  
Q24H HAMILTON  
  
Insulin Aspart 0 units 23 06:00 23 06:54  
  
Insulin Aspart 300 Units/3 Ml Insuln.Pen SUBCUT 24 05:59 Not Given  
  
Q6HR [facility]  
  
Protocol  
  
Ipratropium Bromide 0.5 mg 23 08:00 23 08:27  
  
Ipratropium Bromide 0.5 Mg/2.5 Ml Vial.Neb INHALATION 24 07:59 0.5 mg  
  
QID.RESP HAMILTON Administration  
  
Ondansetron HCl 4 mg 23 03:09 23 03:42  
  
Ondansetron 4 Mg/2 Ml Vial IV-PUSH 24 03:08 4 mg  
  
Q6H PRN Administration  
  
Nausea And Vomiting  
  
Pantoprazole Sodium 40 mg 23 07:30 23 06:54  
  
Pantoprazole 40 Mg Tablet. PO 24 07:29 40 mg  
  
BID.AC.BKFAST.SUPPER HAMILTON Administration  
  
Potassium Chloride 10 meq 23 09:00 23 08:12  
  
Potassium Chloride Er 10 Meq Capsule.Er PO 24 08:59 10 meq  
  
DAILY HAMILTON Administration  
  
Pravastatin Sodium 40 mg 23 21:00  
  
Pravastatin 40 Mg Tablet PO 24 20:59  
  
QPM HAMILTON  
  
Prochlorperazine Edisylate 10 mg 23 03:09 23 10:03  
  
Prochlorperazine Edisylate 10 Mg/2 Ml Vial IV-PUSH 24 03:08 10 mg  
  
Q4H PRN Administration  
  
Nausea And Vomiting  
  
Sodium Chloride 0 ml 23 06:00 23 06:54  
  
Sodium Chloride 0.9 % 10 Ml Syringe IV-PUSH 24 05:59 10 ml  
  
QSHIFT HAMILTON Administration  
  
Sotalol HCl 120 mg 23 09:00  
  
Sotalol 120 Mg Tablet PO 24 08:59  
  
QAM [facility]  
  
  
  
  
A&P - Hospitalist  
Assessment/Plan  
(1) Staghorn calculus:  
(2) Bilateral hydronephrosis:  
(3) UTI (urinary tract infection):  
(4) Acute kidney injury superimposed on CKD:  
(5) Diarrhea:  
(6) Wound of left breast:  
(7) GERD (gastroesophageal reflux disease):  
(8) Chronic respiratory failure with hypoxia:  
(9) Oxygen dependent:  
(10) Diabetes mellitus:  
  
Plan  
  
Bilateral Staghorn renal calculi with moderate right hydronephrosis  
Left UPJ calculus 7x4mm with mild left hydronephrosis  
Hx prior nephrolithiases s/p lithotripsy  
UTI  
CULLEN on CKD3  
-CT abd/pelv- bilateral staghorn calculi greater on right, right perinephric 
edema   
and stranding with moderate hydronephrosis, mild right renal hilar/ 
retroperiteal   
lymphadenopathy, left UVJ calculus measuring up to 7mm, mild left hydronephrosis  
-WBC 19.2 on admit, afebrile. Admitting Cr 2.58 (baseline 1.5-1.7), UA turbid/   
protein/ glucose/ 2+blood/ nitrites/ 4+leukester/ WBCs/ bacteria- culture 
pending  
-Ceftriaxone  
-symptom management, IVF  
-Invokana and metformin on hold with renal function. Apixaban on hold with 
possible   
intervention  
-accepted for transfer [facility] urology Dr Badillo- need for nephrostomy. transfer line
  
advised  few  days probable for bed availability. Currently seeking alternative   
tertiary care options  
  
N/V/D  
-stool specimen, diarrhea reported ongoing for months. Does have hx cdiff May 
2022  
-poor intake with N/V past 4-5days  
-symptomatic treatment, ice chips, IVF  
  
Left breast wound  
-culture pending  
-wound care  
-has been prescribed 90 day of Doxy per PCP 23 with recurrent skin 
infections  
  
Chronic Conditions  
1. COPD on home O2, KWAKU, Tobacco use- does not wear home CPAP- did not like, 
wears   
3LNC, prn Albuterol, Budesonide/ formoterol, Fluticasone nasal, Ipratropium,  
2. Afib on chronic anticoagulation, HLD- Pravastatin, Sotalol. Currently SR. 
Apixaban   
on hold  
3. T2DM- SSI coverage and fingerstick. Invokana, Metformin and Trulicity on hold  
4. Depression- Duloxetine, Amitriptyline  
5. GERD, hx esophageal stricture s/p dilation- pantoprazole  
6. MS- not on any chronic meds and does not follow with neurology  
  
  
  
Documented By: JO Dean   
3 1051  
Signed By: <Electronically signed by JO Kirby>  
23 1422  
<Electronically signed by Brisa Acevedo MD>  
23 1020  
   
  
[facility]  
Work Phone: [phone]- Evaluation note*   
  
                      Encounter Date Diagnosis  Assessment Notes Treatment Notes  
 Treatment   
Clinical Notes  
   
                                        21 Mar, 2023        Chronic respiratory   
failure (ICD-10 -   
J96.10)                                                       
   
                                        21 Mar, 2023        COPD (chronic   
obstructive pulmonary   
disease) (ICD-10 -   
J44.9)                                                        
   
                                        21 Mar, 2023        Obesity   
hypoventilation   
syndrome (ICD-10 -   
E66.2)                                                        
  
  
[facility]  
Other Phone: [phone]- Evaluation note*   
  
                      Encounter Date Diagnosis  Assessment Notes Treatment Notes  
 Treatment   
Clinical Notes  
   
                                        10 Gorge, 2023        Chronic respiratory   
failure (ICD-10 -   
J96.10)                                                       
   
                                        10 [place], 2023        COPD (chronic   
obstructive pulmonary   
disease) (ICD-10 -   
J44.9)                                                        
   
                                        10 [place], 2023        Obesity   
hypoventilation   
syndrome (ICD-10 -   
E66.2)                                                        
  
  
[employer] [facility] [employer] [facility]  
Other Phone: [phone] summary  
  
  
  
  
                                        Author              Amanda Murillo  
[facility]  
2023 3:43pm  
   
                                        Note Date/Time      2023 3  
:43pm  
   
                                                    [facility]  
ENTER  
[address] [place], [place] [id number]  
  
Discharge Summary  
Signed  
  
Patient: Cris Hearn MR#: [id number]  
[id number]  
: 1970 Acct:[id number]  
  
Age/Sex: 52 / F Adm Date: 10/17/2  
3  
Loc:  Room: [address] [place], [place] [id number]  
  
Attending Dr: Amanda Murillo MD  
  
Copies to: [facility] SERVICES  
Amanda Murillo MD~  
  
  
Providers  
Date of Discharge: 10/20/23  
Discharging Provider: Amanda Murillo  
Primary Care Provider: [facility] [place]  
Consults:  
  
  
10/17/23 10:11  
Consult to Infectious Diseases Routine  
  
10/18/23 07:23  
Consult to Nephrology Routine  
  
  
  
  
Discharge Diagnosis  
(1) Complicated UTI (urinary tract infection):  
(2) Bacteremia:  
Final Diagnosis  
Final Discharge Diagnosis:  
Severe sepsis secondary to acute pyelonephritis  
E.coli ESBL bacteremia secondary to complicated UTI  
Bilateral Staghorn renal calculi s/p b/l nephrostomy tubes in 2023 at [facility]  
Hx prior nephrolithiases s/p lithotripsy  
CULLEN on CKD stage 3  
Mild hyponatremia- resolved  
  
Summary  
Hospital Course  
Hospital course:  
Patient is a 52-year-old female with medical history of A-fib, COPD, T2DM, HLD,   
bilateral staghorn kidney stones s/p bilateral nephrostomy tube placement 
recently in   
August done at [facility], She follows with Dr. Iniguez there for this, presented to 
the   
emergency due to left-sided flank pain over the past couple of days associated 
with   
nausea and vomiting over the past 2 days. Patient denies any fever, chills, 
diarrhea,   
constipation, chest pain. Patient noted also some purulent  milky  discharge 
from her   
left nephrostomy tube. Stated that her dressings have been soaked for the past 
few   
days. She lives with her son and his fianc?e at home who takes care of her 
drainage   
and change his dressings. Onpresentation she had severe pain almost 8 out of 10 
with   
left CVA tenderness. In the ER, sepsis protocol was initiated, patient received 
IV   
fluids, blood cultures collected. Also culture collected from the drainage 
around   
nephrostomytube. Patient was given Vanco and Zosyn in the ER. Given Dilaudid for
pain   
andZofran for nausea. Noted to have leukocytosis with WBC 19.2, BMP showing 
elevated   
creatinine from her baseline. Lactic acid within normal limits. Urinalysis 
suggestive   
of infection. ER staff communicated with [facility] Dr. Reinoso who feels the
  
patient can be taken care of here in our facility with IV antibiotics and   
conservative management. Patient was admitted here for further evaluation and   
management.  
  
During hospital course, patient was found to have E. coli bacteremia, final 
cultures   
showed E.coli ESBL , fortunately she was susceptible to meropenem and vancomycin
  
which has been initiated since admission. Patient responded well to treatment.   
Remained afebrile during hospital course, gradual downtrending of WBC with 
remarkable   
clinical improvement in her symptoms. Denies any flank pains. Urine color become
more   
clear via nephrostomy tubes. ID was consulted and input appreciated. Decision 
per ID   
recommendations to continue with ertapenem and vancomycin. PICC line was 
inserted to   
continue antibiotics for the duration as directed per ID. Arrangements have been
made   
for that by . Nephrology was consulted for CULLEN on CKD. Kidney 
function   
has improvedand has been stable. Patient is clinically feeling better and 
remained   
hemodynamically stable and feeling comfortable with discharge planning at this 
time.   
Discussed with patient at bedside, all question answered, patient is comfortable
and   
in agreement with discharge planning at this time. Continue to follow-up with 
her   
urology as outpatient. Continue to follow-up with PCP.  
  
Patient has multiple complex medical issues as listed above and others that are 
not   
listed. All appear to be stable at this time. Patient is feeling significantly 
better   
and feeling comfortable with this plan of discharge. I do not have any clear or   
strong clinical justification to extend inpatient hospitalization. Patient 
however   
will require close and the frequent monitoringas well as additional work-up,   
investigation and therapeutic intervention that could take place from this point
on   
post discharge. That is to prevent relapse,decompensation, rehospitalization and
  
other medical implications. Outpatient follow up as directed for continuity of 
care.   
Verbal and written discharge instructions will be provided to the patient.  
Condition  
Condition at Discharge: Stable  
Time Spent with Patient  
Time spent providing/coordinating discharge services (# min): 45  
Surgeries and Procedures  
  
  
Operation Date: 10/20/23 12:50  
Actual Procedures  
p IR PICC Line Insertion(Left) - Jonathan Zepeda MD  
  
  
  
Diagnostic Studies  
Completed and Pending Studies  
Pending studies at discharge:  
  
  
10/20/23 23:30  
Creatinine [CHEM] Timed  
Vancomycin,Random [TOX] Timed  
  
10/21/23 05:00  
Basic Metabolic Panel [CHEM] IN AM  
Complete Blood Count Auto Diff IN AM  
  
  
  
Labs on day of discharge:  
  
  
10/20/23 11:23: POC Glucose 198  
10/20/23 07:16: PHA Creatinine Clear 30.99, Sodium 140, Potassium 3.6, Chloride 
107,   
Carbon Dioxide 28.5, Anion Gap 8.1, BUN 20, Creatinine 2.77 H, Est GFR (CKD-EPI)
   
19.836, Glucose 110 H, Calcium 8.8  
10/20/23 07:16: Corrected WBC 9.6, Uncorrected WBC Count 9.6, RBC 3.32 L, Hgb 
9.4 L,   
Hct 28.6 L, MCV 86.2, MCH 28.2, MCHC 32.7, RDW 15.0, Plt Count 353, MPV 8.1, 
Neut %   
(Auto) 61.5, Lymph % (Auto) 22.0, Mono % (Auto) 6.3, Eos % (Auto) 9.7, Baso % 
(Auto)   
0.5, Nucleat RBC Rel Count 0.1, Neut # (Auto) 5.9, Lymph # (Auto) 2.1, Mono # 
(Auto)   
0.6, Eos # (Auto) 0.9 H, Baso # (Auto) 0.1  
10/20/23 06:51: POC Glucose 132  
10/19/23 20:25: POC Glucose 128  
10/19/23 16:12: POC Glucose 276  
  
  
  
  
Exam  
Physical Exam  
Vital Signs:  
  
  
  
  
Temp Pulse Resp BP Pulse Ox O2 Del Method O2 Flow Rate  
  
98.1 F 78 16 118/71 99 Nasal Cannula 3  
  
10/20/23 15:31 10/20/23 15:31 10/20/23 15:31 10/20/23 15:31 10/20/23 15:31 
10/20/23   
15:31 10/20/23 15:31  
  
  
  
Narrative:  
Const  
General: cooperative, comfortable appearing, morbidly obese  
  
HEENT  
Normal oropharyngeal mucosa without any ulcers or exudates  
  
Eyes: Conjunctiva normal  
  
Pulmonary  
Auscultation: diminished breath sounds , no crackles, no wheezes  
  
Cardiovascular  
Rate: normal rate  
Rhythm: regular rhythm  
Heart Sounds: S1 normal, S2 normal and no murmurs  
  
GI  
Inspection: non-distended  
Palpation: soft, not firm and nontender. No rigidity or rebound.  
No CVA tenderness on left flank  
Bilateral nephrostomy tubes noted, clear urine drainage noted  
  
Neuro  
General: alert, awake and oriented x3. No obvious new focal deficit  
  
Musculoskeletal: normal range of motion  
  
Extrem  
General: no cyanosis, no pedal edema  
  
Psych  
Appearance: appropriate affect. Grossly normal  
  
  
Discharge Plan  
Discharge Plan  
Patient Disposition: Home  
  
Diet: Regular  
  
Additional Instructions:  
Wound care:  
- Resume previous wound care orders to Bilateral nephrostomy tube sites.  
- Twice daily - Theraworx protect to Bilateral breast, abdominal, and groin fold
   
redness  
-Continue antibiotics as directed  
-Continue B12 supplements  
-Continue home medications as before  
  
Instructions: Heart Failure, Adult  
  
Prescriptions:  
New  
ertapenem 1 gram Recon Soln  
1 g IV Q24H 14 Days Qty: 14 0RF  
vancomycin 1.5 gram recon soln  
1.5 g IV Q24H 14 Days Qty: 14 0RF  
cyanocobalamin (vitamin B-12) 1,000 mcg Tablet  
1,000 mcg PO QAM Qty: 30 0RF  
  
Continued  
albuterol sulfate 2.5 mg /3 mL (0.083 %) Solution For Nebulization  
2.5 mg inhalation Q3H PRN (Reason: Shortness Of Breath) Qty: 15 0RF  
amitriptyline 50 mg Tablet  
50 mg PO QPM Qty: 30 0RF  
albuterol sulfate [Ventolin HFA] 90 mcg/actuation Hfa Aerosol Inhaler  
2 puff inhalation Q6H PRN (Reason: Shortness Of Breath) Qty: 2 0RF  
Invokana 100 mg Tablet  
100 mg PO QAM Qty: 30 0RF  
Hold Instructions: resumption determined by [facility] hospital  
pravastatin 40 mg Tablet  
40 mg PO QPM Qty: 30 0RF  
sotalol [Sotalol AF] 120 mg Tablet  
120 mg PO QAM Qty: 30 0RF  
Hold Instructions: resumption determined by [facility] hospital - prolonged QT  
omeprazole 20 mg Capsule,Delayed Release(Dr/Ec)  
20 mg PO BID Qty: 60 0RF  
Hold Instructions: Resume on 22.  
duloxetine 60 mg Capsule,Delayed Release(Dr/Ec)  
60 mg PO QAM Qty: 30 0RF  
metformin 500 mg tablet extended release 24 hr  
500 mg PO DAILY  
Hold Instructions: resumption determined by [facility] hospital  
Patient Comments:  
TAKE 1 TABLET BY MOUTH ONCE DAILY  
budesonide-formoterol [Symbicort] 160-4.5 mcg/actuation HFA aerosol inhaler  
2 puff INHALATION BID  
potassium chloride 10 mEq capsule, extended release  
10 meq PO DAILY Qty: 30 3RF  
furosemide 20 mg Tablet  
20 mg PO DAILY  
Eliquis 5 mg tablet  
5 mg PO BID  
Trulicity 0.75 mg/0.5 mL pen injector  
0.7 mg SUBCUT QWEEK  
Hold Instructions: resumption determined by [facility] hospital  
Patient Comments:  
INJECT THE CONTENTS OF 1 PEN SUBCUTANEOUSLY ONCE WEEKLY AS DIRECTED  
Rx Instructions:  
ON thursday PER PT  
ergocalciferol (vitamin D2) [Vitamin D2] 1,250 mcg (50,000 unit) capsule  
50,000 unit PO QWEEK  
Hold Instructions: resumption determined by [facility]  
Rx Instructions:  
thursday  
Spiriva Respimat 2.5 mcg/actuation mist  
2 puff INHALATION DAILY  
Patient Comments:  
INHALE TWO (2) PUFFS BY MOUTH ONCE DAILY  
multivitamin Tablet  
1 tab PO DAILY  
Hold Instructions: resumption determined by [facility] hospital  
fluticasone propionate 50 mcg/actuation spray,suspension  
2 spray INTRANASAL DAILY PRN (Reason: Allergy Symptoms)  
Patient Comments:  
SPRAY 2 SPRAY INTO EACH NOSTRIL ONCE DAILY  
ammonium lactate 12 % lotion  
1 applic TOPICAL DAILY PRN (Reason: Dry Skin)  
Patient Comments:  
APPLY 1 APPLICATION TOPICALLY TWICE DAILY  
  
Follow Up:  
[facility] [Outside] - 10/21/23 7:45 am (You have been scheduled for 
your   
outpatient IV infusion for tomorrow for the following time, please call to 
reschedule   
if needed.)  
[facility] [Primary Care Provider] - 10/24/23 9:30 am (You have   
beenscheduled for a follow up appointment for the following date and time, 
please   
call to reschedule if needed.)  
Roselia Chowdhury MD [Active Staff] - (Office is currently closed, please call 
Monday to   
schedule a follow up appointment with Nephrology.)  
  
  
  
Documented By: Amanda Murillo MD 10/20/23 15  
38  
Signed By: <Electronically signed by Amanda Murillo MD>  
10/20/23 1495  
   
  
[facility]  
Work Phone: [phone] noteNo assessment information available
[facility]  
Work Phone: [phone] note*   
  
                          Diagnosis    Onset Date   Resolution   Status  
   
                          Acute kidney injury superimposed on CKD                 
            acute  
   
                          Afib                                   acute  
   
                          Bilateral hydronephrosis                           acu  
te  
   
                          Depression                             acute  
   
                          Diarrhea                               acute  
   
                          GERD (gastroesophageal reflux disease)                  
           acute  
   
                          Staghorn calculus                           acute  
   
                          Struvite kidney stones                           acute  
   
                          UTI (urinary tract infection)                           
  acute  
   
                          Wound of left breast                           acute  
   
                          Chronic respiratory failure with hypoxia                
             chronic  
   
                          COPD (chronic obstructive pulmonary disease)            
                 chronic  
   
                          Diabetes mellitus                           chronic  
   
                          Oxygen dependent                           chronic  
  
  
[facility]  
Work Phone: [phone] note*   
  
                                                    Diagnosis  
   
                                                      
  
  
Acute pyelonephritis- Primary  
  
  
Acute pyelonephritis without lesion of renal medullary necrosis  
   
                                                      
  
  
Obstructive nephropathy  
  
  
Other specified disorder of kidney and ureter  
   
                                                      
  
  
Atrial fibrillation, unspecified type (HCC)  
   
                                                      
  
  
CULLEN (acute kidney injury) (HCC)  
  
  
Acute kidney failure, unspecified  
   
                                                      
  
  
Acute pyelonephritis  
  
  
Acute pyelonephritis without lesion of renal medullary necrosis  
   
                                                      
  
  
Sepsis with acute renal failure without septic shock, due to unspecified 
organism,   
unspecified acute renal failure type (HCC)  
   
                                                      
  
  
[place]  
  
  
Other general symptoms  
   
                                                      
  
  
Atrioventricular block, first degree  
  
  
First degree atrioventricular block  
   
                                                      
  
  
Abnormal electrocardiogram (ECG) (EKG)  
   
                                                      
  
  
Type 2 diabetes mellitus without complication, without long-term current use of   
insulin (HCC)  
   
                                                      
  
  
Recurrent major depressive disorder, in partial remission (HCC)  
   
                                                      
  
  
Obstructive nephropathy  
  
  
Other specified disorder of kidney and ureter  
   
                                                      
  
  
Atrial fibrillation, unspecified type (HCC)  
   
                                                      
  
  
CULLEN (acute kidney injury) (HCC)  
  
  
Acute kidney failure, unspecified  
   
                                                      
  
  
Sepsis with acute renal failure without septic shock, due to unspecified 
organism,   
unspecified acute renal failure type (HCC)  
   
                                                      
  
  
Recurrent major depressive disorder, in partial remission (HCC)  
   
                                                      
  
  
Type 2 diabetes mellitus without complication, without long-term current use of   
insulin (HCC)  
  
[facility] this encounter  
[facility]   
  
                                                    Diagnosis  
   
                                                      
  
  
Obstructive nephropathy- Primary  
  
  
Other specified disorder of kidney and ureter  
  
documented in this encounter  
[facility]   
  
                                                    Diagnosis  
   
                                                      
  
  
Renal stones- Primary  
  
  
Calculus of kidney  
   
                                                      
  
  
Renal stones- Primary  
  
  
Calculus of kidney  
   
                                                      
  
  
Renal stones  
  
  
Calculus of kidney  
  
documented in this encounter  
[facility]   
  
                                                    Diagnosis  
   
                                                      
  
  
Renal stones- Primary  
  
  
Calculus of kidney  
   
                                                      
  
  
Renal stones- Primary  
  
  
Calculus of kidney  
   
                                                      
  
  
Renal stones  
  
  
Calculus of kidney  
  
documented in this encounter  
[facility]   
  
                                                    Diagnosis  
   
                                                      
  
  
Renal stones- Primary  
  
  
Calculus of kidney  
   
                                                      
  
  
Renal stones- Primary  
  
  
Calculus of kidney  
   
                                                      
  
  
Renal stones  
  
  
Calculus of kidney  
  
documented in this encounter  
[facility]   
  
                                                    Diagnosis  
   
                                                      
  
  
Renal stones- Primary  
  
  
Calculus of kidney  
   
                                                      
  
  
Renal stones- Primary  
  
  
Calculus of kidney  
   
                                                      
  
  
Renal stones  
  
  
Calculus of kidney  
  
documented in this encounter  
[facility]   
  
                                                    Diagnosis  
   
                                                      
  
  
Renal stones- Primary  
  
  
Calculus of kidney  
   
                                                      
  
  
Renal stones- Primary  
  
  
Calculus of kidney  
   
                                                      
  
  
Renal stones  
  
  
Calculus of kidney  
  
documented in this encounter  
[facility]   
  
                                                    Diagnosis  
   
                                                      
  
  
Renal stones- Primary  
  
  
Calculus of kidney  
   
                                                      
  
  
Renal stones- Primary  
  
  
Calculus of kidney  
   
                                                      
  
  
Renal stones  
  
  
Calculus of kidney  
   
                                                      
  
  
Renal stones  
  
  
Calculus of kidney  
  
documented in this encounter  
[facility]   
  
                                                    Diagnosis  
   
                                                      
  
  
Renal stones- Primary  
  
  
Calculus of kidney  
   
                                                      
  
  
Renal stones  
  
  
Calculus of kidney  
   
                                                      
  
  
Renal stones  
  
  
Calculus of kidney  
  
documented in this encounter  
[facility]   
  
                          Diagnosis    Onset Date   Resolution   Status  
   
                          Acute kidney injury superimposed on CKD                 
            acute  
   
                          Afib                                   acute  
   
                          Bilateral hydronephrosis                           acu  
te  
   
                          Depression                             acute  
   
                          Diarrhea                               acute  
   
                          GERD (gastroesophageal reflux disease)                  
           acute  
   
                          Staghorn calculus                           acute  
   
                          Struvite kidney stones                           acute  
   
                          UTI (urinary tract infection)                           
  acute  
   
                          Wound of left breast                           acute  
   
                          Chronic respiratory failure with hypoxia                
             chronic  
   
                          COPD (chronic obstructive pulmonary disease)            
                 chronic  
   
                          Diabetes mellitus                           chronic  
   
                          Oxygen dependent                           chronic  
   
                          Pyelonephritis                           acute  
  
  
[facility]  
Work Phone: [phone] note*   
  
                          Diagnosis    Onset Date   Resolution   Status  
   
                          Acute kidney injury superimposed on CKD                 
            acute  
   
                          Afib                                   acute  
   
                          Bilateral hydronephrosis                           acu  
te  
   
                          Depression                             acute  
   
                          Diarrhea                               acute  
   
                          GERD (gastroesophageal reflux disease)                  
           acute  
   
                          Staghorn calculus                           acute  
   
                          Struvite kidney stones                           acute  
   
                          UTI (urinary tract infection)                           
  acute  
   
                          Wound of left breast                           acute  
   
                          Chronic respiratory failure with hypoxia                
             chronic  
   
                          COPD (chronic obstructive pulmonary disease)            
                 chronic  
   
                          Diabetes mellitus                           chronic  
   
                          Oxygen dependent                           chronic  
   
                          (HFpEF) heart failure with preserved ejection fraction  
                           acute  
   
                          Acute kidney injury superimposed on CKD                 
            acute  
   
                          Acute pyelonephritis                           acute  
   
                          Bacteremia                             acute  
   
                          Chronic kidney disease, stage IV (severe)               
              acute  
   
                          Complicated UTI (urinary tract infection)               
              acute  
   
                          Pyelonephritis                           acute  
   
                          Severe sepsis                           acute  
   
                          Anemia                                 resolved  
  
  
[facility]  
Work Phone: [phone] note*   
  
                          Diagnosis    Onset Date   Resolution   Status  
   
                          (HFpEF) heart failure with preserved ejection fraction  
                           acute  
   
                          Acute kidney injury superimposed on CKD                 
            acute  
   
                          Acute pyelonephritis                           acute  
   
                          Bacteremia                             acute  
   
                          Chronic kidney disease, stage IV (severe)               
              acute  
   
                          Complicated UTI (urinary tract infection)               
              acute  
   
                          Pyelonephritis                           acute  
   
                          Severe sepsis                           acute  
   
                          Anemia                                 resolved  
  
  
[facility]  
Work Phone: [phone] note*   
  
                                                    Diagnosis  
   
                                                      
  
  
Renal stones- Primary  
  
  
Calculus of kidney  
   
                                                      
  
  
Preop testing- Primary  
  
  
Preoperative examination, unspecified  
   
                                                      
  
  
Renal stones  
  
  
Calculus of kidney  
  
documented in this encounter  
[facility]   
  
                                                    Diagnosis  
   
                                                      
  
  
Renal stones- Primary  
  
  
Calculus of kidney  
   
                                                      
  
  
Renal stones- Primary  
  
  
Calculus of kidney  
   
                                                      
  
  
Renal stones  
  
  
Calculus of kidney  
  
documented in this encounter  
[facility]   
  
                                                    Diagnosis  
   
                                                      
  
  
Renal stones- Primary  
  
  
Calculus of kidney  
   
                                                      
  
  
Renal stones- Primary  
  
  
Calculus of kidney  
   
                                                      
  
  
Renal stones  
  
  
Calculus of kidney  
  
documented in this encounter  
[facility]   
  
                                                    Diagnosis  
   
                                                      
  
  
Renal stones- Primary  
  
  
Calculus of kidney  
   
                                                      
  
  
Renal stones- Primary  
  
  
Calculus of kidney  
   
                                                      
  
  
Renal stones  
  
  
Calculus of kidney  
  
documented in this encounter  
[facility]   
  
                                                    Diagnosis  
   
                                                      
  
  
Renal stones- Primary  
  
  
Calculus of kidney  
   
                                                      
  
  
Renal stones- Primary  
  
  
Calculus of kidney  
   
                                                      
  
  
Renal stones  
  
  
Calculus of kidney  
  
documented in this encounter  
[facility]   
  
                                                    Diagnosis  
   
                                                      
  
  
Renal stones- Primary  
  
  
Calculus of kidney  
   
                                                      
  
  
Renal stones- Primary  
  
  
Calculus of kidney  
   
                                                      
  
  
Renal stones  
  
  
Calculus of kidney  
  
documented in this encounter  
[facility]   
  
                                                    Diagnosis  
   
                                                      
  
  
Renal stones- Primary  
  
  
Calculus of kidney  
   
                                                      
  
  
Renal stones- Primary  
  
  
Calculus of kidney  
   
                                                      
  
  
Renal stones  
  
  
Calculus of kidney  
  
documented in this encounter  
[facility]   
  
                                                    Diagnosis  
   
                                                      
  
  
Renal stones- Primary  
  
  
Calculus of kidney  
   
                                                      
  
  
Preop testing- Primary  
  
  
Preoperative examination, unspecified  
   
                                                      
  
  
Atrioventricular block, first degree  
  
  
First degree atrioventricular block  
   
                                                      
  
  
Abnormal electrocardiogram (ECG) (EKG)  
   
                                                      
  
  
Renal stones  
  
  
Calculus of kidney  
  
documented in this encounter  
[facility]   
  
                                                    Diagnosis  
   
                                                      
  
  
Renal stones- Primary  
  
  
Calculus of kidney  
  
documented in this encounter  
[facility]   
  
                                                    Diagnosis  
   
                                                      
  
  
Renal stones- Primary  
  
  
Calculus of kidney  
   
                                                      
  
  
Renal stones- Primary  
  
  
Calculus of kidney  
   
                                                      
  
  
Renal stones  
  
  
Calculus of kidney  
  
documented in this encounter  
[facility]   
  
                          Diagnosis    Onset Date   Resolution   Status  
   
                          Afib                                   acute  
   
                          CULLEN (acute kidney injury)                           ac  
[place]  
   
                          Complicated UTI (urinary tract infection)               
              acute  
   
                          Hypokalemia                            acute  
   
                          Severe sepsis                           acute  
   
                          Struvite kidney stones                           acute  
   
                          UTI (urinary tract infection)                           
  acute  
   
                          Diabetes mellitus                           [facility]  
Work Phone: [phone] note*   
  
                                                    Diagnosis  
   
                                                      
  
  
Renal stones- Primary  
  
  
Calculus of kidney  
   
                                                      
  
  
Renal stones- Primary  
  
  
Calculus of kidney  
   
                                                      
  
  
Renal stones  
  
  
Calculus of kidney  
  
documented in this encounter  
[facility]   
  
                                                    Diagnosis  
   
                                                      
  
  
Renal stones- Primary  
  
  
Calculus of kidney  
   
                                                      
  
  
Urinary tract infection without hematuria, site unspecified  
   
                                                      
  
  
Renal stones- Primary  
  
  
Calculus of kidney  
   
                                                      
  
  
Renal stones  
  
  
Calculus of kidney  
  
documented in this encounter  
[facility]   
  
                                                    Diagnosis  
   
                                                      
  
  
Renal stones- Primary  
  
  
Calculus of kidney  
   
                                                      
  
  
Renal stones- Primary  
  
  
Calculus of kidney  
   
                                                      
  
  
Urinary tract infection without hematuria, site unspecified  
   
                                                      
  
  
Renal stones  
  
  
Calculus of kidney  
  
documented in this encounter  
[facility]   
  
                                                    Diagnosis  
   
                                                      
  
  
Renal stones- Primary  
  
  
Calculus of kidney  
   
                                                      
  
  
Urinary tract infection without hematuria, site unspecified  
   
                                                      
  
  
Renal stones- Primary  
  
  
Calculus of kidney  
   
                                                      
  
  
Renal stones  
  
  
Calculus of kidney  
  
documented in this encounter  
[facility]   
  
                                                    Diagnosis  
   
                                                      
  
  
Renal stones- Primary  
  
  
Calculus of kidney  
   
                                                      
  
  
Renal stones- Primary  
  
  
Calculus of kidney  
   
                                                      
  
  
Urinary tract infection without hematuria, site unspecified  
   
                                                      
  
  
Renal stones  
  
  
Calculus of kidney  
  
documented in this encounter  
[facility]   
  
                                                    Diagnosis  
   
                                                      
  
  
Renal stones- Primary  
  
  
Calculus of kidney  
   
                                                      
  
  
Renal stones- Primary  
  
  
Calculus of kidney  
   
                                                      
  
  
Urinary tract infection without hematuria, site unspecified  
   
                                                      
  
  
Renal stones  
  
  
Calculus of kidney  
  
documented in this encounter  
[facility]   
  
                                                    Diagnosis  
   
                                                      
  
  
Renal stones- Primary  
  
  
Calculus of kidney  
   
                                                      
  
  
Renal stones  
  
  
Calculus of kidney  
   
                                                      
  
  
Renal stones  
  
  
Calculus of kidney  
  
documented in this encounter  
[facility]   
  
                                                    Diagnosis  
   
                                                      
  
  
Renal stones- Primary  
  
  
Calculus of kidney  
   
                                                      
  
  
Acute cystitis without hematuria- Primary  
  
  
Acute cystitis  
   
                                                      
  
  
Renal stones  
  
  
Calculus of kidney  
  
documented in this encounter  
[facility]   
  
                                                    Diagnosis  
   
                                                      
  
  
Renal stones- Primary  
  
  
Calculus of kidney  
   
                                                      
  
  
Preop testing- Primary  
  
  
Preoperative examination, unspecified  
   
                                                      
  
  
Renal stones  
  
  
Calculus of kidney  
  
documented in this encounter  
[facility]   
  
                                                    Diagnosis  
   
                                                      
  
  
Renal stones- Primary  
  
  
Calculus of kidney  
   
                                                      
  
  
Acute post-operative pain  
   
                                                      
  
  
Renal stones  
  
  
Calculus of kidney  
  
documented in this encounter  
[facility]   
  
                                                    Diagnosis  
   
                                                      
  
  
Obstructive nephropathy- Primary  
  
  
Other specified disorder of kidney and ureter  
  
documented in this encounter  
[facility]   
  
                          Diagnosis    Onset Date   Resolution   Status  
   
                          Obesity hypoventilation syndrome                        
     acute  
   
                          Chronic respiratory failure with hypoxia                
             chronic  
   
                          COPD (chronic obstructive pulmonary disease)            
                 [facility]  
Work Phone: [phone] note*   
  
                                                    Diagnosis  
   
                                                      
  
  
Renal stones- Primary  
  
  
Calculus of kidney  
  
documented in this encounter  
[facility]   
  
                                                    Diagnosis  
   
                                                      
  
  
Renal stones- Primary  
  
  
Calculus of kidney  
   
                                                      
  
  
Acute post-operative pain  
   
                                                      
  
  
Renal stones  
  
  
Calculus of kidney  
  
documented in this encounter  
[facility]  
Work Phone: [phone] note*   
  
                                                    Diagnosis  
   
                                                      
  
  
Renal stones- Primary  
  
  
Calculus of kidney  
   
                                                      
  
  
Renal stones- Primary  
  
  
Calculus of kidney  
   
                                                      
  
  
Renal stones  
  
  
Calculus of kidney  
  
documented in this encounter  
[facility]  
Work Phone: [phone] note*   
  
                                                    Diagnosis  
   
                                                      
  
  
Renal stones- Primary  
  
  
Calculus of kidney  
   
                                                      
  
  
Renal stones- Primary  
  
  
Calculus of kidney  
   
                                                      
  
  
Renal stones  
  
  
Calculus of kidney  
  
documented in this encounter  
[facility]  
Work Phone: [phone] note*   
  
                                                    Diagnosis  
   
                                                      
  
  
Renal stones- Primary  
  
  
Calculus of kidney  
   
                                                      
  
  
Pre-op evaluation- Primary  
  
  
Preoperative examination, unspecified  
   
                                                      
  
  
Renal stones  
  
  
Calculus of kidney  
  
documented in this encounter  
[facility]  
Work Phone: [phone] note*   
  
                                                    Diagnosis  
   
                                                      
  
  
Renal stones- Primary  
  
  
Calculus of kidney  
   
                                                      
  
  
Urinary tract infection without hematuria, site unspecified- Primary  
   
                                                      
  
  
Renal stones  
  
  
Calculus of kidney  
  
documented in this encounter  
[facility]  
Work Phone: [phone] note*   
  
                                                    Diagnosis  
   
                                                      
  
  
Renal stones- Primary  
  
  
Calculus of kidney  
   
                                                      
  
  
Urinary tract infection without hematuria, site unspecified- Primary  
   
                                                      
  
  
Renal stones  
  
  
Calculus of kidney  
   
                                                      
  
  
Renal stones  
  
  
Calculus of kidney  
  
documented in this encounter  
[facility]  
Work Phone: [phone] note*   
  
                                                    Diagnosis  
   
                                                      
  
  
Renal stones- Primary  
  
  
Calculus of kidney  
   
                                                      
  
  
Bleeding- Primary  
  
  
Hemorrhage, unspecified  
   
                                                      
  
  
Renal stones  
  
  
Calculus of kidney  
   
                                                      
  
  
Renal stones  
  
  
Calculus of kidney  
  
documented in this encounter  
[facility]  
Work Phone: [phone] and physical note  
  
  
  
                                        Author              Shyam Guadarrama  
[facility]  
   
                                        Note Date/Time      2025 1  
:15am  
   
                                                    [facility]  
ENTER  
[address] [place] [address]  
  
Hospitalist H&P  
Signed  
  
Patient: Cris Hearn MR#: [id number]  
[id number]  
: 1970 Acct:[id number]  
  
Age/Sex: 54 / F Adm Date:   
5  
Loc:  Room: [address] [place] [address] Type: ADM IN  
Attending Dr: Shyam Guadarrama MD  
  
Copies to: Shyam Guadarrama MD  
[facility] SERVICES~  
  
  
[facility]  
DATE OF EXAMINATION: 25  
CHIEF COMPLAINT: Generalized weakness  
HISTORY OF PRESENT ILLNESS:  
Cris Hearn is a 53-year-old female past medical history of chronic kidney 
disease   
stage III/IV, atrial fibrillation on Eliquis, h/o bilateral nephrolithiasis s/p   
bilateral percutaneous nephrostomy tube, persistent L sided nephrostomy tube 
lost to   
follow up and no longer draining, status post ureteralstent, COPD with active 
tobacco   
smoking, diabetes mellitus type 2 and other medical comorbidities, multiple 
sclerosis   
was recently admitted for urosepsis and was transferred to [facility] from where she was discharged yesterday morning. She mentioned that she   
arrived home and continued to have weakness for which she called EMS who brought
her   
to [facility]'s ED. She denies any fever chills headache shortness of breath or 
chest   
pain or abdominal pain ornausea or vomiting dysuria urgency frequency or loose   
stools. In the ED her blood pressure is soft. Lab work is remarkable for WBC of   
21,000 and hemoglobinof 7.8, normal electrolytes. Elevated BUN and creatinine 
around   
baseline normal liver enzymes.  
  
Review of Systems  
Review of Systems  
All other systems reviewed & are negative unless noted below or in HPI  
  
[facility]  
Medical History  
  
Chronic respiratory failure  
GERD (gastroesophageal reflux disease)  
Esophageal dilatation  
Vitamin D deficiency  
C. difficile diarrhea  
Chronic renal disease, stage III  
Afib  
Chronic respiratory failure with hypoxia  
Oxygen dependent  
Chronic anticoagulation  
KWAKU (obstructive sleep apnea)  
Depression  
Obesity  
Hyperlipemia  
Multiple sclerosis  
Hypertension  
COPD (chronic obstructive pulmonary disease)  
Diabetes  
Smoking  
  
  
Surgical History (Reviewed 24 @ 11:19 by Valeria Dumont LPN)  
  
Status post incision and drainage  
Umbilical hernia  
Hx of lithotripsy  
H/O tubal ligation  
S/P   
S/P cholecystectomy  
  
  
Family History (Reviewed 24 @ 11:19 by Valeria Dumont LPN)  
Brother Cancer  
Father Cancer  
Legacy FamHx Problem: Diagnosed with Cancer  
Mother COPD (chronic obstructive pulmonary disease)  
  
  
Social History  
Smoking Status: Former smoker  
Tobacco Type: cigarettes  
Substance Use Type: None  
Social History Comments: ex- significant other and younger son  
  
Meds  
Medications and Allergies  
Allergies  
  
aspirin Allergy (Unknown, Verified 25 22:30)  
Hives  
ibuprofen (From Advil) Allergy (Unknown, Verified 25 22:30)  
Hives  
naproxen (From Aleve) Allergy (Unknown, Verified 25 22:30)  
Hives  
diphenhydramine (From Benadryl) Allergy (Verified 25 22:30)  
Swelling  
amoxicillin Adverse Reaction (Verified 25 22:30)  
thrush  
azithromycin Adverse Reaction (Verified 25 22:30)  
Flushing  
  
  
Home Medications  
  
amitriptyline 50 mg tablet 50 mg PO QPM #30 tabs 10/07/21 [Rx Confirmed 
25]  
canagliflozin 100 mg tablet (Invokana) 100 mg PO QAM #30 tabs 10/07/21 [Rx 
Confirmed   
25]  
duloxetine 60 mg capsule,delayed release 60 mg PO QAM #30 caps 10/07/21 [Rx 
Confirmed   
25]  
pravastatin 40 mg tablet 40 mg PO QPM #30 tabs 10/07/21 [Rx Confirmed 25]  
sotalol 120 mg tablet (Sotalol AF) 120 mg PO QAM #30 tabs 10/07/21 [Rx Confirmed
   
25]  
potassium chloride 10 mEq capsule,extended release 10 meq PO DAILY #30 caps 
22   
[Rx Confirmed 25]  
dulaglutide 0.75 mg/0.5 mL subcutaneous pen injector (Trulicity) 0.7 mg subcut 
QWEEK   
23 [History Confirmed 25]  
multivitamin 1 tab PO DAILY 23 [History Confirmed 25]  
apixaban 5 mg tablet (Eliquis) 5 mg PO BID 23 [History Confirmed 25]  
cyanocobalamin (vitamin B-12) 1,000 mcg tablet 1,000 mcg PO QAM #30 tabs 
10/20/23 [Rx   
Confirmed 25]  
fenofibrate nanocrystallized 145 mg tablet 145 mg PO DAILY 24 [History   
Confirmed 25]  
furosemide 80 mg tablet 80 mg PO DAILY 24 [History Confirmed 25]  
linezolid 600 mg tablet 600 mg PO BID 24 [History Confirmed 25]  
omeprazole 20 mg capsule,delayed release 20 mg PO BID 24 [History 
Confirmed   
25]  
CPAP (Continuous Positive Airway Pressure) 24 [History Confirmed 25]  
Oxygen 24 [History Confirmed 25]  
albuterol sulfate 2.5 mg/3 mL (0.083 %) solution for nebulization 2.5 mg (3 mL)   
inhalation Q4HR PRN Shortness Of Breath 30 days #270 mL 24 [Rx Confirmed   
25]  
  
  
  
Exam  
Physical Exam  
Vital Signs:  
  
  
  
  
Temp Pulse Resp BP Pulse Ox O2 Del Method O2 Flow Rate  
  
98.5 F 80 16 107/55 L 98 Room Air 3  
  
25 22:31 25 00:29 25 00:29 25 00:29 25 00:29 
25   
00:29 25 22:31  
  
  
  
Narrative:  
General: Awake alert, no acute distress, morbidly obese  
HEENT: head atraumatic, normocephalic, moist mucous membranes  
Neck: supple no masses, no lymphadenopathy  
CVS: regular rate and rhythm, no murmurs or gallops  
Respiratory: clear to auscultation bilaterally, no wheezing or crackles, 
symmetric   
expansion  
GI: soft, nondistended, nontender, positive bowel sounds with no organomegaly  
Extremity: moves all extremities, no restrictions of movements, no calf 
tenderness,   
unable to lift her legs from the bed.  
Neuro: AOx3, CN II-VII intact. Moves all extremities in all planes of motion.  
Skin: intact no rashes or lesions  
  
Results - Hospitalist H&P  
Lab Results  
Labs:  
Laboratory Last Values  
  
  
  
Corrected WBC 21.0 X10E3/uL (3.8-11.6) H 25 22:50  
  
Uncorrected WBC Count 21.0 x10E3/uL (3.8-11.6) H 25 22:50  
  
RBC 3.02 x10E6/uL (3.60-5.00) L 25 22:50  
  
Hgb 7.8 g/dL (11.8-15.4) L 25 22:50  
  
Hct 24.7 % (34.0-46.4) L 25 22:50  
  
MCV 81.8 fl () 25 22:50  
  
MCH 25.8 pg (24.7-34.3) 25 22:50  
  
MCHC 31.6 g/dL (32.0-35.0) L 25 22:50  
  
RDW 17.0 % (11.9-15.3) H 25 22:50  
  
Plt Count 359 x10E3/uL (150-450) 25 22:50  
  
MPV 7.7 fl (6.3-10.7) 25 22:50  
  
Neut % (Auto) 82.6 % (.) 25 22:50  
  
Lymph % (Auto) 10.5 % (.) 25 22:50  
  
Mono % (Auto) 5.0 % (.) 25 22:50  
  
Eos % (Auto) 1.7 % (.) 25 22:50  
  
Baso % (Auto) 0.2 % (.) 25 22:50  
  
Nucleat RBC Rel Count 0.1 /100 WBC (0-0.5) 25 22:50  
  
Neut # (Auto) 17.3 x10E3/uL (1.8-7.7) H 25 22:50  
  
Lymph # (Auto) 2.2 x10E3/uL (1.00-4.8) 25 22:50  
  
Mono # (Auto) 1.1 x10E3/uL (0.0-0.8) H 25 22:50  
  
Eos # (Auto) 0.4 x10E3/uL (0.0-0.45) 25 22:50  
  
Baso # (Auto) 0.1 x10E3/uL (0.0-0.2) 25 22:50  
  
Monocyte Dist Width 23.15 % (0.00-20.00) H 25 22:50  
  
PHA Creatinine Clear 28.92 25 22:50  
  
Sodium 139 mmol/L (136-145) 25 22:50  
  
Potassium 4.1 mmol/L (3.5-5.1) 25 22:50  
  
Chloride 103 mmol/L () 25 22:50  
  
Carbon Dioxide 25.3 mmol/L (21.0-31.0) 25 22:50  
  
Anion Gap 14.8 mEq/L (6.0-15.0) 25 22:50  
  
BUN 52 mg/dL (7-25) H 25 22:50  
  
Creatinine 2.96 mg/dL (0.60-1.20) H 25 22:50  
  
Est GFR (CKD-EPI) 18.091 mL/Min 25 22:50  
  
Glucose 155 mg/dL () H 25 22:50  
  
POC Glucose 169 mg/dl 25 23:12  
  
Calcium 8.3 mg/dL (8.6-10.3) L 25 22:50  
  
Magnesium 1.4 mg/dL (1.9-2.7) L 25 22:50  
  
Total Bilirubin 0.4 mg/dl (0.3-1.0) 25 22:50  
  
AST 9 U/L (13-39) L 25 22:50  
  
ALT 5 U/L (7-52) L 25 22:50  
  
Alkaline Phosphatase 41 U/L () 25 22:50  
  
Total Protein 6.4 gm/dL (6.4-8.9) 25 22:50  
  
Albumin 3.0 gm/dL (3.5-5.7) L 25 22:50  
  
Globulin 3.4 gm/dL 25 22:50  
  
Albumin/Globulin Ratio 0.9 25 22:50  
  
  
  
  
Assessment & Plan  
Assessment/Plan  
(1) Hypomagnesemia:  
(2) Weakness:  
(3) Multiple sclerosis:  
(4) Chronic kidney disease, stage IV (severe):  
(5) Diabetes mellitus:  
  
Plan  
Cris Hearn is a 53-year-old female past medical history of chronic kidney 
disease   
stage III/IV, atrial fibrillation on Eliquis, h/o bilateral nephrolithiasis s/p   
bilateral percutaneous nephrostomy tube, persistent L sided nephrostomy tube 
lost to   
follow up and no longer draining, status post ureteralstent, COPD with active 
tobacco   
smoking, diabetes mellitus type 2 and other medical comorbidities, multiple 
sclerosis   
was recently admitted for urosepsis and was transferred to [facility] from where she was discharged yesterday morning. She mentioned that she   
arrived home and continuedto have weakness for which she called EMS who brought 
her   
to [facility] ED. Shedenies any fever chills headache shortness of breath or 
chest   
pain or abdominal pain or nausea or vomiting dysuria urgency frequency or loose   
stools. In the EDher blood pressure is soft. Lab work is remarkable for WBC of 
21,000   
and hemoglobin of 7.8, normal electrolytes. Elevated BUN and creatinine around   
baseline normal liver enzymes.  
  
Plan:  
-Admit in the regular nursing floor  
-Continue on ceftriaxone and follow-up urine cultures  
-Will start on IV maintenance fluid for the night  
-Follow-up morning labs  
-Continue POA medication-amitriptyline, Eliquis, for 6, vitamin B12,   
duloxetine,fenofibrate, multivitamin, Protonix, pravastatin, sotalol  
-PT OT  
-Follow-up blood culture  
  
Full code  
  
IP vs OBS Justification  
Based on differential dx, clinical care plan, and risk of adverse events, if   
untreated, in my clinical judgement this patient requires an acute care setting 
as:   
INPATIENT because of an expectation of an over 2 midnight stay.  
Estimated length of stay (# of days): 3  
  
  
Documented By: Shyam Guadarrama MD [id number]  
Signed By: <Electronically signed by Shyam Guadarrama MD>  
25 0115  
   
  
[facility]  
Work Phone: [phone] general Narrative - Reported*   
  
                                Type            Description     Date  
   
                                Medical History Emphysema         
   
                                Medical History Diabetes          
   
                                Medical History COPD              
   
                                Medical History HTN               
   
                                Medical History Covid             
   
                                Medical History CRD stage III     
   
                                Medical History diabetes mallitus   
   
                                Medical History hyperlipidemia    
   
                                Medical History multiple sclerosis   
   
                                Medical History obesity           
   
                                Medical History KWAKU               
   
                                Medical History umbilical hernia   
   
                                Medical History chronic respiratory failure   
   
                                Surgical History gall bladder      
   
                                Surgical History          
   
                                Surgical History Tubal Pregnacy    
   
                                Surgical History Umbilical Hernia   
   
                                Hospitalization History See above         
   
                                Hospitalization History dyspnea [facility]    22  
  
  
[facility]  
Other Phone: [phone] general Narrative - Reported*   
  
                                Type            Description     Date  
   
                                Medical History Emphysema         
   
                                Medical History Diabetes          
   
                                Medical History COPD              
   
                                Medical History HTN               
   
                                Medical History Covid             
   
                                Medical History CRD stage III     
   
                                Medical History diabetes mallitus   
   
                                Medical History hyperlipidemia    
   
                                Medical History multiple sclerosis   
   
                                Medical History obesity           
   
                                Medical History KWAKU               
   
                                Medical History umbilical hernia   
   
                                Medical History chronic respiratory failure   
   
                                Surgical History gall bladder      
   
                                Surgical History          
   
                                Surgical History Tubal Pregnacy    
   
                                Surgical History Umbilical Hernia   
   
                                Hospitalization History See above         
   
                                Hospitalization History dyspnea [facility]    22  
   
                                Hospitalization History [facility] ER UTI     2023  
  
  
[facility]  
Other Phone: [phone] course Narrative  
  
No data available for this section  
  
[facility]  
Work Phone: [phone] Discharge instructions  
Additional Instructions  
Acute care to manage:  
- 2lpm NC O2 - titrate as able/needed (wears 3lpm NC O2 at home)  
- Monitor VS routine  
- Monitor blood sugars - Dx. DM  
- Routine skin assessments/care  
-- Daily - left breast- flush wound with vashe, apply silvasorb gel, telfa and 
secure with opsite  
-- Three times/day - theraworx protect to skin folds, groin, abdomen, glueal 
cleft, [facility]  
Work Phone: [phone] Discharge instructions  
Additional Instructions  
Wound care:  
- Resume previous wound care orders to Bilateral nephrostomy tube sites.  
- Twice daily - Theraworx protect to Bilateral breast, abdominal, and groin fold
redness  
-Continue antibiotics as directed  
-Continue B12 supplements  
-[facility]  
Work Phone: [phone] Discharge instructions* Attachments  
  
The following attachments cannot be sent through Care Everywhere.  
  
* Nephrostomy, Percutaneous Discharge Instructions (English)  
  
documented in this encounterTHE [facility]  
Work Phone: [phone] Discharge instructions  
Additional Instructions  
Full code  
  
Oxygen at 3L per nasal cannula  
  
[facility]  
Work Phone: [phone] on filedocumented in this encounter
[facility]  
  
No data available for this section  
  
[facility]  
Work Phone: [phone]  
  
Summary Purpose  
  
  
                                                      
  
  
  
Family History  
No Family History Records Found  
  
                          Relationship Condition    Age at Onset Recorded Date/T  
anderson  
   
                          Not Specified Chronic obstructive pulmonary disease Un  
known        
   
                          brother      Malignant neoplasm Unknown        
   
                          father       Malignant neoplasm Unknown        
  
  
  
                          Relationship Condition    Age at Onset Recorded Date/T  
anderson  
   
                          brother      Malignant neoplasm Unknown        
   
                          father       Malignant neoplasm Unknown        
   
                          mother       Chronic obstructive pulmonary disease Unk  
nown        
  
  
  
Advance Directives  
No Advanced Directives Records Found  
  
                                Advance Directive Response        Recorded Date/  
Time  
   
                                Advance Directives No               11:14am  
  
                           Latest Code Status on File  
  
                          Code Status  Date Activated Date Inactivated Comments  
   
                          Full Code    2023 1:23 AM                
   
                                Question        Answer          Comments   
   
                                                    Documentation of decision   
process for this code status:           Discussed with patient or   
surrogate. This is the code   
status chosen by the   
patient/surrogate.                         
  
                           Latest Code Status on File  
  
                          Code Status  Date Activated Date Inactivated Comments  
   
                          Full Code    2023 1:23 AM 2023 1:09 AM   
   
                                Question        Answer          Comments   
   
                                                    Documentation of decision   
process for this code status:           Discussed with patient or   
surrogate. This is the code   
status chosen by the   
patient/surrogate.                         
  
                           Latest Code Status on File  
  
                          Code Status  Date Activated Date Inactivated Comments  
   
                          Full Code    2023 1:23 AM 2023 1:09 AM   
   
                                Question        Answer          Comments   
   
                                                    Documentation of decision   
process for this code status:           Discussed with patient or   
surrogate. This is the code   
status chosen by the   
patient/surrogate.                         
  
  
  
                                Advance Directive Response        Recorded Date/  
Time  
   
                                Advance Directives No               10:14am  
  
                           Latest Code Status on File  
  
                          Code Status  Date Activated Date Inactivated Comments  
   
                          Full Code    2023 1:23 AM 2023 1:09 AM   
   
                                Question        Answer          Comments   
   
                                                    Documentation of decision   
process for this code status:           Discussed with patient or   
surrogate. This is the code   
status chosen by the   
patient/surrogate.                         
  
                           Latest Code Status on File  
  
                          Code Status  Date Activated Date Inactivated Comments  
   
                          Full Code    2023 1:23 AM 2023 1:09 AM   
   
                                Question        Answer          Comments   
   
                                                    Documentation of decision   
process for this code status:           Discussed with patient or   
surrogate. This is the code   
status chosen by the   
patient/surrogate.                         
  
  
  
                                Date Activated  Date Inactivated Comments  
   
                                2023 1:23 AM 2023 1:09 AM   
  
  
  
                                Question        Answer          Comments  
   
                                                    Documentation of decision pr  
ocess for   
this code status:                       Discussed with patient or surrogate.   
This is the code status chosen by   
the patient/surrogate.                    
  
  
  
                                Date Activated  Date Inactivated Comments  
   
                                2023 1:23 AM 2023 1:09 AM   
  
  
  
                                Question        Answer          Comments  
   
                                                    Documentation of decision pr  
ocess for   
this code status:                       Discussed with patient or surrogate.   
This is the code status chosen by   
the patient/surrogate.                    
  
  
  
Chief Complaint and Reason for Visit  
  
  
                                        Chief Complaint     D50.9 E11.8 I10  
Diarrhea, abdominal pain, nausea  
   
                                        Reason for Visit    Acute kidney injury   
superimposed on CKD  
Afib  
Bilateral hydronephrosis  
Depression  
Diarrhea  
GERD (gastroesophageal reflux disease)  
Staghorn calculus  
Struvite kidney stones  
UTI (urinary tract infection)  
Wound of left breast  
Chronic respiratory failure with hypoxia  
COPD (chronic obstructive pulmonary disease)  
Diabetes mellitus  
Oxygen dependent  
  
  
  
                                        Chief Complaint     Diarrhea, abdominal   
pain, nausea  
abd pain, sent by   
   
                                        Reason for Visit    Acute kidney injury   
superimposed on CKD  
Afib  
Bilateral hydronephrosis  
Depression  
Diarrhea  
GERD (gastroesophageal reflux disease)  
Staghorn calculus  
Struvite kidney stones  
UTI (urinary tract infection)  
Wound of left breast  
Chronic respiratory failure with hypoxia  
COPD (chronic obstructive pulmonary disease)  
Diabetes mellitus  
Oxygen dependent  
  
  
  
                                        Chief Complaint     Diarrhea, abdominal   
pain, nausea  
abd pain, sent by   
Nephrostomy issue  
   
                                        Reason for Visit    Acute kidney injury   
superimposed on CKD  
Afib  
Bilateral hydronephrosis  
Depression  
Diarrhea  
GERD (gastroesophageal reflux disease)  
Staghorn calculus  
Struvite kidney stones  
UTI (urinary tract infection)  
Wound of left breast  
Chronic respiratory failure with hypoxia  
COPD (chronic obstructive pulmonary disease)  
Diabetes mellitus  
Oxygen dependent  
Pyelonephritis  
  
  
  
                                        Chief Complaint     Diarrhea, abdominal   
pain, nausea  
abd pain, sent by dr  
Nephrostomy issue  
   
                                        Reason for Visit    Acute kidney injury   
superimposed on CKD  
Afib  
Bilateral hydronephrosis  
Depression  
Diarrhea  
GERD (gastroesophageal reflux disease)  
Staghorn calculus  
Struvite kidney stones  
UTI (urinary tract infection)  
Wound of left breast  
Chronic respiratory failure with hypoxia  
COPD (chronic obstructive pulmonary disease)  
Diabetes mellitus  
Oxygen dependent  
(HFpEF) heart failure with preserved ejection fraction  
Acute kidney injury superimposed on CKD  
Acute pyelonephritis  
Bacteremia  
Chronic kidney disease, stage IV (severe)  
Complicated UTI (urinary tract infection)  
Pyelonephritis  
Severe sepsis  
Anemia  
  
  
  
                                        Chief Complaint     abd pain, sent by dr  
Nephrostomy issue  
infection.  
   
                                        Reason for Visit    (HFpEF) heart failur  
e with preserved ejection fraction  
Acute kidney injury superimposed on CKD  
Acute pyelonephritis  
Bacteremia  
Chronic kidney disease, stage IV (severe)  
Complicated UTI (urinary tract infection)  
Pyelonephritis  
Severe sepsis  
Anemia  
  
  
  
                                        Chief Complaint     infection.  
no appetite  
   
                                        Reason for Visit    Afib  
CULLEN (acute kidney injury)  
Complicated UTI (urinary tract infection)  
Hypokalemia  
Severe sepsis  
Struvite kidney stones  
UTI (urinary tract infection)  
Diabetes mellitus  
  
  
  
                                        Chief Complaint     6 Month f/u- COPD &   
Respiratory Failure  
Type 2 diabetes mellitus with complication, withou  
   
                                        Reason for Visit    Obesity hypoventilat  
ion syndrome  
Chronic respiratory failure with hypoxia  
COPD (chronic obstructive pulmonary disease)  
  
  
  
                                        Chief Complaint     Admit Date  
   
                                        Weakness, N/V, Shortness of Breath 2025 6:28pm  
  
  
  
                                        Reason for Visit    Admit Date  
   
                                        Chronic kidney disease, stage IV (severe  
) 2025 6:28pm  
   
                                        Complicated UTI (urinary tract infection  
) 2025 6:28pm  
   
                                        Encephalopathy      2025 6:  
28pm  
   
                                        Flank pain          2025 6:  
28pm  
   
                                        Paroxysmal supraventricular tachycardia   
2025 6:28pm  
   
                                        Perinephric hematoma 2025 6  
:28pm  
   
                                        Pyelonephritis      2025 6:  
28pm  
   
                                        Septic shock        2025 6:  
28pm  
   
                                        Acute and chronic respiratory failure wi  
th hypercapnia 2025 6:28pm  
  
  
  
                                        Chief Complaint     Admit Date  
   
                                        Weakness, N/V, Shortness of Breath andrei  
ry 2025 6:28pm  
   
                                        weakness            2025 1  
2:25am  
  
  
  
                                        Reason for Visit    Admit Date  
   
                                        Chronic kidney disease, stage IV (severe  
) 2025 6:28pm  
   
                                        Complicated UTI (urinary tract infection  
) 2025 6:28pm  
   
                                        Encephalopathy      2025 6:  
28pm  
   
                                        Flank pain          2025 6:  
28pm  
   
                                        Paroxysmal supraventricular tachycardia   
2025 6:28pm  
   
                                        Perinephric hematoma 2025 6  
:28pm  
   
                                        Pyelonephritis      2025 6:  
28pm  
   
                                        Septic shock        2025 6:  
28pm  
   
                                        Acute and chronic respiratory failure wi  
th hypercapnia 2025 6:28pm  
   
                                        Chronic kidney disease, stage IV (severe  
) 2025 12:25am  
   
                                        Hypomagnesemia      2025 1  
2:25am  
   
                                        Multiple sclerosis  2025 1  
2:25am  
   
                                        Weakness            2025 1  
2:25am  
   
                                        Diabetes mellitus   2025 1  
2:25am  
  
  
  
Reason for Referral  
  
  
                          Specialty    Diagnoses / Procedures Referred By Contac  
t Referred To Contact  
   
                                                              
  
  
Diagnoses  
  
  
Renal stones  
  
  
  
Procedures  
  
  
XA URETERAL STENT   
EXISTING NEPH TUBE (TANJA)                  
  
  
MARYCRUZ Iniguez MD  
  
  
[address] [place] [address]   
  
  
Phone: [phone]  
  
  
Fax: [phone]                         
  
  
S INTERVENTIONAL RAD  
  
  
[address] [place] [address]  
  
  
Phone: [phone]  
  
  
  
                    Referral ID Status    Reason    Start Date Expiration Date V  
isits   
Requested                               Visits   
Authorized  
   
                                        [id number]            Pending   
Review                    2024    1            1  
  
  
  
                          Specialty    Diagnoses / Procedures Referred By Contac  
t Referred To Contact  
   
                                                              
  
  
Diagnoses  
  
  
Renal stones  
  
  
  
Procedures  
  
  
XA NEPH TUBE EXCHANGE                     
  
  
MARYCRUZ Iniguez MD  
  
  
[address] [place] [address]   
  
  
Phone: [phone]  
  
  
Fax: [phone]                         
  
  
[facility]  
  
  
[address] [place] [address]  
  
  
Phone: [phone]  
  
  
  
                    Referral ID Status    Reason    Start Date Expiration Date V  
isits   
Requested                               Visits   
Authorized  
   
                                        [id number]            Pending   
Review                    2024    1            1  
  
  
  
                    Referral ID Status    Reason    Start Date Expiration Date V  
isits   
Requested                               Visits   
Authorized  
   
                                        [id number]            Pending   
Review                    2024    1            1  
  
  
  
                          Specialty    Diagnoses / Procedures Referred By Contac  
t Referred To Contact  
   
                                                              
  
  
Diagnoses  
  
  
Renal stones  
  
  
  
Procedures  
  
  
XA URETERAL STENT EXISTING   
NEPH TUBE (TANJA)  
  
  
XA INTERVENTIONAL   
RADIOLOGY VASCULAR BODY   
PROCEDURES                                
  
  
MARYCRUZ Iniguez MD  
  
  
[address] [place] [address]   
  
  
  
Phone: [phone]  
  
  
Fax: [phone]                         
  
  
S INTER[facility]AL RAD  
  
  
[address] [place] [address]  
  
  
Phone: [phone]  
  
  
  
                    Referral ID Status    Reason    Start Date Expiration Date V  
isits   
Requested                               Visits   
Authorized  
   
                                        [id number]            Pending   
Review                    2/15/2024    2025     1            1  
  
  
  
                          Specialty    Diagnoses / Procedures Referred By Contac  
t Referred To Contact  
   
                                        Anesthesiology        
  
  
Diagnoses  
  
  
Renal stones  
                                          
  
  
MARYCRUZ Iniguez MD  
  
  
[address] [place] [address]   
  
  
Phone: [phone]  
  
  
Fax: [phone]                         
  
  
[facility] PRE SURGICAL EVAL  
  
  
[address] [place] [address]  
  
  
Phone: [phone]  
  
  
  
                    Referral ID Status    Reason    Start Date Expiration Date V  
isits   
Requested                               Visits   
Authorized  
   
                [id number] Authorized         2024 1       1  
  
  
  
                                                    Scheduling Instructions  
   
                                                      
  
  
Your surgical team will reach out to you to schedule a preadmission testing   
appointment.  
  
  
  
                                        Question            Answer  
   
                                                      
  
  
Reason for consult?                       
  
  
Recommended PSE Risk Score  
  
  
  
                          Specialty    Diagnoses / Procedures Referred By Contac  
t Referred To Contact  
   
                                                              
  
  
Diagnoses  
  
  
Renal stones  
                                          
  
  
Urologic Surgery  
  
  
[address] [place] [address]  
  
  
Phone: [phone]                       
  
  
  
  
  
                    Referral ID Status    Reason    Start Date Expiration Date V  
isits   
Requested                               Visits   
Authorized  
   
                                        [id number]            Pending   
Review                                              3            3  
  
  
  
                          Specialty    Diagnoses / Procedures Referred By Contac  
t Referred To Contact  
   
                                                              
  
  
Diagnoses  
  
  
Renal stones  
  
  
  
Procedures  
  
  
XA INTERVENTIONAL   
RADIOLOGY VASCULAR BODY   
PROCEDURES                                
  
  
Urologic Surgery  
  
  
[address] [place] [address]  
  
  
Phone: [phone]                       
  
  
S INTERVENTIONAL RAD  
  
  
[address] [place] [address]  
  
  
Phone: [phone]  
  
  
  
                    Referral ID Status    Reason    Start Date Expiration Date V  
isits   
Requested                               Visits   
Authorized  
   
                                        [id number]            Pending   
Review                    2023   1            1  
  
  
  
                          Specialty    Diagnoses / Procedures Referred By Contac  
t Referred To Contact  
   
                                        Radiology             
  
  
Diagnoses  
  
  
Renal stones  
  
  
  
Procedures  
  
  
CT RENAL STONE                            
  
  
Urologic Surgery  
  
  
[address] [place] [address]  
  
  
Phone: [phone]                       
  
  
[facility] CT SCAN  
  
  
Phone: [phone]  
  
  
  
                    Referral ID Status    Reason    Start Date Expiration Date V  
isits   
Requested                               Visits   
Authorized  
   
                                        [id number]            Pending   
Review                    2023    1            1  
  
  
  
                          Specialty    Diagnoses / Procedures Referred By Contac  
t Referred To Contact  
   
                                                              
  
  
Diagnoses  
  
  
Renal stones  
  
  
  
Procedures  
  
  
XA NEPH TUBE EXCHANGE                     
  
  
Urologic Surgery  
  
  
[address] [place] [address]  
  
  
Phone: [phone]                       
  
  
[facility] INTERVENTIONAL RAD  
  
  
[address] [place] [address]  
  
  
Phone: [phone]  
  
  
  
                    Referral ID Status    Reason    Start Date Expiration Date V  
isits   
Requested                               Visits   
Authorized  
   
                                        [id number]            Pending   
Review                    2023    8/15/2024    1            1  
  
  
  
                          Specialty    Diagnoses / Procedures Referred By Contac  
t Referred To Contact  
   
                                                                  
  
  
Eyad Trujillo DO  
  
  
[address] [place] [address]  
  
  
Phone: [phone]  
  
  
Fax: [phone]                         
  
  
  
  
  
                    Referral ID Status    Reason    Start Date Expiration Date V  
isits   
Requested                               Visits   
Authorized  
   
                [id number] Authorized         2023 3       3  
  
  
  
                          Specialty    Diagnoses / Procedures Referred By Contac  
t Referred To Contact  
   
                                        Urology               
  
  
Diagnoses  
  
  
Obstructive nephropathy  
                                          
  
  
Eyad Trujillo DO  
  
  
[address] [place] [address]  
  
  
Phone: [phone]  
  
  
Fax: [phone]                         
  
  
[facility] UROLOGIC SURGERY  
  
  
[address] [place] [address]  
  
  
Phone: [phone]  
  
  
  
                    Referral ID Status    Reason    Start Date Expiration Date V  
isits   
Requested                               Visits   
Authorized  
   
                [id number] Authorized         2023 3       3  
  
  
  
                                                    Scheduling Instructions  
   
                                                      
  
  
Please call the Urology department at [phone], option 1 to schedule an   
appointment if one was not made for you today.  
  
  
  
                                        Question            Answer  
   
                                                      
  
  
Reason for Referral                       
  
  
Kidney Stones [12] - with bilateral percutaneous tubes  
  
  
  
                          Specialty    Diagnoses / Procedures Referred By Contac  
t Referred To Contact  
   
                                        Nephrology            
  
  
Diagnoses  
  
  
Obstructive nephropathy  
                                          
  
  
Eyad Trujillo DO  
  
  
[address] [place] [address]  
  
  
Phone: [phone]  
  
  
Fax: [phone]                         
  
  
[facility] RENAL  
  
  
[address] [place] [address]  
  
  
Phone: [phone]  
  
  
  
                    Referral ID Status    Reason    Start Date Expiration Date V  
isits   
Requested                               Visits   
Authorized  
   
                                        [id number]            Pending   
Review                    2023     3            3  
  
  
  
                                                    Scheduling Instructions  
   
                                                      
  
  
Please call the Nephrology Clinic at [phone] to schedule an appointment 
if one   
was not made for you today.  
  
  
  
                                        Question            Answer  
   
                                                      
  
  
Is the patient on dialysis?               
  
  
No  
   
                                                      
  
  
Patient to be evaluated for:              
  
  
Other [11] - ATN after hydronephrosis  
  
  
  
                                                    Comments  
   
                                                      
  
  
No prior visits in Nephrology  
  
  
  
                          Specialty    Diagnoses / Procedures Referred By Contac  
t Referred To Contact  
   
                                                              
  
  
[facility] [address] [place] [address]  
  
  
Phone: [phone]  
  
  
Fax: [phone]                         
  
  
  
  
  
                Referral ID Status  Reason  Start Date Expiration Date Visits Re  
quested Visits   
Authorized  
   
                [id number] Denied                          3       3  
  
  
  
Additional Source Comments  
  
  
  
                                                    INFORMATION SOURCE (unrecogn  
ized section and content)  
   
                                          
  
  
  
                                        DATE CREATED        AUTHOR  
   
                                2018                      [facility]  
  
  
  
                                DATE CREATED    AUTHOR          AUTHOR'S ORGANIZ  
ATION  
   
                                2025                      [facility]  
  
  
  
                                DATE CREATED    AUTHOR          AUTHOR'S ORGANIZ  
ATION  
   
                                2025                      [facility]  
  
  
  
                                DATE CREATED    AUTHOR          AUTHOR'S ORGANIZ  
ATION  
   
                                2025                      [facility]  
  
  
  
                                DATE CREATED    AUTHOR          AUTHOR'S ORGANIZ  
ATION  
   
                                2025                      [facility]  
  
  
  
                                DATE CREATED    AUTHOR          AUTHOR'S ORGANIZ  
ATION  
   
                                2025                      [facility]  
  
  
  
                                DATE CREATED    AUTHOR          AUTHOR'S ORGANIZ  
ATION  
   
                                2025                      [facility]  
  
  
  
                                DATE CREATED    AUTHOR          AUTHOR'S ORGANIZ  
ATION  
   
                                2025                      [facility]  
  
  
  
                                DATE CREATED    AUTHOR          AUTHOR'S ORGANIZ  
ATION  
   
                                2025                      [facility]  
  
  
  
                                DATE CREATED    AUTHOR          AUTHOR'S ORGANIZ  
ATION  
   
                                02/10/2025                      [facility]  
  
  
  
                                DATE CREATED    AUTHOR          AUTHOR'S ORGANIZ  
ATION  
   
                                2025                      [facility]  
[facility]  
  
  
  
  
  
                                                    Care Teams (unrecognized sec  
tion and content)  
   
                                          
  
  
  
                                                    Team Status: Active   
   
                          Member       Role         Status       Dates  
   
                          [facility] Primary Care Provider Active     
      
  
  
  
                                                    Team Status: Inactive   
   
                          Member       Role         Status       Dates  
   
                          [facility] Primary Care Provider Active     
    Start: 2025  
End: 2025  
   
                          Miquel Asencio DO Emergency Provider Active       Sta  
rt: 2025  
End: 2025  
   
                                        Renny Rowley MD   Admit Provider, Atte  
nding   
Provider                  Active                    Start: 2025  
End: 2025  
  
  
  
                                                    Team Status: Active   
   
                          Member       Role         Status       Dates  
   
                          [facility] Primary Care Provider Active     
    Start: 2025  
  
   
                          Miquel Asencio DO Emergency Provider Active       Sta  
rt: 2025  
  
   
                                        Renny Rowley MD   Admit Provider, Atte  
nding   
Provider                  Active                    Start: 2025  
  
  
  
  
                                                    Team Status: Inactive   
   
                          Member       Role         Status       Dates  
   
                          Jill Rodriguez MD RES Attending Provider Active    
       
  
  
  
                      Team Member Relationship Specialty  Start Date End Date  
   
                                                      
  
  
Ruddy Elder,   
  
  
NPI: [id number]  
  
  
[address]  
  
  
[place] [address] [phone] (Fax) PCP - General   Family Medicine 19           
  
  
  
                                                    Team Status: Active   
   
                          Member       Role         Status       Dates  
   
                          Apollo Lopez DO RES Primary Care Provider Active     
      
  
  
  
                                                    Team Status: Inactive   
   
                          Member       Role         Status       Dates  
   
                          Apollo Lopez DO RES Attending Provider Active        
   
  
  
  
                                                    Team Status: Inactive   
   
                          Member       Role         Status       Dates  
   
                          Apollo Lopez DO RES Primary Care Provider Active     
      
   
                          Jordan De Leon Jr, MD Emergency Provider Active        
   
   
                          Sulaiman Shine DO Admit Provider Active         
   
                          Brisa Acevedo MD Attending Provider Active         
  
  
  
                      Team Member Relationship Specialty  Start Date End Date  
   
                                                      
  
  
Ruddy Elder,   
  
  
NPI: [id number]  
  
  
[address]  
  
  
[place] [address] [phone] (Fax) PCP - General   Family Medicine 19           
  
  
  
                      Team Member Relationship Specialty  Start Date End Date  
   
                                                      
  
  
Ruddy Elder,   
  
  
NPI: [id number]  
  
  
[address]  
  
  
[place] [address]  
  
  
[phone] (Fax) PCP - General   Family Medicine 19           
  
  
  
                      Team Member Relationship Specialty  Start Date End Date  
   
                                                      
  
  
Ruddy Elder,   
  
  
NPI: [id number]  
  
  
[address]  
  
  
[place] [address]  
  
  
[phone] (Fax) PCP - General   Family Medicine 19           
  
  
  
                      Team Member Relationship Specialty  Start Date End Date  
   
                                                      
  
  
Ruddy Elder,   
  
  
NPI: [id number]  
  
  
[address]  
  
  
[place] [address] [phone] (Fax) PCP - General   Family Medicine 19           
  
  
  
                      Team Member Relationship Specialty  Start Date End Date  
   
                                                      
  
  
Ruddy Elder DO  
  
  
NPI: [id number]  
  
  
[address]  
  
  
[place] [id number]  
  
  
[phone] (Fax) PCP - General   Family Medicine 19           
  
  
  
                      Team Member Relationship Specialty  Start Date End Date  
   
                                                      
  
  
Ruddy Elder DO  
  
  
NPI: [id number]  
  
  
[address]  
  
  
[place] [id number]  
  
  
[phone] (Fax) PCP - General   Family Medicine 19           
  
  
  
                      Team Member Relationship Specialty  Start Date End Date  
   
                                                      
  
  
Ruddy Elder DO  
  
  
NPI: [id number]  
  
  
[address]  
  
  
[place] [id number]  
  
  
[phone] (Fax) PCP - General   [facility] Medicine 19           
  
  
  
                      Team Member Relationship Specialty  Start Date End Date  
   
                                                      
  
  
Ruddy Elder DO  
  
  
NPI: [id number]  
  
  
[address]  
  
  
[place] [id number] [phone] (Fax) PCP - General   [facility] Medicine 19           
  
  
  
                      Team Member Relationship Specialty  Start Date End Date  
   
                                                      
  
  
Ruddy Elder DO  
  
  
NPI: [id number]  
  
  
[address]  
  
  
[place] [id number]  
  
  
[phone] (Fax) PCP - General   Family Medicine 19           
   
                                                      
  
  
MARYCRUZ Iniguez MD  
  
  
NPI: [id number]  
  
  
[address] [place] [address]   
  
  
[phone] (Work)  
  
  
[phone] (Fax) Physician       Urology         23            
  
  
  
                                                    Team Status: Inactive   
   
                          Member       Role         Status       Dates  
   
                          Apollo Lopez DO RES Primary Care Provider Active     
      
   
                          Johnnie Henriquez PA-C Emergency Provider Active         
  
  
  
                      Team Member Relationship Specialty  Start Date End Date  
   
                                                      
  
  
Ruddy Elder DO  
  
  
NPI: [id number]  
  
  
[address]  
  
  
[place] [address] [phone] (Fax) PCP - General   Family Medicine 19           
   
                                                      
  
  
MARYCRUZ Iniguez MD  
  
  
NPI: [id number]  
  
  
[address] [place] [address]   
  
  
[phone] (Work)  
  
  
[phone] (Fax) Physician       Urology         23            
  
  
  
                      Team Member Relationship Specialty  Start Date End Date  
   
                                                      
  
  
Ruddy Elder DO  
  
  
NPI: [id number]  
  
  
[address]  
  
  
[place] [address]  
  
  
[phone] (Fax) PCP - General   Family Medicine 19           
   
                                                      
  
  
MARYCRUZ Iniguez MD  
  
  
NPI: [id number]  
  
  
 [place], [place]   
  
  
[phone] (Work)  
  
  
[phone] (Fax) Physician       Urology         23            
  
  
  
                      Team Member Relationship Specialty  Start Date End Date  
   
                                                      
  
  
Ruddy Elder DO  
  
  
NPI: [id number]  
  
  
[address]  
  
  
[place] [id number]  
  
  
[phone] (Fax) PCP - General   Family Medicine 19           
   
                                                      
  
  
MARYCRUZ Iniguez MD  
  
  
NPI: [id number]  
  
  
[address] [place] [address]  
  
  
[phone] (Work)  
  
  
[phone] (Fax) Physician       Urology         23            
  
  
  
                                                    Team Status: Active   
   
                          Member       Role         Status       Dodie Lopez DO RES Primary Care Provider Active     
      
   
                          Alexei Wise , DO Emergency Provider Active       
    
   
                          Amanda Murillo MD Admit Provider, Attending Sethi  
elly Active         
   
                          Azael Aj MD Other Provider Active         
  
  
  
                                                    Team Status: Inactive   
   
                          Member       Role         Status       Dodie Wise , DO Emergency Provider Active       
    
   
                          Amanda Murillo MD Admit Provider, Attending Merari Thibodeaux MD Other Provider Active         
   
                          [facility] Primary Care Provider Active     
      
   
                          Roselia Chowdhury MD Other Provider Active         
  
  
  
                                                    Team Status: Inactive   
   
                          Member       Role         Status       Dates  
   
                          [facility] Primary Care Provider Active     
      
   
                          Azael Aj MD Attending Provider Active         
  
  
  
                      Team Member Relationship Specialty  Start Date End Date  
   
                                                      
  
  
Ruddy Elder DO  
  
  
NPI: [id number]  
  
  
[address]  
  
  
[place] [address]  
  
  
[phone] (Fax) PCP - General   Family Medicine 19           
   
                                                      
  
  
MARYCRUZ Iniguez MD  
  
  
NPI: [id number]  
  
  
 [place] [address]  
  
  
[phone] (Work)  
  
  
[phone] (Fax) Physician       Urology         23            
  
  
  
                      Team Member Relationship Specialty  Start Date End Date  
   
                                                      
  
  
Ruddy Elder DO  
  
  
NPI: [id number]  
  
  
[address]  
  
  
[place] [address]  
  
  
[phone] (Fax) PCP - General   Family Medicine 19           
   
                                                      
  
  
MARYCRUZ Iniguez MD  
  
  
NPI: [id number]  
  
  
[address] [place] [address]   
  
  
[phone] (Work)  
  
  
[phone] (Fax) Physician       Urology         23            
  
  
  
                      Team Member Relationship Specialty  Start Date End Date  
   
                                                      
  
  
Ruddy Elder DO  
  
  
NPI: [id number]  
  
  
[address]  
  
  
[place] [address]  
  
  
[phone] (Fax) PCP - General   Family Medicine 19           
   
                                                      
  
  
MARYCRUZ Iniguez MD  
  
  
NPI: [id number]  
  
  
[address] [place] [address]   
  
  
[phone] (Work)  
  
  
[phone] (Fax) Physician       Urology         23            
  
  
  
                      Team Member Relationship Specialty  Start Date End Date  
   
                                                      
  
  
Ruddy Elder DO  
  
  
NPI: [id number]  
  
  
[address]  
  
  
[place] [address]  
  
  
[phone] (Fax) PCP - General   Family Medicine 19           
   
                                                      
  
  
MARYCRUZ Iniguez MD  
  
  
NPI: [id number]  
  
  
[address] [place] [address]   
  
  
[phone] (Work)  
  
  
[phone] (Fax) Physician       Urology         23            
  
  
  
                      Team Member Relationship Specialty  Start Date End Date  
   
                                                      
  
  
Ruddy Elder DO  
  
  
NPI: [id number]  
  
  
[address]  
  
  
[place] [address]  
  
  
[phone] (Fax) PCP - General   Family Medicine 19           
   
                                                      
  
  
MARYCRUZ Iniguez MD  
  
  
NPI: [id number]  
  
  
[address] [place] [address]   
  
  
[phone] (Work)  
  
  
[phone] (Fax) Physician       Urology         23            
  
  
  
                      Team Member Relationship Specialty  Start Date End Date  
   
                                                      
  
  
Ruddy Elder DO  
  
  
NPI: [id number]  
  
  
[address]  
  
  
[place] [address]  
  
  
[phone] (Fax) PCP - General   Family Medicine 19           
   
                                                      
  
  
MARYCRUZ Iniguez MD  
  
  
NPI: [id number]  
  
  
[address] [place] [address]   
  
  
[phone] (Work)  
  
  
[phone] (Fax) Physician       Urology         23            
  
  
  
                      Team Member Relationship Specialty  Start Date End Date  
   
                                                      
  
  
Ruddy Elder DO  
  
  
NPI: [id number]  
  
  
[address]  
  
  
[place] [address]  
  
  
[phone] (Fax) PCP - General   Family Medicine 19           
   
                                                      
  
  
MARYCRUZ Iniguez MD  
  
  
NPI: [id number]  
  
  
[address] [place] [address]   
  
  
[phone] (Work)  
  
  
[phone] (Fax) Physician       Urology         23            
  
  
  
                      Team Member Relationship Specialty  Start Date End Date  
   
                                                      
  
  
Ruddy Elder DO  
  
  
NPI: [id number]  
  
  
[facility] [address]  
  
  
[place] [address]  
  
  
[phone] (Fax) PCP - General   Family Medicine 19           
   
                                                      
  
  
MARYCRUZ Iniguez MD  
  
  
NPI: [id number]  
  
  
[address] [place] [address]   
  
  
[phone] (Work)  
  
  
[phone] (Fax) Physician       Urology         23            
  
  
  
                      Team Member Relationship Specialty  Start Date End Date  
   
                                                      
  
  
Ruddy Elder DO  
  
  
NPI: [id number]  
  
  
[address]  
  
  
[place] [address]  
  
  
[phone] (Fax) PCP - General   Family Medicine 19           
   
                                                      
  
  
MARYCRUZ Iniguez MD  
  
  
NPI: [id number]  
  
  
[address] [place] [address]   
  
  
[phone] (Work)  
  
  
[phone] (Fax) Physician       Urology         23            
  
  
  
                      Team Member Relationship Specialty  Start Date End Date  
   
                                                      
  
  
Ruddy Elder DO  
  
  
NPI: [id number]  
  
  
[address]  
  
  
[place] [address]  
  
  
[phone] (Fax) PCP - General   Family Medicine 19           
   
                                                      
  
  
MARYCRUZ Iniguez MD  
  
  
NPI: [id number]  
  
  
[address] [place], [place] [address]  
  
  
[phone] (Work)  
  
  
[phone] (Fax) Physician       Urology         23            
  
  
  
                      Team Member Relationship Specialty  Start Date End Date  
   
                                                      
  
  
Ruddy Elder DO  
  
  
NPI: [id number]  
  
  
[address]  
  
  
[place] [address]  
  
  
[phone] (Fax) PCP - General   Family Medicine 19           
   
                                                      
  
  
MARYCRUZ Iniguez MD  
  
  
NPI: [id number]  
  
  
[address] [place] [address]   
  
  
[phone] (Work)  
  
  
[phone] (Fax) Physician       Urology         23            
  
  
  
                      Team Member Relationship Specialty  Start Date End Date  
   
                                                      
  
  
Ruddy Elder DO  
  
  
NPI: [id number]  
  
  
[address]  
  
  
[place] [id number]  
  
  
[phone] (Fax) PCP - General   Family Medicine 19           
   
                                                      
  
  
MARYCRUZ Iniguez MD  
  
  
NPI: [id number]  
  
  
[address] [place] [address]   
  
  
[phone] (Work)  
  
  
[phone] (Fax) Physician       Urology         23            
  
  
  
                                                    Team Status: Inactive   
   
                          Member       Role         Status       Dates  
   
                          Services [facility] Provider Active     
    Start: 2023  
End: 2023  
   
                          Azael Aj MD Attending Provider Active       Sta  
rt: 2023  
End: 2023  
  
  
  
                                                    Team Status: Active   
   
                          Member       Role         Status       Dates  
   
                          Services [facility] Active     
    Start: 2024  
  
   
                          ALIZA Villar Emergency Provider Active      
   Start: 2024  
  
   
                                        Brisa Acevedo MD  Admit Provider, Atte  
nding   
Provider, Other Provider  Active                    Start: 2024  
  
  
  
  
                      Team Member Relationship Specialty  Start Date End Date  
   
                                                      
  
  
Ruddy Elder DO  
  
  
NPI: [id number]  
  
  
[address]  
  
  
[place] [address]  
  
  
[phone] (Fax) PCP - General   Family Medicine 19           
   
                                                      
  
  
MARYCRUZ Iniguez MD  
  
  
NPI: [id number]  
  
  
[address] [place] [address]   
  
  
[phone] (Work)  
  
  
[phone] (Fax) Physician       Urology         23            
  
  
  
                      Team Member Relationship Specialty  Start Date End Date  
   
                                                      
  
  
Ruddy Elder DO  
  
  
NPI: [id number]  
  
  
[address]  
  
  
[place] [id number]  
  
  
[phone] (Fax) PCP - General   Family Medicine 19           
   
                                                      
  
  
MARYCRUZ Iniguez MD  
  
  
NPI: [id number]  
  
  
[address] [place] [address]   
  
  
[phone] (Work)  
  
  
[phone] (Fax) Physician       Urology         23            
  
  
  
                      Team Member Relationship Specialty  Start Date End Date  
   
                                                      
  
  
Ruddy Elder,   
  
  
NPI: [id number]  
  
  
[address]  
  
  
[place] [id number]  
  
  
[phone] (Fax) PCP - General   Family Medicine 19           
   
                                                      
  
  
MARYCRUZ Iniguez MD  
  
  
NPI: [id number]  
  
  
[address] [place] [address]   
  
  
[phone] (Work)  
  
  
[phone] (Fax) Physician       Urology         23            
  
  
  
                      Team Member Relationship Specialty  Start Date End Date  
   
                                                      
  
  
Ruddy Elder,   
  
  
NPI: [id number]  
  
  
[address]  
  
  
[place] [id number]  
  
  
[phone] (Fax) PCP - General   Family Medicine 19           
   
                                                      
  
  
MARYCRUZ Iniguez MD  
  
  
NPI: [id number]  
  
  
[address] [place] [address]   
  
  
[phone] (Work)  
  
  
[phone] (Fax) Physician       Urology         23            
  
  
  
                      Team Member Relationship Specialty  Start Date End Date  
   
                                                      
  
  
Ruddy Elder DO  
  
  
NPI: [id number]  
  
  
[address]  
  
  
[place] [address]  
  
  
[phone] (Fax) PCP - General   Family Medicine 19           
   
                                                      
  
  
MARYCRUZ Iniguez MD  
  
  
NPI: [id number]  
  
  
[address] [place] [address]   
  
  
[phone] (Work)  
  
  
[phone] (Fax) Physician       Urology         23            
  
  
  
                      Team Member Relationship Specialty  Start Date End Date  
   
                                                      
  
  
Ruddy Elder DO  
  
  
NPI: [id number]  
  
  
[facility] [address]  
  
  
[place] [id number]  
  
  
[phone] (Fax) PCP - General   Family Medicine 19           
   
                                                      
  
  
MARYCRUZ Iniguez MD  
  
  
NPI: [id number]  
  
  
[address] [place] [address]   
  
  
[phone] (Work)  
  
  
[phone] (Fax) Physician       Urology         23            
  
  
  
                      Team Member Relationship Specialty  Start Date End Date  
   
                                                      
  
  
Ruddy Elder DO  
  
  
NPI: [id number]  
  
  
[facility] [address]  
  
  
[place] [id number]  
  
  
[phone] (Fax) PCP - General   Family Medicine 19           
   
                                                      
  
  
MARYCRUZ Iniguez MD  
  
  
NPI: [id number]  
  
  
[address] [place] [address]   
  
  
[phone] (Work)  
  
  
[phone] (Fax) Physician       Urology         23            
  
  
  
                      Team Member Relationship Specialty  Start Date End Date  
   
                                                      
  
  
Ruddy Elder DO  
  
  
NPI: [id number]  
  
  
[facility] [address]  
  
  
[place] [id number]  
  
  
[phone] (Fax) PCP - General   Family Medicine 19           
   
                                                      
  
  
MARYCRUZ Iniguez MD  
  
  
NPI: [id number]  
  
  
[address] [place] [address]   
  
  
[phone] (Work)  
  
  
[phone] (Fax) Physician       Urology         23            
  
  
  
                      Team Member Relationship Specialty  Start Date End Date  
   
                                                      
  
  
Ruddy Elder DO  
  
  
NPI: [id number]  
  
  
[facility] [address]  
  
  
[place] [id number]  
  
  
[phone] (Fax) PCP - General   Family Medicine 19           
   
                                                      
  
  
MARYCRUZ Iniguez MD  
  
  
NPI: [id number]  
  
  
[address] [place] [address]   
  
  
[phone] (Work)  
  
  
[phone] (Fax) Physician       Urology         23            
  
  
  
                                                    Team Status: Inactive   
   
                          Member       Role         Status       Dates  
   
                          Services [facility] Provider Active     
    Start: May 16th, 2024  
End: May 16th, 2024  
   
                          Yann Sparks MD Attending Provider Active   
      Start: May 16th, 2024  
End: May 16th, 2024  
   
                          DME                       Active       Start: May 16th  
, 2024  
End: May 16th, 2024  
  
  
  
                                                    Team Status: Inactive   
   
                          Member       Role         Status       Dates  
   
                          Services [facility] Provider Active     
    Start: 2024  
End: 2024  
   
                          Sharif Forbes - FHS , DO Attending Provider Active       S  
tart: 2024  
End: 2024  
  
  
  
                      Team Member Relationship Specialty  Start Date End Date  
   
                                                      
  
  
Ruddy Elder DO  
  
  
NPI: [id number]  
  
  
[address]  
  
  
[place] [address]  
  
  
[phone] (Fax) PCP - General   Family Medicine 19           
  
  
  
                      Team Member Relationship Specialty  Start Date End Date  
   
                                                      
  
  
Ruddy Elder DO  
  
  
NPI: [id number]  
  
  
[address]  
  
  
[place] [address]  
  
  
[phone] (Fax) PCP - General   Family Medicine 19           
   
                                                      
  
  
MARYCRUZ Iniguez MD  
  
  
NPI: [id number]  
  
  
[address] [place] [address]   
  
  
[phone] (Work)  
  
  
[phone] (Fax) Physician       Urology         24            
  
  
  
                      Team Member Relationship Specialty  Start Date End Date  
   
                                                      
  
  
Ruddy Elder DO  
  
  
NPI: [id number]  
  
  
[address]  
  
  
[place] [address]  
  
  
[phone] (Fax) PCP - General   Family Medicine 19           
   
                                                      
  
  
MARYCRUZ Iniguez MD  
  
  
NPI: [id number]  
  
  
[address] [place], [place]   
  
  
[phone] (Work)  
  
  
[phone] (Fax) Physician       Urology         24            
  
  
  
                      Team Member Relationship Specialty  Start Date End Date  
   
                                                      
  
  
Ruddy Elder DO  
  
  
NPI: [id number]  
  
  
[address]  
  
  
[place] [address]  
  
  
[phone] (Fax) PCP - General   Family Medicine 19           
   
                                                      
  
  
MARYCRUZ Iniguez MD  
  
  
NPI: [id number]  
  
  
[address] [place] [address]  
  
  
[phone] (Work)  
  
  
[phone] (Fax) Physician       Urology         24            
   
                                                      
  
  
Kenji Christiansen MD  
  
  
NPI: [id number]  
  
  
[address] [place] [address]  
  
  
[phone] (Work)  
  
  
[phone] (Fax) Physician       Urology         24           
  
  
  
                                                    Team Status: Active   
   
                          Member       Role         Status       Dates  
   
                          Nithya Jaimes DO Emergency Provider Active       St  
art: 2025  
  
   
                          Services [facility] Provider Active     
    Start: 2025  
  
   
                          Evan Coffey DO RES              Active       Star  
t: 2025  
  
   
                                        Shyam Guadarrama MD    Admit Provider, Atte  
[facility]   
Provider                  Active                    Start: 2025  
  
  
  
  
                                                    Team Status: Inactive   
   
                          Member       Role         Status       Dates  
   
                          Nithya Jaimes DO Emergency Provider Active       St  
art: 2025  
End: 2025  
   
                          Services [facility] Provider Active     
    Start: 2025  
End: 2025  
   
                          Evan Coffey DO RES              Active       Star  
t: 2025  
End: 2025  
   
                          Shyam Guadarrama MD Admit Provider Active       Start: Ja  
nuary 2025  
End: 2025  
   
                          Yamilet Terry MD Attending Provider Active       S  
tart: 2025  
End: 2025  
   
                          Azael Aj MD Other Provider Active       Start:   
2025  
End: 2025  
   
                          Lisa Camejo MD Other Provider Active       Start: J  
anuary 2025  
End: 2025  
  
  
  
                                                    Team Status: Inactive   
   
                          Member       Role         Status       Dates  
   
                          Nithya Jaimes DO Emergency Provider Active       St  
art: 2025  
End: 2025  
   
                          Services [facility] Provider Active     
    Start: 2025  
End: 2025  
   
                          Evan Coffey DO RES              Active       Star  
t: 2025  
End: 2025  
   
                          Shyam Guadarrama MD Admit Provider Active       Start: Ja  
nuary 2025  
End: 2025  
   
                          Azael Aj MD Other Provider Active       Start:   
2025  
End: 2025  
   
                          Lisa Camejo MD Other Provider Active       Start: J  
anuary 2025  
End: 2025  
   
                          Yamilet Terry MD Attending Provider Active       S  
tart: 2025  
End: 2025  
  
  
  
  
  
                                                    Goals (unrecognized section   
and content)  
   
                                                    Goals may be documented in a  
n alternate sectionNo InformationNo InformationNo   
InformationGoals may be documented in an alternate sectionNot on filedocumented 
as of   
this encounterGoals may be documented in an alternate sectionGoals may be 
documented   
in an alternate sectionGoals may be documented in an alternate section  
  
  
No data available for this section  
  
  
  
  
                                                    REASON FOR VISIT (unrecogniz  
ed section and content)  
   
                                          
  
  
  
                          Specialty    Diagnoses / Procedures Referred By Contac  
t Referred To Contact  
   
                                        Case Management       
  
  
Diagnoses  
  
  
Complex nephrolithiasis   
with bilateral   
hydronephrosis  
  
  
  
Procedures  
  
  
N/A                                       
  
  
[facility]  
  
  
[address] [place], [place] [address]   
[address]  
  
  
Phone: [phone]                           
  
  
[facility]   
[facility]  
  
  
[address] [place] [address]   
[address]  
  
  
Phone: [phone]  
  
  
  
                Referral ID Status  Reason  Start Date Expiration Date Visits Re  
quested Visits   
Authorized  
   
                [id number]                                 3       3  
  
  
  
                                Reason          Onset Date      Comments  
   
                                Nephrology Referral 2023        
  
  
  
                                Reason          Onset Date      Comments  
   
                                Health Information 2023        
  
  
  
                                Reason          Onset Date      Comments  
   
                                Hospital follow-up 2023        
  
  
  
                          Specialty    Diagnoses / Procedures Referred By Contac  
t Referred To Contact  
   
                                        Urology               
  
  
Diagnoses  
  
  
Obstructive nephropathy  
                                          
  
  
Eyad Trujillo DO  
  
  
[address] [place] [address]  
  
  
Phone: [phone]  
  
  
Fax: [phone]                         
  
  
[facility] UROLOGIC SURGERY  
  
  
[address] [place] [address]  
  
  
Phone: [phone]  
  
  
  
                    Referral ID Status    Reason    Start Date Expiration Date V  
isits   
Requested                               Visits   
Authorized  
   
                [id number] Authorized         2023 3       3  
  
  
  
                          Specialty    Diagnoses / Procedures Referred By Contac  
t Referred To Contact  
   
                                        Radiology             
  
  
Diagnoses  
  
  
Renal stones  
  
  
  
Procedures  
  
  
CT ABDOMEN/PELVIS W/O   
CONTRAST RENAL STONE DUAL   
ENERGY                                    
  
  
Urologic Surgery  
  
  
[address] [place] [address]  
  
  
Phone: [phone]                       
  
  
[facility] CT SCAN  
  
  
Phone: [phone]  
  
  
  
                    Referral ID Status    Reason    Start Date Expiration Date V  
isits   
Requested                               Visits   
Authorized  
   
                                        [id number]            Pending   
Review                    10/2/2023    10/1/2024    1            1  
  
  
  
                                Reason          Onset Date      Comments  
   
                                Other sympt urinary system 2023        
  
  
  
                                Reason          Onset Date      Comments  
   
                                Other sympt/complt bladder 2024        
  
  
  
                                        Reason              Comments  
   
                                        Post-op Follow-up     
  
  
  
                          Specialty    Diagnoses / Procedures Referred By Contac  
t Referred To Contact  
   
                                        Radiology             
  
  
Diagnoses  
  
  
Renal stones  
  
  
  
Procedures  
  
  
CT RENAL STONE                            
  
  
Urologic Surgery  
  
  
[address] [place] [address]  
  
  
Phone: [phone]                       
  
  
[facility] CT SCAN  
  
  
Phone: [phone]  
  
  
  
                Referral ID Status  Reason  Start Date Expiration Date Visits Re  
quested Visits   
Authorized  
   
                [id number] Closed          2024 6/15/2024 1       1  
  
  
  
                          Specialty    Diagnoses / Procedures Referred By Contac  
t Referred To Contact  
   
                                        Ambulatory Surgery    
  
  
Diagnoses  
  
  
Renal stones  
  
  
Renal stones [N20.0]  
  
  
  
Procedures  
  
  
CYSTO/URETERO   
W/LITHOTRIPSY  
  
  
Ureteroscopy with   
Laser Lithotripsy                         
  
  
MARYCRUZ Iniguez MD  
  
  
[address] [place] [address]   
  
  
  
Phone: [phone]  
  
  
Fax: [phone]                         
  
  
[facility]  
  
  
[address] [place] [address]   
[address]  
  
  
Phone: [phone]  
  
  
  
                Referral ID Status  Reason  Start Date Expiration Date Visits Re  
quested Visits   
Authorized  
   
                [id number]                                 3       3  
  
  
  
                                Reason          Onset Date      Comments  
   
                                Right nephrostomy drain completely out   
4        
  
  
  
                                Reason          Onset Date      Comments  
   
                                Post-op Symptoms 2024        
  
  
  
                                Reason          Onset Date      Comments  
   
                                Forms Completion 2024        
  
  
  
                                        Reason              Comments  
   
                                        Monitoring/follow-up   
  
  
  
                          Specialty    Diagnoses / Procedures Referred By Contac  
t Referred To Contact  
   
                                        General Surgery       
  
  
Diagnoses  
  
  
Renal stones  
  
  
Renal stones [N20.0]  
  
  
  
Procedures  
  
  
CYSTO/URETERO   
W/LITHOTRIPSY  
  
  
Ureteroscopy with Laser   
Lithotripsy                               
  
  
MARYCRUZ Iniguez MD  
  
  
[address] [place] [address]   
[address]  
  
  
Phone: [phone]  
  
  
Fax: [phone]                         
  
  
[facility]  
  
  
[address] [place] [address]   
[address]  
  
  
Phone: [phone]  
  
  
  
                Referral ID Status  Reason  Start Date Expiration Date Visits Re  
quested Visits   
Authorized  
   
                [id number]                                 3       3  
  
  
  
                                        Reason              Comments  
   
                                        Procedure           Urine collection fro  
m nephrostomy tube  
  
  
  
                          Specialty    Diagnoses / Procedures Referred By Contac  
t Referred To Contact  
   
                                                              
  
  
Diagnoses  
  
  
Renal stones  
  
  
  
Procedures  
  
  
XA URETERAL STENT EXISTING   
NEPH TUBE (TANJA)  
  
  
XA INTERVENTIONAL   
RADIOLOGY VASCULAR BODY   
PROCEDURES                                
  
  
MARYCRUZ Iniguez MD  
  
  
[address] [place]   
[address]  
  
  
Phone: [phone]  
  
  
Fax: [phone]                         
  
  
[facility] INTERVENTIONAL RAD  
  
  
[address] [place] [address]  
  
  
Phone: [phone]  
  
  
  
                    Referral ID Status    Reason    Start Date Expiration Date V  
isits   
Requested                               Visits   
Authorized  
   
                                        [id number]            Pending   
Review                    2/15/2024    2025     1            1  
  
  
  
                                    Scheduled  
  
                                                    Active and Recently Administ  
ered Medications (unrecognized section and content)  
   
                                          
  
  
  
                          Medication Order 2023  
   
                                                      
  
  
budesonide-formoterol   
(SYMBICORT) 80-4.5   
MCG/ACT inhaler  
2 Puff, Inhalation, 2   
TIMES DAILY RT, First   
dose on 23 at   
0800, Until   
Discontinued  
                                        0850 (Given -   
Provider: Swati Amin, RT)   
(Hold/Not Given -   
Provider: Joyce Louise, RT -   
Reason: RT   
Unavailable -   
Comment: RT in ED)  
                                        1009 (Given -   
Provider: Carrie Vega) (Given   
- Provider: Nadine Costa, RT)  
                                        0800 (Given - Provider:   
Milana Addison, RT)194   
(Given - Provider: Zander Rosales, RT)  
  
   
                                                      
  
  
cefepime (MAXIPIME)   
iv piggyback 1000 mg  
1,000 mg,   
Intravenous, EVERY 12   
HOURS ANTIBIOTIC,   
First dose (after   
last reorder) on 23 at 0730, Until   
Discontinued  
                                        09 (IV New Bag -   
Provider: Jana Patton)   
(IV New Bag -   
Provider: Lili Flynn)  
                                        09 (IV New Bag -   
Provider: Rudy Pan RN)   
(IV New Bag -   
Provider: Lili Flynn)214   
(Hold/Not Given -   
Provider: Lili Flynn - Reason:   
Previously   
Administered)  
                                        1052 (IV New Bag -   
Provider: Jana Patton)2100 (Due -   
Provider: Lyubov Juarez RN)  
  
   
                                                      
  
  
duloxetine (CYMBALTA)   
capsule  
60 mg, Oral, DAILY,   
First dose on 23 at 0900, Until   
Discontinued  
                                        0918 (Given -   
Provider: Jana Patton)  
                                        0856 (Given -   
Provider: Rudy Pan RN)  
                                        0843 (Given - Provider:   
Jana Patton)  
  
   
                                                      
  
  
esomeprazole (NEXIUM)   
capsule  
40 mg, Oral, DAILY 30   
MIN BEFORE BREAKFAST,   
First dose on 23 at 0830, Until   
Discontinued  
                                        0918 (Given -   
Provider: Jana Patton)  
                                        0856 (Given -   
Provider: Rudy Pan RN)  
                                        0843 (Given - Provider:   
Jana Patton)  
  
   
                                                      
  
  
insulin lispro   
(HumaLOG) 100 UNIT/ML   
injection  
2-12 Units,   
Subcutaneous, 3 TIMES   
DAILY BEFORE MEALS,   
First dose on 23 at 0800, Until   
Discontinued  
                                        14 (Hold/Not Given   
- Provider: Jana Patton -   
Reason: Clinical   
contraindications)12  
17 (Hold/Not Given -   
Provider: Jana Patton -   
Reason: Clinical   
contraindications)17  
43 (Hold/Not Given -   
Provider: Jana Patton -   
Reason: Clinical   
contraindications)  
                                        0852 (Hold/Not   
Given - Provider:   
Rudy Pan RN)1253 (Given -   
Provider: Rudy Pan RN)1700   
(Hold/Not Given -   
Provider: Rudy Pan RN -   
Reason: Not   
indicated)  
                                        0844 (Hold/Not Given -   
Provider: Jana Patton - Reason:   
Clinical   
contraindications -   
Comment: bs 120)1317   
(Given - Provider: Jana Patton -   
Comment: bs 157)1858   
(Hold/Not Given -   
Provider: Jana Patton - Reason:   
Clinical   
contraindications -   
Comment: bs 147)  
  
   
                                                      
  
  
magnesium sulfate 2   
GM/50ML in 50 mL ivpb   
(COMPLETED)  
2 g (2,000 mg),   
Intravenous, ONCE, 1   
dose, On Sat 23   
at 1800  
                                        1918 (IV New Bag -   
Provider: Jana Patton)  
                                                      
   
                                                      
  
  
magnesium sulfate in   
dextrose 5 % 100 mL   
ivpb 1,000 mg 100 mL   
(COMPLETED)  
1,000 mg,   
Intravenous, ONCE, 1   
dose, On Sat 23   
at 1800  
                                        1732 (IV New Bag -   
Provider: Jana Patton)  
                                                      
   
                                                      
  
  
nystatin (MYCOSTATIN)   
100,000 unit/g powder  
Topical, 2 TIMES   
DAILY, First dose on   
23 at 0230,   
Until Discontinued  
                                        0939 (Given -   
Provider: Jana Patton)2203   
(Given - Provider:   
Lili Flynn)  
                                        0856 (Given -   
Provider: Rudy Pan, RN)   
(Given - Provider:   
Lili Flynn)  
                                        1143 (Given - Provider:   
Jana Patton)2100 (Due)  
  
   
                                                      
  
  
vancomycin (VANCOCIN)   
500  mg in   
dextrose 5 % 100 mL   
OFFSITE ADS   
(COMPLETED)  
500 mg, Intravenous,   
ONCE, 1 dose, On 23 at 2230  
                                        0022 (IV New Bag -   
Provider: Karen Parrish RN)1051 (IV   
Resume - Provider:   
Jana Patton - Comment:   
Informed Pharmacy to   
resume dose)  
                                                      
   
                                                      
  
  
vancomycin (VANCOCIN)   
500 mg in D5W 100 mL   
(COMMERCIAL PREMIX)   
(COMPLETED)  
500 mg, Intravenous,   
ONCE, 1 dose, On Sun   
8/6/23 at 1130  
                                                    1253 (IV New Bag -   
Provider: Rudy Pan RN)  
                                          
   
                                                      
  
  
vancomycin lab draw   
(COMPLETED)  
Other, ONCE, 1 dose,   
On Sun 8/6/23 at 1100  
                                                    1000 (Given -   
Provider: Rudy Pan, CHARLENE)  
                                          
  
                                   Continuous  
  
                          Medication Order 2023  
   
                                                      
  
  
lactated ringers iv   
infusion  
Intravenous, at 100   
mL/hr, CONTINUOUS,   
Starting on 23   
at 0300, Until   
Discontinued  
                                        215 (IV New Bag -   
Provider: Lili Flynn)  
                                         (IV New Bag -   
Provider: Lili Flynn)  
                                        0530 (Rate Verify -   
Provider: Leigh Tomas, RN)0626 (IV   
New Bag - Provider:   
Leigh Tomas, RN)  
  
  
                                       PRN  
  
                          Medication Order 2023  
   
                                                      
  
  
albuterol (PROVENTIL HFA) 108 (90   
Base) MCG/ACT HFA inhaler  
2 Puff, Inhalation, EVERY 4 HOURS   
PRN, Starting on 23 at   
0213, Until Discontinued,   
Shortness of Breath, Wheezing  
                                                              
   
                                                      
  
  
dextrose (GLUTOSE) 40 % oral   
gel(Linked Group 1)  
15 g of glucose, Buccal, PRN,   
Starting on 23 at 0213,   
Until Discontinued, blood glucose   
between 50 - 69 mg/dL, and with   
no IV access, alert and able to   
swallow.  
                                                              
   
                                                      
  
  
dextrose (GLUTOSE) 40 % oral   
gel(Linked Group 1)  
30 g of glucose, Buccal, PRN,   
Starting on 23 at 0213,   
Until Discontinued, blood glucose   
of 49mg/dL or less, and with no   
IV access, alert and able to   
swallow.  
                                                              
   
                                                      
  
  
dextrose 10 % iv infusion(Linked   
Group 1)  
125 mL, Intravenous, at 999   
mL/hr, PRN, Starting on 23 at 0213, Until   
Discontinued, For blood glucose   
less than 70 mg/dL, with IV   
access and with loss of   
consciousness or unable to   
swallow or NPO  
                                                              
   
                                                      
  
  
glucagon (GLUCAGEN) 1 MG/ML   
injection kit(Linked Group 1)  
1 mg, Subcutaneous, PRN, Starting   
on 23 at 0213, Until   
Discontinued, For blood glucose   
less than 70 mg/dL and with no IV   
access with loss of consciousness   
or alert and unable to swallow.  
                                                              
   
                                                      
  
  
oxyCODONE immediate release   
tablet  
5 mg, Oral, EVERY 4 HOURS PRN,   
Starting on 23 at 0215,   
Until Discontinued, Severe Pain   
(pain score 7,8,9,10)  
                                        0021 (Hold/Not Given -   
Provider: Karen Parrish RN -   
Reason: Patient sleeping)  
                                                      
  
                                  Linked Groups  
  
                                                    Order  
   
                                                      
  
  
Group 1:  
  
  
dextrose 10 % iv infusionJump to med  
125 mL, Intravenous, at 999 mL/hr, PRN, Starting on 23 at 0213, Until   
Discontinued, For blood glucose less than 70 mg/dL, with IV access and with loss
 of   
consciousness or unable to swallow or NPO  
  
   
                                                      
  
  
Or  
  
  
glucagon (GLUCAGEN) 1 MG/ML injection kitJump to med  
1 mg, Subcutaneous, PRN, Starting on 23 at 0213, Until Discontinued, For
   
blood glucose less than 70 mg/dL and with no IV access with loss of 
consciousness or   
alert and unable to swallow.  
  
   
                                                      
  
  
Or  
  
  
dextrose (GLUTOSE) 40 % oral gelJump to med  
15 g of glucose, Buccal, PRN, Starting on 23 at 0213, Until 
Discontinued,   
blood glucose between 50 - 69 mg/dL, and with no IV access, alert and able to   
swallow.  
  
   
                                                      
  
  
Or  
  
  
dextrose (GLUTOSE) 40 % oral gelJump to med  
30 g of glucose, Buccal, PRN, Starting on 23 at 0213, Until 
Discontinued,   
blood glucose of 49mg/dL or less, and with no IV access, alert and able to 
swallow.  
  
  
                                    Scheduled  
  
                          Medication Order 2024  
   
                                                      
  
  
amisulpride (BARHEMSYS)   
injection 10 mg (COMPLETED)  
10 mg, Intravenous Push, ONCE, 1   
dose, On 24 at 1630  
                                                            1601 (Given - Provid  
er:   
Baljit Monroy RN)  
  
   
                                                      
  
  
ertapenem (INVANZ) 500 mg in   
sodium chloride 0.9 % 100 mL   
ivpb (COMPLETED)  
500 mg, Intravenous, ONCE, 1   
dose, On 24 at 1430  
                                                            1348 (IV New Bag - P  
rovider:   
SWAPNIL Ortiz)  
  
   
                                                      
  
  
linezolid in dextrose 5% 300 mL   
(ZYVOX) ivpb 600 mg 300 mL   
(COMPLETED)  
600 mg, Intravenous, Once, 1   
dose, On 24 at 1430, at   
600 mL/hr  
                                                            1349 (Given - Provid  
er: SWAPNIL Ortiz - Comment:   
over 30 minutes)  
  
  
                                   Continuous  
  
                          Medication Order 2024  
   
                                                      
  
  
lactated ringers iv infusion  
Intravenous, at 75 mL/hr,   
CONTINUOUS, Starting on 24 at 1130, Until   
Discontinued  
                                                            1116 (IV New Bag - P  
rovider:   
Alyx West RN)  
  
  
                                       PRN  
  
                          Medication Order 2024  
   
                                                      
  
  
HYDROmorphone (DILAUDID) 0.2 MG/ML injection 0.2 mg  
0.2 mg, Intravenous Push, PACU EVERY 15 MIN PRN X 4   
DOSES, 4 doses, Starting on 24 at 1121,   
Until Thu 24 at 1120, Moderate Pain (pain score   
4,5,6), PACU Now  
                                                              
   
                                                      
  
  
HYDROmorphone (DILAUDID) 1 mg/mL injection  
0.5 mg, Intravenous Push, PACU EVERY 15 MIN PRN X 4   
DOSES, 4 doses, Starting on 24 at 1121,   
Until Wed 5/15/24 at 2359, Severe Pain (pain score   
7,8,9,10), PACU Now  
                                                              
   
                                                      
  
  
naloxone (NARCAN) 0.4 MG/ML injection  
0.4 mg, Intravenous Push, PRN, Starting on 24 at 1121, Until Discontinued, Respiratory   
Rate Less Than 8 for adults and less than 12 for   
Peds or for suspected overdose, PACU Now  
                                                              
   
                                                      
  
  
oxyCODONE-acetaminophen (PERCOCET) 5-325 mg per   
tablet  
2 Tablet, Oral, PRN, 1 dose, Starting on 24   
at 1121, Until Discontinued, Moderate Pain (pain   
score 4,5,6), PACU Now  
                                                              
   
                                                      
  
  
sodium chloride 0.9 % 0.9 % injection  
3 mL, Intravenous Push, PRN, Starting on 24   
at 1121, Until Discontinued, For medication   
administration and blood draw, PACU Now  
                                                              
  
                                    Scheduled  
  
                          Medication Order 2024  
   
                                                      
  
  
ertapenem (INVANZ) 1000 mg in NS   
100 mL ivpb (COMPLETED)  
1,000 mg, Intravenous, ONCE, 1   
dose, On Thu 3/28/24 at 1130  
                                                            1247 (Given - Provid  
er: Vijaya Mars, APRN-CRNA)  
  
  
                                   Continuous  
  
                          Medication Order 2024  
   
                                                      
  
  
lactated ringers iv infusion  
Intravenous, at 75 mL/hr, CONTINUOUS, Starting on   
Thu 3/28/24 at 1130, Until Discontinued  
                                                            1130 (Due)  
  
  
                                       PRN  
  
                          Medication Order 2024  
   
                                                      
  
  
fentaNYL (SUBLIMAZE) 50 MCG/ML   
injection  
25 mcg, Intravenous Push, EVERY 5   
MIN PRN, 5 doses, Starting on Thu   
3/28/24 at 1150, Until Fri 3/29/24   
at 2359, Severe Pain (pain score   
7,8,9,10), PACU Now  
                                                            1602 (Given - Provid  
er: Merly Stephens RN)1608 (Given -   
Provider: Merly Stephens RN)1630 (Given - Provider:   
Merly Stephens RN)  
  
   
                                                      
  
  
fentaNYL (SUBLIMAZE) 50 MCG/ML   
injection  
12.5 mcg, Intravenous Push, EVERY   
5 MIN PRN, 5 doses, Starting on   
Thu 3/28/24 at 1150, Until   
Discontinued, Moderate Pain (pain   
score 4,5,6), PACU Now  
                                                              
   
                                                      
  
  
iohexol (OMNIPAQUE) 300 MG/ML   
injection (CANCELED)  
PRN, Starting on Thu 3/28/24 at   
1430, Until Thu 3/28/24 at 1507,   
Intra-op  
                                                            1430 (Given - Provid  
er:   
Rosendo Weathers MD -   
Comment: ureter)  
  
   
                                                      
  
  
naloxone (NARCAN) 0.4 MG/ML   
injection  
0.4 mg, Intravenous Push, PRN,   
Starting on Thu 3/28/24 at 1150,   
Until Discontinued, Respiratory   
Rate Less Than 8 for adults and   
less than 12 for Peds or for   
suspected overdose, PACU Now  
                                                              
   
                                                      
  
  
ondansetron (ZOFRAN) 4 MG/2ML   
injection  
4 mg, Intravenous Push, PACU ONCE   
PRN, Starting on Thu 3/28/24 at   
1150, Until Thu 3/28/24 at 1749,   
Nausea, Vomiting, PACU Now  
                                                              
   
                                                      
  
  
sodium chloride 0.9 % 0.9 %   
injection  
3 mL, Intravenous Push, PRN,   
Starting on Thu 3/28/24 at 1150,   
Until Discontinued, For medication   
administration and blood draw,   
PACU Now  
                                                              
  
  
FOR RECORDS PERTAINING TO PATIENTS WHO ARE OR HAVE BEEN ENROLLED IN A CHEMICAL 
DEPENDENCY/SUBSTANCEABUSE PROGRAM, SOME INFORMATION MAY BE OMITTED. This 
clinical summary was aggregated from multiple sources.  Caution should be 
exercised in using it in the provision of clinical care. This summary normalizes
 information from multiple sources, and as a consequence, information in this 
document may materially change the coding, format and clinical context of 
patient data. In addition, data may be omitted in some cases. CLINICAL DECISIONS
 SHOULD BE BASED ON THE PRIMARY CLINICAL RECORDS. [email or web address] provides
 no warranty or guarantee of the accuracy or completeness of information in this
document.